# Patient Record
Sex: FEMALE | Race: WHITE | NOT HISPANIC OR LATINO | Employment: OTHER | URBAN - METROPOLITAN AREA
[De-identification: names, ages, dates, MRNs, and addresses within clinical notes are randomized per-mention and may not be internally consistent; named-entity substitution may affect disease eponyms.]

---

## 2017-03-23 ENCOUNTER — OFFICE VISIT (OUTPATIENT)
Dept: ELECTROPHYSIOLOGY | Facility: CLINIC | Age: 67
End: 2017-03-23
Payer: MEDICARE

## 2017-03-23 ENCOUNTER — HOSPITAL ENCOUNTER (OUTPATIENT)
Dept: CARDIOLOGY | Facility: CLINIC | Age: 67
Discharge: HOME | End: 2017-03-23
Payer: MEDICARE

## 2017-03-23 ENCOUNTER — CLINICAL SUPPORT (OUTPATIENT)
Dept: ELECTROPHYSIOLOGY | Facility: CLINIC | Age: 67
End: 2017-03-23
Payer: MEDICARE

## 2017-03-23 VITALS
SYSTOLIC BLOOD PRESSURE: 122 MMHG | BODY MASS INDEX: 21.97 KG/M2 | HEIGHT: 67 IN | WEIGHT: 140 LBS | DIASTOLIC BLOOD PRESSURE: 68 MMHG | HEART RATE: 77 BPM

## 2017-03-23 DIAGNOSIS — I44.30 AV BLOCK: ICD-10-CM

## 2017-03-23 DIAGNOSIS — I44.30 AV BLOCK: Primary | ICD-10-CM

## 2017-03-23 DIAGNOSIS — F41.9 ANXIETY: ICD-10-CM

## 2017-03-23 DIAGNOSIS — Z95.0 CARDIAC PACEMAKER IN SITU: ICD-10-CM

## 2017-03-23 PROCEDURE — 1126F AMNT PAIN NOTED NONE PRSNT: CPT | Mod: S$GLB,,, | Performed by: INTERNAL MEDICINE

## 2017-03-23 PROCEDURE — 1160F RVW MEDS BY RX/DR IN RCRD: CPT | Mod: S$GLB,,, | Performed by: INTERNAL MEDICINE

## 2017-03-23 PROCEDURE — 93000 ELECTROCARDIOGRAM COMPLETE: CPT | Mod: S$GLB,,, | Performed by: INTERNAL MEDICINE

## 2017-03-23 PROCEDURE — 99999 PR PBB SHADOW E&M-EST. PATIENT-LVL III: CPT | Mod: PBBFAC,,, | Performed by: INTERNAL MEDICINE

## 2017-03-23 PROCEDURE — 1159F MED LIST DOCD IN RCRD: CPT | Mod: S$GLB,,, | Performed by: INTERNAL MEDICINE

## 2017-03-23 PROCEDURE — 99214 OFFICE O/P EST MOD 30 MIN: CPT | Mod: S$GLB,,, | Performed by: INTERNAL MEDICINE

## 2017-03-23 PROCEDURE — 1157F ADVNC CARE PLAN IN RCRD: CPT | Mod: S$GLB,,, | Performed by: INTERNAL MEDICINE

## 2017-03-23 PROCEDURE — 93280 PM DEVICE PROGR EVAL DUAL: CPT | Mod: S$GLB,,, | Performed by: INTERNAL MEDICINE

## 2017-03-23 NOTE — PROGRESS NOTES
Subjective:   Patient ID:  Jalyn Love is a 67 y.o. female     Chief complaint: Pacemaker Check.     HPI  From my last note (03/07/16):  PPM for 2nd degree AVB   Significant anxiety -- much improved since she has been on cymbalta  At her last office visit, Ivan Rahman reported that she remained active, though recently retired - walking her dog in the park, yoga, with occasional fatigue.     Update since then:  Since her last office visit, Ms. Love reports feeling well; she denies chest pain, SOB/YORK, dizziness, palpitations, or syncope. She continues to walk in the park and do yoga without difficulty.     Recent cardiac studies:  Echo (03/28/16) CONCLUSIONS:     1 - Normal left ventricular systolic function (EF 55-60%).     2 - Normal right ventricular systolic function .     3 - Normal left ventricular diastolic function.     4 - The estimated PA systolic pressure is greater than 17 mmHg.     5 - Atrial septal aneurysm . No color flow doppler across the IAS.     6-pacemaker catheter seen in the RA, RV.      Device Interrogation (03/23/17) reveals an intrinsic SR w/CHB with stable lead and device function. No arrhythmias or treated episodes noted. She paces 0% in the RA and 100% in the RV. Estimated battery longevity 6 years.     I reviewed today's ECG which demonstrated a V-paced rhythm at 77 bpm; , , and QTc 491.    Current Outpatient Prescriptions   Medication Sig    duloxetine (CYMBALTA) 60 MG capsule TAKE ONE CAPSULE BY MOUTH EVERY DAY     No current facility-administered medications for this visit.      Review of Systems   Constitution: Negative for decreased appetite, diaphoresis, weakness, malaise/fatigue, weight gain and weight loss.   HENT: Negative for headaches and nosebleeds.    Eyes: Negative for blurred vision, double vision and visual disturbance.   Cardiovascular: Negative for chest pain, claudication, cyanosis, dyspnea on exertion, irregular heartbeat, leg  swelling, near-syncope, orthopnea, palpitations, paroxysmal nocturnal dyspnea and syncope.   Respiratory: Negative for cough, shortness of breath and wheezing.    Endocrine: Negative for heat intolerance.   Skin: Negative.  Negative for rash.   Musculoskeletal: Negative.  Negative for muscle weakness and myalgias.   Gastrointestinal: Negative for abdominal pain, anorexia, hematemesis, hematochezia, melena, nausea and vomiting.   Genitourinary: Negative for hematuria and menorrhagia.   Neurological: Negative for excessive daytime sleepiness, dizziness, light-headedness, loss of balance, seizures and vertigo.   Psychiatric/Behavioral: Negative for altered mental status and depression. The patient is not nervous/anxious.        Objective:   Physical Exam   Constitutional: She is oriented to person, place, and time. She appears well-developed and well-nourished.   HENT:   Head: Normocephalic and atraumatic.   Right Ear: External ear normal.   Left Ear: External ear normal.   Eyes: Conjunctivae are normal. Pupils are equal, round, and reactive to light. Left eye exhibits no discharge. No scleral icterus.   Neck: Normal range of motion. Neck supple. No thyromegaly present.   Cardiovascular: Normal rate, regular rhythm, normal heart sounds and intact distal pulses.  Exam reveals no gallop, no S3, no S4, no friction rub, no midsystolic click and no opening snap.    No murmur heard.  Pulses:       Carotid pulses are 2+ on the right side, and 2+ on the left side.       Radial pulses are 2+ on the right side, and 2+ on the left side.        Dorsalis pedis pulses are 2+ on the right side, and 2+ on the left side.        Posterior tibial pulses are 2+ on the right side, and 2+ on the left side.   Pulmonary/Chest: Effort normal and breath sounds normal.   Device pocket is in excellent repair   Abdominal: Soft. Bowel sounds are normal. She exhibits no distension. There is no hepatomegaly. There is no tenderness. There is no  "guarding.   Musculoskeletal: Normal range of motion.        Right ankle: She exhibits no swelling.        Left ankle: She exhibits no swelling.        Right lower leg: She exhibits no swelling.        Left lower leg: She exhibits no swelling.   Neurological: She is alert and oriented to person, place, and time. She has normal strength. No cranial nerve deficit. Gait normal.   Skin: Skin is warm, dry and intact. No rash noted. She is not diaphoretic. No cyanosis. Nails show no clubbing.   Psychiatric: She has a normal mood and affect. Her speech is normal and behavior is normal. Thought content normal. Cognition and memory are normal.   Nursing note and vitals reviewed.    /68  Pulse 77  Ht 5' 7" (1.702 m)  Wt 63.5 kg (139 lb 15.9 oz)  BMI 21.93 kg/m2     Assessment:      1. AV block    2. Cardiac pacemaker in situ    3. Anxiety        Plan:      In summary, Ms. Love is a 67 y.o. female with AVB s/p DC PPM and anxiety. Ms. Love is doing well from a rhythm perspective with stable lead and device function without arrhythmia noted.     Continue current medication regimen and device settings.   Follow up in device clinic as scheduled.   Follow up in EP clinic in 1 year, sooner as needed.     Sophia Dow, MN, APRN, FNP-C    I have personally seen, interviewed and examined this patient and I agree with Ms chelsie's summary as stated above with the following provisos: She is doing well and her paced QRSd is quite narrow predicting a lower risk of pacing induced CMP.          "

## 2017-03-23 NOTE — MR AVS SNAPSHOT
[facility]  [address]  [facility] [address]  Phone: [phone]  Fax: [phone]                  Jalyn Love   3/23/2017 8:40 AM   Office Visit    Description:  Female : 1950   Provider:  Cristi Collins MD   Department:  [facility]           Reason for Visit     Follow-up           Diagnoses this Visit        Comments    AV block    -  Primary     Cardiac pacemaker in situ         Anxiety                To Do List           Future Appointments        Provider Department Dept Phone    2017 8:30 AM Annemarie Kilgore MD [place] [address] [phone]    2017 8:00 AM HOME MONITOR DEVICE CHECK, [facility] [phone]      Goals (5 Years of Data)     None      Follow-Up and Disposition     Return in about 1 year (around 3/23/2018).      [facility]     [facility] On Call Nurse [facility] -  Assistance  Registered nurses in the [facility] On Call Center provide clinical advisement, health education, appointment booking, and other advisory services.  Call for this free service at [phone].             Medications           Message regarding Medications     Verify the changes and/or additions to your medication regime listed below are the same as discussed with your clinician today.  If any of these changes or additions are incorrect, please notify your healthcare provider.        STOP taking these medications     calcium carbonate (OS-TALHA) 500 mg calcium (1,250 mg) tablet Take 1 tablet by mouth once daily.    sodium,potassium,mag sulfates (SUPREP BOWEL PREP KIT) 17.5-3.13-1.6 gram SolR Take 1 each by mouth as directed.           Verify that the below list of medications is an accurate representation of the medications you are currently taking.  If none reported, the list may be blank. If incorrect, please contact your healthcare provider. Carry this list with you in case of emergency.           Current  "Medications     duloxetine (CYMBALTA) 60 MG capsule TAKE ONE CAPSULE BY MOUTH EVERY DAY           Clinical Reference Information           Your Vitals Were     BP Pulse Height Weight BMI    122/68 77 5' 7" (1.702 m) 63.5 kg (139 lb 15.9 oz) 21.93 kg/m2      Blood Pressure          Most Recent Value    BP  122/68      Allergies as of 3/23/2017     Bactrim [Sulfamethoxazole-trimethoprim]      Immunizations Administered on Date of Encounter - 3/23/2017     None      Language Assistance Services     ATTENTION: Language assistance services are available, free of charge. Please call [phone].      ATENCIÓN: Si habla español, tiene a cortez disposición servicios gratuitos de asistencia lingüística. Llame al [phone].     MARIAMA Ý: N?u b?n nói Ti?ng Vi?t, có các d?ch v? h? tr? ngôn ng? mi?n phí dành cho b?n. G?i s? [phone].         [facility] complies with applicable Federal civil rights laws and does not discriminate on the basis of race, color, national origin, age, disability, or sex.        "

## 2017-03-24 DIAGNOSIS — I44.30 AV BLOCK: Primary | ICD-10-CM

## 2017-04-06 ENCOUNTER — OFFICE VISIT (OUTPATIENT)
Dept: INTERNAL MEDICINE | Facility: CLINIC | Age: 67
End: 2017-04-06
Payer: MEDICARE

## 2017-04-06 VITALS
HEART RATE: 75 BPM | BODY MASS INDEX: 21.97 KG/M2 | WEIGHT: 140 LBS | HEIGHT: 67 IN | SYSTOLIC BLOOD PRESSURE: 124 MMHG | DIASTOLIC BLOOD PRESSURE: 82 MMHG

## 2017-04-06 DIAGNOSIS — Z29.9 PREVENTIVE MEASURE: ICD-10-CM

## 2017-04-06 DIAGNOSIS — E55.9 MILD VITAMIN D DEFICIENCY: ICD-10-CM

## 2017-04-06 DIAGNOSIS — Z95.0 CARDIAC PACEMAKER IN SITU: Primary | ICD-10-CM

## 2017-04-06 DIAGNOSIS — F41.9 ANXIETY: ICD-10-CM

## 2017-04-06 DIAGNOSIS — R73.9 HYPERGLYCEMIA: ICD-10-CM

## 2017-04-06 DIAGNOSIS — E78.5 HYPERLIPIDEMIA, UNSPECIFIED HYPERLIPIDEMIA TYPE: ICD-10-CM

## 2017-04-06 LAB
BILIRUB UR QL STRIP: NEGATIVE
CLARITY UR REFRACT.AUTO: CLEAR
COLOR UR AUTO: NORMAL
GLUCOSE UR QL STRIP: NEGATIVE
HGB UR QL STRIP: NEGATIVE
KETONES UR QL STRIP: NEGATIVE
LEUKOCYTE ESTERASE UR QL STRIP: NEGATIVE
NITRITE UR QL STRIP: NEGATIVE
PH UR STRIP: 7 [PH] (ref 5–8)
PROT UR QL STRIP: NEGATIVE
SP GR UR STRIP: 1.01 (ref 1–1.03)
URN SPEC COLLECT METH UR: NORMAL
UROBILINOGEN UR STRIP-ACNC: NEGATIVE EU/DL

## 2017-04-06 PROCEDURE — 81003 URINALYSIS AUTO W/O SCOPE: CPT

## 2017-04-06 PROCEDURE — 90732 PPSV23 VACC 2 YRS+ SUBQ/IM: CPT | Mod: S$GLB,,, | Performed by: INTERNAL MEDICINE

## 2017-04-06 PROCEDURE — G0009 ADMIN PNEUMOCOCCAL VACCINE: HCPCS | Mod: S$GLB,,, | Performed by: INTERNAL MEDICINE

## 2017-04-06 PROCEDURE — 99999 PR PBB SHADOW E&M-EST. PATIENT-LVL III: CPT | Mod: PBBFAC,,, | Performed by: INTERNAL MEDICINE

## 2017-04-06 PROCEDURE — 1157F ADVNC CARE PLAN IN RCRD: CPT | Mod: S$GLB,,, | Performed by: INTERNAL MEDICINE

## 2017-04-06 PROCEDURE — 99214 OFFICE O/P EST MOD 30 MIN: CPT | Mod: 25,S$GLB,, | Performed by: INTERNAL MEDICINE

## 2017-04-06 PROCEDURE — 1126F AMNT PAIN NOTED NONE PRSNT: CPT | Mod: S$GLB,,, | Performed by: INTERNAL MEDICINE

## 2017-04-06 PROCEDURE — 1159F MED LIST DOCD IN RCRD: CPT | Mod: S$GLB,,, | Performed by: INTERNAL MEDICINE

## 2017-04-06 PROCEDURE — 99499 UNLISTED E&M SERVICE: CPT | Mod: S$PBB,,, | Performed by: INTERNAL MEDICINE

## 2017-04-06 PROCEDURE — 1160F RVW MEDS BY RX/DR IN RCRD: CPT | Mod: S$GLB,,, | Performed by: INTERNAL MEDICINE

## 2017-04-06 RX ORDER — DULOXETIN HYDROCHLORIDE 60 MG/1
60 CAPSULE, DELAYED RELEASE ORAL DAILY
Qty: 90 CAPSULE | Refills: 3 | Status: SHIPPED | OUTPATIENT
Start: 2017-04-06 | End: 2017-12-27

## 2017-04-06 NOTE — MR AVS SNAPSHOT
[facility] [address]  [address]  [address]  [facility] [address]  Phone: [phone]                  Jalyn Love   2017 8:30 AM   Office Visit    Description:  Female : 1950   Provider:  Annemarie Kilgore MD   Department:  [facility] [address]           Reason for Visit     Annual Exam           Diagnoses this Visit        Comments    Cardiac pacemaker in situ    -  Primary     Anxiety         Hyperlipidemia, unspecified hyperlipidemia type         Mild vitamin D deficiency         Hyperglycemia         Preventive measure                To Do List           Future Appointments        Provider Department Dept Phone    2017 8:00 AM HOME MONITOR DEVICE CHECK, [facility] [phone]      Goals (5 Years of Data)     None      Follow-Up and Disposition     Return in about 1 year (around 2018).       These Medications        Disp Refills Start End    duloxetine (CYMBALTA) 60 MG capsule 90 capsule 3 2017     Take 1 capsule (60 mg total) by mouth once daily. - Oral    Pharmacy: [facility] [address] [place] [address] [address] AT [facility] [address] Ph #: [phone]       Notes to Pharmacy: **Patient requests 90 days supply**      [facility] On Call     [facility] On Call Nurse Care Line - 24/ Assistance  Unless otherwise directed by your provider, please contact [facility], our nurse care line that is available for 24/7 assistance.     Registered nurses in the [facility] On Call Center provide: appointment scheduling, clinical advisement, health education, and other advisory services.  Call: [phone] (toll free)               Medications           Message regarding Medications     Verify the changes and/or additions to your medication regime listed below are the same as discussed with your clinician today.  If any of these changes or additions are incorrect, please notify your healthcare  "provider.        CHANGE how you are taking these medications     Start Taking Instead of    duloxetine (CYMBALTA) 60 MG capsule duloxetine (CYMBALTA) 60 MG capsule    Dosage:  Take 1 capsule (60 mg total) by mouth once daily. Dosage:  TAKE ONE CAPSULE BY MOUTH EVERY DAY    Reason for Change:  Reorder            Verify that the below list of medications is an accurate representation of the medications you are currently taking.  If none reported, the list may be blank. If incorrect, please contact your healthcare provider. Carry this list with you in case of emergency.           Current Medications     duloxetine (CYMBALTA) 60 MG capsule Take 1 capsule (60 mg total) by mouth once daily.           Clinical Reference Information           Your Vitals Were     BP Pulse Height Weight BMI    124/82 75 5' 7" (1.702 m) 63.5 kg (140 lb) 21.93 kg/m2      Blood Pressure          Most Recent Value    BP  124/82      Allergies as of 4/6/2017     Bactrim [Sulfamethoxazole-trimethoprim]      Immunizations Administered on Date of Encounter - 4/6/2017     Name Date Dose VIS Date Route    Pneumococcal Polysaccharide - 23 Valent 4/6/2017 0.5 mL 4/24/2015 Intramuscular      Orders Placed During Today's Visit      Normal Orders This Visit    Pneumococcal Polysaccharide Vaccine (23 Valent) (SQ/IM)     Urinalysis     Future Labs/Procedures Expected by Expires    CBC auto differential  4/6/2017 6/5/2018    Comprehensive metabolic panel  4/6/2017 4/6/2018    Hemoglobin A1c  4/6/2017 4/6/2018    Lipid panel  4/6/2017 4/6/2018    TSH  4/6/2017 4/6/2018    Vitamin D  4/6/2017 4/6/2018      Language Assistance Services     ATTENTION: Language assistance services are available, free of charge. Please call [phone].      ATENCIÓN: Si habla español, tiene a cortez disposición servicios gratuitos de asistencia lingüística. Llame al [phone].     CHÚ Ý: N?u b?n nói Ti?ng Vi?t, có các d?ch v? h? tr? ngôn ng? mi?n phí dành cho b?n. G?i s? " [phone].         [facility] [address] complies with applicable Federal civil rights laws and does not discriminate on the basis of race, color, national origin, age, disability, or sex.

## 2017-04-13 ENCOUNTER — LAB VISIT (OUTPATIENT)
Dept: LAB | Facility: HOSPITAL | Age: 67
End: 2017-04-13
Attending: INTERNAL MEDICINE
Payer: MEDICARE

## 2017-04-13 DIAGNOSIS — E78.5 HYPERLIPIDEMIA, UNSPECIFIED HYPERLIPIDEMIA TYPE: ICD-10-CM

## 2017-04-13 DIAGNOSIS — Z95.0 CARDIAC PACEMAKER IN SITU: ICD-10-CM

## 2017-04-13 DIAGNOSIS — F41.9 ANXIETY: ICD-10-CM

## 2017-04-13 DIAGNOSIS — E55.9 MILD VITAMIN D DEFICIENCY: ICD-10-CM

## 2017-04-13 DIAGNOSIS — R73.9 HYPERGLYCEMIA: ICD-10-CM

## 2017-04-13 LAB
25(OH)D3+25(OH)D2 SERPL-MCNC: 33 NG/ML
ALBUMIN SERPL BCP-MCNC: 3.7 G/DL
ALP SERPL-CCNC: 69 U/L
ALT SERPL W/O P-5'-P-CCNC: 16 U/L
ANION GAP SERPL CALC-SCNC: 9 MMOL/L
AST SERPL-CCNC: 18 U/L
BASOPHILS # BLD AUTO: 0.05 K/UL
BASOPHILS NFR BLD: 1.3 %
BILIRUB SERPL-MCNC: 0.9 MG/DL
BUN SERPL-MCNC: 15 MG/DL
CALCIUM SERPL-MCNC: 9.5 MG/DL
CHLORIDE SERPL-SCNC: 106 MMOL/L
CHOLEST/HDLC SERPL: 3.1 {RATIO}
CO2 SERPL-SCNC: 28 MMOL/L
CREAT SERPL-MCNC: 0.8 MG/DL
DIFFERENTIAL METHOD: ABNORMAL
EOSINOPHIL # BLD AUTO: 0.3 K/UL
EOSINOPHIL NFR BLD: 7.6 %
ERYTHROCYTE [DISTWIDTH] IN BLOOD BY AUTOMATED COUNT: 12.4 %
EST. GFR  (AFRICAN AMERICAN): >60 ML/MIN/1.73 M^2
EST. GFR  (NON AFRICAN AMERICAN): >60 ML/MIN/1.73 M^2
ESTIMATED AVG GLUCOSE: 114 MG/DL
GLUCOSE SERPL-MCNC: 81 MG/DL
HBA1C MFR BLD HPLC: 5.6 %
HCT VFR BLD AUTO: 39 %
HDL/CHOLESTEROL RATIO: 32.1 %
HDLC SERPL-MCNC: 246 MG/DL
HDLC SERPL-MCNC: 79 MG/DL
HGB BLD-MCNC: 13.3 G/DL
LDLC SERPL CALC-MCNC: 150.6 MG/DL
LYMPHOCYTES # BLD AUTO: 1.6 K/UL
LYMPHOCYTES NFR BLD: 40.9 %
MCH RBC QN AUTO: 31.1 PG
MCHC RBC AUTO-ENTMCNC: 34.1 %
MCV RBC AUTO: 91 FL
MONOCYTES # BLD AUTO: 0.3 K/UL
MONOCYTES NFR BLD: 7.3 %
NEUTROPHILS # BLD AUTO: 1.7 K/UL
NEUTROPHILS NFR BLD: 42.9 %
NONHDLC SERPL-MCNC: 167 MG/DL
PLATELET # BLD AUTO: 215 K/UL
PMV BLD AUTO: 10.1 FL
POTASSIUM SERPL-SCNC: 5 MMOL/L
PROT SERPL-MCNC: 6.9 G/DL
RBC # BLD AUTO: 4.27 M/UL
SODIUM SERPL-SCNC: 143 MMOL/L
TRIGL SERPL-MCNC: 82 MG/DL
TSH SERPL DL<=0.005 MIU/L-ACNC: 1.09 UIU/ML
WBC # BLD AUTO: 3.84 K/UL

## 2017-04-13 PROCEDURE — 82306 VITAMIN D 25 HYDROXY: CPT

## 2017-04-13 PROCEDURE — 80053 COMPREHEN METABOLIC PANEL: CPT

## 2017-04-13 PROCEDURE — 36415 COLL VENOUS BLD VENIPUNCTURE: CPT

## 2017-04-13 PROCEDURE — 85025 COMPLETE CBC W/AUTO DIFF WBC: CPT

## 2017-04-13 PROCEDURE — 83036 HEMOGLOBIN GLYCOSYLATED A1C: CPT

## 2017-04-13 PROCEDURE — 84443 ASSAY THYROID STIM HORMONE: CPT

## 2017-04-13 PROCEDURE — 80061 LIPID PANEL: CPT

## 2017-04-17 NOTE — PROGRESS NOTES
Subjective:       Patient ID: Jalyn Love is a 67 y.o. female.    Chief Complaint: Annual Exam    HPIPt is doing okay physically.  She had recent cardiology check up.  No CP or SOB. Very active. Still trying to help her son who has had issues with drugs.  Review of Systems   Respiratory: Negative for shortness of breath (PND or orthopnea).    Cardiovascular: Negative for chest pain (arm pain or jaw pain).   Gastrointestinal: Negative for abdominal pain, diarrhea, nausea and vomiting.   Genitourinary: Negative for dysuria.   Neurological: Negative for seizures, syncope and headaches.       Objective:      Physical Exam   Constitutional: She is oriented to person, place, and time. She appears well-developed and well-nourished. No distress.   HENT:   Head: Normocephalic.   Mouth/Throat: Oropharynx is clear and moist.   Neck: Neck supple. No JVD present. No thyromegaly present.   Cardiovascular: Normal rate, regular rhythm, normal heart sounds and intact distal pulses.  Exam reveals no gallop and no friction rub.    No murmur heard.  Pulmonary/Chest: Effort normal and breath sounds normal. She has no wheezes. She has no rales.   Abdominal: Soft. Bowel sounds are normal. She exhibits no distension and no mass. There is no tenderness. There is no rebound and no guarding.   Musculoskeletal: She exhibits no edema.   Lymphadenopathy:     She has no cervical adenopathy.   Neurological: She is alert and oriented to person, place, and time.   Skin: Skin is warm and dry.   Psychiatric: She has a normal mood and affect. Her behavior is normal. Judgment and thought content normal.       Assessment:       1. Cardiac pacemaker in situ    2. Anxiety    3. Hyperlipidemia, unspecified hyperlipidemia type    4. Mild vitamin D deficiency    5. Hyperglycemia    6. Preventive measure        Plan:   Cardiac pacemaker in situ  -     CBC auto differential; Future; Expected date: 4/6/17  -     Comprehensive metabolic panel;  Future; Expected date: 4/6/17    Anxiety  -     TSH; Future; Expected date: 4/6/17  Continue cymbalta  Hyperlipidemia, unspecified hyperlipidemia type  -     Lipid panel; Future; Expected date: 4/6/17    Mild vitamin D deficiency  -     Vitamin D; Future; Expected date: 4/6/17    Hyperglycemia  -     Hemoglobin A1c; Future; Expected date: 4/6/17    Preventive measure  -     Urinalysis    Other orders  -     Pneumococcal Polysaccharide Vaccine (23 Valent) (SQ/IM)  -     duloxetine (CYMBALTA) 60 MG capsule; Take 1 capsule (60 mg total) by mouth once daily.  Dispense: 90 capsule; Refill: 3

## 2017-06-26 ENCOUNTER — CLINICAL SUPPORT (OUTPATIENT)
Dept: ELECTROPHYSIOLOGY | Facility: CLINIC | Age: 67
End: 2017-06-26
Payer: MEDICARE

## 2017-06-26 DIAGNOSIS — Z95.0 CARDIAC PACEMAKER IN SITU: ICD-10-CM

## 2017-06-26 DIAGNOSIS — I44.30 AV BLOCK: ICD-10-CM

## 2017-06-26 PROCEDURE — 93296 REM INTERROG EVL PM/IDS: CPT | Mod: S$GLB,,, | Performed by: INTERNAL MEDICINE

## 2017-06-26 PROCEDURE — 93294 REM INTERROG EVL PM/LDLS PM: CPT | Mod: S$GLB,,, | Performed by: INTERNAL MEDICINE

## 2017-09-28 ENCOUNTER — CLINICAL SUPPORT (OUTPATIENT)
Dept: ELECTROPHYSIOLOGY | Facility: CLINIC | Age: 67
End: 2017-09-28
Payer: MEDICARE

## 2017-09-28 DIAGNOSIS — I44.30 AV BLOCK: ICD-10-CM

## 2017-09-28 DIAGNOSIS — Z95.0 CARDIAC PACEMAKER IN SITU: ICD-10-CM

## 2017-09-28 PROCEDURE — 93296 REM INTERROG EVL PM/IDS: CPT | Mod: S$GLB,,, | Performed by: INTERNAL MEDICINE

## 2017-09-28 PROCEDURE — 93294 REM INTERROG EVL PM/LDLS PM: CPT | Mod: S$GLB,,, | Performed by: INTERNAL MEDICINE

## 2017-12-20 ENCOUNTER — TELEPHONE (OUTPATIENT)
Dept: ELECTROPHYSIOLOGY | Facility: CLINIC | Age: 67
End: 2017-12-20

## 2017-12-20 NOTE — TELEPHONE ENCOUNTER
Called patient to inform her the remote transmission was received and all was WNL.  Patient verbalized understanding and stated she has placed a call to her PCP.

## 2017-12-20 NOTE — TELEPHONE ENCOUNTER
----- Message from Binta Machado sent at 12/20/2017 11:45 AM CST -----  Device transmission is WNL.

## 2017-12-20 NOTE — TELEPHONE ENCOUNTER
"Patient contacted the Device Clinic on this morning with c/o "my heart feels fatigued" and I have been feeling really tired and feeling the same way I felt shortly after having my pacemaker put in with all sorts of reprogramming having to be done.  Patient asked if she could send a transmission from her Remote Pacemaker home monitor.  Informed the patient she can go ahead and send a transmission.  Spoke with the Technician who monitors the Care link web site with the above information and she will review the report and a message would be sent to Dr. Collins's nurse if anything of alarm value is found on the transmission.      Patient called back and stated her IPad was next to her remote monitor, "should I send it again"?  Informed the patient the transmission was received and the "summary" reveals " no new events"  Informed the patient the Technician will review the entire report as well as should there be no abnormalities she should contact her PCP for further evaluation.  Patient verbalized understanding to all of the above.    "

## 2017-12-27 ENCOUNTER — OFFICE VISIT (OUTPATIENT)
Dept: INTERNAL MEDICINE | Facility: CLINIC | Age: 67
End: 2017-12-27
Payer: MEDICARE

## 2017-12-27 ENCOUNTER — HOSPITAL ENCOUNTER (OUTPATIENT)
Dept: RADIOLOGY | Facility: HOSPITAL | Age: 67
Discharge: HOME | End: 2017-12-27
Attending: INTERNAL MEDICINE
Payer: MEDICARE

## 2017-12-27 VITALS
DIASTOLIC BLOOD PRESSURE: 83 MMHG | WEIGHT: 143.31 LBS | HEIGHT: 67 IN | BODY MASS INDEX: 22.49 KG/M2 | OXYGEN SATURATION: 99 % | HEART RATE: 71 BPM | SYSTOLIC BLOOD PRESSURE: 114 MMHG | TEMPERATURE: 98 F

## 2017-12-27 DIAGNOSIS — R07.89 CHEST WALL DISCOMFORT: Primary | ICD-10-CM

## 2017-12-27 DIAGNOSIS — Z95.0 CARDIAC PACEMAKER IN SITU: ICD-10-CM

## 2017-12-27 DIAGNOSIS — R07.89 CHEST WALL DISCOMFORT: ICD-10-CM

## 2017-12-27 PROCEDURE — 93005 ELECTROCARDIOGRAM TRACING: CPT | Mod: S$GLB,,, | Performed by: INTERNAL MEDICINE

## 2017-12-27 PROCEDURE — 99213 OFFICE O/P EST LOW 20 MIN: CPT | Mod: S$GLB,,, | Performed by: INTERNAL MEDICINE

## 2017-12-27 PROCEDURE — 93010 ELECTROCARDIOGRAM REPORT: CPT | Mod: S$GLB,,, | Performed by: INTERNAL MEDICINE

## 2017-12-27 PROCEDURE — 71020 XR CHEST PA AND LATERAL: CPT | Mod: TC,PO

## 2017-12-27 PROCEDURE — 71020 XR CHEST PA AND LATERAL: CPT | Mod: 26,,, | Performed by: RADIOLOGY

## 2017-12-27 PROCEDURE — 99999 PR PBB SHADOW E&M-EST. PATIENT-LVL IV: CPT | Mod: PBBFAC,,, | Performed by: INTERNAL MEDICINE

## 2017-12-27 RX ORDER — CHOLECALCIFEROL (VITAMIN D3) 25 MCG
1000 TABLET ORAL DAILY
COMMUNITY

## 2017-12-27 NOTE — PROGRESS NOTES
Subjective:       Patient ID: Jalyn Love is a 67 y.o. female.    Chief Complaint: Chest Pain (more of a discomfort than pain)    Patient presents for evaluation of persistent left anterior chest wall discomfort.  She states it is not really a pain.  Hurts to turn neck and move the chest wall certain ways.  She is concerned that there may be something wrong with the pacer that she has.  She called Cardiology and the pacer was checked via phone.  It was said to be normally functioning.  No shortness of breathe.  Not aware of any palpitations.    She does yoga regularly and may have strained the chest with a certain pose called cobra which involves lying face down and raising the neck and upper torso.      Chest Pain    Pertinent negatives include no cough, fever, palpitations or shortness of breath.     Review of Systems   Constitutional: Negative for chills and fever.   Respiratory: Negative for cough, chest tightness and shortness of breath.    Cardiovascular: Positive for chest pain. Negative for palpitations and leg swelling.       Objective:      Physical Exam   Constitutional: She appears well-developed and well-nourished. No distress.   Cardiovascular: Normal rate, regular rhythm and normal heart sounds.  Exam reveals no gallop.    Pacer in place.  Mildly tender with direct palpation around the pacer area.  No redness.   Pulmonary/Chest: Effort normal and breath sounds normal. No respiratory distress.   Neurological: She is alert.   Vitals reviewed.      EKG with rhythm strip in the office shows pacer function.  CXR done and reviewed with patient in the office is normal.  Pacer wires seemed to be in place.  Assessment:       1. Chest wall discomfort    2. Cardiac pacemaker in situ        Plan:   1. Reassured that I think the chest wall discomfort is probably from a minimal chest wall strain.  Can use tyelnol, ibuprofen as needed.  Watch chest area in yoga poses.  2. Folllowup with regular PCP as  needed.

## 2017-12-27 NOTE — PATIENT INSTRUCTIONS
1. Reassured that I think this is a mild chest wall strain.  2. Be careful with poses in yoga.  May want to use a small towel under pacer when doing face down poses.  3. Tylenol, ibuprofen as needed.  4. Followup with regular PCP as needed.

## 2017-12-28 ENCOUNTER — TELEPHONE (OUTPATIENT)
Dept: ELECTROPHYSIOLOGY | Facility: CLINIC | Age: 67
End: 2017-12-28

## 2017-12-28 ENCOUNTER — CLINICAL SUPPORT (OUTPATIENT)
Dept: ELECTROPHYSIOLOGY | Facility: CLINIC | Age: 67
End: 2017-12-28
Payer: MEDICARE

## 2017-12-28 DIAGNOSIS — Z95.0 CARDIAC PACEMAKER IN SITU: ICD-10-CM

## 2017-12-28 DIAGNOSIS — I44.30 AV BLOCK: ICD-10-CM

## 2017-12-28 PROCEDURE — 93296 REM INTERROG EVL PM/IDS: CPT | Mod: S$GLB,,, | Performed by: INTERNAL MEDICINE

## 2017-12-28 PROCEDURE — 93294 REM INTERROG EVL PM/LDLS PM: CPT | Mod: S$GLB,,, | Performed by: INTERNAL MEDICINE

## 2017-12-28 NOTE — TELEPHONE ENCOUNTER
Patient is due for a remote pacemaker check today. I called patient to remind her to send her transmission. Patient will send her transmission today.

## 2017-12-29 ENCOUNTER — TELEPHONE (OUTPATIENT)
Dept: INTERNAL MEDICINE | Facility: CLINIC | Age: 67
End: 2017-12-29

## 2017-12-29 ENCOUNTER — NURSE TRIAGE (OUTPATIENT)
Dept: ADMINISTRATIVE | Facility: CLINIC | Age: 67
End: 2017-12-29

## 2017-12-29 ENCOUNTER — OFFICE VISIT (OUTPATIENT)
Dept: URGENT CARE | Facility: CLINIC | Age: 67
End: 2017-12-29
Payer: MEDICARE

## 2017-12-29 VITALS
BODY MASS INDEX: 21.97 KG/M2 | WEIGHT: 140 LBS | OXYGEN SATURATION: 99 % | HEIGHT: 67 IN | HEART RATE: 75 BPM | RESPIRATION RATE: 18 BRPM | DIASTOLIC BLOOD PRESSURE: 84 MMHG | TEMPERATURE: 97 F | SYSTOLIC BLOOD PRESSURE: 132 MMHG
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
## 2017-12-29 DIAGNOSIS — Z12.31 SCREENING MAMMOGRAM, ENCOUNTER FOR: Primary | ICD-10-CM

## 2017-12-29 PROCEDURE — 99214 OFFICE O/P EST MOD 30 MIN: CPT | Mod: S$GLB,,, | Performed by: PHYSICIAN ASSISTANT

## 2017-12-29 RX ORDER — AMOXICILLIN AND CLAVULANATE POTASSIUM 875; 125 MG/1; MG/1
1 TABLET, FILM COATED ORAL 2 TIMES DAILY
Qty: 20 TABLET | Refills: 0 | Status: SHIPPED | OUTPATIENT
Start: 2017-12-29 | End: 2018-04-06

## 2017-12-29 NOTE — PATIENT INSTRUCTIONS
- Rest.    - Drink plenty of fluids.    - Tylenol or Ibuprofen as directed as needed for fever/pain.    - Keep the wound clean and dry.    - Wash daily with soap and water.    - Change dressing daily.    - Ice for the next 24-48 hours.    - Elevate when possible.    - Follow up with your PCP or specialty clinic as directed in the next 1-2 weeks if not improved or as needed.  You can call [phone] to schedule an appointment with the appropriate provider.    - Go to the ED if your symptoms worsen.     Dog Bite  A dog bite can cause a wound deep enough to break the skin. In such cases, the wound is cleaned and sometimes closed. If the wound is closed, it is usually not completely closed. This is so that fluid can drain if the wound becomes infected. Often, wounds will be left open to heal. In addition to wound care, a tetanus shot may be given, if needed.    Home care  · Wash your hands well with soap and warm water before and after caring for the wound. This helps lower the risk of infection.  · Care for the wound as directed. If a dressing was applied to the wound, be sure to change it as directed.  · If the wound bleeds, place a clean, soft cloth on the wound. Then firmly apply pressure until the bleeding stops. This may take up to 5 minutes. Do not release the pressure and look at the wound during this time.  · Most wounds heal within 10 days. But an infection can occur even with proper treatment. So be sure to check the wound daily for signs of infection (see below).  · Antibiotics may be prescribed. These help prevent or treat infection. If youre given antibiotics, take them as directed. Also be sure to complete the medicines.  Rabies prevention  Rabies is a virus that can be carried in certain animals. These can include domestic animals such as dogs and cats. Pets fully vaccinated against rabies (2 shots) are at very low risk of infection. But because human rabies is almost always fatal, any biting pet  should be confined for 10 days as an extra precaution. In general, if there is a risk for rabies, the following steps may need to be taken:  · If someones pet dog has bitten you, it should be kept in a secure area for the next 10 days to watch for signs of illness. (If the pet owner wont allow this, contact your local animal control center.) If the dog becomes ill or dies during that time, contact your local animal control center at once so the animal may be tested for rabies. If the dog stays healthy for the next 10 days, there is no danger of rabies in the animal or you.  ¨ If a stray dog bit you, contact your local animal control center. They can give information on capture, quarantine, and animal rabies testing.  ¨ If you cant find the animal that bit you in the next 2 days, and if rabies exists in your area, you may need to receive the rabies vaccine series. Call your healthcare provider right away. Or, return to the emergency department promptly.  ¨ All animal bites should be reported to the local animal control center. If you were not given a form to fill out, you can report this yourself.  Follow-up care  Follow up with your healthcare provider, or as directed.  When to seek medical advice  Call your healthcare provider right away if any of these occur:  · Signs of infection:  ¨ Spreading redness or warmth from the wound  ¨ Increased pain or swelling  ¨ Fever of 100.4ºF (38ºC) or higher, or as directed by your healthcare provider  ¨ Colored fluid or pus draining from the wound  · Signs of rabies infection:  ¨ Headache  ¨ Confusion  ¨ Strange behavior  ¨ Increased salivating and drooling  ¨ Seizure  · Decreased ability to move any body part near the wound  · Bleeding that can't be stopped after 5 minutes of firm pressure  Date Last Reviewed: 3/1/2017  © 1633-8478 [employer]. [address] [place] [address], [place] [address]. All rights reserved. This information is not intended as a substitute for  professional medical care. Always follow your healthcare professional's instructions.

## 2017-12-29 NOTE — TELEPHONE ENCOUNTER
Reason for Disposition   Cut (length > 1/8 inch or 3 mm) or skin tear and any animal    Protocols used:  ANIMAL BITE-A-OH  Ms. Love was bit in the inner thigh by domestic dogs this morning. Patient advsied to go to ED. She states she will go to urgent care.

## 2017-12-29 NOTE — PROGRESS NOTES
"Subjective:       Patient ID: Jalyn Love is a 67 y.o. female.    Vitals:  height is 5' 7" (1.702 m) and weight is 63.5 kg (140 lb). Her tympanic temperature is 97.3 °F (36.3 °C). Her blood pressure is 132/84 and her pulse is 75. Her respiration is 18 and oxygen saturation is 99%.     Chief Complaint: Animal Bite    Animal Bite    The incident occurred just prior to arrival. The incident occurred at a playground. She came to the ER via personal transport. There is an injury to the right knee. The pain is mild. It is unlikely that a foreign body is present. Pertinent negatives include no chest pain, no abdominal pain, no nausea, no vomiting and no headaches. There have been no prior injuries to these areas. She is right-handed. Her tetanus status is UTD. She has been behaving normally. There were no sick contacts. She has received no recent medical care.     Review of Systems   Constitution: Negative for chills and fever.   HENT: Negative for sore throat.    Eyes: Negative for blurred vision.   Cardiovascular: Negative for chest pain.   Respiratory: Negative for shortness of breath.    Skin: Negative for rash.   Musculoskeletal: Negative for back pain and joint pain.   Gastrointestinal: Negative for abdominal pain, diarrhea, nausea and vomiting.   Neurological: Negative for headaches.   Psychiatric/Behavioral: The patient is not nervous/anxious.        Objective:      Physical Exam   Constitutional: She is oriented to person, place, and time. She appears well-developed and well-nourished.   HENT:   Head: Normocephalic and atraumatic.   Eyes: Conjunctivae are normal.   Neck: Normal range of motion. Neck supple. No thyromegaly present.   Cardiovascular: Normal rate and regular rhythm.  Exam reveals no gallop and no friction rub.    No murmur heard.  Pulmonary/Chest: Effort normal and breath sounds normal. She has no wheezes. She has no rales.   Musculoskeletal: Normal range of motion.        Right upper " leg: She exhibits tenderness and swelling.        Legs:  Lymphadenopathy:     She has no cervical adenopathy.   Neurological: She is alert and oriented to person, place, and time.   Skin: Skin is warm and dry. No rash noted. No erythema.   Psychiatric: She has a normal mood and affect. Her behavior is normal. Judgment and thought content normal.   Nursing note and vitals reviewed.      10:58 AM - Wound was irrigated with NS.  A dressing was applied.    Assessment:       1. Dog bite, initial encounter        Plan:         Dog bite, initial encounter  -     amoxicillin-clavulanate 875-125mg (AUGMENTIN) 875-125 mg per tablet; Take 1 tablet by mouth 2 (two) times daily.  Dispense: 20 tablet; Refill: 0      Jalyn Solares was seen today for animal bite.    Diagnoses and all orders for this visit:    Dog bite, initial encounter  -     amoxicillin-clavulanate 875-125mg (AUGMENTIN) 875-125 mg per tablet; Take 1 tablet by mouth 2 (two) times daily.      Patient Instructions   - Rest.    - Drink plenty of fluids.    - Tylenol or Ibuprofen as directed as needed for fever/pain.    - Keep the wound clean and dry.    - Wash daily with soap and water.    - Change dressing daily.    - Ice for the next 24-48 hours.    - Elevate when possible.    - Follow up with your PCP or specialty clinic as directed in the next 1-2 weeks if not improved or as needed.  You can call [phone] to schedule an appointment with the appropriate provider.    - Go to the ED if your symptoms worsen.     Dog Bite  A dog bite can cause a wound deep enough to break the skin. In such cases, the wound is cleaned and sometimes closed. If the wound is closed, it is usually not completely closed. This is so that fluid can drain if the wound becomes infected. Often, wounds will be left open to heal. In addition to wound care, a tetanus shot may be given, if needed.    Home care  · Wash your hands well with soap and warm water before and after caring for the  wound. This helps lower the risk of infection.  · Care for the wound as directed. If a dressing was applied to the wound, be sure to change it as directed.  · If the wound bleeds, place a clean, soft cloth on the wound. Then firmly apply pressure until the bleeding stops. This may take up to 5 minutes. Do not release the pressure and look at the wound during this time.  · Most wounds heal within 10 days. But an infection can occur even with proper treatment. So be sure to check the wound daily for signs of infection (see below).  · Antibiotics may be prescribed. These help prevent or treat infection. If youre given antibiotics, take them as directed. Also be sure to complete the medicines.  Rabies prevention  Rabies is a virus that can be carried in certain animals. These can include domestic animals such as dogs and cats. Pets fully vaccinated against rabies (2 shots) are at very low risk of infection. But because human rabies is almost always fatal, any biting pet should be confined for 10 days as an extra precaution. In general, if there is a risk for rabies, the following steps may need to be taken:  · If someones pet dog has bitten you, it should be kept in a secure area for the next 10 days to watch for signs of illness. (If the pet owner wont allow this, contact your local animal control center.) If the dog becomes ill or dies during that time, contact your local animal control center at once so the animal may be tested for rabies. If the dog stays healthy for the next 10 days, there is no danger of rabies in the animal or you.  ¨ If a stray dog bit you, contact your local animal control center. They can give information on capture, quarantine, and animal rabies testing.  ¨ If you cant find the animal that bit you in the next 2 days, and if rabies exists in your area, you may need to receive the rabies vaccine series. Call your healthcare provider right away. Or, return to the emergency department  promptly.  ¨ All animal bites should be reported to the local animal control center. If you were not given a form to fill out, you can report this yourself.  Follow-up care  Follow up with your healthcare provider, or as directed.  When to seek medical advice  Call your healthcare provider right away if any of these occur:  · Signs of infection:  ¨ Spreading redness or warmth from the wound  ¨ Increased pain or swelling  ¨ Fever of 100.4ºF (38ºC) or higher, or as directed by your healthcare provider  ¨ Colored fluid or pus draining from the wound  · Signs of rabies infection:  ¨ Headache  ¨ Confusion  ¨ Strange behavior  ¨ Increased salivating and drooling  ¨ Seizure  · Decreased ability to move any body part near the wound  · Bleeding that can't be stopped after 5 minutes of firm pressure  Date Last Reviewed: 3/1/2017  © 4980-0871 [employer]. [address] [place] [address]. All rights reserved. This information is not intended as a substitute for professional medical care. Always follow your healthcare professional's instructions.

## 2017-12-29 NOTE — TELEPHONE ENCOUNTER
----- Message from Patt Willis sent at 12/29/2017  4:18 PM CST -----  Contact: self [phone]  Type: Orders Request    What orders/ testing are being requested? Mammogram      Is there a future appointment scheduled for the patient with PCP? No      When?    Comments:  Please advise , Thanks

## 2018-01-04 ENCOUNTER — TELEPHONE (OUTPATIENT)
Dept: ELECTROPHYSIOLOGY | Facility: CLINIC | Age: 68
End: 2018-01-04

## 2018-01-04 NOTE — TELEPHONE ENCOUNTER
Returned patient's call on this afternoon.  Patient stated she was unable to view the remote Pacemaker reports in her patient portal.  Expressed to the patient this type of report is not set up for automatic release.  Informed the patient I have gone in and released the June and Sept remote checks.  Also informed the patient once the most recent remote check done on 12/28/17 is signed by the doctor it can also be released.  Informed the patient all of the transmissions reveal all is WNL.  Patient verbalized understanding to all of the above.

## 2018-01-08 ENCOUNTER — OFFICE VISIT (OUTPATIENT)
Dept: INTERNAL MEDICINE | Facility: CLINIC | Age: 68
End: 2018-01-08
Payer: MEDICARE

## 2018-01-08 VITALS
SYSTOLIC BLOOD PRESSURE: 121 MMHG | HEART RATE: 77 BPM | WEIGHT: 144.19 LBS | BODY MASS INDEX: 22.63 KG/M2 | DIASTOLIC BLOOD PRESSURE: 73 MMHG | HEIGHT: 67 IN

## 2018-01-08 DIAGNOSIS — S70.11XA HEMATOMA OF RIGHT THIGH, INITIAL ENCOUNTER: Primary | ICD-10-CM

## 2018-01-08 PROCEDURE — 99213 OFFICE O/P EST LOW 20 MIN: CPT | Mod: S$GLB,,, | Performed by: INTERNAL MEDICINE

## 2018-01-08 PROCEDURE — 99999 PR PBB SHADOW E&M-EST. PATIENT-LVL III: CPT | Mod: PBBFAC,,, | Performed by: INTERNAL MEDICINE

## 2018-01-08 RX ORDER — DULOXETIN HYDROCHLORIDE 60 MG/1
CAPSULE, DELAYED RELEASE ORAL
Refills: 3 | COMMUNITY
Start: 2017-10-09 | End: 2018-04-06 | Stop reason: SDUPTHER

## 2018-01-08 NOTE — PROGRESS NOTES
REASON FOR VISIT:  This is a 67-year-old female who was bitten by a dog on   12/29/2017 at a dog park.  She had two puncture wounds.  She went to Ur[facility].  Augmentin was given.  The puncture wounds have healed nicely as she puts   it, but she does still feel a hard, firm spot over the right thigh where she got   bit and there is bruising and some discomfort.    PAST MEDICAL HISTORY:  Pacemaker.  Anxiety.  Hyperlipidemia.    VACCINATION HISTORY:  Last tetanus was in 2014.    PHYSICAL EXAMINATION:  VITAL SIGNS:  Per EPIC.  EXTREMITIES:  2+ pedal pulses.  There is soft tissue firm, swollen area over the   lower medial thigh region, about 2 x 2 cm.  There is ecchymosis and there is   also ecchymosis over the medial aspect of the thigh and knee region and some in   the anterior upper shin or leg.  Two puncture marks are noted.  No signs of   infection.  This area swollen is not warm, minimally tender.  I was able to   review an iPhone picture when it first happened and the degree of swelling was   more and there was no ecchymosis over the medial thigh.    IMPRESSION:  Traumatic hematoma, clinically resolving.    PLAN:  She can take Advil or Aleve as needed for pain, but continue with putting   a warm pack over the area.  In a week or two if this does not completely   resolve, consider referral to Surgery.            /catalina [id number] review        DAGMAR/MADELINE  dd: 01/08/2018 16:40:39 (CST)  td: 01/09/2018 09:39:27 (CST)  Doc ID   #[id number]  Job ID #[id number]    CC:

## 2018-02-05 ENCOUNTER — HOSPITAL ENCOUNTER (OUTPATIENT)
Dept: RADIOLOGY | Facility: HOSPITAL | Age: 68
Discharge: HOME | End: 2018-02-05
Attending: INTERNAL MEDICINE
Payer: MEDICARE

## 2018-02-05 ENCOUNTER — OFFICE VISIT (OUTPATIENT)
Dept: OPTOMETRY | Facility: CLINIC | Age: 68
End: 2018-02-05
Payer: MEDICARE

## 2018-02-05 VITALS — HEIGHT: 67 IN | BODY MASS INDEX: 22.6 KG/M2 | WEIGHT: 144 LBS

## 2018-02-05 DIAGNOSIS — H16.229 KERATOCONJUNCTIVITIS SICCA, NOT SPECIFIED AS SJOGREN'S, UNSPECIFIED LATERALITY: Primary | ICD-10-CM

## 2018-02-05 DIAGNOSIS — Z12.31 SCREENING MAMMOGRAM, ENCOUNTER FOR: ICD-10-CM

## 2018-02-05 PROCEDURE — 77067 SCR MAMMO BI INCL CAD: CPT | Mod: TC

## 2018-02-05 PROCEDURE — 77063 BREAST TOMOSYNTHESIS BI: CPT | Mod: 26,,, | Performed by: RADIOLOGY

## 2018-02-05 PROCEDURE — 77067 SCR MAMMO BI INCL CAD: CPT | Mod: 26,,, | Performed by: RADIOLOGY

## 2018-02-05 PROCEDURE — 99999 PR PBB SHADOW E&M-EST. PATIENT-LVL II: CPT | Mod: PBBFAC,,, | Performed by: OPTOMETRIST

## 2018-02-05 NOTE — PROGRESS NOTES
HPI     Jalyn Love is a 68 y.o. Female who returns for continued   eye care. Jalyn was last seen by us on 01/14/2016 for bilateral   presbyopia.She wears progressive lenses for optical correction. She   reports that she has not noticed a significant changes.  She   underwentLasik Sx about 14 years ago, and still struggles with dry eyes.   She is interested in a new treatment for this. She uses artificial tears   but feels as if that just provides short term relief.    (--)blurred vision  (--)Headaches  (--)diplopia  (--)flashes  (--)floaters  (--)pain  (--)Itching  (--)tearing  (--)burning  (+)Dryness  (+) OTC Drops  (--)Photophobia    Last edited by Sonido Mcgraw, OD on 2/5/2018  9:32 AM. (History)        Review of Systems   Constitutional: Negative for chills, fever and malaise/fatigue.   HENT: Negative for congestion and hearing loss.    Eyes: Negative for blurred vision, double vision, photophobia, pain, discharge and redness.        S/p LASIK  Dry eyes   Respiratory: Negative.    Cardiovascular: Negative.    Gastrointestinal: Negative.    Genitourinary: Negative.    Musculoskeletal: Negative.    Skin: Negative.    Neurological: Negative for seizures.   Endo/Heme/Allergies: Negative for environmental allergies.        Allergies: bactrim   Psychiatric/Behavioral: Negative.        Assessment /Plan     For exam results, see Encounter Report.    Keratoconjunctivitis sicca, not specified as Sjogren's, unspecified laterality  - S/p LASIK  - Decreased TBUT in both eyes  - Lissamine green conjunctival staining in both eyes  - Symptomatic with no relief from OTC artificial tears  - Rx Xiidra eye drops for use twice daily in each eye    Nuclkear Sclerosis --> Asymptomatic; not visually significant  - no treatment needed at this time; Monitor annually    Refractive error with Presbyopia  - same specs ok    Glasses Prescription (2/5/2018)        Sphere Cylinder Add    Right +0.75 Sphere +2.75    Left +1.00  Sphere +2.75    Type:  PAL    Expiration Date:  2/6/2019          Patient education; RTC in 3 months for Dry eye check

## 2018-03-29 ENCOUNTER — CLINICAL SUPPORT (OUTPATIENT)
Dept: ELECTROPHYSIOLOGY | Facility: CLINIC | Age: 68
End: 2018-03-29
Payer: MEDICARE

## 2018-03-29 DIAGNOSIS — I44.30 AV BLOCK: ICD-10-CM

## 2018-03-29 DIAGNOSIS — Z95.0 CARDIAC PACEMAKER IN SITU: ICD-10-CM

## 2018-03-29 PROCEDURE — 93280 PM DEVICE PROGR EVAL DUAL: CPT | Mod: S$GLB,,, | Performed by: INTERNAL MEDICINE

## 2018-04-06 ENCOUNTER — OFFICE VISIT (OUTPATIENT)
Dept: INTERNAL MEDICINE | Facility: CLINIC | Age: 68
End: 2018-04-06
Payer: MEDICARE

## 2018-04-06 VITALS
WEIGHT: 137.38 LBS | HEART RATE: 78 BPM | TEMPERATURE: 98 F | SYSTOLIC BLOOD PRESSURE: 123 MMHG | HEIGHT: 67 IN | DIASTOLIC BLOOD PRESSURE: 61 MMHG | OXYGEN SATURATION: 98 % | BODY MASS INDEX: 21.56 KG/M2

## 2018-04-06 DIAGNOSIS — N60.89: ICD-10-CM

## 2018-04-06 DIAGNOSIS — I44.30 AV BLOCK: Primary | ICD-10-CM

## 2018-04-06 DIAGNOSIS — E78.5 HYPERLIPIDEMIA, UNSPECIFIED HYPERLIPIDEMIA TYPE: ICD-10-CM

## 2018-04-06 DIAGNOSIS — Z13.89 SCREENING FOR BLOOD OR PROTEIN IN URINE: ICD-10-CM

## 2018-04-06 DIAGNOSIS — R73.9 HYPERGLYCEMIA: ICD-10-CM

## 2018-04-06 DIAGNOSIS — M89.9 DISORDER OF BONE: ICD-10-CM

## 2018-04-06 DIAGNOSIS — E55.9 MILD VITAMIN D DEFICIENCY: ICD-10-CM

## 2018-04-06 LAB
BILIRUB UR QL STRIP: NEGATIVE
CLARITY UR REFRACT.AUTO: CLEAR
COLOR UR AUTO: YELLOW
GLUCOSE UR QL STRIP: NEGATIVE
HGB UR QL STRIP: NEGATIVE
KETONES UR QL STRIP: NEGATIVE
LEUKOCYTE ESTERASE UR QL STRIP: NEGATIVE
NITRITE UR QL STRIP: NEGATIVE
PH UR STRIP: 7 [PH] (ref 5–8)
PROT UR QL STRIP: NEGATIVE
SP GR UR STRIP: 1.01 (ref 1–1.03)
URN SPEC COLLECT METH UR: NORMAL
UROBILINOGEN UR STRIP-ACNC: NEGATIVE EU/DL

## 2018-04-06 PROCEDURE — 99214 OFFICE O/P EST MOD 30 MIN: CPT | Mod: S$GLB,,, | Performed by: INTERNAL MEDICINE

## 2018-04-06 PROCEDURE — 81003 URINALYSIS AUTO W/O SCOPE: CPT

## 2018-04-06 PROCEDURE — 99999 PR PBB SHADOW E&M-EST. PATIENT-LVL V: CPT | Mod: PBBFAC,,, | Performed by: INTERNAL MEDICINE

## 2018-04-06 RX ORDER — DULOXETIN HYDROCHLORIDE 60 MG/1
CAPSULE, DELAYED RELEASE ORAL
Qty: 90 CAPSULE | Refills: 3 | Status: SHIPPED | OUTPATIENT
Start: 2018-04-06 | End: 2019-04-07 | Stop reason: SDUPTHER

## 2018-04-09 ENCOUNTER — LAB VISIT (OUTPATIENT)
Dept: LAB | Facility: HOSPITAL | Age: 68
End: 2018-04-09
Attending: INTERNAL MEDICINE
Payer: MEDICARE

## 2018-04-09 DIAGNOSIS — E55.9 MILD VITAMIN D DEFICIENCY: ICD-10-CM

## 2018-04-09 DIAGNOSIS — E78.5 HYPERLIPIDEMIA, UNSPECIFIED HYPERLIPIDEMIA TYPE: ICD-10-CM

## 2018-04-09 DIAGNOSIS — R73.9 HYPERGLYCEMIA: ICD-10-CM

## 2018-04-09 LAB
25(OH)D3+25(OH)D2 SERPL-MCNC: 44 NG/ML
ALBUMIN SERPL BCP-MCNC: 3.8 G/DL
ALP SERPL-CCNC: 63 U/L
ALT SERPL W/O P-5'-P-CCNC: 17 U/L
ANION GAP SERPL CALC-SCNC: 8 MMOL/L
AST SERPL-CCNC: 19 U/L
BASOPHILS # BLD AUTO: 0.07 K/UL
BASOPHILS NFR BLD: 1.5 %
BILIRUB SERPL-MCNC: 0.8 MG/DL
BUN SERPL-MCNC: 13 MG/DL
CALCIUM SERPL-MCNC: 9.4 MG/DL
CHLORIDE SERPL-SCNC: 104 MMOL/L
CHOLEST SERPL-MCNC: 255 MG/DL
CHOLEST/HDLC SERPL: 3.1 {RATIO}
CO2 SERPL-SCNC: 29 MMOL/L
CREAT SERPL-MCNC: 0.7 MG/DL
DIFFERENTIAL METHOD: NORMAL
EOSINOPHIL # BLD AUTO: 0.3 K/UL
EOSINOPHIL NFR BLD: 6.9 %
ERYTHROCYTE [DISTWIDTH] IN BLOOD BY AUTOMATED COUNT: 12.7 %
EST. GFR  (AFRICAN AMERICAN): >60 ML/MIN/1.73 M^2
EST. GFR  (NON AFRICAN AMERICAN): >60 ML/MIN/1.73 M^2
ESTIMATED AVG GLUCOSE: 103 MG/DL
GLUCOSE SERPL-MCNC: 84 MG/DL
HBA1C MFR BLD HPLC: 5.2 %
HCT VFR BLD AUTO: 40.3 %
HDLC SERPL-MCNC: 82 MG/DL
HDLC SERPL: 32.2 %
HGB BLD-MCNC: 13 G/DL
IMM GRANULOCYTES # BLD AUTO: 0.02 K/UL
IMM GRANULOCYTES NFR BLD AUTO: 0.4 %
LDLC SERPL CALC-MCNC: 153 MG/DL
LYMPHOCYTES # BLD AUTO: 2.1 K/UL
LYMPHOCYTES NFR BLD: 44.2 %
MCH RBC QN AUTO: 31 PG
MCHC RBC AUTO-ENTMCNC: 32.3 G/DL
MCV RBC AUTO: 96 FL
MONOCYTES # BLD AUTO: 0.4 K/UL
MONOCYTES NFR BLD: 8.2 %
NEUTROPHILS # BLD AUTO: 1.8 K/UL
NEUTROPHILS NFR BLD: 38.8 %
NONHDLC SERPL-MCNC: 173 MG/DL
NRBC BLD-RTO: 0 /100 WBC
PLATELET # BLD AUTO: 228 K/UL
PMV BLD AUTO: 10.2 FL
POTASSIUM SERPL-SCNC: 4.1 MMOL/L
PROT SERPL-MCNC: 6.8 G/DL
RBC # BLD AUTO: 4.2 M/UL
SODIUM SERPL-SCNC: 141 MMOL/L
TRIGL SERPL-MCNC: 100 MG/DL
TSH SERPL DL<=0.005 MIU/L-ACNC: 1.21 UIU/ML
WBC # BLD AUTO: 4.66 K/UL

## 2018-04-09 PROCEDURE — 85025 COMPLETE CBC W/AUTO DIFF WBC: CPT

## 2018-04-09 PROCEDURE — 82306 VITAMIN D 25 HYDROXY: CPT

## 2018-04-09 PROCEDURE — 83036 HEMOGLOBIN GLYCOSYLATED A1C: CPT

## 2018-04-09 PROCEDURE — 36415 COLL VENOUS BLD VENIPUNCTURE: CPT | Mod: PO

## 2018-04-09 PROCEDURE — 80053 COMPREHEN METABOLIC PANEL: CPT

## 2018-04-09 PROCEDURE — 80061 LIPID PANEL: CPT

## 2018-04-09 PROCEDURE — 84443 ASSAY THYROID STIM HORMONE: CPT

## 2018-04-09 NOTE — PROGRESS NOTES
Subjective:       Patient ID: Jalyn Love is a 68 y.o. female.    Chief Complaint: Annual Exam    HPIPt is feeling well - no CP or SOB.  Very active in [employer] - retired now.  Review of Systems   Constitutional: Negative for activity change and unexpected weight change.   HENT: Negative for hearing loss, rhinorrhea and trouble swallowing.    Eyes: Negative for discharge and visual disturbance.   Respiratory: Negative for chest tightness, shortness of breath (PND or orthopnea) and wheezing.    Cardiovascular: Negative for chest pain and palpitations.   Gastrointestinal: Negative for abdominal pain, blood in stool, constipation, diarrhea, nausea and vomiting.   Endocrine: Negative for polydipsia and polyuria.   Genitourinary: Negative for difficulty urinating, dysuria, hematuria and menstrual problem.   Musculoskeletal: Positive for arthralgias. Negative for joint swelling and neck pain.   Neurological: Negative for seizures, syncope, weakness and headaches.   Psychiatric/Behavioral: Negative for confusion and dysphoric mood.       Objective:      Physical Exam   Constitutional: She is oriented to person, place, and time. She appears well-developed and well-nourished. No distress.   HENT:   Head: Normocephalic.   Mouth/Throat: Oropharynx is clear and moist.   Eyes: EOM are normal. Pupils are equal, round, and reactive to light.   Neck: Neck supple. No JVD present. No thyromegaly present.   Cardiovascular: Normal rate, regular rhythm, normal heart sounds and intact distal pulses.  Exam reveals no gallop and no friction rub.    No murmur heard.  Pulmonary/Chest: Effort normal and breath sounds normal. She has no wheezes. She has no rales.   R breast no mass d/c or adenopathy  L breast tender thickening at 2 o'clock - no adenopathy   Abdominal: Soft. Bowel sounds are normal. She exhibits no distension and no mass. There is no tenderness. There is no rebound and no guarding.   Genitourinary:   Genitourinary  Comments: Pt declines  exam now.   Musculoskeletal: She exhibits no edema.   Lymphadenopathy:     She has no cervical adenopathy.   Neurological: She is alert and oriented to person, place, and time. She has normal reflexes.   Skin: Skin is warm and dry.   Psychiatric: She has a normal mood and affect. Her behavior is normal. Judgment and thought content normal.       Assessment:       1. AV block    2. Hyperlipidemia, unspecified hyperlipidemia type    3. Mild vitamin D deficiency    4. Hyperglycemia    5. Screening for blood or protein in urine    6. Flat epithelial atypia of breast, unspecified laterality    7. Disorder of bone        Plan:   AV block  Has pacer and cardiology follow up  Hyperlipidemia, unspecified hyperlipidemia type  -     CBC auto differential; Future; Expected date: 04/06/2018  -     Comprehensive metabolic panel; Future; Expected date: 04/06/2018  -     Lipid panel; Future; Expected date: 04/06/2018    Mild vitamin D deficiency  -     Vitamin D; Future; Expected date: 04/06/2018    Hyperglycemia  -     TSH; Future; Expected date: 04/06/2018  -     Hemoglobin A1c; Future; Expected date: 04/06/2018    Screening for blood or protein in urine  -     Urinalysis    Flat epithelial atypia of breast, unspecified laterality  -     Ambulatory consult to Oncology - for opinion - due to high score - Dr. Saba wanted a consult for chemo prevention pt wants to do it now  -     Mammo Digital Diagnostic Left with Tomosynthesis_CAD; Future; Expected date: 04/06/2018  -     US Breast Left Limited; Future; Expected date: 04/06/2018    Disorder of bone  -     DXA Bone Density Spine And Hip; Future; Expected date: 04/06/2018    Other orders  -     DULoxetine (CYMBALTA) 60 MG capsule; One daily  Dispense: 90 capsule; Refill: 3  -     varicella-zoster gE-AS01B, PF, (SHINGRIX, PF,) 50 mcg/0.5 mL injection; Inject 0.5 mLs into the muscle once.  Dispense: 0.5 mL; Refill: 0

## 2018-04-12 ENCOUNTER — TELEPHONE (OUTPATIENT)
Dept: INTERNAL MEDICINE | Facility: CLINIC | Age: 68
End: 2018-04-12

## 2018-04-12 NOTE — TELEPHONE ENCOUNTER
----- Message from Patt Willis sent at 4/12/2018  4:07 PM CDT -----  Contact: self [phone]  Patient is returning a call from the office . Please call and advise, Thanks

## 2018-04-12 NOTE — TELEPHONE ENCOUNTER
Spoke with patient. informed her of upcoming appointments made for her. Wants to know what Dr. Posada should go to for oncology.    Please Advise  Thank You

## 2018-04-16 ENCOUNTER — TELEPHONE (OUTPATIENT)
Dept: HEMATOLOGY/ONCOLOGY | Facility: CLINIC | Age: 68
End: 2018-04-16

## 2018-04-16 NOTE — TELEPHONE ENCOUNTER
----- Message from Ivis Li RN sent at 4/13/2018  4:01 PM CDT -----  Contact: [phone]  [facility]    ----- Message -----  From: Sarah Foy MA  Sent: 4/12/2018   4:01 PM  To: Ivis Li RN    Flat epithelial atypia of breast, unspecified laterality     Coded Diagnosis: Flat epithelial atypia of breast, unspecified laterality [N60.89] Coded Procedure:    Sched Instruct:      AMB CONSULT TO ONCOLOGY        Pls call patient to schedule consult

## 2018-04-17 ENCOUNTER — OFFICE VISIT (OUTPATIENT)
Dept: DERMATOLOGY | Facility: CLINIC | Age: 68
End: 2018-04-17
Payer: MEDICARE

## 2018-04-17 DIAGNOSIS — D18.00 ANGIOMA: ICD-10-CM

## 2018-04-17 DIAGNOSIS — L72.11 PILAR CYST: ICD-10-CM

## 2018-04-17 DIAGNOSIS — Z85.828 HISTORY OF NONMELANOMA SKIN CANCER: ICD-10-CM

## 2018-04-17 DIAGNOSIS — L57.0 AK (ACTINIC KERATOSIS): Primary | ICD-10-CM

## 2018-04-17 DIAGNOSIS — L82.1 SEBORRHEIC KERATOSIS: ICD-10-CM

## 2018-04-17 DIAGNOSIS — L81.4 LENTIGO: ICD-10-CM

## 2018-04-17 PROCEDURE — 99999 PR PBB SHADOW E&M-EST. PATIENT-LVL II: CPT | Mod: PBBFAC,,, | Performed by: DERMATOLOGY

## 2018-04-17 PROCEDURE — 99213 OFFICE O/P EST LOW 20 MIN: CPT | Mod: 25,S$GLB,, | Performed by: DERMATOLOGY

## 2018-04-17 PROCEDURE — 17000 DESTRUCT PREMALG LESION: CPT | Mod: S$GLB,,, | Performed by: DERMATOLOGY

## 2018-04-17 NOTE — PROGRESS NOTES
Subjective:       Patient ID:  Jalyn Love is a 68 y.o. female who presents for   Chief Complaint   Patient presents with    Growth     scalp few months, itchy no prev tx     Skin Check     UBSE     History of Present Illness: The patient presents for follow up of skin check.    The patient was last seen on: 1/19/16 for skin check  This is a high risk patient here to check for the development of new lesions.      Other skin complaints: growth on scalp x 3 months + itchy. No prev treatment    Was bitten by dog right leg 3.5 months ago on leg - wants it checked          Review of Systems   Skin: Positive for daily sunscreen use and activity-related sunscreen use. Negative for itching, rash, dry skin and recent sunburn.   Hematologic/Lymphatic: Bruises/bleeds easily.        Objective:    Physical Exam   Constitutional: She appears well-developed and well-nourished. No distress.   Neurological: She is alert and oriented to person, place, and time. She is not disoriented.   Psychiatric: She has a normal mood and affect.   Skin:   Areas Examined (abnormalities noted in diagram):   Head / Face Inspection Performed  Neck Inspection Performed  Chest / Axilla Inspection Performed  Back Inspection Performed  RUE Inspected  LUE Inspection Performed  LLE Inspection Performed  Nails and Digits Inspection Performed                       Diagram Legend     Erythematous scaling macule/papule c/w actinic keratosis       Vascular papule c/w angioma      Pigmented verrucoid papule/plaque c/w seborrheic keratosis      Yellow umbilicated papule c/w sebaceous hyperplasia      Irregularly shaped tan macule c/w lentigo     1-2 mm smooth white papules consistent with Milia      Movable subcutaneous cyst with punctum c/w epidermal inclusion cyst      Subcutaneous movable cyst c/w pilar cyst      Firm pink to brown papule c/w dermatofibroma      Pedunculated fleshy papule(s) c/w skin tag(s)      Evenly pigmented macule c/w  junctional nevus     Mildly variegated pigmented, slightly irregular-bordered macule c/w mildly atypical nevus      Flesh colored to evenly pigmented papule c/w intradermal nevus       Pink pearly papule/plaque c/w basal cell carcinoma      Erythematous hyperkeratotic cursted plaque c/w SCC      Surgical scar with no sign of skin cancer recurrence      Open and closed comedones      Inflammatory papules and pustules      Verrucoid papule consistent consistent with wart     Erythematous eczematous patches and plaques     Dystrophic onycholytic nail with subungual debris c/w onychomycosis     Umbilicated papule    Erythematous-base heme-crusted tan verrucoid plaque consistent with inflamed seborrheic keratosis     Erythematous Silvery Scaling Plaque c/w Psoriasis     See annotation      Assessment / Plan:        AK (actinic keratosis)  Cryosurgery Procedure Note    Verbal consent from the patient is obtained and the patient is aware of the precancerous quality and need for treatment of these lesions. Liquid nitrogen cryosurgery is applied to the 1 actinic keratoses, as detailed in the physical exam, to produce a freeze injury. The patient is aware that blisters may form and is instructed on wound care with gentle cleansing and use of vaseline ointment to keep moist until healed. The patient is supplied a handout on cryosurgery and is instructed to call if lesions do not completely resolve.      Pilar cyst - post scalp  Reassurance.     Lentigo  This is a benign hyperpigmented sun induced lesion. Daily sun protection will reduce the number of new lesions. Treatment of these benign lesions are considered cosmetic.  Discussed therapeutic option of laser treatment.       Seborrheic keratosis  These are benign inherited growths without a malignant potential. Reassurance given to patient. No treatment is necessary.       Angioma  This is a benign vascular lesion. Reassurance given. No treatment required.       History of  nonmelanoma skin cancer  Area(s) of previous NMSC evaluated with no signs of recurrence.    Upper body skin examination performed today including at least 6 points as noted in physical examination. No lesions suspicious for malignancy noted.               Follow-up in about 1 year (around 4/17/2019).

## 2018-04-17 NOTE — PATIENT INSTRUCTIONS

## 2018-05-02 NOTE — PROGRESS NOTES
Ms. Love is a patient of Dr. Collins and was last seen in clinic 3/23/2017.    Subjective:   Patient ID:  Jalyn Love is a 68 y.o. female who presents for follow-up of AV block  .     HPI:    Ms. 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     Background:    PPM for 2nd degree AVB in 2001  Significant anxiety -- much improved since she has been on cymbalta    Update (5/3/2018):    Today she says she feels well and has no cardiac complaints. Ms. Love denies chest pain with exertion or at rest, palpitations, SOB, YORK, dizziness, or syncope. She is active, does yoga regularly, and experiences no limitations in activity tolerance.     Device Interrogation (3/29/2018) reveals an intrinsic SR with CHB with stable lead and device function. No arrhythmias or treated episodes noted.  She paces 0% in the RA and 100% in the RV. Estimated battery longevity 5 years. Last echo 3/2016 showed normal EF.    I have personally reviewed the patient's EKG today, which shows SR with ventricular pacing at 73bpm. MN interval is 154. QRS is 142. QTc is 441.    Recent Cardiac Tests:    Echo (03/28/16):  CONCLUSIONS:     1 - Normal left ventricular systolic function (EF 55-60%).     2 - Normal right ventricular systolic function .     3 - Normal left ventricular diastolic function.     4 - The estimated PA systolic pressure is greater than 17 mmHg.     5 - Atrial septal aneurysm . No color flow doppler across the IAS.     6-pacemaker catheter seen in the RA, RV.     Current Outpatient Prescriptions   Medication Sig    DULoxetine (CYMBALTA) 60 MG capsule One daily    lifitegrast (XIIDRA) 5 % Dpet Place 1 drop into both eyes 2 (two) times daily.    vitamin D 1000 units Tab Take 1,000 Units by mouth once daily.     No current facility-administered medications for this visit.      Review of Systems   Constitution: Negative for malaise/fatigue.  "  Cardiovascular: Negative for chest pain, dyspnea on exertion, irregular heartbeat, leg swelling and palpitations.   Respiratory: Negative for shortness of breath.    Hematologic/Lymphatic: Negative for bleeding problem.   Skin: Negative for rash.   Musculoskeletal: Negative for myalgias.   Gastrointestinal: Negative for hematemesis, hematochezia and nausea.   Genitourinary: Negative for hematuria.   Neurological: Negative for light-headedness.   Psychiatric/Behavioral: Negative for altered mental status.   Allergic/Immunologic: Negative for persistent infections.     Objective:          /81   Pulse 73   Ht 5' 7" (1.702 m)   Wt 60.3 kg (132 lb 14.4 oz)   SpO2 97%   BMI 20.82 kg/m²     Physical Exam   Constitutional: She is oriented to person, place, and time. She appears well-developed and well-nourished.   HENT:   Head: Normocephalic.   Nose: Nose normal.   Eyes: Pupils are equal, round, and reactive to light.   Cardiovascular: Normal rate, regular rhythm, S1 normal and S2 normal.    No murmur heard.  Pulses:       Radial pulses are 2+ on the right side, and 2+ on the left side.   Pulmonary/Chest: Breath sounds normal. No respiratory distress.   Device to LUCW in good repair.   Abdominal: Normal appearance.   Musculoskeletal: Normal range of motion. She exhibits no edema.   Neurological: She is alert and oriented to person, place, and time.   Skin: Skin is warm and dry. No erythema.   Psychiatric: She has a normal mood and affect. Her speech is normal and behavior is normal.   Nursing note and vitals reviewed.    Lab Results   Component Value Date     04/09/2018    K 4.1 04/09/2018    BUN 13 04/09/2018    CREATININE 0.7 04/09/2018    ALT 17 04/09/2018    AST 19 04/09/2018    HGB 13.0 04/09/2018    HCT 40.3 04/09/2018    TSH 1.207 04/09/2018    LDLCALC 153.0 04/09/2018           Assessment:       1. Cardiac pacemaker in situ    2. AV block      Plan:     In summary, Ms. Love is a 68 y.o. " female with AVB s/p DC PPM and anxiety. Ms. Love is doing well from a rhythm perspective with stable lead and device function without arrhythmia noted. Her energy and activity levels remain high.  Per PPM report she is pacing 100% in the RV, with a normal EF on her echo 3/2016. Examination of her CXR with Dr. Collins revealed that her RV lead is near the his bundle, reducing her likelihood of cardiomyopathy despite high ventricular pacing. She has no symptoms of CHF.    Continue current medication regimen and device settings.   Follow up in device clinic as scheduled.   Follow up in EP clinic in 1 year, sooner as needed.       Follow-up in about 1 year (around 5/3/2019).    ------------------------------------------------------------------    RAMÓN Plata, NP-C  [facility]    I have personally seen, interviewed and examined this patient and I agree with the nurse practitioner's summary as stated above.  Ms Love has been doing very well. Her paced QRSd is very narrow (RV lead in septal OT)

## 2018-05-03 ENCOUNTER — HOSPITAL ENCOUNTER (OUTPATIENT)
Dept: CARDIOLOGY | Facility: CLINIC | Age: 68
Discharge: HOME | End: 2018-05-03
Payer: MEDICARE

## 2018-05-03 ENCOUNTER — HOSPITAL ENCOUNTER (OUTPATIENT)
Dept: RADIOLOGY | Facility: HOSPITAL | Age: 68
Discharge: HOME | End: 2018-05-03
Attending: INTERNAL MEDICINE
Payer: MEDICARE

## 2018-05-03 ENCOUNTER — OFFICE VISIT (OUTPATIENT)
Dept: ELECTROPHYSIOLOGY | Facility: CLINIC | Age: 68
End: 2018-05-03
Payer: MEDICARE

## 2018-05-03 VITALS
DIASTOLIC BLOOD PRESSURE: 81 MMHG | BODY MASS INDEX: 20.85 KG/M2 | WEIGHT: 132.88 LBS | SYSTOLIC BLOOD PRESSURE: 114 MMHG | HEART RATE: 73 BPM | HEIGHT: 67 IN | OXYGEN SATURATION: 97 %

## 2018-05-03 DIAGNOSIS — I44.30 AV BLOCK: Primary | ICD-10-CM

## 2018-05-03 DIAGNOSIS — Z95.0 CARDIAC PACEMAKER IN SITU: ICD-10-CM

## 2018-05-03 DIAGNOSIS — I44.30 AV BLOCK: ICD-10-CM

## 2018-05-03 DIAGNOSIS — R07.89 CHEST WALL DISCOMFORT: ICD-10-CM

## 2018-05-03 DIAGNOSIS — N60.89: ICD-10-CM

## 2018-05-03 DIAGNOSIS — Z95.0 CARDIAC PACEMAKER IN SITU: Primary | ICD-10-CM

## 2018-05-03 PROCEDURE — 77061 BREAST TOMOSYNTHESIS UNI: CPT | Mod: 26,LT,, | Performed by: RADIOLOGY

## 2018-05-03 PROCEDURE — 77061 BREAST TOMOSYNTHESIS UNI: CPT | Mod: TC,PO,LT

## 2018-05-03 PROCEDURE — 99999 PR PBB SHADOW E&M-EST. PATIENT-LVL III: CPT | Mod: PBBFAC,,, | Performed by: INTERNAL MEDICINE

## 2018-05-03 PROCEDURE — 93000 ELECTROCARDIOGRAM COMPLETE: CPT | Mod: S$GLB,,, | Performed by: INTERNAL MEDICINE

## 2018-05-03 PROCEDURE — 76642 ULTRASOUND BREAST LIMITED: CPT | Mod: 26,LT,, | Performed by: RADIOLOGY

## 2018-05-03 PROCEDURE — 77065 DX MAMMO INCL CAD UNI: CPT | Mod: TC,PO,LT

## 2018-05-03 PROCEDURE — 76642 ULTRASOUND BREAST LIMITED: CPT | Mod: TC,PO,LT

## 2018-05-03 PROCEDURE — 99214 OFFICE O/P EST MOD 30 MIN: CPT | Mod: S$GLB,,, | Performed by: INTERNAL MEDICINE

## 2018-05-03 PROCEDURE — 77065 DX MAMMO INCL CAD UNI: CPT | Mod: 26,LT,, | Performed by: RADIOLOGY

## 2018-05-16 DIAGNOSIS — I44.30 AV BLOCK: ICD-10-CM

## 2018-05-16 DIAGNOSIS — Z95.0 PACEMAKER: Primary | ICD-10-CM

## 2018-05-18 ENCOUNTER — TELEPHONE (OUTPATIENT)
Dept: INTERNAL MEDICINE | Facility: CLINIC | Age: 68
End: 2018-05-18

## 2018-05-18 NOTE — TELEPHONE ENCOUNTER
----- Message from Parmjitronakjohn Rohan sent at 5/18/2018  1:34 PM CDT -----  Contact: Patient [phone]  Her dog scratched her and broke the skin. The dog has had all of her shots. She is requesting an antibiotic if you thinks it is necessary.    Pharmacy: [facility] [address] [place] [address] [address] AT [facility] [address] [phone] (Phone) [phone] (Fax)    Thank you

## 2018-05-19 NOTE — TELEPHONE ENCOUNTER
"Pt with superficial scratch 1" x 1/4" - she cleaned it well tetanus 2014 - watch over the weekend call if red around etc - would be more worried if bite .  "

## 2018-06-06 ENCOUNTER — INITIAL CONSULT (OUTPATIENT)
Dept: HEMATOLOGY/ONCOLOGY | Facility: CLINIC | Age: 68
End: 2018-06-06
Payer: MEDICARE

## 2018-06-06 VITALS
RESPIRATION RATE: 20 BRPM | DIASTOLIC BLOOD PRESSURE: 72 MMHG | HEIGHT: 67 IN | BODY MASS INDEX: 21.48 KG/M2 | TEMPERATURE: 98 F | WEIGHT: 136.88 LBS | HEART RATE: 87 BPM | SYSTOLIC BLOOD PRESSURE: 110 MMHG

## 2018-06-06 DIAGNOSIS — D24.9 INTRADUCTAL PAPILLOMA OF BREAST, UNSPECIFIED LATERALITY: ICD-10-CM

## 2018-06-06 PROCEDURE — 99999 PR PBB SHADOW E&M-EST. PATIENT-LVL III: CPT | Mod: PBBFAC,,, | Performed by: INTERNAL MEDICINE

## 2018-06-06 PROCEDURE — 99204 OFFICE O/P NEW MOD 45 MIN: CPT | Mod: S$GLB,,, | Performed by: INTERNAL MEDICINE

## 2018-06-06 NOTE — PROGRESS NOTES
Subjective:       Patient ID: Jalyn Love is a 68 y.o. female.    Chief Complaint: No chief complaint on file.    HPI     Mrs. Love is a 67 yo female with a history of intraductal papilloma with atypia.  - abnormal screening mammogram 13 followed by confirmatory diagnostic mammogram same day  - underwent a left breast wire localization excisional biopsy in the lateral aspect of the left breast on 2014 for a papilloma.    Final pathology revealed intraductal papilloma with atypia.    At that time she discussed chemoprevention with Dr. Saba and declined.    Most recent breast imagin/3/18 Mammogram:  Impression:  There is no mammographic or sonographic evidence of malignancy.  BI-RADS Category 1: Negative  Recommendation:  Return to annual screening mammogram schedule is recommended due in .     Reports overall doing well.  Active with yoga, walking her dog    PMH:  Wrist arthritis    Active Ambulatory Problems     Diagnosis Date Noted    AV block     Cardiac pacemaker in situ 2013    Anxiety 2014     Resolved Ambulatory Problems     Diagnosis Date Noted    Neck pain 10/02/2013    Papilloma of breast 2013    Breast mass 2014    Hallux valgus 2015    Screening 2016     Past Medical History:   Diagnosis Date    Arthritis     Atypical ductal hyperplasia, breast 2013    AV block     Fever blister     Heart block     History of acne     Joint pain     Pacemaker      SH:  Retired  from here at [employer]  , single  2 sons- 1   Artist for [employer]    FH:  Dad-  of Multiple sclerosis-  at age 43 yo (he was a surgeon)  Mom- retired RN,  at age 85- subdural hematomas from fall with chronic issues post  Paget's disease of nipple  Sister- 71 yo- diagnosed with MS at age 73 (also primary progressive)  Maternal grandfather-  of metastatic prostate cancer in his 70s  CAD    Review of  Systems   Constitutional: Negative for activity change, appetite change, chills, fatigue, fever and unexpected weight change.   Respiratory: Negative for cough, shortness of breath and wheezing.    Cardiovascular: Negative for chest pain, palpitations and leg swelling.   Gastrointestinal: Negative for abdominal distention, blood in stool, constipation, diarrhea, nausea and vomiting.   Musculoskeletal: Positive for arthralgias (wrist) and neck pain (better). Negative for back pain, gait problem, joint swelling and myalgias.   Skin: Negative for color change, pallor, rash and wound.   Neurological: Negative for dizziness and headaches.   Psychiatric/Behavioral: The patient is not nervous/anxious.        Objective:      Physical Exam   Constitutional: She is oriented to person, place, and time. She appears well-developed and well-nourished. No distress.   Presents alone   HENT:   Head: Normocephalic.   Eyes: Conjunctivae are normal. No scleral icterus.   Neck: Normal range of motion. Neck supple. No thyromegaly present.   Cardiovascular: Normal rate, regular rhythm, normal heart sounds and intact distal pulses.  Exam reveals no gallop and no friction rub.    No murmur heard.  Pacemaker in place   Pulmonary/Chest: Effort normal and breath sounds normal. No respiratory distress. She has no wheezes. She has no rales. She exhibits no tenderness.   Breasts- no concerning masses, no nipple irregularities, no LAD   Abdominal: Soft. Bowel sounds are normal. She exhibits no distension and no mass. There is no tenderness. There is no guarding.   No organomegaly   Musculoskeletal: Normal range of motion. She exhibits no edema, tenderness or deformity.   Neurological: She is alert and oriented to person, place, and time.   Skin: Skin is warm and dry. No rash noted. She is not diaphoretic. No erythema.   Nursing note and vitals reviewed.    Labs- reviewed  Assessment:       1. Intraductal papilloma of breast, unspecified laterality         Plan:     We reviewed chemoprevention data  She is now > 4 years out from diagnosis  We reviewed increased risk of second breast event and data showing 50% reduction in another breast event but no impact on survival- these medications still can be offered with impact though we did discuss she would have likely near completed course of therapy by now  We reviewed medications available and mechanism of action and potential side effects  After discussion we will proceed with surveillance.          Distress Screening Results: Psychosocial Distress screening score of Distress Score: 0 noted and reviewed. No intervention indicated.

## 2018-06-06 NOTE — LETTER
June 6, 2018      Annemarie Kilgore MD  [address]  [facility] [address]           [facility]  [address]  [facility] [address]  Phone: [phone]          Patient: Jalyn Love   MR Number: [id number]   YOB: 1950   Date of Visit: 6/6/2018       Dear Dr. Annemarie Kilgore:    Thank you for referring Jalyn Love to me for evaluation. Attached you will find relevant portions of my assessment and plan of care.    If you have questions, please do not hesitate to call me. I look forward to following Jalyn Love along with you.    Sincerely,    Brittany Rodriguez MD    Enclosure  CC:  No Recipients    If you would like to receive this communication electronically, please contact [email or web address] or [phone] to request more information on [employer] access.    For providers and/or their staff who would like to refer a patient to [facility], please contact us through our one-stop-shop provider referral line, [facility], at [phone].    If you feel you have received this communication in error or would no longer like to receive these types of communications, please e-mail [email or web address]

## 2018-07-11 ENCOUNTER — OFFICE VISIT (OUTPATIENT)
Dept: INTERNAL MEDICINE | Facility: CLINIC | Age: 68
End: 2018-07-11
Payer: MEDICARE

## 2018-07-11 ENCOUNTER — HOSPITAL ENCOUNTER (OUTPATIENT)
Dept: RADIOLOGY | Facility: HOSPITAL | Age: 68
Discharge: HOME | End: 2018-07-11
Attending: INTERNAL MEDICINE
Payer: MEDICARE

## 2018-07-11 VITALS
HEART RATE: 84 BPM | HEIGHT: 67 IN | BODY MASS INDEX: 21.58 KG/M2 | OXYGEN SATURATION: 97 % | DIASTOLIC BLOOD PRESSURE: 78 MMHG | SYSTOLIC BLOOD PRESSURE: 108 MMHG | WEIGHT: 137.5 LBS

## 2018-07-11 DIAGNOSIS — M79.89 FINGER SWELLING: ICD-10-CM

## 2018-07-11 DIAGNOSIS — M19.041 PRIMARY OSTEOARTHRITIS OF BOTH HANDS: Primary | ICD-10-CM

## 2018-07-11 DIAGNOSIS — M19.041 PRIMARY OSTEOARTHRITIS OF BOTH HANDS: ICD-10-CM

## 2018-07-11 DIAGNOSIS — M19.042 PRIMARY OSTEOARTHRITIS OF BOTH HANDS: Primary | ICD-10-CM

## 2018-07-11 DIAGNOSIS — M19.042 PRIMARY OSTEOARTHRITIS OF BOTH HANDS: ICD-10-CM

## 2018-07-11 PROCEDURE — 99213 OFFICE O/P EST LOW 20 MIN: CPT | Mod: S$GLB,,, | Performed by: INTERNAL MEDICINE

## 2018-07-11 PROCEDURE — 73140 X-RAY EXAM OF FINGER(S): CPT | Mod: 26,RT,, | Performed by: RADIOLOGY

## 2018-07-11 PROCEDURE — 99999 PR PBB SHADOW E&M-EST. PATIENT-LVL III: CPT | Mod: PBBFAC,,, | Performed by: INTERNAL MEDICINE

## 2018-07-11 PROCEDURE — 73140 X-RAY EXAM OF FINGER(S): CPT | Mod: TC,RT

## 2018-07-11 NOTE — PROGRESS NOTES
Subjective:       Patient ID: Jalyn Love is a 68 y.o. female.    Chief Complaint: Edema (right hand, middle finger)    HPI   Has oa of hands.  Awoke couple of days ago with blue R middle finger.   No h/o injury.  Leaving town and wanted it checked out.  appplied ice, then heat.  Blueness has resolved.   Tender.  Never that painful.  No fever.    Last hand xray -     Review of Systems   Constitutional: Negative for fever and unexpected weight change.   HENT: Negative for congestion and postnasal drip.    Eyes: Negative for pain, discharge and visual disturbance.   Respiratory: Negative for cough, chest tightness, shortness of breath and wheezing.    Cardiovascular: Negative for chest pain and leg swelling.   Gastrointestinal: Negative for abdominal pain, constipation, diarrhea and nausea.   Genitourinary: Negative for difficulty urinating, dysuria and hematuria.   Skin: Negative for rash.   Neurological: Negative for headaches.   Psychiatric/Behavioral: Negative for dysphoric mood and sleep disturbance. The patient is not nervous/anxious.        Objective:      Physical Exam    Ulnar deviaion R middle finger with prominent , mildly tender joints.  No discoloration.  Wrist, other joints without edema, tenderness  Assessment:       1. Primary osteoarthritis of both hands    2. Finger swelling        Plan:       Jalyn Solares was seen today for edema.    Diagnoses and all orders for this visit:    Primary osteoarthritis of both hands  -     X-Ray Finger 2 or More Views Right; Future    Finger swelling  -     X-Ray Finger 2 or More Views Right; Future  -     Sedimentation rate; Future  -     C-reactive protein; Future  -     Uric acid; Future  -     Rheumatoid factor; Future       Aleve 1-2 twice a day for 10 days.    Call if no better

## 2018-08-01 ENCOUNTER — TELEPHONE (OUTPATIENT)
Dept: ELECTROPHYSIOLOGY | Facility: CLINIC | Age: 68
End: 2018-08-01

## 2018-08-01 NOTE — TELEPHONE ENCOUNTER
Patient is over due for her remote pacemaker home transmission. I called her to remind her to send. No answer. I left a voice message for her to return my call.

## 2018-08-14 ENCOUNTER — CLINICAL SUPPORT (OUTPATIENT)
Dept: ELECTROPHYSIOLOGY | Facility: CLINIC | Age: 68
End: 2018-08-14
Attending: INTERNAL MEDICINE
Payer: MEDICARE

## 2018-08-14 DIAGNOSIS — I44.30 AV BLOCK: ICD-10-CM

## 2018-08-14 DIAGNOSIS — Z95.0 PACEMAKER: ICD-10-CM

## 2018-08-14 PROCEDURE — 93294 REM INTERROG EVL PM/LDLS PM: CPT | Mod: ,,, | Performed by: INTERNAL MEDICINE

## 2018-08-14 PROCEDURE — 93296 REM INTERROG EVL PM/IDS: CPT

## 2018-11-16 ENCOUNTER — TELEPHONE (OUTPATIENT)
Dept: CARDIOLOGY | Facility: HOSPITAL | Age: 68
End: 2018-11-16

## 2018-11-16 NOTE — TELEPHONE ENCOUNTER
11/16/18 Left patient a vm as a reminder to send an upcoming scheduled manual transmission on 11/19/18.

## 2018-11-19 ENCOUNTER — CLINICAL SUPPORT (OUTPATIENT)
Dept: CARDIOLOGY | Facility: HOSPITAL | Age: 68
End: 2018-11-19
Attending: INTERNAL MEDICINE
Payer: MEDICARE

## 2018-11-19 DIAGNOSIS — Z95.0 PACEMAKER: ICD-10-CM

## 2018-11-19 DIAGNOSIS — I44.30 AV BLOCK: ICD-10-CM

## 2018-11-19 PROCEDURE — 93296 REM INTERROG EVL PM/IDS: CPT

## 2018-11-28 ENCOUNTER — TELEPHONE (OUTPATIENT)
Dept: INTERNAL MEDICINE | Facility: CLINIC | Age: 68
End: 2018-11-28

## 2018-11-28 NOTE — TELEPHONE ENCOUNTER
Spoke with patient, she will drop papers to  and will get Dr. Kilgore to sign as soon as she can.----- Message from Nya Silverman sent at 11/28/2018  9:28 AM CST -----  Contact: [phone]  PT would like a call back in regards to a medical release form. Pt states she is going to [place] and will need these forms signed to go. Please call and advise.

## 2018-12-04 ENCOUNTER — HOSPITAL ENCOUNTER (OUTPATIENT)
Dept: RADIOLOGY | Facility: CLINIC | Age: 68
Discharge: HOME | End: 2018-12-04
Attending: INTERNAL MEDICINE
Payer: MEDICARE

## 2018-12-04 ENCOUNTER — TELEPHONE (OUTPATIENT)
Dept: INTERNAL MEDICINE | Facility: CLINIC | Age: 68
End: 2018-12-04

## 2018-12-04 DIAGNOSIS — M89.9 DISORDER OF BONE: ICD-10-CM

## 2018-12-04 PROCEDURE — 77080 DXA BONE DENSITY AXIAL: CPT | Mod: TC

## 2018-12-04 PROCEDURE — 77080 DXA BONE DENSITY AXIAL: CPT | Mod: 26,,, | Performed by: INTERNAL MEDICINE

## 2018-12-04 NOTE — TELEPHONE ENCOUNTER
----- Message from Cristina Vela sent at 12/4/2018 10:27 AM CST -----  Contact: Jalyn Love [phone]  Jalyn states she spoke with Inez Jama in regards to her medical release form. Jalyn would like a call back to know if the forms are ready for .

## 2018-12-06 ENCOUNTER — TELEPHONE (OUTPATIENT)
Dept: INTERNAL MEDICINE | Facility: CLINIC | Age: 68
End: 2018-12-06

## 2018-12-06 NOTE — TELEPHONE ENCOUNTER
----- Message from Andrew Collier sent at 12/6/2018  3:02 PM CST -----  Contact: Patient [phone] Cell  Patient is returning a phone call.  Who left a message for the patient: Dr hilario  Does patient know what this is regarding:  Not sure of call  Comments:     Please call an advise  Thank you

## 2018-12-10 ENCOUNTER — TELEPHONE (OUTPATIENT)
Dept: INTERNAL MEDICINE | Facility: CLINIC | Age: 68
End: 2018-12-10

## 2018-12-10 NOTE — TELEPHONE ENCOUNTER
----- Message from Patria Jama sent at 12/8/2018 11:09 AM CST -----  Contact: self   Patient is returning a phone call.  Who left a message for the patient: Dr. hilario  Does patient know what this is regarding:  About her medical release form.  Comments:

## 2019-01-08 DIAGNOSIS — D24.9 INTRADUCTAL PAPILLOMA OF BREAST, UNSPECIFIED LATERALITY: Primary | ICD-10-CM

## 2019-01-08 DIAGNOSIS — R92.8 OTHER ABNORMAL AND INCONCLUSIVE FINDINGS ON DIAGNOSTIC IMAGING OF BREAST: ICD-10-CM

## 2019-01-18 PROCEDURE — 93294 REM INTERROG EVL PM/LDLS PM: CPT | Mod: ,,, | Performed by: INTERNAL MEDICINE

## 2019-01-30 ENCOUNTER — TELEPHONE (OUTPATIENT)
Dept: INTERNAL MEDICINE | Facility: CLINIC | Age: 69
End: 2019-01-30

## 2019-01-30 RX ORDER — DULOXETIN HYDROCHLORIDE 60 MG/1
CAPSULE, DELAYED RELEASE ORAL
Qty: 90 CAPSULE | Refills: 3 | Status: CANCELLED | OUTPATIENT
Start: 2019-01-30

## 2019-01-30 NOTE — TELEPHONE ENCOUNTER
----- Message from Tania Solis sent at 1/30/2019  9:36 AM CST -----  Contact: [phone]  Pt called in regards to getting a paper Rx for DULoxetine (CYMBALTA) 60 MG capsule due to she is going out of the [place] on march first.     Please advise

## 2019-02-15 ENCOUNTER — TELEPHONE (OUTPATIENT)
Dept: CARDIOLOGY | Facility: HOSPITAL | Age: 69
End: 2019-02-15

## 2019-02-18 ENCOUNTER — CLINICAL SUPPORT (OUTPATIENT)
Dept: CARDIOLOGY | Facility: HOSPITAL | Age: 69
End: 2019-02-18
Attending: INTERNAL MEDICINE
Payer: MEDICARE

## 2019-02-18 DIAGNOSIS — Z95.0 PACEMAKER: ICD-10-CM

## 2019-02-18 DIAGNOSIS — I44.30 AV BLOCK: ICD-10-CM

## 2019-02-18 PROCEDURE — 93296 REM INTERROG EVL PM/IDS: CPT

## 2019-02-27 ENCOUNTER — TELEPHONE (OUTPATIENT)
Dept: INTERNAL MEDICINE | Facility: CLINIC | Age: 69
End: 2019-02-27

## 2019-02-27 DIAGNOSIS — I49.9 CARDIAC ARRHYTHMIA, UNSPECIFIED CARDIAC ARRHYTHMIA TYPE: Primary | ICD-10-CM

## 2019-02-27 DIAGNOSIS — R73.9 HYPERGLYCEMIA: ICD-10-CM

## 2019-02-27 DIAGNOSIS — E78.2 HYPERLIPIDEMIA, MIXED: ICD-10-CM

## 2019-02-27 DIAGNOSIS — E55.9 VITAMIN D DEFICIENCY: ICD-10-CM

## 2019-02-27 NOTE — TELEPHONE ENCOUNTER
----- Message from Andrew Collier sent at 2/27/2019 12:50 PM CST -----  Contact: Patient  05/07/19 Annual Physical need lab orders placed and linked    Thank you

## 2019-03-01 ENCOUNTER — HOSPITAL ENCOUNTER (OUTPATIENT)
Dept: RADIOLOGY | Facility: HOSPITAL | Age: 69
Discharge: HOME | End: 2019-03-01
Attending: INTERNAL MEDICINE
Payer: MEDICARE

## 2019-03-01 ENCOUNTER — OFFICE VISIT (OUTPATIENT)
Dept: HEMATOLOGY/ONCOLOGY | Facility: CLINIC | Age: 69
End: 2019-03-01
Payer: MEDICARE

## 2019-03-01 VITALS
OXYGEN SATURATION: 100 % | RESPIRATION RATE: 16 BRPM | WEIGHT: 137.81 LBS | DIASTOLIC BLOOD PRESSURE: 73 MMHG | BODY MASS INDEX: 21.63 KG/M2 | SYSTOLIC BLOOD PRESSURE: 136 MMHG | HEART RATE: 68 BPM | HEIGHT: 67 IN | TEMPERATURE: 98 F

## 2019-03-01 DIAGNOSIS — R92.8 OTHER ABNORMAL AND INCONCLUSIVE FINDINGS ON DIAGNOSTIC IMAGING OF BREAST: ICD-10-CM

## 2019-03-01 DIAGNOSIS — D24.9 INTRADUCTAL PAPILLOMA OF BREAST, UNSPECIFIED LATERALITY: Primary | ICD-10-CM

## 2019-03-01 DIAGNOSIS — D24.9 INTRADUCTAL PAPILLOMA OF BREAST, UNSPECIFIED LATERALITY: ICD-10-CM

## 2019-03-01 PROCEDURE — 99999 PR PBB SHADOW E&M-EST. PATIENT-LVL III: CPT | Mod: PBBFAC,,, | Performed by: INTERNAL MEDICINE

## 2019-03-01 PROCEDURE — 99212 PR OFFICE/OUTPT VISIT, EST, LEVL II, 10-19 MIN: ICD-10-PCS | Mod: S$GLB,,, | Performed by: INTERNAL MEDICINE

## 2019-03-01 PROCEDURE — 99999 PR PBB SHADOW E&M-EST. PATIENT-LVL III: ICD-10-PCS | Mod: PBBFAC,,, | Performed by: INTERNAL MEDICINE

## 2019-03-01 PROCEDURE — 99212 OFFICE O/P EST SF 10 MIN: CPT | Mod: S$GLB,,, | Performed by: INTERNAL MEDICINE

## 2019-03-01 PROCEDURE — 77062 BREAST TOMOSYNTHESIS BI: CPT | Mod: 26,,, | Performed by: RADIOLOGY

## 2019-03-01 PROCEDURE — 77066 MAMMO DIGITAL DIAGNOSTIC BILAT WITH TOMOSYNTHESIS_CAD: ICD-10-PCS | Mod: 26,,, | Performed by: RADIOLOGY

## 2019-03-01 PROCEDURE — 77066 DX MAMMO INCL CAD BI: CPT | Mod: TC,PO

## 2019-03-01 PROCEDURE — 1101F PT FALLS ASSESS-DOCD LE1/YR: CPT | Mod: CPTII,S$GLB,, | Performed by: INTERNAL MEDICINE

## 2019-03-01 PROCEDURE — 1101F PR PT FALLS ASSESS DOC 0-1 FALLS W/OUT INJ PAST YR: ICD-10-PCS | Mod: CPTII,S$GLB,, | Performed by: INTERNAL MEDICINE

## 2019-03-01 PROCEDURE — 77066 DX MAMMO INCL CAD BI: CPT | Mod: 26,,, | Performed by: RADIOLOGY

## 2019-03-01 PROCEDURE — 77062 MAMMO DIGITAL DIAGNOSTIC BILAT WITH TOMOSYNTHESIS_CAD: ICD-10-PCS | Mod: 26,,, | Performed by: RADIOLOGY

## 2019-03-01 RX ORDER — CALCIUM CARBONATE 600 MG
600 TABLET ORAL ONCE
COMMUNITY
End: 2021-11-10

## 2019-03-04 NOTE — PROGRESS NOTES
Subjective:       Patient ID: Jalyn Love is a 69 y.o. female.    Chief Complaint: Follow-up    HPI     Mrs. Love is a 70 yo female with a history of intraductal papilloma with atypia.  - abnormal screening mammogram 11/26/13 followed by confirmatory diagnostic mammogram same day  - underwent a left breast wire localization excisional biopsy in the lateral aspect of the left breast on 01/03/2014 for a papilloma.    Final pathology revealed intraductal papilloma with atypia.    At that time she discussed chemoprevention with Dr. Saba and declined.  Recently presented to re-discuss and with > 5 years out it was not recommended, continued surveillance discussed     Presents after breast imaging:  Films Compared:  Compared to: 05/03/2018 US Breast Left Limited, 05/03/2018 Mammo Digital Diagnostic Left with Tomosynthesis_CAD, 02/05/2018 Mammo Digital Screening Bilat with Tomosynthesis CAD, and 10/03/2016 Mammo Digital Screening Bilat with Tomos  Findings:  Computer-aided detection was utilized in the interpretation of this examination. This procedure was performed using tomosynthesis.    The breasts have scattered areas of fibroglandular density. There is no evidence of suspicious masses, microcalcifications or architectural distortion.  Impression:  No mammographic evidence of malignancy.  BI-RADS Category 1: Negative  Recommendation:  Routine screening mammogram in 1 year is recommended.  The patient's estimated lifetime risk of breast cancer (to age 85) based on Tyrer-Cuzick - 7 risk assessment model is: Tyrer-Cuzick: 26.27 %. According to the American Cancer Society,  patients with a lifetime breast cancer risk of 20% or higher might benefit from supplemental screening tests.    Review of Systems  see recent note  Objective:      Physical Exam  see recent note  Assessment:       No diagnosis found.    Plan:     Continue annual mammogram  She cannot have MRIs due to pacemaker status  Can get  ultrasounds for additional breast imaging if needed  Does not need oncology follow up

## 2019-04-09 RX ORDER — DULOXETIN HYDROCHLORIDE 60 MG/1
CAPSULE, DELAYED RELEASE ORAL
Qty: 90 CAPSULE | Refills: 0 | Status: SHIPPED | OUTPATIENT
Start: 2019-04-09 | End: 2019-05-07 | Stop reason: SDUPTHER

## 2019-04-29 ENCOUNTER — LAB VISIT (OUTPATIENT)
Dept: LAB | Facility: HOSPITAL | Age: 69
End: 2019-04-29
Attending: INTERNAL MEDICINE
Payer: MEDICARE

## 2019-04-29 DIAGNOSIS — E78.2 HYPERLIPIDEMIA, MIXED: ICD-10-CM

## 2019-04-29 DIAGNOSIS — E55.9 VITAMIN D DEFICIENCY: ICD-10-CM

## 2019-04-29 DIAGNOSIS — I49.9 CARDIAC ARRHYTHMIA, UNSPECIFIED CARDIAC ARRHYTHMIA TYPE: ICD-10-CM

## 2019-04-29 DIAGNOSIS — R73.9 HYPERGLYCEMIA: ICD-10-CM

## 2019-04-29 LAB
25(OH)D3+25(OH)D2 SERPL-MCNC: 46 NG/ML (ref 30–96)
ALBUMIN SERPL BCP-MCNC: 3.6 G/DL (ref 3.5–5.2)
ALP SERPL-CCNC: 65 U/L (ref 55–135)
ALT SERPL W/O P-5'-P-CCNC: 15 U/L (ref 10–44)
ANION GAP SERPL CALC-SCNC: 7 MMOL/L (ref 8–16)
AST SERPL-CCNC: 17 U/L (ref 10–40)
BASOPHILS # BLD AUTO: 0.06 K/UL (ref 0–0.2)
BASOPHILS NFR BLD: 1.4 % (ref 0–1.9)
BILIRUB SERPL-MCNC: 0.8 MG/DL (ref 0.1–1)
BUN SERPL-MCNC: 14 MG/DL (ref 8–23)
CALCIUM SERPL-MCNC: 9.4 MG/DL (ref 8.7–10.5)
CHLORIDE SERPL-SCNC: 106 MMOL/L (ref 95–110)
CHOLEST SERPL-MCNC: 227 MG/DL (ref 120–199)
CHOLEST/HDLC SERPL: 3 {RATIO} (ref 2–5)
CO2 SERPL-SCNC: 30 MMOL/L (ref 23–29)
CREAT SERPL-MCNC: 0.7 MG/DL (ref 0.5–1.4)
DIFFERENTIAL METHOD: ABNORMAL
EOSINOPHIL # BLD AUTO: 0.2 K/UL (ref 0–0.5)
EOSINOPHIL NFR BLD: 5.3 % (ref 0–8)
ERYTHROCYTE [DISTWIDTH] IN BLOOD BY AUTOMATED COUNT: 12.7 % (ref 11.5–14.5)
EST. GFR  (AFRICAN AMERICAN): >60 ML/MIN/1.73 M^2
EST. GFR  (NON AFRICAN AMERICAN): >60 ML/MIN/1.73 M^2
ESTIMATED AVG GLUCOSE: 105 MG/DL (ref 68–131)
GLUCOSE SERPL-MCNC: 85 MG/DL (ref 70–110)
HBA1C MFR BLD HPLC: 5.3 % (ref 4–5.6)
HCT VFR BLD AUTO: 38.9 % (ref 37–48.5)
HDLC SERPL-MCNC: 76 MG/DL (ref 40–75)
HDLC SERPL: 33.5 % (ref 20–50)
HGB BLD-MCNC: 12.6 G/DL (ref 12–16)
IMM GRANULOCYTES # BLD AUTO: 0.01 K/UL (ref 0–0.04)
IMM GRANULOCYTES NFR BLD AUTO: 0.2 % (ref 0–0.5)
LDLC SERPL CALC-MCNC: 134 MG/DL (ref 63–159)
LYMPHOCYTES # BLD AUTO: 1.7 K/UL (ref 1–4.8)
LYMPHOCYTES NFR BLD: 38.8 % (ref 18–48)
MCH RBC QN AUTO: 31.6 PG (ref 27–31)
MCHC RBC AUTO-ENTMCNC: 32.4 G/DL (ref 32–36)
MCV RBC AUTO: 98 FL (ref 82–98)
MONOCYTES # BLD AUTO: 0.3 K/UL (ref 0.3–1)
MONOCYTES NFR BLD: 7.6 % (ref 4–15)
NEUTROPHILS # BLD AUTO: 2 K/UL (ref 1.8–7.7)
NEUTROPHILS NFR BLD: 46.7 % (ref 38–73)
NONHDLC SERPL-MCNC: 151 MG/DL
NRBC BLD-RTO: 0 /100 WBC
PLATELET # BLD AUTO: 206 K/UL (ref 150–350)
PMV BLD AUTO: 10.8 FL (ref 9.2–12.9)
POTASSIUM SERPL-SCNC: 4.7 MMOL/L (ref 3.5–5.1)
PROT SERPL-MCNC: 6.5 G/DL (ref 6–8.4)
RBC # BLD AUTO: 3.99 M/UL (ref 4–5.4)
SODIUM SERPL-SCNC: 143 MMOL/L (ref 136–145)
TRIGL SERPL-MCNC: 85 MG/DL (ref 30–150)
TSH SERPL DL<=0.005 MIU/L-ACNC: 0.89 UIU/ML (ref 0.4–4)
WBC # BLD AUTO: 4.33 K/UL (ref 3.9–12.7)

## 2019-04-29 PROCEDURE — 36415 COLL VENOUS BLD VENIPUNCTURE: CPT | Mod: PO

## 2019-04-29 PROCEDURE — 85025 COMPLETE CBC W/AUTO DIFF WBC: CPT

## 2019-04-29 PROCEDURE — 84443 ASSAY THYROID STIM HORMONE: CPT

## 2019-04-29 PROCEDURE — 82306 VITAMIN D 25 HYDROXY: CPT

## 2019-04-29 PROCEDURE — 83036 HEMOGLOBIN GLYCOSYLATED A1C: CPT

## 2019-04-29 PROCEDURE — 80061 LIPID PANEL: CPT

## 2019-04-29 PROCEDURE — 80053 COMPREHEN METABOLIC PANEL: CPT

## 2019-05-03 ENCOUNTER — TELEPHONE (OUTPATIENT)
Dept: ELECTROPHYSIOLOGY | Facility: CLINIC | Age: 69
End: 2019-05-03

## 2019-05-03 DIAGNOSIS — Z95.0 PACEMAKER: Primary | ICD-10-CM

## 2019-05-03 NOTE — TELEPHONE ENCOUNTER
Spoke with patient regarding her appointment request, former [facility] patient wanting to follow care with Dr Colby. scheduled her appointment to see Lasha in June and an ekg prior. Mailed appointment slip. Patient stated she would call to change date and time if needed.

## 2019-05-07 ENCOUNTER — OFFICE VISIT (OUTPATIENT)
Dept: INTERNAL MEDICINE | Facility: CLINIC | Age: 69
End: 2019-05-07
Payer: MEDICARE

## 2019-05-07 VITALS
HEART RATE: 76 BPM | DIASTOLIC BLOOD PRESSURE: 70 MMHG | BODY MASS INDEX: 22.77 KG/M2 | WEIGHT: 136.69 LBS | HEIGHT: 65 IN | SYSTOLIC BLOOD PRESSURE: 120 MMHG | TEMPERATURE: 98 F | OXYGEN SATURATION: 99 %

## 2019-05-07 DIAGNOSIS — D24.9 INTRADUCTAL PAPILLOMA OF BREAST, UNSPECIFIED LATERALITY: Primary | ICD-10-CM

## 2019-05-07 DIAGNOSIS — Z13.89 SCREENING FOR BLOOD OR PROTEIN IN URINE: ICD-10-CM

## 2019-05-07 LAB
BILIRUB UR QL STRIP: NEGATIVE
CLARITY UR REFRACT.AUTO: CLEAR
COLOR UR AUTO: YELLOW
GLUCOSE UR QL STRIP: NEGATIVE
HGB UR QL STRIP: NEGATIVE
KETONES UR QL STRIP: ABNORMAL
LEUKOCYTE ESTERASE UR QL STRIP: NEGATIVE
MICROSCOPIC COMMENT: NORMAL
NITRITE UR QL STRIP: NEGATIVE
PH UR STRIP: 6 [PH] (ref 5–8)
PROT UR QL STRIP: NEGATIVE
SP GR UR STRIP: 1.01 (ref 1–1.03)
URN SPEC COLLECT METH UR: ABNORMAL
WBC #/AREA URNS AUTO: 1 /HPF (ref 0–5)

## 2019-05-07 PROCEDURE — 1101F PR PT FALLS ASSESS DOC 0-1 FALLS W/OUT INJ PAST YR: ICD-10-PCS | Mod: CPTII,S$GLB,, | Performed by: INTERNAL MEDICINE

## 2019-05-07 PROCEDURE — 1101F PT FALLS ASSESS-DOCD LE1/YR: CPT | Mod: CPTII,S$GLB,, | Performed by: INTERNAL MEDICINE

## 2019-05-07 PROCEDURE — 81001 URINALYSIS AUTO W/SCOPE: CPT

## 2019-05-07 PROCEDURE — 99999 PR PBB SHADOW E&M-EST. PATIENT-LVL III: ICD-10-PCS | Mod: PBBFAC,,, | Performed by: INTERNAL MEDICINE

## 2019-05-07 PROCEDURE — 99214 OFFICE O/P EST MOD 30 MIN: CPT | Mod: S$GLB,,, | Performed by: INTERNAL MEDICINE

## 2019-05-07 PROCEDURE — 99214 PR OFFICE/OUTPT VISIT, EST, LEVL IV, 30-39 MIN: ICD-10-PCS | Mod: S$GLB,,, | Performed by: INTERNAL MEDICINE

## 2019-05-07 PROCEDURE — 99999 PR PBB SHADOW E&M-EST. PATIENT-LVL III: CPT | Mod: PBBFAC,,, | Performed by: INTERNAL MEDICINE

## 2019-05-07 RX ORDER — DULOXETIN HYDROCHLORIDE 60 MG/1
CAPSULE, DELAYED RELEASE ORAL
Qty: 90 CAPSULE | Refills: 3 | Status: SHIPPED | OUTPATIENT
Start: 2019-05-07 | End: 2020-05-26 | Stop reason: SDUPTHER

## 2019-05-09 ENCOUNTER — ANCILLARY PROCEDURE (OUTPATIENT)
Dept: CARDIOLOGY | Facility: HOSPITAL | Age: 69
End: 2019-05-09
Attending: INTERNAL MEDICINE
Payer: MEDICARE

## 2019-05-09 DIAGNOSIS — I44.30 AV BLOCK: ICD-10-CM

## 2019-05-09 DIAGNOSIS — Z95.0 PACEMAKER: ICD-10-CM

## 2019-05-09 PROCEDURE — 93280 PM DEVICE PROGR EVAL DUAL: CPT

## 2019-05-27 ENCOUNTER — OFFICE VISIT (OUTPATIENT)
Dept: DERMATOLOGY | Facility: CLINIC | Age: 69
End: 2019-05-27
Payer: MEDICARE

## 2019-05-27 DIAGNOSIS — L57.0 AK (ACTINIC KERATOSIS): Primary | ICD-10-CM

## 2019-05-27 DIAGNOSIS — L72.0 MILIA: ICD-10-CM

## 2019-05-27 DIAGNOSIS — L81.4 LENTIGO: ICD-10-CM

## 2019-05-27 DIAGNOSIS — Z85.828 HISTORY OF NONMELANOMA SKIN CANCER: ICD-10-CM

## 2019-05-27 DIAGNOSIS — L72.11 PILAR CYST: ICD-10-CM

## 2019-05-27 PROCEDURE — 17003 DESTRUCT PREMALG LES 2-14: CPT | Mod: S$GLB,,, | Performed by: DERMATOLOGY

## 2019-05-27 PROCEDURE — 99999 PR PBB SHADOW E&M-EST. PATIENT-LVL II: ICD-10-PCS | Mod: PBBFAC,,, | Performed by: DERMATOLOGY

## 2019-05-27 PROCEDURE — 17000 PR DESTRUCTION(LASER SURGERY,CRYOSURGERY,CHEMOSURGERY),PREMALIGNANT LESIONS,FIRST LESION: ICD-10-PCS | Mod: S$GLB,,, | Performed by: DERMATOLOGY

## 2019-05-27 PROCEDURE — 1101F PR PT FALLS ASSESS DOC 0-1 FALLS W/OUT INJ PAST YR: ICD-10-PCS | Mod: CPTII,S$GLB,, | Performed by: DERMATOLOGY

## 2019-05-27 PROCEDURE — 99999 PR PBB SHADOW E&M-EST. PATIENT-LVL II: CPT | Mod: PBBFAC,,, | Performed by: DERMATOLOGY

## 2019-05-27 PROCEDURE — 17003 DESTRUCTION, PREMALIGNANT LESIONS; SECOND THROUGH 14 LESIONS: ICD-10-PCS | Mod: S$GLB,,, | Performed by: DERMATOLOGY

## 2019-05-27 PROCEDURE — 1101F PT FALLS ASSESS-DOCD LE1/YR: CPT | Mod: CPTII,S$GLB,, | Performed by: DERMATOLOGY

## 2019-05-27 PROCEDURE — 99213 PR OFFICE/OUTPT VISIT, EST, LEVL III, 20-29 MIN: ICD-10-PCS | Mod: 25,S$GLB,, | Performed by: DERMATOLOGY

## 2019-05-27 PROCEDURE — 17000 DESTRUCT PREMALG LESION: CPT | Mod: S$GLB,,, | Performed by: DERMATOLOGY

## 2019-05-27 PROCEDURE — 99213 OFFICE O/P EST LOW 20 MIN: CPT | Mod: 25,S$GLB,, | Performed by: DERMATOLOGY

## 2019-05-27 NOTE — PATIENT INSTRUCTIONS

## 2019-05-27 NOTE — PROGRESS NOTES
Subjective:       Patient ID:  Jalyn Love is a 69 y.o. female who presents for   Chief Complaint   Patient presents with    Skin Check     UBSE     History of Present Illness: The patient presents for follow up of skin check.    The patient was last seen on: 4/17/18 for cryosurgery to actinic keratoses which have resolved.   This is a high risk patient here to check for the development of new lesions.    Other skin complaints:   Patient complains of lesion(s)  Location: scalp  Duration: 3 months  Symptoms: scabs  Relieving factors/Previous treatments: none          Review of Systems   Skin: Positive for daily sunscreen use and activity-related sunscreen use. Negative for itching, rash, dry skin and recent sunburn.   Hematologic/Lymphatic: Bruises/bleeds easily.        Objective:    Physical Exam   Constitutional: She appears well-developed and well-nourished. No distress.   Neurological: She is alert and oriented to person, place, and time. She is not disoriented.   Psychiatric: She has a normal mood and affect.   Skin:   Areas Examined (abnormalities noted in diagram):   Head / Face Inspection Performed  Neck Inspection Performed  Chest / Axilla Inspection Performed  Back Inspection Performed  RUE Inspected  LUE Inspection Performed  Nails and Digits Inspection Performed                       Diagram Legend     Erythematous scaling macule/papule c/w actinic keratosis       Vascular papule c/w angioma      Pigmented verrucoid papule/plaque c/w seborrheic keratosis      Yellow umbilicated papule c/w sebaceous hyperplasia      Irregularly shaped tan macule c/w lentigo     1-2 mm smooth white papules consistent with Milia      Movable subcutaneous cyst with punctum c/w epidermal inclusion cyst      Subcutaneous movable cyst c/w pilar cyst      Firm pink to brown papule c/w dermatofibroma      Pedunculated fleshy papule(s) c/w skin tag(s)      Evenly pigmented macule c/w junctional nevus     Mildly  variegated pigmented, slightly irregular-bordered macule c/w mildly atypical nevus      Flesh colored to evenly pigmented papule c/w intradermal nevus       Pink pearly papule/plaque c/w basal cell carcinoma      Erythematous hyperkeratotic cursted plaque c/w SCC      Surgical scar with no sign of skin cancer recurrence      Open and closed comedones      Inflammatory papules and pustules      Verrucoid papule consistent consistent with wart     Erythematous eczematous patches and plaques     Dystrophic onycholytic nail with subungual debris c/w onychomycosis     Umbilicated papule    Erythematous-base heme-crusted tan verrucoid plaque consistent with inflamed seborrheic keratosis     Erythematous Silvery Scaling Plaque c/w Psoriasis     See annotation      Assessment / Plan:        AK (actinic keratosis)  Cryosurgery Procedure Note    Verbal consent from the patient is obtained including, but not limited to, risk of hypopigmentation/hyperpigmentation, scar, recurrence of lesion. The patient is aware of the precancerous quality and need for treatment of these lesions. Liquid nitrogen cryosurgery is applied to the 4 actinic keratoses, as detailed in the physical exam, to produce a freeze injury. The patient is aware that blisters may form and is instructed on wound care with gentle cleansing and use of vaseline ointment to keep moist until healed. The patient is supplied a handout on cryosurgery and is instructed to call if lesions do not completely resolve.      Lentigo  This is a benign hyperpigmented sun induced lesion. Daily sun protection will reduce the number of new lesions. Treatment of these benign lesions are considered cosmetic.      Pilar cyst   - stable and chronic    Milia  Reassurance given to patient. No treatment is necessary.   Treatment of benign, asymptomatic lesions may be considered cosmetic.      History of nonmelanoma skin cancer  Area(s) of previous NMSC evaluated with no signs of  recurrence.    Upper body skin examination performed today including at least 6 points as noted in physical examination. No lesions suspicious for malignancy noted.               Follow up in about 1 year (around 5/27/2020).

## 2019-05-28 NOTE — PROGRESS NOTES
Subjective:       Patient ID: Jalyn Love is a 69 y.o. female.    Chief Complaint: Annual Exam    HPIPt is doing well - no CP or SOB - UTD with pacer checks. She is active.   Review of Systems   Respiratory: Negative for shortness of breath (PND or orthopnea).    Cardiovascular: Negative for chest pain (arm pain or jaw pain).   Gastrointestinal: Negative for abdominal pain, diarrhea, nausea and vomiting.   Genitourinary: Negative for dysuria.   Neurological: Negative for seizures, syncope and headaches.       Objective:      Physical Exam   Constitutional: She is oriented to person, place, and time. She appears well-developed and well-nourished. No distress.   HENT:   Head: Normocephalic.   Mouth/Throat: Oropharynx is clear and moist.   Neck: Neck supple. No JVD present. No thyromegaly present.   Cardiovascular: Normal rate, regular rhythm, normal heart sounds and intact distal pulses. Exam reveals no gallop and no friction rub.   No murmur heard.  Pulmonary/Chest: Effort normal and breath sounds normal. She has no wheezes. She has no rales.   Abdominal: Soft. Bowel sounds are normal. She exhibits no distension and no mass. There is no tenderness. There is no rebound and no guarding.   Musculoskeletal: She exhibits no edema.   Lymphadenopathy:     She has no cervical adenopathy.   Neurological: She is alert and oriented to person, place, and time. She has normal reflexes.   Skin: Skin is warm and dry.   Psychiatric: She has a normal mood and affect. Her behavior is normal. Judgment and thought content normal.       Assessment:       1. Intraductal papilloma of breast, unspecified laterality    2. Screening for blood or protein in urine        Plan:   Intraductal papilloma of breast, unspecified laterality  UTD with MMG and oncology exam  Screening for blood or protein in urine  -     Urinalysis    Other orders  -     DULoxetine (CYMBALTA) 60 MG capsule; TAKE 1 CAPSULE BY MOUTH DAILY  Dispense: 90  capsule; Refill: 3  -     Urinalysis Microscopic

## 2019-06-25 ENCOUNTER — TELEPHONE (OUTPATIENT)
Dept: CARDIOLOGY | Facility: HOSPITAL | Age: 69
End: 2019-06-25

## 2019-06-25 NOTE — TELEPHONE ENCOUNTER
Left patient message need to reschedule appointments on June 26th.  Rescheduled to July 17th starting at 2:00 pm.  Let us know if this date will work.

## 2019-07-09 ENCOUNTER — TELEPHONE (OUTPATIENT)
Dept: ELECTROPHYSIOLOGY | Facility: CLINIC | Age: 69
End: 2019-07-09

## 2019-07-09 NOTE — TELEPHONE ENCOUNTER
Left detailed message with appointment dates and times asked patient to return our call if they didn't work. Appointment slips mailed.   ----- Message from Eva Morgan sent at 7/9/2019  9:39 AM CDT -----  Alee,  Please call patient and confirm her device check on August 5th and Dr. Colby on August 7th.

## 2019-07-15 RX ORDER — DULOXETIN HYDROCHLORIDE 60 MG/1
CAPSULE, DELAYED RELEASE ORAL
Qty: 90 CAPSULE | Refills: 3 | Status: SHIPPED | OUTPATIENT
Start: 2019-07-15 | End: 2019-08-07 | Stop reason: SDUPTHER

## 2019-08-01 ENCOUNTER — TELEPHONE (OUTPATIENT)
Dept: ELECTROPHYSIOLOGY | Facility: CLINIC | Age: 69
End: 2019-08-01

## 2019-08-02 ENCOUNTER — TELEPHONE (OUTPATIENT)
Dept: DERMATOLOGY | Facility: CLINIC | Age: 69
End: 2019-08-02

## 2019-08-02 NOTE — TELEPHONE ENCOUNTER
----- Message from Petty Blancas sent at 8/2/2019  8:12 AM CDT -----  Contact: MICHAEL VICTOR [[id number]]  Name of Who is Calling: MICHAEL VICTOR [[id number]]       What is the request in detail: Patient is requesting a call from staff she would like to get laser treatment but has questions she needs to ask so she can discuss with her primary doctor before she schedules  .....Please contact to further discuss and advise.     Can the clinic reply by [facility]: no     What [facility]: [phone]

## 2019-08-02 NOTE — TELEPHONE ENCOUNTER
Called PT and answered questions in regards to laser. PT states that she will speak to her cardiologist and call back to schedule.

## 2019-08-05 ENCOUNTER — CLINICAL SUPPORT (OUTPATIENT)
Dept: CARDIOLOGY | Facility: HOSPITAL | Age: 69
End: 2019-08-05
Attending: INTERNAL MEDICINE
Payer: MEDICARE

## 2019-08-05 DIAGNOSIS — Z95.0 PACEMAKER: ICD-10-CM

## 2019-08-05 PROCEDURE — 93280 PM DEVICE PROGR EVAL DUAL: CPT

## 2019-08-06 NOTE — PROGRESS NOTES
Subjective:    Patient ID:  Jalyn Love is a 69 y.o. female who presents for follow-up of AV block      HPI 68 yo female with PPM, AV block, anxiety.      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  Significant anxiety -- much improved since she has been on cymbalta  Echo 3/28/16 normal biventricular structure and function.    Update:  Goes to Yoga three times a week.  Walks the park.  Remains very active.  Happy with her energy level.    Review of Systems   Constitution: Negative. Negative for malaise/fatigue.   Cardiovascular: Negative for chest pain, dyspnea on exertion, irregular heartbeat, leg swelling, near-syncope, orthopnea, palpitations, paroxysmal nocturnal dyspnea and syncope.   Respiratory: Negative for cough and shortness of breath.    Neurological: Negative for dizziness, light-headedness and weakness.   All other systems reviewed and are negative.       Objective:    Physical Exam   Constitutional: She is oriented to person, place, and time. She appears well-developed and well-nourished.   Eyes: Conjunctivae are normal. No scleral icterus.   Neck: No JVD present. No tracheal deviation present.   Cardiovascular: Normal rate and regular rhythm. PMI is not displaced.   Pulmonary/Chest: Effort normal and breath sounds normal. No respiratory distress.   Abdominal: Soft. There is no hepatosplenomegaly. There is no tenderness.   Musculoskeletal: She exhibits no edema or tenderness.   Neurological: She is alert and oriented to person, place, and time.   Skin: Skin is warm and dry. No rash noted.   Psychiatric: She has a normal mood and affect. Her behavior is normal.         Assessment:       1. AV block    2. Cardiac pacemaker in situ    3. Anxiety    4. Intraductal papilloma of breast, unspecified laterality         Plan:           Doing great.  Echo with color flow given chronic RV pacing.  F/u with monitoring as scheduled,  with me in one year.

## 2019-08-07 ENCOUNTER — HOSPITAL ENCOUNTER (OUTPATIENT)
Dept: CARDIOLOGY | Facility: CLINIC | Age: 69
Discharge: HOME | End: 2019-08-07
Attending: INTERNAL MEDICINE
Payer: MEDICARE

## 2019-08-07 ENCOUNTER — TELEPHONE (OUTPATIENT)
Dept: ELECTROPHYSIOLOGY | Facility: CLINIC | Age: 69
End: 2019-08-07

## 2019-08-07 ENCOUNTER — OFFICE VISIT (OUTPATIENT)
Dept: ELECTROPHYSIOLOGY | Facility: CLINIC | Age: 69
End: 2019-08-07
Attending: INTERNAL MEDICINE
Payer: MEDICARE

## 2019-08-07 VITALS
SYSTOLIC BLOOD PRESSURE: 110 MMHG | HEIGHT: 67 IN | HEART RATE: 91 BPM | DIASTOLIC BLOOD PRESSURE: 68 MMHG | WEIGHT: 138 LBS | BODY MASS INDEX: 21.66 KG/M2

## 2019-08-07 VITALS
WEIGHT: 138.69 LBS | DIASTOLIC BLOOD PRESSURE: 70 MMHG | BODY MASS INDEX: 21.77 KG/M2 | HEIGHT: 67 IN | HEART RATE: 94 BPM | SYSTOLIC BLOOD PRESSURE: 120 MMHG

## 2019-08-07 DIAGNOSIS — I44.30 AV BLOCK: ICD-10-CM

## 2019-08-07 DIAGNOSIS — Z95.0 PACEMAKER: ICD-10-CM

## 2019-08-07 DIAGNOSIS — F41.9 ANXIETY: ICD-10-CM

## 2019-08-07 DIAGNOSIS — I44.30 AV BLOCK: Primary | ICD-10-CM

## 2019-08-07 DIAGNOSIS — Z95.0 CARDIAC PACEMAKER IN SITU: ICD-10-CM

## 2019-08-07 DIAGNOSIS — D24.9 INTRADUCTAL PAPILLOMA OF BREAST, UNSPECIFIED LATERALITY: ICD-10-CM

## 2019-08-07 LAB
ASCENDING AORTA: 2.64 CM
AV INDEX (PROSTH): 0.66
AV MEAN GRADIENT: 3 MMHG
AV PEAK GRADIENT: 5 MMHG
AV VALVE AREA: 1.96 CM2
AV VELOCITY RATIO: 0.76
BSA FOR ECHO PROCEDURE: 1.72 M2
CV ECHO LV RWT: 0.35 CM
DOP CALC AO PEAK VEL: 1.1 M/S
DOP CALC AO VTI: 19.76 CM
DOP CALC LVOT AREA: 3 CM2
DOP CALC LVOT DIAMETER: 1.95 CM
DOP CALC LVOT PEAK VEL: 0.84 M/S
DOP CALC LVOT STROKE VOLUME: 38.74 CM3
DOP CALCLVOT PEAK VEL VTI: 12.98 CM
E WAVE DECELERATION TIME: 172.64 MSEC
E/A RATIO: 0.71
E/E' RATIO: 12.22 M/S
ECHO LV POSTERIOR WALL: 0.76 CM (ref 0.6–1.1)
FRACTIONAL SHORTENING: 32 % (ref 28–44)
INTERVENTRICULAR SEPTUM: 0.72 CM (ref 0.6–1.1)
IVRT: 0.14 MSEC
LA MAJOR: 4.5 CM
LA MINOR: 4.53 CM
LA WIDTH: 3.73 CM
LEFT ATRIUM SIZE: 2.72 CM
LEFT ATRIUM VOLUME INDEX: 22.5 ML/M2
LEFT ATRIUM VOLUME: 38.94 CM3
LEFT INTERNAL DIMENSION IN SYSTOLE: 2.96 CM (ref 2.1–4)
LEFT VENTRICLE DIASTOLIC VOLUME INDEX: 49.37 ML/M2
LEFT VENTRICLE DIASTOLIC VOLUME: 85.26 ML
LEFT VENTRICLE MASS INDEX: 56 G/M2
LEFT VENTRICLE SYSTOLIC VOLUME INDEX: 19.6 ML/M2
LEFT VENTRICLE SYSTOLIC VOLUME: 33.9 ML
LEFT VENTRICULAR INTERNAL DIMENSION IN DIASTOLE: 4.35 CM (ref 3.5–6)
LEFT VENTRICULAR MASS: 96.98 G
LV LATERAL E/E' RATIO: 11 M/S
LV SEPTAL E/E' RATIO: 13.75 M/S
MV PEAK A VEL: 0.77 M/S
MV PEAK E VEL: 0.55 M/S
PISA TR MAX VEL: 2.34 M/S
PULM VEIN S/D RATIO: 1.72
PV PEAK D VEL: 0.29 M/S
PV PEAK S VEL: 0.5 M/S
RA MAJOR: 4.45 CM
RA PRESSURE: 3 MMHG
RA WIDTH: 3.52 CM
RIGHT VENTRICULAR END-DIASTOLIC DIMENSION: 3.07 CM
RV TISSUE DOPPLER FREE WALL SYSTOLIC VELOCITY 1 (APICAL 4 CHAMBER VIEW): 14.03 CM/S
SINUS: 3.11 CM
STJ: 2.41 CM
TDI LATERAL: 0.05 M/S
TDI SEPTAL: 0.04 M/S
TDI: 0.05 M/S
TR MAX PG: 22 MMHG
TRICUSPID ANNULAR PLANE SYSTOLIC EXCURSION: 1.86 CM
TV REST PULMONARY ARTERY PRESSURE: 25 MMHG

## 2019-08-07 PROCEDURE — 99999 PR PBB SHADOW E&M-EST. PATIENT-LVL III: CPT | Mod: PBBFAC,,, | Performed by: INTERNAL MEDICINE

## 2019-08-07 PROCEDURE — 93005 ELECTROCARDIOGRAM TRACING: CPT | Mod: S$GLB,,, | Performed by: INTERNAL MEDICINE

## 2019-08-07 PROCEDURE — 1101F PT FALLS ASSESS-DOCD LE1/YR: CPT | Mod: CPTII,S$GLB,, | Performed by: INTERNAL MEDICINE

## 2019-08-07 PROCEDURE — 93306 TTE W/DOPPLER COMPLETE: CPT | Mod: S$GLB,,, | Performed by: INTERNAL MEDICINE

## 2019-08-07 PROCEDURE — 99499 RISK ADDL DX/OHS AUDIT: ICD-10-PCS | Mod: S$GLB,,, | Performed by: INTERNAL MEDICINE

## 2019-08-07 PROCEDURE — 93306 TRANSTHORACIC ECHO (TTE) COMPLETE (CUPID ONLY): ICD-10-PCS | Mod: S$GLB,,, | Performed by: INTERNAL MEDICINE

## 2019-08-07 PROCEDURE — 99499 UNLISTED E&M SERVICE: CPT | Mod: S$GLB,,, | Performed by: INTERNAL MEDICINE

## 2019-08-07 PROCEDURE — 99999 PR PBB SHADOW E&M-EST. PATIENT-LVL III: ICD-10-PCS | Mod: PBBFAC,,, | Performed by: INTERNAL MEDICINE

## 2019-08-07 PROCEDURE — 1101F PR PT FALLS ASSESS DOC 0-1 FALLS W/OUT INJ PAST YR: ICD-10-PCS | Mod: CPTII,S$GLB,, | Performed by: INTERNAL MEDICINE

## 2019-08-07 PROCEDURE — 99214 PR OFFICE/OUTPT VISIT, EST, LEVL IV, 30-39 MIN: ICD-10-PCS | Mod: S$GLB,,, | Performed by: INTERNAL MEDICINE

## 2019-08-07 PROCEDURE — 93010 ELECTROCARDIOGRAM REPORT: CPT | Mod: S$GLB,,, | Performed by: INTERNAL MEDICINE

## 2019-08-07 PROCEDURE — 93010 RHYTHM STRIP: ICD-10-PCS | Mod: S$GLB,,, | Performed by: INTERNAL MEDICINE

## 2019-08-07 PROCEDURE — 93005 RHYTHM STRIP: ICD-10-PCS | Mod: S$GLB,,, | Performed by: INTERNAL MEDICINE

## 2019-08-07 PROCEDURE — 99214 OFFICE O/P EST MOD 30 MIN: CPT | Mod: S$GLB,,, | Performed by: INTERNAL MEDICINE

## 2019-08-07 NOTE — TELEPHONE ENCOUNTER
Called pt to give results of echo. Pt voiced understanding and thanked me for calling.        ----- Message from Chacho Colby MD sent at 8/7/2019  4:38 PM CDT -----  Echo reveals normal structure and function.  Please relay to patient

## 2019-08-07 NOTE — LETTER
August 7, 2019      Cristi Collins MD  [address]  [facility] [address]           [facility] - Arrhythmia  [address]  [facility] [address]  Phone: [phone]  Fax: [phone]          Patient: Jalyn Love   MR Number: [id number]   YOB: 1950   Date of Visit: 8/7/2019       Dear Dr. Cristi Collins:    Thank you for referring Jalyn Love to me for evaluation. Attached you will find relevant portions of my assessment and plan of care.    If you have questions, please do not hesitate to call me. I look forward to following Jalyn Love along with you.    Sincerely,    Chacho Colby MD    Enclosure  CC:  No Recipients    If you would like to receive this communication electronically, please contact [email or web address] or [phone] to request more information on [employer] Link access.    For providers and/or their staff who would like to refer a patient to [facility], please contact us through our one-stop-shop provider referral line, [facility], at [phone].    If you feel you have received this communication in error or would no longer like to receive these types of communications, please e-mail [email or web address]

## 2019-08-09 ENCOUNTER — PATIENT MESSAGE (OUTPATIENT)
Dept: ELECTROPHYSIOLOGY | Facility: CLINIC | Age: 69
End: 2019-08-09

## 2019-08-09 ENCOUNTER — TELEPHONE (OUTPATIENT)
Dept: ELECTROPHYSIOLOGY | Facility: CLINIC | Age: 69
End: 2019-08-09

## 2019-08-16 ENCOUNTER — TELEPHONE (OUTPATIENT)
Dept: DERMATOLOGY | Facility: CLINIC | Age: 69
End: 2019-08-16

## 2019-08-16 NOTE — TELEPHONE ENCOUNTER
Called PT in regards to scheduling laser tx after being clearing by cardiologist. Asked PT to bring in note to scan into her chart when she comes in for her appointment.

## 2019-08-16 NOTE — TELEPHONE ENCOUNTER
----- Message from Shamar Vargas sent at 8/16/2019 10:55 AM CDT -----  Contact: Pt   Name of Who is Calling: MICHAEL VICTOR [[id number]]    What is the request in detail: Pt is requesting a call back to schedule her laser Pt spoke with cardiologist told pt its ok to do the laser ........ Please contact to further discuss and advise      Can the clinic reply by [facility]: Yes     What Number to [facility] Back [facility][phone]

## 2019-09-16 ENCOUNTER — PATIENT OUTREACH (OUTPATIENT)
Dept: ADMINISTRATIVE | Facility: OTHER | Age: 69
End: 2019-09-16

## 2019-09-17 ENCOUNTER — PROCEDURE VISIT (OUTPATIENT)
Dept: DERMATOLOGY | Facility: CLINIC | Age: 69
End: 2019-09-17
Payer: MEDICARE

## 2019-09-17 DIAGNOSIS — L81.4 LENTIGINES: ICD-10-CM

## 2019-09-17 DIAGNOSIS — Z41.1 ELECTIVE PROCEDURE FOR UNACCEPTABLE COSMETIC APPEARANCE: Primary | ICD-10-CM

## 2019-09-17 PROCEDURE — 17999 UNLISTD PX SKN MUC MEMB SUBQ: CPT | Mod: CSM,S$GLB,, | Performed by: DERMATOLOGY

## 2019-09-17 PROCEDURE — 17999 PR V BEAM, 51-150 PULSES: ICD-10-PCS | Mod: CSM,S$GLB,, | Performed by: DERMATOLOGY

## 2019-09-17 PROCEDURE — 99499 UNLISTED E&M SERVICE: CPT | Mod: CSM,S$GLB,, | Performed by: DERMATOLOGY

## 2019-09-17 PROCEDURE — 99499 NO LOS: ICD-10-PCS | Mod: CSM,S$GLB,, | Performed by: DERMATOLOGY

## 2019-10-19 ENCOUNTER — PATIENT OUTREACH (OUTPATIENT)
Dept: ADMINISTRATIVE | Facility: OTHER | Age: 69
End: 2019-10-19

## 2019-10-22 ENCOUNTER — PROCEDURE VISIT (OUTPATIENT)
Dept: DERMATOLOGY | Facility: CLINIC | Age: 69
End: 2019-10-22
Payer: MEDICARE

## 2019-10-22 DIAGNOSIS — Z41.1 ELECTIVE PROCEDURE FOR UNACCEPTABLE COSMETIC APPEARANCE: Primary | ICD-10-CM

## 2019-10-22 DIAGNOSIS — L81.4 LENTIGINES: ICD-10-CM

## 2019-10-22 PROCEDURE — 17999 UNLISTD PX SKN MUC MEMB SUBQ: CPT | Mod: CSM,S$GLB,, | Performed by: DERMATOLOGY

## 2019-10-22 PROCEDURE — 17999 PR V BEAM, 51-150 PULSES: ICD-10-PCS | Mod: CSM,S$GLB,, | Performed by: DERMATOLOGY

## 2019-10-22 PROCEDURE — 99499 NO LOS: ICD-10-PCS | Mod: CSM,S$GLB,, | Performed by: DERMATOLOGY

## 2019-10-22 PROCEDURE — 99499 UNLISTED E&M SERVICE: CPT | Mod: CSM,S$GLB,, | Performed by: DERMATOLOGY

## 2020-02-18 ENCOUNTER — TELEPHONE (OUTPATIENT)
Dept: INTERNAL MEDICINE | Facility: CLINIC | Age: 70
End: 2020-02-18

## 2020-02-18 DIAGNOSIS — R73.9 HYPERGLYCEMIA: ICD-10-CM

## 2020-02-18 DIAGNOSIS — E78.5 HYPERLIPIDEMIA, UNSPECIFIED HYPERLIPIDEMIA TYPE: ICD-10-CM

## 2020-02-18 DIAGNOSIS — E55.9 MILD VITAMIN D DEFICIENCY: ICD-10-CM

## 2020-02-18 DIAGNOSIS — R94.6 ABNORMAL RESULTS OF THYROID FUNCTION STUDIES: ICD-10-CM

## 2020-02-18 DIAGNOSIS — I44.30 AV BLOCK: Primary | ICD-10-CM

## 2020-02-18 NOTE — TELEPHONE ENCOUNTER
----- Message from Andrew Collier sent at 2/18/2020  9:06 AM CST -----  Contact: Patient  Doctor appointment and lab have been scheduled.  Please link lab orders to the lab appointment.  Date of doctor appointment:    Physical or EP:  6/15/20  Date of lab appointment:    Comments:     Thank you

## 2020-04-14 ENCOUNTER — TELEPHONE (OUTPATIENT)
Dept: ELECTROPHYSIOLOGY | Facility: CLINIC | Age: 70
End: 2020-04-14

## 2020-04-14 NOTE — TELEPHONE ENCOUNTER
----- Message from Alethea Jocelynn sent at 4/14/2020  3:18 PM CDT -----  Contact: Patient  The Pt is calling to schedule her recall . Please  call her back @ [phone]. Thanks, Alethea - she wants to know she is having pain from Yoga and wants to know if she can get a xray to see the leads.

## 2020-04-14 NOTE — TELEPHONE ENCOUNTER
Spoke with patient. She is very anxious and had recently increased the amount of Yoga she was doing. She had some soreness on her left side and read that maybe there was a lead issue with PPM so she stopped doing the Yoga. Advised that her transmission from 4/9/2020 showed no change in any lead impedence. CXR not indicated and she can safely resume her Yoga. She appreciated the reassurance and will call me if she needs anything prior to her normal August recall.

## 2020-04-14 NOTE — TELEPHONE ENCOUNTER
Patient is not due to be seen until 8/20. She is asking for X-Ray to check leads. Can you please look at her transmission from 4/9/20 to see if there is any indication for us to order CXR?   Please advise  thanks

## 2020-05-05 ENCOUNTER — CLINICAL SUPPORT (OUTPATIENT)
Dept: CARDIOLOGY | Facility: HOSPITAL | Age: 70
End: 2020-05-05
Attending: INTERNAL MEDICINE
Payer: MEDICARE

## 2020-05-05 DIAGNOSIS — Z95.0 PACEMAKER: ICD-10-CM

## 2020-05-05 PROCEDURE — 93294 CARDIAC DEVICE CHECK - REMOTE: ICD-10-PCS | Mod: ,,, | Performed by: INTERNAL MEDICINE

## 2020-05-05 PROCEDURE — 93296 REM INTERROG EVL PM/IDS: CPT | Performed by: INTERNAL MEDICINE

## 2020-05-05 PROCEDURE — 93294 REM INTERROG EVL PM/LDLS PM: CPT | Mod: ,,, | Performed by: INTERNAL MEDICINE

## 2020-05-12 ENCOUNTER — TELEPHONE (OUTPATIENT)
Dept: ELECTROPHYSIOLOGY | Facility: CLINIC | Age: 70
End: 2020-05-12

## 2020-05-26 ENCOUNTER — HOSPITAL ENCOUNTER (OUTPATIENT)
Dept: RADIOLOGY | Facility: HOSPITAL | Age: 70
Discharge: HOME | End: 2020-05-26
Attending: INTERNAL MEDICINE
Payer: MEDICARE

## 2020-05-26 ENCOUNTER — NURSE TRIAGE (OUTPATIENT)
Dept: ADMINISTRATIVE | Facility: CLINIC | Age: 70
End: 2020-05-26

## 2020-05-26 ENCOUNTER — OFFICE VISIT (OUTPATIENT)
Dept: INTERNAL MEDICINE | Facility: CLINIC | Age: 70
End: 2020-05-26
Payer: MEDICARE

## 2020-05-26 ENCOUNTER — TELEPHONE (OUTPATIENT)
Dept: ELECTROPHYSIOLOGY | Facility: CLINIC | Age: 70
End: 2020-05-26

## 2020-05-26 VITALS
SYSTOLIC BLOOD PRESSURE: 120 MMHG | HEIGHT: 67 IN | DIASTOLIC BLOOD PRESSURE: 80 MMHG | WEIGHT: 129.88 LBS | HEART RATE: 85 BPM | OXYGEN SATURATION: 98 % | BODY MASS INDEX: 20.38 KG/M2

## 2020-05-26 DIAGNOSIS — R06.00 DYSPNEA, UNSPECIFIED TYPE: ICD-10-CM

## 2020-05-26 DIAGNOSIS — R07.9 CHEST PAIN, UNSPECIFIED TYPE: Primary | ICD-10-CM

## 2020-05-26 DIAGNOSIS — N64.9 DISORDER OF BREAST, UNSPECIFIED: ICD-10-CM

## 2020-05-26 DIAGNOSIS — N63.0 BREAST MASS: ICD-10-CM

## 2020-05-26 PROCEDURE — 77062 BREAST TOMOSYNTHESIS BI: CPT | Mod: TC,PO

## 2020-05-26 PROCEDURE — 76642 ULTRASOUND BREAST LIMITED: CPT | Mod: 26,LT,, | Performed by: RADIOLOGY

## 2020-05-26 PROCEDURE — 76642 US BREAST LEFT LIMITED: ICD-10-PCS | Mod: 26,LT,, | Performed by: RADIOLOGY

## 2020-05-26 PROCEDURE — 1159F MED LIST DOCD IN RCRD: CPT | Mod: S$GLB,,, | Performed by: INTERNAL MEDICINE

## 2020-05-26 PROCEDURE — 77062 BREAST TOMOSYNTHESIS BI: CPT | Mod: 26,,, | Performed by: RADIOLOGY

## 2020-05-26 PROCEDURE — 99999 PR PBB SHADOW E&M-EST. PATIENT-LVL III: CPT | Mod: PBBFAC,,, | Performed by: INTERNAL MEDICINE

## 2020-05-26 PROCEDURE — 99214 PR OFFICE/OUTPT VISIT, EST, LEVL IV, 30-39 MIN: ICD-10-PCS | Mod: S$GLB,,, | Performed by: INTERNAL MEDICINE

## 2020-05-26 PROCEDURE — 71046 X-RAY EXAM CHEST 2 VIEWS: CPT | Mod: TC

## 2020-05-26 PROCEDURE — 1101F PR PT FALLS ASSESS DOC 0-1 FALLS W/OUT INJ PAST YR: ICD-10-PCS | Mod: CPTII,S$GLB,, | Performed by: INTERNAL MEDICINE

## 2020-05-26 PROCEDURE — 1101F PT FALLS ASSESS-DOCD LE1/YR: CPT | Mod: CPTII,S$GLB,, | Performed by: INTERNAL MEDICINE

## 2020-05-26 PROCEDURE — 93010 ELECTROCARDIOGRAM REPORT: CPT | Mod: S$GLB,,, | Performed by: INTERNAL MEDICINE

## 2020-05-26 PROCEDURE — 1159F PR MEDICATION LIST DOCUMENTED IN MEDICAL RECORD: ICD-10-PCS | Mod: S$GLB,,, | Performed by: INTERNAL MEDICINE

## 2020-05-26 PROCEDURE — 93005 ELECTROCARDIOGRAM TRACING: CPT | Mod: S$GLB,,, | Performed by: INTERNAL MEDICINE

## 2020-05-26 PROCEDURE — 93005 EKG 12-LEAD: ICD-10-PCS | Mod: S$GLB,,, | Performed by: INTERNAL MEDICINE

## 2020-05-26 PROCEDURE — 93010 EKG 12-LEAD: ICD-10-PCS | Mod: S$GLB,,, | Performed by: INTERNAL MEDICINE

## 2020-05-26 PROCEDURE — 99214 OFFICE O/P EST MOD 30 MIN: CPT | Mod: S$GLB,,, | Performed by: INTERNAL MEDICINE

## 2020-05-26 PROCEDURE — 99999 PR PBB SHADOW E&M-EST. PATIENT-LVL III: ICD-10-PCS | Mod: PBBFAC,,, | Performed by: INTERNAL MEDICINE

## 2020-05-26 PROCEDURE — 71046 X-RAY EXAM CHEST 2 VIEWS: CPT | Mod: 26,,, | Performed by: RADIOLOGY

## 2020-05-26 PROCEDURE — 77066 DX MAMMO INCL CAD BI: CPT | Mod: 26,,, | Performed by: RADIOLOGY

## 2020-05-26 PROCEDURE — 71046 XR CHEST PA AND LATERAL: ICD-10-PCS | Mod: 26,,, | Performed by: RADIOLOGY

## 2020-05-26 PROCEDURE — 77062 MAMMO DIGITAL DIAGNOSTIC BILAT WITH TOMOSYNTHESIS_CAD: ICD-10-PCS | Mod: 26,,, | Performed by: RADIOLOGY

## 2020-05-26 PROCEDURE — 77066 MAMMO DIGITAL DIAGNOSTIC BILAT WITH TOMOSYNTHESIS_CAD: ICD-10-PCS | Mod: 26,,, | Performed by: RADIOLOGY

## 2020-05-26 PROCEDURE — 76642 ULTRASOUND BREAST LIMITED: CPT | Mod: TC,PO,LT

## 2020-05-26 RX ORDER — DULOXETIN HYDROCHLORIDE 60 MG/1
CAPSULE, DELAYED RELEASE ORAL
Qty: 90 CAPSULE | Refills: 3 | Status: SHIPPED | OUTPATIENT
Start: 2020-05-26 | End: 2020-09-08

## 2020-05-26 NOTE — TELEPHONE ENCOUNTER
Spoke with Aide from Dr. Kilgore's office, made 1130 am appt. Informed pt, pt verbalized understanding.

## 2020-05-26 NOTE — TELEPHONE ENCOUNTER
----- Message from Stacey Callejas MA sent at 5/26/2020  9:06 AM CDT -----  Contact: Pt called       ----- Message -----  From: Aisha López  Sent: 5/26/2020   8:07 AM CDT  To: Stacey Callejas MA    Pt has SOB and chest discomfort. Please call pt @ [phone]. Thank you.

## 2020-05-26 NOTE — TELEPHONE ENCOUNTER
"Pt reports "sharp pain in chest" a few weeks ago, called pacemaker clinic but they were not concerned regarding chest pain.     Pt reports chest discomfort and SOB x 2-3 days. Pt called pacemaker clinic today and they advised to call PCP (Dr. Kilgore). Denies exacerbating factors.     Pt would prefer to not go to ER for symptoms. Pt does admit doing yoga past few weeks and thought maybe she was sore from that. Pt does report some tenderness to chest underneath arm, next to breast with tenderness to palpation. Denies racing heart, pain radiation, lightheadedness/dizziness, cough, numbness/tingling to extremities.     Attempting to contact Dr Kilgore's office to make appt.     Reason for Disposition   Intermittent chest pains persist > 3 days    Additional Information   Negative: Severe difficulty breathing (e.g., struggling for each breath, speaks in single words)   Negative: Passed out (i.e., fainted, collapsed and was not responding)   Negative: Chest pain lasting longer than 5 minutes and ANY of the following:* Over 50 years old* Over 30 years old and at least one cardiac risk factor (i.e., high blood pressure, diabetes, high cholesterol, obesity, smoker or strong family history of heart disease)* Pain is crushing, pressure-like, or heavy * Took nitroglycerin and chest pain was not relieved* History of heart disease (i.e., angina, heart attack, bypass surgery, angioplasty, CHF)   Negative: Visible sweat on face or sweat dripping down face   Negative: Sounds like a life-threatening emergency to the triager   Negative: Followed an injury to chest   Negative: SEVERE chest pain   Negative: Pain also present in shoulder(s) or arm(s) or jaw   Negative: Difficulty breathing   Negative: Cocaine use within last 3 days   Negative: History of prior 'blood clot' in leg or lungs (i.e., deep vein thrombosis, pulmonary embolism)   Negative: Recent illness requiring prolonged bed rest (i.e., immobilization)   " Negative: Hip or leg fracture in past 2 months (e.g, or had cast on leg or ankle)   Negative: Major surgery in the past month   Negative: Recent long-distance travel with prolonged time in car, bus, plane, or train (i.e., within past 2 weeks; 6 or more hours duration)   Negative: Heart beating irregularly or very rapidly   Negative: Chest pain lasting longer than 5 minutes   Negative: Intermittent chest pain and pain has been increasing in severity or frequency   Negative: Dizziness or lightheadedness   Negative: Coughing up blood   Negative: Patient sounds very sick or weak to the triager   Negative: Fever > 100.5 F (38.1 C)    Protocols used: CHEST PAIN-A-OH

## 2020-05-26 NOTE — TELEPHONE ENCOUNTER
"Spoke with patient. She was very tearful and very anxious. I spoke with her last month when she was doing online Yoga and had three sudden sharp pains. She was scared that the lead dislodged. Transmission showed normal lead function and reassurance was provided. Today she states that she has had non-stop chest discomfort for about 3 days that is a "6" on a scale of 1-10. Pain is persistent and does not go away. Also states she has shortness of breath. She wants a CXR to make sure PPM lead is not dislodged or infected. Advised that remote interrogation on 5/4/20 once again showed normal lead function. She does need to be evaluated for the chest discomfort but does not want to go to ER. Advised to call her primary care MD to start the evaluation process. Advised that Dr Colby is EP and her symptoms are not arrhythmia related; she may need a general cardiologist. She verbalized understanding and will call Dr Kilgore now.   "

## 2020-06-01 ENCOUNTER — HOSPITAL ENCOUNTER (OUTPATIENT)
Dept: CARDIOLOGY | Facility: CLINIC | Age: 70
Discharge: HOME | End: 2020-06-01
Attending: INTERNAL MEDICINE
Payer: MEDICARE

## 2020-06-01 VITALS
DIASTOLIC BLOOD PRESSURE: 80 MMHG | BODY MASS INDEX: 20.4 KG/M2 | WEIGHT: 130 LBS | HEART RATE: 71 BPM | HEIGHT: 67 IN | SYSTOLIC BLOOD PRESSURE: 130 MMHG

## 2020-06-01 LAB
ASCENDING AORTA: 3.12 CM
AV INDEX (PROSTH): 0.67
AV MEAN GRADIENT: 3 MMHG
AV PEAK GRADIENT: 5 MMHG
AV VALVE AREA: 2.12 CM2
AV VELOCITY RATIO: 0.66
BSA FOR ECHO PROCEDURE: 1.67 M2
CV ECHO LV RWT: 0.29 CM
DOP CALC AO PEAK VEL: 1.12 M/S
DOP CALC AO VTI: 25.35 CM
DOP CALC LVOT AREA: 3.1 CM2
DOP CALC LVOT DIAMETER: 2 CM
DOP CALC LVOT PEAK VEL: 0.74 M/S
DOP CALC LVOT STROKE VOLUME: 53.69 CM3
DOP CALCLVOT PEAK VEL VTI: 17.1 CM
E WAVE DECELERATION TIME: 164.84 MSEC
E/A RATIO: 0.64
E/E' RATIO: 8.36 M/S
ECHO LV POSTERIOR WALL: 0.66 CM (ref 0.6–1.1)
FRACTIONAL SHORTENING: 18 % (ref 28–44)
INTERVENTRICULAR SEPTUM: 0.55 CM (ref 0.6–1.1)
IVRT: 105.61 MSEC
LA MAJOR: 5.3 CM
LA MINOR: 5.18 CM
LA WIDTH: 3.73 CM
LEFT ATRIUM SIZE: 2.77 CM
LEFT ATRIUM VOLUME INDEX: 27.3 ML/M2
LEFT ATRIUM VOLUME: 46.01 CM3
LEFT INTERNAL DIMENSION IN SYSTOLE: 3.72 CM (ref 2.1–4)
LEFT VENTRICLE DIASTOLIC VOLUME INDEX: 56.75 ML/M2
LEFT VENTRICLE DIASTOLIC VOLUME: 95.55 ML
LEFT VENTRICLE MASS INDEX: 48 G/M2
LEFT VENTRICLE SYSTOLIC VOLUME INDEX: 34.9 ML/M2
LEFT VENTRICLE SYSTOLIC VOLUME: 58.73 ML
LEFT VENTRICULAR INTERNAL DIMENSION IN DIASTOLE: 4.56 CM (ref 3.5–6)
LEFT VENTRICULAR MASS: 81.54 G
LV LATERAL E/E' RATIO: 7.67 M/S
LV SEPTAL E/E' RATIO: 9.2 M/S
MV PEAK A VEL: 0.72 M/S
MV PEAK E VEL: 0.46 M/S
PISA TR MAX VEL: 2.28 M/S
PULM VEIN S/D RATIO: 1.59
PV PEAK D VEL: 0.32 M/S
PV PEAK S VEL: 0.51 M/S
RA MAJOR: 5.13 CM
RA PRESSURE: 3 MMHG
RA WIDTH: 3.5 CM
RETIRED EF AND QEF - SEE NOTES: 43 %
RIGHT VENTRICULAR END-DIASTOLIC DIMENSION: 2.89 CM
RV TISSUE DOPPLER FREE WALL SYSTOLIC VELOCITY 1 (APICAL 4 CHAMBER VIEW): 14.08 CM/S
SINUS: 3 CM
STJ: 2.5 CM
TDI LATERAL: 0.06 M/S
TDI SEPTAL: 0.05 M/S
TDI: 0.06 M/S
TR MAX PG: 21 MMHG
TRICUSPID ANNULAR PLANE SYSTOLIC EXCURSION: 2.5 CM
TV REST PULMONARY ARTERY PRESSURE: 24 MMHG

## 2020-06-01 PROCEDURE — 93306 ECHO (CUPID ONLY): ICD-10-PCS | Mod: S$GLB,,, | Performed by: INTERNAL MEDICINE

## 2020-06-01 PROCEDURE — 93306 TTE W/DOPPLER COMPLETE: CPT | Mod: S$GLB,,, | Performed by: INTERNAL MEDICINE

## 2020-06-03 ENCOUNTER — TELEPHONE (OUTPATIENT)
Dept: ELECTROPHYSIOLOGY | Facility: CLINIC | Age: 70
End: 2020-06-03

## 2020-06-03 NOTE — TELEPHONE ENCOUNTER
Please see message below. Can you please schedule her to see Dr Colby or Joyce? Not urgent.  thanks

## 2020-06-03 NOTE — TELEPHONE ENCOUNTER
----- Message from Chacho Colby MD sent at 6/3/2020  1:42 PM CDT -----  Contact: MD Bogdan León,  We will start with us, and probably set her up with General cardiology,  Thanks for the heads up, and for the congrats  Chacho  ----- Message -----  From: Anenmarie Kilgore MD  Sent: 6/3/2020  10:58 AM CDT  To: Chacho Colby MD    Hi,    Ms. Jalyn Love (now [place], newly ) a mutual patient of ours with a pacer came in with some sharp chest discomfort and some dyspnea. Similar to symptoms when she required the pacemaker several years ago.  New echo shows a diminished EF - 43% should she see you in EP or regular cardiology?  Thanks for your advice - congratulations on your new position as  of Cardiology :)  Please stay safe and well!    Annemarie

## 2020-06-05 ENCOUNTER — TELEPHONE (OUTPATIENT)
Dept: ELECTROPHYSIOLOGY | Facility: CLINIC | Age: 70
End: 2020-06-05

## 2020-06-05 NOTE — TELEPHONE ENCOUNTER
----- Message from Binta Machado sent at 6/5/2020  3:57 PM CDT -----  Patient is requesting that her appointment be made after August 11th. She wants to see Dr. Colby not the NP. Please give her a call.

## 2020-06-07 NOTE — PROGRESS NOTES
Subjective:       Patient ID: Jalyn Aaron is a 70 y.o. female.    Chief Complaint: Chest Pain (for atleast 2days) and Shortness of Breath    HPIPt has been walking and swimming. Concerned she pulled a lead from her pacer out - had some sharp CP and some mild YORK.  No PND or orthopnea.  No GI symptoms  Relates symptoms are similar to when she got her pacer   Review of Systems   Respiratory: Positive for shortness of breath (PND or orthopnea).    Cardiovascular: Positive for chest pain (sharp).   Gastrointestinal: Negative for abdominal pain, diarrhea, nausea and vomiting.   Genitourinary: Negative for dysuria.   Neurological: Negative for seizures, syncope and headaches.       Objective:      Physical Exam   Constitutional: She is oriented to person, place, and time. She appears well-developed and well-nourished. No distress.   HENT:   Head: Normocephalic.   Mouth/Throat: Oropharynx is clear and moist.   Eyes: Pupils are equal, round, and reactive to light. EOM are normal.   Neck: Neck supple. No JVD present. No thyromegaly present.   Cardiovascular: Normal rate, regular rhythm, normal heart sounds and intact distal pulses. Exam reveals no gallop and no friction rub.   No murmur heard.  Pulmonary/Chest: Effort normal and breath sounds normal. She has no wheezes. She has no rales.   R breast : No masses, discharge or adenopathy     L breast : 1 cm movable cystic nodule at 1 o'clock - no adenopathy     Abdominal: Soft. Bowel sounds are normal. She exhibits no distension and no mass. There is no tenderness. There is no rebound and no guarding.   Musculoskeletal: She exhibits no edema.   Lymphadenopathy:     She has no cervical adenopathy.   Neurological: She is alert and oriented to person, place, and time. She has normal reflexes.   Skin: Skin is warm and dry.   Psychiatric: She has a normal mood and affect. Her behavior is normal. Judgment and thought content normal.       Assessment:       1. Chest pain,  unspecified type    2. Dyspnea, unspecified type    3. Breast mass    4. Disorder of breast, unspecified         Plan:   Chest pain, unspecified type  -     Troponin I; Future; Expected date: 05/26/2020  -     CK-MB; Future; Expected date: 05/26/2020    Dyspnea, unspecified type  -     Brain Natriuretic Peptide; Future; Expected date: 05/26/2020  -     X-Ray Chest PA And Lateral; Future; Expected date: 05/26/2020  -     EKG 12-lead  -     Echo Color Flow Doppler? Yes    Breast mass  -     Cancel: Mammo Digital Diagnostic Bilateral With CAD; Future; Expected date: 05/26/2020  -     US Breast Left Limited; Future; Expected date: 05/26/2020    Disorder of breast, unspecified   -     Mammo Digital Diagnostic Bilateral With CAD; Future; Expected date: 05/26/2020    Other orders  -     DULoxetine (CYMBALTA) 60 MG capsule; TAKE 1 CAPSULE BY MOUTH DAILY  Dispense: 90 capsule; Refill: 3 for anxiety  -     GI cocktail (mylanta 30 mL, lidocaine 2 % viscous 10 mL, dicyclomine 10 mL) 50 mL - check for GERD

## 2020-06-08 ENCOUNTER — DOCUMENTATION ONLY (OUTPATIENT)
Dept: CARDIOLOGY | Facility: HOSPITAL | Age: 70
End: 2020-06-08

## 2020-06-08 NOTE — PROGRESS NOTES
Pt contacted the Device Clinic on this am in relation to her already scheduled appointments on 8/11/20.  Pt stated she would like to see Dr. Colby vs the NP.  Pt was informed a message would have to be sent to Dr. Colby's MA in relation to the clinic appts.  Pt then asked about the results of the ECHO she had done on 6/1/20 as to she does not see it in her patient portal.  Pt was informed a message would have to be sent to Dr. Colby's RN in relation to this. Understanding was verbalized.     Message was sent to Dr. Karla LOYA in relation to clinic appts and to his RN in relation to the ECHO results.     Addendum: the ECHO was ordered by pt's PCP.  Contacted the pt and instructed her to contact their office in relation to the ECHO results and the releasing of the results.  Understanding was verbalized.  Pt appreciated the return call.

## 2020-06-12 ENCOUNTER — TELEPHONE (OUTPATIENT)
Dept: INTERNAL MEDICINE | Facility: CLINIC | Age: 70
End: 2020-06-12

## 2020-06-12 NOTE — TELEPHONE ENCOUNTER
----- Message from Melodie León sent at 6/12/2020 11:10 AM CDT -----  Contact: Patient [phone]  Good Morning,    Calling to get test results.  Name of test (lab, xray, etc.):  TRANSTHORACIC ECHO COMPLETE  Date of test:  6/1/2020  Ordering provider: Dr Kilgore  Where was the test performed:  [facility] ECHO[facility]  Would the patient rather a call back or a response via [facility]?:  Call  Comments:  Stated that Rx DULoxetine (CYMBALTA) 60 MG capsule is making her nauseated.     Please call and advise.     Thank You

## 2020-06-16 ENCOUNTER — TELEPHONE (OUTPATIENT)
Dept: ELECTROPHYSIOLOGY | Facility: CLINIC | Age: 70
End: 2020-06-16

## 2020-06-16 NOTE — TELEPHONE ENCOUNTER
Spoke with pt to schedule f/u with SK per her request.  ----- Message from Gutierrez Webster sent at 6/8/2020 10:06 AM CDT -----  Bogdan Ibarra,    It looks like you may have spoken to this pt on Friday (6/5/20) in relation to her annual ck up on 8/11/20.  Pt called the Device Clinic on this am and requested to have her appts scheduled with Dr. Colby.  Pt does not want to see the NP.  Please contact the pt @ [phone].    Thanks,  Gutierrez

## 2020-06-17 ENCOUNTER — TELEPHONE (OUTPATIENT)
Dept: INTERNAL MEDICINE | Facility: CLINIC | Age: 70
End: 2020-06-17

## 2020-06-17 DIAGNOSIS — F41.9 ANXIETY: Primary | ICD-10-CM

## 2020-06-17 RX ORDER — DULOXETIN HYDROCHLORIDE 30 MG/1
30 CAPSULE, DELAYED RELEASE ORAL DAILY
Qty: 30 CAPSULE | Refills: 3 | Status: SHIPPED | OUTPATIENT
Start: 2020-06-17 | End: 2020-09-08

## 2020-06-17 NOTE — TELEPHONE ENCOUNTER
Decrease cymbalta to 30mg daily - she will call to schedule with Dr. Colby to discuss EF 45%, please schedule in psych Dr. Vazquez.

## 2020-06-17 NOTE — TELEPHONE ENCOUNTER
----- Message from Tania Ayala sent at 6/17/2020  8:09 AM CDT -----  Contact: patient [phone]  Patient states that she  has been trying to contact you and has not had a response for 2 weeks and she thinks you must not be getting the messages.    Patient had a ct scan on 6/01 and has not been able to get the results because you haven't released them.    Also, you prescribed Cymbalta 60mg  which is causing nausea and loss of appetite. Please call pt asap about whether this medication comes in a weaker dose. She had to quit taking it .

## 2020-06-18 ENCOUNTER — TELEPHONE (OUTPATIENT)
Dept: INTERNAL MEDICINE | Facility: CLINIC | Age: 70
End: 2020-06-18

## 2020-06-18 NOTE — TELEPHONE ENCOUNTER
----- Message from Patria Jama sent at 6/18/2020  3:59 PM CDT -----  Contact: SELF/ [phone]  Patient says no one has called her back from Dr. Dr. Colby office and would like to know if the doctor could call and schedule an appointment for her? Please call and advise.

## 2020-06-19 ENCOUNTER — TELEPHONE (OUTPATIENT)
Dept: INTERNAL MEDICINE | Facility: CLINIC | Age: 70
End: 2020-06-19

## 2020-06-19 NOTE — TELEPHONE ENCOUNTER
----- Message from Tania Ayala sent at 6/19/2020  8:42 AM CDT -----  Contact: patient [phone]  Patient spoke with the nurse yesterday about calling a doctor to be seen but that office has not returned her call. Patient would like to speak with the nurse again about this. It was .

## 2020-06-22 ENCOUNTER — TELEPHONE (OUTPATIENT)
Dept: ELECTROPHYSIOLOGY | Facility: CLINIC | Age: 70
End: 2020-06-22

## 2020-06-22 NOTE — TELEPHONE ENCOUNTER
Called pt to schedule device and ekg appts b4 appt with JF on 7/8/2020.  Canceled appts with SK at the end of August

## 2020-06-29 DIAGNOSIS — I49.8 OTHER SPECIFIED CARDIAC ARRHYTHMIAS: Primary | ICD-10-CM

## 2020-07-06 NOTE — PROGRESS NOTES
Ms. Aaron is a patient of Dr. Colby and was last seen in clinic 8/7/2019.      Subjective:   Patient ID:  Jalyn Aaron is a 70 y.o. female who presents for follow-up of Pacemaker Check  .     HPI:    Ms. Aaron is a 70 y.o. female with PPM, CHB, anxiety here for follow up.     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  Significant anxiety -- much improved since she has been on cymbalta    Update:  Goes to Yoga three times a week.  Walks the park.  Remains very active.  Happy with her energy level.  Doing great.  Echo with color flow given chronic RV pacing.  F/u with monitoring as scheduled, with me in one year.    Update (07/08/2020):    Today she reports that several weeks ago she felt chest pain while stretching doing yoga and was worried that her leads were damaged. CP is now resolved. Labwork WNL. She feels episodic SOB. Denies worsening YORK. Denies palpitations, LH, syncope.    She is very concerned because she knows her EF is 45%.    Device Interrogation (7/7/2020) reveals an intrinsic CHB with stable lead and device function. No arrhythmias or treated episodes were noted.  She paces 1.3% in the RA and 100% in the RV. Estimated battery longevity 31 months.    I have personally reviewed the patient's EKG today, which shows ASVP at 81bpm. DE interval is 154. QRS is 148. QTc is 487.    Recent Cardiac Tests:    2D Echo (6/1/2020):  · Mildly decreased left ventricular systolic function. The estimated ejection fraction is 45%. QEF 43%. Global hypokinetic wall motion.  · Grade I (mild) left ventricular diastolic dysfunction consistent with impaired relaxation.  · Septal wall has abnormal motion.  · Normal right ventricular systolic function.  · Mild tricuspid regurgitation.  · The estimated PA systolic pressure is 24 mmHg.  · Normal central venous pressure (3 mmHg).    Current Outpatient Medications   Medication Sig    calcium  "carbonate (OS-TALHA) 600 mg calcium (1,500 mg) Tab Take 600 mg by mouth once.    DULoxetine (CYMBALTA) 30 MG capsule Take 1 capsule (30 mg total) by mouth once daily.    DULoxetine (CYMBALTA) 60 MG capsule TAKE 1 CAPSULE BY MOUTH DAILY    vitamin D 1000 units Tab Take 1,000 Units by mouth once daily.     No current facility-administered medications for this visit.      Review of Systems   Constitution: Negative for malaise/fatigue.   Cardiovascular: Positive for chest pain. Negative for irregular heartbeat, leg swelling and palpitations.   Respiratory: Positive for shortness of breath.    Hematologic/Lymphatic: Negative for bleeding problem.   Skin: Negative for rash.   Musculoskeletal: Negative for myalgias.   Gastrointestinal: Negative for hematemesis, hematochezia and nausea.   Genitourinary: Negative for hematuria.   Neurological: Negative for light-headedness.   Psychiatric/Behavioral: Negative for altered mental status.   Allergic/Immunologic: Negative for persistent infections.     Objective:        BP (!) 144/78   Pulse 81   Ht 5' 7" (1.702 m)   Wt 60.1 kg (132 lb 7.9 oz)   BMI 20.75 kg/m²     Physical Exam   Constitutional: She is oriented to person, place, and time. She appears well-developed and well-nourished.   HENT:   Head: Normocephalic.   Nose: Nose normal.   Eyes: Pupils are equal, round, and reactive to light.   Cardiovascular: Normal rate, regular rhythm, S1 normal and S2 normal.   No murmur heard.  Pulses:       Radial pulses are 2+ on the right side and 2+ on the left side.   Pulmonary/Chest: Breath sounds normal. No respiratory distress.   Device to LUCW. Pocket and incision in good repair.   Abdominal: Normal appearance.   Musculoskeletal: Normal range of motion.         General: No edema.   Neurological: She is alert and oriented to person, place, and time.   Skin: Skin is warm and dry. No erythema.   Psychiatric: She has a normal mood and affect. Her speech is normal and behavior is " normal.   Nursing note and vitals reviewed.    Lab Results   Component Value Date     04/29/2019    K 4.7 04/29/2019    BUN 14 04/29/2019    CREATININE 0.7 04/29/2019    ALT 15 04/29/2019    AST 17 04/29/2019    HGB 12.6 04/29/2019    HCT 38.9 04/29/2019    TSH 0.888 04/29/2019    LDLCALC 134.0 04/29/2019           Assessment:     1. Cardiac pacemaker in situ    2. AV block    3. Other cardiomyopathy      Plan:     In summary, Ms. Aaron is a 70 y.o. female with PPM, CHB, anxiety here for follow up.   Ms. Aaron is doing well from a device perspective with stable lead and device function. No arrhythmia noted. CHB with 100% RV pacing. EF per echo is down to 45% (from 55%). We discussed that her cardiomyopathy may be a result of chronic RV pacing. Discussed CRT upgrade, including risks and benefits. Pt agrees to proceed if confirmed by Dr. Colby (UPDATE: Will re-evaluate after 3 mo on GDMT). Patient not on GDMT. Will start on BB today, and add ACE if pt tolerates in a few weeks. Chest pain likely MSK and has resolved, but will confirm with Dr. Colby re: ischemic eval. (UPDATE: can defer)    Start metoprolol succinate 50mg.  Possible CRT upgrade. Pending repeat echo after 3 mo on GDMT.  Continue current medication regimen and device settings.   Follow up in device clinic as scheduled.   Follow up in EP clinic as scheduled.    *A copy of this note has been sent to Dr. Colby*    Follow up as scheduled.    ------------------------------------------------------------------    RAMÓN Plata, NP-C  Cardiac Electrophysiology

## 2020-07-07 ENCOUNTER — CLINICAL SUPPORT (OUTPATIENT)
Dept: CARDIOLOGY | Facility: HOSPITAL | Age: 70
End: 2020-07-07
Attending: INTERNAL MEDICINE
Payer: MEDICARE

## 2020-07-07 DIAGNOSIS — Z95.0 PACEMAKER: ICD-10-CM

## 2020-07-07 PROCEDURE — 93280 PM DEVICE PROGR EVAL DUAL: CPT

## 2020-07-08 ENCOUNTER — OFFICE VISIT (OUTPATIENT)
Dept: ELECTROPHYSIOLOGY | Facility: CLINIC | Age: 70
End: 2020-07-08
Payer: MEDICARE

## 2020-07-08 ENCOUNTER — HOSPITAL ENCOUNTER (OUTPATIENT)
Dept: CARDIOLOGY | Facility: CLINIC | Age: 70
Discharge: HOME | End: 2020-07-08
Payer: MEDICARE

## 2020-07-08 ENCOUNTER — NURSE TRIAGE (OUTPATIENT)
Dept: ADMINISTRATIVE | Facility: CLINIC | Age: 70
End: 2020-07-08

## 2020-07-08 VITALS
BODY MASS INDEX: 20.8 KG/M2 | HEIGHT: 67 IN | HEART RATE: 81 BPM | WEIGHT: 132.5 LBS | SYSTOLIC BLOOD PRESSURE: 144 MMHG | DIASTOLIC BLOOD PRESSURE: 78 MMHG

## 2020-07-08 DIAGNOSIS — I42.8 OTHER CARDIOMYOPATHY: ICD-10-CM

## 2020-07-08 DIAGNOSIS — I44.30 AV BLOCK: ICD-10-CM

## 2020-07-08 DIAGNOSIS — I49.8 OTHER SPECIFIED CARDIAC ARRHYTHMIAS: ICD-10-CM

## 2020-07-08 DIAGNOSIS — Z95.0 CARDIAC PACEMAKER IN SITU: Primary | ICD-10-CM

## 2020-07-08 PROCEDURE — 1101F PT FALLS ASSESS-DOCD LE1/YR: CPT | Mod: CPTII,S$GLB,, | Performed by: NURSE PRACTITIONER

## 2020-07-08 PROCEDURE — 1159F PR MEDICATION LIST DOCUMENTED IN MEDICAL RECORD: ICD-10-PCS | Mod: S$GLB,,, | Performed by: NURSE PRACTITIONER

## 2020-07-08 PROCEDURE — 99999 PR PBB SHADOW E&M-EST. PATIENT-LVL III: ICD-10-PCS | Mod: PBBFAC,,, | Performed by: NURSE PRACTITIONER

## 2020-07-08 PROCEDURE — 1159F MED LIST DOCD IN RCRD: CPT | Mod: S$GLB,,, | Performed by: NURSE PRACTITIONER

## 2020-07-08 PROCEDURE — 3008F BODY MASS INDEX DOCD: CPT | Mod: CPTII,S$GLB,, | Performed by: NURSE PRACTITIONER

## 2020-07-08 PROCEDURE — 93005 RHYTHM STRIP: ICD-10-PCS | Mod: S$GLB,,, | Performed by: NURSE PRACTITIONER

## 2020-07-08 PROCEDURE — 99999 PR PBB SHADOW E&M-EST. PATIENT-LVL III: CPT | Mod: PBBFAC,,, | Performed by: NURSE PRACTITIONER

## 2020-07-08 PROCEDURE — 99214 PR OFFICE/OUTPT VISIT, EST, LEVL IV, 30-39 MIN: ICD-10-PCS | Mod: S$GLB,,, | Performed by: NURSE PRACTITIONER

## 2020-07-08 PROCEDURE — 1126F PR PAIN SEVERITY QUANTIFIED, NO PAIN PRESENT: ICD-10-PCS | Mod: S$GLB,,, | Performed by: NURSE PRACTITIONER

## 2020-07-08 PROCEDURE — 93005 ELECTROCARDIOGRAM TRACING: CPT | Mod: S$GLB,,, | Performed by: NURSE PRACTITIONER

## 2020-07-08 PROCEDURE — 1126F AMNT PAIN NOTED NONE PRSNT: CPT | Mod: S$GLB,,, | Performed by: NURSE PRACTITIONER

## 2020-07-08 PROCEDURE — 3008F PR BODY MASS INDEX (BMI) DOCUMENTED: ICD-10-PCS | Mod: CPTII,S$GLB,, | Performed by: NURSE PRACTITIONER

## 2020-07-08 PROCEDURE — 99214 OFFICE O/P EST MOD 30 MIN: CPT | Mod: S$GLB,,, | Performed by: NURSE PRACTITIONER

## 2020-07-08 PROCEDURE — 93010 RHYTHM STRIP: ICD-10-PCS | Mod: S$GLB,,, | Performed by: INTERNAL MEDICINE

## 2020-07-08 PROCEDURE — 93010 ELECTROCARDIOGRAM REPORT: CPT | Mod: S$GLB,,, | Performed by: INTERNAL MEDICINE

## 2020-07-08 PROCEDURE — 1101F PR PT FALLS ASSESS DOC 0-1 FALLS W/OUT INJ PAST YR: ICD-10-PCS | Mod: CPTII,S$GLB,, | Performed by: NURSE PRACTITIONER

## 2020-07-08 RX ORDER — NAPROXEN 500 MG/1
500 TABLET ORAL 2 TIMES DAILY WITH MEALS
COMMUNITY
End: 2020-09-08 | Stop reason: SDUPTHER

## 2020-07-08 RX ORDER — METOPROLOL SUCCINATE 50 MG/1
50 TABLET, EXTENDED RELEASE ORAL DAILY
Qty: 30 TABLET | Refills: 11 | Status: SHIPPED | OUTPATIENT
Start: 2020-07-08 | End: 2020-07-15 | Stop reason: SDUPTHER

## 2020-07-08 NOTE — PATIENT INSTRUCTIONS
Start metoprolol: Take 1/2 tab (25mg) nightly for one week, then take full tab (50mg) nightly.      Understanding Cardiac Resynchronization Therapy (CRT)    Cardiac resynchronization therapy (CRT) is a treatment that may help you when your heart isnt pumping as well as it should. This problem can be caused by heart failure, a condition in which the heart muscle has become weak. It can also be caused by an electrical problem that keeps the bottom chambers of the heart (ventricles) from beating in sync. This can make heart failure worse.  When you have heart failure, fluid can build up in your lungs and your legs (edema). You may have less energy and be short of breath. These symptoms interfere with daily life.  CRT uses a small device to help improve the timing of the hearts contractions. CRT can ease symptoms, improve your quality of life, and help you live longer.  How CRT Works  With CRT, a small electrical device (pacemaker or defibrillator) is put under the skin in the upper chest. This is a minor surgical procedure done at a hospital. It is not heart surgery. Wires from the device lead to the ventricles. The device sends electrical pulses to each ventricle at the same time. This keeps the ventricles beating in sync . This procedure is also called biventricular pacing or resynchronization pacing.  CRT can be done with 1 of 2 devices. The type of device used depends on the persons needs. The devices are:  · A biventricular pacemaker. This device helps the heart beat at a normal rate.  · A biventricular ICD (implantable cardioverter defibrillator). This device is a pacemaker but also can treat fast, life-threatening heart rhythms.  Reasons for CRT  Your healthcare provider may advise CRT if:  · You have heart failure symptoms and your heart doesnt pump well  · The ventricles are not working together  · Tests, like an echocardiogram, show that your heart is weak and enlarged  · Medicine and lifestyle changes  are not working well enough to control your heart failure  Benefits of CRT  CRT wont replace your other treatments. Its part of a complete heart failure treatment plan. CRT helps a weakened heart do a better job of pumping blood out of the heart with each beat. So, more blood and oxygen go to the rest of your body. This can decrease heart failure symptoms and improve survival. The device is put into your body during a low-risk procedure.  Not everyone with heart failure benefits from CRT. CRT improves symptoms in about 2 out of 3 people who get it. For those who have mild symptoms, it can also prevent worsening heart failure. With CRT, you may be more able to:  · Return to daily activities such as walking, carrying grocery bags, and climbing stairs  · Have more energy to be active and do the things you enjoy  · Breathe more easily when you lie flat, so you sleep better at night  · Have less swelling in your ankles, feet, and abdomen  · Make fewer visits to the hospital because of heart failure symptoms  · Have fewer side effects from your heart failure medicines  Risks and complications  Like all medical procedures, having a CRT device implanted has some risks. These include:  · Anesthesia reactions  · Swelling or bruising in the upper chest area where the CRT device is placed  · Bleeding  · Infection  · Heart rhythm problems  · Collapsed lung  · Nerve or blood vessel damage  · Movement of the device or the device wires, which may require a second procedure  · Mechanical problems with the CRT device  · Kidney damage  · Sudden worsening of heart failure  · Other risks related to your specific medical condition  Life with CRT  If you have a CRT device, you may need to stay away from certain electronic devices and devices that have strong magnetic fields. These devices can interfere with how the CRT device works. You will need to avoid anti-theft systems, security metal detectors, Snap[employer] radios, and very strong magnets,  such as those used in MRI. However, depending on the type of device you have implanted, you may be able to undergo an MRI with certain precautions in place. Talk to your cardiologist and the radiologist to make sure it is safe.  Your provider may also give you specific instructions for using cell phones and headphones with mp3 players. Your provider may give you a list of other devices and procedures to avoid. Most people with CRT devices can still enjoy physical activity, including sports and exercise.  You should not drive until your doctor says it's OK. The driving restriction is done to be sure that your health condition does not cause a safety issue for yourself or others on the road. Ask your provider when it may be safe for you to continue to drive.  You will have regular appointments to see how the device is working and to check the battery life of your device. Some of the device monitoring can be done using a home device that transmits the information about your device to your provider's office over a telephone or internet connection. The battery, or generator, will have to be changed at some point and your provider will let you know as that time approaches.  Date Last Reviewed: 6/1/2016  © 3950-3807 [email or web address] [address] [place] [address], [place] [address]. All rights reserved. This information is not intended as a substitute for professional medical care. Always follow your healthcare professional's instructions.

## 2020-07-08 NOTE — Clinical Note
Can we schedule an echo for 3 months from now? Recall will depend on results.Thanks!
Pt with CHB and 100% RV pacing with EF now down to 45% (from 55). I started her on BB today. Do you want to wait for 3 mo on GDMT prior to CRT or schedule now? Also, she had non-exertional CP which has resolved and no CAD hx. Are you ok deferring ischemic eval? Thx.
Private car

## 2020-07-08 NOTE — TELEPHONE ENCOUNTER
Third party exposure. No symptoms. Spoke about if her friend (direct exposure) was to come back positive. Verbalized understanding.     Reason for Disposition   COVID-19 Testing, questions about    Protocols used: CORONAVIRUS (COVID-19) EXPOSURE-A-[place]

## 2020-07-09 ENCOUNTER — TELEPHONE (OUTPATIENT)
Dept: ELECTROPHYSIOLOGY | Facility: CLINIC | Age: 70
End: 2020-07-09

## 2020-07-09 NOTE — TELEPHONE ENCOUNTER
Spoke with pt to schedule echo in October.  Will mail appt reminder  ----- Message from Joyce Campa NP sent at 7/8/2020  3:32 PM CDT -----  Can we schedule an echo for 3 months from now? Recall will depend on results.Thanks!

## 2020-07-15 RX ORDER — METOPROLOL SUCCINATE 25 MG/1
12.5 TABLET, EXTENDED RELEASE ORAL DAILY
Qty: 30 TABLET | Refills: 11 | Status: SHIPPED | OUTPATIENT
Start: 2020-07-15 | End: 2020-07-21 | Stop reason: SINTOL

## 2020-07-21 DIAGNOSIS — I42.8 OTHER CARDIOMYOPATHY: Primary | ICD-10-CM

## 2020-07-21 RX ORDER — LOSARTAN POTASSIUM 25 MG/1
25 TABLET ORAL DAILY
Qty: 30 TABLET | Refills: 3 | Status: SHIPPED | OUTPATIENT
Start: 2020-07-21 | End: 2020-09-08 | Stop reason: SDUPTHER

## 2020-07-31 ENCOUNTER — OFFICE VISIT (OUTPATIENT)
Dept: DERMATOLOGY | Facility: CLINIC | Age: 70
End: 2020-07-31
Payer: MEDICARE

## 2020-07-31 DIAGNOSIS — Z85.828 HISTORY OF NONMELANOMA SKIN CANCER: ICD-10-CM

## 2020-07-31 DIAGNOSIS — L72.0 MILIA: ICD-10-CM

## 2020-07-31 DIAGNOSIS — L82.1 SK (SEBORRHEIC KERATOSIS): ICD-10-CM

## 2020-07-31 DIAGNOSIS — L82.0 BENIGN LICHENOID KERATOSIS: Primary | ICD-10-CM

## 2020-07-31 DIAGNOSIS — L81.4 LENTIGO: ICD-10-CM

## 2020-07-31 PROCEDURE — 1126F PR PAIN SEVERITY QUANTIFIED, NO PAIN PRESENT: ICD-10-PCS | Mod: S$GLB,,, | Performed by: DERMATOLOGY

## 2020-07-31 PROCEDURE — 17110 PR DESTRUCTION BENIGN LESIONS UP TO 14: ICD-10-PCS | Mod: S$GLB,,, | Performed by: DERMATOLOGY

## 2020-07-31 PROCEDURE — 1126F AMNT PAIN NOTED NONE PRSNT: CPT | Mod: S$GLB,,, | Performed by: DERMATOLOGY

## 2020-07-31 PROCEDURE — 99213 OFFICE O/P EST LOW 20 MIN: CPT | Mod: 25,S$GLB,, | Performed by: DERMATOLOGY

## 2020-07-31 PROCEDURE — 17110 DESTRUCTION B9 LES UP TO 14: CPT | Mod: S$GLB,,, | Performed by: DERMATOLOGY

## 2020-07-31 PROCEDURE — 1159F PR MEDICATION LIST DOCUMENTED IN MEDICAL RECORD: ICD-10-PCS | Mod: S$GLB,,, | Performed by: DERMATOLOGY

## 2020-07-31 PROCEDURE — 1101F PR PT FALLS ASSESS DOC 0-1 FALLS W/OUT INJ PAST YR: ICD-10-PCS | Mod: CPTII,S$GLB,, | Performed by: DERMATOLOGY

## 2020-07-31 PROCEDURE — 99213 PR OFFICE/OUTPT VISIT, EST, LEVL III, 20-29 MIN: ICD-10-PCS | Mod: 25,S$GLB,, | Performed by: DERMATOLOGY

## 2020-07-31 PROCEDURE — 99999 PR PBB SHADOW E&M-EST. PATIENT-LVL III: ICD-10-PCS | Mod: PBBFAC,,, | Performed by: DERMATOLOGY

## 2020-07-31 PROCEDURE — 99999 PR PBB SHADOW E&M-EST. PATIENT-LVL III: CPT | Mod: PBBFAC,,, | Performed by: DERMATOLOGY

## 2020-07-31 PROCEDURE — 1159F MED LIST DOCD IN RCRD: CPT | Mod: S$GLB,,, | Performed by: DERMATOLOGY

## 2020-07-31 PROCEDURE — 1101F PT FALLS ASSESS-DOCD LE1/YR: CPT | Mod: CPTII,S$GLB,, | Performed by: DERMATOLOGY

## 2020-07-31 NOTE — PATIENT INSTRUCTIONS

## 2020-08-04 ENCOUNTER — PATIENT MESSAGE (OUTPATIENT)
Dept: ELECTROPHYSIOLOGY | Facility: CLINIC | Age: 70
End: 2020-08-04

## 2020-08-04 NOTE — TELEPHONE ENCOUNTER
Joyce,   Please advise if you want to add any additional meds or just repeat echo in 3 months as originally planned.   Thanks

## 2020-08-17 ENCOUNTER — LAB VISIT (OUTPATIENT)
Dept: LAB | Facility: HOSPITAL | Age: 70
End: 2020-08-17
Attending: INTERNAL MEDICINE
Payer: MEDICARE

## 2020-08-17 DIAGNOSIS — E55.9 MILD VITAMIN D DEFICIENCY: ICD-10-CM

## 2020-08-17 DIAGNOSIS — R94.6 ABNORMAL RESULTS OF THYROID FUNCTION STUDIES: ICD-10-CM

## 2020-08-17 DIAGNOSIS — E78.5 HYPERLIPIDEMIA, UNSPECIFIED HYPERLIPIDEMIA TYPE: ICD-10-CM

## 2020-08-17 DIAGNOSIS — R73.9 HYPERGLYCEMIA: ICD-10-CM

## 2020-08-17 LAB
25(OH)D3+25(OH)D2 SERPL-MCNC: 45 NG/ML (ref 30–96)
ALBUMIN SERPL BCP-MCNC: 3.9 G/DL (ref 3.5–5.2)
ALP SERPL-CCNC: 62 U/L (ref 55–135)
ALT SERPL W/O P-5'-P-CCNC: 12 U/L (ref 10–44)
ANION GAP SERPL CALC-SCNC: 7 MMOL/L (ref 8–16)
AST SERPL-CCNC: 15 U/L (ref 10–40)
BASOPHILS # BLD AUTO: 0.06 K/UL (ref 0–0.2)
BASOPHILS NFR BLD: 1.3 % (ref 0–1.9)
BILIRUB SERPL-MCNC: 0.9 MG/DL (ref 0.1–1)
BUN SERPL-MCNC: 25 MG/DL (ref 8–23)
CALCIUM SERPL-MCNC: 9.4 MG/DL (ref 8.7–10.5)
CHLORIDE SERPL-SCNC: 108 MMOL/L (ref 95–110)
CHOLEST SERPL-MCNC: 213 MG/DL (ref 120–199)
CHOLEST/HDLC SERPL: 2.8 {RATIO} (ref 2–5)
CO2 SERPL-SCNC: 28 MMOL/L (ref 23–29)
CREAT SERPL-MCNC: 0.7 MG/DL (ref 0.5–1.4)
DIFFERENTIAL METHOD: ABNORMAL
EOSINOPHIL # BLD AUTO: 0.2 K/UL (ref 0–0.5)
EOSINOPHIL NFR BLD: 4.5 % (ref 0–8)
ERYTHROCYTE [DISTWIDTH] IN BLOOD BY AUTOMATED COUNT: 12.5 % (ref 11.5–14.5)
EST. GFR  (AFRICAN AMERICAN): >60 ML/MIN/1.73 M^2
EST. GFR  (NON AFRICAN AMERICAN): >60 ML/MIN/1.73 M^2
GLUCOSE SERPL-MCNC: 86 MG/DL (ref 70–110)
HCT VFR BLD AUTO: 38.7 % (ref 37–48.5)
HDLC SERPL-MCNC: 77 MG/DL (ref 40–75)
HDLC SERPL: 36.2 % (ref 20–50)
HGB BLD-MCNC: 12.3 G/DL (ref 12–16)
IMM GRANULOCYTES # BLD AUTO: 0.01 K/UL (ref 0–0.04)
IMM GRANULOCYTES NFR BLD AUTO: 0.2 % (ref 0–0.5)
LDLC SERPL CALC-MCNC: 119 MG/DL (ref 63–159)
LYMPHOCYTES # BLD AUTO: 1.8 K/UL (ref 1–4.8)
LYMPHOCYTES NFR BLD: 40 % (ref 18–48)
MCH RBC QN AUTO: 31.3 PG (ref 27–31)
MCHC RBC AUTO-ENTMCNC: 31.8 G/DL (ref 32–36)
MCV RBC AUTO: 99 FL (ref 82–98)
MONOCYTES # BLD AUTO: 0.4 K/UL (ref 0.3–1)
MONOCYTES NFR BLD: 8 % (ref 4–15)
NEUTROPHILS # BLD AUTO: 2.1 K/UL (ref 1.8–7.7)
NEUTROPHILS NFR BLD: 46 % (ref 38–73)
NONHDLC SERPL-MCNC: 136 MG/DL
NRBC BLD-RTO: 0 /100 WBC
PLATELET # BLD AUTO: 210 K/UL (ref 150–350)
PMV BLD AUTO: 11.8 FL (ref 9.2–12.9)
POTASSIUM SERPL-SCNC: 4.8 MMOL/L (ref 3.5–5.1)
PROT SERPL-MCNC: 6.6 G/DL (ref 6–8.4)
RBC # BLD AUTO: 3.93 M/UL (ref 4–5.4)
SODIUM SERPL-SCNC: 143 MMOL/L (ref 136–145)
TRIGL SERPL-MCNC: 85 MG/DL (ref 30–150)
TSH SERPL DL<=0.005 MIU/L-ACNC: 0.89 UIU/ML (ref 0.4–4)
WBC # BLD AUTO: 4.48 K/UL (ref 3.9–12.7)

## 2020-08-17 PROCEDURE — 85025 COMPLETE CBC W/AUTO DIFF WBC: CPT

## 2020-08-17 PROCEDURE — 80061 LIPID PANEL: CPT

## 2020-08-17 PROCEDURE — 80053 COMPREHEN METABOLIC PANEL: CPT

## 2020-08-17 PROCEDURE — 82306 VITAMIN D 25 HYDROXY: CPT

## 2020-08-17 PROCEDURE — 36415 COLL VENOUS BLD VENIPUNCTURE: CPT

## 2020-08-17 PROCEDURE — 83036 HEMOGLOBIN GLYCOSYLATED A1C: CPT

## 2020-08-17 PROCEDURE — 84443 ASSAY THYROID STIM HORMONE: CPT

## 2020-08-18 ENCOUNTER — TELEPHONE (OUTPATIENT)
Dept: INTERNAL MEDICINE | Facility: CLINIC | Age: 70
End: 2020-08-18

## 2020-08-18 LAB
ESTIMATED AVG GLUCOSE: 105 MG/DL (ref 68–131)
HBA1C MFR BLD HPLC: 5.3 % (ref 4–5.6)

## 2020-08-19 ENCOUNTER — TELEPHONE (OUTPATIENT)
Dept: INTERNAL MEDICINE | Facility: CLINIC | Age: 70
End: 2020-08-19

## 2020-08-19 NOTE — TELEPHONE ENCOUNTER
----- Message from Andrew Collier sent at 8/19/2020 12:35 PM CDT -----  Regarding: Advice  Contact: Patient [phone]  Patient would like to get medical advice.  Symptoms (please be specific):  Read Comment below    Comments: Patient calling stating cut finger with agus knife and wants to know if Tetanus shot is up today, also would like a call back with next steps. Stating didn't go to Urgent Care until office call to inform next steps, feels may need to be seen by PCP.    Please call an advise  Thank you

## 2020-08-19 NOTE — TELEPHONE ENCOUNTER
Please schedule her a UC appt today may need sutures please place with NP that can possibly suture - needs TD if dirty cut and >5 years since last one (Tdap 2014).

## 2020-08-19 NOTE — TELEPHONE ENCOUNTER
----- Message from Andrew Collier sent at 8/19/2020 12:35 PM CDT -----  Regarding: Advice  Contact: Patient [phone]  Patient would like to get medical advice.  Symptoms (please be specific):  Read Comment below    Comments: Patient calling stating cut finger with agus knife and wants to know if Tetanus shot is up today, also would like a call back with next steps. Stating didn't go to Urgent Care until office call to inform next steps, feels may need to be seen by PCP.    Please call an advise  Thank you

## 2020-08-21 ENCOUNTER — TELEPHONE (OUTPATIENT)
Dept: INTERNAL MEDICINE | Facility: CLINIC | Age: 70
End: 2020-08-21

## 2020-09-08 ENCOUNTER — OFFICE VISIT (OUTPATIENT)
Dept: INTERNAL MEDICINE | Facility: CLINIC | Age: 70
End: 2020-09-08
Payer: MEDICARE

## 2020-09-08 VITALS
OXYGEN SATURATION: 96 % | DIASTOLIC BLOOD PRESSURE: 88 MMHG | TEMPERATURE: 98 F | BODY MASS INDEX: 20.3 KG/M2 | WEIGHT: 129.63 LBS | SYSTOLIC BLOOD PRESSURE: 122 MMHG | HEART RATE: 81 BPM

## 2020-09-08 DIAGNOSIS — Z13.89 SCREENING FOR BLOOD OR PROTEIN IN URINE: ICD-10-CM

## 2020-09-08 DIAGNOSIS — I42.8 OTHER CARDIOMYOPATHY: ICD-10-CM

## 2020-09-08 DIAGNOSIS — N95.8 OTHER SPECIFIED MENOPAUSAL AND PERIMENOPAUSAL DISORDERS: ICD-10-CM

## 2020-09-08 DIAGNOSIS — M85.80 OSTEOPENIA, UNSPECIFIED LOCATION: Primary | ICD-10-CM

## 2020-09-08 DIAGNOSIS — Z01.00 ROUTINE EYE EXAM: ICD-10-CM

## 2020-09-08 LAB
BILIRUB UR QL STRIP: NEGATIVE
CLARITY UR REFRACT.AUTO: CLEAR
COLOR UR AUTO: NORMAL
GLUCOSE UR QL STRIP: NEGATIVE
HGB UR QL STRIP: NEGATIVE
KETONES UR QL STRIP: NEGATIVE
LEUKOCYTE ESTERASE UR QL STRIP: NEGATIVE
NITRITE UR QL STRIP: NEGATIVE
PH UR STRIP: 8 [PH] (ref 5–8)
PROT UR QL STRIP: NEGATIVE
SP GR UR STRIP: 1 (ref 1–1.03)
URN SPEC COLLECT METH UR: NORMAL

## 2020-09-08 PROCEDURE — 1159F MED LIST DOCD IN RCRD: CPT | Mod: S$GLB,,, | Performed by: INTERNAL MEDICINE

## 2020-09-08 PROCEDURE — 87086 URINE CULTURE/COLONY COUNT: CPT

## 2020-09-08 PROCEDURE — 81003 URINALYSIS AUTO W/O SCOPE: CPT

## 2020-09-08 PROCEDURE — 3008F BODY MASS INDEX DOCD: CPT | Mod: CPTII,S$GLB,, | Performed by: INTERNAL MEDICINE

## 2020-09-08 PROCEDURE — 1126F AMNT PAIN NOTED NONE PRSNT: CPT | Mod: S$GLB,,, | Performed by: INTERNAL MEDICINE

## 2020-09-08 PROCEDURE — 99999 PR PBB SHADOW E&M-EST. PATIENT-LVL IV: CPT | Mod: PBBFAC,,, | Performed by: INTERNAL MEDICINE

## 2020-09-08 PROCEDURE — 1126F PR PAIN SEVERITY QUANTIFIED, NO PAIN PRESENT: ICD-10-PCS | Mod: S$GLB,,, | Performed by: INTERNAL MEDICINE

## 2020-09-08 PROCEDURE — 99999 PR PBB SHADOW E&M-EST. PATIENT-LVL IV: ICD-10-PCS | Mod: PBBFAC,,, | Performed by: INTERNAL MEDICINE

## 2020-09-08 PROCEDURE — 99214 OFFICE O/P EST MOD 30 MIN: CPT | Mod: S$GLB,,, | Performed by: INTERNAL MEDICINE

## 2020-09-08 PROCEDURE — 99214 PR OFFICE/OUTPT VISIT, EST, LEVL IV, 30-39 MIN: ICD-10-PCS | Mod: S$GLB,,, | Performed by: INTERNAL MEDICINE

## 2020-09-08 PROCEDURE — 1101F PT FALLS ASSESS-DOCD LE1/YR: CPT | Mod: CPTII,S$GLB,, | Performed by: INTERNAL MEDICINE

## 2020-09-08 PROCEDURE — 1101F PR PT FALLS ASSESS DOC 0-1 FALLS W/OUT INJ PAST YR: ICD-10-PCS | Mod: CPTII,S$GLB,, | Performed by: INTERNAL MEDICINE

## 2020-09-08 PROCEDURE — 1159F PR MEDICATION LIST DOCUMENTED IN MEDICAL RECORD: ICD-10-PCS | Mod: S$GLB,,, | Performed by: INTERNAL MEDICINE

## 2020-09-08 PROCEDURE — 3008F PR BODY MASS INDEX (BMI) DOCUMENTED: ICD-10-PCS | Mod: CPTII,S$GLB,, | Performed by: INTERNAL MEDICINE

## 2020-09-08 PROCEDURE — 82043 UR ALBUMIN QUANTITATIVE: CPT

## 2020-09-08 RX ORDER — LOSARTAN POTASSIUM 25 MG/1
TABLET ORAL
Qty: 60 TABLET | Refills: 6 | Status: SHIPPED | OUTPATIENT
Start: 2020-09-08 | End: 2020-10-23 | Stop reason: SDUPTHER

## 2020-09-08 RX ORDER — NAPROXEN 500 MG/1
TABLET ORAL
Qty: 30 TABLET | Refills: 3
Start: 2020-09-08 | End: 2021-09-27 | Stop reason: SDUPTHER

## 2020-09-08 RX ORDER — ALPRAZOLAM 0.25 MG/1
TABLET ORAL
Qty: 10 TABLET | Refills: 0 | Status: SHIPPED | OUTPATIENT
Start: 2020-09-08 | End: 2020-10-09 | Stop reason: SDUPTHER

## 2020-09-08 NOTE — PROGRESS NOTES
Subjective:       Patient ID: Jalyn Aaron is a 70 y.o. female.    Chief Complaint: Annual Exam    HPIPt with anxiety - wants a second opinion about her pacer.  Some slight chest pressure in AM.  No SOB.  No issues exercising.  Could not tolerate metoprolol ro duloxetine.   Review of Systems   Respiratory: Negative for shortness of breath (PND or orthopnea).    Cardiovascular: Negative for chest pain (arm pain or jaw pain).   Gastrointestinal: Negative for abdominal pain, diarrhea, nausea and vomiting.   Genitourinary: Negative for dysuria.   Neurological: Negative for seizures, syncope and headaches.       Objective:      Physical Exam  Constitutional:       General: She is not in acute distress.     Appearance: She is well-developed.   HENT:      Head: Normocephalic.   Neck:      Musculoskeletal: Neck supple.      Thyroid: No thyromegaly.      Vascular: No JVD.   Cardiovascular:      Rate and Rhythm: Normal rate and regular rhythm.      Heart sounds: Normal heart sounds. No murmur. No friction rub. No gallop.    Pulmonary:      Effort: Pulmonary effort is normal.      Breath sounds: Normal breath sounds. No wheezing or rales.   Abdominal:      General: Bowel sounds are normal. There is no distension.      Palpations: Abdomen is soft. There is no mass.      Tenderness: There is no abdominal tenderness. There is no guarding or rebound.   Lymphadenopathy:      Cervical: No cervical adenopathy.   Skin:     General: Skin is warm and dry.   Neurological:      Mental Status: She is alert and oriented to person, place, and time.      Deep Tendon Reflexes: Reflexes are normal and symmetric.   Psychiatric:         Behavior: Behavior normal.         Thought Content: Thought content normal.         Judgment: Judgment normal.         Assessment:       1. Osteopenia, unspecified location    2. Other cardiomyopathy    3. Routine eye exam    4. Screening for blood or protein in urine    5. Other specified menopausal  and perimenopausal disorders         Plan:   Osteopenia, unspecified location  -     DXA Bone Density Spine And Hip; Future; Expected date: 09/08/2020    Other cardiomyopathy  -     Ambulatory referral/consult to Electrophysiology; Future; Expected date: 09/15/2020  -     losartan (COZAAR) 25 MG tablet; One twice daily  Dispense: 60 tablet; Refill: 6    Routine eye exam  -     Ambulatory referral/consult to Optometry; Future; Expected date: 09/15/2020    Screening for blood or protein in urine  -     Urinalysis  -     Microalbumin/creatinine urine ratio  -     Urine culture    Other specified menopausal and perimenopausal disorders   -     DXA Bone Density Spine And Hip; Future; Expected date: 09/08/2020    Other orders  -     ALPRAZolam (XANAX) 0.25 MG tablet; Half - one tablet daily as needed for anxiety  Dispense: 10 tablet; Refill: 0  -     naproxen (NAPROSYN) 500 MG tablet; Half tablet once daily  Dispense: 30 tablet; Refill: 3    Flu vac    Shingrix

## 2020-09-09 ENCOUNTER — TELEPHONE (OUTPATIENT)
Dept: OPHTHALMOLOGY | Facility: CLINIC | Age: 70
End: 2020-09-09

## 2020-09-09 LAB
ALBUMIN/CREAT UR: NORMAL UG/MG (ref 0–30)
BACTERIA UR CULT: NO GROWTH
CREAT UR-MCNC: 18 MG/DL (ref 15–325)
MICROALBUMIN UR DL<=1MG/L-MCNC: <2.5 UG/ML

## 2020-09-10 ENCOUNTER — TELEPHONE (OUTPATIENT)
Dept: OPTOMETRY | Facility: CLINIC | Age: 70
End: 2020-09-10

## 2020-09-10 NOTE — TELEPHONE ENCOUNTER
----- Message from Sonido Mcgraw, OD sent at 9/9/2020 12:31 PM CDT -----  Regarding: RE: Eye Exam  Contact: Jalyn  Yes. Thanks!  ----- Message -----  From: Kerry Gabriel MA  Sent: 9/8/2020  11:50 AM CDT  To: Sonido Mcgraw, OD  Subject: FW: Eye Exam                                     Is it ok to schedule patient?  ----- Message -----  From: Emily Reeves  Sent: 9/8/2020  10:20 AM CDT  To: [facility]ID[facility] Staff  Subject: Eye Exam                                         Pt calling to schedule for her two year eye exam with Dr. Mcgraw. She can be r eached at:  [phone]

## 2020-09-21 ENCOUNTER — HOSPITAL ENCOUNTER (OUTPATIENT)
Dept: CARDIOLOGY | Facility: HOSPITAL | Age: 70
Discharge: HOME | End: 2020-09-21
Attending: INTERNAL MEDICINE
Payer: MEDICARE

## 2020-09-21 ENCOUNTER — OFFICE VISIT (OUTPATIENT)
Dept: CARDIOLOGY | Facility: CLINIC | Age: 70
End: 2020-09-21
Payer: MEDICARE

## 2020-09-21 ENCOUNTER — PATIENT OUTREACH (OUTPATIENT)
Dept: ADMINISTRATIVE | Facility: OTHER | Age: 70
End: 2020-09-21

## 2020-09-21 ENCOUNTER — CLINICAL SUPPORT (OUTPATIENT)
Dept: CARDIOLOGY | Facility: HOSPITAL | Age: 70
End: 2020-09-21
Attending: INTERNAL MEDICINE
Payer: MEDICARE

## 2020-09-21 VITALS
SYSTOLIC BLOOD PRESSURE: 134 MMHG | HEART RATE: 99 BPM | OXYGEN SATURATION: 100 % | BODY MASS INDEX: 20.34 KG/M2 | DIASTOLIC BLOOD PRESSURE: 80 MMHG | WEIGHT: 129.88 LBS

## 2020-09-21 DIAGNOSIS — I44.30 AV BLOCK: ICD-10-CM

## 2020-09-21 DIAGNOSIS — I44.30 AV BLOCK: Primary | ICD-10-CM

## 2020-09-21 DIAGNOSIS — Z95.0 CARDIAC PACEMAKER IN SITU: ICD-10-CM

## 2020-09-21 DIAGNOSIS — Z95.0 CARDIAC PACEMAKER IN SITU: Primary | ICD-10-CM

## 2020-09-21 DIAGNOSIS — R94.30 EJECTION FRACTION < 50%: ICD-10-CM

## 2020-09-21 DIAGNOSIS — F41.9 ANXIETY: ICD-10-CM

## 2020-09-21 PROCEDURE — 93005 ELECTROCARDIOGRAM TRACING: CPT

## 2020-09-21 PROCEDURE — 3008F PR BODY MASS INDEX (BMI) DOCUMENTED: ICD-10-PCS | Mod: CPTII,S$GLB,, | Performed by: INTERNAL MEDICINE

## 2020-09-21 PROCEDURE — 99215 OFFICE O/P EST HI 40 MIN: CPT | Mod: S$GLB,,, | Performed by: INTERNAL MEDICINE

## 2020-09-21 PROCEDURE — 1101F PT FALLS ASSESS-DOCD LE1/YR: CPT | Mod: CPTII,S$GLB,, | Performed by: INTERNAL MEDICINE

## 2020-09-21 PROCEDURE — 99999 PR PBB SHADOW E&M-EST. PATIENT-LVL I: ICD-10-PCS | Mod: PBBFAC,,,

## 2020-09-21 PROCEDURE — 3008F BODY MASS INDEX DOCD: CPT | Mod: CPTII,S$GLB,, | Performed by: INTERNAL MEDICINE

## 2020-09-21 PROCEDURE — 99999 PR PBB SHADOW E&M-EST. PATIENT-LVL III: ICD-10-PCS | Mod: PBBFAC,,, | Performed by: INTERNAL MEDICINE

## 2020-09-21 PROCEDURE — 93010 EKG 12-LEAD: ICD-10-PCS | Mod: ,,, | Performed by: INTERNAL MEDICINE

## 2020-09-21 PROCEDURE — 99999 PR PBB SHADOW E&M-EST. PATIENT-LVL I: CPT | Mod: PBBFAC,,,

## 2020-09-21 PROCEDURE — 93280 PM DEVICE PROGR EVAL DUAL: CPT

## 2020-09-21 PROCEDURE — 93280 PM DEVICE PROGR EVAL DUAL: CPT | Mod: 26,,, | Performed by: INTERNAL MEDICINE

## 2020-09-21 PROCEDURE — 1159F PR MEDICATION LIST DOCUMENTED IN MEDICAL RECORD: ICD-10-PCS | Mod: S$GLB,,, | Performed by: INTERNAL MEDICINE

## 2020-09-21 PROCEDURE — 93010 ELECTROCARDIOGRAM REPORT: CPT | Mod: ,,, | Performed by: INTERNAL MEDICINE

## 2020-09-21 PROCEDURE — 93280 CARDIAC DEVICE CHECK - IN CLINIC & HOSPITAL: ICD-10-PCS | Mod: 26,,, | Performed by: INTERNAL MEDICINE

## 2020-09-21 PROCEDURE — 99215 PR OFFICE/OUTPT VISIT, EST, LEVL V, 40-54 MIN: ICD-10-PCS | Mod: S$GLB,,, | Performed by: INTERNAL MEDICINE

## 2020-09-21 PROCEDURE — 1159F MED LIST DOCD IN RCRD: CPT | Mod: S$GLB,,, | Performed by: INTERNAL MEDICINE

## 2020-09-21 PROCEDURE — 99999 PR PBB SHADOW E&M-EST. PATIENT-LVL III: CPT | Mod: PBBFAC,,, | Performed by: INTERNAL MEDICINE

## 2020-09-21 PROCEDURE — 1101F PR PT FALLS ASSESS DOC 0-1 FALLS W/OUT INJ PAST YR: ICD-10-PCS | Mod: CPTII,S$GLB,, | Performed by: INTERNAL MEDICINE

## 2020-09-21 RX ORDER — INFLUENZA A VIRUS A/MICHIGAN/45/2015 X-275 (H1N1) ANTIGEN (FORMALDEHYDE INACTIVATED), INFLUENZA A VIRUS A/SINGAPORE/INFIMH-16-0019/2016 IVR-186 (H3N2) ANTIGEN (FORMALDEHYDE INACTIVATED), INFLUENZA B VIRUS B/PHUKET/3073/2013 ANTIGEN (FORMALDEHYDE INACTIVATED), AND INFLUENZA B VIRUS B/MARYLAND/15/2016 BX-69A ANTIGEN (FORMALDEHYDE INACTIVATED) 60; 60; 60; 60 UG/.7ML; UG/.7ML; UG/.7ML; UG/.7ML
INJECTION, SUSPENSION INTRAMUSCULAR
COMMUNITY
Start: 2020-09-09 | End: 2020-11-10 | Stop reason: ALTCHOICE

## 2020-09-21 NOTE — PROGRESS NOTES
Subjective:   Patient ID:  Jalyn Aaron is a 70 y.o. female     Chief complaint:Shortness of Breath      HPI  Background:  From my last note (03/07/16):  PPM for 2nd degree AVB   Significant anxiety -- much improved since she has been on cymbalta  At her last office visit, Ivan Rahman reported that she remained active, though recently retired - walking her dog in the park, yoga, with occasional fatigue.      Update 03/23/2017:  Since her last office visit, Ms. Love reports feeling well; she denies chest pain, SOB/YORK, dizziness, palpitations, or syncope. She continues to walk in the park and do yoga without difficulty.      Recent cardiac studies:  Echo (03/28/16) CONCLUSIONS:     1 - Normal left ventricular systolic function (EF 55-60%).     2 - Normal right ventricular systolic function .     3 - Normal left ventricular diastolic function.     4 - The estimated PA systolic pressure is greater than 17 mmHg.     5 - Atrial septal aneurysm . No color flow doppler across the IAS.     6-pacemaker catheter seen in the RA, RV.      Device Interrogation (03/23/17) reveals an intrinsic SR w/CHB with stable lead and device function. No arrhythmias or treated episodes noted. She paces 0% in the RA and 100% in the RV. Estimated battery longevity 6 years.      I reviewed today's ECG which demonstrated a V-paced rhythm at 77 bpm; , , and QTc 491.     Update since then:  She is now here in consultation regarding what to do now that she was newly noted to have a a decreased LVEF on routine echocardiography.  She says that her functionality has not decreased but would like to know how to proceed from here.  The decrease in LVEF is thought to be related to persistent RV pacing.  Current Outpatient Medications   Medication Sig    ALPRAZolam (XANAX) 0.25 MG tablet Half - one tablet daily as needed for anxiety    calcium carbonate (OS-TALHA) 600 mg calcium (1,500 mg) Tab Take 600 mg by mouth once.     FLUZONE HIGHDOSE QUAD 20-21  mcg/0.7 mL Syrg ADM 0.7ML IM UTD    losartan (COZAAR) 25 MG tablet One twice daily    multivitamin capsule Take 1 capsule by mouth once daily.    naproxen (NAPROSYN) 500 MG tablet Half tablet once daily    vitamin D 1000 units Tab Take 1,000 Units by mouth once daily.     No current facility-administered medications for this visit.      Review of Systems   Constitution: Negative for decreased appetite, weight gain and weight loss.   HENT: Negative for nosebleeds.    Eyes: Negative for blurred vision and visual disturbance.   Cardiovascular: Negative for chest pain, claudication, cyanosis, dyspnea on exertion, irregular heartbeat, leg swelling, near-syncope, orthopnea, palpitations, paroxysmal nocturnal dyspnea and syncope.   Respiratory: Negative for cough, shortness of breath and wheezing.    Endocrine: Negative for heat intolerance.   Skin: Negative for rash.   Musculoskeletal: Positive for arthritis. Negative for muscle weakness and myalgias.   Gastrointestinal: Negative for abdominal pain, anorexia, melena, nausea and vomiting.   Genitourinary: Negative for menorrhagia.   Neurological: Negative for excessive daytime sleepiness, dizziness, headaches, loss of balance, seizures, vertigo and weakness.   Psychiatric/Behavioral: Negative for altered mental status and depression. The patient is not nervous/anxious.        Objective:   Physical Exam   Constitutional: She is oriented to person, place, and time. She appears well-developed and well-nourished.   HENT:   Head: Normocephalic and atraumatic.   Right Ear: External ear normal.   Left Ear: External ear normal.   Eyes: Pupils are equal, round, and reactive to light. Conjunctivae are normal. Left eye exhibits no discharge. No scleral icterus.   Neck: Normal range of motion. Neck supple. No thyromegaly present.   Cardiovascular: Normal rate, regular rhythm, normal heart sounds and intact distal pulses. Exam reveals no  gallop, no S3, no S4, no friction rub, no midsystolic click and no opening snap.   No murmur heard.  Pulses:       Carotid pulses are 2+ on the right side and 2+ on the left side.       Radial pulses are 2+ on the right side and 2+ on the left side.        Dorsalis pedis pulses are 2+ on the right side and 2+ on the left side.        Posterior tibial pulses are 2+ on the right side and 2+ on the left side.   Pulmonary/Chest: Effort normal and breath sounds normal.   Device pocket is in excellent repair   Abdominal: Soft. She exhibits no distension. There is no hepatomegaly. There is no abdominal tenderness. There is no guarding.   Musculoskeletal:      Right ankle: She exhibits no swelling.      Left ankle: She exhibits no swelling.      Right lower leg: She exhibits no swelling.      Left lower leg: She exhibits no swelling.   Neurological: She is alert and oriented to person, place, and time. She has normal strength. No cranial nerve deficit. Gait normal.   Skin: Skin is warm, dry and intact. No rash noted. No cyanosis. Nails show no clubbing.   Psychiatric: Her speech is normal and behavior is normal. Thought content normal. Her mood appears anxious. Cognition and memory are normal.   Nursing note and vitals reviewed.    /80 (BP Location: Right arm, Patient Position: Sitting, BP Method: Medium (Manual))   Pulse 99   Wt 58.9 kg (129 lb 13.6 oz)   SpO2 100%   BMI 20.34 kg/m²      Assessment:    Asymptomatic decrease in ejection fraction likely related to persistent RV pacing.  Patient's underlying rhythm is sinus with complete heart block and a junctional escape rhythm at 43 beats per minute.  1. AV block    2. Cardiac pacemaker in situ    3. Ejection fraction < 50%    4. Anxiety        Plan:    Long discussion:    I reassured her that this is reversible situation and that it is quite likely that once the configuration of ventricular pacing is modified, her LV is likely to remodel and LVEF may well  normalize.    I also mentioned that there are 2 methods to do so:          CRT pacing by adding an LV lead.         His bundle pacing with backup RV lead in place.   I personally favor His pacing over CRT for this situation.  She will think about her options and decide whether she wants to pursue.  If she does, it would be helpful to have a preoperative venogram (clinically, there is no evidence of left subclavian occlusion but it would be best to rule this out before full preop counseling).  No orders of the defined types were placed in this encounter.    No follow-ups on file.  There are no discontinued medications.     Medication List with Changes/Refills   Current Medications    ALPRAZOLAM (XANAX) 0.25 MG TABLET    Half - one tablet daily as needed for anxiety    CALCIUM CARBONATE (OS-TALHA) 600 MG CALCIUM (1,500 MG) TAB    Take 600 mg by mouth once.    FLUZONE HIGHDOSE QUAD 20-21  MCG/0.7 ML SYRG    ADM 0.7ML IM UTD    LOSARTAN (COZAAR) 25 MG TABLET    One twice daily    MULTIVITAMIN CAPSULE    Take 1 capsule by mouth once daily.    NAPROXEN (NAPROSYN) 500 MG TABLET    Half tablet once daily    VITAMIN D 1000 UNITS TAB    Take 1,000 Units by mouth once daily.

## 2020-09-23 PROBLEM — R94.30 EJECTION FRACTION < 50%: Status: ACTIVE | Noted: 2020-09-23

## 2020-09-26 LAB
AV DELAY - LONGEST: 180 MSEC
AV DELAY - SHORTEST: 140 MSEC
BATTERY VOLTAGE (V): 2.76 V
IMPEDANCE RA LEAD (NATIVE): 754 OHMS
IMPEDANCE RA LEAD: 485 OHMS
OHS CV DC PP MS1: 0.4 MS
OHS CV DC PP MS2: 0.4 MS
OHS CV DC PP V1: NORMAL V
OHS CV DC PP V2: NORMAL V
P/R-WAVE RA LEAD (NATIVE): NORMAL MV
P/R-WAVE RA LEAD: 5.6 MV
THRESHOLD MS RA LEAD (NATIVE): 0.4 MS
THRESHOLD MS RA LEAD: 0.4 MS
THRESHOLD V RA LEAD (NATIVE): 0.5 V
THRESHOLD V RA LEAD: 0.5 V

## 2020-09-29 ENCOUNTER — PATIENT MESSAGE (OUTPATIENT)
Dept: ELECTROPHYSIOLOGY | Facility: CLINIC | Age: 70
End: 2020-09-29

## 2020-09-29 ENCOUNTER — TELEPHONE (OUTPATIENT)
Dept: ELECTROPHYSIOLOGY | Facility: CLINIC | Age: 70
End: 2020-09-29

## 2020-09-29 NOTE — TELEPHONE ENCOUNTER
Spoke with pt to schedule virtual visit with SK. Pt has Prezacor downloaded as is familiar with how to begin visit.  Will call a few days prior to appt to answer any questions, reconcile meds, and ensure that she has completed the precheck questions.

## 2020-10-01 ENCOUNTER — OFFICE VISIT (OUTPATIENT)
Dept: INTERNAL MEDICINE | Facility: CLINIC | Age: 70
End: 2020-10-01
Payer: MEDICARE

## 2020-10-01 ENCOUNTER — LAB VISIT (OUTPATIENT)
Dept: LAB | Facility: HOSPITAL | Age: 70
End: 2020-10-01
Attending: INTERNAL MEDICINE
Payer: MEDICARE

## 2020-10-01 VITALS
DIASTOLIC BLOOD PRESSURE: 70 MMHG | HEART RATE: 86 BPM | BODY MASS INDEX: 19.93 KG/M2 | HEIGHT: 67 IN | OXYGEN SATURATION: 98 % | WEIGHT: 127 LBS | SYSTOLIC BLOOD PRESSURE: 118 MMHG

## 2020-10-01 DIAGNOSIS — R20.2 PARESTHESIA OF LEFT LOWER EXTREMITY: ICD-10-CM

## 2020-10-01 DIAGNOSIS — R20.2 PARESTHESIA OF LEFT LOWER EXTREMITY: Primary | ICD-10-CM

## 2020-10-01 LAB
FOLATE SERPL-MCNC: 14 NG/ML (ref 4–24)
VIT B12 SERPL-MCNC: 544 PG/ML (ref 210–950)

## 2020-10-01 PROCEDURE — 1101F PR PT FALLS ASSESS DOC 0-1 FALLS W/OUT INJ PAST YR: ICD-10-PCS | Mod: CPTII,S$GLB,, | Performed by: INTERNAL MEDICINE

## 2020-10-01 PROCEDURE — 82607 VITAMIN B-12: CPT

## 2020-10-01 PROCEDURE — 1101F PT FALLS ASSESS-DOCD LE1/YR: CPT | Mod: CPTII,S$GLB,, | Performed by: INTERNAL MEDICINE

## 2020-10-01 PROCEDURE — 99214 PR OFFICE/OUTPT VISIT, EST, LEVL IV, 30-39 MIN: ICD-10-PCS | Mod: S$GLB,,, | Performed by: INTERNAL MEDICINE

## 2020-10-01 PROCEDURE — 3008F BODY MASS INDEX DOCD: CPT | Mod: CPTII,S$GLB,, | Performed by: INTERNAL MEDICINE

## 2020-10-01 PROCEDURE — 1159F MED LIST DOCD IN RCRD: CPT | Mod: S$GLB,,, | Performed by: INTERNAL MEDICINE

## 2020-10-01 PROCEDURE — 99999 PR PBB SHADOW E&M-EST. PATIENT-LVL IV: ICD-10-PCS | Mod: PBBFAC,,, | Performed by: INTERNAL MEDICINE

## 2020-10-01 PROCEDURE — 36415 COLL VENOUS BLD VENIPUNCTURE: CPT

## 2020-10-01 PROCEDURE — 82746 ASSAY OF FOLIC ACID SERUM: CPT

## 2020-10-01 PROCEDURE — 3008F PR BODY MASS INDEX (BMI) DOCUMENTED: ICD-10-PCS | Mod: CPTII,S$GLB,, | Performed by: INTERNAL MEDICINE

## 2020-10-01 PROCEDURE — 99214 OFFICE O/P EST MOD 30 MIN: CPT | Mod: S$GLB,,, | Performed by: INTERNAL MEDICINE

## 2020-10-01 PROCEDURE — 99999 PR PBB SHADOW E&M-EST. PATIENT-LVL IV: CPT | Mod: PBBFAC,,, | Performed by: INTERNAL MEDICINE

## 2020-10-01 PROCEDURE — 1159F PR MEDICATION LIST DOCUMENTED IN MEDICAL RECORD: ICD-10-PCS | Mod: S$GLB,,, | Performed by: INTERNAL MEDICINE

## 2020-10-01 RX ORDER — PREDNISONE 20 MG/1
TABLET ORAL
Qty: 12 TABLET | Refills: 0 | Status: SHIPPED | OUTPATIENT
Start: 2020-10-01 | End: 2020-10-15

## 2020-10-01 NOTE — PROGRESS NOTES
A 70-year-old female    For 2 weeks she has been feeling a numbing discomfort involving her left lower extremity in which she feels along the lateral aspect of her leg and into a foot .  At 1st it was when she was lying in bed but now seems to be all day.  There is no sense of weakness involving leg.  There is no back or buttocks pain.  No change in bowel and urine function.  She does not have numbness anywhere else.    It is noted that she has a pacemaker of for heart block and then recently she had a 2D echo to evaluate chest pain shortness of breath in which the ejection fraction lowered to 45%, which will be leading to an upgrade to CRT    Examination  Weight 127  Pulse 92  Blood pressure 136/86  2+ knee 2+ ankle jerk reflexes  Motor strength is normal  Sensation to monofilament intact  Negative straight leg raise    Impression  Left lower extremity numbness/paresthesia    Plan  B12 folate  Consider trial of Medrol Dosepak of steroids over the next 6 -8 days  If no improvement EMG study

## 2020-10-05 ENCOUNTER — PATIENT MESSAGE (OUTPATIENT)
Dept: CARDIOLOGY | Facility: CLINIC | Age: 70
End: 2020-10-05

## 2020-10-06 ENCOUNTER — PATIENT MESSAGE (OUTPATIENT)
Dept: INTERNAL MEDICINE | Facility: CLINIC | Age: 70
End: 2020-10-06

## 2020-10-06 DIAGNOSIS — R20.0 LEFT LEG NUMBNESS: Primary | ICD-10-CM

## 2020-10-07 ENCOUNTER — PATIENT MESSAGE (OUTPATIENT)
Dept: INTERNAL MEDICINE | Facility: CLINIC | Age: 70
End: 2020-10-07

## 2020-10-08 ENCOUNTER — HOSPITAL ENCOUNTER (OUTPATIENT)
Dept: CARDIOLOGY | Facility: HOSPITAL | Age: 70
Discharge: HOME | End: 2020-10-08
Attending: NURSE PRACTITIONER
Payer: MEDICARE

## 2020-10-08 VITALS
HEIGHT: 67 IN | DIASTOLIC BLOOD PRESSURE: 80 MMHG | BODY MASS INDEX: 19.93 KG/M2 | HEART RATE: 80 BPM | SYSTOLIC BLOOD PRESSURE: 130 MMHG | WEIGHT: 127 LBS

## 2020-10-08 DIAGNOSIS — Z95.0 CARDIAC PACEMAKER IN SITU: ICD-10-CM

## 2020-10-08 DIAGNOSIS — I42.8 OTHER CARDIOMYOPATHY: ICD-10-CM

## 2020-10-08 LAB
ASCENDING AORTA: 2.62 CM
AV INDEX (PROSTH): 0.75
AV MEAN GRADIENT: 3 MMHG
AV PEAK GRADIENT: 5 MMHG
AV VALVE AREA: 2.56 CM2
AV VELOCITY RATIO: 0.73
BSA FOR ECHO PROCEDURE: 1.65 M2
CV ECHO LV RWT: 0.37 CM
DOP CALC AO PEAK VEL: 1.12 M/S
DOP CALC AO VTI: 17.3 CM
DOP CALC LVOT AREA: 3.4 CM2
DOP CALC LVOT DIAMETER: 2.08 CM
DOP CALC LVOT PEAK VEL: 0.82 M/S
DOP CALC LVOT STROKE VOLUME: 44.32 CM3
DOP CALCLVOT PEAK VEL VTI: 13.05 CM
E WAVE DECELERATION TIME: 190.23 MSEC
E/A RATIO: 0.79
E/E' RATIO: 9.23 M/S
ECHO LV POSTERIOR WALL: 0.74 CM (ref 0.6–1.1)
FRACTIONAL SHORTENING: 22 % (ref 28–44)
INTERVENTRICULAR SEPTUM: 0.75 CM (ref 0.6–1.1)
IVRT: 134.16 MSEC
LA MAJOR: 4.36 CM
LA MINOR: 4.43 CM
LA WIDTH: 3.46 CM
LEFT ATRIUM SIZE: 2.32 CM
LEFT ATRIUM VOLUME INDEX MOD: 25.8 ML/M2
LEFT ATRIUM VOLUME INDEX: 18 ML/M2
LEFT ATRIUM VOLUME MOD: 43 CM3
LEFT ATRIUM VOLUME: 29.99 CM3
LEFT INTERNAL DIMENSION IN SYSTOLE: 3.08 CM (ref 2.1–4)
LEFT VENTRICLE DIASTOLIC VOLUME INDEX: 40.7 ML/M2
LEFT VENTRICLE DIASTOLIC VOLUME: 67.86 ML
LEFT VENTRICLE MASS INDEX: 50 G/M2
LEFT VENTRICLE SYSTOLIC VOLUME INDEX: 22.3 ML/M2
LEFT VENTRICLE SYSTOLIC VOLUME: 37.26 ML
LEFT VENTRICULAR INTERNAL DIMENSION IN DIASTOLE: 3.95 CM (ref 3.5–6)
LEFT VENTRICULAR MASS: 83.27 G
LV LATERAL E/E' RATIO: 10 M/S
LV SEPTAL E/E' RATIO: 8.57 M/S
MV PEAK A VEL: 0.76 M/S
MV PEAK E VEL: 0.6 M/S
MV STENOSIS PRESSURE HALF TIME: 55.17 MS
MV VALVE AREA P 1/2 METHOD: 3.99 CM2
PISA TR MAX VEL: 2.44 M/S
PULM VEIN S/D RATIO: 1.39
PV PEAK D VEL: 0.51 M/S
PV PEAK S VEL: 0.71 M/S
RA MAJOR: 4.03 CM
RA PRESSURE: 3 MMHG
RA WIDTH: 3.02 CM
RIGHT VENTRICULAR END-DIASTOLIC DIMENSION: 2.74 CM
RV TISSUE DOPPLER FREE WALL SYSTOLIC VELOCITY 1 (APICAL 4 CHAMBER VIEW): 12.96 CM/S
SINUS: 3.02 CM
STJ: 2.53 CM
TDI LATERAL: 0.06 M/S
TDI SEPTAL: 0.07 M/S
TDI: 0.07 M/S
TR MAX PG: 24 MMHG
TRICUSPID ANNULAR PLANE SYSTOLIC EXCURSION: 1.95 CM
TV REST PULMONARY ARTERY PRESSURE: 27 MMHG

## 2020-10-08 PROCEDURE — 93306 ECHO (CUPID ONLY): ICD-10-PCS | Mod: 26,,, | Performed by: INTERNAL MEDICINE

## 2020-10-08 PROCEDURE — 93306 TTE W/DOPPLER COMPLETE: CPT | Mod: 26,,, | Performed by: INTERNAL MEDICINE

## 2020-10-08 PROCEDURE — 93306 TTE W/DOPPLER COMPLETE: CPT

## 2020-10-09 ENCOUNTER — TELEPHONE (OUTPATIENT)
Dept: INTERNAL MEDICINE | Facility: CLINIC | Age: 70
End: 2020-10-09

## 2020-10-09 ENCOUNTER — PATIENT MESSAGE (OUTPATIENT)
Dept: INTERNAL MEDICINE | Facility: CLINIC | Age: 70
End: 2020-10-09

## 2020-10-09 RX ORDER — ALPRAZOLAM 0.25 MG/1
TABLET ORAL
Qty: 20 TABLET | Refills: 0 | Status: SHIPPED | OUTPATIENT
Start: 2020-10-09 | End: 2021-05-03

## 2020-10-12 PROBLEM — I42.8 CARDIOMYOPATHY, NONISCHEMIC: Status: ACTIVE | Noted: 2020-10-12

## 2020-10-12 NOTE — PROGRESS NOTES
Subjective:    Patient ID:  Jalyn Aaron is a 70 y.o. female who presents for follow-up of No chief complaint on file.      HPI 71 yo female with PPM, AV block, anxiety, NICM.    The patient location is: Home  The chief complaint leading to consultation is: pacemaker    Visit type: audiovisual    Face to Face time with patient: 15 minutes  22 minutes of total time spent on the encounter, which includes face to face time and non-face to face time preparing to see the patient (eg, review of tests), Obtaining and/or reviewing separately obtained history, Documenting clinical information in the electronic or other health record, Independently interpreting results (not separately reported) and communicating results to the patient/family/caregiver, or Care coordination (not separately reported).         Each patient to whom he or she provides medical services by telemedicine is:  (1) informed of the relationship between the physician and patient and the respective role of any other health care provider with respect to management of the patient; and (2) notified that he or she may decline to receive medical services by telemedicine and may withdraw from such care at any time.    Notes:        Background:     Dual chamber PPM implanted in 2011 for 2nd degree AVB.  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  Significant anxiety -- much improved since she has been on cymbalta  Echo 3/28/16 normal biventricular structure and function.    Update:  Echo 6/1/20 EF 45%.  She denied symptoms.  We added Toprol. Already on Losartan.  Felt poorly on the Toprol. Noted heaviness, possibly shortness of breath, thinks anxiety may have been related. This was discontinued.    Echo 10/8/20 EF 45%    Notes anxiety. Notes fatigue. Exercising (yoga and walking her dogs) without difficulty.    Review of Systems   Constitution: Positive for malaise/fatigue. Negative for fever.   HENT: Negative for congestion and sore throat.     Cardiovascular: Negative for chest pain, dyspnea on exertion, irregular heartbeat, leg swelling, near-syncope, orthopnea, palpitations, paroxysmal nocturnal dyspnea and syncope.   Respiratory: Negative for cough and shortness of breath.    Gastrointestinal: Negative for abdominal pain, constipation and diarrhea.   Neurological: Negative for dizziness, light-headedness and weakness.   Psychiatric/Behavioral: Negative for depression. The patient is not nervous/anxious.             Assessment:       1. AV block    2. Cardiac pacemaker in situ    3. Anxiety    4. Cardiomyopathy, nonischemic         Plan:       Slight decrease in EF, which remains stable, in the setting of chronic RV pacing.  My suspicion is that she is asymptomatic (hard to say definitively given her anxiety, but the lack of symptoms with exercise points to this).  Did not tolerate Toprol.  Discussed consideration of upgrade to CRT vs monitoring.  Given lack of symptoms and stable EF, I indicated that I believed monitoring was reasonable. She is in agreement.    Echo in 1 year.  Trial of Coreg 3.125 mg BID.  F/u with monitoring as scheduled, with me in one year.

## 2020-10-13 ENCOUNTER — TELEPHONE (OUTPATIENT)
Dept: ELECTROPHYSIOLOGY | Facility: CLINIC | Age: 70
End: 2020-10-13

## 2020-10-13 NOTE — TELEPHONE ENCOUNTER
Spoke with pt to review pt's meds.  Pt understands how to begin visit, and we will be in contact with her if we notice that she is having any difficulty.  ----- Message from Alethea Cabezas sent at 10/13/2020  1:08 PM CDT -----  Regarding: returning a call  Contact: patient  The pt is returning a call. Please call her back @ [phone]. Thanks, Alethea

## 2020-10-14 ENCOUNTER — OFFICE VISIT (OUTPATIENT)
Dept: ELECTROPHYSIOLOGY | Facility: CLINIC | Age: 70
End: 2020-10-14
Payer: MEDICARE

## 2020-10-14 DIAGNOSIS — F41.9 ANXIETY: ICD-10-CM

## 2020-10-14 DIAGNOSIS — I44.30 AV BLOCK: Primary | ICD-10-CM

## 2020-10-14 DIAGNOSIS — I42.8 CARDIOMYOPATHY, NONISCHEMIC: ICD-10-CM

## 2020-10-14 DIAGNOSIS — Z95.0 CARDIAC PACEMAKER IN SITU: ICD-10-CM

## 2020-10-14 PROCEDURE — 1159F MED LIST DOCD IN RCRD: CPT | Mod: 95,,, | Performed by: INTERNAL MEDICINE

## 2020-10-14 PROCEDURE — 99214 OFFICE O/P EST MOD 30 MIN: CPT | Mod: 95,,, | Performed by: INTERNAL MEDICINE

## 2020-10-14 PROCEDURE — 99214 PR OFFICE/OUTPT VISIT, EST, LEVL IV, 30-39 MIN: ICD-10-PCS | Mod: 95,,, | Performed by: INTERNAL MEDICINE

## 2020-10-14 PROCEDURE — 1101F PR PT FALLS ASSESS DOC 0-1 FALLS W/OUT INJ PAST YR: ICD-10-PCS | Mod: CPTII,95,, | Performed by: INTERNAL MEDICINE

## 2020-10-14 PROCEDURE — 1101F PT FALLS ASSESS-DOCD LE1/YR: CPT | Mod: CPTII,95,, | Performed by: INTERNAL MEDICINE

## 2020-10-14 PROCEDURE — 99499 UNLISTED E&M SERVICE: CPT | Mod: 95,,, | Performed by: INTERNAL MEDICINE

## 2020-10-14 PROCEDURE — 1159F PR MEDICATION LIST DOCUMENTED IN MEDICAL RECORD: ICD-10-PCS | Mod: 95,,, | Performed by: INTERNAL MEDICINE

## 2020-10-14 PROCEDURE — 99499 RISK ADDL DX/OHS AUDIT: ICD-10-PCS | Mod: 95,,, | Performed by: INTERNAL MEDICINE

## 2020-10-14 RX ORDER — CARVEDILOL 3.12 MG/1
3.12 TABLET ORAL 2 TIMES DAILY WITH MEALS
Qty: 60 TABLET | Refills: 11 | Status: SHIPPED | OUTPATIENT
Start: 2020-10-14 | End: 2021-10-06

## 2020-10-15 ENCOUNTER — PATIENT MESSAGE (OUTPATIENT)
Dept: INTERNAL MEDICINE | Facility: CLINIC | Age: 70
End: 2020-10-15

## 2020-10-15 ENCOUNTER — DOCUMENTATION ONLY (OUTPATIENT)
Dept: INTERNAL MEDICINE | Facility: CLINIC | Age: 70
End: 2020-10-15

## 2020-10-15 ENCOUNTER — PROCEDURE VISIT (OUTPATIENT)
Dept: PHYSICAL MEDICINE AND REHAB | Facility: CLINIC | Age: 70
End: 2020-10-15
Payer: MEDICARE

## 2020-10-15 DIAGNOSIS — R20.0 LEFT LEG NUMBNESS: ICD-10-CM

## 2020-10-15 PROCEDURE — 95886 PR EMG COMPLETE, W/ NERVE CONDUCTION STUDIES, 5+ MUSCLES: ICD-10-PCS | Mod: S$GLB,,, | Performed by: PHYSICAL MEDICINE & REHABILITATION

## 2020-10-15 PROCEDURE — 95911 NRV CNDJ TEST 9-10 STUDIES: CPT | Mod: S$GLB,,, | Performed by: PHYSICAL MEDICINE & REHABILITATION

## 2020-10-15 PROCEDURE — 95886 MUSC TEST DONE W/N TEST COMP: CPT | Mod: S$GLB,,, | Performed by: PHYSICAL MEDICINE & REHABILITATION

## 2020-10-15 PROCEDURE — 95885 PR MUSC TST DONE W/NERV TST LIM: ICD-10-PCS | Mod: 59,S$GLB,, | Performed by: PHYSICAL MEDICINE & REHABILITATION

## 2020-10-15 PROCEDURE — 95885 MUSC TST DONE W/NERV TST LIM: CPT | Mod: 59,S$GLB,, | Performed by: PHYSICAL MEDICINE & REHABILITATION

## 2020-10-15 PROCEDURE — 95911 PR NERVE CONDUCTION STUDY; 9-10 STUDIES: ICD-10-PCS | Mod: S$GLB,,, | Performed by: PHYSICAL MEDICINE & REHABILITATION

## 2020-10-15 RX ORDER — PREDNISONE 20 MG/1
TABLET ORAL
Qty: 18 TABLET | Refills: 0 | Status: SHIPPED | OUTPATIENT
Start: 2020-10-15 | End: 2020-11-10

## 2020-10-15 NOTE — PROGRESS NOTES
Patient still has persistent numbness on the top of the left foot that may extend up a little bit over the lateral distal leg    Initially when she was given prednisone for 8 days the pain as which she was having from the leg to the foot resolved and improved but still has the numbness    An EMG study was unrevealing      There is no other neurologic symptoms and will give another course of prednisone over 9 days but if this is still persistent consider referral to neurology

## 2020-10-15 NOTE — PROCEDURES
Test Date:  10/15/2020    Patient: Jalyn Aaron : 1950 Physician: Vaughn Strickland D.O.   ID#:  SEX: Female Ref. Phys: Cedrick Andrade MD     HPI: Jalyn Aaron is a 70 y.o.female who presents for NCS/EMG to evaluate left foot numbness on the dorsum and sole of the foot.      NCV & EMG Findings:   Evaluation of the left Medial Plantar (Mixed) sensory and the right Medial Plantar (Mixed) sensory nerves showed reduced amplitude, but these are notoriously difficult nerves to obtain responses for, especially with advancing age.  Further, this was symmetrical, therefore likely normal for age.     The right Superficial Fibular sensory nerve showed prolonged distal peak latency and decreased conduction velocity.   All remaining nerves (as indicated in the following tables) were within normal limits.   All examined muscles (as indicated in the following table) showed no evidence of electrical instability.    Impression:  1. Normal study of the left lower extremity  2. The right superficial peroneal nerve was mildly prolonged.  While this could indicate a mild superficial peroneal neuropathy on the right, she has no clinical symptoms of such.  Further, needle EMG of the right peroneal muscles were normal.      ___________________________  Vaughn Strickland D.O.        NCS+  Motor Nerve Results      Latency Amplitude F-Lat Segment Distance CV Comment   Site (ms) Norm (mV) Norm (ms)  (cm) (m/s) Norm    Left Fibular (EDB)   Ankle 4.1  < 6.5 2.4  > 1.10         Bel Fib Head 11.4 - 1.93 -  Bel Fib Head-Ankle 37 51 -    Right Fibular (EDB)   Ankle 5.6  < 6.5 1.51  > 1.10         Bel Fib Head 14.1 - 1.39 -  Bel Fib Head-Ankle 34 40 -    Left Tibial (AHB)   Ankle 3.9  < 6.1 7.3  > 1.10 51.5        Right Tibial (AHB)   Ankle 3.8  < 6.1 10.3  > 1.10 52.4          Sensory Nerve Results      Latency (Peak) Amplitude (P-P) Segment Distance CV Comment   Site (ms) Norm (µV) Norm  (cm) (m/s) Norm    Left  Sural   Calf-Lat Mall 3.5  < 4.5 16  > 4 Calf-Lat Mall 14 40  > 35    Right Sural   Calf-Lat Mall 3.8  < 4.5 6  > 4 Calf-Lat Mall 14 37  > 35    Left Superficial Fibular   14 cm-Ankle 2.2  < 4.2 10  > 5 14 cm-Ankle 14 64  > 32    Right Superficial Fibular   14 cm-Ankle *4.4  < 4.2 9  > 5 14 cm-Ankle 14 *32  > 32    Left Medial Plantar   Med Sole-Med Mall 3.0  < 3.7 7  - Med Sole-Med Mall - - -    Right Medial Plantar   Med Sole-Med Mall 3.0  < 3.7 7 - Med Sole-Med Mall - - -      EMG+     Side Muscle Nerve Root Ins Act Fibs Psw Amp Dur Poly Recrt Int Pat Comment   Left Vastus Med Femoral L2-L4 Nml Nml Nml Nml Nml 0 Nml Nml    Left Tib Anterior Fibular,  Deep Fibula... L4-L5 Nml Nml Nml Nml Nml 0 Nml Nml    Left Fib Longus Fibular,  Superficial... L5-S1 Nml Nml Nml Nml Nml 0 Nml Nml    Left Gastroc Tibial S1-S2 Nml Nml Nml Nml Nml 0 Nml Nml    Left Dorsal Interossei ped I Lateral Plantar S2-S3 Nml Nml Nml Nml Nml 0 Nml Nml    Right Fib Longus Fibular,  Superficial... L5-S1 Nml Nml Nml Nml Nml 0 Nml Nml    Right Tib Anterior Fibular,  Deep Fibula... L4-L5 Nml Nml Nml Nml Nml 0 Nml Nml    Left EDB Fibular,  Deep Fibula... L5-S1 Nml Nml Nml Nml Nml 0 Nml Nml    Right EDB Fibular,  Deep Fibula... L5-S1 Nml Nml Nml Nml Nml 0 Nml Nml    Left AHB Tibial,  Medial Plant... S1-S2 Nml Nml Nml Nml Nml 0 Nml Nml            Waveforms:    Motor              F-Wave       Sensory

## 2020-10-23 ENCOUNTER — PATIENT MESSAGE (OUTPATIENT)
Dept: INTERNAL MEDICINE | Facility: CLINIC | Age: 70
End: 2020-10-23

## 2020-10-26 ENCOUNTER — TELEPHONE (OUTPATIENT)
Dept: INTERNAL MEDICINE | Facility: CLINIC | Age: 70
End: 2020-10-26

## 2020-10-26 DIAGNOSIS — R20.2 PARESTHESIA: Primary | ICD-10-CM

## 2020-10-27 ENCOUNTER — TELEPHONE (OUTPATIENT)
Dept: NEUROLOGY | Facility: CLINIC | Age: 70
End: 2020-10-27

## 2020-10-27 NOTE — TELEPHONE ENCOUNTER
----- Message from Savannah Rubio sent at 10/27/2020 10:55 AM CDT -----  Regarding: appt  Contact: pt @ [phone]  Pt calling to speak with someone regarding scheduling an appt with a Neurologist for dx: R20.2 (ICD-10-CM) - Paresthesia, please call

## 2020-10-30 ENCOUNTER — HOSPITAL ENCOUNTER (OUTPATIENT)
Dept: RADIOLOGY | Facility: HOSPITAL | Age: 70
Discharge: HOME | End: 2020-10-30
Attending: PODIATRIST
Payer: MEDICARE

## 2020-10-30 ENCOUNTER — OFFICE VISIT (OUTPATIENT)
Dept: PODIATRY | Facility: CLINIC | Age: 70
End: 2020-10-30
Payer: MEDICARE

## 2020-10-30 VITALS
DIASTOLIC BLOOD PRESSURE: 83 MMHG | HEART RATE: 78 BPM | BODY MASS INDEX: 20.73 KG/M2 | SYSTOLIC BLOOD PRESSURE: 132 MMHG | WEIGHT: 132.06 LBS | HEIGHT: 67 IN

## 2020-10-30 DIAGNOSIS — M79.671 FOOT PAIN, BILATERAL: ICD-10-CM

## 2020-10-30 DIAGNOSIS — M79.671 FOOT PAIN, BILATERAL: Primary | ICD-10-CM

## 2020-10-30 DIAGNOSIS — M20.41 HAMMER TOES OF BOTH FEET: ICD-10-CM

## 2020-10-30 DIAGNOSIS — M79.672 FOOT PAIN, BILATERAL: Primary | ICD-10-CM

## 2020-10-30 DIAGNOSIS — L60.0 INGROWN NAIL: ICD-10-CM

## 2020-10-30 DIAGNOSIS — M79.672 FOOT PAIN, BILATERAL: ICD-10-CM

## 2020-10-30 DIAGNOSIS — Z98.890 HISTORY OF BUNIONECTOMY OF BOTH GREAT TOES: ICD-10-CM

## 2020-10-30 DIAGNOSIS — G60.9 IDIOPATHIC PERIPHERAL NEUROPATHY: ICD-10-CM

## 2020-10-30 DIAGNOSIS — M20.42 HAMMER TOES OF BOTH FEET: ICD-10-CM

## 2020-10-30 PROCEDURE — 3008F PR BODY MASS INDEX (BMI) DOCUMENTED: ICD-10-PCS | Mod: CPTII,S$GLB,, | Performed by: PODIATRIST

## 2020-10-30 PROCEDURE — 1125F AMNT PAIN NOTED PAIN PRSNT: CPT | Mod: S$GLB,,, | Performed by: PODIATRIST

## 2020-10-30 PROCEDURE — 99203 PR OFFICE/OUTPT VISIT, NEW, LEVL III, 30-44 MIN: ICD-10-PCS | Mod: S$GLB,,, | Performed by: PODIATRIST

## 2020-10-30 PROCEDURE — 1125F PR PAIN SEVERITY QUANTIFIED, PAIN PRESENT: ICD-10-PCS | Mod: S$GLB,,, | Performed by: PODIATRIST

## 2020-10-30 PROCEDURE — 73630 X-RAY EXAM OF FOOT: CPT | Mod: TC,50

## 2020-10-30 PROCEDURE — 73630 XR FOOT COMPLETE 3 VIEW BILATERAL: ICD-10-PCS | Mod: 26,50,, | Performed by: RADIOLOGY

## 2020-10-30 PROCEDURE — 1159F PR MEDICATION LIST DOCUMENTED IN MEDICAL RECORD: ICD-10-PCS | Mod: S$GLB,,, | Performed by: PODIATRIST

## 2020-10-30 PROCEDURE — 99999 PR PBB SHADOW E&M-EST. PATIENT-LVL III: CPT | Mod: PBBFAC,,, | Performed by: PODIATRIST

## 2020-10-30 PROCEDURE — 99999 PR PBB SHADOW E&M-EST. PATIENT-LVL III: ICD-10-PCS | Mod: PBBFAC,,, | Performed by: PODIATRIST

## 2020-10-30 PROCEDURE — 1159F MED LIST DOCD IN RCRD: CPT | Mod: S$GLB,,, | Performed by: PODIATRIST

## 2020-10-30 PROCEDURE — 1101F PT FALLS ASSESS-DOCD LE1/YR: CPT | Mod: CPTII,S$GLB,, | Performed by: PODIATRIST

## 2020-10-30 PROCEDURE — 73630 X-RAY EXAM OF FOOT: CPT | Mod: 26,50,, | Performed by: RADIOLOGY

## 2020-10-30 PROCEDURE — 3008F BODY MASS INDEX DOCD: CPT | Mod: CPTII,S$GLB,, | Performed by: PODIATRIST

## 2020-10-30 PROCEDURE — 99203 OFFICE O/P NEW LOW 30 MIN: CPT | Mod: S$GLB,,, | Performed by: PODIATRIST

## 2020-10-30 PROCEDURE — 1101F PR PT FALLS ASSESS DOC 0-1 FALLS W/OUT INJ PAST YR: ICD-10-PCS | Mod: CPTII,S$GLB,, | Performed by: PODIATRIST

## 2020-10-30 NOTE — PROGRESS NOTES
Subjective:      Patient ID: Jalyn Aaron is a 70 y.o. female.    Chief Complaint:   Ingrown Toenail (right ft. 2nd toe), Toe Pain (right ft. 2nd toe, hammertoe), and Foot Problem (left ft. numbness, EMT was normal, has appt w. Neurology in January)    Jalyn Sloares is a 70 y.o. female who presents to the podiatry clinic  with complaint of  bilateral foot pain. Onset of the symptoms was several weeks ago. Precipitating event: none known. Pt relates she feels like there is an ingrown nail on the right 2nd toe and that the toe is becoming hammertoe. Pt relates that she has had bunion surgery on each big toe twice... total of 4. Pt relates a new problem of left foot and leg numbness... her pcp ordered a EMG/NCV which was normal. She is scheduled with the first available neuro appt in [facility].    Per chart review last pod visit was in 2015 for same right 2nd toenail pain and last xray was 2015.        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    HYSTERECTOMY      INSERT / REPLACE / REMOVE PACEMAKER      LASIK       Current Outpatient Medications on File Prior to Visit   Medication Sig Dispense Refill    ALPRAZolam (XANAX) 0.25 MG tablet Half - one tablet daily as needed for anxiety 20 tablet 0    calcium carbonate (OS-TALHA) 600 mg calcium (1,500 mg) Tab Take 600 mg by mouth once.      carvediloL (COREG) 3.125 MG tablet Take 1 tablet  (3.125 mg total) by mouth 2 (two) times daily with meals. 60 tablet 11    FLUZONE HIGHDOSE QUAD 20-21  mcg/0.7 mL Syrg ADM 0.7ML IM UTD      losartan (COZAAR) 25 MG tablet One twice daily 60 tablet 6    multivitamin capsule Take 1 capsule by mouth once daily.      naproxen (NAPROSYN) 500 MG tablet Half tablet once daily 30 tablet 3    vitamin D 1000 units Tab Take 1,000 Units by mouth once daily.      predniSONE (DELTASONE) 20 MG tablet 3 tablets po daily for 3 days, then 2 tablets po daily for 3 days, then 1 tablets po daily for 3 days (Patient not taking: Reported on 10/30/2020) 18 tablet 0     No current facility-administered medications on file prior to visit.      Review of patient's allergies indicates:   Allergen Reactions    Bactrim [sulfamethoxazole-trimethoprim] Nausea Only       Review of Systems   Constitution: Negative for chills, decreased appetite, fever, malaise/fatigue, night sweats, weight gain and weight loss.   Cardiovascular: Negative for chest pain, claudication, dyspnea on exertion, leg swelling, palpitations and syncope.   Respiratory: Negative for cough and shortness of breath.    Endocrine: Negative for cold intolerance and heat intolerance.   Hematologic/Lymphatic: Negative for bleeding problem. Does not bruise/bleed easily.   Skin: Negative for color change, dry skin, flushing, itching, nail changes, poor wound healing, rash, skin cancer, suspicious lesions and unusual hair distribution.   Musculoskeletal: Positive for arthritis. Negative for back pain, falls, gout, joint pain, joint swelling, muscle cramps, muscle weakness, myalgias, neck pain and stiffness.   Gastrointestinal: Negative for diarrhea, nausea and vomiting.   Neurological: Positive for numbness and paresthesias. Negative for dizziness, focal weakness, light-headedness, tremors, vertigo and weakness.   Psychiatric/Behavioral: Negative for altered mental status and depression. The patient does not have insomnia.   "  Allergic/Immunologic: Negative.            Objective:       Vitals:    10/30/20 1307   BP: 132/83   Pulse: 78   Weight: 59.9 kg (132 lb 0.9 oz)   Height: 5' 7" (1.702 m)   PainSc:   4   PainLoc: Foot   59.9 kg (132 lb 0.9 oz)     Physical Exam  Vitals signs reviewed.   Constitutional:       General: She is not in acute distress.     Appearance: She is well-developed. She is not ill-appearing, toxic-appearing or diaphoretic.      Comments: Proper supportive shoegear   Cardiovascular:      Pulses:           Dorsalis pedis pulses are 2+ on the right side and 2+ on the left side.        Posterior tibial pulses are 1+ on the right side and 1+ on the left side.   Musculoskeletal:         General: No tenderness.      Right foot: Decreased range of motion. Deformity, bunion and prominent metatarsal heads present.      Left foot: Decreased range of motion. Deformity, bunion and prominent metatarsal heads present.      Comments: Flexible pes planus foot type w/ medial arch collapse and mild gastroc equinus     Reducible extensor and flexor contractures at the MTPJ and PIPJ of toes 2-5, bilat. With right dorsal contraction at the 2nd MTPJ and lateral deviation of right 1st MTPJ/bunion    Left + prominent area 1st MT   Feet:      Right foot:      Protective Sensation: 10 sites tested. 10 sites sensed.      Skin integrity: No ulcer, blister, skin breakdown, erythema, warmth, callus or dry skin.      Toenail Condition: Right toenails are normal.      Left foot:      Protective Sensation: 10 sites tested. 10 sites sensed.      Skin integrity: No ulcer, blister, skin breakdown, erythema, warmth, callus or dry skin.      Toenail Condition: Left toenails are ingrown.      Comments: SWM some increased sensitivity to left digits    Right medial border 2nd toe incurvated . Mild edema, no erythema. No acute soi  Skin:     General: Skin is warm.      Capillary Refill: Capillary refill takes 2 to 3 seconds.      Coloration: Skin is not " pale.      Findings: No erythema or rash.   Neurological:      Mental Status: She is alert and oriented to person, place, and time.      Gait: Gait is intact.   Psychiatric:         Attention and Perception: Attention normal.         Mood and Affect: Mood normal.         Speech: Speech normal.         Behavior: Behavior normal.         Thought Content: Thought content normal.         Judgment: Judgment normal.               Assessment:       Encounter Diagnoses   Name Primary?    Foot pain, bilateral Yes    Ingrown nail     Hammer toes of both feet     History of bunionectomy of both great toes     Idiopathic peripheral neuropathy          Plan:       Jalyn Solares was seen today for ingrown toenail, toe pain and foot problem.    Diagnoses and all orders for this visit:    Foot pain, bilateral  -     X-Ray Foot Complete Bilateral; Future    Ingrown nail    Hammer toes of both feet    History of bunionectomy of both great toes    Idiopathic peripheral neuropathy      I counseled the patient on her conditions, their implications and medical management.        Utilizing sterile toenail clippers I aggressively debrided the offending nail border approximately 3 mm from its edge and carried the nail plate incision down at an angle in order to wedge out the offending cryptotic portion of the nail plate. The offending border was then removed in toto. The area was cleansed with alcohol. Patient tolerated the procedure well and related significant relief.    - xrays to r/o broken hardware    - left foot subjective neuropathy symptoms could be localized from hardware impingement. Pt has consult with neurology. Consider also hip/back eitiolgy    - continue proper shoes. May need P&A     - f/u 2 months or sooner if needed. If hardware is broken will plan to remove. Do not advise more revisional surgery as pt can walk comfortably in shoes.

## 2020-11-03 ENCOUNTER — DOCUMENTATION ONLY (OUTPATIENT)
Dept: CARDIOLOGY | Facility: HOSPITAL | Age: 70
End: 2020-11-03

## 2020-11-03 NOTE — PROGRESS NOTES
Pt contacted the [facility] on this afternoon.  Pt stated she was calling to find out when her next remote Pacemaker check is scheduled on.  As it turns out the pt was registered @ [facility] under Dr. Collins.  Pt stated she went to see Dr. Collins for a second opinion and that she wants to continue to see Dr. Colby.  Contacted the Device RN [facility] and pt is now under this office for her remote follow up.  Informed the pt the next remote tx is scheduled on 11/5/20.  Understanding was verbalized.  Pt appreciated the call.

## 2020-11-06 ENCOUNTER — PATIENT MESSAGE (OUTPATIENT)
Dept: INTERNAL MEDICINE | Facility: CLINIC | Age: 70
End: 2020-11-06

## 2020-11-07 ENCOUNTER — CLINICAL SUPPORT (OUTPATIENT)
Dept: CARDIOLOGY | Facility: HOSPITAL | Age: 70
End: 2020-11-07
Attending: INTERNAL MEDICINE
Payer: MEDICARE

## 2020-11-07 DIAGNOSIS — Z95.0 PRESENCE OF CARDIAC PACEMAKER: ICD-10-CM

## 2020-11-07 PROCEDURE — 93294 REM INTERROG EVL PM/LDLS PM: CPT | Mod: ,,, | Performed by: INTERNAL MEDICINE

## 2020-11-07 PROCEDURE — 93294 CARDIAC DEVICE CHECK - REMOTE: ICD-10-PCS | Mod: ,,, | Performed by: INTERNAL MEDICINE

## 2020-11-07 PROCEDURE — 93296 REM INTERROG EVL PM/IDS: CPT | Performed by: INTERNAL MEDICINE

## 2020-11-10 ENCOUNTER — OFFICE VISIT (OUTPATIENT)
Dept: OPTOMETRY | Facility: CLINIC | Age: 70
End: 2020-11-10
Payer: MEDICARE

## 2020-11-10 DIAGNOSIS — H25.13 NUCLEAR SCLEROSIS, BILATERAL: Primary | ICD-10-CM

## 2020-11-10 PROCEDURE — 99999 PR PBB SHADOW E&M-EST. PATIENT-LVL III: ICD-10-PCS | Mod: PBBFAC,,, | Performed by: OPTOMETRIST

## 2020-11-10 PROCEDURE — 92014 COMPRE OPH EXAM EST PT 1/>: CPT | Mod: S$GLB,,, | Performed by: OPTOMETRIST

## 2020-11-10 PROCEDURE — 92015 DETERMINE REFRACTIVE STATE: CPT | Mod: S$GLB,,, | Performed by: OPTOMETRIST

## 2020-11-10 PROCEDURE — 92015 PR REFRACTION: ICD-10-PCS | Mod: S$GLB,,, | Performed by: OPTOMETRIST

## 2020-11-10 PROCEDURE — 99999 PR PBB SHADOW E&M-EST. PATIENT-LVL III: CPT | Mod: PBBFAC,,, | Performed by: OPTOMETRIST

## 2020-11-10 PROCEDURE — 92014 PR EYE EXAM, EST PATIENT,COMPREHESV: ICD-10-PCS | Mod: S$GLB,,, | Performed by: OPTOMETRIST

## 2020-11-10 NOTE — PATIENT INSTRUCTIONS
Adult Vision:   Over 60 Years of Age    It's a fact of life that vision changes occur as you get older. But these changes don't have to compromise your lifestyle. Knowing what to expect and when to seek professional care are important steps to safeguarding your vision.  As you reach your 60s and beyond, you need to be attentive to warning signs of age-related eye health problems that could cause vision loss. Many eye diseases have no early symptoms. They may develop painlessly and you may not be aware of changes to your vision until the condition is quite advanced. But wise lifestyle choices and regular eye exams can significantly improve your chances of maintaining good eye health even as you age.      Safeguarding your vision as you age can have a tremendous impact on your quality of life.   You may not realize that health problems affecting other parts of your body can affect your vision as well. Individuals with diabetes or hypertension (high blood pressure), or taking medications that have eye-related side effects, are at greatest risk for developing vision problems.  Therefore, regular eye exams are even more important as you reach your senior years. The [employer] Association recommends annual eye examinations for everyone over age 60. See your doctor of optometry immediately if you notice any changes in your vision.    Age-related Eye and Vision Problems  In the years after you turn 60, a number of eye diseases may develop that can change your vision permanently. The earlier these problems are detected and treated, the more likely you can retain good vision.  The following are some vision disorders of which you should be aware:  Age-related macular degeneration (AMD) is an eye disease affecting the macula, the center of the light sensitive retina at the back of the eye, causing loss of central vision. Although small, the macula is the part of the retina that allows us to see fine detail and colors.  Activities like reading, driving, watching TV and recognizing faces all require good central vision provided by the macula. While macular degeneration causes changes in central vision, peripheral or side vision remains unaffected.      An anuual eye exam can help catch evastating eye diseases, like glaucoma and macular degeneration, early. Early detection increases the chances of maintaining healthy vision in senior years.   Diabetic retinopathy is a condition occurring in people with diabetes. It is the result of progressive damage to the tiny blood vessels that nourish the retina. They leak blood and other fluids that cause swelling of retinal tissue and clouding of vision. The condition usually affects both eyes. The longer a person has diabetes, the more likely they will develop diabetic retinopathy, which can cause blindness.  Retinal detachment is a tearing or separation of the retina from the underlying tissue. It can be caused by trauma to the eye or head, health problems like advanced diabetes, and inflammatory disorders of the eye. But it most often occurs spontaneously as a result of changes to the gel-like vitreous fluid that fills the back of the eye. If not treated promptly, it can cause permanent vision loss.  Cataracts are cloudy or opaque areas in the normally clear lens of the eye. Depending upon their size and location, they can interfere with normal vision. Usually cataracts develop in both eyes, but one may be worse than the other. Cataracts can cause a decrease in contrast sensitivity, a dulling of colors and increased sensitivity to glare.  Glaucoma is a group of eye diseases characterized by damage to the optic nerve resulting in vision loss. People with a family history of glaucoma,  Americans and older adults are at higher risk for developing the disease.   Dry eye is a condition in which there is an insufficient amount of tears or a poor quality of tears to lubricate and nourish the  eye. Tears are necessary for maintaining the health of the front surface of the eye and for providing clear vision. Dry eyes are a common and often chronic problem, particularly in older adults.       Driving Safely After 60  If you are 60 or older, driving a car may be increasingly difficult. Age-related vision changes and eye diseases can compromise driving ability, even before you are aware of symptoms. You may be noticing difficulty judging distances and speed. Bright sunglight or the headlights of oncoming traffic at night may impair your vision.      Night driving with cortical cataract.  .   Some age-related vision changes that commonly affect seniors' driving are:  Not being able to see road signs as clearly   Having difficulty seeing objects up close like the car instrument panel or road maps   Changes in color perception   Problems seeing in low light or nighttime conditions   Difficulty adapting to glare from headlights   Experiencing a loss of side vision   These tips can help you stay safe when driving, especially at night:  Use extra caution at intersections.  Many collisions involving older drivers occur at intersections due to a failure to yield, especially when taking a left turn. Look carefully in both directions before proceeding into an intersection and turn your head frequently when driving to compensate for any decreased peripheral vision.   Reduce your speed and limit yourself to daytime driving.  If you are having trouble seeing at night or your eyes have difficulty recovering from the glare of oncoming headlights, slow down and avoid driving at night or on unfamilair roads, whenever possible.   Avoid wearing eyeglasses and sunglasses with wide frames or temples.  Glasses with wide temples (side arms) may restrict your side vision.   Take a driving course for seniors.  Participate in a program for older drivers in your community, such as those offered by the [employer] Retired  Persons (AARP). This can help you learn more about physical changes that may affect your driving ability and how to compensate for them.   Have an annual vision examination.  Yearly eye exams can ensure your eyeglass or contact lenses prescription is up to date and provide for early detection of any developing eye health problem.      Dealing with Vision Loss      Low vision rehabilitative services can provide people with the help and resources needed to regain their independence.   Unfortunately, some people over 60 experience loss of sight beyond the normal, age-related vision changes. Macular degeneration, glaucoma, and diabetic retinopathy are among the eye health conditions that can lead to permanent vision loss. This loss of vision can take many forms and it may exist in varying degrees.  It is important to understand that visual acuity alone is not a good predictor of the degree of visual difficulty that a person may have. Someone with relatively good acuity (e.g., 20/40) can have difficulty functioning, while someone with worse acuity (e.g., 20/100) might not be experiencing any significant functional problems. Other visual factors such as poor depth perception, limited side vision, extreme sensitivity to lights and glare, and reduced color perception can also limit a person's ability to do everyday tasks.  However, low vision rehabilitative services can provide people with the help and resources needed to regain their independence. Individuals with low vision can be taught a variety of techniques to allow performance of daily activities with the remaining vision.  Your doctor of optometry can help plan a rehabilitation program so that you may resume an independent life within your condition's limitations. A wide variety of rehabilitation options are available to help people with low vision live and work more effectively, efficiently, and safely. Most people can be helped with one or more low vision  treatment options. The more commonly prescribed devices are:  Spectacle-mounted magnifiers -- A magnifying lens is mounted in spectacles (this type of system is called a microscope) or on a special headband. This allows use of both hands to complete a close-up task, such as writing a letter.   Hand-held or spectacle-mounted telescopes -- These miniature telescopes are useful for seeing longer distances, such as across the room to watch television, and can also be modified for near (reading) tasks.   Hand-held and stand magnifiers -- These are convenient for short-term reading of things such as price tags, labels, and instrument dials. Both types can be equipped with lights.   Video magnification -- Table-top (closed-circuit television) or head-mounted systems enlarge reading material on a video display. Some systems can be used for distance views tasks. These are portable systems, and those that can be used with a computer or monitor. Image brightness, image size, contrast, and foreground/background color and illumination can be customized.   In addition, there are numerous other products to assist those with a vision impairment, such as large-type books, magazines, and newspapers, books-on-tape, talking wristwatches, self-threading needles, and more.      Courtesy of The [employer]

## 2020-11-10 NOTE — PROGRESS NOTES
HPI     Jalyn Aaron is a 70 y.o. female who returns  for continued   eye care. Jalyn's last exam with me was on 2/5/18. She has a history of K.   Sicca for which xiidra was prescribed at one point.  She explains that   insurance did not cover this.  She adds that she is rarely symptomatic so   she uses Refresh tears as needed (rarely). Jalyn is s/p LASIK.  She had   high myopia prior to when I began examining her in 2005. She now has   bilateral hyperopia with presbyopia.  She has glasses. She explains that   the coating on her current glasses are peeling, so she is having a   difficult time seeing well with them.     (+)blurred vision  (--)Headaches  (--)diplopia  (--)flashes  (--)floaters  (--)pain  (--)Itching  (--)tearing  (--)burning  (--)Dryness  (+) OTC Drops  (--)Photophobia      Last edited by Sonido Mcgraw, OD on 11/10/2020  9:29 AM. (History)        Review of Systems   Constitutional: Negative for chills, fever and malaise/fatigue.   HENT: Negative for congestion and hearing loss.    Eyes: Positive for blurred vision. Negative for double vision, photophobia, pain, discharge and redness.   Respiratory: Negative.    Cardiovascular: Negative.    Gastrointestinal: Negative.    Genitourinary: Negative.    Musculoskeletal: Negative.    Skin: Negative.    Neurological: Negative for seizures.   Endo/Heme/Allergies: Negative for environmental allergies.   Psychiatric/Behavioral: Negative.        For exam results, see encounter report    Assessment /Plan     1. Nuclear sclerosis, bilateral --> not visually significant  - no treatment needed at this time    2. Bilateral hyperopia with presbyopia  - Spec Rx per final Rx below  Glasses Prescription (11/10/2020)        Sphere Cylinder Add    Right +1.25 Sphere +2.75    Left +1.25 Sphere +2.75    Type: PAL    Expiration Date: 11/11/2021          Patient education; RTC in 1-2 years with DFE; Ok to instill 1% Tropicamide after (normal) baseline workup,  sooner as needed

## 2020-11-18 ENCOUNTER — PATIENT MESSAGE (OUTPATIENT)
Dept: INTERNAL MEDICINE | Facility: CLINIC | Age: 70
End: 2020-11-18

## 2020-11-18 ENCOUNTER — TELEPHONE (OUTPATIENT)
Dept: INTERNAL MEDICINE | Facility: CLINIC | Age: 70
End: 2020-11-18

## 2020-11-18 DIAGNOSIS — M79.606 PAIN OF LOWER EXTREMITY, UNSPECIFIED LATERALITY: Primary | ICD-10-CM

## 2020-12-02 ENCOUNTER — CLINICAL SUPPORT (OUTPATIENT)
Dept: REHABILITATION | Facility: HOSPITAL | Age: 70
End: 2020-12-02
Attending: INTERNAL MEDICINE
Payer: MEDICARE

## 2020-12-02 DIAGNOSIS — R52 PAIN: ICD-10-CM

## 2020-12-02 DIAGNOSIS — M79.606 PAIN OF LOWER EXTREMITY, UNSPECIFIED LATERALITY: ICD-10-CM

## 2020-12-02 PROCEDURE — 97110 THERAPEUTIC EXERCISES: CPT | Performed by: PHYSICAL THERAPIST

## 2020-12-02 PROCEDURE — 97161 PT EVAL LOW COMPLEX 20 MIN: CPT | Performed by: PHYSICAL THERAPIST

## 2020-12-02 NOTE — PLAN OF CARE
[facility]  Physical Therapy Initial Evaluation    Name: Jalyn Aaron  Clinic Number: [id number]    Therapy Diagnosis:   Encounter Diagnoses   Name Primary?    Pain of lower extremity, unspecified laterality     Pain      Physician: Annemarie Kilgore MD    Physician Orders: PT Eval and Treat   Medical Diagnosis: M79.606 (ICD-10-CM) - Pain of lower extremity   Evaluation Date: 12/2/2020  Authorization Period Expiration: 12-16-20  Plan of Care Certification Period: 1-30-21  Visit # / Visits authorized: 1/ 6    Time In: 7:25 am  Time Out: 8:05 am  Total Billable Time: 35 minutes    Precautions: Standard    Subjective     Date of onset: 2.5 mo  History of current condition - Jalyn reports: having L foot numbness and lateral leg/buttock pain that started 2.5 mo ago insidiously.  States having hx of B great toe bunionectomies (2 each) and hip/back problems.  States being concerned she may have MS which her sister had dx at age 70.  Pt to she neurologist next mo.  States NCV and EMG were neg.  States having a pacemaker implant.       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    Jalyn Solares has a current medication list which includes the following prescription(s): alprazolam, calcium carbonate, carvedilol, losartan, multivitamin, naproxen, and vitamin d.    Review of patient's allergies indicates:   Allergen Reactions    Bactrim  [sulfamethoxazole-trimethoprim] Nausea Only        Imaging: X-ray B feet:  Right: There is postoperative change of the 1st metatarsal.  There is partial correction of a hallux valgus deformity.  No fracture dislocation bone destruction or complication seen.  No hardware failure seen.     Left: There is postoperative change of the 1st metatarsal status post partial correction of hallux valgus deformity.  There is a bone island in the 3rd metatarsal.  There is baseline mild DJD.  No acute fracture dislocation bone destruction seen.  No hardware failure seen      Prior Therapy: na  Social History:  lives with their spouse  Occupation: retired  Prior Level of Function: yoga; walking  Current Level of Function: ADL limited by pain    Pain:  Current 0/10, worst 4/10, best 0/10   Location: left buttock and foot   Description: Aching and Numb  Aggravating Factors: Walking  Easing Factors: rest    Pts goals: decreased pain with ADL    Objective     Postural examination:  Decreased lordosis with slumped shld     Functional assessment:   - walking:   independent             AROM:  Gross trunk/LE WNL     MMT:   Gross trunk/core 4/5 to 4+/5; L hip abductors 4-/5; 5/5 B ankles    Tone:  normal    Flexibility testing:   Tight hams and piriformis     Special tests:   Neg SLR and SI stresses    Palpation:   TTP L piriformis    Joint mobility: fair    Swelling:  na    Other:  Sensation intact to light touch; 2+ quad reflex    CMS Impairment/Limitation/Restriction for FOTO LE Survey    Therapist reviewed FOTO scores for Jalyn Aaron on 12/2/2020.   FOTO documents entered into [employer] - see Media section.           TREATMENT     Treatment Time In: 7:25 am  Treatment Time Out: 8:05 am  Total Treatment time separate from Evaluation time:  35 min    Treatment:  HEP (knee to chest, PPT, HSS, piriformis stretch, GTB hip abd, SL hip abd, prone hip ext, bridges and GS) and set up for pool therapy    Home Exercises and Patient  Education Provided    Education provided:   - correct posture    Written Home Exercises Provided: yes.  Exercises were reviewed and Jalyn was able to demonstrate them prior to the end of the session.  Jalyn demonstrated good  understanding of the education provided.     See EMR under Patient Instructions for exercises provided 12/2/2020.      Assessment     Jalyn Solares is a 70 y.o. female referred to outpatient Physical Therapy with a medical diagnosis of M79.606 (ICD-10-CM) - Pain of lower extremity   .  Pt presents with L LE pain/numbness and trunk/core weakness.  Feel pt will benefit from pool therapy for PWB exercises.    Pt prognosis is Fair.   Pt will benefit from skilled outpatient Physical Therapy to address the deficits stated above and in the chart below, provide pt/family education, and to maximize pt's level of independence.     Plan of care discussed with patient: Yes  Pt's spiritual, cultural and educational needs considered and patient is agreeable to the plan of care and goals as stated below:     Anticipated Barriers for therapy: none    Medical Necessity is demonstrated by the following:  History  Co-morbidities and personal factors that may impact the plan of care Co-morbidities:   anxiety; pacemaker    Personal Factors:   no deficits     low   Examination  Body Structures and Functions, activity limitations and participation restrictions that may impact the plan of care Body Regions:   lower extremities  trunk    Body Systems:    ROM  strength  balance  gait    Participation Restrictions:   none    Activity limitations:   Learning and applying knowledge  no deficits    General Tasks and Commands  no deficits    Communication  no deficits    Mobility  lifting and carrying objects  walking    Self care  no deficits    Domestic Life  doing house work (cleaning house, washing dishes, laundry)    Interactions/Relationships  no deficits    Life Areas  no deficits    Community and Social Life  no  deficits         low   Clinical Presentation stable and uncomplicated low   Decision Making/ Complexity Score: low     Goals:  Short Term Goals: 2 weeks         1.   Independent with HEP        2.  Pt will report decreased pain level of < 50% from above measure with ADL    Long Term Goals:   GOALS:    10_   weeks. Pt agrees with goals set.  1. Independent with HEP.  2. Report decreased    L buttock/leg    pain  <   / =  3/10 with ADL such as walking  3. Increased MMT  for  Trunk/core to 4+/5  with ADL such as light lifting and house work  4. Increased LE flexibility and exercise tolerance with functional activities such walking or self-care      Plan     Certification Period/Plan of care expiration: 12/2/2020 to 1-30-21.    Outpatient Physical Therapy 2 times weekly for 10 weeks to include the following interventions: Aquatic Therapy, Patient Education and Therapeutic Exercise.     Tien Ryder, PT

## 2020-12-03 ENCOUNTER — CLINICAL SUPPORT (OUTPATIENT)
Dept: REHABILITATION | Facility: HOSPITAL | Age: 70
End: 2020-12-03
Attending: INTERNAL MEDICINE
Payer: MEDICARE

## 2020-12-03 DIAGNOSIS — R52 PAIN: Primary | ICD-10-CM

## 2020-12-03 PROCEDURE — 97113 AQUATIC THERAPY/EXERCISES: CPT | Performed by: PHYSICAL THERAPIST

## 2020-12-03 NOTE — PROGRESS NOTES
Physical Therapy Daily Treatment Note     Name: Jalyn Aaron  Clinic Number: [id number]    Therapy Diagnosis:   Encounter Diagnosis   Name Primary?    Pain Yes     Physician: Annemarie Kilgore MD    Visit Date: 12/3/2020   Physician Orders: PT Eval and Treat   Medical Diagnosis: M79.606 (ICD-10-CM) - Pain of lower extremity   Evaluation Date: 12/2/2020  Authorization Period Expiration: 12-16-20  Plan of Care Certification Period: 1-30-21  Visit # / Visits authorized: 2/ 6     PTA visits:  0/6    Time In: 915  Time Out: 1000  Total Billable Time: 45minutes    Precautions: Standard, Covid    PROCEDURES/IMAGING:Imaging: X-ray B feet:  Right: There is postoperative change of the 1st metatarsal.  There is partial correction of a hallux valgus deformity.  No fracture dislocation bone destruction or complication seen.  No hardware failure seen.     Left: There is postoperative change of the 1st metatarsal status post partial correction of hallux valgus deformity.  There is a bone island in the 3rd metatarsal.  There is baseline mild DJD.  No acute fracture dislocation bone destruction seen.  No hardware failure seen  Subjective     Pt reports: she is weak and overall fatigued, seeing neuro in [place] to R/O MS.  Discussed over heating, pt has pacemaker as well  She was compliant with home exercise program.  Response to previous treatment: 1st pool treatment  Functional change: none    Pain: 3/10  Location: bilateral back , buttocks  and feet      Objective     Jalyn received aquatic therapeutic exercises to develop strength, endurance, ROM, flexibility and core stabilization for 45 minutes including:    WARMUP x 2 laps each  Walk fwd/back/side    STRETCHES 2 x 20sec      LE EX x 20  Mini squat  Heel raise with GS  Hip abd/add  Hip flex/ext      UE EX/CORE  x 20  Shoulder flex/ext TA activation paddles open  Shoulder horizontal abd/add TA activation paddles open  Shoulder abd/add TA activation paddles  open      ENDURANCE  Stationary biking 5 min in parallel bars    COOL DOWN x 1 lap each  Walk fwd/back/side      Home Exercises Provided and Patient Education Provided     Education provided:   Role of aquatic therapy  Hydration post therapy      Written Home Exercises Provided: Patient instructed to cont prior HEP.  Exercises were reviewed and Jalyn was able to demonstrate them prior to the end of the session.  Jalyn demonstrated good  understanding of the education provided.     See Patient [id number] for exercises provided prior visit.    Assessment     Patient required mod verbal cues  for proper technique & core stabilization. Patient did well post instruction. Excellent posture and positioning in the pool and good understanding of the exercises. Reviewed hydration post therapy due to questionable MS. Given handouts    Jalyn is progressing well towards her goals.   Pt prognosis is Good.     Pt will continue to benefit from skilled aquatic outpatient physical therapy to address the deficits listed in the problem list box on initial evaluation, provide pt/family education and to maximize pt's level of independence in the home and community environment.     Pt's spiritual, cultural and educational needs considered and pt agreeable to plan of care and goals.    Anticipated barriers to physical therapy:none      Goals:  Short Term Goals: 2 weeks         1.   Independent with HEP        2.  Pt will report decreased pain level of < 50% from above measure with ADL     Long Term Goals:   GOALS:    10_   weeks. Pt agrees with goals set.  1. Independent with HEP.  2. Report decreased    L buttock/leg    pain  <   / =  3/10 with ADL such as walking  3. Increased MMT  for  Trunk/core to 4+/5  with ADL such as light lifting and house work  4. Increased LE flexibility and exercise tolerance with functional activities such walking or self-care        Plan      Certification Period/Plan of care expiration: 12/2/2020 to  1-30-21.     Outpatient Physical Therapy 2 times weekly for 10 weeks to include the following interventions: Aquatic Therapy, Patient Education and Therapeutic Exercise.      Ginny Fuentes, PT

## 2020-12-07 ENCOUNTER — PATIENT MESSAGE (OUTPATIENT)
Dept: INTERNAL MEDICINE | Facility: CLINIC | Age: 70
End: 2020-12-07

## 2020-12-07 ENCOUNTER — CLINICAL SUPPORT (OUTPATIENT)
Dept: REHABILITATION | Facility: HOSPITAL | Age: 70
End: 2020-12-07
Attending: INTERNAL MEDICINE
Payer: MEDICARE

## 2020-12-07 DIAGNOSIS — R52 PAIN: Primary | ICD-10-CM

## 2020-12-07 PROCEDURE — 97113 AQUATIC THERAPY/EXERCISES: CPT | Performed by: PHYSICAL THERAPIST

## 2020-12-07 NOTE — PROGRESS NOTES
Physical Therapy Daily Treatment Note     Name: Jalyn Aaron  Clinic Number: [id number]    Therapy Diagnosis:   Encounter Diagnosis   Name Primary?    Pain Yes     Physician: Annemarie Kilgore MD    Visit Date: 12/7/2020   Physician Orders: PT Eval and Treat   Medical Diagnosis: M79.606 (ICD-10-CM) - Pain of lower extremity   Evaluation Date: 12/2/2020  Authorization Period Expiration: 12-16-20  Plan of Care Certification Period: 1-30-21  Visit # / Visits authorized: 3/ 6     PTA visits:  0/6    Time In: 745  Time Out: 835  Total Billable Time: 45minutes    Precautions: Standard, Covid    PROCEDURES/IMAGING:Imaging: X-ray B feet:  Right: There is postoperative change of the 1st metatarsal.  There is partial correction of a hallux valgus deformity.  No fracture dislocation bone destruction or complication seen.  No hardware failure seen.     Left: There is postoperative change of the 1st metatarsal status post partial correction of hallux valgus deformity.  There is a bone island in the 3rd metatarsal.  There is baseline mild DJD.  No acute fracture dislocation bone destruction seen.  No hardware failure seen  Subjective     Pt reports: she is weak and overall fatigued, seeing neuro in [place] to R/O MS.  Discussed over heating, pt has pacemaker as well  She was compliant with home exercise program.  Response to previous treatment: 1st pool treatment  Functional change: none    Pain: 3/10  Location: bilateral back , buttocks  and feet      Objective     Jalyn received aquatic therapeutic exercises to develop strength, endurance, ROM, flexibility and core stabilization for 45 minutes including:    WARMUP x 2 laps each  Walk fwd/back/side    STRETCHES 2 x 20sec  Hamstring and gastroc on stairs    LE EX x 20 with 2 1/2 # weights  Mini squat  Heel raise with GS  Hip abd/add  Hip flex/ext      UE EX/CORE  x 20  Shoulder flex/ext TA activation paddles open  Shoulder horizontal abd/add TA activation paddles  open  Shoulder abd/add TA activation paddles open      ENDURANCE  Stationary biking 5 min in parallel bars    COOL DOWN x 1 lap each  Walk fwd/back/side      Home Exercises Provided and Patient Education Provided     Education provided:   Role of aquatic therapy  Hydration post therapy      Written Home Exercises Provided: Patient instructed to cont prior HEP.  Exercises were reviewed and Jalyn was able to demonstrate them prior to the end of the session.  Jalyn demonstrated good  understanding of the education provided.     See Patient [id number] for exercises provided prior visit.    Assessment     Patient required mod verbal cues  for proper technique & core stabilization. Patient did well post instruction. Excellent posture and positioning in the pool and good understanding of the exercises. Reviewed hydration post therapy due to questionable MS. Given handouts    Jalyn is progressing well towards her goals.   Pt prognosis is Good.     Pt will continue to benefit from skilled aquatic outpatient physical therapy to address the deficits listed in the problem list box on initial evaluation, provide pt/family education and to maximize pt's level of independence in the home and community environment.     Pt's spiritual, cultural and educational needs considered and pt agreeable to plan of care and goals.    Anticipated barriers to physical therapy:none      Goals:  Short Term Goals: 2 weeks         1.   Independent with HEP        2.  Pt will report decreased pain level of < 50% from above measure with ADL     Long Term Goals:   GOALS:    10_   weeks. Pt agrees with goals set.  1. Independent with HEP.  2. Report decreased    L buttock/leg    pain  <   / =  3/10 with ADL such as walking  3. Increased MMT  for  Trunk/core to 4+/5  with ADL such as light lifting and house work  4. Increased LE flexibility and exercise tolerance with functional activities such walking or self-care        Plan      Certification  Period/Plan of care expiration: 12/2/2020 to 1-30-21.     Outpatient Physical Therapy 2 times weekly for 10 weeks to include the following interventions: Aquatic Therapy, Patient Education and Therapeutic Exercise.      Ginny Fuentes, PT

## 2020-12-08 ENCOUNTER — HOSPITAL ENCOUNTER (OUTPATIENT)
Dept: RADIOLOGY | Facility: CLINIC | Age: 70
Discharge: HOME | End: 2020-12-08
Attending: INTERNAL MEDICINE
Payer: MEDICARE

## 2020-12-08 DIAGNOSIS — M85.80 OSTEOPENIA, UNSPECIFIED LOCATION: ICD-10-CM

## 2020-12-08 DIAGNOSIS — N95.8 OTHER SPECIFIED MENOPAUSAL AND PERIMENOPAUSAL DISORDERS: ICD-10-CM

## 2020-12-08 PROCEDURE — 77080 DXA BONE DENSITY AXIAL: CPT | Mod: 26,,, | Performed by: INTERNAL MEDICINE

## 2020-12-08 PROCEDURE — 77080 DEXA BONE DENSITY SPINE HIP: ICD-10-PCS | Mod: 26,,, | Performed by: INTERNAL MEDICINE

## 2020-12-08 PROCEDURE — 77080 DXA BONE DENSITY AXIAL: CPT | Mod: TC

## 2020-12-09 ENCOUNTER — PATIENT MESSAGE (OUTPATIENT)
Dept: INTERNAL MEDICINE | Facility: CLINIC | Age: 70
End: 2020-12-09

## 2020-12-10 ENCOUNTER — PATIENT MESSAGE (OUTPATIENT)
Dept: INTERNAL MEDICINE | Facility: CLINIC | Age: 70
End: 2020-12-10

## 2020-12-11 ENCOUNTER — TELEPHONE (OUTPATIENT)
Dept: INTERNAL MEDICINE | Facility: CLINIC | Age: 70
End: 2020-12-11

## 2020-12-14 ENCOUNTER — PATIENT MESSAGE (OUTPATIENT)
Dept: INTERNAL MEDICINE | Facility: CLINIC | Age: 70
End: 2020-12-14

## 2020-12-14 ENCOUNTER — CLINICAL SUPPORT (OUTPATIENT)
Dept: REHABILITATION | Facility: HOSPITAL | Age: 70
End: 2020-12-14
Attending: INTERNAL MEDICINE
Payer: MEDICARE

## 2020-12-14 DIAGNOSIS — R52 PAIN: Primary | ICD-10-CM

## 2020-12-14 PROCEDURE — 97113 AQUATIC THERAPY/EXERCISES: CPT | Performed by: PHYSICAL THERAPIST

## 2020-12-14 NOTE — PROGRESS NOTES
Physical Therapy Daily Treatment Note     Name: Jalyn Aaron  Clinic Number: [id number]    Therapy Diagnosis:   Encounter Diagnosis   Name Primary?    Pain Yes     Physician: Annemarie Kilgore MD    Visit Date: 12/14/2020   Physician Orders: PT Eval and Treat   Medical Diagnosis: M79.606 (ICD-10-CM) - Pain of lower extremity   Evaluation Date: 12/2/2020  Authorization Period Expiration: 12-16-20  Plan of Care Certification Period: 1-30-21  Visit # / Visits authorized: 4/ 6     PTA visits:  0/6    Time In:845  Time Out: 930  Total Billable Time: 45minutes    Precautions: Standard, Covid    PROCEDURES/IMAGING:Imaging: X-ray B feet:  Right: There is postoperative change of the 1st metatarsal.  There is partial correction of a hallux valgus deformity.  No fracture dislocation bone destruction or complication seen.  No hardware failure seen.     Left: There is postoperative change of the 1st metatarsal status post partial correction of hallux valgus deformity.  There is a bone island in the 3rd metatarsal.  There is baseline mild DJD.  No acute fracture dislocation bone destruction seen.  No hardware failure seen  Subjective     Pt reports: she is weak and overall fatigued, seeing neuro in [place] to R/O MS.  Discussed over heating, pt has pacemaker as well  She was compliant with home exercise program.  Response to previous treatment: fatigue and slight muscle soreness.   Functional change: none    Pain: 3/10  Location: bilateral back , buttocks  and feet      Objective     Jalyn received aquatic therapeutic exercises to develop strength, endurance, ROM, flexibility and core stabilization for 45 minutes including:    WARMUP x 2 laps each  Walk fwd/back/side    STRETCHES 2 x 20sec  Hamstring and gastroc on stairs    LE EX x 20 with 2 1/2 # weights  Mini squat  Heel raise with GS  Hip abd/add  Hip flex/ext      UE EX/CORE  x 20  Shoulder flex/ext TA activation paddlesclosed  Shoulder horizontal abd/add TA  activation paddles closed  Shoulder abd/add TA activation paddles closed  Mini squat with red board, W's 10 reps      ENDURANCE  Stationary biking 5 min in parallel bars    COOL DOWN x 1 lap each  Walk fwd/back/side      Home Exercises Provided and Patient Education Provided     Education provided:   Role of aquatic therapy  Hydration post therapy      Written Home Exercises Provided: Patient instructed to cont prior HEP.  Exercises were reviewed and Jalyn was able to demonstrate them prior to the end of the session.  Jalyn demonstrated good  understanding of the education provided.     See Patient [id number] for exercises provided prior visit.    Assessment     Patient required mod verbal cues  for proper technique & core stabilization. Patient did well post instruction. Excellent posture and positioning in the pool and good understanding of the exercises. Reviewed hydration post therapy due to questionable MS. Given handouts    Jalyn is progressing well towards her goals.   Pt prognosis is Good.     Pt will continue to benefit from skilled aquatic outpatient physical therapy to address the deficits listed in the problem list box on initial evaluation, provide pt/family education and to maximize pt's level of independence in the home and community environment.     Pt's spiritual, cultural and educational needs considered and pt agreeable to plan of care and goals.    Anticipated barriers to physical therapy:none      Goals:  Short Term Goals: 2 weeks         1.   Independent with HEP IMP        2.  Pt will report decreased pain level of < 50% from above measure with ADL IMP     Long Term Goals:   GOALS:    10_   weeks. Pt agrees with goals set.  1. Independent with HEP. IMP  2. Report decreased    L buttock/leg    pain  <   / =  3/10 with ADL such as walking IMP  3. Increased MMT  for  Trunk/core to 4+/5  with ADL such as light lifting and house work NOT MET  4. Increased LE flexibility and exercise  tolerance with functional activities such walking or self-care NOT MET        Plan      Certification Period/Plan of care expiration: 12/2/2020 to 1-30-21.     Outpatient Physical Therapy 2 times weekly for 10 weeks to include the following interventions: Aquatic Therapy, Patient Education and Therapeutic Exercise.      Ginny Fuentes, PT

## 2020-12-15 ENCOUNTER — TELEPHONE (OUTPATIENT)
Dept: INTERNAL MEDICINE | Facility: CLINIC | Age: 70
End: 2020-12-15

## 2020-12-17 ENCOUNTER — CLINICAL SUPPORT (OUTPATIENT)
Dept: REHABILITATION | Facility: HOSPITAL | Age: 70
End: 2020-12-17
Attending: INTERNAL MEDICINE
Payer: MEDICARE

## 2020-12-17 DIAGNOSIS — R52 PAIN: Primary | ICD-10-CM

## 2020-12-17 PROCEDURE — 97113 AQUATIC THERAPY/EXERCISES: CPT | Performed by: PHYSICAL THERAPIST

## 2020-12-17 NOTE — PROGRESS NOTES
Physical Therapy Daily Treatment Note     Name: Jalyn Aaron  Clinic Number: [id number]    Therapy Diagnosis:   Encounter Diagnosis   Name Primary?    Pain Yes     Physician: Annemarie Kilgore MD    Visit Date: 12/17/2020   Physician Orders: PT Eval and Treat   Medical Diagnosis: M79.606 (ICD-10-CM) - Pain of lower extremity   Evaluation Date: 12/2/2020  Authorization Period Expiration: 12-16-20  Plan of Care Certification Period: 1-30-21  Visit # / Visits authorized: 4/ 6     PTA visits:  0/6    Time In:1100  Time Out: 1145  Total Billable Time: 45minutes    Precautions: Standard, Covid    PROCEDURES/IMAGING:Imaging: X-ray B feet:  Right: There is postoperative change of the 1st metatarsal.  There is partial correction of a hallux valgus deformity.  No fracture dislocation bone destruction or complication seen.  No hardware failure seen.     Left: There is postoperative change of the 1st metatarsal status post partial correction of hallux valgus deformity.  There is a bone island in the 3rd metatarsal.  There is baseline mild DJD.  No acute fracture dislocation bone destruction seen.  No hardware failure seen  Subjective     Pt reports: she is weak and overall fatigued, seeing neuro in [place] to R/O MS.  Better after last visit after drinking lots of water to stay hydrated.   She was compliant with home exercise program.  Response to previous treatment: fatigue and slight muscle soreness.   Functional change: none    Pain: 3/10  Location: bilateral back , buttocks  and feet      Objective     Jalyn received aquatic therapeutic exercises to develop strength, endurance, ROM, flexibility and core stabilization for 45 minutes including:    WARMUP x 2 laps each  Walk fwd/back/side    STRETCHES 2 x 20sec  Hamstring and gastroc on stairs    LE EX x 20 with 3.75 # weights  Mini squat  Heel raise with GS  Hip abd/add  Hip flex/ext  LAQ      UE EX/CORE  x 20  Shoulder flex/ext TA activation paddlesclosed  Shoulder  horizontal abd/add TA activation paddles closed  Shoulder abd/add TA activation paddles closed  Mini squat with red board, W's 10 reps      ENDURANCE  Stationary biking 5 min in parallel bars    COOL DOWN x 1 lap each  Walk fwd/back/side    Home Exercises Provided and Patient Education Provided     Education provided:   Role of aquatic therapy  Hydration post therapy      Written Home Exercises Provided: Patient instructed to cont prior HEP.  Exercises were reviewed and Jalyn was able to demonstrate them prior to the end of the session.  Jalyn demonstrated good  understanding of the education provided.     See Patient [id number] for exercises provided prior visit.    Assessment     Patient required mod verbal cues  for proper technique & core stabilization. Patient did well post instruction. Excellent posture and positioning in the pool and good understanding of the exercises. Reviewed hydration post therapy due to questionable MS. Given handouts    Jalyn is progressing well towards her goals.   Pt prognosis is Good.     Pt will continue to benefit from skilled aquatic outpatient physical therapy to address the deficits listed in the problem list box on initial evaluation, provide pt/family education and to maximize pt's level of independence in the home and community environment.     Pt's spiritual, cultural and educational needs considered and pt agreeable to plan of care and goals.    Anticipated barriers to physical therapy:none      Goals:  Short Term Goals: 2 weeks         1.   Independent with HEP IMP        2.  Pt will report decreased pain level of < 50% from above measure with ADL IMP     Long Term Goals:   GOALS:    10_   weeks. Pt agrees with goals set.  1. Independent with HEP. IMP  2. Report decreased    L buttock/leg    pain  <   / =  3/10 with ADL such as walking IMP  3. Increased MMT  for  Trunk/core to 4+/5  with ADL such as light lifting and house work NOT MET  4. Increased LE flexibility  and exercise tolerance with functional activities such walking or self-care NOT MET        Plan      Certification Period/Plan of care expiration: 12/2/2020 to 1-30-21.     Outpatient Physical Therapy 2 times weekly for 10 weeks to include the following interventions: Aquatic Therapy, Patient Education and Therapeutic Exercise.      Ginny Fuentes, PT

## 2020-12-21 ENCOUNTER — OFFICE VISIT (OUTPATIENT)
Dept: INTERNAL MEDICINE | Facility: CLINIC | Age: 70
End: 2020-12-21
Payer: MEDICARE

## 2020-12-21 VITALS
WEIGHT: 127.63 LBS | HEART RATE: 94 BPM | DIASTOLIC BLOOD PRESSURE: 74 MMHG | HEIGHT: 67 IN | BODY MASS INDEX: 20.03 KG/M2 | OXYGEN SATURATION: 98 % | SYSTOLIC BLOOD PRESSURE: 120 MMHG

## 2020-12-21 DIAGNOSIS — R20.2 PARESTHESIA AND PAIN OF LEFT EXTREMITY: Primary | ICD-10-CM

## 2020-12-21 DIAGNOSIS — M79.609 PARESTHESIA AND PAIN OF LEFT EXTREMITY: Primary | ICD-10-CM

## 2020-12-21 DIAGNOSIS — M54.9 DORSALGIA, UNSPECIFIED: ICD-10-CM

## 2020-12-21 PROCEDURE — 1125F PR PAIN SEVERITY QUANTIFIED, PAIN PRESENT: ICD-10-PCS | Mod: S$GLB,,, | Performed by: INTERNAL MEDICINE

## 2020-12-21 PROCEDURE — 3288F PR FALLS RISK ASSESSMENT DOCUMENTED: ICD-10-PCS | Mod: CPTII,S$GLB,, | Performed by: INTERNAL MEDICINE

## 2020-12-21 PROCEDURE — 1101F PR PT FALLS ASSESS DOC 0-1 FALLS W/OUT INJ PAST YR: ICD-10-PCS | Mod: CPTII,S$GLB,, | Performed by: INTERNAL MEDICINE

## 2020-12-21 PROCEDURE — 3288F FALL RISK ASSESSMENT DOCD: CPT | Mod: CPTII,S$GLB,, | Performed by: INTERNAL MEDICINE

## 2020-12-21 PROCEDURE — 1101F PT FALLS ASSESS-DOCD LE1/YR: CPT | Mod: CPTII,S$GLB,, | Performed by: INTERNAL MEDICINE

## 2020-12-21 PROCEDURE — 99999 PR PBB SHADOW E&M-EST. PATIENT-LVL IV: CPT | Mod: PBBFAC,,, | Performed by: INTERNAL MEDICINE

## 2020-12-21 PROCEDURE — 3008F BODY MASS INDEX DOCD: CPT | Mod: CPTII,S$GLB,, | Performed by: INTERNAL MEDICINE

## 2020-12-21 PROCEDURE — 99214 OFFICE O/P EST MOD 30 MIN: CPT | Mod: S$GLB,,, | Performed by: INTERNAL MEDICINE

## 2020-12-21 PROCEDURE — 1159F PR MEDICATION LIST DOCUMENTED IN MEDICAL RECORD: ICD-10-PCS | Mod: S$GLB,,, | Performed by: INTERNAL MEDICINE

## 2020-12-21 PROCEDURE — 99214 PR OFFICE/OUTPT VISIT, EST, LEVL IV, 30-39 MIN: ICD-10-PCS | Mod: S$GLB,,, | Performed by: INTERNAL MEDICINE

## 2020-12-21 PROCEDURE — 99999 PR PBB SHADOW E&M-EST. PATIENT-LVL IV: ICD-10-PCS | Mod: PBBFAC,,, | Performed by: INTERNAL MEDICINE

## 2020-12-21 PROCEDURE — 1157F ADVNC CARE PLAN IN RCRD: CPT | Mod: S$GLB,,, | Performed by: INTERNAL MEDICINE

## 2020-12-21 PROCEDURE — 1159F MED LIST DOCD IN RCRD: CPT | Mod: S$GLB,,, | Performed by: INTERNAL MEDICINE

## 2020-12-21 PROCEDURE — 1157F PR ADVANCE CARE PLAN OR EQUIV PRESENT IN MEDICAL RECORD: ICD-10-PCS | Mod: S$GLB,,, | Performed by: INTERNAL MEDICINE

## 2020-12-21 PROCEDURE — 1125F AMNT PAIN NOTED PAIN PRSNT: CPT | Mod: S$GLB,,, | Performed by: INTERNAL MEDICINE

## 2020-12-21 PROCEDURE — 3008F PR BODY MASS INDEX (BMI) DOCUMENTED: ICD-10-PCS | Mod: CPTII,S$GLB,, | Performed by: INTERNAL MEDICINE

## 2020-12-21 RX ORDER — GABAPENTIN 100 MG/1
CAPSULE ORAL
Qty: 90 CAPSULE | Refills: 3 | Status: SHIPPED | OUTPATIENT
Start: 2020-12-21 | End: 2021-05-03

## 2020-12-23 ENCOUNTER — HOSPITAL ENCOUNTER (OUTPATIENT)
Dept: RADIOLOGY | Facility: HOSPITAL | Age: 70
Discharge: HOME | End: 2020-12-23
Attending: INTERNAL MEDICINE
Payer: MEDICARE

## 2020-12-23 DIAGNOSIS — M54.9 DORSALGIA, UNSPECIFIED: ICD-10-CM

## 2020-12-23 PROCEDURE — 72131 CT LUMBAR SPINE W/O DYE: CPT | Mod: 26,,, | Performed by: RADIOLOGY

## 2020-12-23 PROCEDURE — 72131 CT LUMBAR SPINE WITHOUT CONTRAST: ICD-10-PCS | Mod: 26,,, | Performed by: RADIOLOGY

## 2020-12-23 PROCEDURE — 72131 CT LUMBAR SPINE W/O DYE: CPT | Mod: TC

## 2020-12-28 ENCOUNTER — CLINICAL SUPPORT (OUTPATIENT)
Dept: REHABILITATION | Facility: HOSPITAL | Age: 70
End: 2020-12-28
Attending: INTERNAL MEDICINE
Payer: MEDICARE

## 2020-12-28 DIAGNOSIS — R52 PAIN: Primary | ICD-10-CM

## 2020-12-28 PROCEDURE — 97113 AQUATIC THERAPY/EXERCISES: CPT | Performed by: PHYSICAL THERAPIST

## 2020-12-28 NOTE — PROGRESS NOTES
Physical Therapy Daily Treatment Note     Name: Jalyn Aaron  Clinic Number: [id number]    Therapy Diagnosis:   Encounter Diagnosis   Name Primary?    Pain Yes     Physician: Annemarie Kilgore MD    Visit Date: 12/28/2020   Physician Orders: PT Eval and Treat   Medical Diagnosis: M79.606 (ICD-10-CM) - Pain of lower extremity   Evaluation Date: 12/2/2020  Authorization Period Expiration: 12-16-20  Plan of Care Certification Period: 1-30-21  Visit # / Visits authorized: 5/ 6     PTA visits:  0/6    Time In:1040  Time Out: 1130  Total Billable Time: 50 minutes    Precautions: Standard, Covid    PROCEDURES/IMAGING:Imaging: X-ray B feet:  Right: There is postoperative change of the 1st metatarsal.  There is partial correction of a hallux valgus deformity.  No fracture dislocation bone destruction or complication seen.  No hardware failure seen.     Left: There is postoperative change of the 1st metatarsal status post partial correction of hallux valgus deformity.  There is a bone island in the 3rd metatarsal.  There is baseline mild DJD.  No acute fracture dislocation bone destruction seen.  No hardware failure seen  Subjective     Pt reports: she is weak and overall fatigued, seeing neuro in [place] to R/O MS. Continues to feel good after her pool visit after drinking lots of water to stay hydrated.   She was compliant with home exercise program.  Response to previous treatment: fatigue and slight muscle soreness.   Functional change: none    Pain: 3/10  Location: bilateral back , buttocks  and feet      Objective     Jalyn received aquatic therapeutic exercises to develop strength, endurance, ROM, flexibility and core stabilization for 45 minutes including:    WARMUP x 2 laps each  Walk fwd/back/side    STRETCHES 2 x 20sec  Hamstring and gastroc on stairs    LE EX x 20 with 3.75 # weights  Mini squat  Heel raise with GS  Hip abd/add  Hip flex/ext  LAQ  Orange theraband 2 laps side stepping      UE EX/CORE  x  20  Shoulder flex/ext TA activation paddlesclosed  Shoulder horizontal abd/add TA activation paddles closed  Shoulder abd/add TA activation paddles closed  Mini squat with red board, W's 10 reps      ENDURANCE  Stationary biking 5 min in parallel bars    COOL DOWN x 1 lap each  Walk fwd/back/side    Home Exercises Provided and Patient Education Provided     Education provided:   Role of aquatic therapy  Hydration post therapy      Written Home Exercises Provided: Patient instructed to cont prior HEP.  Exercises were reviewed and Jalyn was able to demonstrate them prior to the end of the session.  Jalyn demonstrated good  understanding of the education provided.     See Patient [id number] for exercises provided prior visit.    Assessment     Patient required mod verbal cues  for proper technique & core stabilization. Patient did well post instruction. Excellent posture and positioning in the pool and good understanding of the exercises. Good tolerance to orange theraband exercises for side stepping and V walks.     Jalyn is progressing well towards her goals.   Pt prognosis is Good.     Pt will continue to benefit from skilled aquatic outpatient physical therapy to address the deficits listed in the problem list box on initial evaluation, provide pt/family education and to maximize pt's level of independence in the home and community environment.     Pt's spiritual, cultural and educational needs considered and pt agreeable to plan of care and goals.    Anticipated barriers to physical therapy:none      Goals:  Short Term Goals: 2 weeks         1.   Independent with HEP IMP        2.  Pt will report decreased pain level of < 50% from above measure with ADL IMP     Long Term Goals:   GOALS:    10_   weeks. Pt agrees with goals set.  1. Independent with HEP. IMP  2. Report decreased    L buttock/leg    pain  <   / =  3/10 with ADL such as walking IMP  3. Increased MMT  for  Trunk/core to 4+/5  with ADL such as  light lifting and house work NOT MET  4. Increased LE flexibility and exercise tolerance with functional activities such walking or self-care NOT MET        Plan      Certification Period/Plan of care expiration: 12/2/2020 to 1-30-21.     Outpatient Physical Therapy 2 times weekly for 10 weeks to include the following interventions: Aquatic Therapy, Patient Education and Therapeutic Exercise.      Ginny Fuentes, PT

## 2020-12-30 ENCOUNTER — CLINICAL SUPPORT (OUTPATIENT)
Dept: REHABILITATION | Facility: HOSPITAL | Age: 70
End: 2020-12-30
Attending: INTERNAL MEDICINE
Payer: MEDICARE

## 2020-12-30 DIAGNOSIS — R52 PAIN: Primary | ICD-10-CM

## 2020-12-30 PROCEDURE — 97113 AQUATIC THERAPY/EXERCISES: CPT | Performed by: PHYSICAL THERAPIST

## 2020-12-30 NOTE — PROGRESS NOTES
Physical Therapy Daily Treatment Note     Name: Jalyn Aaron  Clinic Number: [id number]    Therapy Diagnosis:   Encounter Diagnosis   Name Primary?    Pain Yes     Physician: Annemarie Kilgore MD    Visit Date: 12/30/2020   Physician Orders: PT Eval and Treat   Medical Diagnosis: M79.606 (ICD-10-CM) - Pain of lower extremity   Evaluation Date: 12/2/2020  Authorization Period Expiration: 12-16-20  Plan of Care Certification Period: 1-30-21  Visit # / Visits authorized: 7/18 per referral     PTA visits:  0/6    Time In:750  Time Out: 840  Total Billable Time: 50 minutes    Precautions: Standard, Covid    PROCEDURES/IMAGING:Imaging: X-ray B feet:  Right: There is postoperative change of the 1st metatarsal.  There is partial correction of a hallux valgus deformity.  No fracture dislocation bone destruction or complication seen.  No hardware failure seen.     Left: There is postoperative change of the 1st metatarsal status post partial correction of hallux valgus deformity.  There is a bone island in the 3rd metatarsal.  There is baseline mild DJD.  No acute fracture dislocation bone destruction seen.  No hardware failure seen  Subjective     Pt reports: she is feeling a little less weak, seeing neuro in [place] to R/O MS. Continues to feel good after her pool visit after drinking lots of water to stay hydrated.   She was compliant with home exercise program.  Response to previous treatment: fatigue and slight muscle soreness.   Functional change: none    Pain: 3/10  Location: bilateral back , buttocks  and feet      Objective     Jalyn received aquatic therapeutic exercises to develop strength, endurance, ROM, flexibility and core stabilization for 45 minutes including:    WARMUP x 2 laps each  Walk fwd/back/side    STRETCHES 2 x 20sec  Hamstring and gastroc on stairs    LE EX x 20 with 3.75 # weights  Mini squat  Heel raise with GS  Hip abd/add  Hip flex/ext  LAQ  Green theraband 2 laps side stepping      UE  EX/CORE  x 20  Shoulder flex/ext TA activation paddlesclosed  Shoulder horizontal abd/add TA activation paddles closed  Shoulder abd/add TA activation paddles closed  Mini squat with red board, W's 10 reps      ENDURANCE  Stationary biking 6 min in parallel bars    COOL DOWN x 1 lap each  Walk fwd/back/side    Home Exercises Provided and Patient Education Provided     Education provided:   Role of aquatic therapy  Hydration post therapy      Written Home Exercises Provided: Patient instructed to cont prior HEP.  Exercises were reviewed and Jalyn was able to demonstrate them prior to the end of the session.  Jalyn demonstrated good  understanding of the education provided.     See Patient [id number] for exercises provided prior visit.    Assessment     Patient required mod verbal cues  for proper technique & core stabilization. Patient did well post instruction. Excellent posture and positioning in the pool and good understanding of the exercises. Good tolerance to orange theraband exercises for side stepping and V walks.     Jalyn is progressing well towards her goals.   Pt prognosis is Good.     Pt will continue to benefit from skilled aquatic outpatient physical therapy to address the deficits listed in the problem list box on initial evaluation, provide pt/family education and to maximize pt's level of independence in the home and community environment.     Pt's spiritual, cultural and educational needs considered and pt agreeable to plan of care and goals.    Anticipated barriers to physical therapy:none      Goals:  Short Term Goals: 2 weeks         1.   Independent with HEP IMP        2.  Pt will report decreased pain level of < 50% from above measure with ADL IMP     Long Term Goals:   GOALS:    10_   weeks. Pt agrees with goals set.  1. Independent with HEP. IMP  2. Report decreased    L buttock/leg    pain  <   / =  3/10 with ADL such as walking IMP  3. Increased MMT  for  Trunk/core to 4+/5  with  ADL such as light lifting and house work NOT MET  4. Increased LE flexibility and exercise tolerance with functional activities such walking or self-care NOT MET        Plan      Certification Period/Plan of care expiration: 12/2/2020 to 1-30-21.     Outpatient Physical Therapy 2 times weekly for 10 weeks to include the following interventions: Aquatic Therapy, Patient Education and Therapeutic Exercise.      Ginny Fuentes, PT

## 2021-01-01 ENCOUNTER — TELEPHONE (OUTPATIENT)
Dept: INTERNAL MEDICINE | Facility: CLINIC | Age: 71
End: 2021-01-01

## 2021-01-04 ENCOUNTER — OFFICE VISIT (OUTPATIENT)
Dept: NEUROLOGY | Facility: CLINIC | Age: 71
End: 2021-01-04
Payer: MEDICARE

## 2021-01-04 VITALS
DIASTOLIC BLOOD PRESSURE: 89 MMHG | HEART RATE: 79 BPM | BODY MASS INDEX: 20.43 KG/M2 | HEIGHT: 67 IN | SYSTOLIC BLOOD PRESSURE: 150 MMHG | WEIGHT: 130.19 LBS

## 2021-01-04 DIAGNOSIS — R20.2 PARESTHESIA: ICD-10-CM

## 2021-01-04 DIAGNOSIS — M54.16 LUMBAR RADICULOPATHY: Chronic | ICD-10-CM

## 2021-01-04 PROCEDURE — 99999 PR PBB SHADOW E&M-EST. PATIENT-LVL IV: ICD-10-PCS | Mod: PBBFAC,GC,, | Performed by: STUDENT IN AN ORGANIZED HEALTH CARE EDUCATION/TRAINING PROGRAM

## 2021-01-04 PROCEDURE — 99204 PR OFFICE/OUTPT VISIT, NEW, LEVL IV, 45-59 MIN: ICD-10-PCS | Mod: GC,S$GLB,, | Performed by: PSYCHIATRY & NEUROLOGY

## 2021-01-04 PROCEDURE — 99999 PR PBB SHADOW E&M-EST. PATIENT-LVL IV: CPT | Mod: PBBFAC,GC,, | Performed by: STUDENT IN AN ORGANIZED HEALTH CARE EDUCATION/TRAINING PROGRAM

## 2021-01-04 PROCEDURE — 99204 OFFICE O/P NEW MOD 45 MIN: CPT | Mod: GC,S$GLB,, | Performed by: PSYCHIATRY & NEUROLOGY

## 2021-01-05 ENCOUNTER — TELEPHONE (OUTPATIENT)
Dept: NEUROLOGY | Facility: CLINIC | Age: 71
End: 2021-01-05

## 2021-01-06 ENCOUNTER — CLINICAL SUPPORT (OUTPATIENT)
Dept: REHABILITATION | Facility: HOSPITAL | Age: 71
End: 2021-01-06
Attending: INTERNAL MEDICINE
Payer: MEDICARE

## 2021-01-06 DIAGNOSIS — R52 PAIN: Primary | ICD-10-CM

## 2021-01-06 DIAGNOSIS — M54.16 LUMBAR RADICULOPATHY: Primary | ICD-10-CM

## 2021-01-06 PROCEDURE — 97113 AQUATIC THERAPY/EXERCISES: CPT | Performed by: PHYSICAL THERAPIST

## 2021-01-11 ENCOUNTER — CLINICAL SUPPORT (OUTPATIENT)
Dept: REHABILITATION | Facility: HOSPITAL | Age: 71
End: 2021-01-11
Attending: INTERNAL MEDICINE
Payer: MEDICARE

## 2021-01-11 DIAGNOSIS — R20.2 PARESTHESIA: ICD-10-CM

## 2021-01-11 DIAGNOSIS — R52 PAIN: Primary | ICD-10-CM

## 2021-01-11 PROCEDURE — 97113 AQUATIC THERAPY/EXERCISES: CPT | Performed by: PHYSICAL THERAPIST

## 2021-01-13 ENCOUNTER — PATIENT MESSAGE (OUTPATIENT)
Dept: INTERNAL MEDICINE | Facility: CLINIC | Age: 71
End: 2021-01-13

## 2021-01-14 ENCOUNTER — CLINICAL SUPPORT (OUTPATIENT)
Dept: REHABILITATION | Facility: HOSPITAL | Age: 71
End: 2021-01-14
Attending: INTERNAL MEDICINE
Payer: MEDICARE

## 2021-01-14 DIAGNOSIS — R52 PAIN: Primary | ICD-10-CM

## 2021-01-14 PROCEDURE — 97113 AQUATIC THERAPY/EXERCISES: CPT | Performed by: PHYSICAL THERAPIST

## 2021-01-17 ENCOUNTER — IMMUNIZATION (OUTPATIENT)
Dept: INTERNAL MEDICINE | Facility: CLINIC | Age: 71
End: 2021-01-17
Payer: MEDICARE

## 2021-01-17 DIAGNOSIS — Z23 NEED FOR VACCINATION: Primary | ICD-10-CM

## 2021-01-17 PROCEDURE — 91300 COVID-19, MRNA, LNP-S, PF, 30 MCG/0.3 ML DOSE VACCINE: CPT | Mod: PBBFAC | Performed by: FAMILY MEDICINE

## 2021-01-20 ENCOUNTER — PROCEDURE VISIT (OUTPATIENT)
Dept: PHYSICAL MEDICINE AND REHAB | Facility: CLINIC | Age: 71
End: 2021-01-20
Payer: MEDICARE

## 2021-01-20 ENCOUNTER — CLINICAL SUPPORT (OUTPATIENT)
Dept: REHABILITATION | Facility: HOSPITAL | Age: 71
End: 2021-01-20
Attending: INTERNAL MEDICINE
Payer: MEDICARE

## 2021-01-20 DIAGNOSIS — R52 PAIN: Primary | ICD-10-CM

## 2021-01-20 DIAGNOSIS — R20.2 PARESTHESIA AND PAIN OF LEFT EXTREMITY: ICD-10-CM

## 2021-01-20 DIAGNOSIS — M79.609 PARESTHESIA AND PAIN OF LEFT EXTREMITY: ICD-10-CM

## 2021-01-20 PROCEDURE — 97113 AQUATIC THERAPY/EXERCISES: CPT | Performed by: PHYSICAL THERAPIST

## 2021-01-20 PROCEDURE — 95911 NRV CNDJ TEST 9-10 STUDIES: CPT | Mod: S$GLB,,, | Performed by: PHYSICAL MEDICINE & REHABILITATION

## 2021-01-20 PROCEDURE — 95886 MUSC TEST DONE W/N TEST COMP: CPT | Mod: S$GLB,,, | Performed by: PHYSICAL MEDICINE & REHABILITATION

## 2021-01-20 PROCEDURE — 95911 PR NERVE CONDUCTION STUDY; 9-10 STUDIES: ICD-10-PCS | Mod: S$GLB,,, | Performed by: PHYSICAL MEDICINE & REHABILITATION

## 2021-01-20 PROCEDURE — 95886 PR EMG COMPLETE, W/ NERVE CONDUCTION STUDIES, 5+ MUSCLES: ICD-10-PCS | Mod: S$GLB,,, | Performed by: PHYSICAL MEDICINE & REHABILITATION

## 2021-01-25 ENCOUNTER — CLINICAL SUPPORT (OUTPATIENT)
Dept: REHABILITATION | Facility: HOSPITAL | Age: 71
End: 2021-01-25
Attending: INTERNAL MEDICINE
Payer: MEDICARE

## 2021-01-25 DIAGNOSIS — R52 PAIN: Primary | ICD-10-CM

## 2021-01-25 PROCEDURE — 97113 AQUATIC THERAPY/EXERCISES: CPT | Performed by: PHYSICAL THERAPIST

## 2021-01-28 ENCOUNTER — CLINICAL SUPPORT (OUTPATIENT)
Dept: REHABILITATION | Facility: HOSPITAL | Age: 71
End: 2021-01-28
Attending: INTERNAL MEDICINE
Payer: MEDICARE

## 2021-01-28 DIAGNOSIS — R52 PAIN: Primary | ICD-10-CM

## 2021-01-28 PROCEDURE — 97113 AQUATIC THERAPY/EXERCISES: CPT | Performed by: PHYSICAL THERAPIST

## 2021-02-02 ENCOUNTER — CLINICAL SUPPORT (OUTPATIENT)
Dept: REHABILITATION | Facility: OTHER | Age: 71
End: 2021-02-02
Attending: INTERNAL MEDICINE
Payer: MEDICARE

## 2021-02-02 DIAGNOSIS — M54.16 LUMBAR RADICULOPATHY: ICD-10-CM

## 2021-02-02 DIAGNOSIS — R52 PAIN: ICD-10-CM

## 2021-02-02 PROCEDURE — 97110 THERAPEUTIC EXERCISES: CPT | Mod: PN

## 2021-02-04 ENCOUNTER — CLINICAL SUPPORT (OUTPATIENT)
Dept: REHABILITATION | Facility: HOSPITAL | Age: 71
End: 2021-02-04
Attending: INTERNAL MEDICINE
Payer: MEDICARE

## 2021-02-04 DIAGNOSIS — R52 PAIN: Primary | ICD-10-CM

## 2021-02-04 PROCEDURE — 97113 AQUATIC THERAPY/EXERCISES: CPT | Performed by: PHYSICAL THERAPIST

## 2021-02-05 ENCOUNTER — PATIENT MESSAGE (OUTPATIENT)
Dept: INTERNAL MEDICINE | Facility: CLINIC | Age: 71
End: 2021-02-05

## 2021-02-07 ENCOUNTER — CLINICAL SUPPORT (OUTPATIENT)
Dept: CARDIOLOGY | Facility: HOSPITAL | Age: 71
End: 2021-02-07
Payer: MEDICARE

## 2021-02-07 ENCOUNTER — TELEPHONE (OUTPATIENT)
Dept: INTERNAL MEDICINE | Facility: CLINIC | Age: 71
End: 2021-02-07

## 2021-02-07 DIAGNOSIS — Z12.11 SCREENING FOR COLON CANCER: Primary | ICD-10-CM

## 2021-02-07 DIAGNOSIS — Z95.0 PRESENCE OF CARDIAC PACEMAKER: ICD-10-CM

## 2021-02-07 DIAGNOSIS — I44.2 ATRIOVENTRICULAR BLOCK, COMPLETE: ICD-10-CM

## 2021-02-07 PROCEDURE — 93294 CARDIAC DEVICE CHECK - REMOTE: ICD-10-PCS | Mod: ,,, | Performed by: INTERNAL MEDICINE

## 2021-02-07 PROCEDURE — 93296 REM INTERROG EVL PM/IDS: CPT | Performed by: INTERNAL MEDICINE

## 2021-02-07 PROCEDURE — 93294 REM INTERROG EVL PM/LDLS PM: CPT | Mod: ,,, | Performed by: INTERNAL MEDICINE

## 2021-02-09 ENCOUNTER — IMMUNIZATION (OUTPATIENT)
Dept: INTERNAL MEDICINE | Facility: CLINIC | Age: 71
End: 2021-02-09
Payer: MEDICARE

## 2021-02-09 DIAGNOSIS — Z23 NEED FOR VACCINATION: Primary | ICD-10-CM

## 2021-02-09 PROCEDURE — 0002A COVID-19, MRNA, LNP-S, PF, 30 MCG/0.3 ML DOSE VACCINE: CPT | Mod: CV19,S$GLB,, | Performed by: FAMILY MEDICINE

## 2021-02-09 PROCEDURE — 91300 COVID-19, MRNA, LNP-S, PF, 30 MCG/0.3 ML DOSE VACCINE: CPT | Mod: S$GLB,,, | Performed by: FAMILY MEDICINE

## 2021-02-09 PROCEDURE — 0002A COVID-19, MRNA, LNP-S, PF, 30 MCG/0.3 ML DOSE VACCINE: ICD-10-PCS | Mod: CV19,S$GLB,, | Performed by: FAMILY MEDICINE

## 2021-02-09 PROCEDURE — 91300 COVID-19, MRNA, LNP-S, PF, 30 MCG/0.3 ML DOSE VACCINE: ICD-10-PCS | Mod: S$GLB,,, | Performed by: FAMILY MEDICINE

## 2021-02-10 ENCOUNTER — CLINICAL SUPPORT (OUTPATIENT)
Dept: REHABILITATION | Facility: OTHER | Age: 71
End: 2021-02-10
Attending: INTERNAL MEDICINE
Payer: MEDICARE

## 2021-02-10 DIAGNOSIS — R52 PAIN: ICD-10-CM

## 2021-02-10 PROCEDURE — 97110 THERAPEUTIC EXERCISES: CPT | Mod: PN

## 2021-02-11 ENCOUNTER — CLINICAL SUPPORT (OUTPATIENT)
Dept: REHABILITATION | Facility: HOSPITAL | Age: 71
End: 2021-02-11
Attending: INTERNAL MEDICINE
Payer: MEDICARE

## 2021-02-11 DIAGNOSIS — R52 PAIN: Primary | ICD-10-CM

## 2021-02-11 PROCEDURE — 97113 AQUATIC THERAPY/EXERCISES: CPT | Performed by: PHYSICAL THERAPIST

## 2021-02-18 ENCOUNTER — CLINICAL SUPPORT (OUTPATIENT)
Dept: REHABILITATION | Facility: HOSPITAL | Age: 71
End: 2021-02-18
Attending: INTERNAL MEDICINE
Payer: MEDICARE

## 2021-02-18 DIAGNOSIS — R52 PAIN: Primary | ICD-10-CM

## 2021-02-18 PROCEDURE — 97113 AQUATIC THERAPY/EXERCISES: CPT | Performed by: PHYSICAL THERAPIST

## 2021-02-19 ENCOUNTER — CLINICAL SUPPORT (OUTPATIENT)
Dept: REHABILITATION | Facility: OTHER | Age: 71
End: 2021-02-19
Attending: INTERNAL MEDICINE
Payer: MEDICARE

## 2021-02-19 DIAGNOSIS — R52 PAIN: ICD-10-CM

## 2021-02-19 PROCEDURE — 97110 THERAPEUTIC EXERCISES: CPT | Mod: PN,CQ

## 2021-02-22 ENCOUNTER — PATIENT MESSAGE (OUTPATIENT)
Dept: INTERNAL MEDICINE | Facility: CLINIC | Age: 71
End: 2021-02-22

## 2021-02-22 ENCOUNTER — CLINICAL SUPPORT (OUTPATIENT)
Dept: REHABILITATION | Facility: OTHER | Age: 71
End: 2021-02-22
Attending: INTERNAL MEDICINE
Payer: MEDICARE

## 2021-02-22 DIAGNOSIS — R52 PAIN: ICD-10-CM

## 2021-02-22 PROCEDURE — 97110 THERAPEUTIC EXERCISES: CPT | Mod: PN,CQ

## 2021-02-24 ENCOUNTER — PATIENT MESSAGE (OUTPATIENT)
Dept: INTERNAL MEDICINE | Facility: CLINIC | Age: 71
End: 2021-02-24

## 2021-02-26 ENCOUNTER — CLINICAL SUPPORT (OUTPATIENT)
Dept: REHABILITATION | Facility: OTHER | Age: 71
End: 2021-02-26
Attending: INTERNAL MEDICINE
Payer: MEDICARE

## 2021-02-26 DIAGNOSIS — R52 PAIN: ICD-10-CM

## 2021-02-26 PROCEDURE — 97110 THERAPEUTIC EXERCISES: CPT | Mod: PN

## 2021-03-01 ENCOUNTER — TELEPHONE (OUTPATIENT)
Dept: INTERNAL MEDICINE | Facility: CLINIC | Age: 71
End: 2021-03-01

## 2021-03-01 ENCOUNTER — PATIENT MESSAGE (OUTPATIENT)
Dept: PHYSICAL MEDICINE AND REHAB | Facility: CLINIC | Age: 71
End: 2021-03-01

## 2021-03-01 ENCOUNTER — CLINICAL SUPPORT (OUTPATIENT)
Dept: REHABILITATION | Facility: OTHER | Age: 71
End: 2021-03-01
Attending: INTERNAL MEDICINE
Payer: MEDICARE

## 2021-03-01 DIAGNOSIS — R52 PAIN: ICD-10-CM

## 2021-03-01 PROCEDURE — 97110 THERAPEUTIC EXERCISES: CPT | Mod: PN

## 2021-03-03 ENCOUNTER — PATIENT OUTREACH (OUTPATIENT)
Dept: ADMINISTRATIVE | Facility: HOSPITAL | Age: 71
End: 2021-03-03

## 2021-03-03 ENCOUNTER — CLINICAL SUPPORT (OUTPATIENT)
Dept: REHABILITATION | Facility: OTHER | Age: 71
End: 2021-03-03
Attending: INTERNAL MEDICINE
Payer: MEDICARE

## 2021-03-03 DIAGNOSIS — Z11.59 NEED FOR HEPATITIS C SCREENING TEST: Primary | ICD-10-CM

## 2021-03-03 DIAGNOSIS — R52 PAIN: ICD-10-CM

## 2021-03-03 PROCEDURE — 97110 THERAPEUTIC EXERCISES: CPT | Mod: PN

## 2021-03-05 ENCOUNTER — PATIENT MESSAGE (OUTPATIENT)
Dept: NEUROLOGY | Facility: CLINIC | Age: 71
End: 2021-03-05

## 2021-03-05 ENCOUNTER — TELEPHONE (OUTPATIENT)
Dept: NEUROLOGY | Facility: CLINIC | Age: 71
End: 2021-03-05

## 2021-03-08 ENCOUNTER — OFFICE VISIT (OUTPATIENT)
Dept: INTERNAL MEDICINE | Facility: CLINIC | Age: 71
End: 2021-03-08
Payer: MEDICARE

## 2021-03-08 VITALS
BODY MASS INDEX: 20.25 KG/M2 | SYSTOLIC BLOOD PRESSURE: 124 MMHG | HEIGHT: 67 IN | OXYGEN SATURATION: 99 % | DIASTOLIC BLOOD PRESSURE: 70 MMHG | HEART RATE: 99 BPM | WEIGHT: 129 LBS

## 2021-03-08 DIAGNOSIS — K59.00 CONSTIPATION, UNSPECIFIED CONSTIPATION TYPE: ICD-10-CM

## 2021-03-08 DIAGNOSIS — M54.10 RADICULOPATHY, UNSPECIFIED SPINAL REGION: Primary | ICD-10-CM

## 2021-03-08 DIAGNOSIS — Z12.31 SCREENING MAMMOGRAM, ENCOUNTER FOR: ICD-10-CM

## 2021-03-08 PROCEDURE — 1101F PT FALLS ASSESS-DOCD LE1/YR: CPT | Mod: CPTII,S$GLB,, | Performed by: INTERNAL MEDICINE

## 2021-03-08 PROCEDURE — 3008F BODY MASS INDEX DOCD: CPT | Mod: CPTII,S$GLB,, | Performed by: INTERNAL MEDICINE

## 2021-03-08 PROCEDURE — 1101F PR PT FALLS ASSESS DOC 0-1 FALLS W/OUT INJ PAST YR: ICD-10-PCS | Mod: CPTII,S$GLB,, | Performed by: INTERNAL MEDICINE

## 2021-03-08 PROCEDURE — 1159F PR MEDICATION LIST DOCUMENTED IN MEDICAL RECORD: ICD-10-PCS | Mod: S$GLB,,, | Performed by: INTERNAL MEDICINE

## 2021-03-08 PROCEDURE — 99214 PR OFFICE/OUTPT VISIT, EST, LEVL IV, 30-39 MIN: ICD-10-PCS | Mod: S$GLB,,, | Performed by: INTERNAL MEDICINE

## 2021-03-08 PROCEDURE — 99999 PR PBB SHADOW E&M-EST. PATIENT-LVL V: ICD-10-PCS | Mod: PBBFAC,,, | Performed by: INTERNAL MEDICINE

## 2021-03-08 PROCEDURE — 3288F FALL RISK ASSESSMENT DOCD: CPT | Mod: CPTII,S$GLB,, | Performed by: INTERNAL MEDICINE

## 2021-03-08 PROCEDURE — 1159F MED LIST DOCD IN RCRD: CPT | Mod: S$GLB,,, | Performed by: INTERNAL MEDICINE

## 2021-03-08 PROCEDURE — 99214 OFFICE O/P EST MOD 30 MIN: CPT | Mod: S$GLB,,, | Performed by: INTERNAL MEDICINE

## 2021-03-08 PROCEDURE — 3288F PR FALLS RISK ASSESSMENT DOCUMENTED: ICD-10-PCS | Mod: CPTII,S$GLB,, | Performed by: INTERNAL MEDICINE

## 2021-03-08 PROCEDURE — 99999 PR PBB SHADOW E&M-EST. PATIENT-LVL V: CPT | Mod: PBBFAC,,, | Performed by: INTERNAL MEDICINE

## 2021-03-08 PROCEDURE — 1157F ADVNC CARE PLAN IN RCRD: CPT | Mod: S$GLB,,, | Performed by: INTERNAL MEDICINE

## 2021-03-08 PROCEDURE — 3008F PR BODY MASS INDEX (BMI) DOCUMENTED: ICD-10-PCS | Mod: CPTII,S$GLB,, | Performed by: INTERNAL MEDICINE

## 2021-03-08 PROCEDURE — 1157F PR ADVANCE CARE PLAN OR EQUIV PRESENT IN MEDICAL RECORD: ICD-10-PCS | Mod: S$GLB,,, | Performed by: INTERNAL MEDICINE

## 2021-03-08 RX ORDER — GABAPENTIN 600 MG/1
600 TABLET ORAL 3 TIMES DAILY
Qty: 90 TABLET | Refills: 1 | Status: SHIPPED | OUTPATIENT
Start: 2021-03-08 | End: 2021-08-03

## 2021-03-09 ENCOUNTER — CLINICAL SUPPORT (OUTPATIENT)
Dept: REHABILITATION | Facility: OTHER | Age: 71
End: 2021-03-09
Attending: INTERNAL MEDICINE
Payer: MEDICARE

## 2021-03-09 DIAGNOSIS — M54.10 RADICULOPATHY, UNSPECIFIED SPINAL REGION: ICD-10-CM

## 2021-03-09 DIAGNOSIS — R52 PAIN: ICD-10-CM

## 2021-03-09 PROCEDURE — 97110 THERAPEUTIC EXERCISES: CPT | Mod: PN

## 2021-03-15 ENCOUNTER — CLINICAL SUPPORT (OUTPATIENT)
Dept: REHABILITATION | Facility: OTHER | Age: 71
End: 2021-03-15
Attending: INTERNAL MEDICINE
Payer: MEDICARE

## 2021-03-15 DIAGNOSIS — R52 PAIN: ICD-10-CM

## 2021-03-15 PROCEDURE — 97110 THERAPEUTIC EXERCISES: CPT | Mod: PN

## 2021-03-17 ENCOUNTER — CLINICAL SUPPORT (OUTPATIENT)
Dept: REHABILITATION | Facility: OTHER | Age: 71
End: 2021-03-17
Attending: INTERNAL MEDICINE
Payer: MEDICARE

## 2021-03-17 DIAGNOSIS — R52 PAIN: ICD-10-CM

## 2021-03-17 PROCEDURE — 97110 THERAPEUTIC EXERCISES: CPT | Mod: PN

## 2021-03-18 ENCOUNTER — PATIENT MESSAGE (OUTPATIENT)
Dept: RESEARCH | Facility: HOSPITAL | Age: 71
End: 2021-03-18

## 2021-03-18 DIAGNOSIS — Z01.818 PRE-OP TESTING: ICD-10-CM

## 2021-03-18 DIAGNOSIS — Z12.11 SPECIAL SCREENING FOR MALIGNANT NEOPLASMS, COLON: Primary | ICD-10-CM

## 2021-03-18 RX ORDER — SODIUM, POTASSIUM,MAG SULFATES 17.5-3.13G
1 SOLUTION, RECONSTITUTED, ORAL ORAL DAILY
Qty: 1 KIT | Refills: 0 | Status: SHIPPED | OUTPATIENT
Start: 2021-03-18 | End: 2021-03-20

## 2021-03-22 ENCOUNTER — CLINICAL SUPPORT (OUTPATIENT)
Dept: REHABILITATION | Facility: OTHER | Age: 71
End: 2021-03-22
Attending: INTERNAL MEDICINE
Payer: MEDICARE

## 2021-03-22 DIAGNOSIS — R52 PAIN: ICD-10-CM

## 2021-03-22 PROCEDURE — 97110 THERAPEUTIC EXERCISES: CPT | Mod: PN

## 2021-03-24 ENCOUNTER — CLINICAL SUPPORT (OUTPATIENT)
Dept: REHABILITATION | Facility: OTHER | Age: 71
End: 2021-03-24
Attending: INTERNAL MEDICINE
Payer: MEDICARE

## 2021-03-24 DIAGNOSIS — R52 PAIN: ICD-10-CM

## 2021-03-24 PROCEDURE — 97110 THERAPEUTIC EXERCISES: CPT | Mod: PN

## 2021-03-26 ENCOUNTER — PATIENT MESSAGE (OUTPATIENT)
Dept: RESEARCH | Facility: HOSPITAL | Age: 71
End: 2021-03-26

## 2021-03-26 ENCOUNTER — TELEPHONE (OUTPATIENT)
Dept: NEUROLOGY | Facility: CLINIC | Age: 71
End: 2021-03-26

## 2021-03-27 ENCOUNTER — LAB VISIT (OUTPATIENT)
Dept: INTERNAL MEDICINE | Facility: CLINIC | Age: 71
End: 2021-03-27
Payer: MEDICARE

## 2021-03-27 DIAGNOSIS — Z01.818 PRE-OP TESTING: ICD-10-CM

## 2021-03-27 PROCEDURE — U0003 INFECTIOUS AGENT DETECTION BY NUCLEIC ACID (DNA OR RNA); SEVERE ACUTE RESPIRATORY SYNDROME CORONAVIRUS 2 (SARS-COV-2) (CORONAVIRUS DISEASE [COVID-19]), AMPLIFIED PROBE TECHNIQUE, MAKING USE OF HIGH THROUGHPUT TECHNOLOGIES AS DESCRIBED BY CMS-2020-01-R: HCPCS | Performed by: FAMILY MEDICINE

## 2021-03-27 PROCEDURE — U0005 INFEC AGEN DETEC AMPLI PROBE: HCPCS | Performed by: FAMILY MEDICINE

## 2021-03-28 LAB — SARS-COV-2 RNA RESP QL NAA+PROBE: NOT DETECTED

## 2021-03-30 ENCOUNTER — HOSPITAL ENCOUNTER (OUTPATIENT)
Facility: HOSPITAL | Age: 71
Discharge: HOME | End: 2021-03-30
Attending: COLON & RECTAL SURGERY | Admitting: COLON & RECTAL SURGERY
Payer: MEDICARE

## 2021-03-30 ENCOUNTER — ANESTHESIA EVENT (OUTPATIENT)
Dept: ENDOSCOPY | Facility: HOSPITAL | Age: 71
End: 2021-03-30
Payer: MEDICARE

## 2021-03-30 ENCOUNTER — NURSE TRIAGE (OUTPATIENT)
Dept: ADMINISTRATIVE | Facility: CLINIC | Age: 71
End: 2021-03-30

## 2021-03-30 ENCOUNTER — ANESTHESIA (OUTPATIENT)
Dept: ENDOSCOPY | Facility: HOSPITAL | Age: 71
End: 2021-03-30
Payer: MEDICARE

## 2021-03-30 ENCOUNTER — DOCUMENTATION ONLY (OUTPATIENT)
Dept: CARDIOLOGY | Facility: HOSPITAL | Age: 71
End: 2021-03-30

## 2021-03-30 VITALS
SYSTOLIC BLOOD PRESSURE: 107 MMHG | OXYGEN SATURATION: 100 % | RESPIRATION RATE: 14 BRPM | TEMPERATURE: 98 F | WEIGHT: 128 LBS | BODY MASS INDEX: 20.09 KG/M2 | HEIGHT: 67 IN | HEART RATE: 60 BPM | DIASTOLIC BLOOD PRESSURE: 63 MMHG

## 2021-03-30 DIAGNOSIS — R93.3 ABNORMAL COLONOSCOPY: Primary | ICD-10-CM

## 2021-03-30 DIAGNOSIS — Z12.11 SCREENING FOR COLON CANCER: ICD-10-CM

## 2021-03-30 PROCEDURE — 37000009 HC ANESTHESIA EA ADD 15 MINS: Performed by: COLON & RECTAL SURGERY

## 2021-03-30 PROCEDURE — 37000008 HC ANESTHESIA 1ST 15 MINUTES: Performed by: COLON & RECTAL SURGERY

## 2021-03-30 PROCEDURE — 45378 PR COLONOSCOPY,DIAGNOSTIC: ICD-10-PCS | Mod: 53,,, | Performed by: COLON & RECTAL SURGERY

## 2021-03-30 PROCEDURE — 63600175 PHARM REV CODE 636 W HCPCS: Performed by: NURSE ANESTHETIST, CERTIFIED REGISTERED

## 2021-03-30 PROCEDURE — 45378 DIAGNOSTIC COLONOSCOPY: CPT | Mod: 53,,, | Performed by: COLON & RECTAL SURGERY

## 2021-03-30 PROCEDURE — E9220 PRA ENDO ANESTHESIA: HCPCS | Mod: ,,, | Performed by: NURSE ANESTHETIST, CERTIFIED REGISTERED

## 2021-03-30 PROCEDURE — E9220 PRA ENDO ANESTHESIA: ICD-10-PCS | Mod: ,,, | Performed by: NURSE ANESTHETIST, CERTIFIED REGISTERED

## 2021-03-30 PROCEDURE — 45378 DIAGNOSTIC COLONOSCOPY: CPT | Performed by: COLON & RECTAL SURGERY

## 2021-03-30 PROCEDURE — 25000003 PHARM REV CODE 250: Performed by: NURSE ANESTHETIST, CERTIFIED REGISTERED

## 2021-03-30 RX ORDER — LIDOCAINE HYDROCHLORIDE 20 MG/ML
INJECTION, SOLUTION EPIDURAL; INFILTRATION; INTRACAUDAL; PERINEURAL
Status: DISCONTINUED | OUTPATIENT
Start: 2021-03-30 | End: 2021-03-30

## 2021-03-30 RX ORDER — PROPOFOL 10 MG/ML
VIAL (ML) INTRAVENOUS
Status: DISCONTINUED | OUTPATIENT
Start: 2021-03-30 | End: 2021-03-30

## 2021-03-30 RX ORDER — SODIUM CHLORIDE 9 MG/ML
INJECTION, SOLUTION INTRAVENOUS CONTINUOUS
Status: DISCONTINUED | OUTPATIENT
Start: 2021-03-30 | End: 2021-03-30 | Stop reason: HOSPADM

## 2021-03-30 RX ORDER — PHENYLEPHRINE HCL IN 0.9% NACL 1 MG/10 ML
SYRINGE (ML) INTRAVENOUS
Status: DISCONTINUED | OUTPATIENT
Start: 2021-03-30 | End: 2021-03-30

## 2021-03-30 RX ADMIN — SODIUM CHLORIDE: 0.9 INJECTION, SOLUTION INTRAVENOUS at 08:03

## 2021-03-30 RX ADMIN — Medication 100 MCG: at 09:03

## 2021-03-30 RX ADMIN — LIDOCAINE HYDROCHLORIDE 40 MG: 20 INJECTION, SOLUTION EPIDURAL; INFILTRATION; INTRACAUDAL at 08:03

## 2021-03-30 RX ADMIN — PROPOFOL 150 MCG/KG/MIN: 10 INJECTION, EMULSION INTRAVENOUS at 08:03

## 2021-03-30 RX ADMIN — PROPOFOL 50 MG: 10 INJECTION, EMULSION INTRAVENOUS at 08:03

## 2021-03-31 DIAGNOSIS — K59.00 CONSTIPATION, UNSPECIFIED CONSTIPATION TYPE: Primary | ICD-10-CM

## 2021-04-01 ENCOUNTER — CLINICAL SUPPORT (OUTPATIENT)
Dept: REHABILITATION | Facility: OTHER | Age: 71
End: 2021-04-01
Attending: INTERNAL MEDICINE
Payer: MEDICARE

## 2021-04-01 DIAGNOSIS — R52 PAIN: ICD-10-CM

## 2021-04-01 DIAGNOSIS — R93.3 ABNORMAL COLONOSCOPY: ICD-10-CM

## 2021-04-01 DIAGNOSIS — K59.00 CONSTIPATION, UNSPECIFIED CONSTIPATION TYPE: Primary | ICD-10-CM

## 2021-04-01 PROCEDURE — 97110 THERAPEUTIC EXERCISES: CPT | Mod: PN

## 2021-04-02 ENCOUNTER — PATIENT MESSAGE (OUTPATIENT)
Dept: SURGERY | Facility: CLINIC | Age: 71
End: 2021-04-02

## 2021-04-05 ENCOUNTER — CLINICAL SUPPORT (OUTPATIENT)
Dept: REHABILITATION | Facility: OTHER | Age: 71
End: 2021-04-05
Attending: INTERNAL MEDICINE
Payer: MEDICARE

## 2021-04-05 DIAGNOSIS — R52 PAIN: ICD-10-CM

## 2021-04-05 PROCEDURE — 97110 THERAPEUTIC EXERCISES: CPT | Mod: PN

## 2021-04-07 ENCOUNTER — PATIENT MESSAGE (OUTPATIENT)
Dept: INTERNAL MEDICINE | Facility: CLINIC | Age: 71
End: 2021-04-07

## 2021-04-08 ENCOUNTER — TELEPHONE (OUTPATIENT)
Dept: SURGERY | Facility: CLINIC | Age: 71
End: 2021-04-08

## 2021-04-15 ENCOUNTER — CLINICAL SUPPORT (OUTPATIENT)
Dept: REHABILITATION | Facility: OTHER | Age: 71
End: 2021-04-15
Attending: INTERNAL MEDICINE
Payer: MEDICARE

## 2021-04-15 DIAGNOSIS — R52 PAIN: ICD-10-CM

## 2021-04-15 PROCEDURE — 97140 MANUAL THERAPY 1/> REGIONS: CPT | Mod: PN

## 2021-04-15 PROCEDURE — 97110 THERAPEUTIC EXERCISES: CPT | Mod: PN

## 2021-04-19 ENCOUNTER — PATIENT MESSAGE (OUTPATIENT)
Dept: SURGERY | Facility: CLINIC | Age: 71
End: 2021-04-19

## 2021-04-19 ENCOUNTER — HOSPITAL ENCOUNTER (OUTPATIENT)
Dept: RADIOLOGY | Facility: HOSPITAL | Age: 71
Discharge: HOME | End: 2021-04-19
Attending: COLON & RECTAL SURGERY
Payer: MEDICARE

## 2021-04-19 DIAGNOSIS — R93.3 ABNORMAL COLONOSCOPY: ICD-10-CM

## 2021-04-19 DIAGNOSIS — K59.00 CONSTIPATION, UNSPECIFIED CONSTIPATION TYPE: ICD-10-CM

## 2021-04-19 PROCEDURE — 25500020 PHARM REV CODE 255: Performed by: COLON & RECTAL SURGERY

## 2021-04-19 PROCEDURE — 74270 X-RAY XM COLON 1CNTRST STD: CPT | Mod: 26,,, | Performed by: RADIOLOGY

## 2021-04-19 PROCEDURE — 74270 FL GASTROGRAFIN ENEMA WATER SOLUBLE: ICD-10-PCS | Mod: 26,,, | Performed by: RADIOLOGY

## 2021-04-19 PROCEDURE — 74270 X-RAY XM COLON 1CNTRST STD: CPT | Mod: TC

## 2021-04-19 RX ADMIN — DIATRIZOATE MEGLUMINE AND DIATRIZOATE SODIUM 240 ML: 660; 100 LIQUID ORAL; RECTAL at 11:04

## 2021-04-22 ENCOUNTER — CLINICAL SUPPORT (OUTPATIENT)
Dept: REHABILITATION | Facility: OTHER | Age: 71
End: 2021-04-22
Attending: INTERNAL MEDICINE
Payer: MEDICARE

## 2021-04-22 DIAGNOSIS — R52 PAIN: ICD-10-CM

## 2021-04-22 PROCEDURE — 97110 THERAPEUTIC EXERCISES: CPT | Mod: PN

## 2021-04-23 DIAGNOSIS — I42.8 OTHER CARDIOMYOPATHY: ICD-10-CM

## 2021-04-23 RX ORDER — LOSARTAN POTASSIUM 25 MG/1
TABLET ORAL
Qty: 60 TABLET | Refills: 6 | Status: SHIPPED | OUTPATIENT
Start: 2021-04-23 | End: 2022-05-21

## 2021-04-26 ENCOUNTER — PATIENT MESSAGE (OUTPATIENT)
Dept: SURGERY | Facility: CLINIC | Age: 71
End: 2021-04-26

## 2021-04-27 ENCOUNTER — OFFICE VISIT (OUTPATIENT)
Dept: NEUROLOGY | Facility: CLINIC | Age: 71
End: 2021-04-27
Payer: MEDICARE

## 2021-04-27 VITALS
BODY MASS INDEX: 20.29 KG/M2 | WEIGHT: 129.31 LBS | TEMPERATURE: 98 F | HEART RATE: 79 BPM | DIASTOLIC BLOOD PRESSURE: 80 MMHG | HEIGHT: 67 IN | SYSTOLIC BLOOD PRESSURE: 132 MMHG

## 2021-04-27 DIAGNOSIS — F41.9 ANXIETY: ICD-10-CM

## 2021-04-27 DIAGNOSIS — Z71.89 COUNSELING REGARDING GOALS OF CARE: Primary | ICD-10-CM

## 2021-04-27 DIAGNOSIS — Z95.0 CARDIAC PACEMAKER IN SITU: ICD-10-CM

## 2021-04-27 DIAGNOSIS — M48.061 SPINAL STENOSIS OF LUMBAR REGION, UNSPECIFIED WHETHER NEUROGENIC CLAUDICATION PRESENT: ICD-10-CM

## 2021-04-27 PROCEDURE — 1159F MED LIST DOCD IN RCRD: CPT | Mod: S$GLB,,, | Performed by: PSYCHIATRY & NEUROLOGY

## 2021-04-27 PROCEDURE — 99205 OFFICE O/P NEW HI 60 MIN: CPT | Mod: S$GLB,,, | Performed by: PSYCHIATRY & NEUROLOGY

## 2021-04-27 PROCEDURE — 99205 PR OFFICE/OUTPT VISIT, NEW, LEVL V, 60-74 MIN: ICD-10-PCS | Mod: S$GLB,,, | Performed by: PSYCHIATRY & NEUROLOGY

## 2021-04-27 PROCEDURE — 1157F ADVNC CARE PLAN IN RCRD: CPT | Mod: S$GLB,,, | Performed by: PSYCHIATRY & NEUROLOGY

## 2021-04-27 PROCEDURE — 1125F PR PAIN SEVERITY QUANTIFIED, PAIN PRESENT: ICD-10-PCS | Mod: S$GLB,,, | Performed by: PSYCHIATRY & NEUROLOGY

## 2021-04-27 PROCEDURE — 99499 UNLISTED E&M SERVICE: CPT | Mod: S$GLB,,, | Performed by: PSYCHIATRY & NEUROLOGY

## 2021-04-27 PROCEDURE — 99499 RISK ADDL DX/OHS AUDIT: ICD-10-PCS | Mod: S$GLB,,, | Performed by: PSYCHIATRY & NEUROLOGY

## 2021-04-27 PROCEDURE — 1159F PR MEDICATION LIST DOCUMENTED IN MEDICAL RECORD: ICD-10-PCS | Mod: S$GLB,,, | Performed by: PSYCHIATRY & NEUROLOGY

## 2021-04-27 PROCEDURE — 3008F BODY MASS INDEX DOCD: CPT | Mod: CPTII,S$GLB,, | Performed by: PSYCHIATRY & NEUROLOGY

## 2021-04-27 PROCEDURE — 3008F PR BODY MASS INDEX (BMI) DOCUMENTED: ICD-10-PCS | Mod: CPTII,S$GLB,, | Performed by: PSYCHIATRY & NEUROLOGY

## 2021-04-27 PROCEDURE — 1101F PT FALLS ASSESS-DOCD LE1/YR: CPT | Mod: CPTII,S$GLB,, | Performed by: PSYCHIATRY & NEUROLOGY

## 2021-04-27 PROCEDURE — 1125F AMNT PAIN NOTED PAIN PRSNT: CPT | Mod: S$GLB,,, | Performed by: PSYCHIATRY & NEUROLOGY

## 2021-04-27 PROCEDURE — 99999 PR PBB SHADOW E&M-EST. PATIENT-LVL III: ICD-10-PCS | Mod: PBBFAC,,, | Performed by: PSYCHIATRY & NEUROLOGY

## 2021-04-27 PROCEDURE — 1101F PR PT FALLS ASSESS DOC 0-1 FALLS W/OUT INJ PAST YR: ICD-10-PCS | Mod: CPTII,S$GLB,, | Performed by: PSYCHIATRY & NEUROLOGY

## 2021-04-27 PROCEDURE — 3288F FALL RISK ASSESSMENT DOCD: CPT | Mod: CPTII,S$GLB,, | Performed by: PSYCHIATRY & NEUROLOGY

## 2021-04-27 PROCEDURE — 1157F PR ADVANCE CARE PLAN OR EQUIV PRESENT IN MEDICAL RECORD: ICD-10-PCS | Mod: S$GLB,,, | Performed by: PSYCHIATRY & NEUROLOGY

## 2021-04-27 PROCEDURE — 99999 PR PBB SHADOW E&M-EST. PATIENT-LVL III: CPT | Mod: PBBFAC,,, | Performed by: PSYCHIATRY & NEUROLOGY

## 2021-04-27 PROCEDURE — 3288F PR FALLS RISK ASSESSMENT DOCUMENTED: ICD-10-PCS | Mod: CPTII,S$GLB,, | Performed by: PSYCHIATRY & NEUROLOGY

## 2021-04-29 ENCOUNTER — CLINICAL SUPPORT (OUTPATIENT)
Dept: REHABILITATION | Facility: OTHER | Age: 71
End: 2021-04-29
Attending: INTERNAL MEDICINE
Payer: MEDICARE

## 2021-04-29 DIAGNOSIS — R52 PAIN: ICD-10-CM

## 2021-04-29 PROCEDURE — 97110 THERAPEUTIC EXERCISES: CPT | Mod: PN

## 2021-04-30 ENCOUNTER — PATIENT MESSAGE (OUTPATIENT)
Dept: NEUROLOGY | Facility: CLINIC | Age: 71
End: 2021-04-30

## 2021-05-04 ENCOUNTER — OFFICE VISIT (OUTPATIENT)
Dept: GASTROENTEROLOGY | Facility: CLINIC | Age: 71
End: 2021-05-04
Payer: MEDICARE

## 2021-05-04 VITALS — WEIGHT: 129.88 LBS | BODY MASS INDEX: 20.38 KG/M2 | HEIGHT: 67 IN

## 2021-05-04 DIAGNOSIS — M62.89 PELVIC FLOOR DYSFUNCTION IN FEMALE: ICD-10-CM

## 2021-05-04 DIAGNOSIS — K59.04 CHRONIC IDIOPATHIC CONSTIPATION: Primary | ICD-10-CM

## 2021-05-04 PROCEDURE — 1126F AMNT PAIN NOTED NONE PRSNT: CPT | Mod: S$GLB,,, | Performed by: INTERNAL MEDICINE

## 2021-05-04 PROCEDURE — 99204 PR OFFICE/OUTPT VISIT, NEW, LEVL IV, 45-59 MIN: ICD-10-PCS | Mod: S$GLB,,, | Performed by: INTERNAL MEDICINE

## 2021-05-04 PROCEDURE — 1101F PR PT FALLS ASSESS DOC 0-1 FALLS W/OUT INJ PAST YR: ICD-10-PCS | Mod: CPTII,S$GLB,, | Performed by: INTERNAL MEDICINE

## 2021-05-04 PROCEDURE — 1126F PR PAIN SEVERITY QUANTIFIED, NO PAIN PRESENT: ICD-10-PCS | Mod: S$GLB,,, | Performed by: INTERNAL MEDICINE

## 2021-05-04 PROCEDURE — 99999 PR PBB SHADOW E&M-EST. PATIENT-LVL IV: CPT | Mod: PBBFAC,,, | Performed by: INTERNAL MEDICINE

## 2021-05-04 PROCEDURE — 3008F BODY MASS INDEX DOCD: CPT | Mod: CPTII,S$GLB,, | Performed by: INTERNAL MEDICINE

## 2021-05-04 PROCEDURE — 3288F PR FALLS RISK ASSESSMENT DOCUMENTED: ICD-10-PCS | Mod: CPTII,S$GLB,, | Performed by: INTERNAL MEDICINE

## 2021-05-04 PROCEDURE — 3288F FALL RISK ASSESSMENT DOCD: CPT | Mod: CPTII,S$GLB,, | Performed by: INTERNAL MEDICINE

## 2021-05-04 PROCEDURE — 3008F PR BODY MASS INDEX (BMI) DOCUMENTED: ICD-10-PCS | Mod: CPTII,S$GLB,, | Performed by: INTERNAL MEDICINE

## 2021-05-04 PROCEDURE — 1157F PR ADVANCE CARE PLAN OR EQUIV PRESENT IN MEDICAL RECORD: ICD-10-PCS | Mod: S$GLB,,, | Performed by: INTERNAL MEDICINE

## 2021-05-04 PROCEDURE — 1101F PT FALLS ASSESS-DOCD LE1/YR: CPT | Mod: CPTII,S$GLB,, | Performed by: INTERNAL MEDICINE

## 2021-05-04 PROCEDURE — 99204 OFFICE O/P NEW MOD 45 MIN: CPT | Mod: S$GLB,,, | Performed by: INTERNAL MEDICINE

## 2021-05-04 PROCEDURE — 99999 PR PBB SHADOW E&M-EST. PATIENT-LVL IV: ICD-10-PCS | Mod: PBBFAC,,, | Performed by: INTERNAL MEDICINE

## 2021-05-04 PROCEDURE — 1157F ADVNC CARE PLAN IN RCRD: CPT | Mod: S$GLB,,, | Performed by: INTERNAL MEDICINE

## 2021-05-04 RX ORDER — SENNOSIDES 8.6 MG/1
1 TABLET ORAL DAILY
COMMUNITY

## 2021-05-04 RX ORDER — MAGNESIUM 30 MG
TABLET ORAL ONCE
COMMUNITY
End: 2021-06-11

## 2021-05-04 RX ORDER — POLYETHYLENE GLYCOL 3350 17 G/17G
POWDER, FOR SOLUTION ORAL
COMMUNITY
End: 2021-06-29

## 2021-05-04 RX ORDER — DOCUSATE SODIUM 100 MG/1
100 CAPSULE, LIQUID FILLED ORAL 2 TIMES DAILY
COMMUNITY
End: 2021-12-10

## 2021-05-05 ENCOUNTER — CLINICAL SUPPORT (OUTPATIENT)
Dept: REHABILITATION | Facility: HOSPITAL | Age: 71
End: 2021-05-05
Attending: INTERNAL MEDICINE
Payer: MEDICARE

## 2021-05-05 DIAGNOSIS — M62.9 DISORDER OF MUSCLE: ICD-10-CM

## 2021-05-05 DIAGNOSIS — M62.89 PELVIC FLOOR DYSFUNCTION IN FEMALE: ICD-10-CM

## 2021-05-05 PROCEDURE — 97112 NEUROMUSCULAR REEDUCATION: CPT | Mod: PO

## 2021-05-05 PROCEDURE — 97162 PT EVAL MOD COMPLEX 30 MIN: CPT | Mod: PO

## 2021-05-26 ENCOUNTER — CLINICAL SUPPORT (OUTPATIENT)
Dept: REHABILITATION | Facility: HOSPITAL | Age: 71
End: 2021-05-26
Attending: INTERNAL MEDICINE
Payer: MEDICARE

## 2021-05-26 DIAGNOSIS — M62.9 DISORDER OF MUSCLE: Primary | ICD-10-CM

## 2021-05-26 PROCEDURE — 97112 NEUROMUSCULAR REEDUCATION: CPT | Mod: PO

## 2021-05-27 ENCOUNTER — TELEPHONE (OUTPATIENT)
Dept: NEUROLOGY | Facility: CLINIC | Age: 71
End: 2021-05-27

## 2021-05-27 ENCOUNTER — PATIENT MESSAGE (OUTPATIENT)
Dept: NEUROLOGY | Facility: CLINIC | Age: 71
End: 2021-05-27

## 2021-05-31 ENCOUNTER — PATIENT MESSAGE (OUTPATIENT)
Dept: PSYCHIATRY | Facility: CLINIC | Age: 71
End: 2021-05-31

## 2021-05-31 ENCOUNTER — PATIENT MESSAGE (OUTPATIENT)
Dept: NEUROLOGY | Facility: CLINIC | Age: 71
End: 2021-05-31

## 2021-06-01 ENCOUNTER — PATIENT MESSAGE (OUTPATIENT)
Dept: OPTOMETRY | Facility: CLINIC | Age: 71
End: 2021-06-01

## 2021-06-09 ENCOUNTER — CLINICAL SUPPORT (OUTPATIENT)
Dept: REHABILITATION | Facility: HOSPITAL | Age: 71
End: 2021-06-09
Attending: INTERNAL MEDICINE
Payer: MEDICARE

## 2021-06-09 DIAGNOSIS — M62.9 DISORDER OF MUSCLE: Primary | ICD-10-CM

## 2021-06-09 PROCEDURE — 97112 NEUROMUSCULAR REEDUCATION: CPT | Mod: PO

## 2021-06-11 ENCOUNTER — OFFICE VISIT (OUTPATIENT)
Dept: INTERNAL MEDICINE | Facility: CLINIC | Age: 71
End: 2021-06-11
Payer: MEDICARE

## 2021-06-11 VITALS
BODY MASS INDEX: 20.38 KG/M2 | SYSTOLIC BLOOD PRESSURE: 110 MMHG | TEMPERATURE: 98 F | HEART RATE: 63 BPM | OXYGEN SATURATION: 98 % | DIASTOLIC BLOOD PRESSURE: 70 MMHG | HEIGHT: 67 IN | WEIGHT: 129.88 LBS

## 2021-06-11 DIAGNOSIS — E78.5 HYPERLIPIDEMIA, UNSPECIFIED HYPERLIPIDEMIA TYPE: ICD-10-CM

## 2021-06-11 DIAGNOSIS — I10 HYPERTENSION, UNSPECIFIED TYPE: ICD-10-CM

## 2021-06-11 DIAGNOSIS — E55.9 MILD VITAMIN D DEFICIENCY: ICD-10-CM

## 2021-06-11 DIAGNOSIS — Z11.59 ENCOUNTER FOR HEPATITIS C SCREENING TEST FOR LOW RISK PATIENT: ICD-10-CM

## 2021-06-11 DIAGNOSIS — R73.9 HYPERGLYCEMIA: ICD-10-CM

## 2021-06-11 DIAGNOSIS — I42.8 CARDIOMYOPATHY, NONISCHEMIC: Primary | ICD-10-CM

## 2021-06-11 DIAGNOSIS — R94.6 ABNORMAL RESULTS OF THYROID FUNCTION STUDIES: ICD-10-CM

## 2021-06-11 PROCEDURE — 99999 PR PBB SHADOW E&M-EST. PATIENT-LVL IV: ICD-10-PCS | Mod: PBBFAC,,, | Performed by: INTERNAL MEDICINE

## 2021-06-11 PROCEDURE — 99214 PR OFFICE/OUTPT VISIT, EST, LEVL IV, 30-39 MIN: ICD-10-PCS | Mod: S$GLB,,, | Performed by: INTERNAL MEDICINE

## 2021-06-11 PROCEDURE — 3008F BODY MASS INDEX DOCD: CPT | Mod: CPTII,S$GLB,, | Performed by: INTERNAL MEDICINE

## 2021-06-11 PROCEDURE — 3288F FALL RISK ASSESSMENT DOCD: CPT | Mod: CPTII,S$GLB,, | Performed by: INTERNAL MEDICINE

## 2021-06-11 PROCEDURE — 1101F PR PT FALLS ASSESS DOC 0-1 FALLS W/OUT INJ PAST YR: ICD-10-PCS | Mod: CPTII,S$GLB,, | Performed by: INTERNAL MEDICINE

## 2021-06-11 PROCEDURE — 1159F PR MEDICATION LIST DOCUMENTED IN MEDICAL RECORD: ICD-10-PCS | Mod: S$GLB,,, | Performed by: INTERNAL MEDICINE

## 2021-06-11 PROCEDURE — 99499 RISK ADDL DX/OHS AUDIT: ICD-10-PCS | Mod: S$GLB,,, | Performed by: INTERNAL MEDICINE

## 2021-06-11 PROCEDURE — 99214 OFFICE O/P EST MOD 30 MIN: CPT | Mod: S$GLB,,, | Performed by: INTERNAL MEDICINE

## 2021-06-11 PROCEDURE — 3288F PR FALLS RISK ASSESSMENT DOCUMENTED: ICD-10-PCS | Mod: CPTII,S$GLB,, | Performed by: INTERNAL MEDICINE

## 2021-06-11 PROCEDURE — 1157F ADVNC CARE PLAN IN RCRD: CPT | Mod: S$GLB,,, | Performed by: INTERNAL MEDICINE

## 2021-06-11 PROCEDURE — 99499 UNLISTED E&M SERVICE: CPT | Mod: S$GLB,,, | Performed by: INTERNAL MEDICINE

## 2021-06-11 PROCEDURE — 1157F PR ADVANCE CARE PLAN OR EQUIV PRESENT IN MEDICAL RECORD: ICD-10-PCS | Mod: S$GLB,,, | Performed by: INTERNAL MEDICINE

## 2021-06-11 PROCEDURE — 1101F PT FALLS ASSESS-DOCD LE1/YR: CPT | Mod: CPTII,S$GLB,, | Performed by: INTERNAL MEDICINE

## 2021-06-11 PROCEDURE — 1126F PR PAIN SEVERITY QUANTIFIED, NO PAIN PRESENT: ICD-10-PCS | Mod: S$GLB,,, | Performed by: INTERNAL MEDICINE

## 2021-06-11 PROCEDURE — 99999 PR PBB SHADOW E&M-EST. PATIENT-LVL IV: CPT | Mod: PBBFAC,,, | Performed by: INTERNAL MEDICINE

## 2021-06-11 PROCEDURE — 1159F MED LIST DOCD IN RCRD: CPT | Mod: S$GLB,,, | Performed by: INTERNAL MEDICINE

## 2021-06-11 PROCEDURE — 1126F AMNT PAIN NOTED NONE PRSNT: CPT | Mod: S$GLB,,, | Performed by: INTERNAL MEDICINE

## 2021-06-11 PROCEDURE — 3008F PR BODY MASS INDEX (BMI) DOCUMENTED: ICD-10-PCS | Mod: CPTII,S$GLB,, | Performed by: INTERNAL MEDICINE

## 2021-06-16 ENCOUNTER — PATIENT OUTREACH (OUTPATIENT)
Dept: ADMINISTRATIVE | Facility: OTHER | Age: 71
End: 2021-06-16

## 2021-06-17 ENCOUNTER — OFFICE VISIT (OUTPATIENT)
Dept: GASTROENTEROLOGY | Facility: CLINIC | Age: 71
End: 2021-06-17
Payer: MEDICARE

## 2021-06-17 VITALS — HEIGHT: 67 IN | WEIGHT: 129 LBS | BODY MASS INDEX: 20.25 KG/M2

## 2021-06-17 DIAGNOSIS — K59.04 CHRONIC IDIOPATHIC CONSTIPATION: Primary | ICD-10-CM

## 2021-06-17 DIAGNOSIS — M62.89 PELVIC FLOOR DYSFUNCTION IN FEMALE: ICD-10-CM

## 2021-06-17 PROCEDURE — 99999 PR PBB SHADOW E&M-EST. PATIENT-LVL III: ICD-10-PCS | Mod: PBBFAC,,, | Performed by: INTERNAL MEDICINE

## 2021-06-17 PROCEDURE — 1101F PR PT FALLS ASSESS DOC 0-1 FALLS W/OUT INJ PAST YR: ICD-10-PCS | Mod: CPTII,S$GLB,, | Performed by: INTERNAL MEDICINE

## 2021-06-17 PROCEDURE — 1126F AMNT PAIN NOTED NONE PRSNT: CPT | Mod: S$GLB,,, | Performed by: INTERNAL MEDICINE

## 2021-06-17 PROCEDURE — 3288F PR FALLS RISK ASSESSMENT DOCUMENTED: ICD-10-PCS | Mod: CPTII,S$GLB,, | Performed by: INTERNAL MEDICINE

## 2021-06-17 PROCEDURE — 1101F PT FALLS ASSESS-DOCD LE1/YR: CPT | Mod: CPTII,S$GLB,, | Performed by: INTERNAL MEDICINE

## 2021-06-17 PROCEDURE — 3008F PR BODY MASS INDEX (BMI) DOCUMENTED: ICD-10-PCS | Mod: CPTII,S$GLB,, | Performed by: INTERNAL MEDICINE

## 2021-06-17 PROCEDURE — 99214 OFFICE O/P EST MOD 30 MIN: CPT | Mod: S$GLB,,, | Performed by: INTERNAL MEDICINE

## 2021-06-17 PROCEDURE — 99999 PR PBB SHADOW E&M-EST. PATIENT-LVL III: CPT | Mod: PBBFAC,,, | Performed by: INTERNAL MEDICINE

## 2021-06-17 PROCEDURE — 1126F PR PAIN SEVERITY QUANTIFIED, NO PAIN PRESENT: ICD-10-PCS | Mod: S$GLB,,, | Performed by: INTERNAL MEDICINE

## 2021-06-17 PROCEDURE — 99214 PR OFFICE/OUTPT VISIT, EST, LEVL IV, 30-39 MIN: ICD-10-PCS | Mod: S$GLB,,, | Performed by: INTERNAL MEDICINE

## 2021-06-17 PROCEDURE — 3288F FALL RISK ASSESSMENT DOCD: CPT | Mod: CPTII,S$GLB,, | Performed by: INTERNAL MEDICINE

## 2021-06-17 PROCEDURE — 3008F BODY MASS INDEX DOCD: CPT | Mod: CPTII,S$GLB,, | Performed by: INTERNAL MEDICINE

## 2021-06-17 PROCEDURE — 1157F PR ADVANCE CARE PLAN OR EQUIV PRESENT IN MEDICAL RECORD: ICD-10-PCS | Mod: S$GLB,,, | Performed by: INTERNAL MEDICINE

## 2021-06-17 PROCEDURE — 1157F ADVNC CARE PLAN IN RCRD: CPT | Mod: S$GLB,,, | Performed by: INTERNAL MEDICINE

## 2021-06-29 ENCOUNTER — OFFICE VISIT (OUTPATIENT)
Dept: OPTOMETRY | Facility: CLINIC | Age: 71
End: 2021-06-29
Payer: MEDICARE

## 2021-06-29 DIAGNOSIS — H50.30 INTERMITTENT ESOTROPIA: Primary | ICD-10-CM

## 2021-06-29 DIAGNOSIS — H53.2 DIPLOPIA: ICD-10-CM

## 2021-06-29 PROCEDURE — 1157F PR ADVANCE CARE PLAN OR EQUIV PRESENT IN MEDICAL RECORD: ICD-10-PCS | Mod: S$GLB,,, | Performed by: OPTOMETRIST

## 2021-06-29 PROCEDURE — 1157F ADVNC CARE PLAN IN RCRD: CPT | Mod: S$GLB,,, | Performed by: OPTOMETRIST

## 2021-06-29 PROCEDURE — 99999 PR PBB SHADOW E&M-EST. PATIENT-LVL III: ICD-10-PCS | Mod: PBBFAC,,, | Performed by: OPTOMETRIST

## 2021-06-29 PROCEDURE — 99999 PR PBB SHADOW E&M-EST. PATIENT-LVL III: CPT | Mod: PBBFAC,,, | Performed by: OPTOMETRIST

## 2021-06-29 PROCEDURE — 92014 PR EYE EXAM, EST PATIENT,COMPREHESV: ICD-10-PCS | Mod: S$GLB,,, | Performed by: OPTOMETRIST

## 2021-06-29 PROCEDURE — 92014 COMPRE OPH EXAM EST PT 1/>: CPT | Mod: S$GLB,,, | Performed by: OPTOMETRIST

## 2021-06-29 RX ORDER — TRAMADOL HYDROCHLORIDE 50 MG/1
50 TABLET ORAL EVERY 8 HOURS PRN
COMMUNITY
Start: 2021-06-09 | End: 2021-08-13

## 2021-07-09 ENCOUNTER — TELEPHONE (OUTPATIENT)
Dept: ELECTROPHYSIOLOGY | Facility: CLINIC | Age: 71
End: 2021-07-09

## 2021-07-09 DIAGNOSIS — I44.2 ATRIOVENTRICULAR BLOCK, COMPLETE: Primary | ICD-10-CM

## 2021-07-14 ENCOUNTER — HOSPITAL ENCOUNTER (OUTPATIENT)
Dept: RADIOLOGY | Facility: HOSPITAL | Age: 71
Discharge: HOME | End: 2021-07-14
Attending: INTERNAL MEDICINE
Payer: MEDICARE

## 2021-07-14 DIAGNOSIS — Z12.31 SCREENING MAMMOGRAM, ENCOUNTER FOR: ICD-10-CM

## 2021-07-14 PROCEDURE — 77067 MAMMO DIGITAL SCREENING BILAT WITH TOMO: ICD-10-PCS | Mod: 26,,, | Performed by: RADIOLOGY

## 2021-07-14 PROCEDURE — 77063 MAMMO DIGITAL SCREENING BILAT WITH TOMO: ICD-10-PCS | Mod: 26,,, | Performed by: RADIOLOGY

## 2021-07-14 PROCEDURE — 77063 BREAST TOMOSYNTHESIS BI: CPT | Mod: 26,,, | Performed by: RADIOLOGY

## 2021-07-14 PROCEDURE — 77067 SCR MAMMO BI INCL CAD: CPT | Mod: 26,,, | Performed by: RADIOLOGY

## 2021-07-14 PROCEDURE — 77067 SCR MAMMO BI INCL CAD: CPT | Mod: TC

## 2021-07-30 ENCOUNTER — PATIENT OUTREACH (OUTPATIENT)
Dept: ADMINISTRATIVE | Facility: OTHER | Age: 71
End: 2021-07-30

## 2021-08-03 ENCOUNTER — OFFICE VISIT (OUTPATIENT)
Dept: NEUROLOGY | Facility: CLINIC | Age: 71
End: 2021-08-03
Payer: MEDICARE

## 2021-08-03 VITALS
BODY MASS INDEX: 20.48 KG/M2 | WEIGHT: 130.5 LBS | DIASTOLIC BLOOD PRESSURE: 65 MMHG | SYSTOLIC BLOOD PRESSURE: 113 MMHG | HEART RATE: 69 BPM | HEIGHT: 67 IN

## 2021-08-03 DIAGNOSIS — M54.16 LUMBAR RADICULOPATHY: ICD-10-CM

## 2021-08-03 DIAGNOSIS — M48.061 SPINAL STENOSIS OF LUMBAR REGION WITHOUT NEUROGENIC CLAUDICATION: ICD-10-CM

## 2021-08-03 DIAGNOSIS — R20.2 PARESTHESIA: Primary | ICD-10-CM

## 2021-08-03 PROCEDURE — 99999 PR PBB SHADOW E&M-EST. PATIENT-LVL III: CPT | Mod: PBBFAC,GC,, | Performed by: STUDENT IN AN ORGANIZED HEALTH CARE EDUCATION/TRAINING PROGRAM

## 2021-08-03 PROCEDURE — 99213 OFFICE O/P EST LOW 20 MIN: CPT | Mod: GC,S$GLB,, | Performed by: STUDENT IN AN ORGANIZED HEALTH CARE EDUCATION/TRAINING PROGRAM

## 2021-08-03 PROCEDURE — 99213 PR OFFICE/OUTPT VISIT, EST, LEVL III, 20-29 MIN: ICD-10-PCS | Mod: GC,S$GLB,, | Performed by: STUDENT IN AN ORGANIZED HEALTH CARE EDUCATION/TRAINING PROGRAM

## 2021-08-03 PROCEDURE — 99999 PR PBB SHADOW E&M-EST. PATIENT-LVL III: ICD-10-PCS | Mod: PBBFAC,GC,, | Performed by: STUDENT IN AN ORGANIZED HEALTH CARE EDUCATION/TRAINING PROGRAM

## 2021-08-11 ENCOUNTER — CLINICAL SUPPORT (OUTPATIENT)
Dept: CARDIOLOGY | Facility: HOSPITAL | Age: 71
End: 2021-08-11
Attending: INTERNAL MEDICINE
Payer: MEDICARE

## 2021-08-11 DIAGNOSIS — Z95.0 PRESENCE OF CARDIAC PACEMAKER: ICD-10-CM

## 2021-08-11 PROCEDURE — 93294 REM INTERROG EVL PM/LDLS PM: CPT | Mod: ,,, | Performed by: INTERNAL MEDICINE

## 2021-08-11 PROCEDURE — 93296 REM INTERROG EVL PM/IDS: CPT | Performed by: INTERNAL MEDICINE

## 2021-08-11 PROCEDURE — 93294 CARDIAC DEVICE CHECK - REMOTE: ICD-10-PCS | Mod: ,,, | Performed by: INTERNAL MEDICINE

## 2021-08-13 ENCOUNTER — OFFICE VISIT (OUTPATIENT)
Dept: DERMATOLOGY | Facility: CLINIC | Age: 71
End: 2021-08-13
Payer: MEDICARE

## 2021-08-13 DIAGNOSIS — Z85.828 HISTORY OF NONMELANOMA SKIN CANCER: ICD-10-CM

## 2021-08-13 DIAGNOSIS — L81.4 LENTIGINES: ICD-10-CM

## 2021-08-13 DIAGNOSIS — L81.4 LENTIGO: ICD-10-CM

## 2021-08-13 DIAGNOSIS — L82.1 SK (SEBORRHEIC KERATOSIS): Primary | ICD-10-CM

## 2021-08-13 DIAGNOSIS — L72.0 EIC (EPIDERMAL INCLUSION CYST): ICD-10-CM

## 2021-08-13 PROCEDURE — 99213 OFFICE O/P EST LOW 20 MIN: CPT | Mod: S$GLB,,, | Performed by: DERMATOLOGY

## 2021-08-13 PROCEDURE — 3288F PR FALLS RISK ASSESSMENT DOCUMENTED: ICD-10-PCS | Mod: CPTII,S$GLB,, | Performed by: DERMATOLOGY

## 2021-08-13 PROCEDURE — 1160F RVW MEDS BY RX/DR IN RCRD: CPT | Mod: CPTII,S$GLB,, | Performed by: DERMATOLOGY

## 2021-08-13 PROCEDURE — 99999 PR PBB SHADOW E&M-EST. PATIENT-LVL II: CPT | Mod: PBBFAC,,, | Performed by: DERMATOLOGY

## 2021-08-13 PROCEDURE — 1159F PR MEDICATION LIST DOCUMENTED IN MEDICAL RECORD: ICD-10-PCS | Mod: CPTII,S$GLB,, | Performed by: DERMATOLOGY

## 2021-08-13 PROCEDURE — 1157F ADVNC CARE PLAN IN RCRD: CPT | Mod: CPTII,S$GLB,, | Performed by: DERMATOLOGY

## 2021-08-13 PROCEDURE — 99213 PR OFFICE/OUTPT VISIT, EST, LEVL III, 20-29 MIN: ICD-10-PCS | Mod: S$GLB,,, | Performed by: DERMATOLOGY

## 2021-08-13 PROCEDURE — 1159F MED LIST DOCD IN RCRD: CPT | Mod: CPTII,S$GLB,, | Performed by: DERMATOLOGY

## 2021-08-13 PROCEDURE — 1157F PR ADVANCE CARE PLAN OR EQUIV PRESENT IN MEDICAL RECORD: ICD-10-PCS | Mod: CPTII,S$GLB,, | Performed by: DERMATOLOGY

## 2021-08-13 PROCEDURE — 99999 PR PBB SHADOW E&M-EST. PATIENT-LVL II: ICD-10-PCS | Mod: PBBFAC,,, | Performed by: DERMATOLOGY

## 2021-08-13 PROCEDURE — 1101F PR PT FALLS ASSESS DOC 0-1 FALLS W/OUT INJ PAST YR: ICD-10-PCS | Mod: CPTII,S$GLB,, | Performed by: DERMATOLOGY

## 2021-08-13 PROCEDURE — 3288F FALL RISK ASSESSMENT DOCD: CPT | Mod: CPTII,S$GLB,, | Performed by: DERMATOLOGY

## 2021-08-13 PROCEDURE — 1160F PR REVIEW ALL MEDS BY PRESCRIBER/CLIN PHARMACIST DOCUMENTED: ICD-10-PCS | Mod: CPTII,S$GLB,, | Performed by: DERMATOLOGY

## 2021-08-13 PROCEDURE — 1101F PT FALLS ASSESS-DOCD LE1/YR: CPT | Mod: CPTII,S$GLB,, | Performed by: DERMATOLOGY

## 2021-08-20 ENCOUNTER — PROCEDURE VISIT (OUTPATIENT)
Dept: NEUROLOGY | Facility: CLINIC | Age: 71
End: 2021-08-20
Payer: MEDICARE

## 2021-08-20 DIAGNOSIS — M48.061 SPINAL STENOSIS OF LUMBAR REGION WITHOUT NEUROGENIC CLAUDICATION: ICD-10-CM

## 2021-08-20 DIAGNOSIS — R20.2 PARESTHESIA: ICD-10-CM

## 2021-08-20 PROCEDURE — 95910 PR NERVE CONDUCTION STUDY; 7-8 STUDIES: ICD-10-PCS | Mod: S$GLB,,, | Performed by: PSYCHIATRY & NEUROLOGY

## 2021-08-20 PROCEDURE — 95910 NRV CNDJ TEST 7-8 STUDIES: CPT | Mod: S$GLB,,, | Performed by: PSYCHIATRY & NEUROLOGY

## 2021-08-20 PROCEDURE — 95886 MUSC TEST DONE W/N TEST COMP: CPT | Mod: S$GLB,,, | Performed by: PSYCHIATRY & NEUROLOGY

## 2021-08-20 PROCEDURE — 95886 PR EMG COMPLETE, W/ NERVE CONDUCTION STUDIES, 5+ MUSCLES: ICD-10-PCS | Mod: S$GLB,,, | Performed by: PSYCHIATRY & NEUROLOGY

## 2021-08-23 ENCOUNTER — PATIENT MESSAGE (OUTPATIENT)
Dept: NEUROLOGY | Facility: CLINIC | Age: 71
End: 2021-08-23

## 2021-09-01 ENCOUNTER — PATIENT MESSAGE (OUTPATIENT)
Dept: PSYCHIATRY | Facility: CLINIC | Age: 71
End: 2021-09-01

## 2021-09-02 ENCOUNTER — PATIENT MESSAGE (OUTPATIENT)
Dept: PSYCHIATRY | Facility: CLINIC | Age: 71
End: 2021-09-02

## 2021-09-04 ENCOUNTER — PATIENT MESSAGE (OUTPATIENT)
Dept: NEUROLOGY | Facility: CLINIC | Age: 71
End: 2021-09-04

## 2021-09-16 ENCOUNTER — OFFICE VISIT (OUTPATIENT)
Dept: GASTROENTEROLOGY | Facility: CLINIC | Age: 71
End: 2021-09-16
Payer: MEDICARE

## 2021-09-16 VITALS — BODY MASS INDEX: 20.17 KG/M2 | WEIGHT: 128.5 LBS | HEIGHT: 67 IN

## 2021-09-16 DIAGNOSIS — K59.04 CHRONIC IDIOPATHIC CONSTIPATION: Primary | ICD-10-CM

## 2021-09-16 DIAGNOSIS — M48.061 SPINAL STENOSIS OF LUMBAR REGION, UNSPECIFIED WHETHER NEUROGENIC CLAUDICATION PRESENT: ICD-10-CM

## 2021-09-16 DIAGNOSIS — M62.89 PELVIC FLOOR DYSFUNCTION IN FEMALE: ICD-10-CM

## 2021-09-16 PROCEDURE — 4010F PR ACE/ARB THEARPY RXD/TAKEN: ICD-10-PCS | Mod: CPTII,S$GLB,, | Performed by: INTERNAL MEDICINE

## 2021-09-16 PROCEDURE — 1157F PR ADVANCE CARE PLAN OR EQUIV PRESENT IN MEDICAL RECORD: ICD-10-PCS | Mod: CPTII,S$GLB,, | Performed by: INTERNAL MEDICINE

## 2021-09-16 PROCEDURE — 1126F AMNT PAIN NOTED NONE PRSNT: CPT | Mod: CPTII,S$GLB,, | Performed by: INTERNAL MEDICINE

## 2021-09-16 PROCEDURE — 1159F MED LIST DOCD IN RCRD: CPT | Mod: CPTII,S$GLB,, | Performed by: INTERNAL MEDICINE

## 2021-09-16 PROCEDURE — 3008F PR BODY MASS INDEX (BMI) DOCUMENTED: ICD-10-PCS | Mod: CPTII,S$GLB,, | Performed by: INTERNAL MEDICINE

## 2021-09-16 PROCEDURE — 99999 PR PBB SHADOW E&M-EST. PATIENT-LVL III: CPT | Mod: PBBFAC,,, | Performed by: INTERNAL MEDICINE

## 2021-09-16 PROCEDURE — 99214 PR OFFICE/OUTPT VISIT, EST, LEVL IV, 30-39 MIN: ICD-10-PCS | Mod: S$GLB,,, | Performed by: INTERNAL MEDICINE

## 2021-09-16 PROCEDURE — 3288F FALL RISK ASSESSMENT DOCD: CPT | Mod: CPTII,S$GLB,, | Performed by: INTERNAL MEDICINE

## 2021-09-16 PROCEDURE — 1101F PT FALLS ASSESS-DOCD LE1/YR: CPT | Mod: CPTII,S$GLB,, | Performed by: INTERNAL MEDICINE

## 2021-09-16 PROCEDURE — 1157F ADVNC CARE PLAN IN RCRD: CPT | Mod: CPTII,S$GLB,, | Performed by: INTERNAL MEDICINE

## 2021-09-16 PROCEDURE — 3288F PR FALLS RISK ASSESSMENT DOCUMENTED: ICD-10-PCS | Mod: CPTII,S$GLB,, | Performed by: INTERNAL MEDICINE

## 2021-09-16 PROCEDURE — 3008F BODY MASS INDEX DOCD: CPT | Mod: CPTII,S$GLB,, | Performed by: INTERNAL MEDICINE

## 2021-09-16 PROCEDURE — 1101F PR PT FALLS ASSESS DOC 0-1 FALLS W/OUT INJ PAST YR: ICD-10-PCS | Mod: CPTII,S$GLB,, | Performed by: INTERNAL MEDICINE

## 2021-09-16 PROCEDURE — 4010F ACE/ARB THERAPY RXD/TAKEN: CPT | Mod: CPTII,S$GLB,, | Performed by: INTERNAL MEDICINE

## 2021-09-16 PROCEDURE — 1159F PR MEDICATION LIST DOCUMENTED IN MEDICAL RECORD: ICD-10-PCS | Mod: CPTII,S$GLB,, | Performed by: INTERNAL MEDICINE

## 2021-09-16 PROCEDURE — 1126F PR PAIN SEVERITY QUANTIFIED, NO PAIN PRESENT: ICD-10-PCS | Mod: CPTII,S$GLB,, | Performed by: INTERNAL MEDICINE

## 2021-09-16 PROCEDURE — 99214 OFFICE O/P EST MOD 30 MIN: CPT | Mod: S$GLB,,, | Performed by: INTERNAL MEDICINE

## 2021-09-16 PROCEDURE — 99999 PR PBB SHADOW E&M-EST. PATIENT-LVL III: ICD-10-PCS | Mod: PBBFAC,,, | Performed by: INTERNAL MEDICINE

## 2021-09-27 ENCOUNTER — PATIENT MESSAGE (OUTPATIENT)
Dept: INTERNAL MEDICINE | Facility: CLINIC | Age: 71
End: 2021-09-27

## 2021-09-27 RX ORDER — NAPROXEN 500 MG/1
TABLET ORAL
Qty: 30 TABLET | Refills: 3 | Status: SHIPPED | OUTPATIENT
Start: 2021-09-27 | End: 2021-12-27 | Stop reason: SDUPTHER

## 2021-10-12 ENCOUNTER — PES CALL (OUTPATIENT)
Dept: ADMINISTRATIVE | Facility: CLINIC | Age: 71
End: 2021-10-12

## 2021-10-12 ENCOUNTER — IMMUNIZATION (OUTPATIENT)
Dept: INTERNAL MEDICINE | Facility: CLINIC | Age: 71
End: 2021-10-12
Payer: MEDICARE

## 2021-10-12 DIAGNOSIS — Z23 NEED FOR VACCINATION: Primary | ICD-10-CM

## 2021-10-12 PROCEDURE — 0003A COVID-19, MRNA, LNP-S, PF, 30 MCG/0.3 ML DOSE VACCINE: CPT | Mod: CV19,PBBFAC | Performed by: INTERNAL MEDICINE

## 2021-10-12 PROCEDURE — 91300 COVID-19, MRNA, LNP-S, PF, 30 MCG/0.3 ML DOSE VACCINE: CPT | Mod: PBBFAC | Performed by: INTERNAL MEDICINE

## 2021-10-24 ENCOUNTER — PATIENT MESSAGE (OUTPATIENT)
Dept: INTERNAL MEDICINE | Facility: CLINIC | Age: 71
End: 2021-10-24
Payer: MEDICARE

## 2021-10-25 ENCOUNTER — TELEPHONE (OUTPATIENT)
Dept: INTERNAL MEDICINE | Facility: CLINIC | Age: 71
End: 2021-10-25
Payer: MEDICARE

## 2021-10-25 DIAGNOSIS — R68.2 DRY MOUTH: Primary | ICD-10-CM

## 2021-11-03 ENCOUNTER — HOSPITAL ENCOUNTER (OUTPATIENT)
Dept: CARDIOLOGY | Facility: HOSPITAL | Age: 71
Discharge: HOME | End: 2021-11-03
Attending: INTERNAL MEDICINE
Payer: MEDICARE

## 2021-11-03 ENCOUNTER — CLINICAL SUPPORT (OUTPATIENT)
Dept: CARDIOLOGY | Facility: HOSPITAL | Age: 71
End: 2021-11-03
Attending: INTERNAL MEDICINE
Payer: MEDICARE

## 2021-11-03 ENCOUNTER — OFFICE VISIT (OUTPATIENT)
Dept: ELECTROPHYSIOLOGY | Facility: CLINIC | Age: 71
End: 2021-11-03
Payer: MEDICARE

## 2021-11-03 VITALS
WEIGHT: 128 LBS | HEART RATE: 64 BPM | HEIGHT: 67 IN | BODY MASS INDEX: 20.09 KG/M2 | WEIGHT: 127.88 LBS | DIASTOLIC BLOOD PRESSURE: 77 MMHG | SYSTOLIC BLOOD PRESSURE: 143 MMHG | SYSTOLIC BLOOD PRESSURE: 118 MMHG | HEIGHT: 67 IN | HEART RATE: 70 BPM | BODY MASS INDEX: 20.07 KG/M2 | DIASTOLIC BLOOD PRESSURE: 80 MMHG

## 2021-11-03 DIAGNOSIS — I44.2 ATRIOVENTRICULAR BLOCK, COMPLETE: ICD-10-CM

## 2021-11-03 DIAGNOSIS — I44.30 AV BLOCK: Primary | ICD-10-CM

## 2021-11-03 DIAGNOSIS — F41.9 ANXIETY: ICD-10-CM

## 2021-11-03 DIAGNOSIS — I42.8 CARDIOMYOPATHY, NONISCHEMIC: ICD-10-CM

## 2021-11-03 DIAGNOSIS — Z95.0 CARDIAC PACEMAKER IN SITU: ICD-10-CM

## 2021-11-03 LAB
ASCENDING AORTA: 3.2 CM
AV INDEX (PROSTH): 0.7
AV MEAN GRADIENT: 4 MMHG
AV PEAK GRADIENT: 7 MMHG
AV VALVE AREA: 2.22 CM2
AV VELOCITY RATIO: 0.66
BSA FOR ECHO PROCEDURE: 1.66 M2
CV ECHO LV RWT: 0.34 CM
DOP CALC AO PEAK VEL: 1.29 M/S
DOP CALC AO VTI: 24.39 CM
DOP CALC LVOT AREA: 3.2 CM2
DOP CALC LVOT DIAMETER: 2.01 CM
DOP CALC LVOT PEAK VEL: 0.85 M/S
DOP CALC LVOT STROKE VOLUME: 54.2 CM3
DOP CALCLVOT PEAK VEL VTI: 17.09 CM
E WAVE DECELERATION TIME: 255.49 MSEC
E/A RATIO: 0.61
E/E' RATIO: 8 M/S
ECHO LV POSTERIOR WALL: 0.73 CM (ref 0.6–1.1)
EJECTION FRACTION: 45 %
FRACTIONAL SHORTENING: 18 % (ref 28–44)
INTERVENTRICULAR SEPTUM: 0.73 CM (ref 0.6–1.1)
IVRT: 151.28 MSEC
LA MAJOR: 4.43 CM
LA MINOR: 4.41 CM
LA WIDTH: 3.56 CM
LEFT ATRIUM SIZE: 2.56 CM
LEFT ATRIUM VOLUME INDEX MOD: 14.7 ML/M2
LEFT ATRIUM VOLUME INDEX: 20.5 ML/M2
LEFT ATRIUM VOLUME MOD: 24.58 CM3
LEFT ATRIUM VOLUME: 34.24 CM3
LEFT INTERNAL DIMENSION IN SYSTOLE: 3.49 CM (ref 2.1–4)
LEFT VENTRICLE DIASTOLIC VOLUME INDEX: 48.14 ML/M2
LEFT VENTRICLE DIASTOLIC VOLUME: 80.4 ML
LEFT VENTRICLE MASS INDEX: 55 G/M2
LEFT VENTRICLE SYSTOLIC VOLUME INDEX: 30.3 ML/M2
LEFT VENTRICLE SYSTOLIC VOLUME: 50.6 ML
LEFT VENTRICULAR INTERNAL DIMENSION IN DIASTOLE: 4.24 CM (ref 3.5–6)
LEFT VENTRICULAR MASS: 91.26 G
LV LATERAL E/E' RATIO: 8 M/S
LV SEPTAL E/E' RATIO: 8 M/S
MV A" WAVE DURATION": 14.84 MSEC
MV PEAK A VEL: 0.92 M/S
MV PEAK E VEL: 0.56 M/S
MV STENOSIS PRESSURE HALF TIME: 74.09 MS
MV VALVE AREA P 1/2 METHOD: 2.97 CM2
PISA TR MAX VEL: 2.43 M/S
PULM VEIN S/D RATIO: 1.68
PV PEAK D VEL: 0.34 M/S
PV PEAK S VEL: 0.57 M/S
RA MAJOR: 4.75 CM
RA PRESSURE: 3 MMHG
RA WIDTH: 3.18 CM
RIGHT VENTRICULAR END-DIASTOLIC DIMENSION: 2.42 CM
RV TISSUE DOPPLER FREE WALL SYSTOLIC VELOCITY 1 (APICAL 4 CHAMBER VIEW): 14.58 CM/S
SINUS: 2.78 CM
STJ: 3.17 CM
TDI LATERAL: 0.07 M/S
TDI SEPTAL: 0.07 M/S
TDI: 0.07 M/S
TR MAX PG: 24 MMHG
TRICUSPID ANNULAR PLANE SYSTOLIC EXCURSION: 2.37 CM
TV REST PULMONARY ARTERY PRESSURE: 27 MMHG

## 2021-11-03 PROCEDURE — 1101F PR PT FALLS ASSESS DOC 0-1 FALLS W/OUT INJ PAST YR: ICD-10-PCS | Mod: CPTII,S$GLB,, | Performed by: INTERNAL MEDICINE

## 2021-11-03 PROCEDURE — 3008F BODY MASS INDEX DOCD: CPT | Mod: CPTII,S$GLB,, | Performed by: INTERNAL MEDICINE

## 2021-11-03 PROCEDURE — 93280 PM DEVICE PROGR EVAL DUAL: CPT | Mod: 26,,, | Performed by: INTERNAL MEDICINE

## 2021-11-03 PROCEDURE — 3288F PR FALLS RISK ASSESSMENT DOCUMENTED: ICD-10-PCS | Mod: CPTII,S$GLB,, | Performed by: INTERNAL MEDICINE

## 2021-11-03 PROCEDURE — 99499 UNLISTED E&M SERVICE: CPT | Mod: S$GLB,,, | Performed by: INTERNAL MEDICINE

## 2021-11-03 PROCEDURE — 99214 OFFICE O/P EST MOD 30 MIN: CPT | Mod: S$GLB,,, | Performed by: INTERNAL MEDICINE

## 2021-11-03 PROCEDURE — 4010F PR ACE/ARB THEARPY RXD/TAKEN: ICD-10-PCS | Mod: CPTII,S$GLB,, | Performed by: INTERNAL MEDICINE

## 2021-11-03 PROCEDURE — 93306 ECHO (CUPID ONLY): ICD-10-PCS | Mod: 26,,, | Performed by: INTERNAL MEDICINE

## 2021-11-03 PROCEDURE — 1157F PR ADVANCE CARE PLAN OR EQUIV PRESENT IN MEDICAL RECORD: ICD-10-PCS | Mod: CPTII,S$GLB,, | Performed by: INTERNAL MEDICINE

## 2021-11-03 PROCEDURE — 1160F RVW MEDS BY RX/DR IN RCRD: CPT | Mod: CPTII,S$GLB,, | Performed by: INTERNAL MEDICINE

## 2021-11-03 PROCEDURE — 3288F FALL RISK ASSESSMENT DOCD: CPT | Mod: CPTII,S$GLB,, | Performed by: INTERNAL MEDICINE

## 2021-11-03 PROCEDURE — 93306 TTE W/DOPPLER COMPLETE: CPT | Mod: 26,,, | Performed by: INTERNAL MEDICINE

## 2021-11-03 PROCEDURE — 1157F ADVNC CARE PLAN IN RCRD: CPT | Mod: CPTII,S$GLB,, | Performed by: INTERNAL MEDICINE

## 2021-11-03 PROCEDURE — 93280 PM DEVICE PROGR EVAL DUAL: CPT

## 2021-11-03 PROCEDURE — 93280 CARDIAC DEVICE CHECK - IN CLINIC & HOSPITAL: ICD-10-PCS | Mod: 26,,, | Performed by: INTERNAL MEDICINE

## 2021-11-03 PROCEDURE — 99999 PR PBB SHADOW E&M-EST. PATIENT-LVL III: CPT | Mod: PBBFAC,,, | Performed by: INTERNAL MEDICINE

## 2021-11-03 PROCEDURE — 99214 PR OFFICE/OUTPT VISIT, EST, LEVL IV, 30-39 MIN: ICD-10-PCS | Mod: S$GLB,,, | Performed by: INTERNAL MEDICINE

## 2021-11-03 PROCEDURE — 1160F PR REVIEW ALL MEDS BY PRESCRIBER/CLIN PHARMACIST DOCUMENTED: ICD-10-PCS | Mod: CPTII,S$GLB,, | Performed by: INTERNAL MEDICINE

## 2021-11-03 PROCEDURE — 93306 TTE W/DOPPLER COMPLETE: CPT

## 2021-11-03 PROCEDURE — 4010F ACE/ARB THERAPY RXD/TAKEN: CPT | Mod: CPTII,S$GLB,, | Performed by: INTERNAL MEDICINE

## 2021-11-03 PROCEDURE — 1159F PR MEDICATION LIST DOCUMENTED IN MEDICAL RECORD: ICD-10-PCS | Mod: CPTII,S$GLB,, | Performed by: INTERNAL MEDICINE

## 2021-11-03 PROCEDURE — 1101F PT FALLS ASSESS-DOCD LE1/YR: CPT | Mod: CPTII,S$GLB,, | Performed by: INTERNAL MEDICINE

## 2021-11-03 PROCEDURE — 99499 RISK ADDL DX/OHS AUDIT: ICD-10-PCS | Mod: S$GLB,,, | Performed by: INTERNAL MEDICINE

## 2021-11-03 PROCEDURE — 1126F PR PAIN SEVERITY QUANTIFIED, NO PAIN PRESENT: ICD-10-PCS | Mod: CPTII,S$GLB,, | Performed by: INTERNAL MEDICINE

## 2021-11-03 PROCEDURE — 99999 PR PBB SHADOW E&M-EST. PATIENT-LVL III: ICD-10-PCS | Mod: PBBFAC,,, | Performed by: INTERNAL MEDICINE

## 2021-11-03 PROCEDURE — 3008F PR BODY MASS INDEX (BMI) DOCUMENTED: ICD-10-PCS | Mod: CPTII,S$GLB,, | Performed by: INTERNAL MEDICINE

## 2021-11-03 PROCEDURE — 3079F DIAST BP 80-89 MM HG: CPT | Mod: CPTII,S$GLB,, | Performed by: INTERNAL MEDICINE

## 2021-11-03 PROCEDURE — 3074F SYST BP LT 130 MM HG: CPT | Mod: CPTII,S$GLB,, | Performed by: INTERNAL MEDICINE

## 2021-11-03 PROCEDURE — 1159F MED LIST DOCD IN RCRD: CPT | Mod: CPTII,S$GLB,, | Performed by: INTERNAL MEDICINE

## 2021-11-03 PROCEDURE — 3079F PR MOST RECENT DIASTOLIC BLOOD PRESSURE 80-89 MM HG: ICD-10-PCS | Mod: CPTII,S$GLB,, | Performed by: INTERNAL MEDICINE

## 2021-11-03 PROCEDURE — 1126F AMNT PAIN NOTED NONE PRSNT: CPT | Mod: CPTII,S$GLB,, | Performed by: INTERNAL MEDICINE

## 2021-11-03 PROCEDURE — 3074F PR MOST RECENT SYSTOLIC BLOOD PRESSURE < 130 MM HG: ICD-10-PCS | Mod: CPTII,S$GLB,, | Performed by: INTERNAL MEDICINE

## 2021-11-10 ENCOUNTER — OFFICE VISIT (OUTPATIENT)
Dept: OTOLARYNGOLOGY | Facility: CLINIC | Age: 71
End: 2021-11-10
Payer: MEDICARE

## 2021-11-10 VITALS — HEART RATE: 75 BPM | SYSTOLIC BLOOD PRESSURE: 135 MMHG | DIASTOLIC BLOOD PRESSURE: 82 MMHG

## 2021-11-10 DIAGNOSIS — K21.9 LARYNGOPHARYNGEAL REFLUX (LPR): ICD-10-CM

## 2021-11-10 DIAGNOSIS — J34.2 NASAL SEPTAL DEVIATION: ICD-10-CM

## 2021-11-10 DIAGNOSIS — R09.81 NASAL CONGESTION: ICD-10-CM

## 2021-11-10 DIAGNOSIS — R09.A2 GLOBUS SENSATION: ICD-10-CM

## 2021-11-10 DIAGNOSIS — R68.2 DRY MOUTH: Primary | ICD-10-CM

## 2021-11-10 PROCEDURE — 99999 PR PBB SHADOW E&M-EST. PATIENT-LVL III: ICD-10-PCS | Mod: PBBFAC,,, | Performed by: OTOLARYNGOLOGY

## 2021-11-10 PROCEDURE — 1157F PR ADVANCE CARE PLAN OR EQUIV PRESENT IN MEDICAL RECORD: ICD-10-PCS | Mod: CPTII,S$GLB,, | Performed by: OTOLARYNGOLOGY

## 2021-11-10 PROCEDURE — 3288F PR FALLS RISK ASSESSMENT DOCUMENTED: ICD-10-PCS | Mod: CPTII,S$GLB,, | Performed by: OTOLARYNGOLOGY

## 2021-11-10 PROCEDURE — 1101F PR PT FALLS ASSESS DOC 0-1 FALLS W/OUT INJ PAST YR: ICD-10-PCS | Mod: CPTII,S$GLB,, | Performed by: OTOLARYNGOLOGY

## 2021-11-10 PROCEDURE — 1126F AMNT PAIN NOTED NONE PRSNT: CPT | Mod: CPTII,S$GLB,, | Performed by: OTOLARYNGOLOGY

## 2021-11-10 PROCEDURE — 1159F MED LIST DOCD IN RCRD: CPT | Mod: CPTII,S$GLB,, | Performed by: OTOLARYNGOLOGY

## 2021-11-10 PROCEDURE — 4010F PR ACE/ARB THEARPY RXD/TAKEN: ICD-10-PCS | Mod: CPTII,S$GLB,, | Performed by: OTOLARYNGOLOGY

## 2021-11-10 PROCEDURE — 3288F FALL RISK ASSESSMENT DOCD: CPT | Mod: CPTII,S$GLB,, | Performed by: OTOLARYNGOLOGY

## 2021-11-10 PROCEDURE — 3079F DIAST BP 80-89 MM HG: CPT | Mod: CPTII,S$GLB,, | Performed by: OTOLARYNGOLOGY

## 2021-11-10 PROCEDURE — 1160F RVW MEDS BY RX/DR IN RCRD: CPT | Mod: CPTII,S$GLB,, | Performed by: OTOLARYNGOLOGY

## 2021-11-10 PROCEDURE — 3075F SYST BP GE 130 - 139MM HG: CPT | Mod: CPTII,S$GLB,, | Performed by: OTOLARYNGOLOGY

## 2021-11-10 PROCEDURE — 1159F PR MEDICATION LIST DOCUMENTED IN MEDICAL RECORD: ICD-10-PCS | Mod: CPTII,S$GLB,, | Performed by: OTOLARYNGOLOGY

## 2021-11-10 PROCEDURE — 1126F PR PAIN SEVERITY QUANTIFIED, NO PAIN PRESENT: ICD-10-PCS | Mod: CPTII,S$GLB,, | Performed by: OTOLARYNGOLOGY

## 2021-11-10 PROCEDURE — 3075F PR MOST RECENT SYSTOLIC BLOOD PRESS GE 130-139MM HG: ICD-10-PCS | Mod: CPTII,S$GLB,, | Performed by: OTOLARYNGOLOGY

## 2021-11-10 PROCEDURE — 3079F PR MOST RECENT DIASTOLIC BLOOD PRESSURE 80-89 MM HG: ICD-10-PCS | Mod: CPTII,S$GLB,, | Performed by: OTOLARYNGOLOGY

## 2021-11-10 PROCEDURE — 1101F PT FALLS ASSESS-DOCD LE1/YR: CPT | Mod: CPTII,S$GLB,, | Performed by: OTOLARYNGOLOGY

## 2021-11-10 PROCEDURE — 1160F PR REVIEW ALL MEDS BY PRESCRIBER/CLIN PHARMACIST DOCUMENTED: ICD-10-PCS | Mod: CPTII,S$GLB,, | Performed by: OTOLARYNGOLOGY

## 2021-11-10 PROCEDURE — 99204 OFFICE O/P NEW MOD 45 MIN: CPT | Mod: S$GLB,,, | Performed by: OTOLARYNGOLOGY

## 2021-11-10 PROCEDURE — 4010F ACE/ARB THERAPY RXD/TAKEN: CPT | Mod: CPTII,S$GLB,, | Performed by: OTOLARYNGOLOGY

## 2021-11-10 PROCEDURE — 99204 PR OFFICE/OUTPT VISIT, NEW, LEVL IV, 45-59 MIN: ICD-10-PCS | Mod: S$GLB,,, | Performed by: OTOLARYNGOLOGY

## 2021-11-10 PROCEDURE — 99999 PR PBB SHADOW E&M-EST. PATIENT-LVL III: CPT | Mod: PBBFAC,,, | Performed by: OTOLARYNGOLOGY

## 2021-11-10 PROCEDURE — 1157F ADVNC CARE PLAN IN RCRD: CPT | Mod: CPTII,S$GLB,, | Performed by: OTOLARYNGOLOGY

## 2021-11-10 RX ORDER — FLUTICASONE PROPIONATE 50 MCG
2 SPRAY, SUSPENSION (ML) NASAL DAILY
Qty: 16 G | Refills: 0 | Status: SHIPPED | OUTPATIENT
Start: 2021-11-10 | End: 2021-11-10

## 2021-11-11 ENCOUNTER — PATIENT OUTREACH (OUTPATIENT)
Dept: ADMINISTRATIVE | Facility: OTHER | Age: 71
End: 2021-11-11
Payer: MEDICARE

## 2021-11-12 ENCOUNTER — OFFICE VISIT (OUTPATIENT)
Dept: CARDIOLOGY | Facility: CLINIC | Age: 71
End: 2021-11-12
Payer: MEDICARE

## 2021-11-12 VITALS
SYSTOLIC BLOOD PRESSURE: 145 MMHG | HEART RATE: 71 BPM | WEIGHT: 129.63 LBS | HEIGHT: 67 IN | BODY MASS INDEX: 20.35 KG/M2 | DIASTOLIC BLOOD PRESSURE: 78 MMHG

## 2021-11-12 DIAGNOSIS — I44.30 AV BLOCK: ICD-10-CM

## 2021-11-12 DIAGNOSIS — Z95.0 CARDIAC PACEMAKER IN SITU: ICD-10-CM

## 2021-11-12 DIAGNOSIS — I42.8 CARDIOMYOPATHY, NONISCHEMIC: Primary | ICD-10-CM

## 2021-11-12 PROCEDURE — 1157F ADVNC CARE PLAN IN RCRD: CPT | Mod: CPTII,S$GLB,, | Performed by: INTERNAL MEDICINE

## 2021-11-12 PROCEDURE — 99999 PR PBB SHADOW E&M-EST. PATIENT-LVL IV: ICD-10-PCS | Mod: PBBFAC,,, | Performed by: INTERNAL MEDICINE

## 2021-11-12 PROCEDURE — 1126F AMNT PAIN NOTED NONE PRSNT: CPT | Mod: CPTII,S$GLB,, | Performed by: INTERNAL MEDICINE

## 2021-11-12 PROCEDURE — 4010F PR ACE/ARB THEARPY RXD/TAKEN: ICD-10-PCS | Mod: CPTII,S$GLB,, | Performed by: INTERNAL MEDICINE

## 2021-11-12 PROCEDURE — 1159F PR MEDICATION LIST DOCUMENTED IN MEDICAL RECORD: ICD-10-PCS | Mod: CPTII,S$GLB,, | Performed by: INTERNAL MEDICINE

## 2021-11-12 PROCEDURE — 3078F DIAST BP <80 MM HG: CPT | Mod: CPTII,S$GLB,, | Performed by: INTERNAL MEDICINE

## 2021-11-12 PROCEDURE — 1157F PR ADVANCE CARE PLAN OR EQUIV PRESENT IN MEDICAL RECORD: ICD-10-PCS | Mod: CPTII,S$GLB,, | Performed by: INTERNAL MEDICINE

## 2021-11-12 PROCEDURE — 99214 OFFICE O/P EST MOD 30 MIN: CPT | Mod: S$GLB,,, | Performed by: INTERNAL MEDICINE

## 2021-11-12 PROCEDURE — 3008F PR BODY MASS INDEX (BMI) DOCUMENTED: ICD-10-PCS | Mod: CPTII,S$GLB,, | Performed by: INTERNAL MEDICINE

## 2021-11-12 PROCEDURE — 3078F PR MOST RECENT DIASTOLIC BLOOD PRESSURE < 80 MM HG: ICD-10-PCS | Mod: CPTII,S$GLB,, | Performed by: INTERNAL MEDICINE

## 2021-11-12 PROCEDURE — 4010F ACE/ARB THERAPY RXD/TAKEN: CPT | Mod: CPTII,S$GLB,, | Performed by: INTERNAL MEDICINE

## 2021-11-12 PROCEDURE — 3008F BODY MASS INDEX DOCD: CPT | Mod: CPTII,S$GLB,, | Performed by: INTERNAL MEDICINE

## 2021-11-12 PROCEDURE — 3077F SYST BP >= 140 MM HG: CPT | Mod: CPTII,S$GLB,, | Performed by: INTERNAL MEDICINE

## 2021-11-12 PROCEDURE — 3077F PR MOST RECENT SYSTOLIC BLOOD PRESSURE >= 140 MM HG: ICD-10-PCS | Mod: CPTII,S$GLB,, | Performed by: INTERNAL MEDICINE

## 2021-11-12 PROCEDURE — 1126F PR PAIN SEVERITY QUANTIFIED, NO PAIN PRESENT: ICD-10-PCS | Mod: CPTII,S$GLB,, | Performed by: INTERNAL MEDICINE

## 2021-11-12 PROCEDURE — 99214 PR OFFICE/OUTPT VISIT, EST, LEVL IV, 30-39 MIN: ICD-10-PCS | Mod: S$GLB,,, | Performed by: INTERNAL MEDICINE

## 2021-11-12 PROCEDURE — 99999 PR PBB SHADOW E&M-EST. PATIENT-LVL IV: CPT | Mod: PBBFAC,,, | Performed by: INTERNAL MEDICINE

## 2021-11-12 PROCEDURE — 1159F MED LIST DOCD IN RCRD: CPT | Mod: CPTII,S$GLB,, | Performed by: INTERNAL MEDICINE

## 2021-11-28 ENCOUNTER — PATIENT MESSAGE (OUTPATIENT)
Dept: GASTROENTEROLOGY | Facility: CLINIC | Age: 71
End: 2021-11-28
Payer: MEDICARE

## 2021-11-28 DIAGNOSIS — K59.04 CHRONIC IDIOPATHIC CONSTIPATION: Primary | ICD-10-CM

## 2021-11-29 ENCOUNTER — PATIENT MESSAGE (OUTPATIENT)
Dept: GASTROENTEROLOGY | Facility: CLINIC | Age: 71
End: 2021-11-29
Payer: MEDICARE

## 2021-12-09 ENCOUNTER — TELEPHONE (OUTPATIENT)
Dept: ELECTROPHYSIOLOGY | Facility: CLINIC | Age: 71
End: 2021-12-09
Payer: MEDICARE

## 2021-12-09 ENCOUNTER — TELEPHONE (OUTPATIENT)
Dept: CARDIOLOGY | Facility: HOSPITAL | Age: 71
End: 2021-12-09
Payer: MEDICARE

## 2021-12-10 ENCOUNTER — OFFICE VISIT (OUTPATIENT)
Dept: NEUROLOGY | Facility: CLINIC | Age: 71
End: 2021-12-10
Payer: MEDICARE

## 2021-12-10 VITALS
SYSTOLIC BLOOD PRESSURE: 103 MMHG | HEART RATE: 78 BPM | WEIGHT: 122.38 LBS | BODY MASS INDEX: 19.21 KG/M2 | HEIGHT: 67 IN | DIASTOLIC BLOOD PRESSURE: 68 MMHG

## 2021-12-10 DIAGNOSIS — M48.061 SPINAL STENOSIS OF LUMBAR REGION WITHOUT NEUROGENIC CLAUDICATION: Primary | ICD-10-CM

## 2021-12-10 PROCEDURE — 99999 PR PBB SHADOW E&M-EST. PATIENT-LVL III: ICD-10-PCS | Mod: PBBFAC,GC,, | Performed by: STUDENT IN AN ORGANIZED HEALTH CARE EDUCATION/TRAINING PROGRAM

## 2021-12-10 PROCEDURE — 99213 OFFICE O/P EST LOW 20 MIN: CPT | Mod: GC,S$GLB,, | Performed by: STUDENT IN AN ORGANIZED HEALTH CARE EDUCATION/TRAINING PROGRAM

## 2021-12-10 PROCEDURE — 99213 PR OFFICE/OUTPT VISIT, EST, LEVL III, 20-29 MIN: ICD-10-PCS | Mod: GC,S$GLB,, | Performed by: STUDENT IN AN ORGANIZED HEALTH CARE EDUCATION/TRAINING PROGRAM

## 2021-12-10 PROCEDURE — 99999 PR PBB SHADOW E&M-EST. PATIENT-LVL III: CPT | Mod: PBBFAC,GC,, | Performed by: STUDENT IN AN ORGANIZED HEALTH CARE EDUCATION/TRAINING PROGRAM

## 2021-12-10 RX ORDER — GABAPENTIN 600 MG/1
600 TABLET ORAL 3 TIMES DAILY
Qty: 90 TABLET | Refills: 1 | Status: SHIPPED | OUTPATIENT
Start: 2021-12-10 | End: 2022-02-21 | Stop reason: SDUPTHER

## 2021-12-11 ENCOUNTER — CLINICAL SUPPORT (OUTPATIENT)
Dept: CARDIOLOGY | Facility: HOSPITAL | Age: 71
End: 2021-12-11
Payer: MEDICARE

## 2021-12-11 DIAGNOSIS — Z95.0 PRESENCE OF CARDIAC PACEMAKER: ICD-10-CM

## 2021-12-11 DIAGNOSIS — I44.2 ATRIOVENTRICULAR BLOCK, COMPLETE: ICD-10-CM

## 2021-12-11 PROCEDURE — 93296 REM INTERROG EVL PM/IDS: CPT | Performed by: INTERNAL MEDICINE

## 2021-12-11 PROCEDURE — 93294 CARDIAC DEVICE CHECK - REMOTE: ICD-10-PCS | Mod: ,,, | Performed by: INTERNAL MEDICINE

## 2021-12-11 PROCEDURE — 93294 REM INTERROG EVL PM/LDLS PM: CPT | Mod: ,,, | Performed by: INTERNAL MEDICINE

## 2021-12-23 ENCOUNTER — LAB VISIT (OUTPATIENT)
Dept: LAB | Facility: HOSPITAL | Age: 71
End: 2021-12-23
Attending: INTERNAL MEDICINE
Payer: MEDICARE

## 2021-12-23 DIAGNOSIS — R94.6 ABNORMAL RESULTS OF THYROID FUNCTION STUDIES: ICD-10-CM

## 2021-12-23 DIAGNOSIS — E78.5 HYPERLIPIDEMIA, UNSPECIFIED HYPERLIPIDEMIA TYPE: ICD-10-CM

## 2021-12-23 DIAGNOSIS — Z11.59 ENCOUNTER FOR HEPATITIS C SCREENING TEST FOR LOW RISK PATIENT: ICD-10-CM

## 2021-12-23 DIAGNOSIS — E55.9 MILD VITAMIN D DEFICIENCY: ICD-10-CM

## 2021-12-23 DIAGNOSIS — R73.9 HYPERGLYCEMIA: ICD-10-CM

## 2021-12-23 LAB
25(OH)D3+25(OH)D2 SERPL-MCNC: 59 NG/ML (ref 30–96)
ALBUMIN SERPL BCP-MCNC: 4 G/DL (ref 3.5–5.2)
ALP SERPL-CCNC: 67 U/L (ref 55–135)
ALT SERPL W/O P-5'-P-CCNC: 23 U/L (ref 10–44)
ANION GAP SERPL CALC-SCNC: 6 MMOL/L (ref 8–16)
AST SERPL-CCNC: 22 U/L (ref 10–40)
BASOPHILS # BLD AUTO: 0.07 K/UL (ref 0–0.2)
BASOPHILS NFR BLD: 1.8 % (ref 0–1.9)
BILIRUB SERPL-MCNC: 0.9 MG/DL (ref 0.1–1)
BUN SERPL-MCNC: 21 MG/DL (ref 8–23)
CALCIUM SERPL-MCNC: 10.1 MG/DL (ref 8.7–10.5)
CHLORIDE SERPL-SCNC: 104 MMOL/L (ref 95–110)
CHOLEST SERPL-MCNC: 222 MG/DL (ref 120–199)
CHOLEST/HDLC SERPL: 2.8 {RATIO} (ref 2–5)
CO2 SERPL-SCNC: 31 MMOL/L (ref 23–29)
CREAT SERPL-MCNC: 0.7 MG/DL (ref 0.5–1.4)
DIFFERENTIAL METHOD: ABNORMAL
EOSINOPHIL # BLD AUTO: 0.2 K/UL (ref 0–0.5)
EOSINOPHIL NFR BLD: 4.8 % (ref 0–8)
ERYTHROCYTE [DISTWIDTH] IN BLOOD BY AUTOMATED COUNT: 12.4 % (ref 11.5–14.5)
EST. GFR  (AFRICAN AMERICAN): >60 ML/MIN/1.73 M^2
EST. GFR  (NON AFRICAN AMERICAN): >60 ML/MIN/1.73 M^2
ESTIMATED AVG GLUCOSE: 237 MG/DL (ref 68–131)
GLUCOSE SERPL-MCNC: 90 MG/DL (ref 70–110)
HBA1C MFR BLD: 9.9 % (ref 4–5.6)
HCT VFR BLD AUTO: 39.5 % (ref 37–48.5)
HDLC SERPL-MCNC: 80 MG/DL (ref 40–75)
HDLC SERPL: 36 % (ref 20–50)
HGB BLD-MCNC: 12.6 G/DL (ref 12–16)
IMM GRANULOCYTES # BLD AUTO: 0.01 K/UL (ref 0–0.04)
IMM GRANULOCYTES NFR BLD AUTO: 0.3 % (ref 0–0.5)
LDLC SERPL CALC-MCNC: 126.2 MG/DL (ref 63–159)
LYMPHOCYTES # BLD AUTO: 1.9 K/UL (ref 1–4.8)
LYMPHOCYTES NFR BLD: 47 % (ref 18–48)
MCH RBC QN AUTO: 31.4 PG (ref 27–31)
MCHC RBC AUTO-ENTMCNC: 31.9 G/DL (ref 32–36)
MCV RBC AUTO: 99 FL (ref 82–98)
MONOCYTES # BLD AUTO: 0.3 K/UL (ref 0.3–1)
MONOCYTES NFR BLD: 8.5 % (ref 4–15)
NEUTROPHILS # BLD AUTO: 1.5 K/UL (ref 1.8–7.7)
NEUTROPHILS NFR BLD: 37.6 % (ref 38–73)
NONHDLC SERPL-MCNC: 142 MG/DL
NRBC BLD-RTO: 0 /100 WBC
PLATELET # BLD AUTO: 195 K/UL (ref 150–450)
PMV BLD AUTO: 11.3 FL (ref 9.2–12.9)
POTASSIUM SERPL-SCNC: 4.2 MMOL/L (ref 3.5–5.1)
PROT SERPL-MCNC: 6.9 G/DL (ref 6–8.4)
RBC # BLD AUTO: 4.01 M/UL (ref 4–5.4)
SODIUM SERPL-SCNC: 141 MMOL/L (ref 136–145)
TRIGL SERPL-MCNC: 79 MG/DL (ref 30–150)
TSH SERPL DL<=0.005 MIU/L-ACNC: 1.24 UIU/ML (ref 0.4–4)
WBC # BLD AUTO: 3.98 K/UL (ref 3.9–12.7)

## 2021-12-23 PROCEDURE — 80061 LIPID PANEL: CPT | Performed by: INTERNAL MEDICINE

## 2021-12-23 PROCEDURE — 36415 COLL VENOUS BLD VENIPUNCTURE: CPT | Performed by: INTERNAL MEDICINE

## 2021-12-23 PROCEDURE — 85025 COMPLETE CBC W/AUTO DIFF WBC: CPT | Performed by: INTERNAL MEDICINE

## 2021-12-23 PROCEDURE — 80053 COMPREHEN METABOLIC PANEL: CPT | Performed by: INTERNAL MEDICINE

## 2021-12-23 PROCEDURE — 82306 VITAMIN D 25 HYDROXY: CPT | Performed by: INTERNAL MEDICINE

## 2021-12-23 PROCEDURE — 83036 HEMOGLOBIN GLYCOSYLATED A1C: CPT | Performed by: INTERNAL MEDICINE

## 2021-12-23 PROCEDURE — 86803 HEPATITIS C AB TEST: CPT | Performed by: INTERNAL MEDICINE

## 2021-12-23 PROCEDURE — 84443 ASSAY THYROID STIM HORMONE: CPT | Performed by: INTERNAL MEDICINE

## 2021-12-24 LAB — HCV AB SERPL QL IA: NEGATIVE

## 2021-12-27 ENCOUNTER — OFFICE VISIT (OUTPATIENT)
Dept: INTERNAL MEDICINE | Facility: CLINIC | Age: 71
End: 2021-12-27
Payer: MEDICARE

## 2021-12-27 ENCOUNTER — LAB VISIT (OUTPATIENT)
Dept: LAB | Facility: HOSPITAL | Age: 71
End: 2021-12-27
Attending: INTERNAL MEDICINE
Payer: MEDICARE

## 2021-12-27 VITALS
DIASTOLIC BLOOD PRESSURE: 68 MMHG | WEIGHT: 130.94 LBS | OXYGEN SATURATION: 98 % | BODY MASS INDEX: 20.55 KG/M2 | HEART RATE: 75 BPM | HEIGHT: 67 IN | SYSTOLIC BLOOD PRESSURE: 112 MMHG

## 2021-12-27 DIAGNOSIS — R21 RASH: ICD-10-CM

## 2021-12-27 DIAGNOSIS — R73.9 HYPERGLYCEMIA: ICD-10-CM

## 2021-12-27 DIAGNOSIS — R73.9 HYPERGLYCEMIA: Primary | ICD-10-CM

## 2021-12-27 LAB
BILIRUB UR QL STRIP: NEGATIVE
CLARITY UR REFRACT.AUTO: CLEAR
COLOR UR AUTO: YELLOW
ESTIMATED AVG GLUCOSE: 105 MG/DL (ref 68–131)
GLUCOSE UR QL STRIP: NEGATIVE
HBA1C MFR BLD: 5.3 % (ref 4–5.6)
HGB UR QL STRIP: NEGATIVE
KETONES UR QL STRIP: NEGATIVE
LEUKOCYTE ESTERASE UR QL STRIP: NEGATIVE
MICROSCOPIC COMMENT: NORMAL
NITRITE UR QL STRIP: NEGATIVE
PH UR STRIP: 8 [PH] (ref 5–8)
PROT UR QL STRIP: NEGATIVE
RBC #/AREA URNS AUTO: 1 /HPF (ref 0–4)
SP GR UR STRIP: 1.01 (ref 1–1.03)
URN SPEC COLLECT METH UR: NORMAL
WBC #/AREA URNS AUTO: 0 /HPF (ref 0–5)

## 2021-12-27 PROCEDURE — 3074F PR MOST RECENT SYSTOLIC BLOOD PRESSURE < 130 MM HG: ICD-10-PCS | Mod: CPTII,S$GLB,, | Performed by: INTERNAL MEDICINE

## 2021-12-27 PROCEDURE — 1157F ADVNC CARE PLAN IN RCRD: CPT | Mod: CPTII,S$GLB,, | Performed by: INTERNAL MEDICINE

## 2021-12-27 PROCEDURE — 1101F PT FALLS ASSESS-DOCD LE1/YR: CPT | Mod: CPTII,S$GLB,, | Performed by: INTERNAL MEDICINE

## 2021-12-27 PROCEDURE — 3008F PR BODY MASS INDEX (BMI) DOCUMENTED: ICD-10-PCS | Mod: CPTII,S$GLB,, | Performed by: INTERNAL MEDICINE

## 2021-12-27 PROCEDURE — 36415 COLL VENOUS BLD VENIPUNCTURE: CPT | Performed by: INTERNAL MEDICINE

## 2021-12-27 PROCEDURE — 1160F PR REVIEW ALL MEDS BY PRESCRIBER/CLIN PHARMACIST DOCUMENTED: ICD-10-PCS | Mod: CPTII,S$GLB,, | Performed by: INTERNAL MEDICINE

## 2021-12-27 PROCEDURE — 83036 HEMOGLOBIN GLYCOSYLATED A1C: CPT | Performed by: INTERNAL MEDICINE

## 2021-12-27 PROCEDURE — 1101F PR PT FALLS ASSESS DOC 0-1 FALLS W/OUT INJ PAST YR: ICD-10-PCS | Mod: CPTII,S$GLB,, | Performed by: INTERNAL MEDICINE

## 2021-12-27 PROCEDURE — 3078F DIAST BP <80 MM HG: CPT | Mod: CPTII,S$GLB,, | Performed by: INTERNAL MEDICINE

## 2021-12-27 PROCEDURE — 99999 PR PBB SHADOW E&M-EST. PATIENT-LVL IV: CPT | Mod: PBBFAC,,, | Performed by: INTERNAL MEDICINE

## 2021-12-27 PROCEDURE — 1159F PR MEDICATION LIST DOCUMENTED IN MEDICAL RECORD: ICD-10-PCS | Mod: CPTII,S$GLB,, | Performed by: INTERNAL MEDICINE

## 2021-12-27 PROCEDURE — 1157F PR ADVANCE CARE PLAN OR EQUIV PRESENT IN MEDICAL RECORD: ICD-10-PCS | Mod: CPTII,S$GLB,, | Performed by: INTERNAL MEDICINE

## 2021-12-27 PROCEDURE — 3046F PR MOST RECENT HEMOGLOBIN A1C LEVEL > 9.0%: ICD-10-PCS | Mod: CPTII,S$GLB,, | Performed by: INTERNAL MEDICINE

## 2021-12-27 PROCEDURE — 1126F PR PAIN SEVERITY QUANTIFIED, NO PAIN PRESENT: ICD-10-PCS | Mod: CPTII,S$GLB,, | Performed by: INTERNAL MEDICINE

## 2021-12-27 PROCEDURE — 99214 OFFICE O/P EST MOD 30 MIN: CPT | Mod: S$GLB,,, | Performed by: INTERNAL MEDICINE

## 2021-12-27 PROCEDURE — 99999 PR PBB SHADOW E&M-EST. PATIENT-LVL IV: ICD-10-PCS | Mod: PBBFAC,,, | Performed by: INTERNAL MEDICINE

## 2021-12-27 PROCEDURE — 81001 URINALYSIS AUTO W/SCOPE: CPT | Performed by: INTERNAL MEDICINE

## 2021-12-27 PROCEDURE — 1160F RVW MEDS BY RX/DR IN RCRD: CPT | Mod: CPTII,S$GLB,, | Performed by: INTERNAL MEDICINE

## 2021-12-27 PROCEDURE — 4010F PR ACE/ARB THEARPY RXD/TAKEN: ICD-10-PCS | Mod: CPTII,S$GLB,, | Performed by: INTERNAL MEDICINE

## 2021-12-27 PROCEDURE — 3008F BODY MASS INDEX DOCD: CPT | Mod: CPTII,S$GLB,, | Performed by: INTERNAL MEDICINE

## 2021-12-27 PROCEDURE — 3046F HEMOGLOBIN A1C LEVEL >9.0%: CPT | Mod: CPTII,S$GLB,, | Performed by: INTERNAL MEDICINE

## 2021-12-27 PROCEDURE — 3074F SYST BP LT 130 MM HG: CPT | Mod: CPTII,S$GLB,, | Performed by: INTERNAL MEDICINE

## 2021-12-27 PROCEDURE — 3288F PR FALLS RISK ASSESSMENT DOCUMENTED: ICD-10-PCS | Mod: CPTII,S$GLB,, | Performed by: INTERNAL MEDICINE

## 2021-12-27 PROCEDURE — 3288F FALL RISK ASSESSMENT DOCD: CPT | Mod: CPTII,S$GLB,, | Performed by: INTERNAL MEDICINE

## 2021-12-27 PROCEDURE — 4010F ACE/ARB THERAPY RXD/TAKEN: CPT | Mod: CPTII,S$GLB,, | Performed by: INTERNAL MEDICINE

## 2021-12-27 PROCEDURE — 3078F PR MOST RECENT DIASTOLIC BLOOD PRESSURE < 80 MM HG: ICD-10-PCS | Mod: CPTII,S$GLB,, | Performed by: INTERNAL MEDICINE

## 2021-12-27 PROCEDURE — 1159F MED LIST DOCD IN RCRD: CPT | Mod: CPTII,S$GLB,, | Performed by: INTERNAL MEDICINE

## 2021-12-27 PROCEDURE — 1126F AMNT PAIN NOTED NONE PRSNT: CPT | Mod: CPTII,S$GLB,, | Performed by: INTERNAL MEDICINE

## 2021-12-27 PROCEDURE — 99214 PR OFFICE/OUTPT VISIT, EST, LEVL IV, 30-39 MIN: ICD-10-PCS | Mod: S$GLB,,, | Performed by: INTERNAL MEDICINE

## 2021-12-27 RX ORDER — NAPROXEN 500 MG/1
TABLET ORAL
Qty: 90 TABLET | Refills: 1 | Status: SHIPPED | OUTPATIENT
Start: 2021-12-27 | End: 2023-01-03

## 2022-01-21 ENCOUNTER — CLINICAL SUPPORT (OUTPATIENT)
Dept: REHABILITATION | Facility: OTHER | Age: 72
End: 2022-01-21
Attending: INTERNAL MEDICINE
Payer: MEDICARE

## 2022-01-21 DIAGNOSIS — R29.898 DECREASED STRENGTH OF TRUNK AND BACK: ICD-10-CM

## 2022-01-21 DIAGNOSIS — R29.898 WEAKNESS OF LEFT LOWER EXTREMITY: ICD-10-CM

## 2022-01-21 DIAGNOSIS — M48.061 SPINAL STENOSIS OF LUMBAR REGION WITHOUT NEUROGENIC CLAUDICATION: ICD-10-CM

## 2022-01-21 DIAGNOSIS — M25.69 DECREASED RANGE OF MOTION OF TRUNK AND BACK: ICD-10-CM

## 2022-01-21 PROCEDURE — 97110 THERAPEUTIC EXERCISES: CPT

## 2022-01-21 PROCEDURE — 97161 PT EVAL LOW COMPLEX 20 MIN: CPT

## 2022-01-24 ENCOUNTER — TELEPHONE (OUTPATIENT)
Dept: REHABILITATION | Facility: OTHER | Age: 72
End: 2022-01-24
Payer: MEDICARE

## 2022-01-24 NOTE — TELEPHONE ENCOUNTER
HC called Pt to see how her experience was during the evaluation last week and to talk to her about our health coaching services.  I left a message.

## 2022-01-25 PROBLEM — R29.898 DECREASED STRENGTH OF TRUNK AND BACK: Status: ACTIVE | Noted: 2022-01-25

## 2022-01-25 PROBLEM — R29.898 WEAKNESS OF LEFT LOWER EXTREMITY: Status: ACTIVE | Noted: 2022-01-25

## 2022-01-25 PROBLEM — M25.69 DECREASED RANGE OF MOTION OF TRUNK AND BACK: Status: ACTIVE | Noted: 2022-01-25

## 2022-01-26 NOTE — PLAN OF CARE
[facility]     Name: Jalyn Aaron  Clinic Number: [id number]    Therapy Diagnosis:   Encounter Diagnoses   Name Primary?    Spinal stenosis of lumbar region without neurogenic claudication     Decreased range of motion of trunk and back     Decreased strength of trunk and back     Weakness of left lower extremity      Physician: Bere Myers MD    Physician Orders: PT Eval and Treat   Medical Diagnosis from Referral: M48.061 (ICD-10-CM) - Spinal stenosis of lumbar region without neurogenic claudication  Evaluation Date: 1/21/2022  Authorization Period Expiration: 12/10/2022  Plan of Care Expiration: 4/21/2022  Reassessment Due: 2/21/2022  Visit # / Visits authorized: 1/ 1 (pending)    Time In: 8:00 am  Time Out: 9:30 am  Total Billable Time: 90 minutes    Precautions: Standard and pacemaker     Pattern of pain determined: 2      Subjective   Date of onset: 8 months ago  History of current condition - Jalyn reports: a couple montha ago she started to have constant bilateral buttock pain and chronic constipation. She also reports that her L leg has started to get weak again. She previously completed aquatic PT and traditional ortho PT about 8 months ago that helped with the leg weakness. She had some lasting relief. She reports she has a grade 2 anterolisthesis of L4 on L5 with moderate canal stenosis. Her Dr advised her to complete pilates, but she thinks this made it worse. She wakes up every morning in a lot of pain, and it takes a long time for it to get better. The pain is in the bilateral buttocks which started about 2 months ago and in the L calf pain for over a year. The pain in the buttock has started to migrate a little lower to the posterior thigh in the last week. Aggravating factors include lifting, sitting, bending forward, standing, cooking, lifting overhead, and bending backwards. Alleviated factors include heating pad, ice pack, distractions.       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    Medications:   Jalyn Solares has a current medication list which includes the following prescription(s): carvedilol, gabapentin, linaclotide, losartan, multivitamin, naproxen, senna, and vitamin d.    Allergies:   Review of patient's allergies indicates:   Allergen Reactions    Bactrim [sulfamethoxazole-trimethoprim] Nausea Only        Imaging:   CT LUMBAR SPINE WITHOUT CONTRAST     CLINICAL HISTORY:  Back pain or radiculopathy, > 6 wks;Dorsalgia, unspecified     TECHNIQUE:  Low dose axial images, sagittal and coronal reformations were performed though the lumbar spine.  Contrast was not administered.  Motion artifact degrades study quality.     COMPARISON:  None     FINDINGS:  Motion artifact limits evaluation.     There is a grade 2 anterolisthesis of L4 on L5. The vertebral body heights appear well-maintained.  There is no evidence of fracture or osseous lytic or blastic process.  There are multilevel degenerative changes specifically of intervertebral disc height loss and endplate changes L4-5 and L5-S1.  Focal degenerative changes are described below.     T12-L1: There is no posterior disc bulge.  There is no neural foraminal narrowing. There is no central canal narrowing     L1-L2: There is no posterior disc bulge.  There is no neural foraminal narrowing. There is no  central canal narrowing     L2-L3: There is no posterior disc bulge.  There is no neural foraminal narrowing. There is no central canal narrowing     L3-L4: There is no posterior disc bulge.  There is no neural foraminal narrowing. There is no central canal narrowing     L4-L5: There is no posterior disc bulge.  There is no neural foraminal narrowing.  There is grade 2 anterolisthesis of L4 on L5 with resultant moderate to severe central canal narrowing.     L5-S1: There is no posterior disc bulge.  There is no neural foraminal narrowing. There is no central canal narrowing     The spinal canal otherwise appears unremarkable.  No intradural abnormalities are identified.  Evaluation of the surrounding soft tissues reveals a 1.5 cm left-sided radiopaque dense focus likely representing ingested stomach contents or calcification.  There are pacemaker leads identified.  Mild calcific atherosclerosis of the abdominal aorta.     Impression:     Motion artifact limits evaluation.     Multilevel degenerative changes predominately at L4-S1.  Grade 2 anterolisthesis of L4 on L5 with moderate to severe central canal narrowing.    Prior Therapy: Ortho and aquatic PT 8 months ago  Prior Treatment: NO  Social History: single story home 4 [address] lives with their spouse  Occupation: Retired   Leisure: walking my dog, cooking, go to yoga, reading, [employer]/bible study      Prior Level of Function: independent  Current Level of Function: increased pain and decreased tolerance to lifting, sitting, bending forward, standing, cooking, lifting overhead, and bending backwards  DME owned/used: none      Pain:  Current 3/10, worst 7/10, best 0/10   Location: bilateral back  and buttocks , L calf  Description: buttocks- Burning and Sharp calf - dull/achy  Aggravating Factors: lifting, sitting, bending forward, standing, cooking, lifting overhead, and bending backwards  Easing Factors: heating pad, ice pack, distractions  Disturbed  "Sleep: problems getting to sleep, and sometimes it wakes me up         Pattern of pain questions:  1.  Where is your pain the worst? Bilateral buttocks   2.  Is your pain constant or intermittent? intermittent  3.  Does bending forward make your typical pain worse? no  4.  Since the start of your back pain, has there been a change in your bowel or bladder? Yes chronic constipation  5.  What can't you do now that you use to be able to do? I don't have the energy that I used to       Pts goals: "Increased L leg strength, prevent my pain from getting worse and hopefully decrease my pain"      Red Flag Screening:   Cough  Sneeze  Strain: (+)  Bladder/ bowel: (+)  Falls: (--)  Night pain: (--)  Unexplained weight loss: (--)  General health: "Been great until this happened"    OBJECTIVE     Postural examination/scapula alignment: Rounded shoulder, Head forward, Slouched posture, Increased kyphosis and Decreased lordosis  Joint integrity: Hard end feel  Skin integrity: intact  Edema: none  Sitting: poor  Standing: poor  Correction of posture: better with lumbar roll    MOVEMENT LOSS    ROM Loss   Flexion minimal loss   Extension moderate loss   Side glide Right moderate loss   Side glide Left moderate loss   Rotation Right moderate loss   Rotation Left moderate loss     Lower Extremity Strength  Right LE  Left LE    Hip flexion: 4+/5 Hip flexion: 4/5   Hip extension:  4-/5 Hip extension: 3+/5   Hip abduction: 4-/5 Hip abduction: 3+/5   Hip adduction:  3+/5 Hip adduction:  3+/5   Hip Internal rotation   4/5 Hip Internal rotation 4/5   Knee Flexion 4+/5 Knee Flexion 4+/5   Knee Extension 5/5 Knee Extension 4+/5   Ankle dorsiflexion: 5/5 Ankle dorsiflexion: 4/5   Ankle plantarflexion: 5/5 Ankle plantarflexion: 4/5       GAIT:  Assistive Device used: none  Level of Assistance: independent  Patient displays the following gait deviations:  no gait deviations observed.     Special Tests:   Test Name  Test Result   Prone " "Instability Test (+)   SI Joint Provocation Test (+)   Straight Leg Raise (--)   Neural Tension Test (--)   Crossed Straight Leg Raise (--)   Walking on toes (--)   Walking on heels  (--)       NEUROLOGICAL SCREENING     Sensory deficit: impaired light touch sensation in L lateral knee and calf    Reflexes:    Left Right   Patella Tendon 1+ 1+   Achilles Tendon 1+ 1+   Babinski  (--) (--)   Clonus (--) (--)     REPEATED TEST MOVEMENTS:    Baseline symptoms:  Repeated Flexion in Standing no effect   Repeated Extension in Standing worse   Repeated Flexion in lying better   Repeated Extension in lying  worse       STATIC TESTS and other movements:   Baseline symptoms:  Prone lie worse   Prone lie on elbows worse   Sustained prone with  using mat worse   Sustained flexion better   Sitting slouched  worse   Sitting erect better   Standing slouched no effect   Standing erect  no effect   Lying prone in extension  worse   Long sitting   worse       Baseline Isometric Testing on Med X equipment: Testing administered by PT  Date of testin2022  ROM 3-48 deg   Max Peak Torque 100    Min Peak Torque 55    Flex/Ext Ratio 1.8:1   % below normative data 26         Limitation/Restriction for FOTO lumbar Survey    Therapist reviewed FOTO scores for Jalyn Aaron on 2022.   FOTO documents entered into [employer] - see Media section.     Limitation Score: 35%    Goal: 30%             Treatment   Treatment Time In: 9:00  Treatment Time Out: 9:30  Total Treatment time separate from Evaluation: 30 minutes      Jalyn Solares received therapeutic exercises to develop/improve posture, lumbar/cervical ROM, strength and muscular endurance for 30 minutes including the following exercises:     Med x dynamic exercise and baseline IM test    LTR's x10  PPT 15x5"  DKTC 10x5"  SKTC 10x5"  Seated trunk flexion 10x5"  Seated trunk flexion ball rollouts 10x5"  SOC 10x5"    [facility] - Short 2022   Visit Number 1 "   VAS Pain Rating 3   Lumbar Stretches - Slouch 10   Flexion in Lying 10   Flexion in Sitting 10   Lumbar Extension - Seat Pad 1   Femur Restraint 6   Top Dead Center 24   Counterweight 94   Lumbar Flexion 48   Lumbar Extension 3   Lumbar Peak Torque 100   Lumbar Weight 45   Repetitions 5         Written Home Exercises Provided: yes.  Exercises were reviewed and Jalyn was able to demonstrate them prior to the end of the session.  Jalyn demonstrated good  understanding of the education provided.     See EMR under Patient Instructions for exercises provided 1/21/2022.      Education provided:   - Patient received education regarding proper posture and body mechanics.  Patient was given top [facility] Visit 1 handouts which discuss what to expect in therapy, the purpose and opportunity for health coaching, the program,  wellness when discharged from therapy, back education and care specifically for posture seated, standing, lifting correctly, components of exercise, importance of nutrition and hydration, and importance of sleep.   Information on lumbar rolls provided.  - Fabiana roll tried, recommended, and purchase information was provided.    - Patient received a handout regarding anticipated muscular soreness following the isometric test and strategies for management were reviewed with patient including stretching, using ice and scheduled rest.   - Patient received education on the [facility] program, purpose of the isometric test, progression of back strengthening as well as wellness approach and systemic strengthening.  Details of the program were discussed.  Reviewed that patient should feel support/pressure from med ex restraints but no pain or discomfort and patient expressed understanding.    Jalyn received cold pack for 10 minutes to low back.    Assessment   Jalyn Solares is a 72 y.o. female referred to [facility] with a medical diagnosis of M48.061 (ICD-10-CM) - Spinal stenosis of  lumbar region without neurogenic claudication. Pt presents with signs and symptoms consistent with diagnosis including decreased range of motion of lumbar spine, weakness of trunk and back, decreased strength of L LE, poor posture, poor balance, hypomobility of lumbar spine, decreased soft tissue flexibility and mobility, and decreased functional mobility tolerance. These deficits are limiting patient in full participation in ADL's and leisure activities such as lifting, sitting, bending forward, standing, cooking, lifting overhead, and bending backwards. Pt is 26% below normative values with Medx lumbar isometric strength testing.    Pain Pattern: 2       Pt prognosis is Good.   Pt will benefit from skilled outpatient Physical Therapy to address the deficits stated above and in the chart below, provide pt/family education, and to maximize pt's level of independence. Based on the above history and physical examination an active physical therapy program is recommended.  Pt will continue to benefit from skilled outpatient physical therapy to address the deficits listed below in the chart, provide pt/family education and to maximize pt's level of independence in the home and community environment. .       Plan of care discussed with patient: Yes  Pt's spiritual, cultural and educational needs considered and patient is agreeable to the plan of care and goals as stated below:     Anticipated Barriers for therapy: none    PT Evaluation Completed? Yes    Medical necessity is demonstrated by the following problem list.    Pt presents with the following impairments:     History  Co-morbidities and personal factors that may impact the plan of care Co-morbidities:   advanced age, anxiety, difficulty sleeping and cardiomyopathy, AV block, pacemaker    Personal Factors:   no deficits     low   Examination  Body Structures and Functions, activity limitations and participation restrictions that may impact the plan of care Body  Regions:   back  lower extremities  trunk    Body Systems:    gross symmetry  ROM  strength  gross coordinated movement  balance  gait  transfers  transitions  motor control    Participation Restrictions:   See above    Activity limitations:   Learning and applying knowledge  no deficits    General Tasks and Commands  no deficits    Communication  no deficits    Mobility  lifting and carrying objects    Self care  no deficits    Domestic Life  cooking  doing house work (cleaning house, washing dishes, laundry)    Interactions/Relationships  no deficits    Life Areas  no deficits    Community and Social Life  no deficits         low   Clinical Presentation stable and uncomplicated low   Decision Making/ Complexity Score: low       GOALS: Pt is in agreement with the following goals.    Short term goals:  6 weeks or 10 visits   1.  Pt will demonstrate increased lumbar ROM by at least 6 degrees from the initial ROM value with improvements noted in functional ROM and ability to perform ADLs.  (approp and ongoing)  2.  Pt will demonstrate increased MedX average isometric strength value  by 15% from initial test resulting in improved ability to perform bending, lifting, and carrying activities safely, confidently.  (approp and ongoing)  3.  Patient report a reduction in worst pain score by 1-2 points for improved tolerance for sitting.  (approp and ongoing)  4.  Pt able to perform HEP correctly with minimal cueing or supervision from therapist to encourage independent management of symptoms. (approp and ongoing)      Long term goals: 10 weeks or 20 visits   1. Pt will demonstrate increased lumbar ROM by at least 9 degrees from initial ROM value, resulting in improved ability to perform functional fwd bending while standing and sitting. (approp and ongoing)  2. Pt will demonstrate increased MedX average isometric strength value  by 25% from initial test resulting in improved ability to perform bending, lifting, and carrying  "activities safely, confidently.  (approp and ongoing)  3. Pt to demonstrate ability to independently control and reduce their pain through posture positioning and mechanical movements throughout a typical day.  (approp and ongoing)  4.  Pt will demonstrate reduced pain and improved functional outcomes as reported on the FOTO by reaching a limitation score of < or = 30% or less in order to demonstrate subjective improvement in pt's condition.    (approp and ongoing)  5. Pt will demonstrate independence with the HEP at discharge  (approp and ongoing)  6. Pt will be able to lift 10# from the floor to a table and back to the floor without increased pain so that she may have improved ability to perform bending, lifting, and carrying activities safely, confidently (approp and ongoing)      Plan   Outpatient physical therapy 2x week for 10 weeks or 20 visits to include the following:   - Patient education  - Therapeutic exercise  - Manual therapy  - Performance testing   - Neuromuscular Re-education  - Therapeutic activity   - Modalities    Pt may be seen by PTA as part of the rehabilitation team.     Therapist: Jesus Calabrese, PT  1/25/2022    "I certify the need for these services furnished under this plan of treatment and while under my care."    ____________________________________  Physician/Referring Practitioner    _______________  Date of Signature            "

## 2022-02-01 ENCOUNTER — CLINICAL SUPPORT (OUTPATIENT)
Dept: REHABILITATION | Facility: OTHER | Age: 72
End: 2022-02-01
Attending: INTERNAL MEDICINE
Payer: MEDICARE

## 2022-02-01 DIAGNOSIS — R29.898 WEAKNESS OF LEFT LOWER EXTREMITY: ICD-10-CM

## 2022-02-01 DIAGNOSIS — R29.898 DECREASED STRENGTH OF TRUNK AND BACK: ICD-10-CM

## 2022-02-01 DIAGNOSIS — M25.69 DECREASED RANGE OF MOTION OF TRUNK AND BACK: Primary | ICD-10-CM

## 2022-02-01 PROCEDURE — 97110 THERAPEUTIC EXERCISES: CPT

## 2022-02-01 NOTE — PROGRESS NOTES
"[facility] Physical Therapy Treatment      Name: Jalyn Aaron  Clinic Number: [id number]    Therapy Diagnosis:   Encounter Diagnoses   Name Primary?    Decreased range of motion of trunk and back Yes    Decreased strength of trunk and back     Weakness of left lower extremity      Physician: Bere Myers MD    Visit Date: 2022    Physician Orders: PT Eval and Treat   Medical Diagnosis from Referral: M48.061 (ICD-10-CM) - Spinal stenosis of lumbar region without neurogenic claudication  Evaluation Date: 2022  Authorization Period Expiration: 12/10/2022  Plan of Care Expiration: 2022  Reassessment Due: 2022  Visit # / Visits authorized: 2     Time In: 0815  Time Out: 0900  Total Billable Time: 45 minutes     Precautions: Standard and pacemaker      Pattern of pain determined: 2    Subjective   Jalyn Solares returns to PT for visit 2 f/u; she reports the pain is in her buttocks (lower to post thighs). Pain is worse in the morning 6/10; currently a 3/10. She feels overall her pain has been worsening.     Patient reports tolerating previous visit  Fair.      Pain:  Current 3/10, worst 7/10, best 0/10   Location: bilateral back  and buttocks , L calf  Description: buttocks- Burning and Sharp calf - dull/achy    Occupation: Retired   Leisure: walking my dog, cooking, go to yoga, reading, [employer]/bible study        Pts goals: "Increased L leg strength, prevent my pain from getting worse and hopefully decrease my pain"     Objective     Baseline Isometric Testing on Med X equipment: Testing administered by PT  Date of testin2022  ROM 3-48 deg   Max Peak Torque 100    Min Peak Torque 55    Flex/Ext Ratio 1.8:1   % below normative data 26         Limitation/Restriction for FOTO lumbar Survey     Therapist reviewed FOTO scores for Jalyn Aaron on 2022.   FOTO documents entered into [employer] - see Media section.      Limitation Score: 35%     Goal: " "30%             Treatment    Pt was instructed in and performed the following:     Jalyn Solares received therapeutic exercises to develop/improved posture, cardiovascular endurance, muscular endurance, lumbar/cervical ROM, strength and muscular endurance for 45 minutes including the following exercises:      Aerobic exercise: Treadmill for 5 minutes at 2-3 mph.     LTR's x10  +Piriformis stretch 3x20"  +HS stretch 3x20"  DKTC 10x5"  PPT 15x5"  SOC 20x5"    [facility] 2/1/2022   Visit Number 2   VAS Pain Rating 3   Treadmill Time (in min.) 5   Speed 3   Lumbar Stretches - Slouch Overcorrection 20   Flexion in Lying 10   Lumbar Weight 50   Repetitions 16   Rating of Perceived Exertion 4   Ice - Z Lie (in min.) 5       NP=Not Performed:  SKTC 10x5"-NP  Seated trunk flexion 10x5"-NP  Seated trunk flexion ball rollouts 10x5"-NP      Peripheral muscle strengthening which included 1 set of 15-20 repetitions at a slow, controlled 10-13 second per rep pace focused on strengthening supporting musculature for improved body mechanics and functional mobility.  Pt and therapist focused on proper form during treatment to ensure optimal strengthening of each targeted muscle group.  Machines were utilized including torso rotation, leg extension, leg curl, chest press, upright row. Tricep extension, bicep curl, leg press, and hip abduction added visit 3    Jalyn Solares received the following manual therapy techniques: 00 Minutes    Home Exercises Provided and Patient Education Provided   Home exercises include: See pt instructions form eval; updated 2/1/22 see pt instructions.   Cardio program: Bike/Walking  Lifting education date: visit 11  Lumbar roll: Standard/Slim    Education provided:   Written Home Exercises Provided: Patient instructed to cont prior HEP.  Exercises were reviewed and Jalyn was able to demonstrate them prior to the end of the session.  Jalyn demonstrated good  understanding of the education " provided.     See EMR under Patient Instructions for exercises provided prior visit.    Assessment     Jalyn returned to PT today with minimal pain of 3/10 in B buttocks. She completed guided flexibility and mobility exercises prior to medx. Patient's lumbar weight was initiated at 50% peak torque, at 50#. She proceeded to complete 16 reps today at RPE of 4/10. Proceed per tolerance.     Patient is making progress towards established goals.  Pt will continue to benefit from skilled outpatient physical therapy to address the deficits stated in the impairment chart, provide pt/family education and to maximize pt's level of independence in the home and community environment.     Anticipated Barriers for therapy: None  Pt's spiritual, cultural and educational needs considered and pt agreeable to plan of care and goals as stated below:        GOALS: Pt is in agreement with the following goals.     Short term goals:  6 weeks or 10 visits   1.  Pt will demonstrate increased lumbar ROM by at least 6 degrees from the initial ROM value with improvements noted in functional ROM and ability to perform ADLs.  (approp and ongoing)  2.  Pt will demonstrate increased MedX average isometric strength value  by 15% from initial test resulting in improved ability to perform bending, lifting, and carrying activities safely, confidently.  (approp and ongoing)  3.  Patient report a reduction in worst pain score by 1-2 points for improved tolerance for sitting.  (approp and ongoing)  4.  Pt able to perform HEP correctly with minimal cueing or supervision from therapist to encourage independent management of symptoms. (approp and ongoing)        Long term goals: 10 weeks or 20 visits   1. Pt will demonstrate increased lumbar ROM by at least 9 degrees from initial ROM value, resulting in improved ability to perform functional fwd bending while standing and sitting. (approp and ongoing)  2. Pt will demonstrate increased MedX average isometric  strength value  by 25% from initial test resulting in improved ability to perform bending, lifting, and carrying activities safely, confidently.  (approp and ongoing)  3. Pt to demonstrate ability to independently control and reduce their pain through posture positioning and mechanical movements throughout a typical day.  (approp and ongoing)  4.  Pt will demonstrate reduced pain and improved functional outcomes as reported on the FOTO by reaching a limitation score of < or = 30% or less in order to demonstrate subjective improvement in pt's condition.    (approp and ongoing)  5. Pt will demonstrate independence with the HEP at discharge  (approp and ongoing)  6. Pt will be able to lift 10# from the floor to a table and back to the floor without increased pain so that she may have improved ability to perform bending, lifting, and carrying activities safely, confidently (approp and ongoing)     Plan   Continue with established Plan of Care towards established PT goals.

## 2022-02-03 ENCOUNTER — CLINICAL SUPPORT (OUTPATIENT)
Dept: REHABILITATION | Facility: OTHER | Age: 72
End: 2022-02-03
Attending: INTERNAL MEDICINE
Payer: MEDICARE

## 2022-02-03 DIAGNOSIS — R29.898 DECREASED STRENGTH OF TRUNK AND BACK: ICD-10-CM

## 2022-02-03 DIAGNOSIS — M25.69 DECREASED RANGE OF MOTION OF TRUNK AND BACK: Primary | ICD-10-CM

## 2022-02-03 DIAGNOSIS — R29.898 WEAKNESS OF LEFT LOWER EXTREMITY: ICD-10-CM

## 2022-02-03 PROCEDURE — 97110 THERAPEUTIC EXERCISES: CPT | Mod: CQ

## 2022-02-03 NOTE — PROGRESS NOTES
"[facility]      Name: Jalyn Aaron  Clinic Number: [id number]    Therapy Diagnosis:   Encounter Diagnoses   Name Primary?    Decreased range of motion of trunk and back Yes    Decreased strength of trunk and back     Weakness of left lower extremity      Physician: Bere Myers MD    Visit Date: 2/3/2022    Physician Orders: PT Eval and Treat   Medical Diagnosis from Referral: M48.061 (ICD-10-CM) - Spinal stenosis of lumbar region without neurogenic claudication  Evaluation Date: 2022  Authorization Period Expiration: 12/10/2022  Plan of Care Expiration: 2022  Reassessment Due: 2022  Visit # / Visits authorized: 3     Time In: 1:30 PM  Time Out: 2:30 PM  Total Billable Time: 50 minutes     Precautions: Standard and pacemaker      Pattern of pain determined: 2    Subjective   Jalyn Solares returns with continued c/o (L) lower back/(L)LE pain. States that she has been participating in yoga which has been been helpful.  Patient reports tolerating previous visit fairly well with only mild residual soreness     Pain:  Current 3/10, worst 7/10, best 0/10   Location: bilateral back  and buttocks , L calf  Description: buttocks- Burning and Sharp calf - dull/achy    Occupation: Retired   Leisure: walking my dog, cooking, go to yoga, reading, [employer]/bible study        Pts goals: "Increased L leg strength, prevent my pain from getting worse and hopefully decrease my pain"     Objective     Baseline Isometric Testing on Med X equipment: Testing administered by PT  Date of testin2022  ROM 3-48 deg   Max Peak Torque 100    Min Peak Torque 55    Flex/Ext Ratio 1.8:1   % below normative data 26         Limitation/Restriction for FOTO lumbar Survey     Therapist reviewed FOTO scores for Jalyn Aaron on 2022.   FOTO documents entered into [employer] - see Media section.      Limitation Score: 35%     Goal: 30%           Treatment  " "  Pt was instructed in and performed the following:     Jalyn Solares received therapeutic exercises to develop/improved posture, cardiovascular endurance, muscular endurance, lumbar/cervical ROM, strength and muscular endurance for 50 minutes including the following exercises:      Aerobic exercise: Treadmill for 5 minutes at 2-3 mph.     LTR's x10  Piriformis stretch 3x20"  DKTC 10x5"  HS stretch with strap 3x20"  +Sciatic Nerve glide (L) x 20  PPT 15x5"  SOC 20x5"    Patient receives manual therapy x 5 minutes to include Long Axis distraction (L)LE    Not performed  SKTC 10x5"-NP  Seated trunk flexion 10x5"-NP  Seated trunk flexion ball rollouts 10x5"-NP    [facility] - Short 2/3/2022   Visit Number 3   VAS Pain Rating 3   Treadmill Time (in min.) 5   Speed -   Lumbar Stretches - Slouch 20   Flexion in Lying 10   Flexion in Sitting -   Manual Therapy 5   Lumbar Extension - Seat Pad -   Femur Restraint -   Top Dead Center -   Counterweight -   Lumbar Flexion -   Lumbar Extension -   Lumbar Peak Torque -   Lumbar Weight 50   Repetitions 18   Rating of Perceived Exertion 5     Peripheral muscle strengthening which included 1 set of 15-20 repetitions at a slow, controlled 10-13 second per rep pace focused on strengthening supporting musculature for improved body mechanics and functional mobility.  Pt and therapist focused on proper form during treatment to ensure optimal strengthening of each targeted muscle group.  Machines were utilized including torso rotation, leg extension, leg curl, chest press, upright row. Tricep extension, bicep curl, leg press, and hip abduction added visit 3    Jalyn Solares received the following manual therapy techniques: 00 Minutes    Home Exercises Provided and Patient Education Provided   Home exercises include: See pt instructions form aury; updated 2/1/22 see pt instructions.   Cardio program: Bike/Walking  Lifting education date: visit 11  Lumbar roll: " Standard/Slim    Education provided:   Written Home Exercises Provided: Patient instructed to cont prior HEP. Added Sciatic nerve glide 2/3/22  Exercises were reviewed and Jalyn was able to demonstrate them prior to the end of the session.  Jalyn demonstrated good  understanding of the education provided.     See EMR under Patient Instructions for exercises provided prior visit and today's visit.    Assessment   Jalyn returned for her 3rd [facility] visit today reporting compliance with her HEP which has been helpful thus far but some pain is still lingering. She completed guided flexibility and mobility exercises in prep for the Lumbar Medx without c/o. Added Sciatic nerve glide on (L) along with (L)LE long axis distraction which provided some relief per subjective report. Lumbar MedX resistance was maintained at 50#'s and she was able to progress to complete 18 reps with a RPE = 5 and was also able to progress to perform the full circuit of peripheral strengthening ex's without c/o.  Some cues were required to avoid pushing with her LE's on the MedX.  Will look to increase reps next session.     Patient is making Good progress towards established goals.  Pt will continue to benefit from skilled outpatient physical therapy to address the deficits stated in the impairment chart, provide pt/family education and to maximize pt's level of independence in the home and community environment.     Anticipated Barriers for therapy: None  Pt's spiritual, cultural and educational needs considered and pt agreeable to plan of care and goals as stated below:      GOALS: Pt is in agreement with the following goals.     Short term goals:  6 weeks or 10 visits   1.  Pt will demonstrate increased lumbar ROM by at least 6 degrees from the initial ROM value with improvements noted in functional ROM and ability to perform ADLs.  (approp and ongoing)  2.  Pt will demonstrate increased MedX average isometric strength value  by 15% from  initial test resulting in improved ability to perform bending, lifting, and carrying activities safely, confidently.  (approp and ongoing)  3.  Patient report a reduction in worst pain score by 1-2 points for improved tolerance for sitting.  (approp and ongoing)  4.  Pt able to perform HEP correctly with minimal cueing or supervision from therapist to encourage independent management of symptoms. (approp and ongoing)        Long term goals: 10 weeks or 20 visits   1. Pt will demonstrate increased lumbar ROM by at least 9 degrees from initial ROM value, resulting in improved ability to perform functional fwd bending while standing and sitting. (approp and ongoing)  2. Pt will demonstrate increased MedX average isometric strength value  by 25% from initial test resulting in improved ability to perform bending, lifting, and carrying activities safely, confidently.  (approp and ongoing)  3. Pt to demonstrate ability to independently control and reduce their pain through posture positioning and mechanical movements throughout a typical day.  (approp and ongoing)  4.  Pt will demonstrate reduced pain and improved functional outcomes as reported on the FOTO by reaching a limitation score of < or = 30% or less in order to demonstrate subjective improvement in pt's condition.    (approp and ongoing)  5. Pt will demonstrate independence with the HEP at discharge  (approp and ongoing)  6. Pt will be able to lift 10# from the floor to a table and back to the floor without increased pain so that she may have improved ability to perform bending, lifting, and carrying activities safely, confidently (approp and ongoing)     Plan   Continue with established Plan of Care towards established PT goals.     Prashanth Jones, PTA  2/3/2022

## 2022-02-08 ENCOUNTER — CLINICAL SUPPORT (OUTPATIENT)
Dept: REHABILITATION | Facility: OTHER | Age: 72
End: 2022-02-08
Attending: INTERNAL MEDICINE
Payer: MEDICARE

## 2022-02-08 DIAGNOSIS — R29.898 DECREASED STRENGTH OF TRUNK AND BACK: ICD-10-CM

## 2022-02-08 DIAGNOSIS — R29.898 WEAKNESS OF LEFT LOWER EXTREMITY: ICD-10-CM

## 2022-02-08 DIAGNOSIS — M25.69 DECREASED RANGE OF MOTION OF TRUNK AND BACK: Primary | ICD-10-CM

## 2022-02-08 PROCEDURE — 97110 THERAPEUTIC EXERCISES: CPT

## 2022-02-08 NOTE — PROGRESS NOTES
"  [facility]      Name: Jalyn Aaron  Clinic Number: [id number]    Therapy Diagnosis:   Encounter Diagnoses   Name Primary?    Decreased range of motion of trunk and back Yes    Decreased strength of trunk and back     Weakness of left lower extremity      Physician: Bere Myers MD    Visit Date: 2022    Physician Orders: PT Eval and Treat   Medical Diagnosis from Referral: M48.061 (ICD-10-CM) - Spinal stenosis of lumbar region without neurogenic claudication  Evaluation Date: 2022  Authorization Period Expiration: 12/10/2022  Plan of Care Expiration: 2022  Reassessment Due: 2022  Visit # / Visits authorized:      Time In:9:30 am  Time Out: 10:15 am  Total Billable Time: 45 minutes     Precautions: Standard and pacemaker      Pattern of pain determined: 2    Subjective   Jalyn Solares reports cont visits to [place] for yoga and notes it is performed in the AM where she starts out /c discomfort but at finish of yoga feels better.    Pt report HEP compliance and inquires about technique on some stretches.      Patient reports tolerating previous visit well /c no c/o of excess discomfort.    Pain:  Current 3/10   Location: bilateral back, L calf  Description:  dull/achy    Occupation: Retired   Leisure: walking my dog, cooking, go to yoga, reading, [employer]/b[employer] study        Pts goals: "Increased L leg strength, prevent my pain from getting worse and hopefully decrease my pain"     Objective     Baseline Isometric Testing on Med X equipment: Testing administered by PT  Date of testin2022  ROM 3-48 deg   Max Peak Torque 100    Min Peak Torque 55    Flex/Ext Ratio 1.8:1   % below normative data 26         Limitation/Restriction for FOTO lumbar Survey     Therapist reviewed FOTO scores for Jalyn Aaron on 2022.   FOTO documents entered into [employer] - see Media section.      Limitation Score: 35%     Goal: 30%    " "       Treatment    Pt was instructed in and performed the following:     Jalyn Solares received therapeutic exercises to develop/improved posture, cardiovascular endurance, muscular endurance, lumbar/cervical ROM, strength and muscular endurance for 45 minutes including the following exercises:      Aerobic exercise: Recumbent bike 5 minutes at 4 resistance.     LTR's x10  Piriformis stretch 3x20"  DKTC 10x5"    Prone laying -> prone on elbow 3 min     PPT x 10 5 sec hold  Bridges x 10 cue for TA activation sustained  Sciatic Nerve glide (L) x 10    Return next session:   HS stretch with strap 3x20"  Sciatic Nerve glide (L) x 20  SOC 20x5"    Not performed  SKTC 10x5"-NP  Seated trunk flexion 10x5"-NP  Seated trunk flexion ball rollouts 10x5"-NP    [facility] 2/8/2022   Visit Number 4   VAS Pain Rating 3   Treadmill Time (in min.) -   Speed -   Time 5   Lumbar Stretches - Slouch Overcorrection -   Extension in Lying 1   Flexion in Lying 10   Flexion in Sitting -   Manual Therapy -   Lumbar Extension Seat Pad -   Femur Restraint -   Top Dead Center -   Counterweight -   Lumbar Flexion 48   Lumbar Extension 0   Lumbar Peak Torque -   Min Torque -   Test Percent Below Normative Data -   Lumbar Weight 50   Repetitions 20   Rating of Perceived Exertion 4   Ice - Z Lie (in min.) 5         Peripheral muscle strengthening which included 1 set of 15-20 repetitions at a slow, controlled 10-13 second per rep pace focused on strengthening supporting musculature for improved body mechanics and functional mobility.  Pt and therapist focused on proper form during treatment to ensure optimal strengthening of each targeted muscle group.  Machines were utilized including torso rotation, leg extension, leg curl, chest press, upright row. Tricep extension, bicep curl, leg press, and hip abduction added visit 3    Jalyn Solares received the following manual therapy techniques: 00 Minutes  Patient receives manual therapy x " 00 minutes to include Long Axis distraction (L)LE    Home Exercises Provided and Patient Education Provided   Home exercises include:   LTR  DKTC/SKTC  PPT  Seated lumbar flexion - ball rollout  SOC  Piriformis   HS stretch  Sciatic nerve glide     Cardio program: Bike/Walking  Lifting education date: visit 11  Lumbar roll: Standard/Slim uses in car and at home    Education provided:   -importance of maintaining functional mobility to promote improved posture     Written Home Exercises Provided: Patient instructed to cont prior HEP.  Exercises were reviewed and Jalyn was able to demonstrate them prior to the end of the session.  Jalyn demonstrated good  understanding of the education provided.     See EMR under Patient Instructions for exercises provided prior visit and today's visit.    Assessment     Subjective report confirm the importance of spinal mobility for sx self management.  Pt report she was instructed to never go into extension and pt report fearfulness achieve lumbar ext.  PT ed pt on importance of functional mobility to include some amount of lumbar ext but that excess ext or extended time in ext may be sx provocating.   Therefore, to illustrate this point pt in prone laying to inc confidence in functional mobility tolerance.  Pt report no sx inc /c LEONEL indicating safe execution of mobility exercise.  Pt need cue for TA activation during PPT indicating cont core motor deficit.    Lumbar MedX weight maintained per pt tolerance last visit, however, to capitalize on inc tolerance to ext in functional range ext progressed to 0 deg (upright).   Today pt perform 20 reps at 4 RPE indicating improved mobility and strength.  Progress per HB programming next visit.         Patient is making Good progress towards established goals.  Pt will continue to benefit from skilled outpatient physical therapy to address the deficits stated in the impairment chart, provide pt/family education and to maximize pt's level of  independence in the home and community environment.     Anticipated Barriers for therapy: Fear avoidance  Pt's spiritual, cultural and educational needs considered and pt agreeable to plan of care and goals as stated below:      GOALS: Pt is in agreement with the following goals.     Short term goals:  6 weeks or 10 visits   1.  Pt will demonstrate increased lumbar ROM by at least 6 degrees from the initial ROM value with improvements noted in functional ROM and ability to perform ADLs.  (approp and ongoing)  2.  Pt will demonstrate increased MedX average isometric strength value  by 15% from initial test resulting in improved ability to perform bending, lifting, and carrying activities safely, confidently.  (approp and ongoing)  3.  Patient report a reduction in worst pain score by 1-2 points for improved tolerance for sitting.  (approp and ongoing)  4.  Pt able to perform HEP correctly with minimal cueing or supervision from therapist to encourage independent management of symptoms. (approp and ongoing)        Long term goals: 10 weeks or 20 visits   1. Pt will demonstrate increased lumbar ROM by at least 9 degrees from initial ROM value, resulting in improved ability to perform functional fwd bending while standing and sitting. (approp and ongoing)  2. Pt will demonstrate increased MedX average isometric strength value  by 25% from initial test resulting in improved ability to perform bending, lifting, and carrying activities safely, confidently.  (approp and ongoing)  3. Pt to demonstrate ability to independently control and reduce their pain through posture positioning and mechanical movements throughout a typical day.  (approp and ongoing)  4.  Pt will demonstrate reduced pain and improved functional outcomes as reported on the FOTO by reaching a limitation score of < or = 30% or less in order to demonstrate subjective improvement in pt's condition.    (approp and ongoing)  5. Pt will demonstrate independence  with the HEP at discharge  (approp and ongoing)  6. Pt will be able to lift 10# from the floor to a table and back to the floor without increased pain so that she may have improved ability to perform bending, lifting, and carrying activities safely, confidently (approp and ongoing)     Plan   Continue with established Plan of Care towards established PT goals.     Hamlet Hager, PT  2/8/2022

## 2022-02-10 ENCOUNTER — OFFICE VISIT (OUTPATIENT)
Dept: OPTOMETRY | Facility: CLINIC | Age: 72
End: 2022-02-10
Payer: MEDICARE

## 2022-02-10 DIAGNOSIS — H50.30 INTERMITTENT ESOTROPIA: Primary | ICD-10-CM

## 2022-02-10 DIAGNOSIS — H25.13 NUCLEAR SCLEROSIS, BILATERAL: ICD-10-CM

## 2022-02-10 DIAGNOSIS — H35.89 RPE MOTTLING OF MACULA: ICD-10-CM

## 2022-02-10 DIAGNOSIS — H16.223 KERATOCONJUNCTIVITIS SICCA OF BOTH EYES NOT SPECIFIED AS SJOGREN'S: ICD-10-CM

## 2022-02-10 PROCEDURE — 1126F PR PAIN SEVERITY QUANTIFIED, NO PAIN PRESENT: ICD-10-PCS | Mod: CPTII,S$GLB,, | Performed by: OPTOMETRIST

## 2022-02-10 PROCEDURE — 99999 PR PBB SHADOW E&M-EST. PATIENT-LVL III: CPT | Mod: PBBFAC,,, | Performed by: OPTOMETRIST

## 2022-02-10 PROCEDURE — 1159F PR MEDICATION LIST DOCUMENTED IN MEDICAL RECORD: ICD-10-PCS | Mod: CPTII,S$GLB,, | Performed by: OPTOMETRIST

## 2022-02-10 PROCEDURE — 92014 PR EYE EXAM, EST PATIENT,COMPREHESV: ICD-10-PCS | Mod: S$GLB,,, | Performed by: OPTOMETRIST

## 2022-02-10 PROCEDURE — 92015 PR REFRACTION: ICD-10-PCS | Mod: S$GLB,,, | Performed by: OPTOMETRIST

## 2022-02-10 PROCEDURE — 99999 PR PBB SHADOW E&M-EST. PATIENT-LVL III: ICD-10-PCS | Mod: PBBFAC,,, | Performed by: OPTOMETRIST

## 2022-02-10 PROCEDURE — 1157F PR ADVANCE CARE PLAN OR EQUIV PRESENT IN MEDICAL RECORD: ICD-10-PCS | Mod: CPTII,S$GLB,, | Performed by: OPTOMETRIST

## 2022-02-10 PROCEDURE — 92014 COMPRE OPH EXAM EST PT 1/>: CPT | Mod: S$GLB,,, | Performed by: OPTOMETRIST

## 2022-02-10 PROCEDURE — 1126F AMNT PAIN NOTED NONE PRSNT: CPT | Mod: CPTII,S$GLB,, | Performed by: OPTOMETRIST

## 2022-02-10 PROCEDURE — 1159F MED LIST DOCD IN RCRD: CPT | Mod: CPTII,S$GLB,, | Performed by: OPTOMETRIST

## 2022-02-10 PROCEDURE — 1157F ADVNC CARE PLAN IN RCRD: CPT | Mod: CPTII,S$GLB,, | Performed by: OPTOMETRIST

## 2022-02-10 PROCEDURE — 92015 DETERMINE REFRACTIVE STATE: CPT | Mod: S$GLB,,, | Performed by: OPTOMETRIST

## 2022-02-10 NOTE — PROGRESS NOTES
HPI     Jalyn Aaron is a 72 y.o. female who returns for continued   eye care. Jalyn's last exam with me on 06/29/2021.  She has bilateral   hyperopia, and presbyopia, nuclear sclerosis, K. Sicca. During her last   exam, she was having intermittent diplopia. 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  Glasses (no prism) are   prescribed and worn full time. Today, she explains that the diplopia   (intermittently at near) is stable.  She adds that driving at night   (glare) is now difficult.  She also feels that she has to strain to see   better.     (+)blurred vision  (--)Headaches  (+)diplopia  (--)flashes  (--)floaters  (--)pain  (--)Itching  (--)tearing  (--)burning  (--)Dryness  (--) OTC Drops  (--)Photophobia          Last edited by Sonido Mcgraw, OD on 2/10/2022 10:45 AM. (History)        Review of Systems   Constitutional: Negative for chills, fever and malaise/fatigue.   HENT: Negative for congestion, hearing loss and sore throat.    Eyes: Positive for blurred vision. Negative for double vision, photophobia, pain, discharge and redness.   Respiratory: Negative.  Negative for cough, shortness of breath and wheezing.    Cardiovascular: Negative.    Gastrointestinal: Negative.  Negative for nausea and vomiting.   Genitourinary: Negative.    Musculoskeletal: Negative.    Skin: Negative.    Neurological: Negative for seizures.   Psychiatric/Behavioral: Negative.        For exam results, see encounter report    Assessment /Plan     1. Intermittent esotropia --> stable  - Experiencing intermittent diplopia at near only  - Not subjectively significant; will defer prism    2. Nuclear sclerosis, bilateral --> becoming visually significant with glare problems when night driving  - Defer surgery for now  - Advised on antireflective lens for new glasses    3. Keratoconjunctivitis sicca of both eyes not specified as Sjogren's --> minimally symptomatic  - Controlled with OTC  artificial tears as needed  - Can consider tyrvaya as needed    4. Age related Risk of  RPE mottling of macula  - Will get baseline OCT of macula/RNFL/ONH    5. Bilateral hyperopia and presbyopia  - Spec Rx per final Rx below  Glasses Prescription (2/10/2022)        Sphere Cylinder Add    Right +0.75 Sphere +3.25    Left +0.75 Sphere +3.25    Type: PAL    Expiration Date: 2/11/2023            Patient education; RTC in 1 year with DFE; Ok to instill 1% Tropicamide & 2.5% Phenylephrine after (normal) baseline workup, sooner as needed at [facility]

## 2022-02-10 NOTE — PATIENT INSTRUCTIONS
Open Your Eyes to Healthy Eating Habits  NUTRITION TIPS FOR YOUR EYE SIGHT  Doctors of optometry see millions of patients a year and are the primary providers of eye and vision care in [place]. This month, in celebration of national Save Your Vision Month, the American Optometric Association (AOA), [employer] [employer] â€“ [employer] are educating Americans on the many preventative actions they can take to protect their sight, including eating  right.    More than 43 million Americans suffer from cataracts or age-related macular degeneration (AMD), the two leading causes of vision loss and blindness.  Research indicates that there is a strong correlation between good nutrition and the prevention of these age-related eye diseases. Eating foods rich in key nutrients - antioxidants lutein and zeaxanthin, essential fatty acids, vitamins C and E and the mineral zinc - can help protect eyesight and vision.    Fast Facts   In a recent survey conducted by the AOA, nearly three-fourths (72%) of respondents  age 55 and older began noticing changes in their vision between the ages of 40  and 45.   To cope with vision loss or various eye problems, less than one-third (29%) of  respondents are increasing their nutrient intake for healthy eyes.   Many Americans (48%) still believe that carrots are the best food for eye health,  when, in fact, spinach and other dark leafy greens are the healthiest foods for  the eyes because they naturally contain large amounts of lutein and zeaxanthin.   In order to maintain healthy eyes, studies show that 10 mg of lutein should be  consumed each day or one cup of cooked spinach four times a week.   More than 50% of Americans do not take in the recommended dosage of vitamin C  per day.   One cup (8 fl oz) of orange juice per day contains 81.6 mg/serving of vitamin C,  more than enough to help offset some eye diseases.  Visit www.AOA.org for additional information, or for vision-friendly  recipes      Nutrition for Healthy Eyes  By Thomas Cano OD  Research suggests that antioxidants and other important nutrients may reduce your risk of cataracts and macular degeneration. Specific antioxidants can have additional benefits as well; for example, vitamin A protects against blindness, and vitamin C may play a role in preventing or alleviating glaucoma.  Omega-3 essential fatty acids appear to help the eye in a variety of ways, from alleviating symptoms of dry eye syndrome to guarding against macular damage.  Eye Benefits of Vitamins and Micronutrients    A healthy diet for your eyes should include plenty of colorful fruits and vegetables.   The following vitamins, minerals and other nutrients have been shown to be essential for good vision and may protect your eyes from sight-robbing conditions and diseases.  Incorporating the following foods in your diet will help you get the Recommended Dietary Allowance (RDA) of these important eye nutrients. Established by the [place] of Medicine (National Academy of Sciences), the RDA is the average daily dietary intake level of a nutrient sufficient to meet the requirements of nearly all healthy individuals in a specific life stage and gender group.  While the RDA is a useful reference, some eye care practitioners recommend higher daily intakes of certain nutrients for people at risk for eye problems.  (In the following list, mg = milligram; mcg = microgram (1/1000 of a mg) and IU = International Unit.)  Beta-carotene  Eye benefits of beta-carotene: When taken in combination with zinc and vitamins C and E, beta-carotene may reduce the progression of macular degeneration.   Food sources: Carrots, sweet potatoes, spinach, kale, butternut squash.   RDA: None (most supplements contain 5,000 to 25,000 IU).  Bioflavonoids (Flavonoids)  Eye benefits of bioflavonoids: May protect against cataracts and macular degeneration.   Food sources: Tea, red wine, citrus fruits,  bilberries, blueberries, cherries, legumes, soy products.   RDA: None.  More Info   Learn more about omega-3s and save $2 on TheraTears Nutrition gels   Moderate to severe dry eyes? Find out if Retaine MGD is right for you   Learn how Optometry Giving Sight helps 670 million people to see again  Lutein and Zeaxanthin  Eye benefits of lutein and zeaxanthin: May prevent cataracts and macular degeneration.   Food sources: Spinach, kale, turnip greens, mohini greens, squash.   RDA: None.      This infographic shows which nutrients you need for good eye health as you age. Please click here for the full image. (Image: [employer])   Omega-3 Fatty Acids  Eye benefits of omega-3 fatty acids: May help prevent macular degeneration (AMD) and dry eyes.   Food sources: Cold-water fish such as salmon, mackerel and herring; fish oil supplements, freshly ground flaxseeds, walnuts.   RDA: None; but for cardiovascular benefits, the American Heart Association recommends approximately 1,000 mg daily.  Selenium  Eye benefits of selenium: When combined with carotenoids and vitamins C and E, may reduce risk of advanced AMD.   Food sources: Seafood (shrimp, crab, salmon, halibut), [place] nuts, enriched noodles, brown rice.   RDA: 55 mcg for teens and adults (60 mcg for women during pregnancy and 70 mcg when breast-feeding).  Vitamin A  Eye benefits of vitamin A: May protect against night blindness and dry eyes.   Food sources: Beef or chicken liver; eggs, butter, milk.   RDA: 3,000 IU for men; 2,333 IU for women (2,567 IU during pregnancy and 4,333 IU when breast-feeding).  Vitamin C  Eye benefits of vitamin C: May reduce the risk of cataracts and macular degeneration.   Food sources: Sweet peppers (red or green), kale, strawberries, broccoli, oranges, cantaloupe.   RDA: 90 mg for men; 70 mg for women (85 mg during pregnancy and 120 mg when breast-feeding).  Vitamin D  Eye benefits of vitamin D: May reduce the risk of macular  degeneration.   Food sources: [place], sardines, mackerel, milk; orange juice fortified with vitamin D.   RDA: None, but the American Academy of Pediatrics recommends 400 IU per day for infants, children and adolescents, and many experts recommend higher daily intakes for adults.   The best source of vitamin D is exposure to sunlight. Ultraviolet radiation from the sun stimulates production of vitamin D in human skin, and just a few minutes of exposure to sunlight each day (without sunscreen) will insure your body is producing adequate amounts of vitamin D.  Vitamin E  Eye benefits of vitamin E: When combined with carotenoids and vitamin C, may reduce the risk of advanced AMD.   Food sources: Almonds, sunflower seeds, hazelnuts.   RDA: 15 mg for teens and adults (15 mg for women during pregnancy and 19 mg when breast-feeding).  Zinc  Eye benefits of zinc: Helps vitamin A reduce the risk of night blindness; may play a role in reducing risk of advanced AMD.   Food sources: Oysters, beef, [place] crab, turkey (dark meat).   RDA: 11 mg for men; 8 mg for women (11 mg during pregnancy and 12 mg when breast-feeding).  In general, it's best to obtain most nutrients through a healthy diet, including at least two servings of fish per week and plenty of colorful fruits and vegetables.  If you plan to begin a regimen of eye vitamins, be sure to discuss this with your optometrist or ophthalmologist. Taking too much of certain vision supplements can cause problems, especially if you are taking prescription medications for health problems.  Bon appétit!   Home » Nutrition » Overview     About the Author: Thomas Cano OD, is  of [employer]. Dr. aCno has more than 25 years of experience as an eye care provider, health educator and consultant to the eyewear industry. His special interests include contact lenses, nutrition and preventive vision care.      Eye Benefits of Omega-3 Fatty Acids  By Thomas Cano  "OD  You may find it hard to believe that fat is essential to your health, but it's true. Without fat, our bodies can't function properly. And without the proper kinds of fats in our diet, our eye health also may suffer.  Fatty acids are the "building blocks" of fat. These important nutrients are critical for the normal production and functioning of cells, muscles, nerves and organs. Fatty acids also are required for the production of hormone-like compounds that help regulate blood pressure, heart rate and blood clotting.  Some fatty acids -- called essential fatty acids (EFAs) -- are necessary to our diet, because our body can't produce them. To stay healthy, we must obtain these fatty acids from our food.  Two types of EFAs are omega-3 fatty acids and omega-6 fatty acids. Studies have found that omega-3 fatty acids, in particular, may benefit eye health.  Omega-3 fatty acids include docosahexaenoic acid (DHA), eicoapentaenoic acid (EPA) and alpha-linolenic acid (ALA).  Omega-3 Fatty Acids and Infant Vision Development  A number of clinical studies have shown omega-3 fatty acids are essential for normal infant vision development.    Grilled salmon is an excellent natural source of omega-3 fatty acids.   DHA and other omega-3 fatty acids are found in maternal breast milk and also are added to some supplemented infant formulas. Omega-3 supplemental formulas appear to stimulate vision development in infants.  According to an analysis of several studies conducted by researchers at [employer] and published in the journal Pediatrics, the authors found that healthy pre-term infants who were fed DHA-supplemented formula showed significantly better visual acuity at 2 and 4 months of age, compared with similar pre-term infants who were fed formula that did not contain the omega-3 supplement.  Adequate amounts of DHA and other omega-3 fatty acids in the diet of pregnant women also appear to be important in " "normal infant vision development.  More Info   Learn more about omega-3s and save $2 on TheraTears Nutrition gels   Moderate to severe dry eyes? Find out if Retaine MGD is right for you   Learn how Optometry Giving Sight helps 670 million people to see again  In a study published in the American Journal of Clinical Nutrition, [place] researchers found that infant girls whose mothers received DHA supplements from their fourth month of pregnancy until delivery were less likely to have below-average visual acuity at 2 months of age than infant girls whose mothers did not receive the omega-3 supplements.  Adult Eye Benefits of Omega-3 Fatty Acids  Several studies suggest omega-3 fatty acids may help protect adult eyes from macular degeneration and dry eye syndrome. Essential fatty acids also may help proper drainage of intraocular fluid from the eye, decreasing the risk of high eye pressure and glaucoma.  In a large  study published in 2008, participants who ate oily fish (an excellent source of DHA and EPA omega-3 fatty acids) at least once per week had half the risk of developing neovascular ("wet") macular degeneration, compared with those who ate fish less than once per week.    Eye Nutrition News   Omega-3 Supplements Relieve Dry Eye Symptoms Among Computer Users, Study Finds  February 2015 -- Taking daily omega-3 fatty acid supplements could help relieve your dry eyes associated with computer use, according to a study.    The study participants were 456 computer users in [facility] who complained of dry eyes and who used a computer for more than three hours a day for at least one year.  Subjects in one group (220) were given two capsules of omega-3 fatty acids, each containing 180mg EPA and 120mg DHA, to supplement their daily diet; subjects in the other group (236) were given two capsules of a placebo containing olive oil for daily use. Each group took the daily supplements for three months.  At the end of " the three-month trial, a survey of the participants revealed dry eye symptoms diminished after dietary intervention with omega-3 fatty acids, and use of the omega-3 supplements also reduced abnormal tear evaporation. The omega-3 supplements also increased the density of conjunctival goblet cells on the surface of the eye. These cells secrete substances that lubricate the eye during blinks, stabilize the tear film and reduce dryness.  The study authors concluded that orally administered omega-3 fatty acid supplements can alleviate dry eye symptoms, slow tear evaporation, and improve signs of a healthy eye surface in patients suffering from dry eyes related to computer vision syndrome.  A report of this study was published online this month by the journal Contact Lens & Anterior Eye. -- G.[facility].  Also, a 2009 National Eye [place] (NEI) study that used data obtained from the Age-Related Eye Disease Study (AREDS) found participants who reported the highest level of omega-3 fatty acids in their diet were 30 percent less likely than their peers to develop macular degeneration during a 12-year period.  In May 2013, the NEI published results of a large follow-up to the original AREDS study called AREDS2. Among other things, AREDS2 investigated whether daily supplementation of omega-3 fatty acids, along with the original AREDS nutritional supplement or modifications of that formula -- which contained beta-carotene, vitamin C, vitamin E, zinc and copper -- would further reduce the risk of AMD progression among study participants with early signs of macular degeneration. (The original AREDS supplement reduced the risk of AMD progression by 25 percent among a similar population.)  A somewhat surprising result of AREDS2 was that participants who supplemented their diet with 1,000 mg of omega-3s daily (350 mg DHA and 650 mg EPA) did not show any reduction of their risk for progressive AMD over the 5-year duration of the study,  "compared with participants who did not receive omega-3 supplements.  Possible explanations for these different findings from AREDS and AREDS2 data may be that omega-3 fatty acids are more effective at reducing the risk of age-related eye diseases when obtained via food sources rather than from nutritional supplements, and that a healthy diet containing plenty of omega-3s along with other important nutrients consumed over a person's lifetime is more protective than taking nutritional supplements for a 5-year period.  Omega-3 fatty acids also have been found to reduce the risk of dry eyes. In a study of more than 32,000 women between the ages of 45 and 84, those with the highest ratio of (potentially harmful) omega-6 fatty acids to beneficial omega-3 fatty acids in their diet (15-to-1) had a significantly greater risk of dry eye syndrome, compared with the women with the lowest ratio (less than 4-to-1). The study also found that the women who ate at least two servings of tuna per week had significantly less risk of dry eye than women who ate one or fewer servings per week.  Omega-3 fatty acids also may help treat dry eyes. In a recent study of dry eyes induced in mice, topical application of the omega-3 fatty acid ALA led to a significant decrease in dry eye signs and inflammation associated with dry eye.  [place]-3 Foods  While both omega-3 and omega-6 fatty acids are important to health, the balance of these two types of EFAs in our diet is extremely important. Most experts believe the ratio of omega-6 to omega-3 fatty acids in a healthy diet should be 4-to-1 or lower.  Many eye doctors recommend a diet high in omega-3 fatty acids to reduce the risk of eye problems.   Unfortunately, the typical American diet, characterized by significant amounts of meat and processed foods, tends to contain 10 to 30 times more omega-6 than omega-3 fatty acids. This imbalance of omega-6 ("bad") fatty acids to omega-3 ("good") fatty " "acids appears to be a contributing cause of a number of serious health problems, including heart disease, cancer, asthma, arthritis and depression.  One of the best steps you can take to improve your diet is to eat more foods that are rich in omega-3 fatty acids and fewer that are high in omega-6 fatty acids.  The best food sources of beneficial omega-3 fatty acids are cold-water fish, which are high in both DHA and EPA. Examples include sardines, herring, salmon and tuna. Wild-caught varieties usually are better than "farmed" fish, which typically are subject to higher levels of pollutants and chemicals.  The American Heart Association recommends a minimum of two servings of cold-water fish weekly to reduce the risk of cardiovascular disease, and many eye doctors likewise recommend a diet high in omega-3 fatty acids to reduce the risk of eye problems.  If you aren't a fish lover, another way to make sure your diet contains enough omega-3s it to take fish oil supplements. These are available in capsule and liquid form, and many varieties feature a "non-fishy" taste.  Other good sources of omega-3 fatty acids include flaxseeds, flaxseed oil, walnuts and dark green leafy vegetables. However, your body cannot process the ALA omega-3 fatty acids from these vegetarian sources as easily as the DHA and EPA omega-3 fatty acids found in fish.  To reduce your intake of omega-6s, avoid fried and highly processed foods. Many cooking oils, including sunflower oil and corn oil, are very high in omega-6 fatty acids. High cooking temperatures also create harmful trans-fatty acids, or "trans-fats."  Trans fats interfere with the body's absorption of beneficial omega-3 fatty acids and may contribute to a number of serious diseases, including cancer, heart disease, atherosclerosis (hardening of the arteries), high blood pressure, diabetes, obesity, arthritis and immune system disorders.  Currently, there is no Recommended Dietary " Allowance (RDA) for omega-3 fatty acids. But, according to the American Heart Association, research suggests daily intakes of DHA and EPA (combined) ranging from 500 milligrams (0.5 gram) to 1.8 grams (either from fish or fish oil supplements) significantly reduces cardiac risks. For ALA, daily intakes of 1.5 to 3 grams (g) seem to be beneficial.  Foods Containing Omega-3 Essential Fatty Acids   Food DHA and EPA Omega-3s (total), grams   [place], Atlantic (half fillet, grilled) 3.89   Mackerel, Pacific (1 fillet, grilled) 3.25   Sardine oil (1 tablespoon) 2.83   Amado, [place] (half fillet, grilled) 2.68   Cod liver oil (1 tablespoon) 2.43   [place]ado, pink (half fillet, grilled)  1.60   Herring oil (1 tablespoon) 1.43   Sardines, canned in oil (approx. 3 ounces) 0.90   White tuna, canned in water (approx. 3 ounces) 0.73   Source: [employer] Library, U.S. Dept. of Agriculture   For a more nutritious diet and potentially better eye health, try these simple changes:  1. Replace cooking oils that are high in omega-6 fatty acids with olive oil, which has significantly lower levels of omega-6 fatty acids.  2. Eat plenty of fish, fruits and vegetables.  3. Avoid hydrogenated oils (found in many snack foods) and margarine.  4. Avoid fried foods and foods containing trans fats.  5. Limit your consumption of red meat.  Choosing a healthy diet that includes a variety of foods with plenty of omega-3 fatty acids and limiting your intake of potentially harmful omega-6 fatty acids will significantly increase your odds of a lifetime of good vision and vibrant health.   Home » Nutrition » Omega-3 Fatty Acids     About the Author: Thomas Cano OD, is  of [email or web address]. Dr. Cano has more than 25 years of experience as an eye care provider, health educator and consultant to the eyewear industry. His special interests include contact lenses, nutrition and preventive vision care

## 2022-02-11 ENCOUNTER — CLINICAL SUPPORT (OUTPATIENT)
Dept: REHABILITATION | Facility: OTHER | Age: 72
End: 2022-02-11
Attending: INTERNAL MEDICINE
Payer: MEDICARE

## 2022-02-11 DIAGNOSIS — M25.69 DECREASED RANGE OF MOTION OF TRUNK AND BACK: Primary | ICD-10-CM

## 2022-02-11 DIAGNOSIS — R29.898 WEAKNESS OF LEFT LOWER EXTREMITY: ICD-10-CM

## 2022-02-11 DIAGNOSIS — R29.898 DECREASED STRENGTH OF TRUNK AND BACK: ICD-10-CM

## 2022-02-11 PROCEDURE — 97110 THERAPEUTIC EXERCISES: CPT

## 2022-02-11 NOTE — PROGRESS NOTES
" [facility] Treatment      Name: Jalyn Aaron  Clinic Number: [id number]    Therapy Diagnosis:   Encounter Diagnoses   Name Primary?    Decreased range of motion of trunk and back Yes    Decreased strength of trunk and back     Weakness of left lower extremity      Physician: Bere Myers MD    Visit Date: 2022    Physician Orders: PT Eval and Treat   Medical Diagnosis from Referral: M48.061 (ICD-10-CM) - Spinal stenosis of lumbar region without neurogenic claudication  Evaluation Date: 2022  Authorization Period Expiration: 12/10/2022  Plan of Care Expiration: 2022  Reassessment Due: 2022  Visit # / Visits authorized:      Time In:9:30 am  Time Out: 10:30 am  Total Billable Time: 55 minutes     Precautions: Standard and pacemaker      Pattern of pain determined: 2    Subjective   Jalyn Solares reports increased soreness in the glute     Patient reports tolerating previous visit well /c no c/o of excess discomfort.      Pain:  Current 3/10   Location: bilateral back, L calf  Description:  dull/achy    Occupation: Retired   Leisure: walking my dog, cooking, go to yoga, reading, [employer]/bible study        Pts goals: "Increased L leg strength, prevent my pain from getting worse and hopefully decrease my pain"     Objective     Baseline Isometric Testing on Med X equipment: Testing administered by PT  Date of testin2022  ROM 3-48 deg   Max Peak Torque 100    Min Peak Torque 55    Flex/Ext Ratio 1.8:1   % below normative data 26         Limitation/Restriction for FOTO lumbar Survey     Therapist reviewed FOTO scores for Jalyn Aaron on 2022.   FOTO documents entered into [employer] - see Media section.      Limitation Score: 35%     Goal: 30%           Treatment    Pt was instructed in and performed the following:     Jalyn Solares received therapeutic exercises to develop/improved posture, cardiovascular endurance, " "muscular endurance, lumbar/cervical ROM, strength and muscular endurance for 50 minutes including the following exercises:      Aerobic exercise: Recumbent bike 5 minutes at 4 resistance.     LTR's x10  Piriformis stretch 3x20"  DKTC 10x5"  PPT x 15 5 sec hold  Bridges RTB x 15 cue for TA activation sustained  BKFO RTB x10  Sciatic Nerve glide (L) x 20    Not performed  SOC 20x5"  HS stretch with strap 3x20"  Prone laying -> prone on elbow 3 min   SKTC 10x5"-NP  Seated trunk flexion 10x5"-NP  Seated trunk flexion ball rollouts 10x5"-NP    [facility] - Short 2/11/2022   Visit Number 5   VAS Pain Rating 3   Treadmill Time (in min.) -   Speed -   Time 5   Lumbar Stretches - Slouch -   Extension in Lying -   Flexion in Lying 10   Flexion in Sitting -   Manual Therapy 5   Lumbar Extension - Seat Pad -   Femur Restraint -   Top Dead Center -   Counterweight -   Lumbar Flexion -   Lumbar Extension -   Lumbar Peak Torque -   Lumbar Weight 55   Repetitions 15   Rating of Perceived Exertion 4         Peripheral muscle strengthening which included 1 set of 15-20 repetitions at a slow, controlled 10-13 second per rep pace focused on strengthening supporting musculature for improved body mechanics and functional mobility.  Pt and therapist focused on proper form during treatment to ensure optimal strengthening of each targeted muscle group.  Machines were utilized including torso rotation, leg extension, leg curl, chest press, upright row. Tricep extension, bicep curl, leg press, and hip abduction added visit 3    Jalyn Solares received the following manual therapy techniques: 5 Minutes    Piriformis release     Home Exercises Provided and Patient Education Provided   Home exercises include:   LTR  DKTC/SKTC  PPT  Seated lumbar flexion - ball rollout  SOC  Piriformis   HS stretch  Sciatic nerve glide     Cardio program: Bike/Walking  Lifting education date: visit 11  Lumbar roll: Standard/Slim uses in car and at " home    Education provided:   -importance of maintaining functional mobility to promote improved posture     Written Home Exercises Provided: Patient instructed to cont prior HEP.  Exercises were reviewed and Jalyn was able to demonstrate them prior to the end of the session.  Jalyn demonstrated good  understanding of the education provided.     See EMR under Patient Instructions for exercises provided prior visit and today's visit.    Assessment     Pt presents today with continued complaints of buttock and calf pain. Reviewed sciatic nerve glide form due to incorrect form demo by patient. After cueing, pt able to complete correctly. Medex resistance progressed to 55 ft/lbs and pt was able to complete 15 reps with 4/10 RPE. Continue to progress as tolerated.       Patient is making Good progress towards established goals.  Pt will continue to benefit from skilled outpatient physical therapy to address the deficits stated in the impairment chart, provide pt/family education and to maximize pt's level of independence in the home and community environment.     Anticipated Barriers for therapy: Fear avoidance  Pt's spiritual, cultural and educational needs considered and pt agreeable to plan of care and goals as stated below:      GOALS: Pt is in agreement with the following goals.     Short term goals:  6 weeks or 10 visits   1.  Pt will demonstrate increased lumbar ROM by at least 6 degrees from the initial ROM value with improvements noted in functional ROM and ability to perform ADLs.  (approp and ongoing)  2.  Pt will demonstrate increased MedX average isometric strength value  by 15% from initial test resulting in improved ability to perform bending, lifting, and carrying activities safely, confidently.  (approp and ongoing)  3.  Patient report a reduction in worst pain score by 1-2 points for improved tolerance for sitting.  (approp and ongoing)  4.  Pt able to perform HEP correctly with minimal cueing or  supervision from therapist to encourage independent management of symptoms. (approp and ongoing)        Long term goals: 10 weeks or 20 visits   1. Pt will demonstrate increased lumbar ROM by at least 9 degrees from initial ROM value, resulting in improved ability to perform functional fwd bending while standing and sitting. (approp and ongoing)  2. Pt will demonstrate increased MedX average isometric strength value  by 25% from initial test resulting in improved ability to perform bending, lifting, and carrying activities safely, confidently.  (approp and ongoing)  3. Pt to demonstrate ability to independently control and reduce their pain through posture positioning and mechanical movements throughout a typical day.  (approp and ongoing)  4.  Pt will demonstrate reduced pain and improved functional outcomes as reported on the FOTO by reaching a limitation score of < or = 30% or less in order to demonstrate subjective improvement in pt's condition.    (approp and ongoing)  5. Pt will demonstrate independence with the HEP at discharge  (approp and ongoing)  6. Pt will be able to lift 10# from the floor to a table and back to the floor without increased pain so that she may have improved ability to perform bending, lifting, and carrying activities safely, confidently (approp and ongoing)     Plan   Continue with established Plan of Care towards established PT goals.     Jesus Calabrese, PT  2/11/2022

## 2022-02-15 ENCOUNTER — CLINICAL SUPPORT (OUTPATIENT)
Dept: REHABILITATION | Facility: OTHER | Age: 72
End: 2022-02-15
Attending: INTERNAL MEDICINE
Payer: MEDICARE

## 2022-02-15 DIAGNOSIS — R29.898 WEAKNESS OF LEFT LOWER EXTREMITY: ICD-10-CM

## 2022-02-15 DIAGNOSIS — R29.898 DECREASED STRENGTH OF TRUNK AND BACK: ICD-10-CM

## 2022-02-15 DIAGNOSIS — M25.69 DECREASED RANGE OF MOTION OF TRUNK AND BACK: Primary | ICD-10-CM

## 2022-02-15 PROCEDURE — 97110 THERAPEUTIC EXERCISES: CPT

## 2022-02-15 NOTE — PROGRESS NOTES
"  [facility] Update     Name: Jalyn Rileyue  Clinic Number: [id number]    Therapy Diagnosis:   Encounter Diagnoses   Name Primary?    Decreased range of motion of trunk and back Yes    Decreased strength of trunk and back     Weakness of left lower extremity      Physician: Bere Myers MD    Visit Date: 2/15/2022    Physician Orders: PT Eval and Treat   Medical Diagnosis from Referral: M48.061 (ICD-10-CM) - Spinal stenosis of lumbar region without neurogenic claudication  Evaluation Date: 1/21/2022  Authorization Period Expiration: 12/10/2022  Plan of Care Expiration: 4/21/2022  Reassessment Completed: 02/15/22  Visit # / Visits authorized:  6/ 6     Time In: 0815  Time Out: 0915  Total Billable Time: 45 minutes     Precautions: Standard and pacemaker      Pattern of pain determined: 2    Subjective   Jalyn Solares arrives to PT and reports 3/10 L buttock pain, with some pain at lower leg region.    Patient reports tolerating previous visit well /c no c/o of excess discomfort.      Pain:  Current: 3/10;  at worst: 5/10   ; at best: 3/10     Location: bilateral back/(L buttock), L calf  Description:  dull/achy    Occupation: Retired   Leisure: walking my dog, cooking, go to yoga, reading, Y[employer]anism/bible study        Pts goals: "Increased L leg strength, prevent my pain from getting worse and hopefully decrease my pain"     Objective     UPDATE: 2/15/22  MOVEMENT LOSS     2/15/22 ROM Loss   Flexion WFL minimal loss   Extension Mod loss moderate loss   Side glide Right Mod loss moderate loss   Side glide Left Mod Loss moderate loss   Rotation Right WFL moderate loss   Rotation Left WFL moderate loss      Lower Extremity Strength  Right LE   Left LE     Hip flexion: 4+/5 Hip flexion: 4+/5   Hip extension:  4-/5 Hip extension: 4-/5   Hip abduction: 4-/5 Hip abduction: 4-/5   Hip adduction:  4/5 Hip adduction:  4/5   Hip IR    4+/5 Hip IR 4+/5   Hip ER 4-/5 Hip ER 4-/5 " "  Knee Flexion 4+/5 Knee Flexion 4+/5   Knee Extension 5/5 Knee Extension 5/5   Ankle dorsiflexion: 5/5 Ankle dorsiflexion: 4/5   Ankle plantarflexion: 5/5 Ankle plantarflexion: 4/5        Baseline Isometric Testing on Med X equipment: Testing administered by PT  Date of testin2022  ROM 3-48 deg==> 0-48 deg    Max Peak Torque 100    Min Peak Torque 55    Flex/Ext Ratio 1.8:1   Total ROM gain from eval  3 deg Ext on 22   % below normative data 26         Limitation/Restriction for FOTO lumbar Survey     Therapist reviewed FOTO scores for Jalyn Aaron on 2022.   FOTO documents entered into [employer] - see Media section.      Limitation Score: 35%              Treatment    Pt was instructed in and performed the following:     Jalyn Solares received therapeutic exercises to develop/improved posture, cardiovascular endurance, muscular endurance, lumbar/cervical ROM, strength and muscular endurance for 60 minutes including the following exercises:      Aerobic exercise: Treadmill at 2 mph for 5 minutes  Recumbent bike 5 minutes at 4 resistance-NP    LTR's x10  Piriformis stretch 3x20"  DKTC 10x5"  PPT x20 with 5 sec hold  Bridges RTB x20 cue for TA activation sustained ==> Progress to GTB  Braced BKFO RTB x20==> Progress to GTB  Sciatic Nerve glide (L) x 20  [facility] 2/15/2022   Visit Number 6   VAS Pain Rating 3   Treadmill Time (in min.) 5   Speed 2   Flexion in Lying 10   Lumbar Weight 55   Repetitions 20   Rating of Perceived Exertion 5   Ice - Z Lie (in min.) 5       Not performed  SOC 20x5"  HS stretch with strap 3x20"  Prone laying -> prone on elbow 3 min   SKTC 10x5"-NP  Seated trunk flexion 10x5"-NP  Seated trunk flexion ball rollouts 10x5"-NP    Peripheral muscle strengthening which included 1 set of 15-20 repetitions at a slow, controlled 10-13 second per rep pace focused on strengthening supporting musculature for improved body mechanics and functional mobility.  Pt and " therapist focused on proper form during treatment to ensure optimal strengthening of each targeted muscle group.  Machines were utilized including torso rotation, leg extension, leg curl, chest press, upright row. Tricep extension, bicep curl, leg press, and hip abduction added visit 3    Jalyn Solares received the following manual therapy techniques:  Minutes    Piriformis release     Home Exercises Provided and Patient Education Provided   Home exercises include:   LTR  DKTC/SKTC  PPT  Seated lumbar flexion - ball rollout  SOC  Piriformis   HS stretch  Sciatic nerve glide     Cardio program: Bike/Walking  Lifting education date: visit 11  Lumbar roll: Standard/Slim uses in car and at home    Education provided:   -importance of maintaining functional mobility to promote improved posture     Written Home Exercises Provided: Patient instructed to cont prior HEP.  Exercises were reviewed and Jalyn was able to demonstrate them prior to the end of the session.  Jalyn demonstrated good  understanding of the education provided.     See EMR under Patient Instructions for exercises provided prior visit and today's visit.    Assessment     Jalyn  has attended 6 visits at [facility] which included MD evaluation, PT evaluation with isometric testing, and physical therapy treatment including HEP instruction, education, aerobic work, dynamic strengthening on med ex equipment for the spine, and whole body strengthening on med ex equipment with increasing weight loads.  Patient is demonstrating fairly improving ability to reduce symptoms, adjusting posture, and improving lumbar mobility and LE's strength. She displayed a 3 deg extension ROM increase from eval, on lumbar extension medx.   Jalyn resumed previous exercises for mobility, stabilization and strengthening without any concerns. She progressed with lumbar extension weight on medx, at 55# currently. She completed 20 reps on lumbar extension, at RPE of 5/10.  Progress with a 5% weight increase at next visit.     Patient is making  progress towards lumbar ROM, LE Strength,  FOTO outcome score, and other established goals.  Pt will continue to benefit from skilled outpatient physical therapy to address the deficits stated in the impairment chart, provide pt/family education and to maximize pt's level of independence in the home and community environment.     Anticipated Barriers for therapy: Fear avoidance  Pt's spiritual, cultural and educational needs considered and pt agreeable to plan of care and goals as stated below:      GOALS: Pt is in agreement with the following goals.     Short term goals:  6 weeks or 10 visits   1.  Pt will demonstrate increased lumbar ROM by at least 6 degrees from the initial ROM value with improvements noted in functional ROM and ability to perform ADLs.  (approp and ongoing)  2.  Pt will demonstrate increased MedX average isometric strength value  by 15% from initial test resulting in improved ability to perform bending, lifting, and carrying activities safely, confidently.  (approp and ongoing)  3.  Patient report a reduction in worst pain score by 1-2 points for improved tolerance for sitting.  (approp and ongoing)  4.  Pt able to perform HEP correctly with minimal cueing or supervision from therapist to encourage independent management of symptoms. (approp and ongoing)        Long term goals: 10 weeks or 20 visits   1. Pt will demonstrate increased lumbar ROM by at least 9 degrees from initial ROM value, resulting in improved ability to perform functional fwd bending while standing and sitting. (approp and ongoing)  2. Pt will demonstrate increased MedX average isometric strength value  by 25% from initial test resulting in improved ability to perform bending, lifting, and carrying activities safely, confidently.  (approp and ongoing)  3. Pt to demonstrate ability to independently control and reduce their pain through posture positioning  and mechanical movements throughout a typical day.  (approp and ongoing)  4.  Pt will demonstrate reduced pain and improved functional outcomes as reported on the FOTO by reaching a limitation score of < or = 30% or less in order to demonstrate subjective improvement in pt's condition.    (approp and ongoing)  5. Pt will demonstrate independence with the HEP at discharge  (approp and ongoing)  6. Pt will be able to lift 10# from the floor to a table and back to the floor without increased pain so that she may have improved ability to perform bending, lifting, and carrying activities safely, confidently (approp and ongoing)     Plan   Continue with established Plan of Care towards established PT goals.     Solomon Wesley, PT  2/15/2022

## 2022-02-17 ENCOUNTER — CLINICAL SUPPORT (OUTPATIENT)
Dept: REHABILITATION | Facility: OTHER | Age: 72
End: 2022-02-17
Attending: INTERNAL MEDICINE
Payer: MEDICARE

## 2022-02-17 ENCOUNTER — PES CALL (OUTPATIENT)
Dept: ADMINISTRATIVE | Facility: CLINIC | Age: 72
End: 2022-02-17
Payer: MEDICARE

## 2022-02-17 DIAGNOSIS — M25.69 DECREASED RANGE OF MOTION OF TRUNK AND BACK: Primary | ICD-10-CM

## 2022-02-17 DIAGNOSIS — R29.898 WEAKNESS OF LEFT LOWER EXTREMITY: ICD-10-CM

## 2022-02-17 DIAGNOSIS — R29.898 DECREASED STRENGTH OF TRUNK AND BACK: ICD-10-CM

## 2022-02-17 PROCEDURE — 97110 THERAPEUTIC EXERCISES: CPT | Mod: CQ

## 2022-02-17 NOTE — PROGRESS NOTES
"  [facility] Update     Name: Jalyn Solares [facility]  Clinic Number: [id number]    Therapy Diagnosis:   Encounter Diagnoses   Name Primary?    Decreased range of motion of trunk and back Yes    Decreased strength of trunk and back     Weakness of left lower extremity      Physician: Bere Myers MD    Visit Date: 2/17/2022    Physician Orders: PT Eval and Treat   Medical Diagnosis from Referral: M48.061 (ICD-10-CM) - Spinal stenosis of lumbar region without neurogenic claudication  Evaluation Date: 1/21/2022  Authorization Period Expiration: 12/10/2022  Plan of Care Expiration: 4/21/2022  Reassessment Completed: 02/15/22  Visit # / Visits authorized:  7/ 6 (pending)     Time In: 8:15  Time Out: 9:10  Total Billable Time: 45 minutes     Precautions: Standard and pacemaker      Pattern of pain determined: 2    Subjective   Jalyn Solares reports increased pain in L buttock and L calf today.    Patient reports tolerating previous visit well /c no c/o of excess discomfort.      Pain:  Current: 5/10;  at worst: 5/10   ; at best: 3/10     Location: bilateral back/(L buttock), L calf  Description:  dull/achy    Occupation: Retired   Leisure: walking my dog, cooking, go to yoga, reading, Quaker/bible study        Pts goals: "Increased L leg strength, prevent my pain from getting worse and hopefully decrease my pain"     Objective     UPDATE: 2/15/22  MOVEMENT LOSS     2/15/22 ROM Loss   Flexion WFL minimal loss   Extension Mod loss moderate loss   Side glide Right Mod loss moderate loss   Side glide Left Mod Loss moderate loss   Rotation Right WFL moderate loss   Rotation Left WFL moderate loss      Lower Extremity Strength  Right LE   Left LE     Hip flexion: 4+/5 Hip flexion: 4+/5   Hip extension:  4-/5 Hip extension: 4-/5   Hip abduction: 4-/5 Hip abduction: 4-/5   Hip adduction:  4/5 Hip adduction:  4/5   Hip IR    4+/5 Hip IR 4+/5   Hip ER 4-/5 Hip ER 4-/5   Knee Flexion " "4+/5 Knee Flexion 4+/5   Knee Extension 5/5 Knee Extension 5/5   Ankle dorsiflexion: 5/5 Ankle dorsiflexion: 4/5   Ankle plantarflexion: 5/5 Ankle plantarflexion: 4/5        Baseline Isometric Testing on Med X equipment: Testing administered by PT  Date of testin2022  ROM 3-48 deg==> 0-48 deg    Max Peak Torque 100    Min Peak Torque 55    Flex/Ext Ratio 1.8:1   Total ROM gain from eval  3 deg Ext on 22   % below normative data 26         Limitation/Restriction for FOTO lumbar Survey     Therapist reviewed FOTO scores for Jalyn Aaron on 2022.   FOTO documents entered into "[employer] - see Media section.      Limitation Score: 35%              Treatment    Pt was instructed in and performed the following:     Jalyn Solares received therapeutic exercises to develop/improved posture, cardiovascular endurance, muscular endurance, lumbar/cervical ROM, strength and muscular endurance for 45 minutes including the following exercises:      Aerobic exercise: Treadmill at 2 mph for 5 minutes  Recumbent bike 5 minutes at 4 resistance-NP    LTR's x10  Piriformis stretch 3x20"  DKTC 10x5"  PPT x20 with 5 sec hold  Bridges +GTB x20 cue for TA activation sustained  Braced BKFO +GTB x20   Sciatic Nerve glide (L) x 20    [facility] 2022   Visit Number 7   VAS Pain Rating 5   Treadmill Time (in min.) 5   Speed 2   Time -   Lumbar Stretches - Slouch -   Extension in Lying -   Flexion in Lying 10   Flexion in Sitting -   Manual Therapy 5   Lumbar Extension - Seat Pad -   Femur Restraint -   Top Dead Center -   Counterweight -   Lumbar Flexion -   Lumbar Extension -   Lumbar Peak Torque -   Lumbar Weight 59   Repetitions 15   Rating of Perceived Exertion 6       Not performed  SOC 20x5"  HS stretch with strap 3x20"  Prone laying -> prone on elbow 3 min   SKTC 10x5"-NP  Seated trunk flexion 10x5"-NP  Seated trunk flexion ball rollouts 10x5"-NP    Peripheral muscle strengthening which " included 1 set of 15-20 repetitions at a slow, controlled 10-13 second per rep pace focused on strengthening supporting musculature for improved body mechanics and functional mobility.  Pt and therapist focused on proper form during treatment to ensure optimal strengthening of each targeted muscle group.  Machines were utilized including torso rotation, leg extension, leg curl, chest press, upright row. Tricep extension, bicep curl, leg press, and hip abduction added visit 3    Jalyn Solares received the following manual therapy techniques: 5 Minutes    LAD to L leg  Piriformis release NP    Home Exercises Provided and Patient Education Provided   Home exercises include:   LTR  DKTC/SKTC  PPT  Seated lumbar flexion - ball rollout  SOC  Piriformis   HS stretch  Sciatic nerve glide     Cardio program: Bike/Walking  Lifting education date: visit 11  Lumbar roll: Standard/Slim uses in car and at home    Education provided:   -importance of maintaining functional mobility to promote improved posture     Written Home Exercises Provided: Patient instructed to cont prior HEP.  Exercises were reviewed and Jalyn was able to demonstrate them prior to the end of the session.  Jalyn demonstrated good  understanding of the education provided.     See EMR under Patient Instructions for exercises provided prior visit and today's visit.    Assessment   Jalyn returned reporting increased L buttocks pain.  Treatment began by performing LAD to L leg to reduce radicular pain down L leg. She reported decreased pain following technique.  Resistance on bridge and BKFO exercises increased to GTB to further challenge gluteus medius musculature.  Medx resistance increased to 59#.  She completed 15 reps at a RPE of 6/10.  Continue to progress per HB protocol.      Patient is making  progress towards lumbar ROM, LE Strength,  FOTO outcome score, and other established goals.  Pt will continue to benefit from skilled outpatient physical therapy  to address the deficits stated in the impairment chart, provide pt/family education and to maximize pt's level of independence in the home and community environment.     Anticipated Barriers for therapy: Fear avoidance  Pt's spiritual, cultural and educational needs considered and pt agreeable to plan of care and goals as stated below:      GOALS: Pt is in agreement with the following goals.     Short term goals:  6 weeks or 10 visits   1.  Pt will demonstrate increased lumbar ROM by at least 6 degrees from the initial ROM value with improvements noted in functional ROM and ability to perform ADLs.  (approp and ongoing)  2.  Pt will demonstrate increased MedX average isometric strength value  by 15% from initial test resulting in improved ability to perform bending, lifting, and carrying activities safely, confidently.  (approp and ongoing)  3.  Patient report a reduction in worst pain score by 1-2 points for improved tolerance for sitting.  (approp and ongoing)  4.  Pt able to perform HEP correctly with minimal cueing or supervision from therapist to encourage independent management of symptoms. (approp and ongoing)        Long term goals: 10 weeks or 20 visits   1. Pt will demonstrate increased lumbar ROM by at least 9 degrees from initial ROM value, resulting in improved ability to perform functional fwd bending while standing and sitting. (approp and ongoing)  2. Pt will demonstrate increased MedX average isometric strength value  by 25% from initial test resulting in improved ability to perform bending, lifting, and carrying activities safely, confidently.  (approp and ongoing)  3. Pt to demonstrate ability to independently control and reduce their pain through posture positioning and mechanical movements throughout a typical day.  (approp and ongoing)  4.  Pt will demonstrate reduced pain and improved functional outcomes as reported on the FOTO by reaching a limitation score of < or = 30% or less in order to  demonstrate subjective improvement in pt's condition.    (approp and ongoing)  5. Pt will demonstrate independence with the HEP at discharge  (approp and ongoing)  6. Pt will be able to lift 10# from the floor to a table and back to the floor without increased pain so that she may have improved ability to perform bending, lifting, and carrying activities safely, confidently (approp and ongoing)     Plan   Continue with established Plan of Care towards established PT goals.     Alfonso Reeder, PTA  2/17/2022

## 2022-02-21 ENCOUNTER — TELEPHONE (OUTPATIENT)
Dept: NEUROLOGY | Facility: CLINIC | Age: 72
End: 2022-02-21

## 2022-02-21 ENCOUNTER — OFFICE VISIT (OUTPATIENT)
Dept: PODIATRY | Facility: CLINIC | Age: 72
End: 2022-02-21
Payer: MEDICARE

## 2022-02-21 ENCOUNTER — TELEPHONE (OUTPATIENT)
Dept: NEUROLOGY | Facility: HOSPITAL | Age: 72
End: 2022-02-21
Payer: MEDICARE

## 2022-02-21 ENCOUNTER — PATIENT MESSAGE (OUTPATIENT)
Dept: NEUROLOGY | Facility: CLINIC | Age: 72
End: 2022-02-21
Payer: MEDICARE

## 2022-02-21 VITALS
BODY MASS INDEX: 20.4 KG/M2 | SYSTOLIC BLOOD PRESSURE: 115 MMHG | HEIGHT: 67 IN | HEART RATE: 74 BPM | DIASTOLIC BLOOD PRESSURE: 71 MMHG | WEIGHT: 130 LBS

## 2022-02-21 DIAGNOSIS — L84 CORN OR CALLUS: ICD-10-CM

## 2022-02-21 DIAGNOSIS — M21.619 BUNION: ICD-10-CM

## 2022-02-21 DIAGNOSIS — M79.671 FOOT PAIN, BILATERAL: Primary | ICD-10-CM

## 2022-02-21 DIAGNOSIS — M79.672 FOOT PAIN, BILATERAL: Primary | ICD-10-CM

## 2022-02-21 DIAGNOSIS — Z98.890 HISTORY OF BUNIONECTOMY OF BOTH GREAT TOES: ICD-10-CM

## 2022-02-21 DIAGNOSIS — L60.0 INGROWN NAIL: ICD-10-CM

## 2022-02-21 DIAGNOSIS — L85.3 DRY SKIN: ICD-10-CM

## 2022-02-21 PROCEDURE — 4010F ACE/ARB THERAPY RXD/TAKEN: CPT | Mod: CPTII,S$GLB,, | Performed by: PODIATRIST

## 2022-02-21 PROCEDURE — 3008F PR BODY MASS INDEX (BMI) DOCUMENTED: ICD-10-PCS | Mod: CPTII,S$GLB,, | Performed by: PODIATRIST

## 2022-02-21 PROCEDURE — 99999 PR PBB SHADOW E&M-EST. PATIENT-LVL III: ICD-10-PCS | Mod: PBBFAC,,, | Performed by: PODIATRIST

## 2022-02-21 PROCEDURE — 1125F AMNT PAIN NOTED PAIN PRSNT: CPT | Mod: CPTII,S$GLB,, | Performed by: PODIATRIST

## 2022-02-21 PROCEDURE — 1160F RVW MEDS BY RX/DR IN RCRD: CPT | Mod: CPTII,S$GLB,, | Performed by: PODIATRIST

## 2022-02-21 PROCEDURE — 3074F PR MOST RECENT SYSTOLIC BLOOD PRESSURE < 130 MM HG: ICD-10-PCS | Mod: CPTII,S$GLB,, | Performed by: PODIATRIST

## 2022-02-21 PROCEDURE — 1160F PR REVIEW ALL MEDS BY PRESCRIBER/CLIN PHARMACIST DOCUMENTED: ICD-10-PCS | Mod: CPTII,S$GLB,, | Performed by: PODIATRIST

## 2022-02-21 PROCEDURE — 1159F PR MEDICATION LIST DOCUMENTED IN MEDICAL RECORD: ICD-10-PCS | Mod: CPTII,S$GLB,, | Performed by: PODIATRIST

## 2022-02-21 PROCEDURE — 3008F BODY MASS INDEX DOCD: CPT | Mod: CPTII,S$GLB,, | Performed by: PODIATRIST

## 2022-02-21 PROCEDURE — 1159F MED LIST DOCD IN RCRD: CPT | Mod: CPTII,S$GLB,, | Performed by: PODIATRIST

## 2022-02-21 PROCEDURE — 1157F PR ADVANCE CARE PLAN OR EQUIV PRESENT IN MEDICAL RECORD: ICD-10-PCS | Mod: CPTII,S$GLB,, | Performed by: PODIATRIST

## 2022-02-21 PROCEDURE — 3078F PR MOST RECENT DIASTOLIC BLOOD PRESSURE < 80 MM HG: ICD-10-PCS | Mod: CPTII,S$GLB,, | Performed by: PODIATRIST

## 2022-02-21 PROCEDURE — 3288F FALL RISK ASSESSMENT DOCD: CPT | Mod: CPTII,S$GLB,, | Performed by: PODIATRIST

## 2022-02-21 PROCEDURE — 3288F PR FALLS RISK ASSESSMENT DOCUMENTED: ICD-10-PCS | Mod: CPTII,S$GLB,, | Performed by: PODIATRIST

## 2022-02-21 PROCEDURE — 3078F DIAST BP <80 MM HG: CPT | Mod: CPTII,S$GLB,, | Performed by: PODIATRIST

## 2022-02-21 PROCEDURE — 99213 PR OFFICE/OUTPT VISIT, EST, LEVL III, 20-29 MIN: ICD-10-PCS | Mod: S$GLB,,, | Performed by: PODIATRIST

## 2022-02-21 PROCEDURE — 1101F PT FALLS ASSESS-DOCD LE1/YR: CPT | Mod: CPTII,S$GLB,, | Performed by: PODIATRIST

## 2022-02-21 PROCEDURE — 99213 OFFICE O/P EST LOW 20 MIN: CPT | Mod: S$GLB,,, | Performed by: PODIATRIST

## 2022-02-21 PROCEDURE — 1101F PR PT FALLS ASSESS DOC 0-1 FALLS W/OUT INJ PAST YR: ICD-10-PCS | Mod: CPTII,S$GLB,, | Performed by: PODIATRIST

## 2022-02-21 PROCEDURE — 3074F SYST BP LT 130 MM HG: CPT | Mod: CPTII,S$GLB,, | Performed by: PODIATRIST

## 2022-02-21 PROCEDURE — 1157F ADVNC CARE PLAN IN RCRD: CPT | Mod: CPTII,S$GLB,, | Performed by: PODIATRIST

## 2022-02-21 PROCEDURE — 99999 PR PBB SHADOW E&M-EST. PATIENT-LVL III: CPT | Mod: PBBFAC,,, | Performed by: PODIATRIST

## 2022-02-21 PROCEDURE — 1125F PR PAIN SEVERITY QUANTIFIED, PAIN PRESENT: ICD-10-PCS | Mod: CPTII,S$GLB,, | Performed by: PODIATRIST

## 2022-02-21 PROCEDURE — 4010F PR ACE/ARB THEARPY RXD/TAKEN: ICD-10-PCS | Mod: CPTII,S$GLB,, | Performed by: PODIATRIST

## 2022-02-21 RX ORDER — GABAPENTIN 600 MG/1
600 TABLET ORAL 3 TIMES DAILY
Qty: 90 TABLET | Refills: 1 | Status: SHIPPED | OUTPATIENT
Start: 2022-02-21 | End: 2022-03-11 | Stop reason: ALTCHOICE

## 2022-02-21 RX ORDER — UREA 200 MG/G
1 CREAM TOPICAL DAILY
Qty: 75 G | Refills: 10 | Status: SHIPPED | OUTPATIENT
Start: 2022-02-21 | End: 2022-12-02

## 2022-02-21 NOTE — TELEPHONE ENCOUNTER
----- Message from Piero Strauss sent at 2/21/2022 11:35 AM CST -----  Contact: Pt  Pt calling in reference to RX below     gabapentin (NEURONTIN) 600 MG tablet        [id number] - [place] - [address]  [address]  [place] [address]  Phone: [phone] Fax: [phone]

## 2022-02-21 NOTE — PROGRESS NOTES
Subjective:      Patient ID: Jalyn Aaron is a 72 y.o. female.    Chief Complaint:   Ingrown Toenail (L great toe, 2nd digit on R foot)    Jalyn Solares is a 72 y.o. female who presents to the podiatry clinic  with concerns of ingrown nail pain.   history of bunionectomy.   Bunion recurred.  She is wearing toe spacers.   Going to [place] in about a month.  Brought in boot that will be worn on her trip to be examined.          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 90 tablet 1    losartan (COZAAR) 25 MG tablet One twice daily 60 tablet 6    multivitamin capsule Take 1 capsule by mouth once daily.      naproxen (NAPROSYN) 500 MG tablet Half tablet once daily 90 tablet 1    senna (SENOKOT) 8.6 mg tablet Take 1 tablet by mouth once daily. Twice a day      vitamin D 1000 units Tab Take 1,000 Units by mouth once daily.       No current facility-administered medications on file prior to visit.     Review of patient's allergies indicates:   Allergen Reactions    Bactrim [sulfamethoxazole-trimethoprim] Nausea Only       Review of Systems   Constitutional: Negative for chills, decreased appetite, fever, malaise/fatigue, night sweats, weight gain and weight loss.   Cardiovascular: Negative for chest pain, claudication, dyspnea on exertion, leg swelling, palpitations and syncope.   Respiratory: Negative for cough and  "shortness of breath.    Endocrine: Negative for cold intolerance and heat intolerance.   Hematologic/Lymphatic: Negative for bleeding problem. Does not bruise/bleed easily.   Skin: Negative for color change, dry skin, flushing, itching, nail changes, poor wound healing, rash, skin cancer, suspicious lesions and unusual hair distribution.   Musculoskeletal: Positive for arthritis. Negative for back pain, falls, gout, joint pain, joint swelling, muscle cramps, muscle weakness, myalgias, neck pain and stiffness.   Gastrointestinal: Negative for diarrhea, nausea and vomiting.   Neurological: Positive for numbness and paresthesias. Negative for dizziness, focal weakness, light-headedness, tremors, vertigo and weakness.   Psychiatric/Behavioral: Negative for altered mental status and depression. The patient does not have insomnia.    Allergic/Immunologic: Negative.            Objective:       Vitals:    02/21/22 1043   BP: 115/71   Pulse: 74   Weight: 59 kg (130 lb)   Height: 5' 7" (1.702 m)   PainSc:   5   PainLoc: Toe   59 kg (130 lb)     Physical Exam  Vitals reviewed.   Constitutional:       General: She is not in acute distress.     Appearance: She is well-developed. She is not ill-appearing, toxic-appearing or diaphoretic.      Comments: Proper supportive shoegear   Cardiovascular:      Pulses:           Dorsalis pedis pulses are 2+ on the right side and 2+ on the left side.        Posterior tibial pulses are 1+ on the right side and 1+ on the left side.   Musculoskeletal:         General: No tenderness.      Right foot: Decreased range of motion. Deformity, bunion and prominent metatarsal heads present.      Left foot: Decreased range of motion. Deformity, bunion and prominent metatarsal heads present.      Comments: Flexible pes planus foot type w/ medial arch collapse and mild gastroc equinus     Reducible extensor and flexor contractures at the MTPJ and PIPJ of toes 2-5, bilat. With right dorsal contraction at " the 2nd MTPJ and lateral deviation of right 1st MTPJ/bunion    Left + prominent area 1st MT   Feet:      Right foot:      Protective Sensation: 10 sites tested. 10 sites sensed.      Skin integrity: No ulcer, blister, skin breakdown, erythema, warmth, callus or dry skin.      Toenail Condition: Right toenails are normal.      Left foot:      Protective Sensation: 10 sites tested. 10 sites sensed.      Skin integrity: No ulcer, blister, skin breakdown, erythema, warmth, callus or dry skin.      Toenail Condition: Left toenails are ingrown.      Comments: SWM some increased sensitivity to left digits    Right medial border 2nd toe incurvated . Mild edema, no erythema. No acute soi  Skin:     General: Skin is warm.      Capillary Refill: Capillary refill takes 2 to 3 seconds.      Coloration: Skin is not pale.      Findings: No erythema or rash.   Neurological:      Mental Status: She is alert and oriented to person, place, and time.      Gait: Gait is intact.   Psychiatric:         Attention and Perception: Attention normal.         Mood and Affect: Mood normal.         Speech: Speech normal.         Behavior: Behavior normal.         Thought Content: Thought content normal.         Judgment: Judgment normal.               Assessment:       Encounter Diagnoses   Name Primary?    Foot pain, bilateral Yes    History of bunionectomy of both great toes     Ingrown nail     Bunion     Dry skin     Corn or callus          Plan:         · I counseled the patient on her conditions, their implications and medical management.  Shoe and activity modification as needed for relief.    Extra depth shoes to accommodate bunion, and toe spacers.  Consider shoe repair shop that can stretch out shoes/boots.   I trimmed ingrown nail as a courtesy.  Return as needed for P&A (phenol and alcohol chemical matrixectomy)    General nail care measures for abnormal nails include:   Keeping nails trimmed short   Avoiding trauma     Avoiding contact irritants    Keeping nails dry (avoiding wet work)   Avoiding all nail cosmetics

## 2022-02-22 ENCOUNTER — CLINICAL SUPPORT (OUTPATIENT)
Dept: REHABILITATION | Facility: OTHER | Age: 72
End: 2022-02-22
Attending: INTERNAL MEDICINE
Payer: MEDICARE

## 2022-02-22 DIAGNOSIS — R29.898 DECREASED STRENGTH OF TRUNK AND BACK: ICD-10-CM

## 2022-02-22 DIAGNOSIS — R29.898 WEAKNESS OF LEFT LOWER EXTREMITY: ICD-10-CM

## 2022-02-22 DIAGNOSIS — M25.69 DECREASED RANGE OF MOTION OF TRUNK AND BACK: Primary | ICD-10-CM

## 2022-02-22 PROCEDURE — 97110 THERAPEUTIC EXERCISES: CPT

## 2022-02-22 NOTE — PROGRESS NOTES
"  [facility] Update     Name: Jalyn Aaron  Clinic Number: [id number]    Therapy Diagnosis:   Encounter Diagnoses   Name Primary?    Decreased range of motion of trunk and back Yes    Decreased strength of trunk and back     Weakness of left lower extremity      Physician: Bere Myers MD    Visit Date: 2/22/2022    Physician Orders: PT Eval and Treat   Medical Diagnosis from Referral: M48.061 (ICD-10-CM) - Spinal stenosis of lumbar region without neurogenic claudication  Evaluation Date: 1/21/2022  Authorization Period Expiration: 12/10/2022  Plan of Care Expiration: 4/21/2022  Reassessment Completed: 02/15/22  Visit # / Visits authorized:  8/ 6 (pending)     Time In: 0815  Time Out: 0902  Total Billable Time: 47 minutes     Precautions: Standard and pacemaker      Pattern of pain determined: 2    Subjective   Jalyn Solares arrives to PT and reports mod pain in the back, with  L sciatic symptoms/pain to L LE (3/10).     Patient reports tolerating previous visit well /c no c/o of excess discomfort.      Pain:  Current: /10;  at worst: 5/10   ; at best: 3/10     Location: bilateral back/(L buttock), L calf  Description:  dull/achy    Occupation: Retired   Leisure: walking my dog, cooking, go to yoga, reading, Oriental orthodox/bible study        Pts goals: "Increased L leg strength, prevent my pain from getting worse and hopefully decrease my pain"     Objective     UPDATE: 2/15/22  MOVEMENT LOSS     2/15/22 ROM Loss   Flexion WFL minimal loss   Extension Mod loss moderate loss   Side glide Right Mod loss moderate loss   Side glide Left Mod Loss moderate loss   Rotation Right WFL moderate loss   Rotation Left WFL moderate loss      Lower Extremity Strength  Right LE   Left LE     Hip flexion: 4+/5 Hip flexion: 4+/5   Hip extension:  4-/5 Hip extension: 4-/5   Hip abduction: 4-/5 Hip abduction: 4-/5   Hip adduction:  4/5 Hip adduction:  4/5   Hip IR    4+/5 Hip IR 4+/5 " "  Hip ER 4-/5 Hip ER 4-/5   Knee Flexion 4+/5 Knee Flexion 4+/5   Knee Extension 5/5 Knee Extension 5/5   Ankle dorsiflexion: 5/5 Ankle dorsiflexion: 4/5   Ankle plantarflexion: 5/5 Ankle plantarflexion: 4/5        Baseline Isometric Testing on Med X equipment: Testing administered by PT  Date of testin2022  ROM 3-48 deg==> 0-48 deg    Max Peak Torque 100    Min Peak Torque 55    Flex/Ext Ratio 1.8:1   Total ROM gain from eval  3 deg Ext on 22   % below normative data 26         Limitation/Restriction for FOTO lumbar Survey     Therapist reviewed FOTO scores for Jalyn Aaron on 2022.   FOTO documents entered into [employer] - see Media section.      Limitation Score: 35%              Treatment    Pt was instructed in and performed the following:     Jalyn Soalres received therapeutic exercises to develop/improved posture, cardiovascular endurance, muscular endurance, lumbar/cervical ROM, strength and muscular endurance for 47 minutes including the following exercises:      Aerobic exercise: Treadmill at 2 mph for 5 minutes  Recumbent bike 5 minutes at 4 resistance-NP    LTR's x10  Piriformis stretch 3x20"  DKTC 10x5"  Sciatic Nerve glide (L/R) x 20  PPT x20 with 5 sec hold  Bridges +GTB x20 cue for TA activation sustained  Braced BKFO +GTB x20     [facility] 2022   Visit Number 8   VAS Pain Rating 0   Treadmill Time (in min.) 5   Speed 2   Flexion in Lying 10   Lumbar Weight 59   Repetitions 18   Rating of Perceived Exertion 5.5   Ice - Z Lie (in min.) 5       Not performed  SOC 20x5"  HS stretch with strap 3x20"  Prone laying -> prone on elbow 3 min   SKTC 10x5"-NP  Seated trunk flexion 10x5"-NP  Seated trunk flexion ball rollouts 10x5"-NP    Peripheral muscle strengthening which included 1 set of 15-20 repetitions at a slow, controlled 10-13 second per rep pace focused on strengthening supporting musculature for improved body mechanics and functional mobility.  Pt and " therapist focused on proper form during treatment to ensure optimal strengthening of each targeted muscle group.  Machines were utilized including torso rotation, leg extension, leg curl, chest press, upright row. Tricep extension, bicep curl, leg press, and hip abduction added visit 3    Jalyn Solares received the following manual therapy techniques: 00 Minutes    LAD to L leg  Piriformis release NP    Home Exercises Provided and Patient Education Provided   Home exercises include:   LTR  DKTC/SKTC  PPT  Seated lumbar flexion - ball rollout  SOC  Piriformis   HS stretch  Sciatic nerve glide     Cardio program: Bike/Walking  Lifting education date: visit 11  Lumbar roll: Standard/Slim uses in car and at home    Education provided:   -importance of maintaining functional mobility to promote improved posture     Written Home Exercises Provided: Patient instructed to cont prior HEP.  Exercises were reviewed and Jalyn was able to demonstrate them prior to the end of the session.  Jalyn demonstrated good  understanding of the education provided.     See EMR under Patient Instructions for exercises provided prior visit and today's visit.    Assessment     Jalyn arrived to PT with 4/10 L LBP and 3/10 L LE pain. Following guided stretches, and upon set up in lumbar medx, she reported 0/10 pain. She resumed previous lumbar weight of 59#, completing 18 reps at RPE of 5.5/10. Continue to progress per tolerance.     Patient is making  progress towards lumbar ROM, LE Strength,  FOTO outcome score, and other established goals.  Pt will continue to benefit from skilled outpatient physical therapy to address the deficits stated in the impairment chart, provide pt/family education and to maximize pt's level of independence in the home and community environment.     Anticipated Barriers for therapy: Fear avoidance  Pt's spiritual, cultural and educational needs considered and pt agreeable to plan of care and goals as stated below:       GOALS: Pt is in agreement with the following goals.     Short term goals:  6 weeks or 10 visits   1.  Pt will demonstrate increased lumbar ROM by at least 6 degrees from the initial ROM value with improvements noted in functional ROM and ability to perform ADLs.  (approp and ongoing)  2.  Pt will demonstrate increased MedX average isometric strength value  by 15% from initial test resulting in improved ability to perform bending, lifting, and carrying activities safely, confidently.  (approp and ongoing)  3.  Patient report a reduction in worst pain score by 1-2 points for improved tolerance for sitting.  (approp and ongoing)  4.  Pt able to perform HEP correctly with minimal cueing or supervision from therapist to encourage independent management of symptoms. (approp and ongoing)        Long term goals: 10 weeks or 20 visits   1. Pt will demonstrate increased lumbar ROM by at least 9 degrees from initial ROM value, resulting in improved ability to perform functional fwd bending while standing and sitting. (approp and ongoing)  2. Pt will demonstrate increased MedX average isometric strength value  by 25% from initial test resulting in improved ability to perform bending, lifting, and carrying activities safely, confidently.  (approp and ongoing)  3. Pt to demonstrate ability to independently control and reduce their pain through posture positioning and mechanical movements throughout a typical day.  (approp and ongoing)  4.  Pt will demonstrate reduced pain and improved functional outcomes as reported on the FOTO by reaching a limitation score of < or = 30% or less in order to demonstrate subjective improvement in pt's condition.    (approp and ongoing)  5. Pt will demonstrate independence with the HEP at discharge  (approp and ongoing)  6. Pt will be able to lift 10# from the floor to a table and back to the floor without increased pain so that she may have improved ability to perform bending, lifting, and  carrying activities safely, confidently (approp and ongoing)     Plan   Continue with established Plan of Care towards established PT goals.     Solomon Wesley, PT  2/22/2022

## 2022-02-24 ENCOUNTER — CLINICAL SUPPORT (OUTPATIENT)
Dept: REHABILITATION | Facility: OTHER | Age: 72
End: 2022-02-24
Attending: INTERNAL MEDICINE
Payer: MEDICARE

## 2022-02-24 DIAGNOSIS — R29.898 DECREASED STRENGTH OF TRUNK AND BACK: ICD-10-CM

## 2022-02-24 DIAGNOSIS — M25.69 DECREASED RANGE OF MOTION OF TRUNK AND BACK: Primary | ICD-10-CM

## 2022-02-24 DIAGNOSIS — R29.898 WEAKNESS OF LEFT LOWER EXTREMITY: ICD-10-CM

## 2022-02-24 PROCEDURE — 97110 THERAPEUTIC EXERCISES: CPT | Mod: CQ

## 2022-02-24 NOTE — PROGRESS NOTES
"  [facility] Update     Name: Jalyn Solares [facility]  Clinic Number: [id number]    Therapy Diagnosis:   Encounter Diagnoses   Name Primary?    Decreased range of motion of trunk and back Yes    Decreased strength of trunk and back     Weakness of left lower extremity      Physician: Bere Myers MD    Visit Date: 2/24/2022    Physician Orders: PT Eval and Treat   Medical Diagnosis from Referral: M48.061 (ICD-10-CM) - Spinal stenosis of lumbar region without neurogenic claudication  Evaluation Date: 1/21/2022  Authorization Period Expiration: 12/10/2022  Plan of Care Expiration: 4/21/2022  Reassessment Completed: 02/15/22  Visit # / Visits authorized: 9/20     Time In: 8:15 AM  Time Out:9:10 AM  Total Billable Time: 50 minutes     Precautions: Standard and pacemaker      Pattern of pain determined: 2    Subjective   Jalyn Solares returned reporting some continued (L) lower back/leg symptoms. She states that she remains active with yoga but has not been able to do it this week because of her ill dog. She reports improved postural and core awareness.    Patient reports tolerating previous visit well /c no c/o of excess discomfort.   Pain:  Current: 3-4/10;  at worst: 5/10   ; at best: 3/10     Location: bilateral back/(L buttock), L calf  Description:  dull/achy    Occupation: Retired   Leisure: walking my dog, cooking, go to yoga, reading, [employer]/bible study        Pts goals: "Increased L leg strength, prevent my pain from getting worse and hopefully decrease my pain"     Objective     UPDATE: 2/15/22  MOVEMENT LOSS     2/15/22 ROM Loss   Flexion WFL minimal loss   Extension Mod loss moderate loss   Side glide Right Mod loss moderate loss   Side glide Left Mod Loss moderate loss   Rotation Right WFL moderate loss   Rotation Left WFL moderate loss      Lower Extremity Strength  Right LE   Left LE     Hip flexion: 4+/5 Hip flexion: 4+/5   Hip extension:  4-/5 Hip extension: " "4-/5   Hip abduction: 4-/5 Hip abduction: 4-/5   Hip adduction:  4/5 Hip adduction:  4/5   Hip IR    4+/5 Hip IR 4+/5   Hip ER 4-/5 Hip ER 4-/5   Knee Flexion 4+/5 Knee Flexion 4+/5   Knee Extension 5/5 Knee Extension 5/5   Ankle dorsiflexion: 5/5 Ankle dorsiflexion: 4/5   Ankle plantarflexion: 5/5 Ankle plantarflexion: 4/5        Baseline Isometric Testing on Med X equipment: Testing administered by PT  Date of testin2022  ROM 3-48 deg==> 0-48 deg    Max Peak Torque 100    Min Peak Torque 55    Flex/Ext Ratio 1.8:1   Total ROM gain from eval  3 deg Ext on 22   % below normative data 26         Limitation/Restriction for FOTO lumbar Survey     Therapist reviewed FOTO scores for Jalyn Aaron on 2022.   FOTO documents entered into [employer] - see Media section.      Limitation Score: 35%              Treatment    Pt was instructed in and performed the following:     Jalyn Solares received therapeutic exercises to develop/improved posture, cardiovascular endurance, muscular endurance, lumbar/cervical ROM, strength and muscular endurance for 50 minutes including the following exercises:      Aerobic exercise: Treadmill at 2 mph for 5 minutes  Recumbent bike 5 minutes at 4 resistance-NP    LTR's x10  Piriformis stretch 3x20"  DKTC 10x5"  Sciatic Nerve glide (L/R) x 20  PPT x20 with 5 sec hold  Bridges +GTB x20 cue for TA activation sustained  Braced BKFO +GTB x20     Not performed  SOC 20x5"  HS stretch with strap 3x20"  Prone laying -> prone on elbow 3 min   SKTC 10x5"-NP  Seated trunk flexion 10x5"-NP  Seated trunk flexion ball rollouts 10x5"-NP    [facility] - Short 2022   Visit Number 9   VAS Pain Rating 3   Treadmill Time (in min.) -   Speed -   Time 5   Lumbar Stretches - Slouch -   Extension in Lying -   Flexion in Lying 10   Flexion in Sitting -   Manual Therapy -   Lumbar Extension - Seat Pad -   Femur Restraint -   Top Dead Center -   Counterweight -   Lumbar Flexion - "   Lumbar Extension -   Lumbar Peak Torque -   Lumbar Weight 59   Repetitions 20   Rating of Perceived Exertion 6     Peripheral muscle strengthening which included 1 set of 15-20 repetitions at a slow, controlled 10-13 second per rep pace focused on strengthening supporting musculature for improved body mechanics and functional mobility.  Pt and therapist focused on proper form during treatment to ensure optimal strengthening of each targeted muscle group.  Machines were utilized including torso rotation, leg extension, leg curl, chest press, upright row. Tricep extension, bicep curl, leg press, and hip abduction added visit 3    Jalyn Solares received the following manual therapy techniques: 2 Minutes    LAD to L leg x 2 minutes  Piriformis release NP    Home Exercises Provided and Patient Education Provided   Home exercises include:   LTR  DKTC/SKTC  PPT  Seated lumbar flexion - ball rollout  SOC  Piriformis   HS stretch  Sciatic nerve glide     Cardio program: Bike/Walking  Lifting education date: visit 11  Lumbar roll: Standard/Slim uses in car and at home    Education provided:   -importance of maintaining functional mobility to promote improved posture     Written Home Exercises Provided: Patient instructed to cont prior HEP.  Exercises were reviewed and Jalyn was able to demonstrate them prior to the end of the session.  Jalyn demonstrated good  understanding of the education provided.     See EMR under Patient Instructions for exercises provided prior visit     Assessment   Jalyn returned with some continued lower back and intermittent (L)LE discomfort.  She reports Improved postural and core awareness since initiating the program. Continued with lumbopelvic/core flexibility and strengthening ex's without c/o. She reports that (L)LE long axis distraction provides some relief. Lumbar MedX resistance maintained at 59# and she was able to progress to complete 20 reps with a RPE = 6/10. The topic of Health  Coaching opportunities as part of the program was discussed to which she did express some interest and was able to discuss scheduling of this with one of our Health Coaches. Will monitor and attempt to progress as tolerated.      Pt will continue to benefit from skilled outpatient physical therapy to address the deficits stated in the impairment chart, provide pt/family education and to maximize pt's level of independence in the home and community environment.     Anticipated Barriers for therapy: Fear avoidance  Pt's spiritual, cultural and educational needs considered and pt agreeable to plan of care and goals as stated below:      GOALS: Pt is in agreement with the following goals.     Short term goals:  6 weeks or 10 visits   1.  Pt will demonstrate increased lumbar ROM by at least 6 degrees from the initial ROM value with improvements noted in functional ROM and ability to perform ADLs.  (approp and ongoing)  2.  Pt will demonstrate increased MedX average isometric strength value  by 15% from initial test resulting in improved ability to perform bending, lifting, and carrying activities safely, confidently.  (approp and ongoing)  3.  Patient report a reduction in worst pain score by 1-2 points for improved tolerance for sitting.  (approp and ongoing)  4.  Pt able to perform HEP correctly with minimal cueing or supervision from therapist to encourage independent management of symptoms. (approp and ongoing)        Long term goals: 10 weeks or 20 visits   1. Pt will demonstrate increased lumbar ROM by at least 9 degrees from initial ROM value, resulting in improved ability to perform functional fwd bending while standing and sitting. (approp and ongoing)  2. Pt will demonstrate increased MedX average isometric strength value  by 25% from initial test resulting in improved ability to perform bending, lifting, and carrying activities safely, confidently.  (approp and ongoing)  3. Pt to demonstrate ability to  independently control and reduce their pain through posture positioning and mechanical movements throughout a typical day.  (approp and ongoing)  4.  Pt will demonstrate reduced pain and improved functional outcomes as reported on the FOTO by reaching a limitation score of < or = 30% or less in order to demonstrate subjective improvement in pt's condition.    (approp and ongoing)  5. Pt will demonstrate independence with the HEP at discharge  (approp and ongoing)  6. Pt will be able to lift 10# from the floor to a table and back to the floor without increased pain so that she may have improved ability to perform bending, lifting, and carrying activities safely, confidently (approp and ongoing)     Plan   Continue with established Plan of Care towards established PT goals.     Prashanth Jones, PTA  2/24/2022

## 2022-03-02 ENCOUNTER — CLINICAL SUPPORT (OUTPATIENT)
Dept: REHABILITATION | Facility: OTHER | Age: 72
End: 2022-03-02
Attending: INTERNAL MEDICINE
Payer: MEDICARE

## 2022-03-02 DIAGNOSIS — M25.69 DECREASED RANGE OF MOTION OF TRUNK AND BACK: Primary | ICD-10-CM

## 2022-03-02 DIAGNOSIS — R29.898 DECREASED STRENGTH OF TRUNK AND BACK: ICD-10-CM

## 2022-03-02 DIAGNOSIS — R29.898 WEAKNESS OF LEFT LOWER EXTREMITY: ICD-10-CM

## 2022-03-02 PROCEDURE — 97110 THERAPEUTIC EXERCISES: CPT

## 2022-03-02 NOTE — PROGRESS NOTES
"  [facility] Treatment     Name: Jalyn Solares [facility]  Clinic Number: [id number]    Therapy Diagnosis:   Encounter Diagnoses   Name Primary?    Decreased range of motion of trunk and back Yes    Decreased strength of trunk and back     Weakness of left lower extremity      Physician: Bere Myers MD    Visit Date: 3/2/2022    Physician Orders: PT Eval and Treat   Medical Diagnosis from Referral: M48.061 (ICD-10-CM) - Spinal stenosis of lumbar region without neurogenic claudication  Evaluation Date: 1/21/2022  Authorization Period Expiration: 12/10/2022  Plan of Care Expiration: 4/21/2022  Visit # / Visits authorized: 10/20     Time In: 9:30 AM  Time Out: 10:25 AM  Total Billable Time: 50 minutes     Precautions: Standard and pacemaker      Pattern of pain determined: 2    Subjective   Jalyn Solares reports no low back or glute discomfort at this time, just some pain in the L calf    Patient reports tolerating previous visit well /c no c/o of excess discomfort.   Pain:  Current: 3-4/10;  at worst: 5/10   ; at best: 3/10     Location: bilateral back/(L buttock), L calf  Description:  dull/achy    Occupation: Retired   Leisure: walking my dog, cooking, go to yoga, reading, [employer]/bible study        Pts goals: "Increased L leg strength, prevent my pain from getting worse and hopefully decrease my pain"     Objective     UPDATE: 2/15/22  MOVEMENT LOSS     2/15/22 ROM Loss   Flexion WFL minimal loss   Extension Mod loss moderate loss   Side glide Right Mod loss moderate loss   Side glide Left Mod Loss moderate loss   Rotation Right WFL moderate loss   Rotation Left WFL moderate loss      Lower Extremity Strength  Right LE   Left LE     Hip flexion: 4+/5 Hip flexion: 4+/5   Hip extension:  4-/5 Hip extension: 4-/5   Hip abduction: 4-/5 Hip abduction: 4-/5   Hip adduction:  4/5 Hip adduction:  4/5   Hip IR    4+/5 Hip IR 4+/5   Hip ER 4-/5 Hip ER 4-/5   Knee Flexion 4+/5 Knee " "Flexion 4+/5   Knee Extension 5/5 Knee Extension 5/5   Ankle dorsiflexion: 5/5 Ankle dorsiflexion: 4/5   Ankle plantarflexion: 5/5 Ankle plantarflexion: 4/5        Baseline Isometric Testing on Med X equipment: Testing administered by PT  Date of testin2022  ROM 3-48 deg==> 0-48 deg    Max Peak Torque 100    Min Peak Torque 55    Flex/Ext Ratio 1.8:1   Total ROM gain from eval  3 deg Ext on 22   % below normative data 26      Baseline Isometric Testing on Med X equipment: Testing administered by PT  Date of testing: 3/2/2022  ROM 0-48 deg    Max Peak Torque 132   Min Peak Torque 74   Flex/Ext Ratio 1.8:1   Total ROM gain from eval  3 deg Ext on 22   % gain from initial  61%   % above normative data 14%        Limitation/Restriction for FOTO lumbar Survey     Therapist reviewed FOTO scores for Jalyn Aaron on 2022.   FOTO documents entered into [employer] - see Media section.                    Treatment    Pt was instructed in and performed the following:     Jalyn Solares received therapeutic exercises to develop/improved posture, cardiovascular endurance, muscular endurance, lumbar/cervical ROM, strength and muscular endurance for 45 minutes including the following exercises:      Aerobic exercise: Treadmill at 2 mph for 5 minutes  Recumbent bike 5 minutes at 4 resistance-NP    LTR's x10  Piriformis stretch 3x20"  DKTC 10x5"  Sciatic Nerve glide (L) x 20  PPT x20 with 5 sec hold  Bridges +GTB x20 cue for TA activation sustained  Braced BKFO +GTB x15     Not performed  SOC 20x5"  HS stretch with strap 3x20"  Prone laying -> prone on elbow 3 min   SKTC 10x5"  Seated trunk flexion 10x5"   Seated trunk flexion ball rollouts 10x5"    [facility] - Short 3/2/2022   Visit Number 10   VAS Pain Rating 3   Treadmill Time (in min.) -   Speed -   Time 5   Lumbar Stretches - Slouch -   Extension in Lying -   Flexion in Lying 10   Flexion in Sitting 10   Manual Therapy -   Lumbar Extension - " Seat Pad -   Femur Restraint -   Top Dead Center -   Counterweight -   Lumbar Flexion 48   Lumbar Extension 0   Lumbar Peak Torque 132   Lumbar Weight 45   Repetitions 5   Rating of Perceived Exertion -         Peripheral muscle strengthening which included 1 set of 15-20 repetitions at a slow, controlled 10-13 second per rep pace focused on strengthening supporting musculature for improved body mechanics and functional mobility.  Pt and therapist focused on proper form during treatment to ensure optimal strengthening of each targeted muscle group.  Machines were utilized including torso rotation, leg extension, leg curl, chest press, upright row. Tricep extension, bicep curl, leg press, and hip abduction added visit 3    Jalyn Solares received the following manual therapy techniques: 2 Minutes    LAD to L leg x 2 minutes  Piriformis release NP    Home Exercises Provided and Patient Education Provided   Home exercises include:   LTR  DKTC/SKTC  PPT  Seated lumbar flexion - ball rollout  SOC  Piriformis   HS stretch  Sciatic nerve glide     Cardio program: Bike/Walking  Lifting education date: visit 11  Lumbar roll: Standard/Slim uses in car and at home    Education provided:   -importance of maintaining functional mobility to promote improved posture     Written Home Exercises Provided: Patient instructed to cont prior HEP.  Exercises were reviewed and Jalyn was able to demonstrate them prior to the end of the session.  Jalyn demonstrated good  understanding of the education provided.     See EMR under Patient Instructions for exercises provided prior visit     Assessment       Patient retested at visit 10 and shows improvement in:  Improved posture, using lumbar roll  Improved lumbar ROM, 3-48 deg initially on med ex test and 0-48 currently  Improved strength at each test point on lumbar med ex IM test with 61% average improvement noted with Reduced pain  Noted by patient  Initial FOTO score 35% and current FOTO  score 28% indicating reduced pain and improved function        Pt will continue to benefit from skilled outpatient physical therapy to address the deficits stated in the impairment chart, provide pt/family education and to maximize pt's level of independence in the home and community environment.     Anticipated Barriers for therapy: Fear avoidance  Pt's spiritual, cultural and educational needs considered and pt agreeable to plan of care and goals as stated below:      GOALS: Pt is in agreement with the following goals.     Short term goals:  6 weeks or 10 visits   1.  Pt will demonstrate increased lumbar ROM by at least 6 degrees from the initial ROM value with improvements noted in functional ROM and ability to perform ADLs.  (approp and ongoing)  2.  Pt will demonstrate increased MedX average isometric strength value  by 15% from initial test resulting in improved ability to perform bending, lifting, and carrying activities safely, confidently.  MET  3.  Patient report a reduction in worst pain score by 1-2 points for improved tolerance for sitting.  MET  4.  Pt able to perform HEP correctly with minimal cueing or supervision from therapist to encourage independent management of symptoms. MET      Long term goals: 10 weeks or 20 visits   1. Pt will demonstrate increased lumbar ROM by at least 9 degrees from initial ROM value, resulting in improved ability to perform functional fwd bending while standing and sitting. (approp and ongoing)  2. Pt will demonstrate increased MedX average isometric strength value  by 25% from initial test resulting in improved ability to perform bending, lifting, and carrying activities safely, confidently.  MET  3. Pt to demonstrate ability to independently control and reduce their pain through posture positioning and mechanical movements throughout a typical day.  (approp and ongoing)  4.  Pt will demonstrate reduced pain and improved functional outcomes as reported on the FOTO by  reaching a limitation score of < or = 30% or less in order to demonstrate subjective improvement in pt's condition.    MET  5. Pt will demonstrate independence with the HEP at discharge  (approp and ongoing)  6. Pt will be able to lift 10# from the floor to a table and back to the floor without increased pain so that she may have improved ability to perform bending, lifting, and carrying activities safely, confidently (approp and ongoing)     Plan   Continue with established Plan of Care towards established PT goals.     Jesus Calabrese, PT  3/2/2022

## 2022-03-06 NOTE — PROGRESS NOTES
"  [facility]     Name: Jalyn Rileyue  Clinic Number: [id number]    Therapy Diagnosis:   Encounter Diagnoses   Name Primary?    Decreased range of motion of trunk and back Yes    Decreased strength of trunk and back     Weakness of left lower extremity      Physician: Bere Myers MD    Visit Date: 3/7/2022    Physician Orders: PT Eval and Treat   Medical Diagnosis from Referral: M48.061 (ICD-10-CM) - Spinal stenosis of lumbar region without neurogenic claudication  Evaluation Date: 1/21/2022  Authorization Period Expiration: 12/10/2022  Plan of Care Expiration: 4/21/2022  Visit # / Visits authorized: 11/ 20     Time In: 11:45 am  Time Out: 12:30 pm   Total Billable Time: 45 minutes     Precautions: Standard and pacemaker      Pattern of pain determined: 2    Subjective   Jalyn Solares reports cont inc AM sx presentation but that her AM stretches and cold pack contribute to lowering her discomfort.    Pt reports has not returned to yoga courses but feels she is physically capable.    Pt reports 17 day countdown until 10 day vacation to [facility].     Patient reports tolerating previous visit well with no residual issues or symptoms  Patient reports their pain to be 3/10 on a 0-10 scale with 0 being no pain and 10 being the worst pain imaginable    Location: bilateral back L calf  Description:  dull/achy    Occupation: Retired   Leisure: walking my dog, cooking, go to yoga, reading, [employer]/bible study        Pts goals: "Increased L leg strength, prevent my pain from getting worse and hopefully decrease my pain"     Objective     UPDATE: 2/15/22  MOVEMENT LOSS     2/15/22 ROM Loss   Flexion WFL minimal loss   Extension Mod loss moderate loss   Side glide Right Mod loss moderate loss   Side glide Left Mod Loss moderate loss   Rotation Right WFL moderate loss   Rotation Left WFL moderate loss      Lower Extremity Strength  Right LE   Left LE     Hip " "flexion: 4+/5 Hip flexion: 4+/5   Hip extension:  4-/5 Hip extension: 4-/5   Hip abduction: 4-/5 Hip abduction: 4-/5   Hip adduction:  4/5 Hip adduction:  4/5   Hip IR    4+/5 Hip IR 4+/5   Hip ER 4-/5 Hip ER 4-/5   Knee Flexion 4+/5 Knee Flexion 4+/5   Knee Extension 5/5 Knee Extension 5/5   Ankle dorsiflexion: 5/5 Ankle dorsiflexion: 4/5   Ankle plantarflexion: 5/5 Ankle plantarflexion: 4/5        Baseline Isometric Testing on Med X equipment: Testing administered by PT  Date of testin2022  ROM 3-48 deg==> 0-48 deg    Max Peak Torque 100    Min Peak Torque 55    Flex/Ext Ratio 1.8:1   Total ROM gain from eval  3 deg Ext on 22   % below normative data 26      Baseline Isometric Testing on Med X equipment: Testing administered by PT  Date of testing: 3/2/2022  ROM 0-48 deg    Max Peak Torque 132   Min Peak Torque 74   Flex/Ext Ratio 1.8:1   Total ROM gain from eval  3 deg Ext on 22   % gain from initial  61%   % above normative data 14%        Limitation/Restriction for FOTO lumbar Survey     Therapist reviewed FOTO scores for Jalyn Aaron on 2022.   FOTO documents entered into [employer] - see Media section.                    Treatment    Pt was instructed in and performed the following:     Jalyn Solares received therapeutic exercises to develop/improved posture, cardiovascular endurance, muscular endurance, lumbar/cervical ROM, strength and muscular endurance for 45 minutes including the following exercises:      Aerobic exercise: Treadmill at 2 mph for 5 minutes    LTR's x10  DKTC 10x5"  Prone laying -> prone on elbow 3 min    PPT x 10 /c 5 sec hold   Bridges x20 cue for TA activation sustained    Lifting protocol - performed various lifts /c technique deconstructed to hip hinge, counter balance, squat   Pt educated on lifting basics including issued handout   Hip hinge x 10     Squat x 10 cue for knees over toes, push thru heel   Squat /c step stool lift x 5    Golfers lift x 5 " "  SOC x 20    NP due to lifting ed (plan to return next visit)  Braced BKFO +GTB x15   Bridges +GTB x20 cue for TA activation sustained  Piriformis stretch 3x20"  Sciatic Nerve glide (L) x 20     Not performed  Braced BKFO +GTB x15  SOC 20x5"  HS stretch with strap 3x20"  SKTC 10x5"  Seated trunk flexion 10x5"   Seated trunk flexion ball rollouts 10x5"  [facility] 3/7/2022   Visit Number 11   VAS Pain Rating 3   Treadmill Time (in min.) 5   Speed -   Time -   Lumbar Stretches - Slouch Overcorrection 20   Extension in Lying -   Flexion in Lying 10   Flexion in Sitting -   Manual Therapy -   Lumbar Extension Seat Pad -   Femur Restraint -   Top Dead Center -   Counterweight -   Lumbar Flexion 51   Lumbar Extension 0   Lumbar Peak Torque -   Min Torque -   Test Percent Below Normative Data -   Test Percent Gain in Strength from Initial  -   Lumbar Weight 63   Repetitions 19   Rating of Perceived Exertion 6   Ice - Z Lie (in min.) 5       Peripheral muscle strengthening which included 1 set of 15-20 repetitions at a slow, controlled 10-13 second per rep pace focused on strengthening supporting musculature for improved body mechanics and functional mobility.  Pt and therapist focused on proper form during treatment to ensure optimal strengthening of each targeted muscle group.  Machines were utilized including torso rotation, leg extension, leg curl, chest press, upright row. Tricep extension, bicep curl, leg press, and hip abduction added visit 3    Jalyn Solares received the following manual therapy techniques:  00 Minutes    LAD to L leg x 2 minutes  Piriformis release NP    Home Exercises Provided and Patient Education Provided   Home exercises include:   LTR  DKTC/SKTC  PPT  Seated lumbar flexion - ball rollout  SOC  Piriformis   HS stretch  Sciatic nerve glide     Cardio program: Bike/Walking  Lifting education date: visit 11 pt issued handout  Lumbar roll: Standard/Slim uses in car and at " home    Education provided:   -importance of maintaining functional mobility to promote improved posture     Written Home Exercises Provided: Patient instructed to cont prior HEP.  Exercises were reviewed and Jalyn was able to demonstrate them prior to the end of the session.  Jalyn demonstrated good  understanding of the education provided.     See EMR under Patient Instructions for exercises provided prior visit     Assessment     Pt subjective report indicating good sx self management.  Pt demo good core control and may benefit from inc intensity core stability exercises, primarily to offer cont inc self efficacy for return to yoga.  Lumbar MedX weight increased per pt tolerance last visit. Also inc flex range for mobility challenge.  Today pt perform 19 reps at 6 RPE indicating improved mobility and appropriateness of present weight for strength challenge.   Tx /c focus on lifting basics including technique to dec stress on spinal structures and musculature. Pt issued handout for inc carry over.      Patient is making good progress towards established goals.  Pt will continue to benefit from skilled outpatient physical therapy to address the deficits stated in the impairment chart, provide pt/family education and to maximize pt's level of independence in the home and community environment.     Anticipated Barriers for therapy: Fear avoidance  Pt's spiritual, cultural and educational needs considered and pt agreeable to plan of care and goals as stated below:      GOALS: Pt is in agreement with the following goals.     Short term goals:  6 weeks or 10 visits   1.  Pt will demonstrate increased lumbar ROM by at least 6 degrees from the initial ROM value with improvements noted in functional ROM and ability to perform ADLs.  (approp and ongoing)  2.  Pt will demonstrate increased MedX average isometric strength value  by 15% from initial test resulting in improved ability to perform bending, lifting, and carrying  activities safely, confidently.  MET  3.  Patient report a reduction in worst pain score by 1-2 points for improved tolerance for sitting.  MET  4.  Pt able to perform HEP correctly with minimal cueing or supervision from therapist to encourage independent management of symptoms. MET      Long term goals: 10 weeks or 20 visits   1. Pt will demonstrate increased lumbar ROM by at least 9 degrees from initial ROM value, resulting in improved ability to perform functional fwd bending while standing and sitting. (approp and ongoing)  2. Pt will demonstrate increased MedX average isometric strength value  by 25% from initial test resulting in improved ability to perform bending, lifting, and carrying activities safely, confidently.  MET  3. Pt to demonstrate ability to independently control and reduce their pain through posture positioning and mechanical movements throughout a typical day.  (approp and ongoing)  4.  Pt will demonstrate reduced pain and improved functional outcomes as reported on the FOTO by reaching a limitation score of < or = 30% or less in order to demonstrate subjective improvement in pt's condition.    MET  5. Pt will demonstrate independence with the HEP at discharge  (approp and ongoing)  6. Pt will be able to lift 10# from the floor to a table and back to the floor without increased pain so that she may have improved ability to perform bending, lifting, and carrying activities safely, confidently (approp and ongoing)     Plan   Continue with established Plan of Care towards established PT goals.     Hamlet Hager, PT  3/7/2022

## 2022-03-07 ENCOUNTER — CLINICAL SUPPORT (OUTPATIENT)
Dept: REHABILITATION | Facility: OTHER | Age: 72
End: 2022-03-07
Attending: INTERNAL MEDICINE
Payer: MEDICARE

## 2022-03-07 DIAGNOSIS — R29.898 WEAKNESS OF LEFT LOWER EXTREMITY: ICD-10-CM

## 2022-03-07 DIAGNOSIS — M25.69 DECREASED RANGE OF MOTION OF TRUNK AND BACK: Primary | ICD-10-CM

## 2022-03-07 DIAGNOSIS — R29.898 DECREASED STRENGTH OF TRUNK AND BACK: ICD-10-CM

## 2022-03-07 PROCEDURE — 97110 THERAPEUTIC EXERCISES: CPT

## 2022-03-08 ENCOUNTER — OFFICE VISIT (OUTPATIENT)
Dept: DERMATOLOGY | Facility: CLINIC | Age: 72
End: 2022-03-08
Payer: MEDICARE

## 2022-03-08 DIAGNOSIS — L98.9 DISEASE OF SKIN AND SUBCUTANEOUS TISSUE: Primary | ICD-10-CM

## 2022-03-08 DIAGNOSIS — R21 RASH: ICD-10-CM

## 2022-03-08 PROCEDURE — 1101F PT FALLS ASSESS-DOCD LE1/YR: CPT | Mod: CPTII,S$GLB,, | Performed by: DERMATOLOGY

## 2022-03-08 PROCEDURE — 3288F PR FALLS RISK ASSESSMENT DOCUMENTED: ICD-10-PCS | Mod: CPTII,S$GLB,, | Performed by: DERMATOLOGY

## 2022-03-08 PROCEDURE — 88305 TISSUE EXAM BY PATHOLOGIST: CPT | Mod: 26,,, | Performed by: PATHOLOGY

## 2022-03-08 PROCEDURE — 1160F RVW MEDS BY RX/DR IN RCRD: CPT | Mod: CPTII,S$GLB,, | Performed by: DERMATOLOGY

## 2022-03-08 PROCEDURE — 1101F PR PT FALLS ASSESS DOC 0-1 FALLS W/OUT INJ PAST YR: ICD-10-PCS | Mod: CPTII,S$GLB,, | Performed by: DERMATOLOGY

## 2022-03-08 PROCEDURE — 1126F PR PAIN SEVERITY QUANTIFIED, NO PAIN PRESENT: ICD-10-PCS | Mod: CPTII,S$GLB,, | Performed by: DERMATOLOGY

## 2022-03-08 PROCEDURE — 1126F AMNT PAIN NOTED NONE PRSNT: CPT | Mod: CPTII,S$GLB,, | Performed by: DERMATOLOGY

## 2022-03-08 PROCEDURE — 99214 OFFICE O/P EST MOD 30 MIN: CPT | Mod: 25,S$GLB,, | Performed by: DERMATOLOGY

## 2022-03-08 PROCEDURE — 99999 PR PBB SHADOW E&M-EST. PATIENT-LVL III: CPT | Mod: PBBFAC,,, | Performed by: DERMATOLOGY

## 2022-03-08 PROCEDURE — 1157F ADVNC CARE PLAN IN RCRD: CPT | Mod: CPTII,S$GLB,, | Performed by: DERMATOLOGY

## 2022-03-08 PROCEDURE — 99999 PR PBB SHADOW E&M-EST. PATIENT-LVL III: ICD-10-PCS | Mod: PBBFAC,,, | Performed by: DERMATOLOGY

## 2022-03-08 PROCEDURE — 1160F PR REVIEW ALL MEDS BY PRESCRIBER/CLIN PHARMACIST DOCUMENTED: ICD-10-PCS | Mod: CPTII,S$GLB,, | Performed by: DERMATOLOGY

## 2022-03-08 PROCEDURE — 88313 SPECIAL STAINS GROUP 2: CPT | Mod: 26,,, | Performed by: PATHOLOGY

## 2022-03-08 PROCEDURE — 88305 TISSUE EXAM BY PATHOLOGIST: CPT | Performed by: PATHOLOGY

## 2022-03-08 PROCEDURE — 4010F ACE/ARB THERAPY RXD/TAKEN: CPT | Mod: CPTII,S$GLB,, | Performed by: DERMATOLOGY

## 2022-03-08 PROCEDURE — 1159F MED LIST DOCD IN RCRD: CPT | Mod: CPTII,S$GLB,, | Performed by: DERMATOLOGY

## 2022-03-08 PROCEDURE — 99214 PR OFFICE/OUTPT VISIT, EST, LEVL IV, 30-39 MIN: ICD-10-PCS | Mod: 25,S$GLB,, | Performed by: DERMATOLOGY

## 2022-03-08 PROCEDURE — 88313 SPECIAL STAINS GROUP 2: CPT | Performed by: PATHOLOGY

## 2022-03-08 PROCEDURE — 3288F FALL RISK ASSESSMENT DOCD: CPT | Mod: CPTII,S$GLB,, | Performed by: DERMATOLOGY

## 2022-03-08 PROCEDURE — 1157F PR ADVANCE CARE PLAN OR EQUIV PRESENT IN MEDICAL RECORD: ICD-10-PCS | Mod: CPTII,S$GLB,, | Performed by: DERMATOLOGY

## 2022-03-08 PROCEDURE — 11104 PR PUNCH BIOPSY, SKIN, SINGLE LESION: ICD-10-PCS | Mod: S$GLB,,, | Performed by: DERMATOLOGY

## 2022-03-08 PROCEDURE — 1159F PR MEDICATION LIST DOCUMENTED IN MEDICAL RECORD: ICD-10-PCS | Mod: CPTII,S$GLB,, | Performed by: DERMATOLOGY

## 2022-03-08 PROCEDURE — 88313 PR  SPECIAL STAINS,GROUP II: ICD-10-PCS | Mod: 26,,, | Performed by: PATHOLOGY

## 2022-03-08 PROCEDURE — 11104 PUNCH BX SKIN SINGLE LESION: CPT | Mod: S$GLB,,, | Performed by: DERMATOLOGY

## 2022-03-08 PROCEDURE — 88305 TISSUE EXAM BY PATHOLOGIST: ICD-10-PCS | Mod: 26,,, | Performed by: PATHOLOGY

## 2022-03-08 PROCEDURE — 4010F PR ACE/ARB THEARPY RXD/TAKEN: ICD-10-PCS | Mod: CPTII,S$GLB,, | Performed by: DERMATOLOGY

## 2022-03-08 RX ORDER — CLOBETASOL PROPIONATE 0.5 MG/G
CREAM TOPICAL
Qty: 60 G | Refills: 3 | Status: SHIPPED | OUTPATIENT
Start: 2022-03-08 | End: 2022-06-27

## 2022-03-08 NOTE — PROGRESS NOTES
Subjective:       Patient ID:  Jalyn Aaron is a 72 y.o. female who presents for   Chief Complaint   Patient presents with    Skin Discoloration    Spot     stomach     History of Present Illness: The patient presents for evaluation of skin discoloration on stomach.    The patient was last seen on: 8/13/2021 for skin check.     Other skin complaints:   Patient complains of lesion(s)  Location: stomach  Duration: 5 months  Symptoms: bruising, discoloration, no symptoms  Relieving factors/Previous treatments: neosporin          Review of Systems   Skin: Negative for itching and rash.   Hematologic/Lymphatic: Bruises/bleeds easily.        Objective:    Physical Exam   Constitutional: She appears well-developed and well-nourished. No distress.   Neurological: She is alert and oriented to person, place, and time. She is not disoriented.   Psychiatric: She has a normal mood and affect.   Skin:   Areas Examined (abnormalities noted in diagram):   Abdomen Inspection Performed              Diagram Legend     Erythematous scaling macule/papule c/w actinic keratosis       Vascular papule c/w angioma      Pigmented verrucoid papule/plaque c/w seborrheic keratosis      Yellow umbilicated papule c/w sebaceous hyperplasia      Irregularly shaped tan macule c/w lentigo     1-2 mm smooth white papules consistent with Milia      Movable subcutaneous cyst with punctum c/w epidermal inclusion cyst      Subcutaneous movable cyst c/w pilar cyst      Firm pink to brown papule c/w dermatofibroma      Pedunculated fleshy papule(s) c/w skin tag(s)      Evenly pigmented macule c/w junctional nevus     Mildly variegated pigmented, slightly irregular-bordered macule c/w mildly atypical nevus      Flesh colored to evenly pigmented papule c/w intradermal nevus       Pink pearly papule/plaque c/w basal cell carcinoma      Erythematous hyperkeratotic cursted plaque c/w SCC      Surgical scar with no sign of skin cancer recurrence       Open and closed comedones      Inflammatory papules and pustules      Verrucoid papule consistent consistent with wart     Erythematous eczematous patches and plaques     Dystrophic onycholytic nail with subungual debris c/w onychomycosis     Umbilicated papule    Erythematous-base heme-crusted tan verrucoid plaque consistent with inflamed seborrheic keratosis     Erythematous Silvery Scaling Plaque c/w Psoriasis     See annotation          Assessment / Plan:      Pathology Orders:     Normal Orders This Visit    Specimen to Pathology, Dermatology     Questions:    Procedure Type: Dermatology and skin neoplasms    Number of Specimens: 1    ------------------------: -------------------------    Spec 1 Procedure: Biopsy    Spec 1 Clinical Impression: r/o morphea vs ctcl vs other    Spec 1 Source: left abdomen    Release to patient: Immediate        Disease of skin and subcutaneous tissue  -     Specimen to Pathology, Dermatology  Punch biopsy procedure note:  Punch biopsy performed after verbal consent obtained. Area marked and prepped with alcohol. Approximately 1cc of 1% lidocaine with epinephrine injected. 3 mm disposable punch used to remove lesion. Hemostasis obtained and biopsy site closed with 1 - 2 Prolene sutures. Wound care instructions reviewed with patient and handout given.    -     clobetasoL (TEMOVATE) 0.05 % cream; AAA bid x 3 months  Dispense: 60 g; Refill: 3               Follow up in about 2 weeks (around 3/22/2022).

## 2022-03-08 NOTE — PATIENT INSTRUCTIONS

## 2022-03-09 ENCOUNTER — DOCUMENTATION ONLY (OUTPATIENT)
Dept: REHABILITATION | Facility: OTHER | Age: 72
End: 2022-03-09
Attending: INTERNAL MEDICINE
Payer: MEDICARE

## 2022-03-09 DIAGNOSIS — R29.898 DECREASED STRENGTH OF TRUNK AND BACK: ICD-10-CM

## 2022-03-09 DIAGNOSIS — R29.898 WEAKNESS OF LEFT LOWER EXTREMITY: ICD-10-CM

## 2022-03-09 DIAGNOSIS — M25.69 DECREASED RANGE OF MOTION OF TRUNK AND BACK: Primary | ICD-10-CM

## 2022-03-09 PROCEDURE — 97110 THERAPEUTIC EXERCISES: CPT

## 2022-03-09 NOTE — PROGRESS NOTES
"  [facility]     Name: Jalyn Solares [facility]  Clinic Number: [id number]    Therapy Diagnosis:   Encounter Diagnoses   Name Primary?    Decreased range of motion of trunk and back Yes    Decreased strength of trunk and back     Weakness of left lower extremity      Physician: Bere Myers MD    Visit Date: 3/9/2022    Physician Orders: PT Eval and Treat   Medical Diagnosis from Referral: M48.061 (ICD-10-CM) - Spinal stenosis of lumbar region without neurogenic claudication  Evaluation Date: 1/21/2022  Authorization Period Expiration: 12/10/2022  Plan of Care Expiration: 4/21/2022  Visit # / Visits authorized: 12/ 20     Time In: 9:30 am  Time Out: 10:20 am   Total Billable Time: 45 minutes     Precautions: Standard and pacemaker      Pattern of pain determined: 2    Subjective   Jalyn Solares reports she woke up today for the first time in a long time without any pain in her back, buttocks, or calf    Patient reports tolerating previous visit well with no residual issues or symptoms  Patient reports their pain to be 0/10 on a 0-10 scale with 0 being no pain and 10 being the worst pain imaginable    Location: bilateral back L calf  Description:  dull/achy    Occupation: Retired   Leisure: walking my dog, cooking, go to yoga, reading, [employer]/bible study        Pts goals: "Increased L leg strength, prevent my pain from getting worse and hopefully decrease my pain"     Objective     UPDATE: 2/15/22  MOVEMENT LOSS     2/15/22 ROM Loss   Flexion WFL minimal loss   Extension Mod loss moderate loss   Side glide Right Mod loss moderate loss   Side glide Left Mod Loss moderate loss   Rotation Right WFL moderate loss   Rotation Left WFL moderate loss      Lower Extremity Strength  Right LE   Left LE     Hip flexion: 4+/5 Hip flexion: 4+/5   Hip extension:  4-/5 Hip extension: 4-/5   Hip abduction: 4-/5 Hip abduction: 4-/5   Hip adduction:  4/5 Hip adduction:  4/5   Hip " "IR    4+/5 Hip IR 4+/5   Hip ER 4-/5 Hip ER 4-/5   Knee Flexion 4+/5 Knee Flexion 4+/5   Knee Extension 5/5 Knee Extension 5/5   Ankle dorsiflexion: 5/5 Ankle dorsiflexion: 4/5   Ankle plantarflexion: 5/5 Ankle plantarflexion: 4/5        Baseline Isometric Testing on Med X equipment: Testing administered by PT  Date of testin2022  ROM 3-48 deg==> 0-48 deg    Max Peak Torque 100    Min Peak Torque 55    Flex/Ext Ratio 1.8:1   Total ROM gain from eval  3 deg Ext on 22   % below normative data 26      Baseline Isometric Testing on Med X equipment: Testing administered by PT  Date of testing: 3/2/2022  ROM 0-48 deg    Max Peak Torque 132   Min Peak Torque 74   Flex/Ext Ratio 1.8:1   Total ROM gain from eval  3 deg Ext on 22   % gain from initial  61%   % above normative data 14%        Limitation/Restriction for FOTO lumbar Survey     Therapist reviewed FOTO scores for Jalyn Aaron on 2022.   FOTO documents entered into [employer] - see Media section.                    Treatment    Pt was instructed in and performed the following:     Jalyn Solares received therapeutic exercises to develop/improved posture, cardiovascular endurance, muscular endurance, lumbar/cervical ROM, strength and muscular endurance for 45 minutes including the following exercises:      [facility] 3/9/2022   Visit Number 12   VAS Pain Rating 0   Treadmill Time (in min.) -   Speed -   Time 5   Lumbar Stretches - Slouch -   Extension in Lying -   Flexion in Lying 10   Flexion in Sitting -   Manual Therapy -   Lumbar Extension - Seat Pad -   Femur Restraint -   Top Dead Center -   Counterweight -   Lumbar Flexion -   Lumbar Extension -   Lumbar Peak Torque -   Lumbar Weight 63   Repetitions 20   Rating of Perceived Exertion 5       LTR's x10  DKTC 10x5"  PPT x 10 /c 5 sec hold   Bridges x20 cue for TA activation sustained   SOC x 20  Braced BKFO +GTB x15   Bridges +GTB x20 cue for TA activation " "sustained  Piriformis stretch 3x20"  Sciatic Nerve glide (L) x 20     Not performed  Lifting protocol - performed various lifts /c technique deconstructed to hip hinge, counter balance, squat   Pt educated on lifting basics including issued handout   Hip hinge x 10     Squat x 10 cue for knees over toes, push thru heel   Squat /c step stool lift x 5    Golfers lift x 5  Prone laying -> prone on elbow 3 min  SOC 20x5"  HS stretch with strap 3x20"  SKTC 10x5"  Seated trunk flexion 10x5"   Seated trunk flexion ball rollouts 10x5"      Peripheral muscle strengthening which included 1 set of 15-20 repetitions at a slow, controlled 10-13 second per rep pace focused on strengthening supporting musculature for improved body mechanics and functional mobility.  Pt and therapist focused on proper form during treatment to ensure optimal strengthening of each targeted muscle group.  Machines were utilized including torso rotation, leg extension, leg curl, chest press, upright row. Tricep extension, bicep curl, leg press, and hip abduction added visit 3    Jalyn Solares received the following manual therapy techniques:  00 Minutes    LAD to L leg x 2 minutes  Piriformis release NP    Home Exercises Provided and Patient Education Provided   Home exercises include:   LTR  DKTC/SKTC  PPT  Seated lumbar flexion - ball rollout  SOC  Piriformis   HS stretch  Sciatic nerve glide     Cardio program: Bike/Walking  Lifting education date: visit 11 pt issued handout  Lumbar roll: Standard/Slim uses in car and at home    Education provided:   -importance of maintaining functional mobility to promote improved posture     Written Home Exercises Provided: Patient instructed to cont prior HEP.  Exercises were reviewed and Jalyn was able to demonstrate them prior to the end of the session.  Jalyn demonstrated good  understanding of the education provided.     See EMR under Patient Instructions for exercises provided prior visit     Assessment "     Jalyn presents today without complaints of pain. Modified piriformis stretch and pt reporting increased stretch and improved relief with stretch. Medex resistance maintained and pt was able to complete 20 reps with 5/10 RPE. Progress 5% next visit.     Patient is making good progress towards established goals.  Pt will continue to benefit from skilled outpatient physical therapy to address the deficits stated in the impairment chart, provide pt/family education and to maximize pt's level of independence in the home and community environment.     Anticipated Barriers for therapy: Fear avoidance  Pt's spiritual, cultural and educational needs considered and pt agreeable to plan of care and goals as stated below:      GOALS: Pt is in agreement with the following goals.     Short term goals:  6 weeks or 10 visits   1.  Pt will demonstrate increased lumbar ROM by at least 6 degrees from the initial ROM value with improvements noted in functional ROM and ability to perform ADLs.  (approp and ongoing)  2.  Pt will demonstrate increased MedX average isometric strength value  by 15% from initial test resulting in improved ability to perform bending, lifting, and carrying activities safely, confidently.  MET  3.  Patient report a reduction in worst pain score by 1-2 points for improved tolerance for sitting.  MET  4.  Pt able to perform HEP correctly with minimal cueing or supervision from therapist to encourage independent management of symptoms. MET      Long term goals: 10 weeks or 20 visits   1. Pt will demonstrate increased lumbar ROM by at least 9 degrees from initial ROM value, resulting in improved ability to perform functional fwd bending while standing and sitting. (approp and ongoing)  2. Pt will demonstrate increased MedX average isometric strength value  by 25% from initial test resulting in improved ability to perform bending, lifting, and carrying activities safely, confidently.  MET  3. Pt to demonstrate  ability to independently control and reduce their pain through posture positioning and mechanical movements throughout a typical day.  (approp and ongoing)  4.  Pt will demonstrate reduced pain and improved functional outcomes as reported on the FOTO by reaching a limitation score of < or = 30% or less in order to demonstrate subjective improvement in pt's condition.    MET  5. Pt will demonstrate independence with the HEP at discharge  (approp and ongoing)  6. Pt will be able to lift 10# from the floor to a table and back to the floor without increased pain so that she may have improved ability to perform bending, lifting, and carrying activities safely, confidently (approp and ongoing)     Plan   Continue with established Plan of Care towards established PT goals.     Jesus Calabrese, PT  3/9/2022

## 2022-03-11 ENCOUNTER — OFFICE VISIT (OUTPATIENT)
Dept: NEUROLOGY | Facility: CLINIC | Age: 72
End: 2022-03-11
Payer: MEDICARE

## 2022-03-11 ENCOUNTER — CLINICAL SUPPORT (OUTPATIENT)
Dept: CARDIOLOGY | Facility: HOSPITAL | Age: 72
End: 2022-03-11
Payer: MEDICARE

## 2022-03-11 VITALS
SYSTOLIC BLOOD PRESSURE: 126 MMHG | WEIGHT: 132.38 LBS | BODY MASS INDEX: 20.78 KG/M2 | HEIGHT: 67 IN | HEART RATE: 65 BPM | DIASTOLIC BLOOD PRESSURE: 77 MMHG

## 2022-03-11 DIAGNOSIS — Z95.0 PRESENCE OF CARDIAC PACEMAKER: ICD-10-CM

## 2022-03-11 DIAGNOSIS — M54.16 LUMBAR RADICULOPATHY: Primary | ICD-10-CM

## 2022-03-11 DIAGNOSIS — M48.061 SPINAL STENOSIS OF LUMBAR REGION, UNSPECIFIED WHETHER NEUROGENIC CLAUDICATION PRESENT: ICD-10-CM

## 2022-03-11 DIAGNOSIS — M54.16 LUMBAR RADICULOPATHY, CHRONIC: ICD-10-CM

## 2022-03-11 DIAGNOSIS — G62.9 NEUROPATHY: ICD-10-CM

## 2022-03-11 DIAGNOSIS — R29.898 WEAKNESS OF LEFT LOWER EXTREMITY: ICD-10-CM

## 2022-03-11 DIAGNOSIS — G60.9 HEREDITARY AND IDIOPATHIC NEUROPATHY, UNSPECIFIED: ICD-10-CM

## 2022-03-11 PROCEDURE — 93296 REM INTERROG EVL PM/IDS: CPT | Performed by: INTERNAL MEDICINE

## 2022-03-11 PROCEDURE — 99499 UNLISTED E&M SERVICE: CPT | Mod: S$GLB,,, | Performed by: STUDENT IN AN ORGANIZED HEALTH CARE EDUCATION/TRAINING PROGRAM

## 2022-03-11 PROCEDURE — 99999 PR PBB SHADOW E&M-EST. PATIENT-LVL III: CPT | Mod: PBBFAC,GC,, | Performed by: STUDENT IN AN ORGANIZED HEALTH CARE EDUCATION/TRAINING PROGRAM

## 2022-03-11 PROCEDURE — 99499 RISK ADDL DX/OHS AUDIT: ICD-10-PCS | Mod: S$GLB,,, | Performed by: STUDENT IN AN ORGANIZED HEALTH CARE EDUCATION/TRAINING PROGRAM

## 2022-03-11 PROCEDURE — 99999 PR PBB SHADOW E&M-EST. PATIENT-LVL III: ICD-10-PCS | Mod: PBBFAC,GC,, | Performed by: STUDENT IN AN ORGANIZED HEALTH CARE EDUCATION/TRAINING PROGRAM

## 2022-03-11 RX ORDER — GABAPENTIN 600 MG/1
900 TABLET ORAL 3 TIMES DAILY
Qty: 135 TABLET | Refills: 11 | Status: SHIPPED | OUTPATIENT
Start: 2022-03-11 | End: 2023-06-12 | Stop reason: SDUPTHER

## 2022-03-11 NOTE — PATIENT INSTRUCTIONS
New prescription of gabapentin today   -900 mg in the morning, 900 mg in the daytime and 900 mg in nighttime--> that is a total of one and half tablet three times a day as each tablet is 600 mg

## 2022-03-11 NOTE — PROGRESS NOTES
[facility] - NEUROLOGY [address]TH [address] [facility], [place]    Date: 3/11/22  Patient Name: Jalyn Aaron   MRN: [id number]   PCP: Annemarie Kilgore  Referring Provider: No ref. provider found    Assessment:   Jalyn Aaron is a 72 y.o. female presenting with paresthesias in the dorsal part of her bilateral feet.     Plan:   Patient presents as a follow up for subjective sensation changes in the dorsal part of her bilateral feet that started in September 2020 and progressively has gotten worse. 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 I saw her in MS clinic as she thought she had MS (given her strong family history) however, that was ruled out.  Patient has full strength in dorsiflexion, plantarflexion, inversion, and eversion making. Repeat EMG revealing radiculopathy.     -Increase gabapentin to 900 mg TID  -Repeat CT lumbar spine   -Obtain Vit B1, B6  -Referral to Neurosurgery per patient request for possible intervention for spinal stenosis     Problem List Items Addressed This Visit        Neuro    Lumbar stenosis    Current Assessment & Plan     -Increase gabapentin 900 mg TID  -Continue healthy back clinic  -Referral NSGY            Relevant Orders    Ambulatory referral/consult to Neurosurgery    VITAMIN B1    VITAMIN B6    Neuropathy    Current Assessment & Plan     Secondary to the stenosis of the lumbar spine  -Will obtain Vitamin B1 and B6               Orthopedic    Weakness of left lower extremity    Current Assessment & Plan     Improving with healthy back clinic exercises              Other Visit Diagnoses     Lumbar radiculopathy    -  Primary    Relevant Orders    Ambulatory referral/consult to Neurosurgery    VITAMIN B1    VITAMIN B6    Lumbar radiculopathy, chronic        Relevant Orders    VITAMIN B1    VITAMIN B6    CT  "Lumbar Spine Without Contrast    Hereditary and idiopathic neuropathy, unspecified         Relevant Orders    VITAMIN B6          Bere Myers MD    Patient note was created using MModal Dictation.  Any errors in syntax or even information may not have been identified and edited on initial review prior to signing this note.  Subjective:        HPI:   Ms. Jalyn Aaron is a 72 y.o. female presenting as a follow up for her paresthesias. Repeat EMG in August 2021 revealed acute on chronic denervation in the right L5 myotome and chronic denervation in the left L4 and L5 myotomes. This is suggestive of radiculopathy.   I last saw her with Dr Stone on 12/10/21.    She is currently going to healthy back and she feels that her muscles in her back are getting stronger. She is having buttocks pain. She is concerned that the pain will get worse. Although she can function ok, she would like to see neurosurgery for any intervention in the future.      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 Patient's main concern today is the possibility of having multiple sclerosis as she has a strong family history. Her father was diagnosed with primary progressive MS at age 35 and passed away at 48. Her sister was diagnosed with primary progressive MS at 69 at [facility] and she passed away at 73 (3 weeks ago).      Patient had numbness/pressure like sensation initially on the top of her left feet. It began in the middle of September of last year. It only occured at night but then progressed throughout the entire day. She went to Dr. Andrade (her PCP) who ordered her a short course of steroids thinking this was related to her sciatic pain. The steroids helped her back pain however, not the numbness. An EMG of the lower " "extremities done on 10/15/20, showed a normal study of the left lower extremity. The right superficial peroneal nerve was mildly prolonged which indicated mild superficial peroneal neuropathy. Neuropathy labs such as B12, HgA1 c, and folate was normal. She was started on gabapentin nightly by her PCP on 12/21/21 and the dose was increased to 600 mg nightly for the past 2 months which helped with her numbness. Her PCP also ordered a CT lumbar spine which showed multilevel degenerative changes at L4 and S1 and grade 2 anterolisthesis at L4 with moderate to severe central canal narrowing.      Her first visit with Dr. Sutton and Dr. Comer was on 1/4/21. 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 They ordered aquatic therapy which has helped the patient with her core however, not with the numbness.      She now presents with new numbness in her right foot that began in March. 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    Left    AV block     exercised induced 2:1    Cataract     Fever blister     Heart block     History of acne     Joint pain     hands    Keratoconjunctivitis sicca not due to Sjogren's syndrome     Pacemaker        PAST SURGICAL HISTORY:  Past Surgical History:   Procedure Laterality Date    BREAST BIOPSY Left 12/2013    papilloma with atypia on core biopsy    BREAST BIOPSY Left 01/2014    Ex.Bx., removal of ADH/Papilloma    COLONOSCOPY N/A 11/8/2016    Procedure: COLONOSCOPY;  Surgeon: Dl Caruso MD;  Location: [address]);  Service: Endoscopy;  Laterality: N/A;  Pacemaker in place.    COLONOSCOPY N/A 3/30/2021    Procedure: COLONOSCOPY;  Surgeon: Joaquin Johnson MD;  Location: [facility] ENDO ([address] [place] [address]);  Service: Endoscopy;  Laterality: N/A;  prep ins. emailed - " COVID screening on 3/27/21 PCW - ERW    HYSTERECTOMY      INSERT / REPLACE / REMOVE PACEMAKER      LASIK      LASIK Bilateral 2009       CURRENT MEDS:  Current Outpatient Medications   Medication Sig Dispense Refill    carvediloL (COREG) 3.125 MG tablet TAKE 1 TABLET(3.125 MG) BY MOUTH TWICE DAILY WITH MEALS 60 tablet 11    clobetasoL (TEMOVATE) 0.05 % cream AAA bid x 3 months 60 g 3    gabapentin (NEURONTIN) 600 MG tablet Take 1 tablet (600 mg total) by mouth 3 (three) times daily. 90 tablet 1    losartan (COZAAR) 25 MG tablet One twice daily 60 tablet 6    multivitamin capsule Take 1 capsule by mouth once daily.      naproxen (NAPROSYN) 500 MG tablet Half tablet once daily 90 tablet 1    senna (SENOKOT) 8.6 mg tablet Take 1 tablet by mouth once daily. Twice a day      urea 20 % Crea Apply 1 application topically once daily. To dry skin on the feet. 75 g 10    vitamin D 1000 units Tab Take 1,000 Units by mouth once daily.       No current facility-administered medications for this visit.       ALLERGIES:  Review of patient's allergies indicates:   Allergen Reactions    Bactrim [sulfamethoxazole-trimethoprim] Nausea Only       FAMILY HISTORY:  Family History   Problem Relation Age of Onset    Cancer Mother 80        pagets    Cataracts Mother     Hypertension Mother     Breast cancer Mother     Multiple sclerosis Father     Multiple sclerosis Sister     No Known Problems Brother     No Known Problems Maternal Aunt     No Known Problems Maternal Uncle     No Known Problems Paternal Aunt     No Known Problems Paternal Uncle     No Known Problems Maternal Grandmother     Prostate cancer Maternal Grandfather     No Known Problems Paternal Grandmother     No Known Problems Paternal Grandfather     No Known Problems Son     No Known Problems Son     Amblyopia Neg Hx     Blindness Neg Hx     Diabetes Neg Hx     Glaucoma Neg Hx     Macular degeneration Neg Hx     Retinal detachment Neg  "Hx     Strabismus Neg Hx     Stroke Neg Hx     Thyroid disease Neg Hx     Melanoma Neg Hx     Ovarian cancer Neg Hx        SOCIAL HISTORY:  Social History     Tobacco Use    Smoking status: Never Smoker    Smokeless tobacco: Never Used   Substance Use Topics    Alcohol use: Yes     Alcohol/week: 0.0 standard drinks     Types: 2 Glasses of wine per week     Comment: socially    Drug use: No       Review of Systems:  12 system review of systems is negative except for the symptoms mentioned in HPI.      Objective:     Vitals:    03/11/22 0811   BP: 126/77   BP Location: Left arm   Patient Position: Sitting   BP Method: Large (Automatic)   Pulse: 65   Weight: 60.1 kg (132 lb 6.2 oz)   Height: 5' 7" (1.702 m)     General: NAD, well nourished   Eyes: no tearing, discharge, no erythema   ENT: moist mucous membranes of the oral cavity, nares patent    Neck: Supple, full range of motion  Cardiovascular: Warm and well perfused, pulses equal and symmetrical  Lungs: Normal work of breathing, normal chest wall excursions  Skin: No rash, lesions, or breakdown on exposed skin  Psychiatry: Mood and affect are appropriate   Abdomen: soft, non tender, non distended  Extremeties: No cyanosis, clubbing or edema.    Neurological   MENTAL STATUS: Alert and oriented to person, place, and time. Attention and concentration within normal limits. Speech without dysarthria, able to name and repeat without difficulty. Recent and remote memory within normal limits   CRANIAL NERVES: Visual fields intact. PERRL. EOMI. Facial sensation intact. Face symmetrical. Hearing grossly intact. Full shoulder shrug bilaterally. Tongue protrudes midline   SENSORY: Sensation is decreased in bilateral dorsum of the feet (vibration and pinprick)  MOTOR: Normal bulk and tone. No pronator drift.  5/5 deltoid, biceps, triceps, interosseous, hand  bilaterally. 5/5 iliopsoas, knee 5/5 extension/flexion, 5/5 foot dorsi/plantarflexion bilaterally.  "   REFLEXES: Symmetric and 2+ in the patellar, triceps and biceps. 1+ in the achilles. Toes down going bilaterally.   CEREBELLAR/COORDINATION/GAIT: Gait steady with normal arm swing and stride length.  Heel to shin intact. Finger to nose intact. Normal rapid alternating movements.     Imaging        EXAMINATION:  CT LUMBAR SPINE WITHOUT CONTRAST     CLINICAL HISTORY:  Back pain or radiculopathy, > 6 wks;Dorsalgia, unspecified     TECHNIQUE:  Low dose axial images, sagittal and coronal reformations were performed   though the lumbar spine.  Contrast was not administered.  Motion artifact   degrades study quality.     COMPARISON:  None     FINDINGS:  Motion artifact limits evaluation.     There is a grade 2 anterolisthesis of L4 on L5. The vertebral body heights   appear well-maintained.  There is no evidence of fracture or osseous lytic   or blastic process.  There are multilevel degenerative changes   specifically of intervertebral disc height loss and endplate changes L4-5   and L5-S1.  Focal degenerative changes are described below.     T12-L1: There is no posterior disc bulge.  There is no neural foraminal   narrowing. There is no central canal narrowing     L1-L2: There is no posterior disc bulge.  There is no neural foraminal   narrowing. There is no central canal narrowing     L2-L3: There is no posterior disc bulge.  There is no neural foraminal   narrowing. There is no central canal narrowing     L3-L4: There is no posterior disc bulge.  There is no neural foraminal   narrowing. There is no central canal narrowing     L4-L5: There is no posterior disc bulge.  There is no neural foraminal   narrowing.  There is grade 2 anterolisthesis of L4 on L5 with resultant   moderate to severe central canal narrowing.     L5-S1: There is no posterior disc bulge.  There is no neural foraminal   narrowing. There is no central canal narrowing     The spinal canal otherwise appears unremarkable.  No intradural   abnormalities  are identified.  Evaluation of the surrounding soft tissues   reveals a 1.5 cm left-sided radiopaque dense focus likely representing   ingested stomach contents or calcification.  There are pacemaker leads   identified.  Mild calcific atherosclerosis of the abdominal aorta.     Impression:     Motion artifact limits evaluation.     Multilevel degenerative changes predominately at L4-S1.  Grade 2   anterolisthesis of L4 on L5 with moderate to severe central canal   narrowing.    EMG 8/20/21    Acute on chronic denervation in the right L5 myotome and chronic denervation in the left L4 and L5 myotomes suggestive of radiculopathies at those levels. The absent superficial sensory responses can sometimes be attributed to L5 radiculopathy.

## 2022-03-14 ENCOUNTER — TELEPHONE (OUTPATIENT)
Dept: NEUROSURGERY | Facility: CLINIC | Age: 72
End: 2022-03-14
Payer: MEDICARE

## 2022-03-14 NOTE — TELEPHONE ENCOUNTER
----- Message from Kwabena Richardson sent at 3/14/2022  9:31 AM CDT -----  Contact: MICHAEL TINOCO [[id number]]  Type: Patient Call Back    Who called:MICHAEL TINOCO [[id number]]    What is the request in detail: The patient would like to schedule appt.     Can the clinic reply by [facility]?    Would the patient rather a call back or a response via [facility]?     Best call back number: [phone] (mobile)    Additional Information:

## 2022-03-14 NOTE — TELEPHONE ENCOUNTER
----- Message from Gutierrez Valderrama sent at 3/14/2022  4:47 PM CDT -----  Contact: pt.  .Type:  Needs Medical Advice    Who Called: pt    Would the patient rather a call back or a response via [facility]? Call back  Best Call Back Number: [phone]   Additional Information: Pt. Is returning   a call to the office.

## 2022-03-15 ENCOUNTER — CLINICAL SUPPORT (OUTPATIENT)
Dept: REHABILITATION | Facility: OTHER | Age: 72
End: 2022-03-15
Attending: INTERNAL MEDICINE
Payer: MEDICARE

## 2022-03-15 DIAGNOSIS — M25.69 DECREASED RANGE OF MOTION OF TRUNK AND BACK: Primary | ICD-10-CM

## 2022-03-15 DIAGNOSIS — R29.898 WEAKNESS OF LEFT LOWER EXTREMITY: ICD-10-CM

## 2022-03-15 DIAGNOSIS — R29.898 DECREASED STRENGTH OF TRUNK AND BACK: ICD-10-CM

## 2022-03-15 LAB
FINAL PATHOLOGIC DIAGNOSIS: NORMAL
GROSS: NORMAL
Lab: NORMAL
MICROSCOPIC EXAM: NORMAL

## 2022-03-15 PROCEDURE — 97110 THERAPEUTIC EXERCISES: CPT | Mod: CQ

## 2022-03-15 NOTE — PROGRESS NOTES
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 She states that she's had a pretty positive week, with the highlight of her week being making a decision about her vacation. After talking to her  and family friends, she decided that it would be best to post-pone the river cruise to stay with her dog, Mildred. She feels immense relief since making the decision.  She also was very successful with her other weekly goals. She looked into 2 different programs for art classes and now has a good idea of when she would be able to start them. She also purchased a  and is excited to tryout different smoothie recipes.  Pt is currently feeling stressed about the health of her son's dog. We discussed different scenarios and strategies for moving forward.    Vision:      Values: family, friends, pets, Orthodoxy, relationships, nutrition    Strengths: self-aware, motivated, proactive, compassionate    Challenges:  Decisional a    Support:  , friends, Orthodoxy, HB    Hobbies:  Yoga, cooking, walking, reading, art        INITIAL date  One a scale of 1-10, with 10 being 100% happy, how would you rate your happiness in each of the wellness areas below?    Happiness:         1     2     3     4     5     6     7     8     9     10    Initial Date: DC Date: +/- Total Change   Exercise/Movement      Physical Health      Stress Level      Nutrition      Sleep       Play      Body Image      Relationships      Energy/Vitality        Assessment:   Patient has a good understanding of her wellness aspirations.    Plan:  Patient goals for next consult include   1) investigate Neeta's potential injuries today; also discuss healthier care for him with son   2) Read venus and mars book with  to promote better communication  3) buy smoothie ingredients today  4) try out one of the HB recipes on Saturday    3 month:  Consistent art classes, cooking interesting meals, going to [facility] 3x/wk, yoga classes    Health : Laura Lynch  3/15/2022

## 2022-03-15 NOTE — PROGRESS NOTES
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  Other things in her wellness that she'd like to work on include finding better recepies to cook with her , finding more opportunities for self-care, and capitalizing on the fitness opportunities that she has ([facility] and [employer]). She's interested in buying a  to make smoothies.    Vision:      Values: family, friends, pets, Holiness, relationships, nutrition    Strengths: self-aware, motivated, proactive, compassionate    Challenges:  Decisional a    Support:  , friends, Holiness, HB    Hobbies:  Yoga, cooking, walking, reading, art        INITIAL date  One a scale of 1-10, with 10 being 100% happy, how would you rate your happiness in each of the wellness areas below?    Happiness:         1     2     3     4     5     6     7     8     9     10    Initial Date: DC Date: +/- Total Change   Exercise/Movement      Physical Health      Stress Level      Nutrition      Sleep      Play      Body Image      Relationships      Energy/Vitality        Assessment:   Patient has a good understanding of her wellness aspirations.    Plan:  Patient goals for next consult include   1) Make a decision about the River Cruise (talking with , call  Emili, weighing pros/cons)  2) Check into art classes  3) Order a  on [employer]    3 month:  Consistent art classes, cooking interesting meals, going to [facility] 3x/wk, yoga classes    Health : Laura Lynch  3/15/2022

## 2022-03-15 NOTE — PROGRESS NOTES
"  [facility]     Name: Jalyn Solares [facility]  Clinic Number: [id number]    Therapy Diagnosis:   Encounter Diagnoses   Name Primary?    Decreased range of motion of trunk and back Yes    Decreased strength of trunk and back     Weakness of left lower extremity      Physician: Bere Myers MD    Visit Date: 3/15/2022    Physician Orders: PT Eval and Treat   Medical Diagnosis from Referral: M48.061 (ICD-10-CM) - Spinal stenosis of lumbar region without neurogenic claudication  Evaluation Date: 1/21/2022  Authorization Period Expiration: 12/10/2022  Plan of Care Expiration: 4/21/2022  Visit # / Visits authorized: 13/ 20     Time In: 12:10 pm  Time Out: 1:00 pm   Total Billable Time: 45 minutes     Precautions: Standard and pacemaker      Pattern of pain determined: 2    Subjective   Jalyn Solares reports no pain at the moment but states she woke up with some pain.  She was able to relieve pain with ice and performing her HEP stretches.    Patient reports tolerating previous visit well with no residual issues or symptoms  Patient reports their pain to be 0/10 on a 0-10 scale with 0 being no pain and 10 being the worst pain imaginable    Location: bilateral back L calf  Description:  dull/achy    Occupation: Retired   Leisure: walking my dog, cooking, go to yoga, reading, Taoist/bible study        Pts goals: "Increased L leg strength, prevent my pain from getting worse and hopefully decrease my pain"     Objective     UPDATE: 2/15/22  MOVEMENT LOSS     2/15/22 ROM Loss   Flexion WFL minimal loss   Extension Mod loss moderate loss   Side glide Right Mod loss moderate loss   Side glide Left Mod Loss moderate loss   Rotation Right WFL moderate loss   Rotation Left WFL moderate loss      Lower Extremity Strength  Right LE   Left LE     Hip flexion: 4+/5 Hip flexion: 4+/5   Hip extension:  4-/5 Hip extension: 4-/5   Hip abduction: 4-/5 Hip abduction: 4-/5   Hip " "adduction:  4/5 Hip adduction:  4/5   Hip IR    4+/5 Hip IR 4+/5   Hip ER 4-/5 Hip ER 4-/5   Knee Flexion 4+/5 Knee Flexion 4+/5   Knee Extension 5/5 Knee Extension 5/5   Ankle dorsiflexion: 5/5 Ankle dorsiflexion: 4/5   Ankle plantarflexion: 5/5 Ankle plantarflexion: 4/5        Baseline Isometric Testing on Med X equipment: Testing administered by PT  Date of testin2022  ROM 3-48 deg==> 0-48 deg    Max Peak Torque 100    Min Peak Torque 55    Flex/Ext Ratio 1.8:1   Total ROM gain from eval  3 deg Ext on 22   % below normative data 26      Baseline Isometric Testing on Med X equipment: Testing administered by PT  Date of testing: 3/2/2022  ROM 0-48 deg    Max Peak Torque 132   Min Peak Torque 74   Flex/Ext Ratio 1.8:1   Total ROM gain from eval  3 deg Ext on 22   % gain from initial  61%   % above normative data 14%        Limitation/Restriction for FOTO lumbar Survey     Therapist reviewed FOTO scores for Jalyn Aaron on 2022.   FOTO documents entered into [employer] - see Media section.                    Treatment    Pt was instructed in and performed the following:     Jalyn Solares received therapeutic exercises to develop/improved posture, cardiovascular endurance, muscular endurance, lumbar/cervical ROM, strength and muscular endurance for 45 minutes including the following exercises:      [facility] - Short 3/15/2022   Visit Number 13   VAS Pain Rating 0   Treadmill Time (in min.) 5   Speed -   Time -   Lumbar Stretches - Slouch 20   Extension in Lying -   Flexion in Lying 10   Flexion in Sitting -   Manual Therapy -   Lumbar Extension - Seat Pad -   Femur Restraint -   Top Dead Center -   Counterweight -   Lumbar Flexion -   Lumbar Extension -   Lumbar Peak Torque -   Lumbar Weight 67   Repetitions 16   Rating of Perceived Exertion 6       LTR's x10  DKTC 10x5"  +PPT + Marches x10 ea  SOC x 20  Braced BKFO +GTB x15   Bridges +GTB x20 cue for TA activation " "sustained  Piriformis stretch 3x20"    Not performed  Lifting protocol - performed various lifts /c technique deconstructed to hip hinge, counter balance, squat   Pt educated on lifting basics including issued handout   Hip hinge x 10     Squat x 10 cue for knees over toes, push thru heel   Squat /c step stool lift x 5    Golfers lift x 5  Prone laying -> prone on elbow 3 min  HS stretch with strap 3x20"  SKTC 10x5"  Seated trunk flexion 10x5"   Seated trunk flexion ball rollouts 10x5"  Sciatic Nerve glide (L) x 20       Peripheral muscle strengthening which included 1 set of 15-20 repetitions at a slow, controlled 10-13 second per rep pace focused on strengthening supporting musculature for improved body mechanics and functional mobility.  Pt and therapist focused on proper form during treatment to ensure optimal strengthening of each targeted muscle group.  Machines were utilized including torso rotation, leg extension, leg curl, chest press, upright row. Tricep extension, bicep curl, leg press, and hip abduction added visit 3    Jalyn Sujatha received the following manual therapy techniques:  00 Minutes    LAD to L leg x 2 minutes  Piriformis release NP    Home Exercises Provided and Patient Education Provided   Home exercises include:   LTR  DKTC/SKTC  PPT  Seated lumbar flexion - ball rollout  SOC  Piriformis   HS stretch  Sciatic nerve glide     Cardio program: Bike/Walking  Lifting education date: visit 11 pt issued handout  Lumbar roll: Standard/Slim uses in car and at home    Education provided:   -importance of maintaining functional mobility to promote improved posture     Written Home Exercises Provided: Patient instructed to cont prior HEP.  Exercises were reviewed and Jalyn was able to demonstrate them prior to the end of the session.  Jalyn demonstrated good  understanding of the education provided.     See EMR under Patient Instructions for exercises provided prior visit     Assessment   Jalyn " returns without complaints of pain and reporting improved management of symptoms.  PPT + marches were added today to further challenge core musculature and coordination. Medx resistance increased to 67#.  She completed 16 reps at a RPE of 6/10.  Will attempt to complete 18-20 reps next visit per  protocol.    Patient is making good progress towards established goals.  Pt will continue to benefit from skilled outpatient physical therapy to address the deficits stated in the impairment chart, provide pt/family education and to maximize pt's level of independence in the home and community environment.     Anticipated Barriers for therapy: Fear avoidance  Pt's spiritual, cultural and educational needs considered and pt agreeable to plan of care and goals as stated below:      GOALS: Pt is in agreement with the following goals.     Short term goals:  6 weeks or 10 visits   1.  Pt will demonstrate increased lumbar ROM by at least 6 degrees from the initial ROM value with improvements noted in functional ROM and ability to perform ADLs.  (approp and ongoing)  2.  Pt will demonstrate increased MedX average isometric strength value  by 15% from initial test resulting in improved ability to perform bending, lifting, and carrying activities safely, confidently.  MET  3.  Patient report a reduction in worst pain score by 1-2 points for improved tolerance for sitting.  MET  4.  Pt able to perform HEP correctly with minimal cueing or supervision from therapist to encourage independent management of symptoms. MET      Long term goals: 10 weeks or 20 visits   1. Pt will demonstrate increased lumbar ROM by at least 9 degrees from initial ROM value, resulting in improved ability to perform functional fwd bending while standing and sitting. (approp and ongoing)  2. Pt will demonstrate increased MedX average isometric strength value  by 25% from initial test resulting in improved ability to perform bending, lifting, and carrying  activities safely, confidently.  MET  3. Pt to demonstrate ability to independently control and reduce their pain through posture positioning and mechanical movements throughout a typical day.  (approp and ongoing)  4.  Pt will demonstrate reduced pain and improved functional outcomes as reported on the FOTO by reaching a limitation score of < or = 30% or less in order to demonstrate subjective improvement in pt's condition.    MET  5. Pt will demonstrate independence with the HEP at discharge  (approp and ongoing)  6. Pt will be able to lift 10# from the floor to a table and back to the floor without increased pain so that she may have improved ability to perform bending, lifting, and carrying activities safely, confidently (approp and ongoing)     Plan   Continue with established Plan of Care towards established PT goals.     Alfonso Reeder, PTA  3/15/2022

## 2022-03-17 ENCOUNTER — CLINICAL SUPPORT (OUTPATIENT)
Dept: REHABILITATION | Facility: OTHER | Age: 72
End: 2022-03-17
Attending: INTERNAL MEDICINE
Payer: MEDICARE

## 2022-03-17 DIAGNOSIS — R29.898 WEAKNESS OF LEFT LOWER EXTREMITY: ICD-10-CM

## 2022-03-17 DIAGNOSIS — M25.69 DECREASED RANGE OF MOTION OF TRUNK AND BACK: Primary | ICD-10-CM

## 2022-03-17 DIAGNOSIS — R29.898 DECREASED STRENGTH OF TRUNK AND BACK: ICD-10-CM

## 2022-03-17 PROCEDURE — 97110 THERAPEUTIC EXERCISES: CPT | Mod: CQ

## 2022-03-17 NOTE — PROGRESS NOTES
"  [facility]     Name: Jalyn Solares [facility]  Clinic Number: [id number]    Therapy Diagnosis:   Encounter Diagnoses   Name Primary?    Decreased range of motion of trunk and back Yes    Decreased strength of trunk and back     Weakness of left lower extremity      Physician: Bere Myers MD    Visit Date: 3/17/2022    Physician Orders: PT Eval and Treat   Medical Diagnosis from Referral: M48.061 (ICD-10-CM) - Spinal stenosis of lumbar region without neurogenic claudication  Evaluation Date: 1/21/2022  Authorization Period Expiration: 12/10/2022  Plan of Care Expiration: 4/21/2022  Visit # / Visits authorized: 14/ 20     Time In: 10:30 AM    Time Out: 11:20 AM    Total Billable Time: 45 minutes     Precautions: Standard and pacemaker      Pattern of pain determined: 2    Subjective   Jalyn Solares reports no pain currently but still has some tendency for (L) Lower back/glute and leg pain in the mornings and evenings ( 4-5/10). States that she is able to resolve/calm down some of the symptoms faster now with stretching ex's and also notes improved core strength since initiating the program. States that she has been pleased with HB coaching thus far and is looking forward to participation in the Wellness program upon PT d/c.    Patient reports tolerating previous visit well with no residual issues or symptoms  Patient reports their pain to be 0/10 on a 0-10 scale with 0 being no pain and 10 being the worst pain imaginable     Location: bilateral back L calf  Description:  dull/achy    Occupation: Retired   Leisure: walking my dog, cooking, go to yoga, reading, Jewish/bible study        Pts goals: "Increased L leg strength, prevent my pain from getting worse and hopefully decrease my pain"     Objective     UPDATE: 2/15/22  MOVEMENT LOSS     2/15/22 ROM Loss   Flexion WFL minimal loss   Extension Mod loss moderate loss   Side glide Right Mod loss moderate loss " "  Side glide Left Mod Loss moderate loss   Rotation Right WFL moderate loss   Rotation Left WFL moderate loss      Lower Extremity Strength  Right LE   Left LE     Hip flexion: 4+/5 Hip flexion: 4+/5   Hip extension:  4-/5 Hip extension: 4-/5   Hip abduction: 4-/5 Hip abduction: 4-/5   Hip adduction:  4/5 Hip adduction:  4/5   Hip IR    4+/5 Hip IR 4+/5   Hip ER 4-/5 Hip ER 4-/5   Knee Flexion 4+/5 Knee Flexion 4+/5   Knee Extension 5/5 Knee Extension 5/5   Ankle dorsiflexion: 5/5 Ankle dorsiflexion: 4/5   Ankle plantarflexion: 5/5 Ankle plantarflexion: 4/5        Baseline Isometric Testing on Med X equipment: Testing administered by PT  Date of testin2022  ROM 3-48 deg==> 0-48 deg    Max Peak Torque 100    Min Peak Torque 55    Flex/Ext Ratio 1.8:1   Total ROM gain from eval  3 deg Ext on 22   % below normative data 26      Baseline Isometric Testing on Med X equipment: Testing administered by PT  Date of testing: 3/2/2022  ROM 0-48 deg    Max Peak Torque 132   Min Peak Torque 74   Flex/Ext Ratio 1.8:1   Total ROM gain from eval  3 deg Ext on 22   % gain from initial  61%   % above normative data 14%        Limitation/Restriction for FOTO lumbar Survey     Therapist reviewed FOTO scores for Jalyn Aaron on 2022.   FOTO documents entered into [employer] - see Media section.                    Treatment    Pt was instructed in and performed the following:     Jalyn Solares received therapeutic exercises to develop/improved posture, cardiovascular endurance, muscular endurance, lumbar/cervical ROM, strength and muscular endurance for 45 minutes including the following exercises:     LTR's x10  DKTC 10x5"  Piriformis stretch 3x20"  Sciatic Nerve glide (L) x 20   PPT + Marches x15 ea  Braced BKFO +GTB x20   Bridges +GTB x20 cue for TA activation sustained  SOC x 20    Not performed  Lifting protocol - performed various lifts /c technique deconstructed to hip hinge, counter balance, " "squat   Pt educated on lifting basics including issued handout   Hip hinge x 10     Squat x 10 cue for knees over toes, push thru heel   Squat /c step stool lift x 5    Golfers lift x 5  Prone laying -> prone on elbow 3 min  HS stretch with strap 3x20"  SKTC 10x5"  Seated trunk flexion 10x5"   Seated trunk flexion ball rollouts 10x5"    [facility] - Short 3/17/2022   Visit Number 14   VAS Pain Rating 0   Treadmill Time (in min.) 5   Speed -   Time -   Lumbar Stretches - Slouch 20   Extension in Lying -   Flexion in Lying 10   Flexion in Sitting -   Manual Therapy -   Lumbar Extension - Seat Pad -   Femur Restraint -   Top Dead Center -   Counterweight -   Lumbar Flexion -   Lumbar Extension -   Lumbar Peak Torque -   Lumbar Weight 67   Repetitions 17   Rating of Perceived Exertion 6     Peripheral muscle strengthening which included 1 set of 15-20 repetitions at a slow, controlled 10-13 second per rep pace focused on strengthening supporting musculature for improved body mechanics and functional mobility.  Pt and therapist focused on proper form during treatment to ensure optimal strengthening of each targeted muscle group.  Machines were utilized including torso rotation, leg extension, leg curl, chest press, upright row. Tricep extension, bicep curl, leg press, and hip abduction added visit 3    Jalyn Solares received the following manual therapy techniques:  00 Minutes    LAD to L leg x 2 minutes  Piriformis release NP    Home Exercises Provided and Patient Education Provided   Home exercises include:   LTR  DKTC/SKTC  PPT  Seated lumbar flexion - ball rollout  SOC  Piriformis   HS stretch  Sciatic nerve glide     Cardio program: Bike/Walking  Lifting education date: visit 11 pt issued handout  Lumbar roll: Standard/Slim uses in car and at home    Education provided:   -importance of maintaining functional mobility to promote improved posture     Written Home Exercises Provided: Patient instructed to " cont prior HEP.  Exercises were reviewed and Jalyn was able to demonstrate them prior to the end of the session.  Jalyn demonstrated good  understanding of the education provided.     See EMR under Patient Instructions for exercises provided prior visit     Assessment   Jalyn returns without c/o pain however, some (L) lower back/glute and leg symptoms are still lingering with tendency for symptoms in the mornings and afternoons.  She does feel that she is able to manage the symptoms easier now since the initiation of the HB program and feels that her core is stronger now. Treatment continued with lumbopelvic/core flexibility and strengthening ex's which she was able to perform without increased pain.  Lumbar MedX resistance was maintained at 67# and she was able to complete 17 reps with a RPE = 6/10. Will attempt to progress per HB protocol and patient tolerance.     Patient is making good progress towards established goals.  Pt will continue to benefit from skilled outpatient physical therapy to address the deficits stated in the impairment chart, provide pt/family education and to maximize pt's level of independence in the home and community environment.     Anticipated Barriers for therapy: Fear avoidance  Pt's spiritual, cultural and educational needs considered and pt agreeable to plan of care and goals as stated below:      GOALS: Pt is in agreement with the following goals.     Short term goals:  6 weeks or 10 visits   1.  Pt will demonstrate increased lumbar ROM by at least 6 degrees from the initial ROM value with improvements noted in functional ROM and ability to perform ADLs.  (approp and ongoing)  2.  Pt will demonstrate increased MedX average isometric strength value  by 15% from initial test resulting in improved ability to perform bending, lifting, and carrying activities safely, confidently.  MET  3.  Patient report a reduction in worst pain score by 1-2 points for improved tolerance for sitting.   MET  4.  Pt able to perform HEP correctly with minimal cueing or supervision from therapist to encourage independent management of symptoms. MET      Long term goals: 10 weeks or 20 visits   1. Pt will demonstrate increased lumbar ROM by at least 9 degrees from initial ROM value, resulting in improved ability to perform functional fwd bending while standing and sitting. (approp and ongoing)  2. Pt will demonstrate increased MedX average isometric strength value  by 25% from initial test resulting in improved ability to perform bending, lifting, and carrying activities safely, confidently.  MET  3. Pt to demonstrate ability to independently control and reduce their pain through posture positioning and mechanical movements throughout a typical day.  (approp and ongoing)  4.  Pt will demonstrate reduced pain and improved functional outcomes as reported on the FOTO by reaching a limitation score of < or = 30% or less in order to demonstrate subjective improvement in pt's condition.    MET  5. Pt will demonstrate independence with the HEP at discharge  (approp and ongoing)  6. Pt will be able to lift 10# from the floor to a table and back to the floor without increased pain so that she may have improved ability to perform bending, lifting, and carrying activities safely, confidently (approp and ongoing)     Plan   Continue with established Plan of Care towards established PT goals.     Prashanth Jones, PTA  3/17/2022

## 2022-03-21 ENCOUNTER — CLINICAL SUPPORT (OUTPATIENT)
Dept: REHABILITATION | Facility: OTHER | Age: 72
End: 2022-03-21
Attending: INTERNAL MEDICINE
Payer: MEDICARE

## 2022-03-21 DIAGNOSIS — R29.898 WEAKNESS OF LEFT LOWER EXTREMITY: ICD-10-CM

## 2022-03-21 DIAGNOSIS — M25.69 DECREASED RANGE OF MOTION OF TRUNK AND BACK: Primary | ICD-10-CM

## 2022-03-21 DIAGNOSIS — R29.898 DECREASED STRENGTH OF TRUNK AND BACK: ICD-10-CM

## 2022-03-21 PROCEDURE — 97110 THERAPEUTIC EXERCISES: CPT

## 2022-03-21 NOTE — PROGRESS NOTES
"  [facility]     Name: Jalyn Solares [facility]  Clinic Number: [id number]    Therapy Diagnosis:   Encounter Diagnoses   Name Primary?    Decreased range of motion of trunk and back Yes    Decreased strength of trunk and back     Weakness of left lower extremity      Physician: Bere Myers MD    Visit Date: 3/21/2022    Physician Orders: PT Eval and Treat   Medical Diagnosis from Referral: M48.061 (ICD-10-CM) - Spinal stenosis of lumbar region without neurogenic claudication  Evaluation Date: 1/21/2022  Authorization Period Expiration: 12/10/2022  Plan of Care Expiration: 4/21/2022  Visit # / Visits authorized: 15 / 20     Time In: 10:15 am    Time Out: 11:00 am    Total Billable Time: 45 minutes     Precautions: Standard and pacemaker      Pattern of pain determined: 2    Subjective   Jalyn Solares reports her 2 week vacation has been cancelled due to family illness.  Pt report Health  visits have brought up some good motivation and recipes.    This weekend pt did some gardening and noted her activity tolerance was more than expected.  Pt plans to attend yoga session later this week.   Pt note difficulty performing HEP in her bed due to mattress but that today she attempted it on her couch and noticed dec AM sx presentation vs attempting on bed.       Patient reports tolerating previous visit well with no residual issues or symptoms  Patient reports their pain to be 0/10 on a 0-10 scale with 0 being no pain and 10 being the worst pain imaginable     Location: bilateral back L calf  Description:  dull/achy    Occupation: Retired   Leisure: walking my dog, cooking, go to yoga, reading, Shinto/bible study        Pts goals: "Increased L leg strength, prevent my pain from getting worse and hopefully decrease my pain"     Objective     UPDATE: 2/15/22  MOVEMENT LOSS     2/15/22 ROM Loss   Flexion WFL minimal loss   Extension Mod loss moderate loss   Side glide " "Right Mod loss moderate loss   Side glide Left Mod Loss moderate loss   Rotation Right WFL moderate loss   Rotation Left WFL moderate loss      Lower Extremity Strength  Right LE   Left LE     Hip flexion: 4+/5 Hip flexion: 4+/5   Hip extension:  4-/5 Hip extension: 4-/5   Hip abduction: 4-/5 Hip abduction: 4-/5   Hip adduction:  4/5 Hip adduction:  4/5   Hip IR    4+/5 Hip IR 4+/5   Hip ER 4-/5 Hip ER 4-/5   Knee Flexion 4+/5 Knee Flexion 4+/5   Knee Extension 5/5 Knee Extension 5/5   Ankle dorsiflexion: 5/5 Ankle dorsiflexion: 4/5   Ankle plantarflexion: 5/5 Ankle plantarflexion: 4/5        Baseline Isometric Testing on Med X equipment: Testing administered by PT  Date of testin2022  ROM 3-48 deg==> 0-48 deg    Max Peak Torque 100    Min Peak Torque 55    Flex/Ext Ratio 1.8:1   Total ROM gain from eval  3 deg Ext on 22   % below normative data 26      Baseline Isometric Testing on Med X equipment: Testing administered by PT  Date of testing: 3/2/2022  ROM 0-48 deg    Max Peak Torque 132   Min Peak Torque 74   Flex/Ext Ratio 1.8:1   Total ROM gain from eval  3 deg Ext on 22   % gain from initial  61%   % above normative data 14%        Limitation/Restriction for FOTO lumbar Survey     Therapist reviewed FOTO scores for Jalyn Aaron on 2022.   FOTO documents entered into EPIC - see Media section.                    Treatment    Pt was instructed in and performed the following:     Jalyn Solares received therapeutic exercises to develop/improved posture, cardiovascular endurance, muscular endurance, lumbar/cervical ROM, strength and muscular endurance for 45 minutes including the following exercises:     Aerobic exercise: Elliptical at resistance 4 for 5 minutes    LTR's x10  DKTC 10x5"  Piriformis stretch 3x20"  Sciatic Nerve glide (L) x 20   PPT   TA brace heel taps x 10 B   Bridges +GTB x20 cue for TA activation sustained  Quad:    Cat/Cow x 10    LE ext x 8 B  SOC x 20    Not " "performed  Braced BKFO +GTB x20   Lifting protocol - performed various lifts /c technique deconstructed to hip hinge, counter balance, squat   Pt educated on lifting basics including issued handout   Hip hinge x 10     Squat x 10 cue for knees over toes, push thru heel   Squat /c step stool lift x 5    Golfers lift x 5  Prone laying -> prone on elbow 3 min  HS stretch with strap 3x20"  SKTC 10x5"  Seated trunk flexion 10x5"   Seated trunk flexion ball rollouts 10x5"    [facility] 3/21/2022   Visit Number 15   VAS Pain Rating 0   Treadmill Time (in min.) 5   Speed -   Time -   Lumbar Stretches - Slouch Overcorrection 20   Extension in Lying -   Flexion in Lying 10   Flexion in Sitting -   Manual Therapy -   Lumbar Extension Seat Pad -   Femur Restraint -   Top Dead Center -   Counterweight -   Lumbar Flexion 57   Lumbar Extension 0   Lumbar Peak Torque -   Min Torque -   Test Percent Below Normative Data -   Test Percent Gain in Strength from Initial  -   Lumbar Weight 67   Repetitions 20   Rating of Perceived Exertion 5   Ice - Z Lie (in min.) 5       Peripheral muscle strengthening which included 1 set of 15-20 repetitions at a slow, controlled 10-13 second per rep pace focused on strengthening supporting musculature for improved body mechanics and functional mobility.  Pt and therapist focused on proper form during treatment to ensure optimal strengthening of each targeted muscle group.  Machines were utilized including torso rotation, leg extension, leg curl, chest press, upright row. Tricep extension, bicep curl, leg press, and hip abduction added visit 3    Jalyn Solares received the following manual therapy techniques:  00 Minutes    LAD to L leg x 2 minutes  Piriformis release NP    Home Exercises Provided and Patient Education Provided   Home exercises include:   LTR  DKTC/SKTC  PPT  Piriformis   HS stretch  SOC  Heel taps     Sciatic nerve glide - d/c   Seated lumbar flexion - ball " rollout    Cardio program: Bike/Walking  Lifting education date: visit 11 pt issued handout  Lumbar roll: Standard/Slim uses in car and at home    Education provided:   -importance of maintaining functional mobility to promote improved posture     Written Home Exercises Provided: Patient instructed to cont prior HEP.  Exercises were reviewed and Jalyn was able to demonstrate them prior to the end of the session.  Jalyn demonstrated good  understanding of the education provided.     See EMR under Patient Instructions for exercises provided prior visit     Assessment     Pt subjective report indicate dec fear avoidance /c planned return to yoga and yard work this weekend.  Pt encouraged to cont AM HEP completion to address AM sx presentation.    Tx cont progress core stability challenge introducing quadruped positioning.  Pt complete /s discomfort indicating appropriateness of cont performance.  Pt HEP primarily consist of mobility exercises, however, pt demo good mobility. Therefore, added core stability challenge /c heel taps to HEP.    Lumbar MedX weight maintained per pt tolerance last visit.  Pt demo comfort /c inc flexion during MedX set up, therefore, inc flex range for mobility challenge.  Today pt perform 20 reps at 5 RPE indicating improved strength and mobility.  Progress per HB protocol.       Patient is making good progress towards established goals.  Pt will continue to benefit from skilled outpatient physical therapy to address the deficits stated in the impairment chart, provide pt/family education and to maximize pt's level of independence in the home and community environment.     Anticipated Barriers for therapy: Fear avoidance  Pt's spiritual, cultural and educational needs considered and pt agreeable to plan of care and goals as stated below:      GOALS: Pt is in agreement with the following goals.     Short term goals:  6 weeks or 10 visits   1.  Pt will demonstrate increased lumbar ROM by at  least 6 degrees from the initial ROM value with improvements noted in functional ROM and ability to perform ADLs.  (approp and ongoing)  2.  Pt will demonstrate increased MedX average isometric strength value  by 15% from initial test resulting in improved ability to perform bending, lifting, and carrying activities safely, confidently.  MET  3.  Patient report a reduction in worst pain score by 1-2 points for improved tolerance for sitting.  MET  4.  Pt able to perform HEP correctly with minimal cueing or supervision from therapist to encourage independent management of symptoms. MET      Long term goals: 10 weeks or 20 visits   1. Pt will demonstrate increased lumbar ROM by at least 9 degrees from initial ROM value, resulting in improved ability to perform functional fwd bending while standing and sitting. (approp and ongoing)  2. Pt will demonstrate increased MedX average isometric strength value  by 25% from initial test resulting in improved ability to perform bending, lifting, and carrying activities safely, confidently.  MET  3. Pt to demonstrate ability to independently control and reduce their pain through posture positioning and mechanical movements throughout a typical day.  (approp and ongoing)  4.  Pt will demonstrate reduced pain and improved functional outcomes as reported on the FOTO by reaching a limitation score of < or = 30% or less in order to demonstrate subjective improvement in pt's condition.    MET  5. Pt will demonstrate independence with the HEP at discharge  (approp and ongoing)  6. Pt will be able to lift 10# from the floor to a table and back to the floor without increased pain so that she may have improved ability to perform bending, lifting, and carrying activities safely, confidently (approp and ongoing)     Plan   Continue with established Plan of Care towards established PT goals.     Hamlet Hager, PT  3/21/2022

## 2022-03-22 ENCOUNTER — OFFICE VISIT (OUTPATIENT)
Dept: DERMATOLOGY | Facility: CLINIC | Age: 72
End: 2022-03-22
Payer: MEDICARE

## 2022-03-22 DIAGNOSIS — L98.9 DISEASE OF SKIN AND SUBCUTANEOUS TISSUE: Primary | ICD-10-CM

## 2022-03-22 PROCEDURE — 3288F FALL RISK ASSESSMENT DOCD: CPT | Mod: CPTII,S$GLB,, | Performed by: DERMATOLOGY

## 2022-03-22 PROCEDURE — 1101F PR PT FALLS ASSESS DOC 0-1 FALLS W/OUT INJ PAST YR: ICD-10-PCS | Mod: CPTII,S$GLB,, | Performed by: DERMATOLOGY

## 2022-03-22 PROCEDURE — 4010F ACE/ARB THERAPY RXD/TAKEN: CPT | Mod: CPTII,S$GLB,, | Performed by: DERMATOLOGY

## 2022-03-22 PROCEDURE — 3288F PR FALLS RISK ASSESSMENT DOCUMENTED: ICD-10-PCS | Mod: CPTII,S$GLB,, | Performed by: DERMATOLOGY

## 2022-03-22 PROCEDURE — 1126F AMNT PAIN NOTED NONE PRSNT: CPT | Mod: CPTII,S$GLB,, | Performed by: DERMATOLOGY

## 2022-03-22 PROCEDURE — 1157F PR ADVANCE CARE PLAN OR EQUIV PRESENT IN MEDICAL RECORD: ICD-10-PCS | Mod: CPTII,S$GLB,, | Performed by: DERMATOLOGY

## 2022-03-22 PROCEDURE — 1159F MED LIST DOCD IN RCRD: CPT | Mod: CPTII,S$GLB,, | Performed by: DERMATOLOGY

## 2022-03-22 PROCEDURE — 99213 PR OFFICE/OUTPT VISIT, EST, LEVL III, 20-29 MIN: ICD-10-PCS | Mod: S$GLB,,, | Performed by: DERMATOLOGY

## 2022-03-22 PROCEDURE — 99999 PR PBB SHADOW E&M-EST. PATIENT-LVL II: CPT | Mod: PBBFAC,,, | Performed by: DERMATOLOGY

## 2022-03-22 PROCEDURE — 1159F PR MEDICATION LIST DOCUMENTED IN MEDICAL RECORD: ICD-10-PCS | Mod: CPTII,S$GLB,, | Performed by: DERMATOLOGY

## 2022-03-22 PROCEDURE — 1160F RVW MEDS BY RX/DR IN RCRD: CPT | Mod: CPTII,S$GLB,, | Performed by: DERMATOLOGY

## 2022-03-22 PROCEDURE — 1157F ADVNC CARE PLAN IN RCRD: CPT | Mod: CPTII,S$GLB,, | Performed by: DERMATOLOGY

## 2022-03-22 PROCEDURE — 99999 PR PBB SHADOW E&M-EST. PATIENT-LVL II: ICD-10-PCS | Mod: PBBFAC,,, | Performed by: DERMATOLOGY

## 2022-03-22 PROCEDURE — 1101F PT FALLS ASSESS-DOCD LE1/YR: CPT | Mod: CPTII,S$GLB,, | Performed by: DERMATOLOGY

## 2022-03-22 PROCEDURE — 1126F PR PAIN SEVERITY QUANTIFIED, NO PAIN PRESENT: ICD-10-PCS | Mod: CPTII,S$GLB,, | Performed by: DERMATOLOGY

## 2022-03-22 PROCEDURE — 1160F PR REVIEW ALL MEDS BY PRESCRIBER/CLIN PHARMACIST DOCUMENTED: ICD-10-PCS | Mod: CPTII,S$GLB,, | Performed by: DERMATOLOGY

## 2022-03-22 PROCEDURE — 99213 OFFICE O/P EST LOW 20 MIN: CPT | Mod: S$GLB,,, | Performed by: DERMATOLOGY

## 2022-03-22 PROCEDURE — 4010F PR ACE/ARB THEARPY RXD/TAKEN: ICD-10-PCS | Mod: CPTII,S$GLB,, | Performed by: DERMATOLOGY

## 2022-03-22 NOTE — PROGRESS NOTES
Subjective:       Patient ID:  Jalyn Aaron is a 72 y.o. female who presents for   Chief Complaint   Patient presents with    Spot     F/u      History of Present Illness: The patient returns to clinic for f/u of asymptomatic rash on stomach x 5 months. Here for bx results.    The patient was last seen on: 3/8/2022 for bx of left abdomen:    Final Pathologic Diagnosis      Date                     Value               Ref Range           Status               03/08/2022                                                       Final            1.  Skin, left abdomen, punch biopsy: - MILD SUPERFICIAL PERIVASCULAR DERMATITIS WITH ASSOCIATED DERMAL EDEMA (SEE COMMENT). COMMENT:  These histologic features are mild and nonspecific.  In the appropriate clinical context, they may be compatible with a viral exanthem or a morbilliform drug eruption.  Clinical correlation is essential.   Comment:  Interp By Kwabena Collier M.D., Signed on 03/15/2022 at 15:21  ----------      Pt says that she has been using the clobetasol cream bid and rash has resolved.    Patient with new complaint of lesion(s)  Location: left chest  Duration: 2 days  Symptoms: red/hurt -- ?bite  Relieving factors/Previous treatments: neosporin              Review of Systems   Skin: Negative for itching and rash.   Hematologic/Lymphatic: Bruises/bleeds easily.        Objective:    Physical Exam   Constitutional: She appears well-developed and well-nourished. No distress.   Neurological: She is alert and oriented to person, place, and time. She is not disoriented.   Psychiatric: She has a normal mood and affect.   Skin:   Areas Examined (abnormalities noted in diagram):   Chest / Axilla Inspection Performed  Abdomen Inspection Performed              Diagram Legend     Erythematous scaling macule/papule c/w actinic keratosis       Vascular papule c/w angioma      Pigmented verrucoid papule/plaque c/w seborrheic keratosis      Yellow umbilicated  papule c/w sebaceous hyperplasia      Irregularly shaped tan macule c/w lentigo     1-2 mm smooth white papules consistent with Milia      Movable subcutaneous cyst with punctum c/w epidermal inclusion cyst      Subcutaneous movable cyst c/w pilar cyst      Firm pink to brown papule c/w dermatofibroma      Pedunculated fleshy papule(s) c/w skin tag(s)      Evenly pigmented macule c/w junctional nevus     Mildly variegated pigmented, slightly irregular-bordered macule c/w mildly atypical nevus      Flesh colored to evenly pigmented papule c/w intradermal nevus       Pink pearly papule/plaque c/w basal cell carcinoma      Erythematous hyperkeratotic cursted plaque c/w SCC      Surgical scar with no sign of skin cancer recurrence      Open and closed comedones      Inflammatory papules and pustules      Verrucoid papule consistent consistent with wart     Erythematous eczematous patches and plaques     Dystrophic onycholytic nail with subungual debris c/w onychomycosis     Umbilicated papule    Erythematous-base heme-crusted tan verrucoid plaque consistent with inflamed seborrheic keratosis     Erythematous Silvery Scaling Plaque c/w Psoriasis     See annotation      Assessment / Plan:        Disease of skin and subcutaneous tissue  Path nonspecific -- may have been viral exanthem (although lasted 5 months) vs drug eruption (although not pruritic or classic in morphology)  Resolved  D/c clobetasol cream (ok to use on new lesion on chest that may represent an arthropod bite)             No follow-ups on file.

## 2022-03-23 ENCOUNTER — CLINICAL SUPPORT (OUTPATIENT)
Dept: REHABILITATION | Facility: OTHER | Age: 72
End: 2022-03-23
Attending: INTERNAL MEDICINE
Payer: MEDICARE

## 2022-03-23 DIAGNOSIS — R29.898 WEAKNESS OF LEFT LOWER EXTREMITY: ICD-10-CM

## 2022-03-23 DIAGNOSIS — R29.898 DECREASED STRENGTH OF TRUNK AND BACK: ICD-10-CM

## 2022-03-23 DIAGNOSIS — M25.69 DECREASED RANGE OF MOTION OF TRUNK AND BACK: Primary | ICD-10-CM

## 2022-03-23 PROCEDURE — 97110 THERAPEUTIC EXERCISES: CPT

## 2022-03-23 NOTE — PROGRESS NOTES
"  [facility]     Name: Jalyn Rileyue  Clinic Number: [id number]    Therapy Diagnosis:   Encounter Diagnoses   Name Primary?    Decreased range of motion of trunk and back Yes    Decreased strength of trunk and back     Weakness of left lower extremity      Physician: Bere Myers MD    Visit Date: 3/23/2022    Physician Orders: PT Eval and Treat   Medical Diagnosis from Referral: M48.061 (ICD-10-CM) - Spinal stenosis of lumbar region without neurogenic claudication  Evaluation Date: 1/21/2022  Authorization Period Expiration: 12/10/2022  Plan of Care Expiration: 4/21/2022  Visit # / Visits authorized: 16 / 20     Time In: 10:15 am    Time Out: 11:00 am    Total Billable Time: 45 minutes     Precautions: Standard and pacemaker      Pattern of pain determined: 2    Subjective   Jalyn Solares reports usually morning sorness when she woke up but continues to be less and less. Able to achieve no pain after completing morning HEP.     Patient reports tolerating previous visit well with no residual issues or symptoms  Patient reports their pain to be 0/10 on a 0-10 scale with 0 being no pain and 10 being the worst pain imaginable     Location: bilateral back L calf  Description:  dull/achy    Occupation: Retired   Leisure: walking my dog, cooking, go to yoga, reading, [employer]ab[employer]/bible study        Pts goals: "Increased L leg strength, prevent my pain from getting worse and hopefully decrease my pain"     Objective     UPDATE: 2/15/22  MOVEMENT LOSS     2/15/22 ROM Loss   Flexion WFL minimal loss   Extension Mod loss moderate loss   Side glide Right Mod loss moderate loss   Side glide Left Mod Loss moderate loss   Rotation Right WFL moderate loss   Rotation Left WFL moderate loss      Lower Extremity Strength  Right LE   Left LE     Hip flexion: 4+/5 Hip flexion: 4+/5   Hip extension:  4-/5 Hip extension: 4-/5   Hip abduction: 4-/5 Hip abduction: 4-/5   Hip " "adduction:  4/5 Hip adduction:  4/5   Hip IR    4+/5 Hip IR 4+/5   Hip ER 4-/5 Hip ER 4-/5   Knee Flexion 4+/5 Knee Flexion 4+/5   Knee Extension 5/5 Knee Extension 5/5   Ankle dorsiflexion: 5/5 Ankle dorsiflexion: 4/5   Ankle plantarflexion: 5/5 Ankle plantarflexion: 4/5        Baseline Isometric Testing on Med X equipment: Testing administered by PT  Date of testin2022  ROM 3-48 deg==> 0-48 deg    Max Peak Torque 100    Min Peak Torque 55    Flex/Ext Ratio 1.8:1   Total ROM gain from eval  3 deg Ext on 22   % below normative data 26      Baseline Isometric Testing on Med X equipment: Testing administered by PT  Date of testing: 3/2/2022  ROM 0-48 deg    Max Peak Torque 132   Min Peak Torque 74   Flex/Ext Ratio 1.8:1   Total ROM gain from eval  3 deg Ext on 22   % gain from initial  61%   % above normative data 14%        Limitation/Restriction for FOTO lumbar Survey     Therapist reviewed FOTO scores for Jalyn Aaron on 2022.   FOTO documents entered into EPIC - see Media section.                    Treatment    Pt was instructed in and performed the following:     Jalyn Solares received therapeutic exercises to develop/improved posture, cardiovascular endurance, muscular endurance, lumbar/cervical ROM, strength and muscular endurance for 50 minutes including the following exercises:     LTR's x10  DKTC 10x5"  Piriformis stretch 3x20"  Sciatic Nerve glide (L) x 20   PPT 15x5"  TA brace heel taps x 10 B   Braced BKFO +GTB x20   Bridges +GTB x15 cue for TA activation sustained  Quad:    Cat/Cow x 10    Multifidi lifts x10 ea    Not performed  Lifting protocol - performed various lifts /c technique deconstructed to hip hinge, counter balance, squat   Pt educated on lifting basics including issued handout   Hip hinge x 10     Squat x 10 cue for knees over toes, push thru heel   Squat /c step stool lift x 5    Golfers lift x 5  Prone laying -> prone on elbow 3 min  HS stretch with " "strap 3x20"  SKTC 10x5"  Seated trunk flexion 10x5"   Seated trunk flexion ball rollouts 10x5"  SOC x 20    [facility] 3/23/2022   Visit Number 16   VAS Pain Rating 0   Treadmill Time (in min.) 5   Speed -   Time -   Lumbar Stretches - Slouch -   Extension in Lying -   Flexion in Lying 10   Flexion in Sitting -   Manual Therapy -   Lumbar Extension - Seat Pad -   Femur Restraint -   Top Dead Center -   Counterweight -   Lumbar Flexion -   Lumbar Extension -   Lumbar Peak Torque -   Lumbar Weight 70   Repetitions 15   Rating of Perceived Exertion 6         Peripheral muscle strengthening which included 1 set of 15-20 repetitions at a slow, controlled 10-13 second per rep pace focused on strengthening supporting musculature for improved body mechanics and functional mobility.  Pt and therapist focused on proper form during treatment to ensure optimal strengthening of each targeted muscle group.  Machines were utilized including torso rotation, leg extension, leg curl, chest press, upright row. Tricep extension, bicep curl, leg press, and hip abduction added visit 3    Jalyn Sujatha received the following manual therapy techniques:  00 Minutes    LAD to L leg x 2 minutes  Piriformis release NP    Home Exercises Provided and Patient Education Provided   Home exercises include:   LTR  DKTC/SKTC  PPT  Piriformis   HS stretch  SOC  Heel taps     Sciatic nerve glide - d/c   Seated lumbar flexion - ball rollout    Cardio program: Bike/Walking  Lifting education date: visit 11 pt issued handout  Lumbar roll: Standard/Slim uses in car and at home    Education provided:   -importance of maintaining functional mobility to promote improved posture     Written Home Exercises Provided: Patient instructed to cont prior HEP.  Exercises were reviewed and Jalyn was able to demonstrate them prior to the end of the session.  Jalyn demonstrated good  understanding of the education provided.     See EMR under Patient " Instructions for exercises provided prior visit     Assessment     Pt presents today without complaints of pain. Continued with progression of core strengthening and stability exercises with good tolerance and no adverse effects. Medex resistance progressed to 70 ft/lbs and pt was able to complete 15 reps with 6/10 RPE. Continue to progress as tolerated.     Patient is making good progress towards established goals.  Pt will continue to benefit from skilled outpatient physical therapy to address the deficits stated in the impairment chart, provide pt/family education and to maximize pt's level of independence in the home and community environment.     Anticipated Barriers for therapy: Fear avoidance  Pt's spiritual, cultural and educational needs considered and pt agreeable to plan of care and goals as stated below:      GOALS: Pt is in agreement with the following goals.     Short term goals:  6 weeks or 10 visits   1.  Pt will demonstrate increased lumbar ROM by at least 6 degrees from the initial ROM value with improvements noted in functional ROM and ability to perform ADLs.  (approp and ongoing)  2.  Pt will demonstrate increased MedX average isometric strength value  by 15% from initial test resulting in improved ability to perform bending, lifting, and carrying activities safely, confidently.  MET  3.  Patient report a reduction in worst pain score by 1-2 points for improved tolerance for sitting.  MET  4.  Pt able to perform HEP correctly with minimal cueing or supervision from therapist to encourage independent management of symptoms. MET      Long term goals: 10 weeks or 20 visits   1. Pt will demonstrate increased lumbar ROM by at least 9 degrees from initial ROM value, resulting in improved ability to perform functional fwd bending while standing and sitting. (approp and ongoing)  2. Pt will demonstrate increased MedX average isometric strength value  by 25% from initial test resulting in improved  ability to perform bending, lifting, and carrying activities safely, confidently.  MET  3. Pt to demonstrate ability to independently control and reduce their pain through posture positioning and mechanical movements throughout a typical day.  (approp and ongoing)  4.  Pt will demonstrate reduced pain and improved functional outcomes as reported on the FOTO by reaching a limitation score of < or = 30% or less in order to demonstrate subjective improvement in pt's condition.    MET  5. Pt will demonstrate independence with the HEP at discharge  (approp and ongoing)  6. Pt will be able to lift 10# from the floor to a table and back to the floor without increased pain so that she may have improved ability to perform bending, lifting, and carrying activities safely, confidently (approp and ongoing)     Plan   Continue with established Plan of Care towards established PT goals.     Jesus Calabrese, PT  3/23/2022

## 2022-03-23 NOTE — PROGRESS NOTES
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 She is notably a little nervous about hosting friends tonight. She feels better about the situation surrounding her son's dog and has worked on accepting things she cannot control. She was successful with her goal of getting smoothie ingredients, and is excited to continue with more. She was also very successful with trying out one of the recipes provided to her last visit (shrimp parm spaghetti squash). Since she and her  were not able to go on vacation this week, she would like to initiate some activities for them to do here in the city.     Vision:      Values: family, friends, pets, Rastafarian, relationships, nutrition    Strengths: self-aware, motivated, proactive, compassionate    Challenges:  Decisional a    Support:  , friends, Rastafarian, HB    Hobbies:  Yoga, cooking, walking, reading, art        INITIAL date  One a scale of 1-10, with 10 being 100% happy, how would you rate your happiness in each of the wellness areas below?    Happiness:         1     2     3     4     5     6     7     8     9     10    Initial Date: DC Date: +/- Total Change   Exercise/Movement      Physical Health      Stress Level      Nutrition      Sleep      Play      Body Image      Relationships      Energy/Vitality        Assessment:   Patient has a good understanding of her wellness aspirations.    Plan:  Patient  goals for next consult include   1) Plan activities to do with  in place of vacation   2) Read venus and mars book with  to promote better communication      3 month:  Consistent art classes, cooking interesting meals, going to [employer] 3x/wk, yoga classes    Health : Laura Lynch  3/23/2022

## 2022-04-05 ENCOUNTER — CLINICAL SUPPORT (OUTPATIENT)
Dept: REHABILITATION | Facility: OTHER | Age: 72
End: 2022-04-05
Attending: INTERNAL MEDICINE
Payer: MEDICARE

## 2022-04-05 DIAGNOSIS — R29.898 WEAKNESS OF LEFT LOWER EXTREMITY: ICD-10-CM

## 2022-04-05 DIAGNOSIS — M25.69 DECREASED RANGE OF MOTION OF TRUNK AND BACK: Primary | ICD-10-CM

## 2022-04-05 DIAGNOSIS — R29.898 DECREASED STRENGTH OF TRUNK AND BACK: ICD-10-CM

## 2022-04-05 PROCEDURE — 97110 THERAPEUTIC EXERCISES: CPT

## 2022-04-05 NOTE — PROGRESS NOTES
" [facility]     Name: Jalyn Aaron  Clinic Number: [id number]    Therapy Diagnosis:   Encounter Diagnoses   Name Primary?    Decreased range of motion of trunk and back Yes    Decreased strength of trunk and back     Weakness of left lower extremity      Physician: Bere Myers MD    Visit Date: 2022    Physician Orders: PT Eval and Treat   Medical Diagnosis from Referral: M48.061 (ICD-10-CM) - Spinal stenosis of lumbar region without neurogenic claudication  Evaluation Date: 2022  Authorization Period Expiration: 12/10/2022  Plan of Care Expiration: 2022  Visit # / Visits authorized:      Time In: 1500    Time Out: 1547  Total Billable Time: 47 minutes     Precautions: Standard and pacemaker      Pattern of pain determined: 2    Subjective   Jalyn Solares returns to PT with c/o of 3/10 L calf pain today; no other new complaints.     Patient reports tolerating previous visit well with no residual issues or symptoms  Patient reports their pain to be 3/10 on a 0-10 scale with 0 being no pain and 10 being the worst pain imaginable     Location: bilateral back L calf  Description:  dull/achy    Occupation: Retired   Leisure: walking my dog, cooking, go to yoga, reading, [employer] study        Pts goals: "Increased L leg strength, prevent my pain from getting worse and hopefully decrease my pain"     Objective        Baseline Isometric Testing on Med X equipment: Testing administered by PT  Date of testin2022  ROM 3-48 deg==> 0-48 deg    Max Peak Torque 100    Min Peak Torque 55    Flex/Ext Ratio 1.8:1   Total ROM gain from eval  3 deg Ext on 22   % below normative data 26      Baseline Isometric Testing on Med X equipment: Testing administered by PT  Date of testing: 3/2/2022  ROM 0-48 deg    Max Peak Torque 132   Min Peak Torque 74   Flex/Ext Ratio 1.8:1   Total ROM gain from eval  3 deg Ext on 22   % gain from " "initial  61%   % above normative data 14%        Limitation/Restriction for FOTO lumbar Survey     Therapist reviewed FOTO scores for Jalyn Aaron on 1/21/2022.   FOTO documents entered into [employer] - see Media section.                    Treatment    Pt was instructed in and performed the following:     Jalyn Solares received therapeutic exercises to develop/improved posture, cardiovascular endurance, muscular endurance, lumbar/cervical ROM, strength and muscular endurance for 47 minutes including the following exercises:     Aerobic Exercise: Treadmill for 5 minutes at 2.5 mph    LTR's x10  DKTC 10x5"  Piriformis stretch 3x20"  Sciatic Nerve glide (L) x 20   PPT 15x5"  TA brace heel taps x 10 B   Braced BKFO with BTB x10 (individual LE activation)  Bridges +BTB x10 cue for TA activation sustained    [facility] 4/5/2022   Visit Number 17   VAS Pain Rating 0   Treadmill Time (in min.) 5   Speed 2.5   Flexion in Lying 10   Lumbar Weight 70   Repetitions 17   Rating of Perceived Exertion 6   Ice - Z Lie (in min.) 5       Not Performed:  Quad:    Cat/Cow x 10    Multifidi lifts x10 ea      Peripheral muscle strengthening which included 1 set of 15-20 repetitions at a slow, controlled 10-13 second per rep pace focused on strengthening supporting musculature for improved body mechanics and functional mobility.  Pt and therapist focused on proper form during treatment to ensure optimal strengthening of each targeted muscle group.  Machines were utilized including torso rotation, leg extension, leg curl, chest press, upright row. Tricep extension, bicep curl, leg press, and hip abduction added visit 3    Jalyn Solares received the following manual therapy techniques:  00 Minutes    LAD to L leg x 2 minutes  Piriformis release NP    Home Exercises Provided and Patient Education Provided   Home exercises include: BTB on 4/5/22  LTR  DKTC/SKTC  PPT  Piriformis   HS stretch  SOC  Heel taps     Sciatic " nerve glide - d/c   Seated lumbar flexion - ball rollout    Cardio program: Bike/Walking  Lifting education date: visit 11 pt issued handout  Lumbar roll: Standard/Slim uses in car and at home    Education provided:   -importance of maintaining functional mobility to promote improved posture     Written Home Exercises Provided: Patient instructed to cont prior HEP.  Exercises were reviewed and Jalyn was able to demonstrate them prior to the end of the session.  Jalyn demonstrated good  understanding of the education provided.     See EMR under Patient Instructions for exercises provided prior visit     Assessment     Jalyn returned to PT with c/o of 3/10 L calf pain; relieved following bolded stretches above. PT progressed therex with Blue resistance band without issues. Pt proceeded with medx exercises, with  Maintained lumbar dynamic weight of 70#. She completed 17 reps at RPE of 6/10. She completed all other peripheral strengthening without new complaints. Progress per HB program protocol, and per tolerance.     Patient is making good progress towards established goals.  Pt will continue to benefit from skilled outpatient physical therapy to address the deficits stated in the impairment chart, provide pt/family education and to maximize pt's level of independence in the home and community environment.     Anticipated Barriers for therapy: Fear avoidance  Pt's spiritual, cultural and educational needs considered and pt agreeable to plan of care and goals as stated below:      GOALS: Pt is in agreement with the following goals.     Short term goals:  6 weeks or 10 visits   1.  Pt will demonstrate increased lumbar ROM by at least 6 degrees from the initial ROM value with improvements noted in functional ROM and ability to perform ADLs.  (approp and ongoing)  2.  Pt will demonstrate increased MedX average isometric strength value  by 15% from initial test resulting in improved ability to perform bending, lifting, and  carrying activities safely, confidently.  MET  3.  Patient report a reduction in worst pain score by 1-2 points for improved tolerance for sitting.  MET  4.  Pt able to perform HEP correctly with minimal cueing or supervision from therapist to encourage independent management of symptoms. MET      Long term goals: 10 weeks or 20 visits   1. Pt will demonstrate increased lumbar ROM by at least 9 degrees from initial ROM value, resulting in improved ability to perform functional fwd bending while standing and sitting. (approp and ongoing)  2. Pt will demonstrate increased MedX average isometric strength value  by 25% from initial test resulting in improved ability to perform bending, lifting, and carrying activities safely, confidently.  MET  3. Pt to demonstrate ability to independently control and reduce their pain through posture positioning and mechanical movements throughout a typical day.  (approp and ongoing)  4.  Pt will demonstrate reduced pain and improved functional outcomes as reported on the FOTO by reaching a limitation score of < or = 30% or less in order to demonstrate subjective improvement in pt's condition.    MET  5. Pt will demonstrate independence with the HEP at discharge  (approp and ongoing)  6. Pt will be able to lift 10# from the floor to a table and back to the floor without increased pain so that she may have improved ability to perform bending, lifting, and carrying activities safely, confidently (approp and ongoing)     Plan   Continue with established Plan of Care towards established PT goals.     Solomon Wesley, PT  4/5/2022

## 2022-04-06 ENCOUNTER — HOSPITAL ENCOUNTER (OUTPATIENT)
Dept: RADIOLOGY | Facility: HOSPITAL | Age: 72
Discharge: HOME | End: 2022-04-06
Attending: STUDENT IN AN ORGANIZED HEALTH CARE EDUCATION/TRAINING PROGRAM
Payer: MEDICARE

## 2022-04-06 DIAGNOSIS — M54.16 LUMBAR RADICULOPATHY, CHRONIC: ICD-10-CM

## 2022-04-06 PROCEDURE — 72131 CT LUMBAR SPINE W/O DYE: CPT | Mod: TC

## 2022-04-06 PROCEDURE — 72131 CT LUMBAR SPINE W/O DYE: CPT | Mod: 26,,, | Performed by: RADIOLOGY

## 2022-04-06 PROCEDURE — 72131 CT LUMBAR SPINE WITHOUT CONTRAST: ICD-10-PCS | Mod: 26,,, | Performed by: RADIOLOGY

## 2022-04-07 ENCOUNTER — TELEPHONE (OUTPATIENT)
Dept: NEUROSURGERY | Facility: CLINIC | Age: 72
End: 2022-04-07
Payer: MEDICARE

## 2022-04-07 ENCOUNTER — PATIENT MESSAGE (OUTPATIENT)
Dept: NEUROSURGERY | Facility: CLINIC | Age: 72
End: 2022-04-07
Payer: MEDICARE

## 2022-04-07 DIAGNOSIS — M54.16 LUMBAR RADICULOPATHY: Primary | ICD-10-CM

## 2022-04-07 NOTE — TELEPHONE ENCOUNTER
----- Message from Olivia Ventura sent at 4/7/2022  8:41 AM CDT -----  Regarding: Surgery Scheduling/Had CT Scan Yesterday  Type:  Patient Returning Call    Who Called:pt  Who Left Message for Patient: Jagruti  Does the patient know what this is regarding?: scheduling procedure  Would the patient rather a call back or a response via [facility]? call  Best Call Back Number: [phone]

## 2022-04-08 ENCOUNTER — CLINICAL SUPPORT (OUTPATIENT)
Dept: REHABILITATION | Facility: OTHER | Age: 72
End: 2022-04-08
Attending: INTERNAL MEDICINE
Payer: MEDICARE

## 2022-04-08 DIAGNOSIS — R29.898 WEAKNESS OF LEFT LOWER EXTREMITY: ICD-10-CM

## 2022-04-08 DIAGNOSIS — R29.898 DECREASED STRENGTH OF TRUNK AND BACK: ICD-10-CM

## 2022-04-08 DIAGNOSIS — M25.69 DECREASED RANGE OF MOTION OF TRUNK AND BACK: Primary | ICD-10-CM

## 2022-04-08 PROCEDURE — 97110 THERAPEUTIC EXERCISES: CPT

## 2022-04-08 NOTE — PROGRESS NOTES
"  [facility]     Name: Jalyn Solares [facility]  Clinic Number: [id number]    Therapy Diagnosis:   Encounter Diagnoses   Name Primary?    Decreased range of motion of trunk and back Yes    Decreased strength of trunk and back     Weakness of left lower extremity      Physician: Bere Myers MD    Visit Date: 2022    Physician Orders: PT Eval and Treat   Medical Diagnosis from Referral: M48.061 (ICD-10-CM) - Spinal stenosis of lumbar region without neurogenic claudication  Evaluation Date: 2022  Authorization Period Expiration: 12/10/2022  Plan of Care Expiration: 2022  Visit # / Visits authorized:      Time In: 9:35 am  Time Out: 10:30 am  Total Billable Time: 50 minutes     Precautions: Standard and pacemaker      Pattern of pain determined: 2    Subjective   Jalyn Solares reports she continues to have calf pain, she thinks the seated trunk flexion ball rollouts make her calf pain worse.      Patient reports tolerating previous visit well with no residual issues or symptoms  Patient reports their pain to be 3/10 on a 0-10 scale with 0 being no pain and 10 being the worst pain imaginable     Location: bilateral back L calf  Description:  dull/achy    Occupation: Retired   Leisure: walking my dog, cooking, go to yoga, reading, Hinduism/bible study        Pts goals: "Increased L leg strength, prevent my pain from getting worse and hopefully decrease my pain"     Objective        Baseline Isometric Testing on Med X equipment: Testing administered by PT  Date of testin2022  ROM 3-48 deg==> 0-48 deg    Max Peak Torque 100    Min Peak Torque 55    Flex/Ext Ratio 1.8:1   Total ROM gain from eval  3 deg Ext on 22   % below normative data 26      Baseline Isometric Testing on Med X equipment: Testing administered by PT  Date of testing: 3/2/2022  ROM 0-48 deg    Max Peak Torque 132   Min Peak Torque 74   Flex/Ext Ratio 1.8:1   Total ROM gain " "from eval  3 deg Ext on 2/8/22   % gain from initial  61%   % above normative data 14%        Limitation/Restriction for FOTO lumbar Survey     Therapist reviewed FOTO scores for Jalyn Aaron on 1/21/2022.   FOTO documents entered into [employer] - see Media section.                    Treatment    Pt was instructed in and performed the following:     Jalyn Solares received therapeutic exercises to develop/improved posture, cardiovascular endurance, muscular endurance, lumbar/cervical ROM, strength and muscular endurance for 50 minutes including the following exercises:     Aerobic Exercise: Treadmill for 5 minutes at 2.5 mph    LTR's x10  DKTC 10x5"  Piriformis stretch 3x20"  Sciatic Nerve glide (L) x 20   PPT 15x5"  TA brace heel taps x 10 B   Braced BKFO with BTB x10 (individual LE activation)  Bridges +BTB x10 cue for TA activation sustained  [facility] 4/8/2022   Visit Number 18   VAS Pain Rating 0   Treadmill Time (in min.) 5   Speed -   Time -   Lumbar Stretches - Slouch -   Extension in Lying -   Flexion in Lying 10   Flexion in Sitting -   Manual Therapy -   Lumbar Extension - Seat Pad -   Femur Restraint -   Top Dead Center -   Counterweight -   Lumbar Flexion -   Lumbar Extension -   Lumbar Peak Torque -   Lumbar Weight 70   Repetitions 20   Rating of Perceived Exertion 6           Not Performed:  Quad:    Cat/Cow x 10    Multifidi lifts x10 ea      Peripheral muscle strengthening which included 1 set of 15-20 repetitions at a slow, controlled 10-13 second per rep pace focused on strengthening supporting musculature for improved body mechanics and functional mobility.  Pt and therapist focused on proper form during treatment to ensure optimal strengthening of each targeted muscle group.  Machines were utilized including torso rotation, leg extension, leg curl, chest press, upright row. Tricep extension, bicep curl, leg press, and hip abduction added visit 3    Jalyn Solares " received the following manual therapy techniques:  00 Minutes    LAD to L leg x 2 minutes  Piriformis release NP    Home Exercises Provided and Patient Education Provided   Home exercises include: BTB on 4/5/22  LTR  DKTC/SKTC  PPT  Piriformis   HS stretch  SOC  Heel taps     Sciatic nerve glide - d/c   Seated lumbar flexion - ball rollout    Cardio program: Bike/Walking  Lifting education date: visit 11 pt issued handout  Lumbar roll: Standard/Slim uses in car and at home    Education provided:   -importance of maintaining functional mobility to promote improved posture     Written Home Exercises Provided: Patient instructed to cont prior HEP.  Exercises were reviewed and Jayln was able to demonstrate them prior to the end of the session.  Jalyn demonstrated good  understanding of the education provided.     See EMR under Patient Instructions for exercises provided prior visit     Assessment     Jalyn presents today with continued complaints of pain. Pt instructed to stop seated trunk flexion at home due to reports of increase calf pain with that exercise. Continuation of flexion based dynamic core and lumbar strengthening and stability exercises with good tolerance and no adverse effects. Medx resistance maintained at 70 ft/lbs and pt was able to complete 20 reps with 6/10 RPE. Maintain resistance for one more visit.    Patient is making good progress towards established goals.  Pt will continue to benefit from skilled outpatient physical therapy to address the deficits stated in the impairment chart, provide pt/family education and to maximize pt's level of independence in the home and community environment.     Anticipated Barriers for therapy: Fear avoidance  Pt's spiritual, cultural and educational needs considered and pt agreeable to plan of care and goals as stated below:      GOALS: Pt is in agreement with the following goals.     Short term goals:  6 weeks or 10 visits   1.  Pt will demonstrate increased  lumbar ROM by at least 6 degrees from the initial ROM value with improvements noted in functional ROM and ability to perform ADLs.  (approp and ongoing)  2.  Pt will demonstrate increased MedX average isometric strength value  by 15% from initial test resulting in improved ability to perform bending, lifting, and carrying activities safely, confidently.  MET  3.  Patient report a reduction in worst pain score by 1-2 points for improved tolerance for sitting.  MET  4.  Pt able to perform HEP correctly with minimal cueing or supervision from therapist to encourage independent management of symptoms. MET      Long term goals: 10 weeks or 20 visits   1. Pt will demonstrate increased lumbar ROM by at least 9 degrees from initial ROM value, resulting in improved ability to perform functional fwd bending while standing and sitting. (approp and ongoing)  2. Pt will demonstrate increased MedX average isometric strength value  by 25% from initial test resulting in improved ability to perform bending, lifting, and carrying activities safely, confidently.  MET  3. Pt to demonstrate ability to independently control and reduce their pain through posture positioning and mechanical movements throughout a typical day.  (approp and ongoing)  4.  Pt will demonstrate reduced pain and improved functional outcomes as reported on the FOTO by reaching a limitation score of < or = 30% or less in order to demonstrate subjective improvement in pt's condition.    MET  5. Pt will demonstrate independence with the HEP at discharge  (approp and ongoing)  6. Pt will be able to lift 10# from the floor to a table and back to the floor without increased pain so that she may have improved ability to perform bending, lifting, and carrying activities safely, confidently (approp and ongoing)     Plan   Continue with established Plan of Care towards established PT goals.     Jesus Calabrese, PT  4/8/2022

## 2022-04-11 ENCOUNTER — CLINICAL SUPPORT (OUTPATIENT)
Dept: REHABILITATION | Facility: OTHER | Age: 72
End: 2022-04-11
Attending: INTERNAL MEDICINE
Payer: MEDICARE

## 2022-04-11 DIAGNOSIS — R29.898 DECREASED STRENGTH OF TRUNK AND BACK: ICD-10-CM

## 2022-04-11 DIAGNOSIS — R29.898 WEAKNESS OF LEFT LOWER EXTREMITY: ICD-10-CM

## 2022-04-11 DIAGNOSIS — M25.69 DECREASED RANGE OF MOTION OF TRUNK AND BACK: Primary | ICD-10-CM

## 2022-04-11 PROCEDURE — 97110 THERAPEUTIC EXERCISES: CPT

## 2022-04-11 NOTE — PROGRESS NOTES
"  [facility]     Name: Jalyn [facility]  Clinic Number: [id number]    Therapy Diagnosis:   Encounter Diagnoses   Name Primary?    Decreased range of motion of trunk and back Yes    Decreased strength of trunk and back     Weakness of left lower extremity      Physician: Bere Myers MD    Visit Date: 2022    Physician Orders: PT Eval and Treat   Medical Diagnosis from Referral: M48.061 (ICD-10-CM) - Spinal stenosis of lumbar region without neurogenic claudication  Evaluation Date: 2022  Authorization Period Expiration: 12/10/2022  Plan of Care Expiration: 2022  Visit # / Visits authorized:      Time In: 10:15 am  Time Out: 11:00 am  Total Billable Time: 45 minutes     Precautions: Standard and pacemaker      Pattern of pain determined: 2    Subjective   Jalyn Solares reports gardening this weekend leading to some L hip/groin discomfort particularly /c walking. Pt notes she has limited her dog walking distance as a result.  Pt googled regional stretches and feels they have helped the discomfort   However, pt notes she feels stronger and more cognizant of her posture.  Pt plans to attend Wellness and cont yoga visit /p HB.    Pt continues to have concern for her imaging and is considering future visits to MD for consult.     Patient reports tolerating previous visit well with no residual issues or symptoms  Patient reports their pain to be 3/10 on a 0-10 scale with 0 being no pain and 10 being the worst pain imaginable     Location: bilateral back L calf  Description:  dull/achy    Occupation: Retired   Leisure: walking my dog, cooking, go to yoga, reading, Congregational/bible study        Pts goals: "Increased L leg strength, prevent my pain from getting worse and hopefully decrease my pain"     Objective        Baseline Isometric Testing on Med X equipment: Testing administered by PT  Date of testin2022  ROM 3-48 deg==> 0-48 deg  " "  Max Peak Torque 100    Min Peak Torque 55    Flex/Ext Ratio 1.8:1   Total ROM gain from eval  3 deg Ext on 2/8/22   % below normative data 26      Baseline Isometric Testing on Med X equipment: Testing administered by PT  Date of testing: 3/2/2022  ROM 0-48 deg    Max Peak Torque 132   Min Peak Torque 74   Flex/Ext Ratio 1.8:1   Total ROM gain from eval  3 deg Ext on 2/8/22   % gain from initial  61%   % above normative data 14%        Limitation/Restriction for FOTO lumbar Survey     Therapist reviewed FOTO scores for Jalyn Aaron on 1/21/2022.   FOTO documents entered into [employer] - see Media section.                    Treatment    Pt was instructed in and performed the following:     Jalyn Solares received therapeutic exercises to develop/improved posture, cardiovascular endurance, muscular endurance, lumbar/cervical ROM, strength and muscular endurance for 45 minutes including the following exercises:     Aerobic Exercise: Recumbent bike Level 14 5 min     Standing hip AD stretch 3 x 20 sec hold  Supine hip flexor stretch 30 sec hold B   LTR's x10  Piriformis stretch 3x20"  Sciatic Nerve glide (L) x 20     PPT 10x5"  TA brace heel taps x 10 B   Bridges x 15 arms across chest     Squat lift 10# floor<>plinth x 5       Not Performed:  Braced BKFO with BTB x10 (individual LE activation)  DKTC 10x5"  Quad:    Cat/Cow x 10    Multifidi lifts x10 ea    [facility] 4/11/2022   Visit Number 19   VAS Pain Rating 3   Treadmill Time (in min.) -   Speed -   Time 5   Lumbar Stretches - Slouch Overcorrection -   Extension in Lying -   Flexion in Lying -   Flexion in Sitting -   Manual Therapy -   Lumbar Extension Seat Pad -   Femur Restraint -   Top Dead Center -   Counterweight -   Lumbar Flexion 60   Lumbar Extension 0   Lumbar Peak Torque -   Min Torque -   Test Percent Below Normative Data -   Test Percent Gain in Strength from Initial  -   Lumbar Weight 70   Repetitions 20   Rating of Perceived " Exertion 6   Ice - Z Lie (in min.) 5         Peripheral muscle strengthening which included 1 set of 15-20 repetitions at a slow, controlled 10-13 second per rep pace focused on strengthening supporting musculature for improved body mechanics and functional mobility.  Pt and therapist focused on proper form during treatment to ensure optimal strengthening of each targeted muscle group.  Machines were utilized including torso rotation, leg extension, leg curl, chest press, upright row. Tricep extension, bicep curl, leg press, and hip abduction added visit 3    Jalyn Solares received the following manual therapy techniques:  00 Minutes    LAD to L leg x 2 minutes  Piriformis release NP    Home Exercises Provided and Patient Education Provided   Home exercises include: BTB on 4/5/22  LTR  DKTC/SKTC  PPT  Piriformis   HS stretch  SOC  Heel taps     Sciatic nerve glide - d/c   Seated lumbar flexion - ball rollout    Cardio program: Bike/Walking  Lifting education date: visit 11 pt issued handout  Lumbar roll: Standard/Slim uses in car and at home    Education provided:   -importance of maintaining functional mobility to promote improved posture     Written Home Exercises Provided: Patient instructed to cont prior HEP.  Exercises were reviewed and Jalyn was able to demonstrate them prior to the end of the session.  Jalyn demonstrated good  understanding of the education provided.     See EMR under Patient Instructions for exercises provided prior visit     Assessment     Pt subjective report indicate sig improved sx self management /c application of stretching to address discomfort and, thereby meeting, LTG.  Stretches reviewed for safe performance and pt introduced to supine hip flexor stretch to address recent hip discomfort.  Goal for lifting also address in tx today.  Pt complete /s discomfort thereby meeting functional goal.  Pt encouraged to attended Wellness to prepare for Ind workouts on resistance exercise  machines at [place].  Lumbar MedX weight maintained per pt tolerance last visit.  Pt demo comfort /c inc flex range during MedX set up, therefore inc flex ROM for mobility challenge.  Today pt perform 20 reps at 6 RPE indicating improved mobility and strength.  Plan MedX IM testing next visit for likely d/c.     Patient is making good progress towards established goals.  Pt will continue to benefit from skilled outpatient physical therapy to address the deficits stated in the impairment chart, provide pt/family education and to maximize pt's level of independence in the home and community environment.     Anticipated Barriers for therapy: Fear avoidance  Pt's spiritual, cultural and educational needs considered and pt agreeable to plan of care and goals as stated below:      GOALS: Pt is in agreement with the following goals.     Short term goals:  6 weeks or 10 visits   1.  Pt will demonstrate increased lumbar ROM by at least 6 degrees from the initial ROM value with improvements noted in functional ROM and ability to perform ADLs.  (approp and ongoing)  2.  Pt will demonstrate increased MedX average isometric strength value  by 15% from initial test resulting in improved ability to perform bending, lifting, and carrying activities safely, confidently.  MET  3.  Patient report a reduction in worst pain score by 1-2 points for improved tolerance for sitting.  MET  4.  Pt able to perform HEP correctly with minimal cueing or supervision from therapist to encourage independent management of symptoms. MET      Long term goals: 10 weeks or 20 visits   1. Pt will demonstrate increased lumbar ROM by at least 9 degrees from initial ROM value, resulting in improved ability to perform functional fwd bending while standing and sitting. (approp and ongoing)  2. Pt will demonstrate increased MedX average isometric strength value  by 25% from initial test resulting in improved ability to perform bending, lifting, and carrying  activities safely, confidently.  MET  3. Pt to demonstrate ability to independently control and reduce their pain through posture positioning and mechanical movements throughout a typical day.  (MET 04/11/22)  4.  Pt will demonstrate reduced pain and improved functional outcomes as reported on the FOTO by reaching a limitation score of < or = 30% or less in order to demonstrate subjective improvement in pt's condition.    MET  5. Pt will demonstrate independence with the HEP at discharge  (approp and ongoing)  6. Pt will be able to lift 10# from the floor to a table and back to the floor without increased pain so that she may have improved ability to perform bending, lifting, and carrying activities safely, confidently (MET 04/11/22)     Plan   Continue with established Plan of Care towards established PT goals.     Hamlet Hager, PT  4/11/2022

## 2022-04-12 ENCOUNTER — TELEPHONE (OUTPATIENT)
Dept: OPTOMETRY | Facility: CLINIC | Age: 72
End: 2022-04-12
Payer: MEDICARE

## 2022-04-12 ENCOUNTER — DOCUMENTATION ONLY (OUTPATIENT)
Dept: REHABILITATION | Facility: OTHER | Age: 72
End: 2022-04-12
Attending: INTERNAL MEDICINE
Payer: MEDICARE

## 2022-04-12 NOTE — PROGRESS NOTES
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 Her last day of PT is tomorrow and she is feeling motivated, enthusiastic, and secure about moving forward with her physical health. Today is most likely final HC session, but will be checking in within the wellness program. She and her   recently went to the [employer] [place] and enjoyed themselves in spite of having to change vacation plans. They will be able reschedule the [employer] for later in the year and have been enjoying solid quality time together recently. Tomorrow, pt is looking forward to meeting a friend at an art class, especially since she is planning on doing classes this summer as a creative outlet for her own enjoyment.     Vision:      Values: family, friends, pets, Christianity, relationships, nutrition    Strengths: self-aware, motivated, proactive, compassionate    Challenges:  Decisional ambivalence     Support:  , friends, Christianity,     Hobbies:  Yoga, cooking, walking, reading, art        Assessment:   Patient has a good understanding of her wellness aspirations. She will thrive as her confidence increases during the wellness program.    Plan:  Pt plans to continue with the  Wellness program, continue to take part in social/ Christianity events, and enjoy her time with her dog.      3 month:  Consistent art classes,  cooking interesting meals, going to [facility] 3x/wk, yoga classes           Health : Laura Lynch  4/12/2022

## 2022-04-12 NOTE — TELEPHONE ENCOUNTER
----- Message from Sonido Mcgraw OD sent at 4/6/2022  7:38 PM CDT -----  Bring her back for an rx check with Joyce  ----- Message -----  From: MARGOT Owen  Sent: 4/6/2022   9:50 AM CDT  To: Sonido Mcgraw OD      ----- Message -----  From: Diane Ayoub  Sent: 4/6/2022   9:21 AM CDT  To: Lucien RIDER Staff    Patient states that her distant vision is blurry with the new eyeglasses and that she can see distances better with her old glasses.  Patient wanted to know if the doctor suggests she do so that she can see distance with her new glasses    Jalyn  @  [phone]

## 2022-04-13 ENCOUNTER — CLINICAL SUPPORT (OUTPATIENT)
Dept: REHABILITATION | Facility: OTHER | Age: 72
End: 2022-04-13
Attending: INTERNAL MEDICINE
Payer: MEDICARE

## 2022-04-13 DIAGNOSIS — M25.69 DECREASED RANGE OF MOTION OF TRUNK AND BACK: Primary | ICD-10-CM

## 2022-04-13 DIAGNOSIS — R29.898 WEAKNESS OF LEFT LOWER EXTREMITY: ICD-10-CM

## 2022-04-13 DIAGNOSIS — R29.898 DECREASED STRENGTH OF TRUNK AND BACK: ICD-10-CM

## 2022-04-13 PROCEDURE — 97110 THERAPEUTIC EXERCISES: CPT

## 2022-04-13 NOTE — PROGRESS NOTES
" [facility]     Name: Jalyn Solares [facility]  Clinic Number: [id number]    Therapy Diagnosis:   Encounter Diagnoses   Name Primary?    Decreased range of motion of trunk and back Yes    Decreased strength of trunk and back     Weakness of left lower extremity      Physician: Bere Myers MD    Visit Date: 2022    Physician Orders: PT Eval and Treat   Medical Diagnosis from Referral: M48.061 (ICD-10-CM) - Spinal stenosis of lumbar region without neurogenic claudication  Evaluation Date: 2022  Authorization Period Expiration: 12/10/2022  Plan of Care Expiration: 2022  Visit # / Visits authorized:      Time In: 8:05 am  Time Out: 9:05 am  Total Billable Time: 55 minutes     Precautions: Standard and pacemaker      Pattern of pain determined: 2    Subjective   Jalyn Solares reports she is happy with her progress with therapy, eager to transition to wellness    Patient reports tolerating previous visit well with no residual issues or symptoms  Patient reports their pain to be 3/10 on a 0-10 scale with 0 being no pain and 10 being the worst pain imaginable     Location: bilateral back L calf  Description:  dull/achy    Occupation: Retired   Leisure: walking my dog, cooking, go to yoga, reading, Synagogue/bible study        Pts goals: "Increased L leg strength, prevent my pain from getting worse and hopefully decrease my pain"     Objective        Baseline Isometric Testing on Med X equipment: Testing administered by PT  Date of testin2022  ROM 3-48 deg==> 0-48 deg    Max Peak Torque 100    Min Peak Torque 55    Flex/Ext Ratio 1.8:1   Total ROM gain from eval  3 deg Ext on 22   % below normative data 26      Baseline Isometric Testing on Med X equipment: Testing administered by PT  Date of testing: 3/2/2022  ROM 0-48 deg    Max Peak Torque 132   Min Peak Torque 74   Flex/Ext Ratio 1.8:1   Total ROM gain from eval  3 deg Ext on 22 " "gain from initial  61%   % above normative data 14%     Final Isometric Testing on Med X equipment: Testing administered by PT  Date of testin2022  ROM 0-60 deg    Max Peak Torque 158   Min Peak Torque 55   Flex/Ext Ratio 2.9:1   Total ROM gain from eval  15 deg   % gain from initial  61%   % above normative data 13%        Limitation/Restriction for FOTO lumbar Survey     Therapist reviewed FOTO scores for Jalyn Aaron on 2022.   FOTO documents entered into [email or web address] - see Media section.                    Treatment    Pt was instructed in and performed the following:     Jalyn Solares received therapeutic exercises to develop/improved posture, cardiovascular endurance, muscular endurance, lumbar/cervical ROM, strength and muscular endurance for 45 minutes including the following exercises:     Aerobic Exercise: Recumbent bike Level 4 5 min     Standing hip AD stretch 3 x 20 sec hold  Supine hip flexor stretch 30 sec hold B   LTR's x10  Piriformis stretch 3x20"  Sciatic Nerve glide (L) x 20   PPT 10x5"  TA brace heel taps x 10 B   Bridges x 15 arms across chest       Not Performed:  Squat lift 10# floor<>plinth x 5   Braced BKFO with BTB x10 (individual LE activation)  DKTC 10x5"  Quad:    Cat/Cow x 10    Multifidi lifts x10 ea    [facility] - Short 2022   Visit Number 20   VAS Pain Rating 3   Treadmill Time (in min.) 5   Speed -   Time -   Lumbar Stretches - Slouch -   Extension in Lying -   Flexion in Lying 10   Flexion in Sitting -   Manual Therapy -   Lumbar Extension - Seat Pad -   Femur Restraint -   Top Dead Center -   Counterweight -   Lumbar Flexion 60   Lumbar Extension 0   Lumbar Peak Torque 158   Lumbar Weight 45   Repetitions 5   Rating of Perceived Exertion -         Peripheral muscle strengthening which included 1 set of 15-20 repetitions at a slow, controlled 10-13 second per rep pace focused on strengthening supporting musculature for improved body mechanics and " functional mobility.  Pt and therapist focused on proper form during treatment to ensure optimal strengthening of each targeted muscle group.  Machines were utilized including torso rotation, leg extension, leg curl, chest press, upright row. Tricep extension, bicep curl, leg press, and hip abduction added visit 3    Jalyn Solares received the following manual therapy techniques:  00 Minutes    LAD to L leg x 2 minutes  Piriformis release NP    Home Exercises Provided and Patient Education Provided   Home exercises include: BTB on 4/5/22  LTR  DKTC/SKTC  PPT  Piriformis   HS stretch  SOC  Heel taps     Sciatic nerve glide - d/c   Seated lumbar flexion - ball rollout    Cardio program: Bike/Walking  Lifting education date: visit 11 pt issued handout  Lumbar roll: Standard/Slim uses in car and at home    Education provided:   -importance of maintaining functional mobility to promote improved posture     Written Home Exercises Provided: Patient instructed to cont prior HEP.  Exercises were reviewed and Jalyn was able to demonstrate them prior to the end of the session.  Jalyn demonstrated good  understanding of the education provided.     See EMR under Patient Instructions for exercises provided prior visit     Assessment     Patient has attended 20 visits of the [facility] program for aerobic work, med ex isometric testing with dynamic strengthening on med ex equipment for spine, whole body strengthening on med ex equipment, HEP, education.  Patient has completed [facility] Program and is ready to be transitioned into wellness program.  Importance of wellness program, and attending 1/week to maintain strength stressed.  Importance of continuing there ex and body mech and ergonomics stressed.   Patient demonstrates improvement in ability to reduce symptoms, improved posture, improved ROM and improved strength.   Reviewed HEP, and importance of maintaining a healthy spine through continued stretching and  performance of HEP, good posture, good ergonomics, good body mech with ADL, leisure, and work.  Discharge handout with HEP given, and discussed aspects of exercise program, cardio, strengthening, and stretching.    Patient expressed understanding information given.  Patient plans to attend wellness and is ready to transition to wellness.      -Improved posture,   when using lumbar roll  -Improved lumbar ROM,  initially on med ex test 3-48 and   currently 0-60 deg  -Improved strength at each test point on lumbar med ex IM test with   61% average improvement noted with Reduced pain  Noted by patient  -Initial outcome tool score 35% and current outcome tool score  28% indicating reduced pain and improved function        Patient is making good progress towards established goals.  Pt will continue to benefit from skilled outpatient physical therapy to address the deficits stated in the impairment chart, provide pt/family education and to maximize pt's level of independence in the home and community environment.     Anticipated Barriers for therapy: Fear avoidance  Pt's spiritual, cultural and educational needs considered and pt agreeable to plan of care and goals as stated below:      GOALS: Pt is in agreement with the following goals.     Short term goals:  6 weeks or 10 visits   1.  Pt will demonstrate increased lumbar ROM by at least 6 degrees from the initial ROM value with improvements noted in functional ROM and ability to perform ADLs.  MET  2.  Pt will demonstrate increased MedX average isometric strength value  by 15% from initial test resulting in improved ability to perform bending, lifting, and carrying activities safely, confidently.  MET  3.  Patient report a reduction in worst pain score by 1-2 points for improved tolerance for sitting.  MET  4.  Pt able to perform HEP correctly with minimal cueing or supervision from therapist to encourage independent management of symptoms. MET      Long term goals: 10  weeks or 20 visits   1. Pt will demonstrate increased lumbar ROM by at least 9 degrees from initial ROM value, resulting in improved ability to perform functional fwd bending while standing and sitting. MET  2. Pt will demonstrate increased MedX average isometric strength value  by 25% from initial test resulting in improved ability to perform bending, lifting, and carrying activities safely, confidently.  MET  3. Pt to demonstrate ability to independently control and reduce their pain through posture positioning and mechanical movements throughout a typical day.  (MET 04/11/22)  4.  Pt will demonstrate reduced pain and improved functional outcomes as reported on the FOTO by reaching a limitation score of < or = 30% or less in order to demonstrate subjective improvement in pt's condition.    MET  5. Pt will demonstrate independence with the HEP at discharge  MET  6. Pt will be able to lift 10# from the floor to a table and back to the floor without increased pain so that she may have improved ability to perform bending, lifting, and carrying activities safely, confidently (MET 04/11/22)     Plan   Continue with established Plan of Care towards established PT goals.     Jesus Calabrese, PT  4/13/2022

## 2022-04-15 ENCOUNTER — PATIENT MESSAGE (OUTPATIENT)
Dept: INTERNAL MEDICINE | Facility: CLINIC | Age: 72
End: 2022-04-15
Payer: MEDICARE

## 2022-04-18 ENCOUNTER — TELEPHONE (OUTPATIENT)
Dept: INTERNAL MEDICINE | Facility: CLINIC | Age: 72
End: 2022-04-18

## 2022-04-18 ENCOUNTER — OFFICE VISIT (OUTPATIENT)
Dept: OPTOMETRY | Facility: CLINIC | Age: 72
End: 2022-04-18
Payer: MEDICARE

## 2022-04-18 DIAGNOSIS — H53.8 BLURRED VISION: Primary | ICD-10-CM

## 2022-04-18 PROCEDURE — 99499 UNLISTED E&M SERVICE: CPT | Mod: S$GLB,,, | Performed by: OPTOMETRIST

## 2022-04-18 PROCEDURE — 4010F ACE/ARB THERAPY RXD/TAKEN: CPT | Mod: CPTII,S$GLB,, | Performed by: OPTOMETRIST

## 2022-04-18 PROCEDURE — 1157F ADVNC CARE PLAN IN RCRD: CPT | Mod: CPTII,S$GLB,, | Performed by: OPTOMETRIST

## 2022-04-18 PROCEDURE — 99499 NO LOS: ICD-10-PCS | Mod: S$GLB,,, | Performed by: OPTOMETRIST

## 2022-04-18 PROCEDURE — 4010F PR ACE/ARB THEARPY RXD/TAKEN: ICD-10-PCS | Mod: CPTII,S$GLB,, | Performed by: OPTOMETRIST

## 2022-04-18 PROCEDURE — 1157F PR ADVANCE CARE PLAN OR EQUIV PRESENT IN MEDICAL RECORD: ICD-10-PCS | Mod: CPTII,S$GLB,, | Performed by: OPTOMETRIST

## 2022-04-18 PROCEDURE — 1159F MED LIST DOCD IN RCRD: CPT | Mod: CPTII,S$GLB,, | Performed by: OPTOMETRIST

## 2022-04-18 PROCEDURE — 99999 PR PBB SHADOW E&M-EST. PATIENT-LVL II: ICD-10-PCS | Mod: PBBFAC,,, | Performed by: OPTOMETRIST

## 2022-04-18 PROCEDURE — 99999 PR PBB SHADOW E&M-EST. PATIENT-LVL II: CPT | Mod: PBBFAC,,, | Performed by: OPTOMETRIST

## 2022-04-18 PROCEDURE — 1159F PR MEDICATION LIST DOCUMENTED IN MEDICAL RECORD: ICD-10-PCS | Mod: CPTII,S$GLB,, | Performed by: OPTOMETRIST

## 2022-04-18 NOTE — PROGRESS NOTES
HPI     Jalyn Aaron is a 72 y.o. female who returns for continued   eye care. She has intermittent esotropia, bilateral hyperopia with   presbyopia, K. Sicca, and bilateral nuclear sclerosis. Her last exam with   me was on 02/10/2022.  At that time her glasses prescription was updated.   Today, she reports that her distance vision, with the new glasses is   blurry, specifically when driving. Her near and intermediate vision are   fine. She explains that her distance vision is better with her older   glasses.     (+)blurred vision  (--)Headaches  (--)diplopia  (--)flashes  (--)floaters  (--)pain  (--)Itching  (--)tearing  (--)burning  (--)Dryness  (--) OTC Drops  (--)Photophobia         Last edited by Sonido Mcgraw, OD on 4/18/2022  9:51 AM. (History)        Review of Systems   Constitutional: Negative for chills, fever and malaise/fatigue.   HENT: Negative for congestion, hearing loss and sore throat.    Eyes: Positive for blurred vision. Negative for double vision, photophobia, pain, discharge and redness.   Respiratory: Negative.  Negative for cough, shortness of breath and wheezing.    Cardiovascular: Negative.    Gastrointestinal: Negative.  Negative for nausea and vomiting.   Genitourinary: Negative.    Musculoskeletal: Negative.    Skin: Negative.    Neurological: Negative for seizures.   Psychiatric/Behavioral: Negative.        For exam results, see encounter report    Assessment /Plan     Blurred vision  - Remake Spec Rx per final Rx below  Glasses Prescription (4/18/2022)        Sphere Cylinder Add    Right +1.00 Sphere +3.00    Left +1.00 Sphere +3.00    Type: PAL    Expiration Date: 4/18/2023              Patient education; RTC as scheduled, sooner as needed

## 2022-04-19 ENCOUNTER — PATIENT OUTREACH (OUTPATIENT)
Dept: ADMINISTRATIVE | Facility: OTHER | Age: 72
End: 2022-04-19
Payer: MEDICARE

## 2022-04-19 NOTE — PROGRESS NOTES
Health Maintenance Due   Topic Date Due    Shingles Vaccine (2 of 3) 11/02/2016    Influenza Vaccine (1) 09/01/2021     Updates were requested from care everywhere.  Chart was reviewed for overdue Proactive [facility] Encounters ([facility]) topics (CRS, Breast Cancer Screening, Eye exam)  Health Maintenance has been updated.  LIN[place] immunization registry triggered.  Immunizations were reconciled.

## 2022-04-22 ENCOUNTER — PES CALL (OUTPATIENT)
Dept: ADMINISTRATIVE | Facility: CLINIC | Age: 72
End: 2022-04-22
Payer: MEDICARE

## 2022-04-28 ENCOUNTER — DOCUMENTATION ONLY (OUTPATIENT)
Dept: REHABILITATION | Facility: OTHER | Age: 72
End: 2022-04-28
Attending: INTERNAL MEDICINE
Payer: MEDICARE

## 2022-04-28 NOTE — PROGRESS NOTES
Health  Wellness Visit Note    Name: Jalyn Solares [facility]  Clinic Number: [id number]  Physician: Bere Myers MD  Diagnosis: No diagnosis found.  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    Left    AV block     exercised induced 2:1    Cataract     Fever blister     Heart block     History of acne     Joint pain     hands    Keratoconjunctivitis sicca not due to Sjogren's syndrome     Pacemaker      Visit Number: 21  Precautions: standard; pacemaker      1st PT visit:  1/21/22  Year of care end date: 1/21/23  6 Month test  Performed: July 2022  12 Month test performed: Jan 2023  Mind body plan: 1F  Patient level:     Time In: 8:59 AM  Time Out: 9:49 AM  Total Treatment Time: 50 minutes    Wellness Vision 2022  Handout on this week's wellness topic Breathing Exercises provided  along with a discussion on what it means, the benefits, and suggestions for practice.  Reviewed last week's topic of NA-1st wellness visit.    Subjective:   Patient reports for her first wellness visit interested in exercising at [facility] to get an additional strengthening day in during the week.  Pt wakes up with glute pain but sits on ice to ease the pain. No pain during her wellness session.  Signed liability waiver.    Objective:   Jalyn Solares completed therapeutic stretches (EIL, KATHY) and the following MedX exercise machines: core lumbar, torso rotation l/r, leg extension, leg curl, upright row, chest press, biceps curl, triceps extension, leg press    See exercise log in patient folder for rate of exertion and repetitions completed.       Fitness Machine Education Key:  E=education on equipment initiated and further follow up and education needed  I=independent with  and exercise.  The patient:   Adjusts machines to his/her settings   Uses equipment levers, pins, weights safely   Maintains safe and correct posture while exercising   Moves  through exercise with correct pace and control   Gets on and off equipment safely      Lumbar/Cervical Ext.  Torso Rotation  Leg Press    Leg Extension  Seated Leg Curl  Chest Press    Seated Row  Hip ADD  Hip ABD    Triceps Extension  Bicep Curl  Other:        Assessment:   Patient tolerated Patient tolerated MedX Core Lumbar Strength and all other peripheral exercises without an increase in symptoms. Patient warmed up on recumbent bike for 5 minutes, stretched, and iced low back for 5 minutes after the workout.    Plan:  Continue with established plan of care towards wellness goals.     Health  : Elba Rodriguez  4/28/2022

## 2022-05-03 ENCOUNTER — PES CALL (OUTPATIENT)
Dept: ADMINISTRATIVE | Facility: CLINIC | Age: 72
End: 2022-05-03
Payer: MEDICARE

## 2022-05-06 ENCOUNTER — DOCUMENTATION ONLY (OUTPATIENT)
Dept: REHABILITATION | Facility: OTHER | Age: 72
End: 2022-05-06
Attending: INTERNAL MEDICINE
Payer: MEDICARE

## 2022-05-06 NOTE — PROGRESS NOTES
Health  Wellness Visit Note    Name: Jalyn Solares [place]  Clinic Number: [id number]  Physician: Bere Myers MD  Diagnosis: No diagnosis found.  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    Left    AV block     exercised induced 2:1    Cataract     Fever blister     Heart block     History of acne     Joint pain     hands    Keratoconjunctivitis sicca not due to Sjogren's syndrome     Pacemaker      Visit Number: 22  Precautions: standard; pacemaker      1st PT visit:  1/21/22  Year of care end date: 1/21/23  6 Month test  Performed: July 2022  12 Month test performed: Jan 2023  Mind body plan: Plan A 8 months  Patient level: B    Time In: 10:06 AM  Time Out: 10:50 AM  Total Treatment Time:  44 minutes    Wellness Vision 2022  Handout on this week's wellness topic Creative brain provided  along with a discussion on what it means, the benefits, and suggestions for practice.  Reviewed last week's topic of Breathing exercises- she practices breathing when she does yoga and has been practicing deep breathing when she wakes up in the middle of the night.  The handout was a reminder for her to use deep breathing when she is not able to fall back at sleep.      Subjective:   Patient reports that lower back pain is at 0/10 but having left glute pain 1-2/10, left leg pain 2-3/10, and left hip pain 2-3/10 today.  She is stretches and ices daily.  She walks daily with her , about 1 mile per day.  They have a dog that sometimes prevents them from walking the mile.  She went to the gym yesterday and walked 1 mile on the track.  They also played around with some of the machines in order to start getting used to them.  I will meet Mrs. Gunderson at [facility] on Friday 5/13/22 @ 1PM.      Objective:   Jalyn Solares completed therapeutic stretches (EIL, KATHY) and the following MedX exercise machines: core lumbar, torso rotation l/r, leg extension, leg curl,  upright row, chest press, biceps curl, triceps extension, leg press    See exercise log in patient folder for rate of exertion and repetitions completed.       Fitness Machine Education Key:  E=education on equipment initiated and further follow up and education needed  I=independent with  and exercise.  The patient:   Adjusts machines to his/her settings   Uses equipment levers, pins, weights safely   Maintains safe and correct posture while exercising   Moves through exercise with correct pace and control   Gets on and off equipment safely      Lumbar/Cervical Ext.  Torso Rotation  Leg Press    Leg Extension  Seated Leg Curl  Chest Press    Seated Row  Hip ADD  Hip ABD    Triceps Extension  Bicep Curl  Other:        Assessment:   Patient tolerated Patient tolerated MedX Core Lumbar Strength and all other peripheral exercises without an increase in symptoms. Patient warmed up on recumbent bike for 5 minutes, stretched, and iced low back for 5 minutes after the workout.    Plan:  Continue with established plan of care towards wellness goals.     Health  : Pat Zamora  5/6/2022

## 2022-05-11 ENCOUNTER — DOCUMENTATION ONLY (OUTPATIENT)
Dept: REHABILITATION | Facility: OTHER | Age: 72
End: 2022-05-11
Attending: INTERNAL MEDICINE
Payer: MEDICARE

## 2022-05-11 NOTE — PROGRESS NOTES
Health  Wellness Visit Note    Name: Jalyn Solares [facility]  Clinic Number: [id number]  Physician: Bere Myers MD  Diagnosis: No diagnosis found.  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    Left    AV block     exercised induced 2:1    Cataract     Fever blister     Heart block     History of acne     Joint pain     hands    Keratoconjunctivitis sicca not due to Sjogren's syndrome     Pacemaker      Visit Number: 23  Precautions: standard; pacemaker      1st PT visit:  1/21/22  Year of care end date: 1/21/23  6 Month test  Performed: July 2022  12 Month test performed: Jan 2023  Mind body plan: Plan A 8 months  Patient level: B    Time In: 8:26 AM  Time Out: 9:10 AM  Total Treatment Time:  44 minutes    Wellness Vision 2022  Handout on this week's wellness topic Learning was provided along with a discussion on what it means, the benefits, and suggestions for practice.  Reviewed last week's topic of Creative Brain-she signed up for an art class.    Subjective:   Patient reports mild lower back and hip pain that runs down the back of her leg. She is stretches and ices regularly daily.  She walks daily with her , about 1 mile per day. HC-Pat at [facility] on Friday 5/13/22 @ 1PM to learn machines.    Started to learn  today and entered CC into [employer].    Objective:   Jalyn Solares completed therapeutic stretches (EIL, KATHY) and the following MedX exercise machines: core lumbar, torso rotation l/r, leg extension, leg curl, upright row, chest press, biceps curl, triceps extension, leg press    See exercise log in patient folder for rate of exertion and repetitions completed.       Fitness Machine Education Key:  E=education on equipment initiated and further follow up and education needed  I=independent with  and exercise.  The patient:   Adjusts machines to his/her settings   Uses equipment levers, pins,  weights safely   Maintains safe and correct posture while exercising   Moves through exercise with correct pace and control   Gets on and off equipment safely      Lumbar/Cervical Ext.  Torso Rotation  Leg Press    Leg Extension E Seated Leg Curl  Chest Press    Seated Row  Hip ADD X Hip ABD E   Triceps Extension  Bicep Curl  Other:        Assessment:   Patient tolerated Patient tolerated MedX Core Lumbar Strength and all other peripheral exercises without an increase in symptoms. Patient warmed up on recumbent bike for 5 minutes, stretched, and iced low back for 5 minutes after the workout.    Plan:  Continue with established plan of care towards wellness goals.     Health  : Elba Rodriguez  5/11/2022

## 2022-05-17 ENCOUNTER — DOCUMENTATION ONLY (OUTPATIENT)
Dept: REHABILITATION | Facility: OTHER | Age: 72
End: 2022-05-17
Attending: INTERNAL MEDICINE
Payer: MEDICARE

## 2022-05-17 NOTE — PROGRESS NOTES
Health  Wellness Visit Note    Name: Jalyn Solares [facility]  Clinic Number: [id number]  Physician: Bere Myers MD  Diagnosis: No diagnosis found.  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    Left    AV block     exercised induced 2:1    Cataract     Fever blister     Heart block     History of acne     Joint pain     hands    Keratoconjunctivitis sicca not due to Sjogren's syndrome     Pacemaker      Visit Number: 24  Precautions: standard; pacemaker      1st PT visit:  1/21/22  Year of care end date: 1/21/23  6 Month test  Performed: July 2022  12 Month test performed: Jan 2023  Mind body plan: Plan A 8 months  Patient level: B    Time In: 8:58 AM  Time Out: 9:38 AM  Total Treatment Time: 40  minutes    Wellness Vision 2022  Handout on this week's wellness topic Memory was provided along with a discussion on what it means, the benefits, and suggestions for practice.  Reviewed last week's topic of Learning -she is very open to learning new things and gaining new experiences.    Subjective:   Patient reports that her low back feels good today. Over the week, she completed her stretches on a daily basis and iced at least 3 times.  For exercise, she takes walks on most days. Pt also got set up at [facility], and has worked out twice since Friday.    Objective:   Jalyn Solares completed therapeutic stretches (EIL, KATHY) and the following MedX exercise machines: core lumbar, torso rotation l/r, leg extension, leg curl, upright row, chest press, biceps curl, triceps extension, leg press    See exercise log in patient folder for rate of exertion and repetitions completed.       Fitness Machine Education Key:  E=education on equipment initiated and further follow up and education needed  I=independent with  and exercise.  The patient:   Adjusts machines to his/her settings   Uses equipment levers, pins, weights safely   Maintains  safe and correct posture while exercising   Moves through exercise with correct pace and control   Gets on and off equipment safely      Lumbar/Cervical Ext.  Torso Rotation  Leg Press    Leg Extension E Seated Leg Curl E Chest Press    Seated Row E Hip ADD X Hip ABD E   Triceps Extension  Bicep Curl  Other:        Assessment:   Patient tolerated Patient tolerated MedX Core Lumbar Strength and all other peripheral exercises without an increase in symptoms. Patient warmed up on recumbent bike for 5 minutes, stretched, and iced low back for 5 minutes after the workout.    Plan:  Continue with established plan of care towards wellness goals.     Health  : Laura Lynch  5/17/2022

## 2022-05-24 ENCOUNTER — DOCUMENTATION ONLY (OUTPATIENT)
Dept: REHABILITATION | Facility: OTHER | Age: 72
End: 2022-05-24
Attending: INTERNAL MEDICINE
Payer: MEDICARE

## 2022-05-24 NOTE — PROGRESS NOTES
Health  Wellness Visit Note    Name: Jalyn Solares [facility]  Clinic Number: [id number]  Physician: Bere Myers MD  Diagnosis: No diagnosis found.  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    Left    AV block     exercised induced 2:1    Cataract     Fever blister     Heart block     History of acne     Joint pain     hands    Keratoconjunctivitis sicca not due to Sjogren's syndrome     Pacemaker      Visit Number: 25  Precautions: standard; pacemaker      1st PT visit:  1/21/22  Year of care end date: 1/21/23  6 Month test  Performed: July 2022  12 Month test performed: Jan 2023  Mind body plan: Plan A 8 months  Patient level: B    Time In: 3:24 PM  Time Out: 4:08 PM  Total Treatment Time:  44 minutes    Wellness Vision 2022  Handout on this week's wellness topic Learning was provided along with a discussion on what it means, the benefits, and suggestions for practice.  Reviewed last week's topic of Memory- she liked the handout and is happy to start art class next Thursday.  She has not been painting at all.      Subjective:   Patient reports that her low back pain is at 0 but having left buttock and leg pain, level 3/10 today.  The buttock pain was at 5/10 this morning but is usually better by this time.  She is stretching and icing daily. The Pt does weight training at least one time per week, walking every day- 25 minutes with the dog and 40 minutes with her .  She is also doing yoga one time per week.      Objective:   Jalyn Solares completed therapeutic stretches (EIL, KATHY) and the following MedX exercise machines: core lumbar, torso rotation l/r, leg extension, leg curl, upright row, chest press, biceps curl, triceps extension, leg press    See exercise log in patient folder for rate of exertion and repetitions completed.       Fitness Machine Education Key:  E=education on equipment initiated and further follow up and  education needed  I=independent with  and exercise.  The patient:   Adjusts machines to his/her settings   Uses equipment levers, pins, weights safely   Maintains safe and correct posture while exercising   Moves through exercise with correct pace and control   Gets on and off equipment safely      Core lumbar strength  Core Torso Rotation E Leg Press E   Leg Extension E Seated Leg Curl E Chest Press    Row E Abductor E Hip ADD X   Triceps   Biceps  Other:        Assessment:   Patient tolerated Patient tolerated MedX Core Lumbar Strength and all other peripheral exercises without an increase in symptoms. Patient warmed up on the treadmill for 5 minutes, stretched, and iced low back for 5 minutes after the workout.    Plan:  Continue with established plan of care towards wellness goals.     Health  : Pat Zamora  5/24/2022

## 2022-05-27 ENCOUNTER — TELEPHONE (OUTPATIENT)
Dept: NEUROSURGERY | Facility: CLINIC | Age: 72
End: 2022-05-27

## 2022-05-27 ENCOUNTER — HOSPITAL ENCOUNTER (OUTPATIENT)
Dept: RADIOLOGY | Facility: HOSPITAL | Age: 72
Discharge: HOME | End: 2022-05-27
Attending: NEUROLOGICAL SURGERY
Payer: MEDICARE

## 2022-05-27 ENCOUNTER — OFFICE VISIT (OUTPATIENT)
Dept: NEUROSURGERY | Facility: CLINIC | Age: 72
End: 2022-05-27
Payer: MEDICARE

## 2022-05-27 DIAGNOSIS — M54.16 LUMBAR RADICULOPATHY: ICD-10-CM

## 2022-05-27 DIAGNOSIS — M48.061 SPINAL STENOSIS OF LUMBAR REGION, UNSPECIFIED WHETHER NEUROGENIC CLAUDICATION PRESENT: ICD-10-CM

## 2022-05-27 DIAGNOSIS — M48.061 SPINAL STENOSIS OF LUMBAR REGION WITH RADICULOPATHY: ICD-10-CM

## 2022-05-27 DIAGNOSIS — M54.16 SPINAL STENOSIS OF LUMBAR REGION WITH RADICULOPATHY: ICD-10-CM

## 2022-05-27 DIAGNOSIS — M51.36 DEGENERATIVE DISC DISEASE, LUMBAR: ICD-10-CM

## 2022-05-27 DIAGNOSIS — M54.2 NECK PAIN: Primary | ICD-10-CM

## 2022-05-27 DIAGNOSIS — M43.16 SPONDYLOLISTHESIS AT L4-L5 LEVEL: ICD-10-CM

## 2022-05-27 DIAGNOSIS — M54.2 NECK PAIN: ICD-10-CM

## 2022-05-27 PROCEDURE — 72120 XR LUMBAR SPINE FLEXION AND EXTENSION ONLY: ICD-10-PCS | Mod: 26,,, | Performed by: RADIOLOGY

## 2022-05-27 PROCEDURE — 1159F MED LIST DOCD IN RCRD: CPT | Mod: CPTII,S$GLB,, | Performed by: NEUROLOGICAL SURGERY

## 2022-05-27 PROCEDURE — 72050 XR CERVICAL SPINE AP LAT WITH FLEX EXTEN: ICD-10-PCS | Mod: 26,,, | Performed by: RADIOLOGY

## 2022-05-27 PROCEDURE — 99204 OFFICE O/P NEW MOD 45 MIN: CPT | Mod: S$GLB,,, | Performed by: NEUROLOGICAL SURGERY

## 2022-05-27 PROCEDURE — 1125F AMNT PAIN NOTED PAIN PRSNT: CPT | Mod: CPTII,S$GLB,, | Performed by: NEUROLOGICAL SURGERY

## 2022-05-27 PROCEDURE — 1101F PR PT FALLS ASSESS DOC 0-1 FALLS W/OUT INJ PAST YR: ICD-10-PCS | Mod: CPTII,S$GLB,, | Performed by: NEUROLOGICAL SURGERY

## 2022-05-27 PROCEDURE — 99999 PR PBB SHADOW E&M-EST. PATIENT-LVL IV: ICD-10-PCS | Mod: PBBFAC,,, | Performed by: NEUROLOGICAL SURGERY

## 2022-05-27 PROCEDURE — 1159F PR MEDICATION LIST DOCUMENTED IN MEDICAL RECORD: ICD-10-PCS | Mod: CPTII,S$GLB,, | Performed by: NEUROLOGICAL SURGERY

## 2022-05-27 PROCEDURE — 72110 XR LUMBAR SPINE AP AND LAT WITH FLEX/EXT: ICD-10-PCS | Mod: 26,,, | Performed by: RADIOLOGY

## 2022-05-27 PROCEDURE — 3288F PR FALLS RISK ASSESSMENT DOCUMENTED: ICD-10-PCS | Mod: CPTII,S$GLB,, | Performed by: NEUROLOGICAL SURGERY

## 2022-05-27 PROCEDURE — 72050 X-RAY EXAM NECK SPINE 4/5VWS: CPT | Mod: 26,,, | Performed by: RADIOLOGY

## 2022-05-27 PROCEDURE — 99204 PR OFFICE/OUTPT VISIT, NEW, LEVL IV, 45-59 MIN: ICD-10-PCS | Mod: S$GLB,,, | Performed by: NEUROLOGICAL SURGERY

## 2022-05-27 PROCEDURE — 99999 PR PBB SHADOW E&M-EST. PATIENT-LVL IV: CPT | Mod: PBBFAC,,, | Performed by: NEUROLOGICAL SURGERY

## 2022-05-27 PROCEDURE — 1157F PR ADVANCE CARE PLAN OR EQUIV PRESENT IN MEDICAL RECORD: ICD-10-PCS | Mod: CPTII,S$GLB,, | Performed by: NEUROLOGICAL SURGERY

## 2022-05-27 PROCEDURE — 3288F FALL RISK ASSESSMENT DOCD: CPT | Mod: CPTII,S$GLB,, | Performed by: NEUROLOGICAL SURGERY

## 2022-05-27 PROCEDURE — 72110 X-RAY EXAM L-2 SPINE 4/>VWS: CPT | Mod: TC,PN

## 2022-05-27 PROCEDURE — 72120 X-RAY BEND ONLY L-S SPINE: CPT | Mod: 26,,, | Performed by: RADIOLOGY

## 2022-05-27 PROCEDURE — 72110 X-RAY EXAM L-2 SPINE 4/>VWS: CPT | Mod: 26,,, | Performed by: RADIOLOGY

## 2022-05-27 PROCEDURE — 1157F ADVNC CARE PLAN IN RCRD: CPT | Mod: CPTII,S$GLB,, | Performed by: NEUROLOGICAL SURGERY

## 2022-05-27 PROCEDURE — 4010F ACE/ARB THERAPY RXD/TAKEN: CPT | Mod: CPTII,S$GLB,, | Performed by: NEUROLOGICAL SURGERY

## 2022-05-27 PROCEDURE — 1101F PT FALLS ASSESS-DOCD LE1/YR: CPT | Mod: CPTII,S$GLB,, | Performed by: NEUROLOGICAL SURGERY

## 2022-05-27 PROCEDURE — 1125F PR PAIN SEVERITY QUANTIFIED, PAIN PRESENT: ICD-10-PCS | Mod: CPTII,S$GLB,, | Performed by: NEUROLOGICAL SURGERY

## 2022-05-27 PROCEDURE — 4010F PR ACE/ARB THEARPY RXD/TAKEN: ICD-10-PCS | Mod: CPTII,S$GLB,, | Performed by: NEUROLOGICAL SURGERY

## 2022-05-27 PROCEDURE — 72050 X-RAY EXAM NECK SPINE 4/5VWS: CPT | Mod: TC,PN

## 2022-05-27 PROCEDURE — 72120 X-RAY BEND ONLY L-S SPINE: CPT | Mod: TC,PN

## 2022-05-27 NOTE — PROGRESS NOTES
Oswestry Low Back Pain Disability Questionnaire    DATE OF SERVICE:  05/27/2022    ATTENDING PHYSICIAN:  Narciso Segura MD    Instructions    This questionnaire has been designed to give us information as to how your back or leg pain is affecting your ability to manage in everyday life. Please answer by checking ONE box in each section for the statement which best applies to you. We realize you may consider that two or more statements in any one section apply but please just shade out the spot that indicates the statement which most clearly describes your problem.    Section 1 - Pain intensity    * I have no pain at the moment.  * The pain is very mild at the moment.  * The pain is moderate at the moment.  * The pain is fairly severe at the moment.  * The pain is very severe at the moment.  * The pain is the worst imaginable at the moment.    Score : 2    Section 2 - Personal care (washing, dressing etc)    * I can look after myself normally without causing extra pain.  * I can look after myself normally but it causes extra pain.  * It is painful to look after myself and I am slow and careful.  * I need some help but manage most of my personal care.  * I need help every day in most aspects of self-care.  * I do not get dressed, I wash with difficulty and stay in bed.    Score : 1    Section 3 - Lifting    * I can lift heavy weights without extra pain.  * I can lift heavy weights but it gives extra pain.  * Pain prevents me from lifting heavy weights off the floor, but I can manage if they are conveniently placed eg. on a table.  * Pain prevents me from lifting heavy weights, but I can manage light to medium weights if they are conveniently positioned.  * I can lift very light weights.  * I cannot lift or carry anything at all.    Score : 1    Section 4 - Walking    * Pain does not prevent me walking any distance.  * Pain prevents me from walking more than 1 mile.         * Pain prevents me from walking more than  1/2 mile.         * Pain prevents me from walking more than 100 yards.            * I can only walk using a stick or crutches.  * I am in bed most of the time.    Score : 0    Section 5 - Sitting    * I can sit in any chair as long as I like.  * I can only sit in my favourite chair as long as I like.  * Pain prevents me sitting more than one hour.  * Pain prevents me from sitting more than 30 minutes.  * Pain prevents me from sitting more than 10 minutes.  * Pain prevents me from sitting at all.    Score : 2    Section 6 - Standing    * I can stand as long as I want without extra pain.  * I can stand as long as I want but it gives me extra pain.  * Pain prevents me from standing for more than 1 hour  * Pain prevents me from standing for more than 30 minutes.  * Pain prevents me from standing for more than 10 minutes.  * Pain prevents me from standing at all.     Score : 1    Section 7 - Sleeping    * My sleep is never disturbed by pain.  * My sleep is occasionally disturbed by pain.  * Because of pain I have less than 6 hours sleep.   * Because of pain I have less than 4 hours sleep.   * Because of pain I have less than 2 hours sleep.  * Pain prevents me from sleeping at all.    Score : 1    Section 8 - Sex life (if applicable)    * My sex life is normal and causes no extra pain.  * My sex life is normal but causes some extra pain.  * My sex life is nearly normal but is very painful.   * My sex life is severely restricted by pain.  * My sex life is nearly absent because of pain.   * Pain prevents any sex life at all.    Score : 1    Section 9 - Social life    * My social life is normal and gives me no extra pain.  * My social life is normal but increases the degree of pain.  * Pain has no significant effect on my social life apart from limiting my more energetic interests eg, sport.  * Pain has restricted my social life and I do not go out as often.  * Pain has restricted my social life to my home.   * I have no  social life because of pain.     Score : 2    Section 10 - Travelling    * I can travel anywhere without pain.  * I can travel anywhere but it gives me extra pain.  * Pain is bad but I manage journeys over two hours.  * Pain restricts me to journeys of less than one hour.  * Pain restricts me to short necessary journeys under 30 minutes.  * Pain prevents me from travelling except to receive treatment.    Score : 1      TOTAL SCORE :  12 / 50 x 2 = 24 %      References  1. Syeda DEMETRIUS, Kaci PB. The Oswestry Disability Index. Spine 2000 Nov 15;25(22):2941-52; discussion 52.  from standing for more than from standing for more than from standing for more than from standing at all      NEUROSURGICAL OUTPATIENT CONSULTATION NOTE    DATE OF SERVICE:  2022    ATTENDING PHYSICIAN:  Narciso Segura MD    CONSULT REQUESTED BY:  Dr Myers    REASON FOR CONSULT:  Low back pain, left leg pain    GASTON:24%    NRS low back:3-4/10    NRS right le/10    NRS left leg:3-4/10    Opioid use daily:None    Neuropathic pain meds daily: gabapentin 450 mg BID    NSAIDs daily: naproxen 250 mg daily      Smoking:none    SUBJECTIVE:    HISTORY:  This is a 72 y.o. female, pacemaker for AV block, cardiomyopathy, who started to have left foot numbness in the L5 distribution about 2 years ago.  She then developed worsening left-sided back pain and leg pain.  The back and leg pain is rated 3 to 4/10.  The pain is not interfering with walking at this time.  Pain is usually worse with sitting or following physical activities such as lifting or bending forward.  She has been treated with the healthy back program and physical therapy with good pain relief.  She used to do a lot of yoga.  She is considering doing Pilates.  She denies having urinary retention or incontinence.  She is mainly complaining of constipation which is new for her.  No new onset of motor weakness.  No recent spinal injections.  More recently she has been complaining of  right-sided neck pain.  She tried some stretches at home but the pain is constant and not improving.  She denies having upper extremity weakness numbness.  She has not dropping things.  No gait imbalance.      PAST MEDICAL HISTORY:  Active Ambulatory Problems     Diagnosis Date Noted    AV block     Cardiac pacemaker in situ 07/26/2013    Anxiety 07/18/2014    Intraductal papilloma of breast 06/06/2018    Ejection fraction < 50% 09/23/2020    Cardiomyopathy, nonischemic 10/12/2020    Lumbar stenosis 01/04/2021    Screening for colon cancer 03/30/2021    Disorder of muscle 05/05/2021    Decreased range of motion of trunk and back 01/25/2022    Decreased strength of trunk and back 01/25/2022    Weakness of left lower extremity 01/25/2022    Neuropathy 03/11/2022     Resolved Ambulatory Problems     Diagnosis Date Noted    Neck pain 10/02/2013    Papilloma of breast 12/16/2013    Breast mass 01/03/2014    Hallux valgus 04/01/2015    Screening 11/08/2016    Pain 12/02/2020     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    COLONOSCOPY N/A 3/30/2021    Procedure: COLONOSCOPY;  Surgeon: Joaquin Johnson MD;  Location: [address]);  Service: Endoscopy;  Laterality: N/A;  prep ins. emailed - COVID screening on 3/27/21 [facility] - [facility]    HYSTERECTOMY      INSERT / REPLACE / REMOVE PACEMAKER       LASIK      LASIK Bilateral 2009       SOCIAL HISTORY:   Social History     Socioeconomic History    Marital status:     Number of children: 1   Occupational History    Occupation:      Employer: [employer]   Tobacco Use    Smoking status: Never Smoker    Smokeless tobacco: Never Used   Substance and Sexual Activity    Alcohol use: Yes     Alcohol/week: 0.0 standard drinks     Types: 2 Glasses of wine per week     Comment: socially    Drug use: No    Sexual activity: Not Currently     Partners: Male     Birth control/protection: None   Other Topics Concern    Are you pregnant or think you may be? No    Breast-feeding No   Social History Narrative     (case management) at [employer]       FAMILY HISTORY:  Family History   Problem Relation Age of Onset    Cancer Mother 80        pagets    Cataracts Mother     Hypertension Mother     Breast cancer Mother     Multiple sclerosis Father     Multiple sclerosis Sister     No Known Problems Brother     No Known Problems Maternal Aunt     No Known Problems Maternal Uncle     No Known Problems Paternal Aunt     No Known Problems Paternal Uncle     No Known Problems Maternal Grandmother     Prostate cancer Maternal Grandfather     No Known Problems Paternal Grandmother     No Known Problems Paternal Grandfather     No Known Problems Son     No Known Problems Son     Amblyopia Neg Hx     Blindness Neg Hx     Diabetes Neg Hx     Glaucoma Neg Hx     Macular degeneration Neg Hx     Retinal detachment Neg Hx     Strabismus Neg Hx     Stroke Neg Hx     Thyroid disease Neg Hx     Melanoma Neg Hx     Ovarian cancer Neg Hx        CURRENTS MEDICATIONS:  Current Outpatient Medications on File Prior to Visit   Medication Sig Dispense Refill    carvediloL (COREG) 3.125 MG tablet TAKE 1 TABLET(3.125 MG) BY MOUTH TWICE DAILY WITH MEALS 60 tablet 11    gabapentin (NEURONTIN) 600 MG tablet Take 1.5 tablets (900  mg total) by mouth 3 (three) times daily. 135 tablet 11    losartan (COZAAR) 25 MG tablet TAKE 1 TABLET BY MOUTH TWICE DAILY 60 tablet 6    multivitamin capsule Take 1 capsule by mouth once daily.      naproxen (NAPROSYN) 500 MG tablet Half tablet once daily 90 tablet 1    senna (SENOKOT) 8.6 mg tablet Take 1 tablet by mouth once daily. Twice a day      urea 20 % Crea Apply 1 application topically once daily. To dry skin on the feet. 75 g 10    vitamin D 1000 units Tab Take 1,000 Units by mouth once daily.      clobetasoL (TEMOVATE) 0.05 % cream AAA bid x 3 months 60 g 3     No current facility-administered medications on file prior to visit.       ALLERGIES:  Review of patient's allergies indicates:   Allergen Reactions    Bactrim [sulfamethoxazole-trimethoprim] Nausea Only       REVIEW OF SYSTEMS:  Review of Systems   Constitutional: Negative for diaphoresis, fever and weight loss.   Respiratory: Negative for shortness of breath.    Cardiovascular: Negative for chest pain.   Gastrointestinal: Negative for blood in stool.   Genitourinary: Negative for hematuria.   Endo/Heme/Allergies: Does not bruise/bleed easily.   All other systems reviewed and are negative.      OBJECTIVE:    PHYSICAL EXAMINATION:   There were no vitals filed for this visit.    Physical Exam:  Vitals reviewed.    Constitutional: She appears well-developed and well-nourished.     Eyes: Pupils are equal, round, and reactive to light. Conjunctivae and EOM are normal.     Cardiovascular: Normal distal pulses and no edema.     Abdominal: Soft.     Skin: Skin displays no rash on trunk and no rash on extremities. Skin displays no lesions on trunk and no lesions on extremities.     Psych/Behavior: She is alert. She is oriented to person, place, and time. She has a normal mood and affect.     Musculoskeletal:        Neck: Range of motion is limited.     Neurological:        DTRs: Tricep reflexes are 2+ on the right side and 2+ on the left side.  Bicep reflexes are 2+ on the right side and 2+ on the left side. Brachioradialis reflexes are 2+ on the right side and 2+ on the left side. Patellar reflexes are 2+ on the right side and 2+ on the left side. Achilles reflexes are 0 on the right side and 0 on the left side.       Back Exam     Tenderness   The patient is experiencing tenderness in the cervical and lumbar.    Range of Motion   Extension: normal   Flexion: normal   Lateral bend right: normal   Lateral bend left: normal   Rotation right: normal   Rotation left: normal     Muscle Strength   Right Quadriceps:  5/5   Left Quadriceps:  5/5   Right Hamstrings:  5/5   Left Hamstrings:  5/5     Tests   Straight leg raise right: negative  Straight leg raise left: negative    Other   Toe walk: normal  Heel walk: normal              Neurologic Exam     Mental Status   Oriented to person, place, and time.   Speech: speech is normal   Level of consciousness: alert    Cranial Nerves   Cranial nerves II through XII intact.     CN III, IV, VI   Pupils are equal, round, and reactive to light.  Extraocular motions are normal.     Motor Exam   Muscle bulk: normal  Overall muscle tone: normal    Strength   Right deltoid: 5/5  Left deltoid: 5/5  Right biceps: 5/5  Left biceps: 5/5  Right triceps: 5/5  Left triceps: 5/5  Right wrist flexion: 5/5  Left wrist flexion: 5/5  Right wrist extension: 5/5  Left wrist extension: 5/5  Right interossei: 5/5  Left interossei: 5/5  Right iliopsoas: 5/5  Left iliopsoas: 5/5  Right quadriceps: 5/5  Left quadriceps: 5/5  Right hamstrin/5  Left hamstrin/5  Right anterior tibial: 5/5  Left anterior tibial: 5/5  Right posterior tibial: 5/5  Left posterior tibial: 5/5  Right peroneal: 5/5  Left peroneal: 5/5  Right gastroc: 5/5  Left gastroc: 5/5    Sensory Exam   Light touch normal.   Pinprick normal.     Gait, Coordination, and Reflexes     Gait  Gait: normal    Coordination   Finger to nose coordination: normal    Reflexes   Right  brachioradialis: 2+  Left brachioradialis: 2+  Right biceps: 2+  Left biceps: 2+  Right triceps: 2+  Left triceps: 2+  Right patellar: 2+  Left patellar: 2+  Right achilles: 0  Left achilles: 0  Right plantar: normal  Left plantar: normal  Right Alfredo: absent  Left Alfredo: absent  Right ankle clonus: absent  Left ankle clonus: absent        DIAGNOSTIC DATA:  I personally interpreted the following imaging:   CT scan lumbar spine April 2022 compared to December 2021:  Grade 2 L4-5 degenerative spondylolisthesis with severe spinal stenosis and foraminal stenosis, when compared to prior CT scan she seems to have more erosive endplate changes at L4-5.  Degenerative disc disease at L5-S1 with right more than left foraminal stenosis    EMG 08/21 is showing left L4 and L5 radiculopathy, right L5 radiculopathy    Lumbar flexion-extension x-rays  Pending  Cervical flexion-extension x-rays  Pending      ASSESMENT:  This is a 72 y.o. female with     Problem List Items Addressed This Visit        Neuro    Lumbar stenosis    Relevant Orders    X-Ray Lumbar Spine Flexion And Extension Only      Other Visit Diagnoses     Neck pain    -  Primary    Relevant Orders    X-Ray Cervical Spine AP Lat with Flexion  Extension    Ambulatory referral/consult to Physical/Occupational Therapy    Lumbar radiculopathy        Relevant Orders    X-Ray Lumbar Spine Flexion And Extension Only    Spondylolisthesis at L4-L5 level        Degenerative disc disease, lumbar        Spinal stenosis of lumbar region with radiculopathy              PLAN:  Continue conservative management with Pilates for lumbar problem  Will start cervical physical therapy with deep neck flexor muscle strengthening exercises  Surgical treatment explained including left L4-5 oblique interbody fusion with posterior instrumentation and minimally invasive laminectomy.  I explained to her that, at this time, her functional status is good and she does not have walking  limitations.  She does not have significant motor weakness.  The goals of the surgery would be to prevent worsening radiculopathy however I think a surgery should only be performed if her condition worsens over time.    FU in 3 months  Continue gabapentin  All questions answered        Narciso Segura MD  Cell:[phone]

## 2022-05-31 ENCOUNTER — CLINICAL SUPPORT (OUTPATIENT)
Dept: REHABILITATION | Facility: HOSPITAL | Age: 72
End: 2022-05-31
Attending: NEUROLOGICAL SURGERY
Payer: MEDICARE

## 2022-05-31 ENCOUNTER — DOCUMENTATION ONLY (OUTPATIENT)
Dept: REHABILITATION | Facility: OTHER | Age: 72
End: 2022-05-31
Attending: INTERNAL MEDICINE
Payer: MEDICARE

## 2022-05-31 DIAGNOSIS — M54.2 NECK PAIN: ICD-10-CM

## 2022-05-31 DIAGNOSIS — R29.898 DECREASED ROM OF NECK: ICD-10-CM

## 2022-05-31 DIAGNOSIS — R29.898 DECREASED STRENGTH OF UPPER EXTREMITY: ICD-10-CM

## 2022-05-31 PROCEDURE — 97162 PT EVAL MOD COMPLEX 30 MIN: CPT | Mod: PN

## 2022-05-31 PROCEDURE — 97110 THERAPEUTIC EXERCISES: CPT | Mod: PN

## 2022-05-31 NOTE — PROGRESS NOTES
Health  Wellness Visit Note    Name: Jalyn Solares [facility]  Clinic Number: [id number]  Physician: Bere Myers MD  Diagnosis: No diagnosis found.  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    Left    AV block     exercised induced 2:1    Cataract     Fever blister     Heart block     History of acne     Joint pain     hands    Keratoconjunctivitis sicca not due to Sjogren's syndrome     Pacemaker      Visit Number: 25  Precautions: standard; pacemaker      1st PT visit:  1/21/22  Year of care end date: 1/21/23  6 Month test  Performed: July 2022  12 Month test performed: Jan 2023  Mind body plan: Plan A 8 months  Patient level: B    Time In: 8:50 AM  Time Out: 9:30 AM  Total Treatment Time:  40 minutes    [employer] 2022  Handout on this week's wellness topic Mindfulness was provided along with a discussion on what it means, the benefits, and suggestions for practice.  Reviewed last week's topic of n/a.      Subjective:   Patient reports that her low back feels good today, but she's been experiencing next pain recently; she has an appt at [facility] later today to address this. Pt stretches and ices her back on a daily basis. Last week she weight-trained once, walked every day with her , and completed a yoga session.      Objective:   Jalyn Solares completed therapeutic stretches (EIL, KATHY) and the following MedX exercise machines: core lumbar, torso rotation l/r, leg extension, leg curl, upright row, chest press, biceps curl, triceps extension, leg press    See exercise log in patient folder for rate of exertion and repetitions completed.       Fitness Machine Education Key:  E=education on equipment initiated and further follow up and education needed  I=independent with  and exercise.  The patient:   Adjusts machines to his/her settings   Uses equipment levers, pins, weights safely   Maintains safe and  correct posture while exercising   Moves through exercise with correct pace and control   Gets on and off equipment safely      Core lumbar strength E Core Torso Rotation E Leg Press E   Leg Extension E Seated Leg Curl E Chest Press    Row E Abductor E Hip ADD X   Triceps   Biceps E Other:        Assessment:   Patient tolerated Patient tolerated MedX Core Lumbar Strength and all other peripheral exercises without an increase in symptoms. Patient warmed up on the treadmill for 5 minutes, stretched, and iced low back for 5 minutes after the workout.    Plan:  Continue with established plan of care towards wellness goals.     Health  : Laura Lynch  5/31/2022

## 2022-05-31 NOTE — PLAN OF CARE
[facility]  Physical Therapy Initial Evaluation    Name: Jalyn Aaron  Clinic Number: [id number]    Therapy Diagnosis:   Encounter Diagnoses   Name Primary?    Neck pain     Decreased ROM of neck     Decreased strength of upper extremity      Physician: Narciso Segura MD    Physician Orders: PT Eval and Treat   Medical Diagnosis from Referral: M54.2 (ICD-10-CM) - Neck pain  Evaluation Date: 5/31/2022  Authorization Period Expiration: 5/27/23  Plan of Care Expiration: 7/19/22  Visit # / Visits authorized: 1/ 1  FOTO: 1/10    Time In: 11:35  Time Out: 12:20  Total Billable Time: 45 minutes (mod Complexity Evaluation)    Precautions: Standard, pacemaker for AV block, cardiomyopathy    Subjective   Date of onset: 1 month  History of current condition - Jalyn reports: insidious onset of neck pain with rotation. Right>Left. pain is fairly constant, worse in mornings, relieved with cold pack. hx of lumbar spinal stenosis participating 1x/week in wellness program for healthy back. She was prescribed chin tucks by doctor and has been performing occasionally. Pt reports cracking with rotation, sometimes  painful. She has been very active and does yoga and pilates. Pt has n/t in left 4th/5th digits. EMG ruled out carpal tunnel. She has some difficulty sleeping, due to worry. Takes gabapentin for LEs. She has some arthritis in hands and has some difficulty opening things. Denies dropping things, no headaches. Pt allows  to do heavy household chores. Planning on participating in oil painting class for 8 weeks.      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    Medications:   Jalyn Solares has a current medication list which includes the following prescription(s): carvedilol, clobetasol, gabapentin, losartan, multivitamin, naproxen, senna, urea, and vitamin d.    Allergies:   Review of patient's allergies indicates:   Allergen Reactions    Bactrim [sulfamethoxazole-trimethoprim] Nausea Only        Imaging, MRI studies: "FINDINGS:  The cervical spine is visualized from the craniocervical junction to the inferior endplate of C7 on the lateral view.     There is no acute fracture or subluxation of the cervical spine.  The vertebral body heights are preserved.     At C3-C4 there is anterolisthesis measuring 3 mm on flexion, 0 mm on extension, and 1 mm on neutral.     At C4-C5 there is anterolisthesis measuring 4 mm on flexion, 2 mm on extension, and 2 mm on neutral.     At C7-T1 there is mild fixed anterolisthesis.     There is straightening of the usual cervical lordosis.     There is moderate disc height loss at C5-C6, C6-C7, and C7-T1.  There is multilevel bilateral facet arthropathy and uncovertebral joint spurring.     The remaining visualized osseous structures are intact.     Prevertebral soft tissues are within normal limits.     Partially imaged cardiac pacer and leads.     Impression:  No acute osseous abnormality of the cervical spine.  Anterolisthesis at C3-C4, C4-C5, and C7-T1 with measurements on neutral, flexion, and extension as detailed above."    Prior Therapy: back, currently in healthy back wellness program  Social History:  lives with their spouse, 4 entry steps  Occupation: retired   Falls: none  Prior Level of Function: participating in yoga, wellness, and gym " 1-2x/week   Current Level of Function: pain with above activities    Pain:   Current 3/10, worst 5/10, best 3/10   Location: right neck   Description: Dull  Aggravating Factors: neck rotation, reading   Easing Factors: pain medication and ice    Pts goals: improving strength and mobility     Objective     Posture: Forward head, rounded shoulders, decreased cervical lordosis   Palpation: tenderness of cervical paraspinals and right lower cervical facet joints (C4-7)  Sensation: intact     Range of Motion/Strength:   CERVICAL AROM Pain/Dysfunction with Movement   Flexion 40    Extension 45* right   Right side bending 25    Left side bending 20* abarant     Right rotation 55* Right    Left rotation 45* Right      Shoulder Right Left Pain/Dysfunction with Movement   AROM/PROM      flexion  WNL  WNL    extension  WNL WNL    abduction  WNL  WNL    Internal rotation  T10  T10    External rotation T5  T5      U/E MMT Right Left Pain/Dysfunction with Movement   Shoulder Flexion 4+/5 4+/5    Shoulder Extension 5/5 5/5    Shoulder Abduction 4+/5 4+/5    Shoulder IR 4+/5 4+/5    Shoulder ER   4+/5 4+/5    Elbow Flexion  5/5 5/5    Elbow Extension 5/5 5/5    Rhomboids 4/5 4/5    Mid Traps 4/5 4/5    Low Traps 4/5 4/5      Joint Mobility:   - Cervical:grade 2 hypomobility lower cervical spine   - Thoracic: decreased mobility throughout     Flexibility: UT assessment limited by joint mobility restrictions    Special Tests: negative compression, spurlings, neg ulnar nerve ULTT    Other: DNF endurance 5 sec prior to compensation     CMS Impairment/Limitation/Restriction for FOTO neck Survey    Therapist reviewed FOTO scores for Jalyn Aaron on 5/31/2022.   FOTO documents entered into [employer] - see Media section.    Limitation Score: 45%  Category: Carrying         TREATMENT   Treatment Time In: 12:10  Treatment Time Out: 12:20  Total Treatment time separate from Evaluation: 10 minutes    Jalyn received therapeutic  exercises to develop strength, endurance, ROM, flexibility, posture and core stabilization for 10 minutes including:  Cervical snags   Rows green theraband  Chin tucks     Home Exercises Provided and Patient Education Provided     Education provided:    Anatomy and Pathology.   Symptom management and plan of care progressions.   Home Exercise Program.        Written Home Exercises Provided: yes.  Exercises were reviewed and Jalyn was able to demonstrate them prior to the end of the session.  Jalyn demonstrated good  understanding of the education provided.     See EMR under Patient Instructions for exercises provided 5/31/2022.      Assessment   Jalyn Solares is a 72 y.o. female referred to outpatient Physical Therapy with a medical diagnosis of neck pain. Pt presents with limited and painful cervical range of motion, decreased upper extremity strength, impaired cervical and thoracic joint mobility, impaired posture, and decreased functional mobility. These impairments impact her ability to sleep, read, paint, and perform household chores without pain.     Pt prognosis is Good.   Pt will benefit from skilled outpatient Physical Therapy to address the deficits stated above and in the chart below, provide pt/family education, and to maximize pt's level of independence.     Plan of care discussed with patient: Yes  Pt's spiritual, cultural and educational needs considered and patient is agreeable to the plan of care and goals as stated below:     Anticipated Barriers for therapy: chronicity, co-morbidities     Medical Necessity is demonstrated by the following  History  Co-morbidities and personal factors that may impact the plan of care Co-morbidities:   anxiety and spinal stenosis, pacemaker    Personal Factors:   no deficits     moderate   Examination  Body Structures and Functions, activity limitations and participation restrictions that may impact the plan of care Body Regions:   neck  upper  extremities    Body Systems:    gross symmetry  ROM  strength  transitions  motor control    Participation Restrictions:   Painting, reading    Activity limitations:   Learning and applying knowledge  no deficits    General Tasks and Commands  no deficits    Communication  no deficits    Mobility  lifting and carrying objects  driving (bike, car, motorcycle)    Self care  dressing    Domestic Life  shopping  cooking  doing house work (cleaning house, washing dishes, laundry)    Interactions/Relationships  no deficits    Life Areas  no deficits    Community and Social Life  community life  recreation and leisure         high   Clinical Presentation evolving clinical presentation with changing clinical characteristics moderate   Decision Making/ Complexity Score: moderate       Goals:  Short Term Goals (4 Weeks):   1. Pt will be independent with HEP to supplement PT in improving pain free cervical mobility.  2. Pt will improve cervical SB AROM 5 deg to improve mobility for functional tasks.   3. Pt will improve UE MMTs by 1/2 grade in all planes to improve strength for lifting.    Long Term Goals (8 Weeks):   1. Pt will improve FOTO to </=28% limitation to improve perceived limitation with changing and maintaining mobility.  2. Pt will improve cervical rotation AROM to 70 deg bilateral  to improve mobility for driving.  3. Pt will improve UE MMTs 1 grade in all planes to improve strength for lifting and carrying tasks.  4. Pt will report no pain with oil painting for 1 hr.   5. Pt will report no pain with cervical AROM in all planes to promote unlimited functional mobility.     Plan   Plan of care Certification: 5/31/2022 to 7/19/22.    Outpatient Physical Therapy 2 times weekly for 8 weeks to include the following interventions: Cervical/Lumbar Traction, Manual Therapy, Moist Heat/ Ice, Neuromuscular Re-ed, Patient Education, Therapeutic Activities and Therapeutic Exercise, ASTYM, Kinesiotaping PRN, Functional Dry  Ulysses VILLASENOR, PT, DPT

## 2022-06-03 ENCOUNTER — OFFICE VISIT (OUTPATIENT)
Dept: NEUROLOGY | Facility: CLINIC | Age: 72
End: 2022-06-03
Payer: MEDICARE

## 2022-06-03 VITALS
WEIGHT: 132.25 LBS | HEART RATE: 70 BPM | HEIGHT: 67 IN | SYSTOLIC BLOOD PRESSURE: 98 MMHG | DIASTOLIC BLOOD PRESSURE: 67 MMHG | BODY MASS INDEX: 20.76 KG/M2

## 2022-06-03 DIAGNOSIS — G62.9 NEUROPATHY: ICD-10-CM

## 2022-06-03 DIAGNOSIS — M48.061 SPINAL STENOSIS OF LUMBAR REGION, UNSPECIFIED WHETHER NEUROGENIC CLAUDICATION PRESENT: Primary | ICD-10-CM

## 2022-06-03 PROCEDURE — 99213 OFFICE O/P EST LOW 20 MIN: CPT | Mod: GC,S$GLB,, | Performed by: STUDENT IN AN ORGANIZED HEALTH CARE EDUCATION/TRAINING PROGRAM

## 2022-06-03 PROCEDURE — 99999 PR PBB SHADOW E&M-EST. PATIENT-LVL III: ICD-10-PCS | Mod: PBBFAC,GC,, | Performed by: STUDENT IN AN ORGANIZED HEALTH CARE EDUCATION/TRAINING PROGRAM

## 2022-06-03 PROCEDURE — 99999 PR PBB SHADOW E&M-EST. PATIENT-LVL III: CPT | Mod: PBBFAC,GC,, | Performed by: STUDENT IN AN ORGANIZED HEALTH CARE EDUCATION/TRAINING PROGRAM

## 2022-06-03 PROCEDURE — 99213 PR OFFICE/OUTPT VISIT, EST, LEVL III, 20-29 MIN: ICD-10-PCS | Mod: GC,S$GLB,, | Performed by: STUDENT IN AN ORGANIZED HEALTH CARE EDUCATION/TRAINING PROGRAM

## 2022-06-03 NOTE — PROGRESS NOTES
[facility] - NEUROLOGY [address]TH [address] [facility], [place]    Date: 6/3/22  Patient Name: Jalyn Aaron   MRN: [id number]   PCP: Annemarie Kilgore  Referring Provider: No ref. provider found    Assessment:   Jalyn Aaron is a 72 y.o. female presenting with paresthesias in the dorsal part of her bilateral feet. Currently doing conservative therapy which is helping her.     Plan:   Patient presents as a follow up for subjective sensation changes in the dorsal part of her bilateral feet that started in September 2020 and progressively has gotten worse. 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 I saw her in MS clinic as she thought she had MS (given her strong family history) however, that was ruled out.  Patient has full strength in dorsiflexion, plantarflexion, inversion, and eversion making today. Repeat EMG revealing radiculopathy.     -Continue gabapentin 450 mg TID   -Continue PT/OT wellness program   -Continue follow up with NSGY   -Will send referral to Spine/Back Clinic   -Follow up with neurologist PRN     Problem List Items Addressed This Visit        Neuro    Lumbar stenosis - Primary    Relevant Orders    Ambulatory Consult to Back & Spine Clinic    Neuropathy    Current Assessment & Plan     Secondary to the stenosis of the lumbar spine                   Bere Myers MD    Patient note was created using MModal Dictation.  Any errors in syntax or even information may not have been identified and edited on initial review prior to signing this note.  Subjective:        HPI:   Ms. Jalyn Aaron is a 72 y.o. female presenting as a follow up for her paresthesias. Repeat EMG in August 2021 revealed acute on chronic denervation in the right L5 myotome and chronic denervation in the left L4 and L5 myotomes. This is suggestive  "of radiculopathy.   I last saw her on 3/11/2022.    She is currently going to therapy and she's hopeful. She finished the healthy back program and is in the wellness program.      She went to Dr. Narciso Segura regarding her back pain. She was found to have spondylolisthesis at L4-L5. He recommended conservative therapy with pilates and cervical physical therapy for her right neck pain. Recent  X ray of the C spine showed anterolisthesis at C3-C4, C4-C5, and C7-T1.        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 Patient's main concern today is the possibility of having multiple sclerosis as she has a strong family history. Her father was diagnosed with primary progressive MS at age 35 and passed away at 48. Her sister was diagnosed with primary progressive MS at 69 at [facility] and she passed away at 73 (3 weeks ago).      Patient had numbness/pressure like sensation initially on the top of her left feet. It began in the middle of September of last year. It only occured at night but then progressed throughout the entire day. She went to Dr. Andrade (her PCP) who ordered her a short course of steroids thinking this was related to her sciatic pain. The steroids helped her back pain however, not the numbness. An EMG of the lower extremities done on 10/15/20, showed a normal study of the left lower extremity. The right superficial peroneal nerve was mildly prolonged which indicated mild superficial peroneal neuropathy. Neuropathy labs such as B12, HgA1 c, and folate was normal. She was started on gabapentin nightly by her PCP on 12/21/21 and the dose was increased to 600 mg nightly for the past 2 months which helped with her numbness. Her PCP also ordered a CT lumbar spine which showed multilevel degenerative " "changes at L4 and S1 and grade 2 anterolisthesis at L4 with moderate to severe central canal narrowing.      Her first visit with Dr. Sutton and Dr. Comer was on 1/4/21. 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 They ordered aquatic therapy which has helped the patient with her core however, not with the numbness.      She now presents with new numbness in her right foot that began in March. 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  Service: Endoscopy;  Laterality: N/A;  Pacemaker in place.    COLONOSCOPY N/A 3/30/2021    Procedure: COLONOSCOPY;  Surgeon: Joaquin Johnson MD;  [facility] ([facility]);  Service: Endoscopy;  Laterality: N/A;  prep ins. emailed - COVID screening on 3/27/21 [facility] - ER[facility]    HYSTERECTOMY      INSERT / REPLACE / REMOVE PACEMAKER      LASIK      LASIK Bilateral 2009       CURRENT MEDS:  Current Outpatient Medications   Medication Sig Dispense Refill    carvediloL (COREG) 3.125 MG tablet TAKE 1 TABLET(3.125 MG) BY MOUTH TWICE DAILY WITH MEALS 60 tablet 11    gabapentin (NEURONTIN) 600 MG tablet Take 1.5 tablets (900 mg total) by mouth 3 (three) times daily. 135 tablet 11    losartan (COZAAR) 25 MG tablet TAKE 1 " TABLET BY MOUTH TWICE DAILY 60 tablet 6    multivitamin capsule Take 1 capsule by mouth once daily.      naproxen (NAPROSYN) 500 MG tablet Half tablet once daily 90 tablet 1    senna (SENOKOT) 8.6 mg tablet Take 1 tablet by mouth once daily. Twice a day      urea 20 % Crea Apply 1 application topically once daily. To dry skin on the feet. 75 g 10    vitamin D 1000 units Tab Take 1,000 Units by mouth once daily.      clobetasoL (TEMOVATE) 0.05 % cream AAA bid x 3 months 60 g 3     No current facility-administered medications for this visit.       ALLERGIES:  Review of patient's allergies indicates:   Allergen Reactions    Bactrim [sulfamethoxazole-trimethoprim] Nausea Only       FAMILY HISTORY:  Family History   Problem Relation Age of Onset    Cancer Mother 80        pagets    Cataracts Mother     Hypertension Mother     Breast cancer Mother     Multiple sclerosis Father     Multiple sclerosis Sister     No Known Problems Brother     No Known Problems Maternal Aunt     No Known Problems Maternal Uncle     No Known Problems Paternal Aunt     No Known Problems Paternal Uncle     No Known Problems Maternal Grandmother     Prostate cancer Maternal Grandfather     No Known Problems Paternal Grandmother     No Known Problems Paternal Grandfather     No Known Problems Son     No Known Problems Son     Amblyopia Neg Hx     Blindness Neg Hx     Diabetes Neg Hx     Glaucoma Neg Hx     Macular degeneration Neg Hx     Retinal detachment Neg Hx     Strabismus Neg Hx     Stroke Neg Hx     Thyroid disease Neg Hx     Melanoma Neg Hx     Ovarian cancer Neg Hx        SOCIAL HISTORY:  Social History     Tobacco Use    Smoking status: Never Smoker    Smokeless tobacco: Never Used   Substance Use Topics    Alcohol use: Yes     Alcohol/week: 0.0 standard drinks     Types: 2 Glasses of wine per week     Comment: socially    Drug use: No       Review of Systems:  12 system review of systems is  "negative except for the symptoms mentioned in HPI.      Objective:     Vitals:    06/03/22 0802   BP: 98/67   BP Location: Left arm   Patient Position: Sitting   BP Method: Large (Automatic)   Pulse: 70   Weight: 60 kg (132 lb 4.4 oz)   Height: 5' 7" (1.702 m)     General: NAD, well nourished   Eyes: no tearing, discharge, no erythema   ENT: moist mucous membranes of the oral cavity, nares patent    Neck: Supple, full range of motion  Cardiovascular: Warm and well perfused, pulses equal and symmetrical  Lungs: Normal work of breathing, normal chest wall excursions  Skin: No rash, lesions, or breakdown on exposed skin  Psychiatry: Mood and affect are appropriate   Abdomen: soft, non tender, non distended  Extremeties: No cyanosis, clubbing or edema.    Neurological   MENTAL STATUS: Alert and oriented to person, place, and time. Attention and concentration within normal limits. Speech without dysarthria, able to name and repeat without difficulty. Recent and remote memory within normal limits   CRANIAL NERVES: Visual fields intact. PERRL. EOMI. Facial sensation intact. Face symmetrical. Hearing grossly intact. Full shoulder shrug bilaterally. Tongue protrudes midline   SENSORY: Sensation is decreased in bilateral dorsum of the feet (vibration and pinprick)  MOTOR: Normal bulk and tone. No pronator drift.  5/5 deltoid, biceps, triceps, interosseous, hand  bilaterally. 5/5 iliopsoas, knee 5/5 extension/flexion, 5/5 foot dorsi/plantarflexion bilaterally.    REFLEXES: Symmetric and 2+ in the patellar, triceps and biceps. 1+ in the achilles. Toes down going bilaterally.   CEREBELLAR/COORDINATION/GAIT: Gait steady with normal arm swing and stride length.  Heel to shin intact. Finger to nose intact. Normal rapid alternating movements.     Imaging        EXAMINATION:  CT LUMBAR SPINE WITHOUT CONTRAST     CLINICAL HISTORY:  Back pain or radiculopathy, > 6 wks;Dorsalgia, unspecified     TECHNIQUE:  Low dose axial images, " sagittal and coronal reformations were performed   though the lumbar spine.  Contrast was not administered.  Motion artifact   degrades study quality.     COMPARISON:  None     FINDINGS:  Motion artifact limits evaluation.     There is a grade 2 anterolisthesis of L4 on L5. The vertebral body heights   appear well-maintained.  There is no evidence of fracture or osseous lytic   or blastic process.  There are multilevel degenerative changes   specifically of intervertebral disc height loss and endplate changes L4-5   and L5-S1.  Focal degenerative changes are described below.     T12-L1: There is no posterior disc bulge.  There is no neural foraminal   narrowing. There is no central canal narrowing     L1-L2: There is no posterior disc bulge.  There is no neural foraminal   narrowing. There is no central canal narrowing     L2-L3: There is no posterior disc bulge.  There is no neural foraminal   narrowing. There is no central canal narrowing     L3-L4: There is no posterior disc bulge.  There is no neural foraminal   narrowing. There is no central canal narrowing     L4-L5: There is no posterior disc bulge.  There is no neural foraminal   narrowing.  There is grade 2 anterolisthesis of L4 on L5 with resultant   moderate to severe central canal narrowing.     L5-S1: There is no posterior disc bulge.  There is no neural foraminal   narrowing. There is no central canal narrowing     The spinal canal otherwise appears unremarkable.  No intradural   abnormalities are identified.  Evaluation of the surrounding soft tissues   reveals a 1.5 cm left-sided radiopaque dense focus likely representing   ingested stomach contents or calcification.  There are pacemaker leads   identified.  Mild calcific atherosclerosis of the abdominal aorta.     Impression:     Motion artifact limits evaluation.     Multilevel degenerative changes predominately at L4-S1.  Grade 2   anterolisthesis of L4 on L5 with moderate to severe central canal    narrowing.    EMG 8/20/21    Acute on chronic denervation in the right L5 myotome and chronic denervation in the left L4 and L5 myotomes suggestive of radiculopathies at those levels. The absent superficial sensory responses can sometimes be attributed to L5 radiculopathy.

## 2022-06-04 ENCOUNTER — PATIENT MESSAGE (OUTPATIENT)
Dept: NEUROSURGERY | Facility: CLINIC | Age: 72
End: 2022-06-04
Payer: MEDICARE

## 2022-06-04 ENCOUNTER — PATIENT MESSAGE (OUTPATIENT)
Dept: GASTROENTEROLOGY | Facility: CLINIC | Age: 72
End: 2022-06-04
Payer: MEDICARE

## 2022-06-07 ENCOUNTER — CLINICAL SUPPORT (OUTPATIENT)
Dept: REHABILITATION | Facility: HOSPITAL | Age: 72
End: 2022-06-07
Payer: MEDICARE

## 2022-06-07 DIAGNOSIS — R29.898 DECREASED ROM OF NECK: Primary | ICD-10-CM

## 2022-06-07 DIAGNOSIS — R29.898 DECREASED STRENGTH OF UPPER EXTREMITY: ICD-10-CM

## 2022-06-07 PROCEDURE — 97140 MANUAL THERAPY 1/> REGIONS: CPT | Mod: KX,PN

## 2022-06-07 PROCEDURE — 97110 THERAPEUTIC EXERCISES: CPT | Mod: KX,PN

## 2022-06-07 NOTE — PROGRESS NOTES
"[facility]   Physical Therapy Treatment Note     Name: Jalyn Aaron  Clinic Number: [id number]    Therapy Diagnosis:   Encounter Diagnoses   Name Primary?    Decreased ROM of neck Yes    Decreased strength of upper extremity      Physician: Narciso Segura MD    Visit Date: 6/7/2022    Physician Orders: PT Eval and Treat   Medical Diagnosis from Referral: M54.2 (ICD-10-CM) - Neck pain  Evaluation Date: 5/31/2022  Authorization Period Expiration: 5/27/23  Plan of Care Expiration: 7/19/22  Visit # / Visits authorized: 2/ 1  FOTO: 2/10     PTA Visit #: 0/5     Precautions: Standard, pacemaker for AV block, cardiomyopathy    Time In: 3:04 pm   Time Out: 4:00 pm  Total Billable Time: 56 minutes (1 MT, 3 TE - KX modifier)    SUBJECTIVE     Pt reports: her neck was hurting more this morning, she put a cold pack on it and did her exercises and feel like this helped.  She was compliant with home exercise program.  Response to previous treatment: initial eval only  Functional change: none voiced     Pain: 3/10  Location: right neck      OBJECTIVE     Objective Measures updated at progress report unless specified.     Treatment     Jalyn received the treatments listed below:      therapeutic exercises to develop strength, endurance, ROM, flexibility and posture for 41 minutes including:  Cervical snags 15x bilateral   Rows green theraband 2x10  Chin tucks 15x5"  +Upper trap stretch 3x30"   +Levator scap stretch 3x30"  +No moneys red theraband 2x10  +Wall slide into thoracic extension 15x5"  +Standing open books 10x bilateral      manual therapy techniques: Joint mobilizations, soft tissue mobilization were applied to the: cervical spine for 15 minutes, including:  Cervical , transverse glides, up glides bilaterally        Patient Education and Home Exercises     Home Exercises Provided and Patient Education Provided     Education provided:   - Updated home exercise program "     Written Home Exercises Provided: yes. Exercises were reviewed and Jalyn was able to demonstrate them prior to the end of the session.  Jalyn demonstrated good  understanding of the education provided. See EMR under Patient Instructions for exercises provided during therapy sessions    ASSESSMENT     Jalyn Solares is a 72 y.o. female referred to outpatient Physical Therapy with a medical diagnosis of neck pain. Pt presented to PT with reports of relatively unchanged right neck pain/tightness since evaluation. Provided manual therapy to promote improved cervical mobility, no radicular symptoms provided with any mobilizations performed today. Introduced cervical/thoracic stretching and scapular strengthening exercises and pt tolerated all interventions well with no reports of neck pain/discomfort. Pt with notable tightness of right upper trap and levator scap, so provided pt with updated home exercise program including stretches for these muscles. Pt reported improvement in pain upon completion of treatment.     Jalyn Is progressing well towards her goals.   Pt prognosis is Good.     Pt will continue to benefit from skilled outpatient physical therapy to address the deficits listed in the problem list box on initial evaluation, provide pt/family education and to maximize pt's level of independence in the home and community environment.     Pt's spiritual, cultural and educational needs considered and pt agreeable to plan of care and goals.     Anticipated barriers to physical therapy: chronicity, co-morbidities     Goals:   Short Term Goals (4 Weeks):   1. Pt will be independent with HEP to supplement PT in improving pain free cervical mobility.  2. Pt will improve cervical SB AROM 5 deg to improve mobility for functional tasks.   3. Pt will improve UE MMTs by 1/2 grade in all planes to improve strength for lifting.     Long Term Goals (8 Weeks):   1. Pt will improve FOTO to </=28% limitation to improve perceived  limitation with changing and maintaining mobility.  2. Pt will improve cervical rotation AROM to 70 deg bilateral  to improve mobility for driving.  3. Pt will improve UE MMTs 1 grade in all planes to improve strength for lifting and carrying tasks.  4. Pt will report no pain with oil painting for 1 hr.   5. Pt will report no pain with cervical AROM in all planes to promote unlimited functional mobility.     PLAN     Continue with established PT plan of care     Maximiliano Reeves, PT, DPT

## 2022-06-08 ENCOUNTER — DOCUMENTATION ONLY (OUTPATIENT)
Dept: REHABILITATION | Facility: OTHER | Age: 72
End: 2022-06-08
Attending: INTERNAL MEDICINE
Payer: MEDICARE

## 2022-06-08 DIAGNOSIS — M48.02 SPINAL STENOSIS, CERVICAL REGION: ICD-10-CM

## 2022-06-08 NOTE — PROGRESS NOTES
Health  Wellness Visit Note    Name: Jalyn Solares [facility]  Clinic Number: [id number]  Physician: Bere Myers MD  Diagnosis: No diagnosis found.  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    Left    AV block     exercised induced 2:1    Cataract     Fever blister     Heart block     History of acne     Joint pain     hands    Keratoconjunctivitis sicca not due to Sjogren's syndrome     Pacemaker      Visit Number: 27  Precautions: standard; pacemaker      1st PT visit:  1/21/22  Year of care end date: 1/21/23  6 Month test  Performed: July 2022  12 Month test performed: Jan 2023  Mind body plan: Plan A 8 months  Patient level: B    Time In: 10:30 AM  Time Out: 11:06 AM  Total Treatment Time: 36 minutes    Wellness Vision 2022  Handout on this week's wellness topic Guilt vs Shame was provided along with a discussion on what it means, the benefits, and suggestions for practice.  Reviewed last week's topic of Mindfulness.      Subjective:   Patient reports that her neck has 3/10 pain, but her low back feels good today.  She is going to [facility] for neck therapy. She stretches and ices her back on a daily basis. Last week she weight-trained once, walked every day with her , and completed a yoga session.    She is learning .    Objective:   Jalyn Solares completed therapeutic stretches (EIL, KATHY) and the following MedX exercise machines: core lumbar, torso rotation l/r, leg extension, leg curl, upright row, chest press, biceps curl, triceps extension, leg press    See exercise log in patient folder for rate of exertion and repetitions completed.       Fitness Machine Education Key:  E=education on equipment initiated and further follow up and education needed  I=independent with  and exercise.  The patient:   Adjusts machines to his/her settings   Uses equipment levers, pins, weights safely   Maintains  safe and correct posture while exercising   Moves through exercise with correct pace and control   Gets on and off equipment safely      Core lumbar strength E Core Torso Rotation E Leg Press E   Leg Extension E Seated Leg Curl E Chest Press    Row E Abductor E Hip ADD X   Triceps   Biceps E Other:        Assessment:   Patient tolerated Patient tolerated MedX Core Lumbar Strength and all other peripheral exercises without an increase in symptoms. Patient warmed up on the treadmill for 5 minutes, stretched, and iced low back for 5 minutes after the workout.    Plan:  Continue with established plan of care towards wellness goals.     Health  : Elba Rodriguez  6/8/2022

## 2022-06-09 ENCOUNTER — CLINICAL SUPPORT (OUTPATIENT)
Dept: CARDIOLOGY | Facility: HOSPITAL | Age: 72
End: 2022-06-09
Payer: MEDICARE

## 2022-06-09 DIAGNOSIS — Z95.0 PRESENCE OF CARDIAC PACEMAKER: ICD-10-CM

## 2022-06-09 PROCEDURE — 93294 REM INTERROG EVL PM/LDLS PM: CPT | Mod: ,,, | Performed by: INTERNAL MEDICINE

## 2022-06-09 PROCEDURE — 93296 REM INTERROG EVL PM/IDS: CPT | Performed by: INTERNAL MEDICINE

## 2022-06-09 PROCEDURE — 93294 CARDIAC DEVICE CHECK - REMOTE: ICD-10-PCS | Mod: ,,, | Performed by: INTERNAL MEDICINE

## 2022-06-10 ENCOUNTER — TELEPHONE (OUTPATIENT)
Dept: CARDIOLOGY | Facility: HOSPITAL | Age: 72
End: 2022-06-10
Payer: MEDICARE

## 2022-06-10 NOTE — TELEPHONE ENCOUNTER
Spoke with patient and informed her that her device transmission has been received and she has not reached [facility] yet.  Patient stated understanding.   ----- Message from Marlee Lopes sent at 6/10/2022 10:02 AM CDT -----  Regarding: Transmission  Pt [phone] sent a transmission in on yesterday and she wants to know if it was received?    Thanks

## 2022-06-12 DIAGNOSIS — M48.02 CERVICAL STENOSIS OF SPINAL CANAL: Primary | ICD-10-CM

## 2022-06-12 DIAGNOSIS — M48.061 DEGENERATIVE LUMBAR SPINAL STENOSIS: ICD-10-CM

## 2022-06-14 ENCOUNTER — PATIENT MESSAGE (OUTPATIENT)
Dept: INTERNAL MEDICINE | Facility: CLINIC | Age: 72
End: 2022-06-14
Payer: MEDICARE

## 2022-06-15 ENCOUNTER — CLINICAL SUPPORT (OUTPATIENT)
Dept: REHABILITATION | Facility: HOSPITAL | Age: 72
End: 2022-06-15
Payer: MEDICARE

## 2022-06-15 ENCOUNTER — DOCUMENTATION ONLY (OUTPATIENT)
Dept: REHABILITATION | Facility: OTHER | Age: 72
End: 2022-06-15
Attending: INTERNAL MEDICINE
Payer: MEDICARE

## 2022-06-15 DIAGNOSIS — R29.898 DECREASED STRENGTH OF UPPER EXTREMITY: ICD-10-CM

## 2022-06-15 DIAGNOSIS — R29.898 DECREASED ROM OF NECK: Primary | ICD-10-CM

## 2022-06-15 PROCEDURE — 97140 MANUAL THERAPY 1/> REGIONS: CPT | Mod: PN,CQ

## 2022-06-15 PROCEDURE — 97110 THERAPEUTIC EXERCISES: CPT | Mod: PN,CQ

## 2022-06-15 NOTE — PROGRESS NOTES
"[facility]   Physical Therapy Treatment Note     Name: Jalyn Aaron  Clinic Number: [id number]    Therapy Diagnosis:   Encounter Diagnoses   Name Primary?    Decreased ROM of neck Yes    Decreased strength of upper extremity      Physician: Narciso Segura MD    Visit Date: 6/15/2022    Physician Orders: PT Eval and Treat   Medical Diagnosis from Referral: M54.2 (ICD-10-CM) - Neck pain  Evaluation Date: 5/31/2022  Authorization Period Expiration: 5/27/23  Plan of Care Expiration: 7/19/22  Visit # / Visits authorized: 3/1  FOTO:  3/5     PTA Visit #: 1/5     Precautions: Standard, pacemaker for AV block, cardiomyopathy    Time In: 1:48 pm   Time Out: 2:33 pm  Total Billable Time: 45 minutes (1 MT, 2 TE - KX modifier)    SUBJECTIVE     Pt reports: her neck is hurting a little more right now since going to [facility] this morning.  She was compliant with home exercise program.  Response to previous treatment:no adverse effects  Functional change: ongoing     Pain: 5/10  Location: right neck      OBJECTIVE     Objective Measures updated at progress report unless specified.     Treatment     Jalyn received the treatments listed below:      therapeutic exercises to develop strength, endurance, ROM, flexibility and posture for 30 minutes including:  Cervical snags 15x bilateral   Rows green theraband 2x10  Chin tucks 15x5"  Upper trap stretch 3x30"   Levator scap stretch 3x30"  No moneys red theraband 2x10  Wall slide into thoracic extension 15x5"  Standing open books 10x bilateral      manual therapy techniques: Joint mobilizations, soft tissue mobilization were applied to the: cervical spine for 10 minutes, including:  STM to cervical paraspinals with elongation  Gentle sub occipital release  STM/MFR to bilateral Upper traps and thoracic paraspinals    Patient Education and Home Exercises     Home Exercises Provided and Patient Education Provided     Education provided:   - " "Updated home exercise program   - Instructed on use of theraband in door frame rather than door knob  - Reviewed log roll technique for supine <> sit and practiced same.    Written Home Exercises Provided: yes. Exercises were reviewed and Jalyn was able to demonstrate them prior to the end of the session.  Jalyn demonstrated good  understanding of the education provided. See EMR under Patient Instructions for exercises provided during therapy sessions    ASSESSMENT     Jalyn Solares is a 72 y.o. female referred to outpatient Physical Therapy with a medical diagnosis of neck pain. Pt presented to PT with reports of increased pain after performing [facility] program this morning.  Required review as of all therex as performed to complete with best technique and in pain free range.  Pt tolerated all interventions well with no reports of increased neck pain/discomfort. Pt with notable tightness of right upper trap and levator scap with manual therapy.  Reports some relief of same after manual interventions and stretching.  Rates pain at "3/10" after treatment.. Pt reported improvement in pain upon completion of treatment.     Jalyn Is progressing well towards her goals.   Pt prognosis is Good.     Pt will continue to benefit from skilled outpatient physical therapy to address the deficits listed in the problem list box on initial evaluation, provide pt/family education and to maximize pt's level of independence in the home and community environment.     Pt's spiritual, cultural and educational needs considered and pt agreeable to plan of care and goals.     Anticipated barriers to physical therapy: chronicity, co-morbidities     Goals:   Short Term Goals (4 Weeks):   1. Pt will be independent with HEP to supplement PT in improving pain free cervical mobility.  2. Pt will improve cervical SB AROM 5 deg to improve mobility for functional tasks.   3. Pt will improve UE MMTs by 1/2 grade in all planes to improve " strength for lifting.     Long Term Goals (8 Weeks):   1. Pt will improve FOTO to </=28% limitation to improve perceived limitation with changing and maintaining mobility.  2. Pt will improve cervical rotation AROM to 70 deg bilateral  to improve mobility for driving.  3. Pt will improve UE MMTs 1 grade in all planes to improve strength for lifting and carrying tasks.  4. Pt will report no pain with oil painting for 1 hr.   5. Pt will report no pain with cervical AROM in all planes to promote unlimited functional mobility.     PLAN     Continue with established PT plan of care     Belinda Malcolm, LADY

## 2022-06-15 NOTE — PROGRESS NOTES
Health  Wellness Visit Note    Name: Jalyn Solares [facility]  Clinic Number: [id number]  Physician: Bere Myers MD  Diagnosis: No diagnosis found.  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    Left    AV block     exercised induced 2:1    Cataract     Fever blister     Heart block     History of acne     Joint pain     hands    Keratoconjunctivitis sicca not due to Sjogren's syndrome     Pacemaker      Visit Number: 28  Precautions: standard; pacemaker      1st PT visit:  1/21/22  Year of care end date: 1/21/23  6 Month test  Performed: July 2022  12 Month test performed: Jan 2023  Mind body plan: Plan A 8 months  Patient level: B    Time In: 8:29 AM  Time Out: 9:09 AM  Total Treatment Time: 40 minutes    Wellness Vision 2022  Handout on this week's wellness topic Anxiety was provided along with a discussion on what it means, the benefits, and suggestions for practice.  Reviewed last week's topic of Guilt vs Shame.      Subjective:   Patient reports that her neck has 4/10 pain, but she feels 0/10 in her back.  The rowing machine caused neck to become irritated. She is going to [facility] for neck therapy. She will discuss transferring to [facility]. She stretches and ices her back on a daily basis. Last week she weight-trained once, walked every day with her , and completed a yoga session.    She is learning .    Objective:   Jalyn Solares completed therapeutic stretches (EIL, KATHY) and the following MedX exercise machines: core lumbar, torso rotation l/r, leg extension, leg curl, upright row, chest press, biceps curl, triceps extension, leg press    See exercise log in patient folder for rate of exertion and repetitions completed.       Fitness Machine Education Key:  E=education on equipment initiated and further follow up and education needed  I=independent with  and exercise.  The  patient:   Adjusts machines to his/her settings   Uses equipment levers, pins, weights safely   Maintains safe and correct posture while exercising   Moves through exercise with correct pace and control   Gets on and off equipment safely      Core lumbar strength E Core Torso Rotation E Leg Press E   Leg Extension E Seated Leg Curl E Chest Press    Row E Abductor E Hip ADD X   Triceps   Biceps E Other:        Assessment:   Patient tolerated Patient tolerated MedX Core Lumbar Strength and all other peripheral exercises without an increase in symptoms. Patient warmed up on the treadmill for 5 minutes, stretched, and iced low back for 5 minutes after the workout.    Plan:  Continue with established plan of care towards wellness goals.     Health  : Elba Rodriguez  6/15/2022

## 2022-06-17 ENCOUNTER — CLINICAL SUPPORT (OUTPATIENT)
Dept: REHABILITATION | Facility: HOSPITAL | Age: 72
End: 2022-06-17
Payer: MEDICARE

## 2022-06-17 DIAGNOSIS — R29.898 DECREASED STRENGTH OF UPPER EXTREMITY: ICD-10-CM

## 2022-06-17 DIAGNOSIS — R29.898 DECREASED ROM OF NECK: Primary | ICD-10-CM

## 2022-06-17 PROCEDURE — 97140 MANUAL THERAPY 1/> REGIONS: CPT | Mod: PN

## 2022-06-17 PROCEDURE — 97110 THERAPEUTIC EXERCISES: CPT | Mod: PN

## 2022-06-17 NOTE — PROGRESS NOTES
"[facility]   Physical Therapy Treatment Note     Name: Jalyn Aaron  Clinic Number: [id number]    Therapy Diagnosis:   Encounter Diagnoses   Name Primary?   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 Neck pain is Up and down. Feels better with exercises. Ongoing n/t left 4th and 5th digits. Neck pain primarily right side. Pt reports myelogram scheduled, however she is nervous about procedure and would like to wait until she has change to MRI safe pacemaker, she assumes within a year.   She was compliant with home exercise program.  Response to previous treatment:no adverse effects  Functional change: ongoing     Pain: 3/10  Location: right neck      OBJECTIVE     Objective Measures updated at progress report unless specified.     Treatment     Jalyn received the treatments listed below:      therapeutic exercises to develop strength, endurance, ROM, flexibility and posture for 30 minutes including:  Cervical snags 15x bilateral   Rows green theraband 3x10 3"  Shoulder extensions green theraband 3x10 3"  Chin tucks  20 x5"  Supine chin tuck + head lift 10x 3"  Cervical isometrics 10x SB, rot, Flexion   No moneys red theraband 2x10 3"   Horizontal abduction red theraband 2x10  Wall slide into thoracic extension 15x5"  Standing open books 15x bilateral    Upper trap stretch 3x30" " "  Levator scap stretch 3x30" NT    manual therapy techniques: Joint mobilizations, soft tissue mobilization were applied to the: cervical spine for 10 minutes, including:  STM to cervical paraspinals with elongation  Gentle sub occipital release  STM/MFR to bilateral Upper traps     Patient Education and Home Exercises     Home Exercises Provided and Patient Education Provided     Education provided:   - Updated home exercise program   - Instructed on use of theraband in door frame rather than door knob  - Reviewed log roll technique for supine <> sit and practiced same.    Written Home Exercises Provided: yes. Exercises were reviewed and Jalyn was able to demonstrate them prior to the end of the session.  Jalyn demonstrated good  understanding of the education provided. See EMR under Patient Instructions for exercises provided during therapy sessions    ASSESSMENT     Jalyn Solares is a 72 y.o. female referred to outpatient Physical Therapy with a medical diagnosis of neck pain. Pt with gradual reduction in neck pain. Today's treatment focused on cervical stabilization. Pt with good tolerance for new exercises and periscapular strengthening. Pt with relief of neck tightness following manual techniques. Pt reported good response to today's treatment without increased pain.     Jalyn Is progressing well towards her goals.   Pt prognosis is Good.     Pt will continue to benefit from skilled outpatient physical therapy to address the deficits listed in the problem list box on initial evaluation, provide pt/family education and to maximize pt's level of independence in the home and community environment.     Pt's spiritual, cultural and educational needs considered and pt agreeable to plan of care and goals.     Anticipated barriers to physical therapy: chronicity, co-morbidities     Goals:   Short Term Goals (4 Weeks):   1. Pt will be independent with HEP to supplement PT in improving pain free cervical mobility.  2. " Pt will improve cervical SB AROM 5 deg to improve mobility for functional tasks.   3. Pt will improve UE MMTs by 1/2 grade in all planes to improve strength for lifting.     Long Term Goals (8 Weeks):   1. Pt will improve FOTO to </=28% limitation to improve perceived limitation with changing and maintaining mobility.  2. Pt will improve cervical rotation AROM to 70 deg bilateral  to improve mobility for driving.  3. Pt will improve UE MMTs 1 grade in all planes to improve strength for lifting and carrying tasks.  4. Pt will report no pain with oil painting for 1 hr.   5. Pt will report no pain with cervical AROM in all planes to promote unlimited functional mobility.     PLAN     Continue with established PT plan of care     ADRIAN VILLASENOR, PT

## 2022-06-20 ENCOUNTER — DOCUMENTATION ONLY (OUTPATIENT)
Dept: REHABILITATION | Facility: OTHER | Age: 72
End: 2022-06-20
Attending: INTERNAL MEDICINE
Payer: MEDICARE

## 2022-06-20 ENCOUNTER — PATIENT MESSAGE (OUTPATIENT)
Dept: INTERNAL MEDICINE | Facility: CLINIC | Age: 72
End: 2022-06-20
Payer: MEDICARE

## 2022-06-20 ENCOUNTER — PATIENT MESSAGE (OUTPATIENT)
Dept: NEUROSURGERY | Facility: CLINIC | Age: 72
End: 2022-06-20
Payer: MEDICARE

## 2022-06-20 NOTE — PROGRESS NOTES
Health  Wellness Visit Note    Name: Jalyn Solares [facility]  Clinic Number: [id number]  Physician: Bere Myers MD  Diagnosis: No diagnosis found.  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    Left    AV block     exercised induced 2:1    Cataract     Fever blister     Heart block     History of acne     Joint pain     hands    Keratoconjunctivitis sicca not due to Sjogren's syndrome     Pacemaker      Visit Number: 29  Precautions: standard; pacemaker      1st PT visit:  1/21/22  Year of care end date: 1/21/23  6 Month test  Performed: July 2022  12 Month test performed: Jan 2023  Mind body plan: Plan A 8 months  Patient level: B    Time In: 10:50 AM  Time Out: 11:32 AM  Total Treatment Time: 42 minutes    Wellness Vision 2022  Handout on this week's wellness topic Fear Avoidance was provided along with a discussion on what it means, the benefits, and suggestions for practice.  Reviewed last week's topic of Anxiety .      Subjective:   Patient reports that she is having no low back/glute pain, however her neck pain is 3-4/10 today  Pt stretches and ices on a daily basis, and noted that she is feeling motivated to continue to do so. For exercise, she weight-trained and walked twice/day.      Objective:   Jalyn Solares completed therapeutic stretches (EIL, KATHY) and the following MedX exercise machines: core lumbar, torso rotation l/r, leg extension, leg curl, upright row, chest press, biceps curl, triceps extension, leg press    See exercise log in patient folder for rate of exertion and repetitions completed.       Fitness Machine Education Key:  E=education on equipment initiated and further follow up and education needed  I=independent with  and exercise.  The patient:   Adjusts machines to his/her settings   Uses equipment levers, pins, weights safely   Maintains safe and correct posture while exercising   Moves through  exercise with correct pace and control   Gets on and off equipment safely      Core lumbar strength E Core Torso Rotation E Leg Press E   Leg Extension E Seated Leg Curl E Chest Press E   Row E Abductor E Hip ADD X   Triceps  E Biceps E Other: X       Assessment:   Patient tolerated Patient tolerated MedX Core Lumbar Strength and all other peripheral exercises without an increase in symptoms. Patient warmed up on the treadmill for 5 minutes, stretched, and iced low back for 5 minutes after the workout.    Plan:  Continue with established plan of care towards wellness goals.     Health  : Laura Lynch  6/20/2022

## 2022-06-21 ENCOUNTER — CLINICAL SUPPORT (OUTPATIENT)
Dept: REHABILITATION | Facility: HOSPITAL | Age: 72
End: 2022-06-21
Payer: MEDICARE

## 2022-06-21 DIAGNOSIS — R29.898 DECREASED ROM OF NECK: Primary | ICD-10-CM

## 2022-06-21 DIAGNOSIS — R29.898 DECREASED STRENGTH OF UPPER EXTREMITY: ICD-10-CM

## 2022-06-21 PROCEDURE — 97110 THERAPEUTIC EXERCISES: CPT | Mod: PN,CQ

## 2022-06-21 NOTE — PROGRESS NOTES
"[facility]   Physical Therapy Treatment Note     Name: Jalyn Aaron  Clinic Number: [id number]    Therapy Diagnosis:   Encounter Diagnoses   Name Primary?   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  Has been doing home exercise program including chin tucks and cervical rotation at red lights!  She was compliant with home exercise program.  Response to previous treatment:no adverse effects  Functional change: ongoing     Pain: 3/10  Location: right neck      OBJECTIVE     Objective Measures updated at progress report unless specified.     Treatment     Jalyn received the treatments listed below:      therapeutic exercises to develop strength, endurance, ROM, flexibility and posture for 30 minutes including:  Supine chin tuck + head lift 10x 3"  Seated Chin tucks:  5 x 20  Cervical snags 15x bilateral   Rows green theraband 3x10 3" out of time  Shoulder extensions green theraband 3x10 3" out of time  Cervical isometrics 10x SB, rot, Flexion   No moneys red theraband 2x10 3"   Horizontal abduction red theraband 2x10  Wall slide into thoracic extension 15x5"  Standing open books 15x bilateral  (performed supine 6/21 at patient request)  Upper trap stretch 3x30"   Levator scap stretch 3x30" NT    manual therapy techniques: Joint mobilizations, soft tissue mobilization were " "applied to the: cervical spine for 10 minutes, including:  STM to cervical paraspinals with elongation  Gentle sub occipital release  STM/MFR to bilateral Upper traps     Patient Education and Home Exercises     Home Exercises Provided and Patient Education Provided     Education provided:   - Updated home exercise program   - Instructed on use of theraband in door frame rather than door knob  - Reviewed log roll technique for supine <> sit and practiced same.    Written Home Exercises Provided: yes. Exercises were reviewed and Jalyn was able to demonstrate them prior to the end of the session.  Jalyn demonstrated good  understanding of the education provided. See EMR under Patient Instructions for exercises provided during evaland updated on 6/21/2022    ASSESSMENT     Jalyn Solares is a 72 y.o. female referred to outpatient Physical Therapy with a medical diagnosis of neck pain. Pt with gradual reduction in neck pain. Today's treatment focused on cervical stabilization and stretching. Pt required review of therex for correct form and updated home exercise program was issued to help with technique.  Jalyn withgood tolerance for  exercises and periscapular strengthening. Pt with reports of decreased pain and muscle tension after treatment. Pt reported good response to today's treatment without increased pain. Rates "2/10" at end of session.    Jalyn Is progressing well towards her goals.   Pt prognosis is Good.     Pt will continue to benefit from skilled outpatient physical therapy to address the deficits listed in the problem list box on initial evaluation, provide pt/family education and to maximize pt's level of independence in the home and community environment.     Pt's spiritual, cultural and educational needs considered and pt agreeable to plan of care and goals.     Anticipated barriers to physical therapy: chronicity, co-morbidities     Goals:   Short Term Goals (4 Weeks):   1. Pt will be independent " with HEP to supplement PT in improving pain free cervical mobility.  2. Pt will improve cervical SB AROM 5 deg to improve mobility for functional tasks.   3. Pt will improve UE MMTs by 1/2 grade in all planes to improve strength for lifting.     Long Term Goals (8 Weeks):   1. Pt will improve FOTO to </=28% limitation to improve perceived limitation with changing and maintaining mobility.  2. Pt will improve cervical rotation AROM to 70 deg bilateral  to improve mobility for driving.  3. Pt will improve UE MMTs 1 grade in all planes to improve strength for lifting and carrying tasks.  4. Pt will report no pain with oil painting for 1 hr.   5. Pt will report no pain with cervical AROM in all planes to promote unlimited functional mobility.     PLAN     Continue with established PT plan of care     Belinda Maclolm PTA

## 2022-06-24 ENCOUNTER — CLINICAL SUPPORT (OUTPATIENT)
Dept: REHABILITATION | Facility: HOSPITAL | Age: 72
End: 2022-06-24
Payer: MEDICARE

## 2022-06-24 ENCOUNTER — TELEPHONE (OUTPATIENT)
Dept: PAIN MEDICINE | Facility: CLINIC | Age: 72
End: 2022-06-24
Payer: MEDICARE

## 2022-06-24 DIAGNOSIS — R29.898 DECREASED ROM OF NECK: Primary | ICD-10-CM

## 2022-06-24 DIAGNOSIS — R29.898 DECREASED STRENGTH OF UPPER EXTREMITY: ICD-10-CM

## 2022-06-24 PROCEDURE — 97110 THERAPEUTIC EXERCISES: CPT | Mod: KX,PN

## 2022-06-24 NOTE — TELEPHONE ENCOUNTER
This message is for patient in regards to his/her appointment 06/27/22 at 1:00p   With Dr. Angelika Weeks MD.      [facility] Policy: For the safety of all patients and staff members.     Patient Visitor policy: During this visit we're informing all patients that face mask are now optional, upon visit you have the options of requesting clinical staff to wear a mask for your protection and thiers.      If you have any questions or concerns please contact [phone]      Staff left a voicemail reminding pt of their appt

## 2022-06-24 NOTE — PROGRESS NOTES
"[facility]   Physical Therapy Treatment Note     Name: Jalyn Aaron  Clinic Number: [id number]    Therapy Diagnosis:   Encounter Diagnoses   Name Primary?   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 Neurologist agreed but cautioned her to watch for symptoms of myelopathy  She was compliant with home exercise program.  Response to previous treatment:no adverse effects  Functional change: ongoing     Pain: 3-4/10  Location: right neck      OBJECTIVE     Objective Measures updated at progress report unless specified.     X-Ray: "At C3-C4 there is anterolisthesis measuring 3 mm on flexion, 0 mm on extension, and 1 mm on neutral.  At C4-C5 there is anterolisthesis measuring 4 mm on flexion, 2 mm on extension, and 2 mm on neutral.  At C7-T1 there is mild fixed anterolisthesis.  There is straightening of the usual cervical lordosis.  There is moderate disc height loss at C5-C6, C6-C7, and C7-T1.  There is multilevel bilateral facet arthropathy and uncovertebral joint spurring."    Treatment     Jalyn received the treatments listed below:      therapeutic exercises to develop strength, endurance, ROM, flexibility and posture for 30 minutes including:  · Seated " "Chin tucks   20x w/ 5" hold  · Supine chin tuck + head lift 3 x 10 w/ 5" hold  · Scap retractions at wall   20x w/ 5 "hold  · No moneys RTB    20x; 3" hold  · Wall posture   3 x 1 min   · Rows GTB    2x15 5" hold  · Shoulder extensions GTB  2x15 w/ 5" hold    Not Today:   Cervical snags 15x bilateral   Cervical isometrics 10x SB, rot, Flexion   Horizontal abduction red theraband 2x10  Wall slide into thoracic extension 15x5"  Standing open books 15x bilateral  (performed supine 6/21 at patient request)      manual therapy techniques: Joint mobilizations, soft tissue mobilization were applied to the: cervical spine for 10 minutes, including:  STM to cervical paraspinals with elongation  Gentle sub occipital release  STM/MFR to bilateral Upper traps     Patient Education and Home Exercises     Home Exercises Provided and Patient Education Provided     Education provided:   - Updated home exercise program   - Instructed on use of theraband in door frame rather than door knob  - Reviewed log roll technique for supine <> sit and practiced same.    Written Home Exercises Provided: yes. Exercises were reviewed and Jalyn was able to demonstrate them prior to the end of the session.  Jalyn demonstrated good  understanding of the education provided. See EMR under Patient Instructions for exercises provided during evaland updated on 6/21/2022    ASSESSMENT     Jalyn Solares is a 72 y.o. female referred to outpatient Physical Therapy with a medical diagnosis of neck pain. She was greatly fatigued by chin tuck and lift exercise as well as static postural exercises. She will benefit continued strengthening of deep neck flexors and parascapular musculature to off increased stability and motor control to her cervical spine.     Jalyn Is progressing well towards her goals.   Pt prognosis is Good.     Pt will continue to benefit from skilled outpatient physical therapy to address the deficits listed in the problem list box on " initial evaluation, provide pt/family education and to maximize pt's level of independence in the home and community environment.     Pt's spiritual, cultural and educational needs considered and pt agreeable to plan of care and goals.     Anticipated barriers to physical therapy: chronicity, co-morbidities     Goals:   Short Term Goals (4 Weeks):   1. Pt will be independent with HEP to supplement PT in improving pain free cervical mobility.  2. Pt will improve cervical SB AROM 5 deg to improve mobility for functional tasks.   3. Pt will improve UE MMTs by 1/2 grade in all planes to improve strength for lifting.     Long Term Goals (8 Weeks):   1. Pt will improve FOTO to </=28% limitation to improve perceived limitation with changing and maintaining mobility.  2. Pt will improve cervical rotation AROM to 70 deg bilateral  to improve mobility for driving.  3. Pt will improve UE MMTs 1 grade in all planes to improve strength for lifting and carrying tasks.  4. Pt will report no pain with oil painting for 1 hr.   5. Pt will report no pain with cervical AROM in all planes to promote unlimited functional mobility.     PLAN     Continue with established PT plan of care     Shanice Valente, PT, DPT

## 2022-06-27 ENCOUNTER — TELEPHONE (OUTPATIENT)
Dept: DERMATOLOGY | Facility: CLINIC | Age: 72
End: 2022-06-27
Payer: MEDICARE

## 2022-06-27 ENCOUNTER — OFFICE VISIT (OUTPATIENT)
Dept: PAIN MEDICINE | Facility: CLINIC | Age: 72
End: 2022-06-27
Payer: MEDICARE

## 2022-06-27 ENCOUNTER — DOCUMENTATION ONLY (OUTPATIENT)
Dept: REHABILITATION | Facility: OTHER | Age: 72
End: 2022-06-27
Attending: INTERNAL MEDICINE
Payer: MEDICARE

## 2022-06-27 VITALS
HEIGHT: 67 IN | HEART RATE: 73 BPM | WEIGHT: 133.19 LBS | DIASTOLIC BLOOD PRESSURE: 74 MMHG | SYSTOLIC BLOOD PRESSURE: 122 MMHG | RESPIRATION RATE: 18 BRPM | BODY MASS INDEX: 20.9 KG/M2

## 2022-06-27 DIAGNOSIS — M50.30 DDD (DEGENERATIVE DISC DISEASE), CERVICAL: Primary | ICD-10-CM

## 2022-06-27 PROCEDURE — 1125F AMNT PAIN NOTED PAIN PRSNT: CPT | Mod: CPTII,S$GLB,, | Performed by: ANESTHESIOLOGY

## 2022-06-27 PROCEDURE — 4010F ACE/ARB THERAPY RXD/TAKEN: CPT | Mod: CPTII,S$GLB,, | Performed by: ANESTHESIOLOGY

## 2022-06-27 PROCEDURE — 4010F PR ACE/ARB THEARPY RXD/TAKEN: ICD-10-PCS | Mod: CPTII,S$GLB,, | Performed by: ANESTHESIOLOGY

## 2022-06-27 PROCEDURE — 99999 PR PBB SHADOW E&M-EST. PATIENT-LVL III: ICD-10-PCS | Mod: PBBFAC,,, | Performed by: ANESTHESIOLOGY

## 2022-06-27 PROCEDURE — 99999 PR PBB SHADOW E&M-EST. PATIENT-LVL III: CPT | Mod: PBBFAC,,, | Performed by: ANESTHESIOLOGY

## 2022-06-27 PROCEDURE — 3288F PR FALLS RISK ASSESSMENT DOCUMENTED: ICD-10-PCS | Mod: CPTII,S$GLB,, | Performed by: ANESTHESIOLOGY

## 2022-06-27 PROCEDURE — 3288F FALL RISK ASSESSMENT DOCD: CPT | Mod: CPTII,S$GLB,, | Performed by: ANESTHESIOLOGY

## 2022-06-27 PROCEDURE — 3008F PR BODY MASS INDEX (BMI) DOCUMENTED: ICD-10-PCS | Mod: CPTII,S$GLB,, | Performed by: ANESTHESIOLOGY

## 2022-06-27 PROCEDURE — 3008F BODY MASS INDEX DOCD: CPT | Mod: CPTII,S$GLB,, | Performed by: ANESTHESIOLOGY

## 2022-06-27 PROCEDURE — 1159F MED LIST DOCD IN RCRD: CPT | Mod: CPTII,S$GLB,, | Performed by: ANESTHESIOLOGY

## 2022-06-27 PROCEDURE — 3074F PR MOST RECENT SYSTOLIC BLOOD PRESSURE < 130 MM HG: ICD-10-PCS | Mod: CPTII,S$GLB,, | Performed by: ANESTHESIOLOGY

## 2022-06-27 PROCEDURE — 1160F PR REVIEW ALL MEDS BY PRESCRIBER/CLIN PHARMACIST DOCUMENTED: ICD-10-PCS | Mod: CPTII,S$GLB,, | Performed by: ANESTHESIOLOGY

## 2022-06-27 PROCEDURE — 1101F PR PT FALLS ASSESS DOC 0-1 FALLS W/OUT INJ PAST YR: ICD-10-PCS | Mod: CPTII,S$GLB,, | Performed by: ANESTHESIOLOGY

## 2022-06-27 PROCEDURE — 99204 PR OFFICE/OUTPT VISIT, NEW, LEVL IV, 45-59 MIN: ICD-10-PCS | Mod: S$GLB,,, | Performed by: ANESTHESIOLOGY

## 2022-06-27 PROCEDURE — 3078F DIAST BP <80 MM HG: CPT | Mod: CPTII,S$GLB,, | Performed by: ANESTHESIOLOGY

## 2022-06-27 PROCEDURE — 99204 OFFICE O/P NEW MOD 45 MIN: CPT | Mod: S$GLB,,, | Performed by: ANESTHESIOLOGY

## 2022-06-27 PROCEDURE — 1125F PR PAIN SEVERITY QUANTIFIED, PAIN PRESENT: ICD-10-PCS | Mod: CPTII,S$GLB,, | Performed by: ANESTHESIOLOGY

## 2022-06-27 PROCEDURE — 1157F PR ADVANCE CARE PLAN OR EQUIV PRESENT IN MEDICAL RECORD: ICD-10-PCS | Mod: CPTII,S$GLB,, | Performed by: ANESTHESIOLOGY

## 2022-06-27 PROCEDURE — 3078F PR MOST RECENT DIASTOLIC BLOOD PRESSURE < 80 MM HG: ICD-10-PCS | Mod: CPTII,S$GLB,, | Performed by: ANESTHESIOLOGY

## 2022-06-27 PROCEDURE — 1157F ADVNC CARE PLAN IN RCRD: CPT | Mod: CPTII,S$GLB,, | Performed by: ANESTHESIOLOGY

## 2022-06-27 PROCEDURE — 3074F SYST BP LT 130 MM HG: CPT | Mod: CPTII,S$GLB,, | Performed by: ANESTHESIOLOGY

## 2022-06-27 PROCEDURE — 1160F RVW MEDS BY RX/DR IN RCRD: CPT | Mod: CPTII,S$GLB,, | Performed by: ANESTHESIOLOGY

## 2022-06-27 PROCEDURE — 1159F PR MEDICATION LIST DOCUMENTED IN MEDICAL RECORD: ICD-10-PCS | Mod: CPTII,S$GLB,, | Performed by: ANESTHESIOLOGY

## 2022-06-27 PROCEDURE — 1101F PT FALLS ASSESS-DOCD LE1/YR: CPT | Mod: CPTII,S$GLB,, | Performed by: ANESTHESIOLOGY

## 2022-06-27 NOTE — PROGRESS NOTES
Health  Wellness Visit Note    Name: Jalyn Solares [facility]  Clinic Number: [id number]  Physician: Bere Myers MD  Diagnosis: No diagnosis found.  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    Left    AV block     exercised induced 2:1    Cataract     Fever blister     Heart block     History of acne     Joint pain     hands    Keratoconjunctivitis sicca not due to Sjogren's syndrome     Pacemaker      Visit Number: 30  Precautions: standard; pacemaker      1st PT visit:  1/21/22  Year of care end date: 1/21/23  6 Month test  Performed: July 2022  12 Month test performed: Jan 2023  Mind body plan: Plan A 8 months  Patient level: B    Time In: 9:55 AM  Time Out: 10:35 AM  Total Treatment Time: 40 minutes    Wellness [employer] 2022  Handout on this week's wellness topic Emotions was provided along with a discussion on what it means, the benefits, and suggestions for practice.  Reviewed last week's topic of Fear Avoidance.      Subjective:   Patient reports that her back is pain-free and her neck pain has improved since last visit. Pt stretches and ices on a daily basis. For exercise, she typically goes to the gym at least once/week and walks twice/day. However, she did not workout as much this past week, due to caregiving for her sick dog.    Objective:   Jalyn Solares completed therapeutic stretches (EIL, KATHY) and the following MedX exercise machines: core lumbar, torso rotation l/r, leg extension, leg curl, upright row, chest press, biceps curl, triceps extension, leg press    See exercise log in patient folder for rate of exertion and repetitions completed.       Fitness Machine Education Key:  E=education on equipment initiated and further follow up and education needed  I=independent with  and exercise.  The patient:   Adjusts machines to his/her settings   Uses equipment levers, pins, weights safely   Maintains safe and  correct posture while exercising   Moves through exercise with correct pace and control   Gets on and off equipment safely      Core lumbar strength E Core Torso Rotation E Leg Press E   Leg Extension E Seated Leg Curl E Chest Press E   Row E Abductor E Hip ADD X   Triceps  E Biceps E Other: X       Assessment:   Patient tolerated Patient tolerated MedX Core Lumbar Strength and all other peripheral exercises without an increase in symptoms. Patient warmed up on the treadmill for 5 minutes, stretched, and iced low back for 5 minutes after the workout.    Plan:  Continue with established plan of care towards wellness goals.     Health  : Laura Lynch  6/27/2022

## 2022-06-27 NOTE — TELEPHONE ENCOUNTER
----- Message from Kim Alfaro sent at 6/27/2022  3:05 PM CDT -----  Pt requesting call back needs to schedule follow up for August . Please call         Confirmed patient's contact info below:  Contact Name: Jalyn Aaron  Phone Number: [phone]

## 2022-06-27 NOTE — PROGRESS NOTES
PCP: Annemarie Kilgore MD    REFERRING PHYSICIAN: Bere Myers MD    CHIEF COMPLAINT: Right neck pain    Original HISTORY OF PRESENT ILLNESS: Jalyn Aaron presents to the clinic for the evaluation of the above pain. The pain started about 6 weeks ago after no particular event.     Original Pain Description:  The pain is located in the right side of the neck and does not radiate. The pain is described as aching. Exacerbating factors: twisting neck. Mitigating factors ice. Symptoms interfere with daily activity. The patient feels like symptoms have been unchanged.     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    COLONOSCOPY N/A 3/30/2021    Procedure: COLONOSCOPY;  Surgeon: Joaquin Johnson MD;  Location: [address]);  Service: Endoscopy;  Laterality: N/A;  prep ins. emailed - COVID screening on 3/27/21 [facility] - [facility]    HYSTERECTOMY      INSERT / REPLACE /  REMOVE PACEMAKER      LASIK      LASIK Bilateral 2009     Social History     Socioeconomic History    Marital status:     Number of children: 1   Occupational History    Occupation:      Employer: [employer]   Tobacco Use    Smoking status: Never Smoker    Smokeless tobacco: Never Used   Substance and Sexual Activity    Alcohol use: Yes     Alcohol/week: 0.0 standard drinks     Types: 2 Glasses of wine per week     Comment: socially    Drug use: No    Sexual activity: Not Currently     Partners: Male     Birth control/protection: None   Other Topics Concern    Are you pregnant or think you may be? No    Breast-feeding No   Social History Narrative     (case management) at [employer]     Family History   Problem Relation Age of Onset    Cancer Mother 80        pagets    Cataracts Mother     Hypertension Mother     Breast cancer Mother     Multiple sclerosis Father     Multiple sclerosis Sister     No Known Problems Brother     No Known Problems Maternal Aunt     No Known Problems Maternal Uncle     No Known Problems Paternal Aunt     No Known Problems Paternal Uncle     No Known Problems Maternal Grandmother     Prostate cancer Maternal Grandfather     No Known Problems Paternal Grandmother     No Known Problems Paternal Grandfather     No Known Problems Son     No Known Problems Son     Amblyopia Neg Hx     Blindness Neg Hx     Diabetes Neg Hx     Glaucoma Neg Hx     Macular degeneration Neg Hx     Retinal detachment Neg Hx     Strabismus Neg Hx     Stroke Neg Hx     Thyroid disease Neg Hx     Melanoma Neg Hx     Ovarian cancer Neg Hx        Review of patient's allergies indicates:   Allergen Reactions    Bactrim [sulfamethoxazole-trimethoprim] Nausea Only       Current Outpatient Medications   Medication Sig    carvediloL (COREG) 3.125 MG tablet TAKE 1 TABLET(3.125 MG) BY MOUTH TWICE DAILY WITH MEALS    gabapentin (NEURONTIN)  "600 MG tablet Take 1.5 tablets (900 mg total) by mouth 3 (three) times daily.    linaCLOtide (LINZESS) 72 mcg Cap capsule Take 1 capsule (72 mcg total) by mouth before breakfast.    losartan (COZAAR) 25 MG tablet TAKE 1 TABLET BY MOUTH TWICE DAILY    multivitamin capsule Take 1 capsule by mouth once daily.    naproxen (NAPROSYN) 500 MG tablet Half tablet once daily    senna (SENOKOT) 8.6 mg tablet Take 1 tablet by mouth once daily. Twice a day    urea 20 % Crea Apply 1 application topically once daily. To dry skin on the feet.    vitamin D 1000 units Tab Take 1,000 Units by mouth once daily.    clobetasoL (TEMOVATE) 0.05 % cream AAA bid x 3 months     No current facility-administered medications for this visit.       ROS:  GENERAL: No fever. No chills. No fatigue. Denies weight loss. Denies weight gain.  HEENT: Denies headaches. Denies vision change. Denies eye pain. Denies double vision. Denies ear pain.   CV: Denies chest pain.   PULM: Denies of shortness of breath.  GI: Denies constipation. No diarrhea. No abdominal pain. Denies nausea. Denies vomiting. No blood in stool.  HEME: Denies bleeding problems.  : Denies urgency. No painful urination. No blood in urine.  MS: Denies joint stiffness. Denies joint swelling.  Denies back pain.  SKIN: Denies rash.   NEURO: Denies seizures. No weakness.  PSYCH:  Denies difficulty sleeping. No anxiety. Denies depression. No suicidal thoughts.       VITALS:   Vitals:    06/27/22 1309   BP: 122/74   Pulse: 73   Resp: 18   Weight: 60.4 kg (133 lb 2.5 oz)   Height: 5' 7" (1.702 m)   PainSc:   4         PHYSICAL EXAM:   GENERAL: Well appearing, in no acute distress, alert and oriented x3.  PSYCH:  Mood and affect appropriate.  SKIN: Skin color, texture, turgor normal, no rashes or lesions.  HEENT:  Normocephalic, atraumatic. Cranial nerves grossly intact.  NECK: Limited ROM. Slow/limited rotation. No pain over facets. Pain reproduced in right trapezius muscle.   PULM: No " evidence of respiratory difficulty, symmetric chest rise.  GI:  Non-distended  BACK: Normal range of motion. No pain to palpation over the spinous processes. No pain to palpation over facet joints. There is no pain with palpation over the sacroiliac joints bilaterally. Straight leg raising in the supine position is negative to radicular pain.   EXTREMITIES: No deformities, edema, or skin discoloration.   MUSCULOSKELETAL: Shoulder, hip, and knee provocative maneuvers are negative. Bilateral upper and lower extremity strength is normal and symmetric. No atrophy is noted.  NEURO: Sensation is equal and appropriate bilaterally. Bilateral upper and lower extremity coordination and muscle stretch reflexes are physiologic and symmetric. Plantar response are downgoing.   GAIT: normal.      LABS:      IMAGING:    XR CERVICAL SPINE AP LAT WITH FLEX EXTEN     CLINICAL HISTORY:  Cervicalgia     TECHNIQUE:  Three views of the cervical spine plus flexion and extension views were performed.     COMPARISON:  Cervical spine radiographs from 09/03/2013.     FINDINGS:  The cervical spine is visualized from the craniocervical junction to the inferior endplate of C7 on the lateral view.     There is no acute fracture or subluxation of the cervical spine.  The vertebral body heights are preserved.     At C3-C4 there is anterolisthesis measuring 3 mm on flexion, 0 mm on extension, and 1 mm on neutral.     At C4-C5 there is anterolisthesis measuring 4 mm on flexion, 2 mm on extension, and 2 mm on neutral.     At C7-T1 there is mild fixed anterolisthesis.     There is straightening of the usual cervical lordosis.     There is moderate disc height loss at C5-C6, C6-C7, and C7-T1.  There is multilevel bilateral facet arthropathy and uncovertebral joint spurring.     The remaining visualized osseous structures are intact.     Prevertebral soft tissues are within normal limits.     Partially imaged cardiac pacer and  leads.     Impression:     No acute osseous abnormality of the cervical spine.     Anterolisthesis at C3-C4, C4-C5, and C7-T1 with measurements on neutral, flexion, and extension as detailed above.        Electronically signed by: Porter Valdes  Date:                                            05/27/2022  Time:                                           11:02    ASSESSMENT: 72 y.o. year old female with pain, consistent with myalgia.     DISCUSSION: Ms. Balbuena comes to us with right sided neck pain after no particular event. Xray does show some significant DDD, but her pain is reproduced with palpation over the right trapezius muscle. She is seeing Dr. Segura and they are planning advanced imaging. She has a [employer] pacemaker that she doesn't think is MRI compatible. They are planning to change out her IPG soon due to end of life.     PLAN:  1. Continue neck PT  2. Schedule right neck TPI in 4 weeks  3. She is holding off on cervical myelogram at this time. She will see Colton in August 4. We will follow up with her after TPI to see if any more is done    I would like to thank Bere Myers MD for the opportunity to assist in the care of this patient. We had a very nice visit and I look forward to continuing their care. Please let me know if I can be of further assistance.     Angelika Weeks  06/27/2022

## 2022-06-29 ENCOUNTER — DOCUMENTATION ONLY (OUTPATIENT)
Dept: REHABILITATION | Facility: HOSPITAL | Age: 72
End: 2022-06-29
Payer: MEDICARE

## 2022-06-29 NOTE — PROGRESS NOTES
Physical therapist and physical therapy assistant(s) met face to face to discuss patient's treatment plan and progress towards established goals. Pt will be seen by a physical therapist minimally every 6th visit or every 30 days.    ADRIAN VILLASENOR, PT, DPT    Belinda Malcolm, PTA

## 2022-07-01 ENCOUNTER — CLINICAL SUPPORT (OUTPATIENT)
Dept: REHABILITATION | Facility: HOSPITAL | Age: 72
End: 2022-07-01
Payer: MEDICARE

## 2022-07-01 DIAGNOSIS — R29.898 DECREASED STRENGTH OF UPPER EXTREMITY: ICD-10-CM

## 2022-07-01 DIAGNOSIS — R29.898 DECREASED ROM OF NECK: Primary | ICD-10-CM

## 2022-07-01 PROCEDURE — 97110 THERAPEUTIC EXERCISES: CPT | Mod: KX,PN

## 2022-07-01 NOTE — PROGRESS NOTES
"[facility]   Physical Therapy Treatment Note     Name: Jalyn Aaron  Clinic Number: [id number]    Therapy Diagnosis:   No diagnosis found.  Physician: No ref. 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 Neurologist agreed but cautioned her to watch for symptoms of myelopathy  She was compliant with home exercise program.  Response to previous treatment:no adverse effects  Functional change: ongoing     Pain: 3-4/10  Location: right neck      OBJECTIVE     Objective Measures updated at progress report unless specified.     X-Ray: "At C3-C4 there is anterolisthesis measuring 3 mm on flexion, 0 mm on extension, and 1 mm on neutral.  At C4-C5 there is anterolisthesis measuring 4 mm on flexion, 2 mm on extension, and 2 mm on neutral.  At C7-T1 there is mild fixed anterolisthesis.  There is straightening of the usual cervical lordosis.  There is moderate disc height loss at C5-C6, C6-C7, and C7-T1.  There is multilevel bilateral facet arthropathy and uncovertebral joint spurring."    Treatment     Jalyn received the treatments listed below:      therapeutic exercises to develop strength, endurance, ROM, flexibility and posture for 30 minutes including:  · Seated Chin tucks   20x w/ 5" hold  · Supine chin tuck + head " "lift 3 x 10 w/ 5" hold  · Scap retractions at wall   20x w/ 5 "hold  · No moneys RTB    20x; 3" hold  · Wall posture   3 x 1 min   · Rows GTB    2x15 5" hold  · Shoulder extensions GTB  2x15 w/ 5" hold    Not Today:   Cervical snags 15x bilateral   Cervical isometrics 10x SB, rot, Flexion   Horizontal abduction red theraband 2x10  Wall slide into thoracic extension 15x5"  Standing open books 15x bilateral  (performed supine 6/21 at patient request)      manual therapy techniques: Joint mobilizations, soft tissue mobilization were applied to the: cervical spine for 10 minutes, including:  STM to cervical paraspinals with elongation  Gentle sub occipital release  STM/MFR to bilateral Upper traps     Patient Education and Home Exercises     Home Exercises Provided and Patient Education Provided     Education provided:   - Updated home exercise program   - Instructed on use of theraband in door frame rather than door knob  - Reviewed log roll technique for supine <> sit and practiced same.    Written Home Exercises Provided: yes. Exercises were reviewed and Jalyn was able to demonstrate them prior to the end of the session.  Jalyn demonstrated good  understanding of the education provided. See EMR under Patient Instructions for exercises provided during evaland updated on 6/21/2022    ASSESSMENT     Jalyn Solares is a 72 y.o. female referred to outpatient Physical Therapy with a medical diagnosis of neck pain. She was greatly fatigued by chin tuck and lift exercise as well as static postural exercises. She will benefit continued strengthening of deep neck flexors and parascapular musculature to off increased stability and motor control to her cervical spine.     Jalyn Is progressing well towards her goals.   Pt prognosis is Good.     Pt will continue to benefit from skilled outpatient physical therapy to address the deficits listed in the problem list box on initial evaluation, provide pt/family education and to " maximize pt's level of independence in the home and community environment.     Pt's spiritual, cultural and educational needs considered and pt agreeable to plan of care and goals.     Anticipated barriers to physical therapy: chronicity, co-morbidities     Goals:   Short Term Goals (4 Weeks):   1. Pt will be independent with HEP to supplement PT in improving pain free cervical mobility.  2. Pt will improve cervical SB AROM 5 deg to improve mobility for functional tasks.   3. Pt will improve UE MMTs by 1/2 grade in all planes to improve strength for lifting.     Long Term Goals (8 Weeks):   1. Pt will improve FOTO to </=28% limitation to improve perceived limitation with changing and maintaining mobility.  2. Pt will improve cervical rotation AROM to 70 deg bilateral  to improve mobility for driving.  3. Pt will improve UE MMTs 1 grade in all planes to improve strength for lifting and carrying tasks.  4. Pt will report no pain with oil painting for 1 hr.   5. Pt will report no pain with cervical AROM in all planes to promote unlimited functional mobility.     PLAN     Continue with established PT plan of care     Shanice Valente, PT, DPT

## 2022-07-01 NOTE — PROGRESS NOTES
"[facility]   Physical Therapy Treatment Note     Name: Jalyn Aaron  Clinic Number: [id number]    Therapy Diagnosis:   Encounter Diagnoses   Name Primary?    Decreased ROM of neck Yes    Decreased strength of upper extremity      Physician: No ref. 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  Response to previous treatment:no adverse effects  Functional change: ongoing     Pain: 4/10  Location: right neck      OBJECTIVE     Objective Measures updated at progress report unless specified.     X-Ray: "At C3-C4 there is anterolisthesis measuring 3 mm on flexion, 0 mm on extension, and 1 mm on neutral.   At C4-C5 there is anterolisthesis measuring 4 mm on flexion, 2 mm on extension, and 2 mm on neutral.   At C7-T1 there is mild fixed anterolisthesis.   There is straightening of the usual cervical lordosis.   There is moderate disc height loss at C5-C6, C6-C7, and C7-T1.  There is multilevel bilateral facet arthropathy and uncovertebral joint spurring."    Treatment     Jalyn received the treatments listed below:      therapeutic exercises to develop strength, endurance, ROM, flexibility and posture for 30 minutes of 1:1 and 30 minutes under supervision including:  · Seated Chin " "tucks   20x w/ 5" hold  · Supine chin tuck + head lift 3 x 10 w/ 5" hold  · Scap retractions at wall   20x w/ 5 "hold  · No moneys RTB    20x; 3" hold  · Wall posture   3 x 1 min   · Rows GTB    2x15 5" hold  · Shoulder extensions GTB  2x15 w/ 5" hold  · Open books   2 x 15  · Cervical snags   3 min L rotation    Not Today:   Cervical isometrics 10x SB, rot, Flexion   Horizontal abduction red theraband 2x10  Wall slide into thoracic extension 15x5"      manual therapy techniques: Joint mobilizations, soft tissue mobilization were applied to the: cervical spine for 00 minutes, including:  STM to cervical paraspinals with elongation  Gentle sub occipital release  STM/MFR to bilateral Upper traps     Patient Education and Home Exercises     Home Exercises Provided and Patient Education Provided     Education provided:   - Updated home exercise program   - Instructed on use of theraband in door frame rather than door knob  - Reviewed log roll technique for supine <> sit and practiced same.    Written Home Exercises Provided: yes. Exercises were reviewed and Jalyn was able to demonstrate them prior to the end of the session.  Jalyn demonstrated good  understanding of the education provided. See EMR under Patient Instructions for exercises provided during evaland updated on 6/21/2022    ASSESSMENT     Jalyn Solares is a 72 y.o. female referred to outpatient Physical Therapy with a medical diagnosis of neck pain. She was in increased pain today which was mildly decreased by end of session. She continues to be fatigued by deep neck flexor and postural exercises. Will benefit continued cervical stability training at follow up sessions     Jalyn Is progressing well towards her goals.   Pt prognosis is Good.     Pt will continue to benefit from skilled outpatient physical therapy to address the deficits listed in the problem list box on initial evaluation, provide pt/family education and to maximize pt's level of " independence in the home and community environment.     Pt's spiritual, cultural and educational needs considered and pt agreeable to plan of care and goals.     Anticipated barriers to physical therapy: chronicity, co-morbidities     Goals:   Short Term Goals (4 Weeks):   1. Pt will be independent with HEP to supplement PT in improving pain free cervical mobility.  2. Pt will improve cervical SB AROM 5 deg to improve mobility for functional tasks.   3. Pt will improve UE MMTs by 1/2 grade in all planes to improve strength for lifting.     Long Term Goals (8 Weeks):   1. Pt will improve FOTO to </=28% limitation to improve perceived limitation with changing and maintaining mobility.  2. Pt will improve cervical rotation AROM to 70 deg bilateral  to improve mobility for driving.  3. Pt will improve UE MMTs 1 grade in all planes to improve strength for lifting and carrying tasks.  4. Pt will report no pain with oil painting for 1 hr.   5. Pt will report no pain with cervical AROM in all planes to promote unlimited functional mobility.     PLAN     Continue with established PT plan of care     Shanice Valente, PT, DPT

## 2022-07-06 ENCOUNTER — CLINICAL SUPPORT (OUTPATIENT)
Dept: REHABILITATION | Facility: HOSPITAL | Age: 72
End: 2022-07-06
Payer: MEDICARE

## 2022-07-06 DIAGNOSIS — R29.898 DECREASED ROM OF NECK: Primary | ICD-10-CM

## 2022-07-06 DIAGNOSIS — R29.898 DECREASED STRENGTH OF UPPER EXTREMITY: ICD-10-CM

## 2022-07-06 PROCEDURE — 97110 THERAPEUTIC EXERCISES: CPT | Mod: PN,CQ

## 2022-07-06 PROCEDURE — 97140 MANUAL THERAPY 1/> REGIONS: CPT | Mod: PN,CQ

## 2022-07-06 NOTE — PROGRESS NOTES
"[facility]   Physical Therapy Treatment Note     Name: Jalyn Aaron  Clinic Number: [id number]    Therapy Diagnosis:   Encounter Diagnoses   Name Primary?   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  Response to previous treatment:no adverse effects  Functional change: ongoing     Pain: 4/10  Location: right neck      OBJECTIVE     Objective Measures updated at progress report unless specified.     X-Ray: "At C3-C4 there is anterolisthesis measuring 3 mm on flexion, 0 mm on extension, and 1 mm on neutral.   At C4-C5 there is anterolisthesis measuring 4 mm on flexion, 2 mm on extension, and 2 mm on neutral.   At C7-T1 there is mild fixed anterolisthesis.   There is straightening of the usual cervical lordosis.   There is moderate disc height loss at C5-C6, C6-C7, and C7-T1.  There is multilevel bilateral facet arthropathy and uncovertebral joint spurring."    Treatment     Jalyn received the treatments listed below:      therapeutic exercises to develop strength, endurance, ROM, flexibility and posture for 45 minutes of 1:1 including:    · Seated Chin tucks   20x w/ 5" hold  · Supine chin tuck + head lift 3 x 10 w/ 5" hold  · Scap " "retractions at wall   20x w/ 5 "hold  · No moneys RTB    20x; 3" hold  · Wall posture   3 x 1 min   · Rows GTB    2x15 5" hold  · Shoulder extensions GTB  2x15 w/ 5" hold  · Open books   2 x 15  · Cervical snags   3 min L rotation      Not Today:   Cervical isometrics 10x SB, rot, Flexion   Horizontal abduction red theraband 2x10  Wall slide into thoracic extension 15x5"      manual therapy techniques: Joint mobilizations, soft tissue mobilization were applied to the: cervical spine for 10 minutes, including:  STM to cervical paraspinals with elongation NP  Gentle sub occipital release  Man str B UT/lev  STM/MFR to bilateral Upper traps NP    Patient Education and Home Exercises     Home Exercises Provided and Patient Education Provided     Education provided:   - Updated home exercise program   - Instructed on use of theraband in door frame rather than door knob  - Reviewed log roll technique for supine <> sit and practiced same.    Written Home Exercises Provided: yes. Exercises were reviewed and Jalyn was able to demonstrate them prior to the end of the session.  Jalyn demonstrated good  understanding of the education provided. See EMR under Patient Instructions for exercises provided during evaland updated on 6/21/2022    ASSESSMENT     Jalyn Solares is a 72 y.o. female referred to outpatient Physical Therapy with a medical diagnosis of neck pain. She presents with fair technique throughout session but fatigues easily with scapular strength training. She reports overall decreased pain and improved mobility post treatment today.  Jalyn Is progressing well towards her goals.   Pt prognosis is Good.     Pt will continue to benefit from skilled outpatient physical therapy to address the deficits listed in the problem list box on initial evaluation, provide pt/family education and to maximize pt's level of independence in the home and community environment.     Pt's spiritual, cultural and educational needs " considered and pt agreeable to plan of care and goals.     Anticipated barriers to physical therapy: chronicity, co-morbidities     Goals:   Short Term Goals (4 Weeks):   1. Pt will be independent with HEP to supplement PT in improving pain free cervical mobility.  2. Pt will improve cervical SB AROM 5 deg to improve mobility for functional tasks.   3. Pt will improve UE MMTs by 1/2 grade in all planes to improve strength for lifting.     Long Term Goals (8 Weeks):   1. Pt will improve FOTO to </=28% limitation to improve perceived limitation with changing and maintaining mobility.  2. Pt will improve cervical rotation AROM to 70 deg bilateral  to improve mobility for driving.  3. Pt will improve UE MMTs 1 grade in all planes to improve strength for lifting and carrying tasks.  4. Pt will report no pain with oil painting for 1 hr.   5. Pt will report no pain with cervical AROM in all planes to promote unlimited functional mobility.     PLAN     Continue with established PT plan of care     ANNA VILLARREAL, PTA

## 2022-07-07 ENCOUNTER — PATIENT MESSAGE (OUTPATIENT)
Dept: NEUROSURGERY | Facility: CLINIC | Age: 72
End: 2022-07-07
Payer: MEDICARE

## 2022-07-07 ENCOUNTER — DOCUMENTATION ONLY (OUTPATIENT)
Dept: REHABILITATION | Facility: OTHER | Age: 72
End: 2022-07-07
Attending: INTERNAL MEDICINE
Payer: MEDICARE

## 2022-07-07 NOTE — PROGRESS NOTES
Health  Wellness Visit Note    Name: Jalyn Solares [facility]  Clinic Number: [id number]  Physician: Bere Myers MD  Diagnosis: No diagnosis found.  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    Left    AV block     exercised induced 2:1    Cataract     Fever blister     Heart block     History of acne     Joint pain     hands    Keratoconjunctivitis sicca not due to Sjogren's syndrome     Pacemaker      Visit Number: 31  Precautions: standard; pacemaker      1st PT visit:  1/21/22  Year of care end date: 1/21/23  6 Month test  Performed: July 2022  12 Month test performed: Jan 2023  Mind body plan: Plan A 8 months  Patient level: B    Time In: 9:00 AM  Time Out: 9:45 AM  Total Treatment Time: 45 minutes    [employer] 2022  Handout on this week's wellness topic Workaholic was provided along with a discussion on what it means, the benefits, and suggestions for practice.  Reviewed last week's topic of Emotions.      Subjective:   Patient reports that her back is pain-free, but she is going to rehab for her neck pain.  She hasn't noticed any neck improvement since last visit. Pt stretches and ices on a daily basis. For exercise, she typically does yoga, goes to the gym at least once/week and walks twice/day.     Objective:   Jalyn Solares completed therapeutic stretches (EIL, KATHY) and the following MedX exercise machines: core lumbar, torso rotation l/r, leg extension, leg curl, upright row, chest press, biceps curl, triceps extension, leg press    See exercise log in patient folder for rate of exertion and repetitions completed.       Fitness Machine Education Key:  E=education on equipment initiated and further follow up and education needed  I=independent with  and exercise.  The patient:   Adjusts machines to his/her settings   Uses equipment levers, pins, weights safely   Maintains safe and correct posture while  exercising   Moves through exercise with correct pace and control   Gets on and off equipment safely      Core lumbar strength E Core Torso Rotation E Leg Press E   Leg Extension E Seated Leg Curl E Chest Press E   Row E Abductor E Hip ADD X   Triceps  E Biceps E Other: X       Assessment:   Patient tolerated Patient tolerated MedX Core Lumbar Strength and all other peripheral exercises without an increase in symptoms. Patient warmed up on the treadmill for 5 minutes, stretched, and iced low back for 5 minutes after the workout.    Plan:  Continue with established plan of care towards wellness goals.     Health  : Elba Rodriguez  7/7/2022

## 2022-07-11 ENCOUNTER — OFFICE VISIT (OUTPATIENT)
Dept: PODIATRY | Facility: CLINIC | Age: 72
End: 2022-07-11
Payer: MEDICARE

## 2022-07-11 VITALS
SYSTOLIC BLOOD PRESSURE: 105 MMHG | HEIGHT: 67 IN | BODY MASS INDEX: 20.88 KG/M2 | HEART RATE: 68 BPM | DIASTOLIC BLOOD PRESSURE: 68 MMHG | WEIGHT: 133 LBS

## 2022-07-11 DIAGNOSIS — M79.674 TOE PAIN, RIGHT: ICD-10-CM

## 2022-07-11 DIAGNOSIS — L60.0 INGROWN NAIL: Primary | ICD-10-CM

## 2022-07-11 PROCEDURE — 3074F PR MOST RECENT SYSTOLIC BLOOD PRESSURE < 130 MM HG: ICD-10-PCS | Mod: CPTII,S$GLB,, | Performed by: PODIATRIST

## 2022-07-11 PROCEDURE — 3008F BODY MASS INDEX DOCD: CPT | Mod: CPTII,S$GLB,, | Performed by: PODIATRIST

## 2022-07-11 PROCEDURE — 3078F DIAST BP <80 MM HG: CPT | Mod: CPTII,S$GLB,, | Performed by: PODIATRIST

## 2022-07-11 PROCEDURE — 1125F AMNT PAIN NOTED PAIN PRSNT: CPT | Mod: CPTII,S$GLB,, | Performed by: PODIATRIST

## 2022-07-11 PROCEDURE — 11750 NAIL REMOVAL: ICD-10-PCS | Mod: T6,S$GLB,, | Performed by: PODIATRIST

## 2022-07-11 PROCEDURE — 4010F PR ACE/ARB THEARPY RXD/TAKEN: ICD-10-PCS | Mod: CPTII,S$GLB,, | Performed by: PODIATRIST

## 2022-07-11 PROCEDURE — 4010F ACE/ARB THERAPY RXD/TAKEN: CPT | Mod: CPTII,S$GLB,, | Performed by: PODIATRIST

## 2022-07-11 PROCEDURE — 1160F RVW MEDS BY RX/DR IN RCRD: CPT | Mod: CPTII,S$GLB,, | Performed by: PODIATRIST

## 2022-07-11 PROCEDURE — 11750 EXCISION NAIL&NAIL MATRIX: CPT | Mod: T6,S$GLB,, | Performed by: PODIATRIST

## 2022-07-11 PROCEDURE — 99999 PR PBB SHADOW E&M-EST. PATIENT-LVL III: CPT | Mod: PBBFAC,,, | Performed by: PODIATRIST

## 2022-07-11 PROCEDURE — 1157F PR ADVANCE CARE PLAN OR EQUIV PRESENT IN MEDICAL RECORD: ICD-10-PCS | Mod: CPTII,S$GLB,, | Performed by: PODIATRIST

## 2022-07-11 PROCEDURE — 1125F PR PAIN SEVERITY QUANTIFIED, PAIN PRESENT: ICD-10-PCS | Mod: CPTII,S$GLB,, | Performed by: PODIATRIST

## 2022-07-11 PROCEDURE — 1160F PR REVIEW ALL MEDS BY PRESCRIBER/CLIN PHARMACIST DOCUMENTED: ICD-10-PCS | Mod: CPTII,S$GLB,, | Performed by: PODIATRIST

## 2022-07-11 PROCEDURE — 3078F PR MOST RECENT DIASTOLIC BLOOD PRESSURE < 80 MM HG: ICD-10-PCS | Mod: CPTII,S$GLB,, | Performed by: PODIATRIST

## 2022-07-11 PROCEDURE — 1157F ADVNC CARE PLAN IN RCRD: CPT | Mod: CPTII,S$GLB,, | Performed by: PODIATRIST

## 2022-07-11 PROCEDURE — 3008F PR BODY MASS INDEX (BMI) DOCUMENTED: ICD-10-PCS | Mod: CPTII,S$GLB,, | Performed by: PODIATRIST

## 2022-07-11 PROCEDURE — 1159F MED LIST DOCD IN RCRD: CPT | Mod: CPTII,S$GLB,, | Performed by: PODIATRIST

## 2022-07-11 PROCEDURE — 3074F SYST BP LT 130 MM HG: CPT | Mod: CPTII,S$GLB,, | Performed by: PODIATRIST

## 2022-07-11 PROCEDURE — 99999 PR PBB SHADOW E&M-EST. PATIENT-LVL III: ICD-10-PCS | Mod: PBBFAC,,, | Performed by: PODIATRIST

## 2022-07-11 PROCEDURE — 99499 NO LOS: ICD-10-PCS | Mod: S$GLB,,, | Performed by: PODIATRIST

## 2022-07-11 PROCEDURE — 1159F PR MEDICATION LIST DOCUMENTED IN MEDICAL RECORD: ICD-10-PCS | Mod: CPTII,S$GLB,, | Performed by: PODIATRIST

## 2022-07-11 PROCEDURE — 99499 UNLISTED E&M SERVICE: CPT | Mod: S$GLB,,, | Performed by: PODIATRIST

## 2022-07-11 NOTE — PROGRESS NOTES
Subjective:      Patient ID: Jalyn Aaron is a 72 y.o. female.    Chief Complaint:   Ingrown Toenail    Jalyn Solares is a 72 y.o. female who presents to the podiatry clinic  with concerns of ingrown nail pain.  right 2nd toe.  Interested in chemical matrixectomy today.          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 135 tablet 11    linaCLOtide (LINZESS) 72 mcg Cap capsule Take 1 capsule (72 mcg total) by mouth before breakfast. 30 capsule 3    losartan (COZAAR) 25 MG tablet TAKE 1 TABLET BY MOUTH TWICE DAILY 60 tablet 6    multivitamin capsule Take 1 capsule by mouth once daily.      naproxen (NAPROSYN) 500 MG tablet Half tablet once daily 90 tablet 1    senna (SENOKOT) 8.6 mg tablet Take 1 tablet by mouth once daily. Twice a day      urea 20 % Crea Apply 1 application topically once daily. To dry skin on the feet. 75 g 10    vitamin D 1000 units Tab Take 1,000 Units by mouth once daily.       No current facility-administered medications on file prior to visit.     Review of patient's allergies indicates:   Allergen Reactions    Bactrim [sulfamethoxazole-trimethoprim] Nausea Only       Review of Systems   Constitutional: Negative for chills, decreased appetite, fever, malaise/fatigue, night sweats, weight gain and weight loss.   Cardiovascular: Negative for chest pain, claudication, dyspnea on exertion, leg  "swelling, palpitations and syncope.   Respiratory: Negative for cough and shortness of breath.    Endocrine: Negative for cold intolerance and heat intolerance.   Hematologic/Lymphatic: Negative for bleeding problem. Does not bruise/bleed easily.   Skin: Negative for color change, dry skin, flushing, itching, nail changes, poor wound healing, rash, skin cancer, suspicious lesions and unusual hair distribution.   Musculoskeletal: Positive for arthritis. Negative for back pain, falls, gout, joint pain, joint swelling, muscle cramps, muscle weakness, myalgias, neck pain and stiffness.   Gastrointestinal: Negative for diarrhea, nausea and vomiting.   Neurological: Positive for numbness and paresthesias. Negative for dizziness, focal weakness, light-headedness, tremors, vertigo and weakness.   Psychiatric/Behavioral: Negative for altered mental status and depression. The patient does not have insomnia.    Allergic/Immunologic: Negative.            Objective:       Vitals:    07/11/22 1205   BP: 105/68   Pulse: 68   Weight: 60.3 kg (133 lb)   Height: 5' 7" (1.702 m)   PainSc:   3   PainLoc: Foot   60.3 kg (133 lb)     Physical Exam  Vitals reviewed.   Constitutional:       General: She is not in acute distress.     Appearance: She is well-developed. She is not ill-appearing, toxic-appearing or diaphoretic.      Comments: Proper supportive shoegear   Cardiovascular:      Pulses:           Dorsalis pedis pulses are 2+ on the right side and 2+ on the left side.        Posterior tibial pulses are 1+ on the right side and 1+ on the left side.   Musculoskeletal:         General: No tenderness.      Right foot: Decreased range of motion. Deformity, bunion and prominent metatarsal heads present.      Left foot: Decreased range of motion. Deformity, bunion and prominent metatarsal heads present.      Comments: Flexible pes planus foot type w/ medial arch collapse and mild gastroc equinus     Reducible extensor and flexor " contractures at the MTPJ and PIPJ of toes 2-5, bilat. With right dorsal contraction at the 2nd MTPJ and lateral deviation of right 1st MTPJ/bunion    Left + prominent area 1st MT   Feet:      Right foot:      Protective Sensation: 10 sites tested. 10 sites sensed.      Skin integrity: No ulcer, blister, skin breakdown, erythema, warmth, callus or dry skin.      Toenail Condition: Right toenails are normal.      Left foot:      Protective Sensation: 10 sites tested. 10 sites sensed.      Skin integrity: No ulcer, blister, skin breakdown, erythema, warmth, callus or dry skin.      Toenail Condition: Left toenails are ingrown.      Comments: SWM some increased sensitivity to left digits    Right medial border 2nd toe incurvated . Mild edema, no erythema. No acute soi  Skin:     General: Skin is warm.      Capillary Refill: Capillary refill takes 2 to 3 seconds.      Coloration: Skin is not pale.      Findings: No erythema or rash.   Neurological:      Mental Status: She is alert and oriented to person, place, and time.      Gait: Gait is intact.   Psychiatric:         Attention and Perception: Attention normal.         Mood and Affect: Mood normal.         Speech: Speech normal.         Behavior: Behavior normal.         Thought Content: Thought content normal.         Judgment: Judgment normal.                 Assessment:       Encounter Diagnoses   Name Primary?    Ingrown nail Yes    Toe pain, right          Plan:         · I counseled the patient on her conditions, their implications and medical management.    Nail Removal    Date/Time: 7/11/2022 12:15 PM  Performed by: Louisa Almanzar DPM  Authorized by: Louisa Almanzar DPM     Consent Done?:  Yes (Written)  Prep: patient was prepped and draped in usual sterile fashion    Location:     Location:  Right foot    Location detail:  Right second toe  Anesthesia:     Anesthesia:  Local infiltration    Local anesthetic:  Bupivacaine 0.5% without epinephrine and lidocaine  2% without epinephrine    Anesthetic total (ml):  1  Procedure Details:     Preparation:  Skin prepped with Betadine    Amount removed:  Partial (medial border)    Side:  Medial    Wedge excision of skin of nail fold: No      Nail bed sutured?: No      Nail matrix removed:  Partial (phenol was used)    Removal method:  Phenol and alcohol    Dressing applied:  Antibiotic ointment and dressing applied    Patient tolerance:  Patient tolerated the procedure well with no immediate complications

## 2022-07-13 ENCOUNTER — CLINICAL SUPPORT (OUTPATIENT)
Dept: REHABILITATION | Facility: HOSPITAL | Age: 72
End: 2022-07-13
Payer: MEDICARE

## 2022-07-13 DIAGNOSIS — R29.898 DECREASED ROM OF NECK: Primary | ICD-10-CM

## 2022-07-13 DIAGNOSIS — R29.898 DECREASED STRENGTH OF UPPER EXTREMITY: ICD-10-CM

## 2022-07-13 PROCEDURE — 97140 MANUAL THERAPY 1/> REGIONS: CPT | Mod: PN,CQ

## 2022-07-13 PROCEDURE — 97110 THERAPEUTIC EXERCISES: CPT | Mod: PN,CQ

## 2022-07-13 NOTE — PROGRESS NOTES
"[facility]   Physical Therapy Treatment Note     Name: Jalyn Aaron  Clinic Number: [id number]    Therapy Diagnosis:   Encounter Diagnoses   Name Primary?   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  Response to previous treatment:no adverse effects  Functional change: ongoing     Pain: 4/10  Location: right neck      OBJECTIVE     Objective Measures updated at progress report unless specified.     X-Ray: "At C3-C4 there is anterolisthesis measuring 3 mm on flexion, 0 mm on extension, and 1 mm on neutral.   At C4-C5 there is anterolisthesis measuring 4 mm on flexion, 2 mm on extension, and 2 mm on neutral.   At C7-T1 there is mild fixed anterolisthesis.   There is straightening of the usual cervical lordosis.   There is moderate disc height loss at C5-C6, C6-C7, and C7-T1.  There is multilevel bilateral facet arthropathy and uncovertebral joint spurring."    Treatment     Jalyn received the treatments listed below:      therapeutic exercises to develop strength, endurance, ROM, flexibility and posture for 45 minutes of 1:1 including:    · Seated Chin tucks   20x w/ 5" hold  · Supine chin tuck + head lift 3 x 10 w/ 5" hold  · Scap retractions at wall " Reason For Visit  ABHINAV PITTS is here today for a nurse visit for immunizations FLU vaccine.      Current Meds   1. Aspir-81 81 MG Oral Tablet Delayed Release; TAKE 1 TABLET BY MOUTH DAILY;   Therapy: [id number] to Recorded   2. Simvastatin 20 MG Oral Tablet; TAKE ONE-HALF (1/2) TABLET DAILY;   Therapy: [id number] to (Last Rx:[id number])  Requested for: [id number] Ordered    Allergies  No Known Drug Allergies    Screening  Vaccine Screening (Adult):   1. Are you sick today? No.   2. Do you have allergies to medications, food, a vaccine component, or latex? No.   3. Have you ever had a serious reaction after receiving a vaccination? No.   4. Do you have long-term health problems with heart disease, lung disease, asthma, kidney disease, metabolic disease (e.g. diabetes), anemia, or other blood disorder? No.   5. Do you have cancer, leukemia, HIV/AIDS, or any other immune system problem? No.   6. In the past 3 months, have you taken medications that affect your immune system, such as cortisone, prednisone, other steroids, or anticancer drugs; drugs for the treatment of rheumatoid arthritis, Crohn's disease, or psoriasis; or have you had radiation treatments? No.   7. Have you had a seizure or a brain or other nervous system problem? No.   10. Have you received any vaccinations in the past 4 weeks? No.       See scanned screening form in the patients chart.       Assessment   1. Need for influenza vaccination (Z23)    Plan   1. Fluzone Quadrivalent 0.5 ML Intramuscular Suspension Prefilled Syringe    Signatures   Electronically signed by : MICHAEL JO; Oct 23 2018 11:59AM CST     "  20x w/ 5 "hold  · No moneys RTB    20x; 3" hold  · Wall posture   3 x 1 min -np  · Rows GTB    2x15 5" hold  · Shoulder extensions GTB  2x15 w/ 5" hold -np  · Open books   2 x 15 increased ROM  · Cervical snags   3 min L rotation      Not Today:   Cervical isometrics 10x SB, rot, Flexion   Horizontal abduction red theraband 2x10  Wall slide into thoracic extension 15x5"      manual therapy techniques: Joint mobilizations, soft tissue mobilization were applied to the: cervical spine for 10 minutes, including:  STM to cervical paraspinals with elongation NP  Gentle sub occipital release  Man str B UT/lev  STM/MFR to bilateral Upper traps NP    Patient Education and Home Exercises     Home Exercises Provided and Patient Education Provided     Education provided:   - Updated home exercise program   - Instructed on use of theraband in door frame rather than door knob  - Reviewed log roll technique for supine <> sit and practiced same.    Written Home Exercises Provided: yes. Exercises were reviewed and Jalyn was able to demonstrate them prior to the end of the session.  Jalyn demonstrated good  understanding of the education provided. See EMR under Patient Instructions for exercises provided during evaland updated on 6/21/2022    ASSESSMENT     Jalyn Solares is a 72 y.o. female referred to outpatient Physical Therapy with a medical diagnosis of neck pain. She presents with with slightly guarded cervical ROM with gait. Good tolerance to therex today. Increase open book ROM to the right. Added thoracic extension for increased mobility.  Jalyn Is progressing well towards her goals.   Pt prognosis is Good.     Pt will continue to benefit from skilled outpatient physical therapy to address the deficits listed in the problem list box on initial evaluation, provide pt/family education and to maximize pt's level of independence in the home and community environment.     Pt's spiritual, cultural and educational needs " considered and pt agreeable to plan of care and goals.     Anticipated barriers to physical therapy: chronicity, co-morbidities     Goals:   Short Term Goals (4 Weeks):   1. Pt will be independent with HEP to supplement PT in improving pain free cervical mobility.  2. Pt will improve cervical SB AROM 5 deg to improve mobility for functional tasks.   3. Pt will improve UE MMTs by 1/2 grade in all planes to improve strength for lifting.     Long Term Goals (8 Weeks):   1. Pt will improve FOTO to </=28% limitation to improve perceived limitation with changing and maintaining mobility.  2. Pt will improve cervical rotation AROM to 70 deg bilateral  to improve mobility for driving.  3. Pt will improve UE MMTs 1 grade in all planes to improve strength for lifting and carrying tasks.  4. Pt will report no pain with oil painting for 1 hr.   5. Pt will report no pain with cervical AROM in all planes to promote unlimited functional mobility.     PLAN     Continue with established PT plan of care     Michael Winter, PTA

## 2022-07-14 ENCOUNTER — DOCUMENTATION ONLY (OUTPATIENT)
Dept: REHABILITATION | Facility: OTHER | Age: 72
End: 2022-07-14
Attending: INTERNAL MEDICINE
Payer: MEDICARE

## 2022-07-14 NOTE — PROGRESS NOTES
Health  Wellness Visit Note    Name: Jalyn Solares [place]  Clinic Number: [id number]  Physician: Bere Myers MD  Diagnosis: No diagnosis found.  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    Left    AV block     exercised induced 2:1    Cataract     Fever blister     Heart block     History of acne     Joint pain     hands    Keratoconjunctivitis sicca not due to Sjogren's syndrome     Pacemaker      Visit Number: 32  Precautions: standard; pacemaker      1st PT visit:  1/21/22  Year of care end date: 1/21/23  6 Month test  Performed: July 2022  12 Month test performed: Jan 2023  Mind body plan: Plan A 8 months  Patient level: B    Time In: 8:58 AM  Time Out: 9:33 AM  Total Treatment Time: 35 minutes    [employer] 2022  Handout on this week's wellness topic Play was provided along with a discussion on what it means, the benefits, and suggestions for practice.  Reviewed last week's topic of Workaholic.      Subjective:   Patient reports that her back continues to be pain-free, but she  hasn't noticed any neck improvement since last visit. Pt stretches and ices on a daily basis. For exercise, she typically does yoga, goes to the gym at least once/week and walks twice/day. Pt is learning  and working on increasing resistance to build some strength.    Objective:   Jalyn Solares completed therapeutic stretches (EIL, KATHY) and the following MedX exercise machines: core lumbar, torso rotation l/r, leg extension, leg curl, upright row, chest press, biceps curl, triceps extension, leg press    See exercise log in patient folder for rate of exertion and repetitions completed.       Fitness Machine Education Key:  E=education on equipment initiated and further follow up and education needed  I=independent with  and exercise.  The patient:   Adjusts machines to his/her settings   Uses equipment levers, pins, weights  safely   Maintains safe and correct posture while exercising   Moves through exercise with correct pace and control   Gets on and off equipment safely      Core lumbar strength E Core Torso Rotation E Leg Press E   Leg Extension E Seated Leg Curl E Chest Press E   Row E Abductor E Hip ADD X   Triceps  E Biceps E Other: X       Assessment:   Patient tolerated Patient tolerated MedX Core Lumbar Strength and all other peripheral exercises without an increase in symptoms. Patient warmed up on the treadmill for 5 minutes, stretched, and iced low back for 5 minutes after the workout.    Plan:  Continue with established plan of care towards wellness goals.     Health  : Elba Rodriguez  7/14/2022

## 2022-07-15 ENCOUNTER — CLINICAL SUPPORT (OUTPATIENT)
Dept: REHABILITATION | Facility: HOSPITAL | Age: 72
End: 2022-07-15
Payer: MEDICARE

## 2022-07-15 DIAGNOSIS — R29.898 DECREASED STRENGTH OF UPPER EXTREMITY: ICD-10-CM

## 2022-07-15 DIAGNOSIS — R29.898 DECREASED ROM OF NECK: Primary | ICD-10-CM

## 2022-07-15 PROCEDURE — 97164 PT RE-EVAL EST PLAN CARE: CPT | Mod: PN

## 2022-07-15 PROCEDURE — 97110 THERAPEUTIC EXERCISES: CPT | Mod: PN

## 2022-07-15 NOTE — PLAN OF CARE
Outpatient Therapy Updated Plan of Care     Visit Date: 7/15/2022  Name: Jalyn Aaron  Clinic Number: [id number]    Therapy Diagnosis:   Encounter Diagnoses   Name Primary?   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     Time In: 0900 am  Time Out: 1000 am  Total Treatment Time: 60 minutes  Total Billable Time: 30 minutes (2 TE, 1 Re-Eval)    Subjective     Update: see daily treatment note    Objective     Update:     Range of Motion/Strength:   CERVICAL AROM Pain/Dysfunction with Movement   Flexion 45 Stiffness on R   Extension 45 R sided mid cervical pain    Right side bending 25 R sided pain     Left side bending 25     Right rotation 35 R sided pain    Left rotation 55 Stiffness on R      U/E MMT Right Left Pain/Dysfunction with Movement   Shoulder Flexion 4-/5 4-/5     Shoulder Abduction 4-/5 4-/5  pain on R   Shoulder IR 4+/5 4+/5     Shoulder ER    4+/5 4+/5     Elbow Flexion  5/5 5/5     Elbow Extension 5/5 5/5     Rhomboids 4-/5 4-/5     Mid Traps 4-/5 4-/5     Low Traps 4-/5 4-/5        Joint Mobility:               - Thoracic; grossly hypomobile              - Cervical: lower grossly hypomobile, mid and upper, grossly hypermobile, joint play assessed without high grade mobilization due to instability on imaging        Assessment     Update: Jalyn presents with neck pain and cervical instability which is steadily improving since eval. However, given measurements take today, she has not yet made the strength improvements needed for  long term benefit at this time. She will benefit continued DNF endurance and parascapular strengthening to improve her posture, cervical stability, and pain.     Short Term Goals (3 Weeks):   2. Pt will improve cervical SB AROM 5 deg to improve mobility for functional tasks. (Progressing, not met)  3. Pt will improve UE MMTs by 1/2 grade in all planes to improve strength for lifting.  (Progressing, not met)     Long Term Goals (6 Weeks):   1. Pt will improve FOTO to </=28% limitation to improve perceived limitation with changing and maintaining mobility.  (Progressing, not met)  2. Pt will improve cervical rotation AROM to 70 deg bilateral  to improve mobility for driving.  (Progressing, not met)  3. Pt will improve UE MMTs 1 grade in all planes to improve strength for lifting and carrying tasks.  (Progressing, not met)  4. Pt will report no pain with oil painting for 1 hr.   (Progressing, not met)  5. Pt will report no pain with cervical AROM in all planes to promote unlimited functional mobility.   (Progressing, not met)    Reasons for Recertification of Therapy:   Updated POC    Plan     Updated Certification Period: 7/15/2022 to 08/26/2022  Recommended Treatment Plan: 1 times per week for 6 weeks: Aquatic Therapy, Manual Therapy, Moist Heat/ Ice, Neuromuscular Re-ed, Patient Education, Therapeutic Activities and Therapeutic Exercise  Other Recommendations: none    Shanice Valente, PT, DPT  7/15/2022      I CERTIFY THE NEED FOR THESE SERVICES FURNISHED UNDER THIS PLAN OF TREATMENT AND WHILE UNDER MY CARE    Physician's comments:        Physician's Signature: ___________________________________________________

## 2022-07-15 NOTE — PROGRESS NOTES
"[facility]   Physical Therapy Treatment Note     Name: Jalyn Aaron  Clinic Number: [id number]    Therapy Diagnosis:   Encounter Diagnoses   Name Primary?   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  Response to previous treatment:no adverse effects  Functional change: ongoing     Pain: 3/10  Location: right neck      OBJECTIVE     Objective Measures updated at progress report unless specified.     Treatment     Jalyn received the treatments listed below:      therapeutic exercises to develop strength, endurance, ROM, flexibility and posture for  30 minutes of 1:1 and 30 minutes under supervision including reassessment:    · Seated Chin tucks  w/ scap retraciton  20x w/ 5" hold  · Supine chin tuck + head lift   3 x 15 w/ 5" hold  · No moneys RTB      3 x 15; 5" hold  · Wall posture     3 x 1 min  · Rows GTB      2x15 5" hold  · Shoulder extensions GTB    2x15 w/ 5" hold   · Open books     2 x 15 increased Range of Motion  · SA wall slides    Not Today:  Scap retractions at wall     20x w/ 5 "hold    manual therapy techniques: Joint mobilizations, soft tissue mobilization were applied to the: cervical spine for 00 minutes, " including:  STM to cervical paraspinals with elongation   Gentle sub occipital release  STM/MFR to bilateral Upper traps    Patient Education and Home Exercises     Home Exercises Provided and Patient Education Provided     Education provided:   - Updated home exercise program   - Instructed on use of theraband in door frame rather than door knob  - Reviewed log roll technique for supine <> sit and practiced same.    Written Home Exercises Provided: yes. Exercises were reviewed and Jalyn was able to demonstrate them prior to the end of the session.  Jalyn demonstrated good  understanding of the education provided. See EMR under Patient Instructions for exercises provided during evaland updated on 6/21/2022    ASSESSMENT   See updated POC in treatment section.    Jalyn Is progressing well towards her goals.   Pt prognosis is Good.     Pt will continue to benefit from skilled outpatient physical therapy to address the deficits listed in the problem list box on initial evaluation, provide pt/family education and to maximize pt's level of independence in the home and community environment.     Pt's spiritual, cultural and educational needs considered and pt agreeable to plan of care and goals.     Anticipated barriers to physical therapy: chronicity, co-morbidities     Goals:   Short Term Goals (4 Weeks):   1. Pt will be independent with HEP to supplement PT in improving pain free cervical mobility.  2. Pt will improve cervical SB AROM 5 deg to improve mobility for functional tasks.   3. Pt will improve UE MMTs by 1/2 grade in all planes to improve strength for lifting.     Long Term Goals (8 Weeks):   1. Pt will improve FOTO to </=28% limitation to improve perceived limitation with changing and maintaining mobility.  2. Pt will improve cervical rotation AROM to 70 deg bilateral  to improve mobility for driving.  3. Pt will improve UE MMTs 1 grade in all planes to improve strength for lifting and carrying tasks.  4. Pt  will report no pain with oil painting for 1 hr.   5. Pt will report no pain with cervical AROM in all planes to promote unlimited functional mobility.     PLAN     Continue with established PT plan of care     Shanice Valente, PT, DPT

## 2022-07-18 ENCOUNTER — CLINICAL SUPPORT (OUTPATIENT)
Dept: REHABILITATION | Facility: HOSPITAL | Age: 72
End: 2022-07-18
Payer: MEDICARE

## 2022-07-18 DIAGNOSIS — R29.898 DECREASED ROM OF NECK: Primary | ICD-10-CM

## 2022-07-18 DIAGNOSIS — R29.898 DECREASED STRENGTH OF UPPER EXTREMITY: ICD-10-CM

## 2022-07-18 PROCEDURE — 97110 THERAPEUTIC EXERCISES: CPT | Mod: PN

## 2022-07-18 NOTE — PROGRESS NOTES
"[facility]   Physical Therapy Treatment Note     Name: Jalyn Aaron  Clinic Number: [id number]    Therapy Diagnosis:   Encounter Diagnoses   Name Primary?   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  Response to previous treatment:no adverse effects  Functional change: ongoing     Pain: 3/10  Location: right neck      OBJECTIVE     Objective Measures updated at progress report unless specified.     Treatment     Jalyn received the treatments listed below:      therapeutic exercises to develop strength, endurance, ROM, flexibility and posture for  30 minutes of 1:1 and 30 minutes under supervision including reassessment:    · Seated Chin tucks  w/ scap retraciton  20x w/ 5" hold  · Supine chin tuck + head lift   3 x 15 w/ 5" hold  · No moneys RTB      3 x 15 w/ 5" hold  · Wall posture     3 x 1 min  · Rows GTB      3x15 w/ 5" hold  · Shoulder extensions GTB    3x15 w/ 5" hold   · Open books     2 x 15 B  · SA wall slides     4 x 5 w/ cues to avoid lumbar ext    Not Today:  Scap retractions at wall     20x w/ 5 "hold    manual therapy techniques: Joint mobilizations, soft tissue mobilization were applied to the: cervical spine " for 00 minutes, including:  STM to cervical paraspinals with elongation   Gentle sub occipital release  STM/MFR to bilateral Upper traps    Patient Education and Home Exercises     Home Exercises Provided and Patient Education Provided     Education provided:   - Updated home exercise program   - Instructed on use of theraband in door frame rather than door knob  - Reviewed log roll technique for supine <> sit and practiced same.    Written Home Exercises Provided: yes. Exercises were reviewed and Jalyn was able to demonstrate them prior to the end of the session.  Jalyn demonstrated good  understanding of the education provided. See EMR under Patient Instructions for exercises provided during evaland updated on 6/21/2022    ASSESSMENT   Jalyn showed good carry over from last session in her motor control during exercise and had improved subjective report. She will benefit continued progressive strengthening of DNF and parascapular musculature. Will progress as able.     Jalyn Is progressing well towards her goals.   Pt prognosis is Good.     Pt will continue to benefit from skilled outpatient physical therapy to address the deficits listed in the problem list box on initial evaluation, provide pt/family education and to maximize pt's level of independence in the home and community environment.     Pt's spiritual, cultural and educational needs considered and pt agreeable to plan of care and goals.     Anticipated barriers to physical therapy: chronicity, co-morbidities     Goals:   Short Term Goals (3 Weeks):   2. Pt will improve cervical SB AROM 5 deg to improve mobility for functional tasks. (Progressing, not met)  3. Pt will improve UE MMTs by 1/2 grade in all planes to improve strength for lifting.  (Progressing, not met)     Long Term Goals (6 Weeks):   1. Pt will improve FOTO to </=28% limitation to improve perceived limitation with changing and maintaining mobility.  (Progressing, not met)  2. Pt will improve  cervical rotation AROM to 70 deg bilateral  to improve mobility for driving.  (Progressing, not met)  3. Pt will improve UE MMTs 1 grade in all planes to improve strength for lifting and carrying tasks.  (Progressing, not met)  4. Pt will report no pain with oil painting for 1 hr.   (Progressing, not met)  5. Pt will report no pain with cervical AROM in all planes to promote unlimited functional mobility.   (Progressing, not met)       PLAN     Continue with established PT plan of care     Shanice Valente, PT, DPT

## 2022-07-21 ENCOUNTER — DOCUMENTATION ONLY (OUTPATIENT)
Dept: REHABILITATION | Facility: OTHER | Age: 72
End: 2022-07-21
Attending: INTERNAL MEDICINE
Payer: MEDICARE

## 2022-07-21 NOTE — PROGRESS NOTES
Health  Wellness Visit Note    Name: Jalyn Solares [facility]  Clinic Number: [id number]  Physician: Bere Myers MD  Diagnosis: No diagnosis found.  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    Left    AV block     exercised induced 2:1    Cataract     Fever blister     Heart block     History of acne     Joint pain     hands    Keratoconjunctivitis sicca not due to Sjogren's syndrome     Pacemaker      Visit Number: 33  Precautions: standard; pacemaker      1st PT visit:  1/21/22  Year of care end date: 1/21/23  6 Month test  Performed: July 2022  12 Month test performed: Jan 2023  Mind body plan: Plan A 8 months  Patient level: B    Time In: 8:55 AM  Time Out: 9:30 AM  Total Treatment Time: 35 minutes    Wellness Vision 2022  Handout on this week's wellness topic Laughter was provided along with a discussion on what it means, the benefits, and suggestions for practice.  Reviewed last week's topic of Play       Subjective:   Patient reports that her back feels pretty good today; she is continuing to rehab neck at [place]. Pt stretches and ices on a daily basis. For exercise, she typically does yoga, strength-trains i the gym at least 1x/week and walks 2x/day.     Objective:   Jalyn Solares completed therapeutic stretches (EIL, KATHY) and the following MedX exercise machines: core lumbar, torso rotation l/r, leg extension, leg curl, upright row, chest press, biceps curl, triceps extension, leg press    See exercise log in patient folder for rate of exertion and repetitions completed.       Fitness Machine Education Key:  E=education on equipment initiated and further follow up and education needed  I=independent with  and exercise.  The patient:   Adjusts machines to his/her settings   Uses equipment levers, pins, weights safely   Maintains safe and correct posture while exercising   Moves through exercise with correct pace  and control   Gets on and off equipment safely      Core lumbar strength E Core Torso Rotation E Leg Press E   Leg Extension E Seated Leg Curl E Chest Press E   Row E Abductor E Hip ADD X   Triceps  E Biceps E Other: X       Assessment:   Patient tolerated Patient tolerated MedX Core Lumbar Strength and all other peripheral exercises without an increase in symptoms. Patient warmed up on the treadmill for 5 minutes, stretched, and iced low back for 5 minutes after the workout.    Plan:  Continue with established plan of care towards wellness goals.     Health  : Laura Lynch  7/21/2022

## 2022-07-22 ENCOUNTER — CLINICAL SUPPORT (OUTPATIENT)
Dept: REHABILITATION | Facility: HOSPITAL | Age: 72
End: 2022-07-22
Payer: MEDICARE

## 2022-07-22 DIAGNOSIS — R29.898 DECREASED STRENGTH OF UPPER EXTREMITY: ICD-10-CM

## 2022-07-22 DIAGNOSIS — R29.898 DECREASED ROM OF NECK: Primary | ICD-10-CM

## 2022-07-22 PROCEDURE — 97110 THERAPEUTIC EXERCISES: CPT | Mod: PN,CQ

## 2022-07-22 PROCEDURE — 97140 MANUAL THERAPY 1/> REGIONS: CPT | Mod: PN,CQ

## 2022-07-22 NOTE — PROGRESS NOTES
"[facility]   Physical Therapy Treatment Note     Name: Jalyn Aaron  Clinic Number: [id number]    Therapy Diagnosis:   Encounter Diagnoses   Name Primary?   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 MTx1)    SUBJECTIVE     Pt reports: "I feel better, it's a 3/10 but it comes and goes so that's a big improvement!"  She was compliant with home exercise program.  Response to previous treatment:no adverse effects  Functional change: ongoing     Pain: 3/10  Location: right neck      OBJECTIVE     Objective Measures updated at progress report unless specified.     Treatment     Jalyn received the treatments listed below:      therapeutic exercises to develop strength, endurance, ROM, flexibility and posture for  45 minutes  including:    · Seated Chin tucks  w/ scap retraciton  20x w/ 5" hold  · Supine chin tuck + head lift   3 x 15 w/ 5" hold  · No moneys RTB      3 x 15 w/ 5" hold  · Wall posture     3 x 1 min  · Rows GTB      3x15 w/ 5" hold  · Shoulder extensions GTB    3x15 w/ 5" hold   · Open books     2 x 15 B  · SA wall slides     4 x 5 w/ cues to avoid lumbar ext    Not Today:  Scap retractions at wall     20x w/ 5 "hold    manual therapy techniques: Joint mobilizations, soft tissue mobilization were applied to the: cervical spine for 10 minutes, including:  STM to " cervical paraspinals with elongation   Gentle sub occipital release  STM/MFR to bilateral Upper traps    Patient Education and Home Exercises     Home Exercises Provided and Patient Education Provided     Education provided:   - Updated home exercise program   - Instructed on use of theraband in door frame rather than door knob  - Reviewed log roll technique for supine <> sit and practiced same.    Written Home Exercises Provided: yes. Exercises were reviewed and Jalyn was able to demonstrate them prior to the end of the session.  Jalyn demonstrated good  understanding of the education provided. See EMR under Patient Instructions for exercises provided during evaland updated on 6/21/2022    ASSESSMENT   Jalyn showed good carry over from last session in her motor control during exercise and had improved subjective report. Myofascial restrictions noted cervical paraspinals and upper traps  Decreased after manual interventions.  Reports relief after manual therapy.  States no pain after treatment. She will benefit continued progressive strengthening of DNF and parascapular musculature.   Will progress as able.     Jalyn Is progressing well towards her goals.   Pt prognosis is Good.     Pt will continue to benefit from skilled outpatient physical therapy to address the deficits listed in the problem list box on initial evaluation, provide pt/family education and to maximize pt's level of independence in the home and community environment.     Pt's spiritual, cultural and educational needs considered and pt agreeable to plan of care and goals.     Anticipated barriers to physical therapy: chronicity, co-morbidities     Goals:   Short Term Goals (3 Weeks):   2. Pt will improve cervical SB AROM 5 deg to improve mobility for functional tasks. (Progressing, not met)  3. Pt will improve UE MMTs by 1/2 grade in all planes to improve strength for lifting.  (Progressing, not met)     Long Term Goals (6 Weeks):   1. Pt will  improve FOTO to </=28% limitation to improve perceived limitation with changing and maintaining mobility.  (Progressing, not met)  2. Pt will improve cervical rotation AROM to 70 deg bilateral  to improve mobility for driving.  (Progressing, not met)  3. Pt will improve UE MMTs 1 grade in all planes to improve strength for lifting and carrying tasks.  (Progressing, not met)  4. Pt will report no pain with oil painting for 1 hr.   (Progressing, not met)  5. Pt will report no pain with cervical AROM in all planes to promote unlimited functional mobility.   (Progressing, not met)       PLAN     Continue with established PT plan of care     Belinda Malcolm, PTA

## 2022-07-25 ENCOUNTER — OFFICE VISIT (OUTPATIENT)
Dept: PODIATRY | Facility: CLINIC | Age: 72
End: 2022-07-25
Payer: MEDICARE

## 2022-07-25 VITALS
BODY MASS INDEX: 20.88 KG/M2 | DIASTOLIC BLOOD PRESSURE: 70 MMHG | HEIGHT: 67 IN | WEIGHT: 133 LBS | HEART RATE: 70 BPM | SYSTOLIC BLOOD PRESSURE: 115 MMHG

## 2022-07-25 DIAGNOSIS — L60.0 INGROWN NAIL: ICD-10-CM

## 2022-07-25 DIAGNOSIS — Z09 FOLLOW-UP EXAM AFTER TREATMENT: Primary | ICD-10-CM

## 2022-07-25 PROCEDURE — 1101F PR PT FALLS ASSESS DOC 0-1 FALLS W/OUT INJ PAST YR: ICD-10-PCS | Mod: CPTII,S$GLB,, | Performed by: PODIATRIST

## 2022-07-25 PROCEDURE — 99999 PR PBB SHADOW E&M-EST. PATIENT-LVL III: CPT | Mod: PBBFAC,,, | Performed by: PODIATRIST

## 2022-07-25 PROCEDURE — 3008F BODY MASS INDEX DOCD: CPT | Mod: CPTII,S$GLB,, | Performed by: PODIATRIST

## 2022-07-25 PROCEDURE — 1157F ADVNC CARE PLAN IN RCRD: CPT | Mod: CPTII,S$GLB,, | Performed by: PODIATRIST

## 2022-07-25 PROCEDURE — 1126F PR PAIN SEVERITY QUANTIFIED, NO PAIN PRESENT: ICD-10-PCS | Mod: CPTII,S$GLB,, | Performed by: PODIATRIST

## 2022-07-25 PROCEDURE — 99024 POSTOP FOLLOW-UP VISIT: CPT | Mod: S$GLB,,, | Performed by: PODIATRIST

## 2022-07-25 PROCEDURE — 99999 PR PBB SHADOW E&M-EST. PATIENT-LVL III: ICD-10-PCS | Mod: PBBFAC,,, | Performed by: PODIATRIST

## 2022-07-25 PROCEDURE — 3074F SYST BP LT 130 MM HG: CPT | Mod: CPTII,S$GLB,, | Performed by: PODIATRIST

## 2022-07-25 PROCEDURE — 3008F PR BODY MASS INDEX (BMI) DOCUMENTED: ICD-10-PCS | Mod: CPTII,S$GLB,, | Performed by: PODIATRIST

## 2022-07-25 PROCEDURE — 1126F AMNT PAIN NOTED NONE PRSNT: CPT | Mod: CPTII,S$GLB,, | Performed by: PODIATRIST

## 2022-07-25 PROCEDURE — 4010F ACE/ARB THERAPY RXD/TAKEN: CPT | Mod: CPTII,S$GLB,, | Performed by: PODIATRIST

## 2022-07-25 PROCEDURE — 1159F PR MEDICATION LIST DOCUMENTED IN MEDICAL RECORD: ICD-10-PCS | Mod: CPTII,S$GLB,, | Performed by: PODIATRIST

## 2022-07-25 PROCEDURE — 1160F PR REVIEW ALL MEDS BY PRESCRIBER/CLIN PHARMACIST DOCUMENTED: ICD-10-PCS | Mod: CPTII,S$GLB,, | Performed by: PODIATRIST

## 2022-07-25 PROCEDURE — 1101F PT FALLS ASSESS-DOCD LE1/YR: CPT | Mod: CPTII,S$GLB,, | Performed by: PODIATRIST

## 2022-07-25 PROCEDURE — 99024 PR POST-OP FOLLOW-UP VISIT: ICD-10-PCS | Mod: S$GLB,,, | Performed by: PODIATRIST

## 2022-07-25 PROCEDURE — 3288F PR FALLS RISK ASSESSMENT DOCUMENTED: ICD-10-PCS | Mod: CPTII,S$GLB,, | Performed by: PODIATRIST

## 2022-07-25 PROCEDURE — 1159F MED LIST DOCD IN RCRD: CPT | Mod: CPTII,S$GLB,, | Performed by: PODIATRIST

## 2022-07-25 PROCEDURE — 3074F PR MOST RECENT SYSTOLIC BLOOD PRESSURE < 130 MM HG: ICD-10-PCS | Mod: CPTII,S$GLB,, | Performed by: PODIATRIST

## 2022-07-25 PROCEDURE — 3288F FALL RISK ASSESSMENT DOCD: CPT | Mod: CPTII,S$GLB,, | Performed by: PODIATRIST

## 2022-07-25 PROCEDURE — 3078F DIAST BP <80 MM HG: CPT | Mod: CPTII,S$GLB,, | Performed by: PODIATRIST

## 2022-07-25 PROCEDURE — 4010F PR ACE/ARB THEARPY RXD/TAKEN: ICD-10-PCS | Mod: CPTII,S$GLB,, | Performed by: PODIATRIST

## 2022-07-25 PROCEDURE — 3078F PR MOST RECENT DIASTOLIC BLOOD PRESSURE < 80 MM HG: ICD-10-PCS | Mod: CPTII,S$GLB,, | Performed by: PODIATRIST

## 2022-07-25 PROCEDURE — 1160F RVW MEDS BY RX/DR IN RCRD: CPT | Mod: CPTII,S$GLB,, | Performed by: PODIATRIST

## 2022-07-25 PROCEDURE — 1157F PR ADVANCE CARE PLAN OR EQUIV PRESENT IN MEDICAL RECORD: ICD-10-PCS | Mod: CPTII,S$GLB,, | Performed by: PODIATRIST

## 2022-07-25 NOTE — PROGRESS NOTES
"  Chief Complaint   Patient presents with    Ingrown Toenail     Ingrown Toenail         S:   72 y.o. female returns for follow up s/p ingrown toenail procedure - right 2nd toe.   Patient is healing well, no complaints.  The patient has been keeping the toe covered as instructed.   Patient has no pain today. Patient denies constitutional symptoms.         O:   Vitals:    07/25/22 1135   BP: 115/70   Pulse: 70   Weight: 60.3 kg (133 lb)   Height: 5' 7" (1.702 m)        S/p  right  2nd toe ingrown toenail matrixectomy. no erythema;  no ascending cellulitis.   no swelling. No drainage.    The site is healing well. No signs of infection.   Pedal pulses are palpable.   Range of motion intact.   no  tenderness to palpation.             A/P:     1. Follow-up exam after treatment     2. Ingrown nail          S/p right 2nd toe ingrown toenail chemical matrixectomy.  Patient is healing well.      Return to regular activities as tolerated.     Call or return to clinic prn if these symptoms worsen or fail to improve as anticipated.     "

## 2022-07-28 ENCOUNTER — DOCUMENTATION ONLY (OUTPATIENT)
Dept: REHABILITATION | Facility: HOSPITAL | Age: 72
End: 2022-07-28
Payer: MEDICARE

## 2022-07-28 ENCOUNTER — TELEPHONE (OUTPATIENT)
Dept: NEUROSURGERY | Facility: CLINIC | Age: 72
End: 2022-07-28
Payer: MEDICARE

## 2022-07-28 DIAGNOSIS — M48.061 SPINAL STENOSIS OF LUMBAR REGION, UNSPECIFIED WHETHER NEUROGENIC CLAUDICATION PRESENT: ICD-10-CM

## 2022-07-28 DIAGNOSIS — M48.02 CERVICAL STENOSIS OF SPINAL CANAL: Primary | ICD-10-CM

## 2022-07-28 NOTE — PROGRESS NOTES
Physical therapist and physical therapy assistant(s) met face to face to discuss patient's treatment plan and progress towards established goals. Pt will be seen by a physical therapist minimally every 6th visit or every 30 days.    ADRIAN VILLASENOR, PT, DPT    Belinda Malcolm, PTA    ANNA VILLARREAL, PTA

## 2022-07-28 NOTE — TELEPHONE ENCOUNTER
----- Message from López Leslie sent at 7/28/2022  3:52 PM CDT -----  Contact: pt  Type: Requesting to speak with nurse        Who Called: PT  Regarding: would like a call back regarding  appt   Would the patient rather a call back or a response via [facility]? Call back  Best Call Back Number: [phone]  Additional Information:

## 2022-07-29 ENCOUNTER — PATIENT MESSAGE (OUTPATIENT)
Dept: SPINE | Facility: CLINIC | Age: 72
End: 2022-07-29
Payer: MEDICARE

## 2022-07-29 ENCOUNTER — CLINICAL SUPPORT (OUTPATIENT)
Dept: REHABILITATION | Facility: HOSPITAL | Age: 72
End: 2022-07-29
Payer: MEDICARE

## 2022-07-29 ENCOUNTER — DOCUMENTATION ONLY (OUTPATIENT)
Dept: REHABILITATION | Facility: OTHER | Age: 72
End: 2022-07-29
Attending: INTERNAL MEDICINE
Payer: MEDICARE

## 2022-07-29 ENCOUNTER — PES CALL (OUTPATIENT)
Dept: ADMINISTRATIVE | Facility: CLINIC | Age: 72
End: 2022-07-29
Payer: MEDICARE

## 2022-07-29 DIAGNOSIS — R29.898 DECREASED STRENGTH OF UPPER EXTREMITY: ICD-10-CM

## 2022-07-29 DIAGNOSIS — R29.898 DECREASED ROM OF NECK: Primary | ICD-10-CM

## 2022-07-29 PROCEDURE — 97110 THERAPEUTIC EXERCISES: CPT | Mod: PN,CQ

## 2022-07-29 PROCEDURE — 97140 MANUAL THERAPY 1/> REGIONS: CPT | Mod: PN,CQ

## 2022-07-29 NOTE — PROGRESS NOTES
Health  Wellness Visit Note    Name: Jalyn Solares [facility]  Clinic Number: [id number]  Physician: Bere Myers MD  Diagnosis: No diagnosis found.  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    Left    AV block     exercised induced 2:1    Cataract     Fever blister     Heart block     History of acne     Joint pain     hands    Keratoconjunctivitis sicca not due to Sjogren's syndrome     Pacemaker      Visit Number: 34  Precautions: standard; pacemaker      1st PT visit:  1/21/22  Year of care end date: 1/21/23  6 Month test  Performed: July 2022  12 Month test performed: Jan 2023  Mind body plan: Plan A 8 months  Patient level: B    Time In: 12:25 PM  Time Out: 1:06 PM  Total Treatment Time: 41 minutes    Wellness Vision 2022  Handout on this week's wellness topic Self-Dating was provided along with a discussion on what it means, the benefits, and suggestions for practice.  Reviewed last week's topic of Laughter      Subjective:   Patient reports that her back feels pretty good today; she is continuing to make progress with cervical rehabilitation at [place]. Pt stretches and ices regularly. For exercise, she typically does yoga, works out at the gym at least once per week and walks twice per day.     Objective:   Jalyn Solares completed therapeutic stretches (EIL, KATHY) and the following MedX exercise machines: core lumbar, torso rotation l/r, leg extension, leg curl, upright row, chest press, biceps curl, triceps extension, leg press    See exercise log in patient folder for rate of exertion and repetitions completed.       Fitness Machine Education Key:  E=education on equipment initiated and further follow up and education needed  I=independent with  and exercise.  The patient:   Adjusts machines to his/her settings   Uses equipment levers, pins, weights safely   Maintains safe and correct posture while  exercising   Moves through exercise with correct pace and control   Gets on and off equipment safely      Core lumbar strength E Core Torso Rotation E Leg Press E   Leg Extension E Seated Leg Curl E Chest Press E   Row E Abductor E Hip ADD X   Triceps  E Biceps E Other: X       Assessment:   Patient tolerated Patient tolerated MedX Core Lumbar Strength and all other peripheral exercises without an increase in symptoms. Patient warmed up on the treadmill for 5 minutes, stretched, and iced low back for 5 minutes after the workout.    Plan:  Continue with established plan of care towards wellness goals.     Health  : Laura Lynch  7/29/2022

## 2022-08-01 ENCOUNTER — OFFICE VISIT (OUTPATIENT)
Dept: SPINE | Facility: CLINIC | Age: 72
End: 2022-08-01
Payer: MEDICARE

## 2022-08-01 VITALS
SYSTOLIC BLOOD PRESSURE: 138 MMHG | DIASTOLIC BLOOD PRESSURE: 74 MMHG | RESPIRATION RATE: 17 BRPM | BODY MASS INDEX: 20.87 KG/M2 | HEIGHT: 67 IN | HEART RATE: 64 BPM | WEIGHT: 132.94 LBS

## 2022-08-01 DIAGNOSIS — M79.10 MYALGIA: Primary | ICD-10-CM

## 2022-08-01 PROCEDURE — 99999 PR PBB SHADOW E&M-EST. PATIENT-LVL III: ICD-10-PCS | Mod: PBBFAC,,, | Performed by: ANESTHESIOLOGY

## 2022-08-01 PROCEDURE — 3075F SYST BP GE 130 - 139MM HG: CPT | Mod: CPTII,S$GLB,, | Performed by: ANESTHESIOLOGY

## 2022-08-01 PROCEDURE — 1157F ADVNC CARE PLAN IN RCRD: CPT | Mod: CPTII,S$GLB,, | Performed by: ANESTHESIOLOGY

## 2022-08-01 PROCEDURE — 1157F PR ADVANCE CARE PLAN OR EQUIV PRESENT IN MEDICAL RECORD: ICD-10-PCS | Mod: CPTII,S$GLB,, | Performed by: ANESTHESIOLOGY

## 2022-08-01 PROCEDURE — 1159F PR MEDICATION LIST DOCUMENTED IN MEDICAL RECORD: ICD-10-PCS | Mod: CPTII,S$GLB,, | Performed by: ANESTHESIOLOGY

## 2022-08-01 PROCEDURE — 3288F FALL RISK ASSESSMENT DOCD: CPT | Mod: CPTII,S$GLB,, | Performed by: ANESTHESIOLOGY

## 2022-08-01 PROCEDURE — 3008F BODY MASS INDEX DOCD: CPT | Mod: CPTII,S$GLB,, | Performed by: ANESTHESIOLOGY

## 2022-08-01 PROCEDURE — 3288F PR FALLS RISK ASSESSMENT DOCUMENTED: ICD-10-PCS | Mod: CPTII,S$GLB,, | Performed by: ANESTHESIOLOGY

## 2022-08-01 PROCEDURE — 4010F ACE/ARB THERAPY RXD/TAKEN: CPT | Mod: CPTII,S$GLB,, | Performed by: ANESTHESIOLOGY

## 2022-08-01 PROCEDURE — 1160F PR REVIEW ALL MEDS BY PRESCRIBER/CLIN PHARMACIST DOCUMENTED: ICD-10-PCS | Mod: CPTII,S$GLB,, | Performed by: ANESTHESIOLOGY

## 2022-08-01 PROCEDURE — 99214 PR OFFICE/OUTPT VISIT, EST, LEVL IV, 30-39 MIN: ICD-10-PCS | Mod: S$GLB,,, | Performed by: ANESTHESIOLOGY

## 2022-08-01 PROCEDURE — 1160F RVW MEDS BY RX/DR IN RCRD: CPT | Mod: CPTII,S$GLB,, | Performed by: ANESTHESIOLOGY

## 2022-08-01 PROCEDURE — 1159F MED LIST DOCD IN RCRD: CPT | Mod: CPTII,S$GLB,, | Performed by: ANESTHESIOLOGY

## 2022-08-01 PROCEDURE — 1101F PR PT FALLS ASSESS DOC 0-1 FALLS W/OUT INJ PAST YR: ICD-10-PCS | Mod: CPTII,S$GLB,, | Performed by: ANESTHESIOLOGY

## 2022-08-01 PROCEDURE — 1125F PR PAIN SEVERITY QUANTIFIED, PAIN PRESENT: ICD-10-PCS | Mod: CPTII,S$GLB,, | Performed by: ANESTHESIOLOGY

## 2022-08-01 PROCEDURE — 1125F AMNT PAIN NOTED PAIN PRSNT: CPT | Mod: CPTII,S$GLB,, | Performed by: ANESTHESIOLOGY

## 2022-08-01 PROCEDURE — 1101F PT FALLS ASSESS-DOCD LE1/YR: CPT | Mod: CPTII,S$GLB,, | Performed by: ANESTHESIOLOGY

## 2022-08-01 PROCEDURE — 99999 PR PBB SHADOW E&M-EST. PATIENT-LVL III: CPT | Mod: PBBFAC,,, | Performed by: ANESTHESIOLOGY

## 2022-08-01 PROCEDURE — 3078F PR MOST RECENT DIASTOLIC BLOOD PRESSURE < 80 MM HG: ICD-10-PCS | Mod: CPTII,S$GLB,, | Performed by: ANESTHESIOLOGY

## 2022-08-01 PROCEDURE — 3075F PR MOST RECENT SYSTOLIC BLOOD PRESS GE 130-139MM HG: ICD-10-PCS | Mod: CPTII,S$GLB,, | Performed by: ANESTHESIOLOGY

## 2022-08-01 PROCEDURE — 4010F PR ACE/ARB THEARPY RXD/TAKEN: ICD-10-PCS | Mod: CPTII,S$GLB,, | Performed by: ANESTHESIOLOGY

## 2022-08-01 PROCEDURE — 3078F DIAST BP <80 MM HG: CPT | Mod: CPTII,S$GLB,, | Performed by: ANESTHESIOLOGY

## 2022-08-01 PROCEDURE — 3008F PR BODY MASS INDEX (BMI) DOCUMENTED: ICD-10-PCS | Mod: CPTII,S$GLB,, | Performed by: ANESTHESIOLOGY

## 2022-08-01 PROCEDURE — 99214 OFFICE O/P EST MOD 30 MIN: CPT | Mod: S$GLB,,, | Performed by: ANESTHESIOLOGY

## 2022-08-01 NOTE — PROGRESS NOTES
PCP: Annemarie Kligore MD    REFERRING PHYSICIAN: No ref. provider found    CHIEF COMPLAINT: Right neck pain    Original HISTORY OF PRESENT ILLNESS: Jalyn Aaron presents to the clinic for the evaluation of the above pain. The pain started about 6 weeks ago after no particular event.     Original Pain Description:  The pain is located in the right side of the neck and does not radiate. The pain is described as aching. Exacerbating factors: twisting neck. Mitigating factors ice. Symptoms interfere with daily activity. The patient feels like symptoms have been unchanged.     Original PAIN SCORES:  Best: Pain is 2  Worst: Pain is 6  Usually: Pain is 4  Current: Pain is 4    INTERVAL HISTORY:   Interval History 8/1/22: Ms. Aaron follows up today for TPI. She has been continuing PT.  She says she feels that the physical therapy has been helping the pain, saying that she used to have pain all the time but now it's not all the time and she feels with therapy she's gotten around 20% relief of pain. She says the pain is aching and is worse with turning the head to the right and looking up, is located on the right side of her neck, and on the worst day is a 6/10, today the pain is 4/10, and the best pain is 0/10. She notes no numbness/tingling down her upper extremities and no weakness of her upper extremities. She reports no other back pain. She notes she is still taking naproxen and gabapentin, and uses cold packs for relief of pain.    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    Left    AV block     exercised induced 2:1    Cataract     Fever blister     Heart  block     History of acne     Joint pain     hands    Keratoconjunctivitis sicca not due to Sjogren's syndrome     Pacemaker      Past Surgical History:   Procedure Laterality Date    BREAST BIOPSY Left 12/2013    papilloma with atypia on core biopsy    BREAST BIOPSY Left 01/2014    Ex.Bx., removal of ADH/Papilloma    COLONOSCOPY N/A 11/8/2016    Procedure: COLONOSCOPY;  Surgeon: Dl Caruso MD;  [facility]  Service: Endoscopy;  Laterality: N/A;  Pacemaker in place.    COLONOSCOPY N/A 3/30/2021    Procedure: COLONOSCOPY;  Surgeon: Joaquin Johnson MD;  [facility]  Service: Endoscopy;  Laterality: N/A;  prep ins. emailed - COVID screening on 3/27/21 PCW - ERW    HYSTERECTOMY      INSERT / REPLACE / REMOVE PACEMAKER      LASIK      LASIK Bilateral 2009     Social History     Socioeconomic History    Marital status:     Number of children: 1   Occupational History    Occupation:      Employer: [employer]   Tobacco Use    Smoking status: Never Smoker    Smokeless tobacco: Never Used   Substance and Sexual Activity    Alcohol use: Yes     Alcohol/week: 0.0 standard drinks     Types: 2 Glasses of wine per week     Comment: socially    Drug use: No    Sexual activity: Not Currently     Partners: Male     Birth control/protection: None   Other Topics Concern    Are you pregnant or think you may be? No    Breast-feeding No   Social History Narrative     (case management) at [employer]     Family History   Problem Relation Age of Onset    Cancer Mother 80        pagets    Cataracts Mother     Hypertension Mother     Breast cancer Mother     Multiple sclerosis Father     Multiple sclerosis Sister     No Known Problems Brother     No Known Problems Maternal Aunt     No Known Problems Maternal Uncle     No Known Problems Paternal Aunt     No Known Problems Paternal Uncle     No Known Problems Maternal  Grandmother     Prostate cancer Maternal Grandfather     No Known Problems Paternal Grandmother     No Known Problems Paternal Grandfather     No Known Problems Son     No Known Problems Son     Amblyopia Neg Hx     Blindness Neg Hx     Diabetes Neg Hx     Glaucoma Neg Hx     Macular degeneration Neg Hx     Retinal detachment Neg Hx     Strabismus Neg Hx     Stroke Neg Hx     Thyroid disease Neg Hx     Melanoma Neg Hx     Ovarian cancer Neg Hx        Review of patient's allergies indicates:   Allergen Reactions    Bactrim [sulfamethoxazole-trimethoprim] Nausea Only       Current Outpatient Medications   Medication Sig    carvediloL (COREG) 3.125 MG tablet TAKE 1 TABLET(3.125 MG) BY MOUTH TWICE DAILY WITH MEALS    gabapentin (NEURONTIN) 600 MG tablet Take 1.5 tablets (900 mg total) by mouth 3 (three) times daily.    linaCLOtide (LINZESS) 72 mcg Cap capsule Take 1 capsule (72 mcg total) by mouth before breakfast.    losartan (COZAAR) 25 MG tablet TAKE 1 TABLET BY MOUTH TWICE DAILY    multivitamin capsule Take 1 capsule by mouth once daily.    naproxen (NAPROSYN) 500 MG tablet Half tablet once daily    senna (SENOKOT) 8.6 mg tablet Take 1 tablet by mouth once daily. Twice a day    urea 20 % Crea Apply 1 application topically once daily. To dry skin on the feet.    vitamin D 1000 units Tab Take 1,000 Units by mouth once daily.     No current facility-administered medications for this visit.       ROS:  GENERAL: No fever. No chills. No fatigue. Denies weight loss. Denies weight gain.  HEENT: Denies headaches. Denies vision change. Denies eye pain. Denies double vision. Denies ear pain.   CV: Denies chest pain.   PULM: Denies of shortness of breath.  GI: Denies constipation. No diarrhea. No abdominal pain. Denies nausea. Denies vomiting. No blood in stool.  HEME: Denies bleeding problems.  : Denies urgency. No painful urination. No blood in urine.  MS: Denies joint stiffness. Denies joint  "swelling.  Denies back pain.  SKIN: Denies rash.   NEURO: Denies seizures. No weakness.  PSYCH:  Denies difficulty sleeping. No anxiety. Denies depression. No suicidal thoughts.       VITALS:   Vitals:    08/01/22 0956   BP: 138/74   Pulse: 64   Resp: 17   Weight: 60.3 kg (132 lb 15 oz)   Height: 5' 7" (1.702 m)   PainSc:   3         PHYSICAL EXAM:   GENERAL: Well appearing, in no acute distress, alert and oriented x3.  PSYCH:  Mood and affect appropriate.  SKIN: Skin color, texture, turgor normal, no rashes or lesions.  HEENT:  Normocephalic, atraumatic. Cranial nerves grossly intact.  NECK: Limited ROM. Slow/limited rotation secondary to pain. No pain over facets, no midline spinal tenderness. Pain reproduced in right trapezius muscle.   PULM: No evidence of respiratory difficulty, symmetric chest rise.  GI:  Non-distended  BACK: Normal range of motion. No pain to palpation over the spinous processes. No pain to palpation over facet joints. There is no pain with palpation over the sacroiliac joints bilaterally.   EXTREMITIES: No deformities, edema, or skin discoloration.   MUSCULOSKELETAL: Shoulder, hip, and knee provocative maneuvers are negative. Bilateral upper and lower extremity strength is normal and symmetric. No atrophy is noted.  NEURO: Sensation is equal and appropriate bilaterally. Bilateral upper and lower extremity coordination and muscle stretch reflexes are physiologic and symmetric. Plantar response are downgoing.   GAIT: normal.      LABS:      IMAGING:    XR CERVICAL SPINE AP LAT WITH FLEX EXTEN     CLINICAL HISTORY:  Cervicalgia     TECHNIQUE:  Three views of the cervical spine plus flexion and extension views were performed.     COMPARISON:  Cervical spine radiographs from 09/03/2013.     FINDINGS:  The cervical spine is visualized from the craniocervical junction to the inferior endplate of C7 on the lateral view.     There is no acute fracture or subluxation of the cervical spine.  The " vertebral body heights are preserved.     At C3-C4 there is anterolisthesis measuring 3 mm on flexion, 0 mm on extension, and 1 mm on neutral.     At C4-C5 there is anterolisthesis measuring 4 mm on flexion, 2 mm on extension, and 2 mm on neutral.     At C7-T1 there is mild fixed anterolisthesis.     There is straightening of the usual cervical lordosis.     There is moderate disc height loss at C5-C6, C6-C7, and C7-T1.  There is multilevel bilateral facet arthropathy and uncovertebral joint spurring.     The remaining visualized osseous structures are intact.     Prevertebral soft tissues are within normal limits.     Partially imaged cardiac pacer and leads.     Impression:     No acute osseous abnormality of the cervical spine.     Anterolisthesis at C3-C4, C4-C5, and C7-T1 with measurements on neutral, flexion, and extension as detailed above.        Electronically signed by: Porter Valdes  Date:                                            05/27/2022  Time:                                           11:02    ASSESSMENT: 72 y.o. year old female with pain, consistent with myalgia.     DISCUSSION: Ms. Balbuena comes to us with right sided neck pain after no particular event. Xray does show some significant DDD, but her pain is reproduced with palpation over the right trapezius muscle. She is seeing Dr. Segura and they are planning advanced imaging. She has a [employer] pacemaker that she doesn't think is MRI compatible. They are planning to change out her IPG soon due to end of life.     PLAN:  1. Continue neck PT  2. Did TPI in office today. Follow up in 4 weeks to assess progress  3. She is holding off on cervical myelogram at this time. She will see [facility] in August 4. We will follow up with her after TPI to see if any more is done    Marisel Mary  08/01/2022     Patient Name: Jalyn Aaron  MRN: [id number]    INFORMED CONSENT: The procedure, risks, benefits and options were discussed with patient. There are  no contraindications to the procedure. The patient expressed understanding and agreed to proceed. The personnel performing the procedure was discussed. I verify that I personally obtained Jalyn Aaron's consent prior to the start of the procedure and the signed consent can be found on the patient's chart.    Procedure Date: 08/01/2022    Anesthesia: None    Pre Procedure diagnosis: M79.1 Myalgia    Post-Procedure diagnosis: same    Sedation: None    PROCEDURE: Right Rhomboid TRIGGER POINT INJECTION  The patient was placed in a seated position and time out was perfomed. The patient's  trigger points were identified and marked within each muscle. The skin was prepped with chlorhexidine three times.  The muscle was grasped between the thumb and forefinger and a 25-gauge 1 inch  needle was advanced through the skin and subcutaneous tissues and into the muscle at each location. Aspiration for blood, air and CSF was negative.  A total of 4 ml of Bupivacaine 0.25% and 20mg Kenalog was divided among the trigger points. The needle was removed intact each time and bleeding was nil. No complications were evident.     Angelika Weeks MD  08/01/2022;i

## 2022-08-05 ENCOUNTER — CLINICAL SUPPORT (OUTPATIENT)
Dept: REHABILITATION | Facility: HOSPITAL | Age: 72
End: 2022-08-05
Payer: MEDICARE

## 2022-08-05 DIAGNOSIS — R29.898 DECREASED STRENGTH OF UPPER EXTREMITY: ICD-10-CM

## 2022-08-05 DIAGNOSIS — R29.898 DECREASED ROM OF NECK: Primary | ICD-10-CM

## 2022-08-05 PROCEDURE — 97110 THERAPEUTIC EXERCISES: CPT | Mod: KX,PN

## 2022-08-05 NOTE — PROGRESS NOTES
"[facility]   Physical Therapy Treatment Note     Name: Jalyn Aaron  Clinic Number: [id number]    Therapy Diagnosis:   Encounter Diagnoses   Name Primary?   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  Response to previous treatment: relief for a few hours  Functional change: ongoing     Pain: 3/10  Location: right neck      OBJECTIVE     Objective Measures updated at progress report unless specified.     Treatment     Jalyn received the treatments listed below:      therapeutic exercises to develop strength, endurance, ROM, flexibility and posture for  55 minutes  including:    · UBE Lv 2.0 3 min fwd/ 3 min bkwd for increased CV endurance and postural stability w/ UE mvmt  · Supine chin tuck + head lift   2 x 10 w/ 10" hold  · D2 Flexion, YTB     2 x 10 ea  · Prone row      10x w/ 5" hold- d/c next for Ts  · Prone T      2 x 10 w/ 5" hold  · Rows BTB      4 x 12 w/ 5" hold  · Shoulder extensions GTB    4 x 15 w/ 5" hold - NP  · SA wall slides     4 x 8 w/ cues to avoid lumbar ext      manual therapy techniques: Joint mobilizations, soft tissue mobilization " were applied to the: cervical spine for 00 minutes, including:  STM to cervical paraspinals with elongation   Gentle sub occipital release  STM/MFR to bilateral Upper traps    Patient Education and Home Exercises     Home Exercises Provided and Patient Education Provided     Education provided:   - Updated home exercise program   - Instructed on use of theraband in door frame rather than door knob  - Reviewed log roll technique for supine <> sit and practiced same.    Written Home Exercises Provided: yes. Exercises were reviewed and Jalyn was able to demonstrate them prior to the end of the session.  Jalyn demonstrated good  understanding of the education provided. See EMR under Patient Instructions for exercises provided during evaland updated on 6/21/2022    ASSESSMENT   Jalyn shows continued improvement in DNF endurance and parascapular strength with appropriate fatigue after advancements today. She will benefit continued progression of stability and strengthening as tolerable.     Jalyn Is progressing well towards her goals.   Pt prognosis is Good.     Pt will continue to benefit from skilled outpatient physical therapy to address the deficits listed in the problem list box on initial evaluation, provide pt/family education and to maximize pt's level of independence in the home and community environment.     Pt's spiritual, cultural and educational needs considered and pt agreeable to plan of care and goals.     Anticipated barriers to physical therapy: chronicity, co-morbidities     Goals:   Short Term Goals (3 Weeks):   2. Pt will improve cervical SB AROM 5 deg to improve mobility for functional tasks. (Progressing, not met)  3. Pt will improve UE MMTs by 1/2 grade in all planes to improve strength for lifting.  (Progressing, not met)     Long Term Goals (6 Weeks):   1. Pt will improve FOTO to </=28% limitation to improve perceived limitation with changing and maintaining mobility.  (Progressing, not met)  2.  Pt will improve cervical rotation AROM to 70 deg bilateral  to improve mobility for driving.  (Progressing, not met)  3. Pt will improve UE MMTs 1 grade in all planes to improve strength for lifting and carrying tasks.  (Progressing, not met)  4. Pt will report no pain with oil painting for 1 hr.   (Progressing, not met)  5. Pt will report no pain with cervical AROM in all planes to promote unlimited functional mobility.   (Progressing, not met)       PLAN     Continue with established PT plan of care     Shanice Valente, PT, DPT

## 2022-08-18 ENCOUNTER — DOCUMENTATION ONLY (OUTPATIENT)
Dept: REHABILITATION | Facility: OTHER | Age: 72
End: 2022-08-18
Attending: INTERNAL MEDICINE
Payer: MEDICARE

## 2022-08-18 NOTE — PROGRESS NOTES
Health  Wellness Visit Note    Name: Jalyn Solares [facility]  Clinic Number: [id number]  Physician: Bere Myers MD  Diagnosis: No diagnosis found.  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    Left    AV block     exercised induced 2:1    Cataract     Fever blister     Heart block     History of acne     Joint pain     hands    Keratoconjunctivitis sicca not due to Sjogren's syndrome     Pacemaker      Visit Number: 36  Precautions: standard; pacemaker      1st PT visit:  1/21/22  Year of care end date: 1/21/23  6 Month test  Performed: July 2022  12 Month test performed: Jan 2023  Mind body plan: Plan A 8 months  Patient level: B    Time In: 2:20 PM  Time Out: 3:00 PM  Total Treatment Time: 40 minutes    Wellness Vision 2022  Handout on this week's wellness topic Stages of Change was provided along with a discussion on what it means, the benefits, and suggestions for practice.  Reviewed last week's topic of n/a     Subjective:   Patient reports that her back feels good today; chief complaint is neck pain, but states that she has made progress in cervical PT at [facility]. Pt stretches daily and ices regularly. For exercise, she typically does yoga, works out at the gym at least once per week and walks twice per day.     Objective:   Jalyn Solares completed therapeutic stretches (EIL, KATHY) and the following MedX exercise machines: core lumbar, torso rotation l/r, leg extension, leg curl, upright row, chest press, biceps curl, triceps extension, leg press    See exercise log in patient folder for rate of exertion and repetitions completed.       Fitness Machine Education Key:  E=education on equipment initiated and further follow up and education needed  I=independent with  and exercise.  The patient:   Adjusts machines to his/her settings   Uses equipment levers, pins, weights safely   Maintains safe and correct posture while  exercising   Moves through exercise with correct pace and control   Gets on and off equipment safely      Core lumbar strength E Core Torso Rotation E Leg Press E   Leg Extension E Seated Leg Curl E Chest Press E   Row E Abductor E Hip ADD X   Triceps  E Biceps E Other: X       Assessment:   Patient tolerated Patient tolerated MedX Core Lumbar Strength and all other peripheral exercises without an increase in symptoms. Patient warmed up on the treadmill for 5 minutes, stretched, and iced low back for 5 minutes after the workout.    Plan:  Continue with established plan of care towards wellness goals.     Health  : Laura Lynch  8/18/2022

## 2022-08-19 ENCOUNTER — CLINICAL SUPPORT (OUTPATIENT)
Dept: REHABILITATION | Facility: HOSPITAL | Age: 72
End: 2022-08-19
Payer: MEDICARE

## 2022-08-19 DIAGNOSIS — R29.898 DECREASED ROM OF NECK: Primary | ICD-10-CM

## 2022-08-19 DIAGNOSIS — R29.898 DECREASED STRENGTH OF UPPER EXTREMITY: ICD-10-CM

## 2022-08-19 PROCEDURE — 97110 THERAPEUTIC EXERCISES: CPT | Mod: KX,PN

## 2022-08-19 NOTE — PROGRESS NOTES
"[facility]   Physical Therapy Treatment Note     Name: Jalyn Aaron  Clinic Number: [id number]    Therapy Diagnosis:   Encounter Diagnoses   Name Primary?   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  Response to previous treatment: relief for a few hours  Functional change: ongoing     Pain: 3/10  Location: right neck      OBJECTIVE     Objective Measures updated at progress report unless specified.     Treatment     Jalyn received the treatments listed below:      therapeutic exercises to develop strength, endurance, ROM, flexibility and posture for 60 minutes  including:    · UBE Lv 2.0 6 min fwd only for increased CV endurance and postural stability w/ UE mvmt  · Supine chin tuck + head lift   3 x 10 w/ 10" hold  · D2 Flexion, RTB     3 x 10 ea  · Prone T      3 x 10 w/ 5" hold  · Rows BTB      4 x 15 w/ 5" hold  · SA wall slides     2 x 10 w/ cues to avoid lumbar ext      manual therapy techniques: Joint mobilizations, soft tissue mobilization were applied to the: cervical spine for 00 minutes, including:  STM to cervical paraspinals with elongation   Gentle sub occipital release  STM/MFR to bilateral Upper " traps    Patient Education and Home Exercises     Home Exercises Provided and Patient Education Provided     Education provided:   - cont HEP    Written Home Exercises Provided: yes. Exercises were reviewed and Jalyn was able to demonstrate them prior to the end of the session.  Jalyn demonstrated good  understanding of the education provided. See EMR under Patient Instructions for exercises provided during evaland updated on 6/21/2022    ASSESSMENT   Jalyn is continuing to progress in DNF and parascapular strengthening. She is appropriately fatigued by these exercises and tolerated all advancements well. She will benefit continued loading to increase motor control and muscle hypertrophy to improve cervical stability.     Jalyn Is progressing well towards her goals.   Pt prognosis is Good.     Pt will continue to benefit from skilled outpatient physical therapy to address the deficits listed in the problem list box on initial evaluation, provide pt/family education and to maximize pt's level of independence in the home and community environment.     Pt's spiritual, cultural and educational needs considered and pt agreeable to plan of care and goals.     Anticipated barriers to physical therapy: chronicity, co-morbidities     Goals:   Short Term Goals (3 Weeks):   2. Pt will improve cervical SB AROM 5 deg to improve mobility for functional tasks. (Progressing, not met)  3. Pt will improve UE MMTs by 1/2 grade in all planes to improve strength for lifting.  (Progressing, not met)     Long Term Goals (6 Weeks):   1. Pt will improve FOTO to </=28% limitation to improve perceived limitation with changing and maintaining mobility.  (Progressing, not met)  2. Pt will improve cervical rotation AROM to 70 deg bilateral  to improve mobility for driving.  (Progressing, not met)  3. Pt will improve UE MMTs 1 grade in all planes to improve strength for lifting and carrying tasks.  (Progressing, not met)  4. Pt will report no  pain with oil painting for 1 hr.   (Progressing, not met)  5. Pt will report no pain with cervical AROM in all planes to promote unlimited functional mobility.   (Progressing, not met)       PLAN     Continue with established PT plan of care     Shanice Valente, PT, DPT

## 2022-08-22 ENCOUNTER — CLINICAL SUPPORT (OUTPATIENT)
Dept: REHABILITATION | Facility: HOSPITAL | Age: 72
End: 2022-08-22
Payer: MEDICARE

## 2022-08-22 DIAGNOSIS — R29.898 DECREASED STRENGTH OF UPPER EXTREMITY: ICD-10-CM

## 2022-08-22 DIAGNOSIS — R29.898 DECREASED ROM OF NECK: Primary | ICD-10-CM

## 2022-08-22 PROCEDURE — 97110 THERAPEUTIC EXERCISES: CPT | Mod: KX,PN

## 2022-08-22 NOTE — PROGRESS NOTES
"[facility]   Physical Therapy Treatment Note     Name: Jalyn Aaron  Clinic Number: [id number]    Therapy Diagnosis:   Encounter Diagnoses   Name Primary?   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  Response to previous treatment: relief for a few hours  Functional change: ongoing     Pain: 0/10 (at rest) 3/10 (with occasional movement)  Location: right neck      OBJECTIVE     Objective Measures updated at progress report unless specified.     Treatment     Jalyn received the treatments listed below:      therapeutic exercises to develop strength, endurance, ROM, flexibility and posture for 60 minutes  Including updated POC measures:    · UBE Lv 2.0 5 min fwd only for increased CV endurance and postural stability w/ UE mvmt  · Supine chin tuck + head lift   3 x 12 w/ 10" hold  · D2 Flexion, GTB     3 x 10 ea  · Prone T      3 x 10 w/ 5" hold  · Rows BTB      4 x 15 w/ 5" hold- NP  · SA wall slides     2 x 10 w/ cues to avoid lumbar ext      manual therapy techniques: Joint mobilizations, soft tissue mobilization were applied to the: cervical spine for 00 minutes, including:  STM to cervical paraspinals " with elongation   Gentle sub occipital release  STM/MFR to bilateral Upper traps    Patient Education and Home Exercises     Home Exercises Provided and Patient Education Provided     Education provided:   - cont HEP    Written Home Exercises Provided: yes. Exercises were reviewed and Jalyn was able to demonstrate them prior to the end of the session.  Jalyn demonstrated good  understanding of the education provided. See EMR under Patient Instructions for exercises provided during evaland updated on 6/21/2022    ASSESSMENT   See updated POC     Jalyn Is progressing well towards her goals.   Pt prognosis is Good.     Pt will continue to benefit from skilled outpatient physical therapy to address the deficits listed in the problem list box on initial evaluation, provide pt/family education and to maximize pt's level of independence in the home and community environment.     Pt's spiritual, cultural and educational needs considered and pt agreeable to plan of care and goals.     Anticipated barriers to physical therapy: chronicity, co-morbidities     Goals:   Short Term Goals (3 Weeks):   2. Pt will improve cervical SB AROM 5 deg to improve mobility for functional tasks. MET 8/22/22  3. Pt will improve UE MMTs by 1/2 grade in all planes to improve strength for lifting.  MET 8/22/22     Long Term Goals (6 Weeks):   1. Pt will improve FOTO to </=28% limitation to improve perceived limitation with changing and maintaining mobility.  (Progressing, not met)  2. Pt will improve cervical rotation AROM to 70 deg bilateral  to improve mobility for driving.  (Progressing, not met)  3. Pt will improve UE MMTs 1 grade in all planes to improve strength for lifting and carrying tasks.  (Progressing, not met)  4. Pt will report no pain with oil painting for 1 hr.   (Progressing, not met)  5. Pt will report no pain with cervical AROM in all planes to promote unlimited functional mobility.   (Progressing, not met)    PLAN     Continue  with established PT plan of care     Shanice Valente, PT, DPT

## 2022-08-23 ENCOUNTER — DOCUMENTATION ONLY (OUTPATIENT)
Dept: REHABILITATION | Facility: OTHER | Age: 72
End: 2022-08-23
Attending: INTERNAL MEDICINE
Payer: MEDICARE

## 2022-08-23 NOTE — PLAN OF CARE
Outpatient Therapy Updated Plan of Care     Visit Date: 8/22/2022  Name: Jalyn Aaron  Clinic Number: [id number]    Therapy Diagnosis:   Encounter Diagnoses   Name Primary?   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    Time In: 0905 am  Time Out: 1000 am  Total Treatment Time: 55 minutes  Total Billable Time: 55 minutes (4 TE)  Subjective     Update: see daily treatment note    Objective     Update:   Range of Motion/Strength:   CERVICAL AROM Pain/Dysfunction with Movement   Flexion 55 none   Extension 45 none   Right side bending 30 R sided pain     Left side bending 30 none   Right rotation 55 none   Left rotation 55 none      U/E MMT Right Left Pain/Dysfunction with Movement   Shoulder Flexion 4-/5 4-/5     Shoulder Abduction 4-/5 4-/5    Shoulder IR 4+/5 4+/5     Shoulder ER    4+/5 4+/5     Elbow Flexion  5/5 5/5     Elbow Extension 5/5 5/5     Rhomboids 4-/5 4-/5  increased R neck pain    Mid Traps 4-/5 4-/5  increased R neck pain    Low Traps 4-/5 4-/5  increased R neck pain        Assessment     Update: Jalyn presents with intermittent neck pain and dynamic instability of her cervical spine on flex/ext x-rays. She has improved greatly in range of motion, pain, function, and strength. However, she is continues to have weakness in her parascapular musculature and has neck pain with sudden motions due to still lacking some cervical stabilizer strength. She is nearing her therapy goals but still requires some skilled  interventions to manage this pain and appropriately progress her strengthening activities.     Goals:   Long Term Goals (4 Weeks):   1. Pt will improve FOTO to </=28% limitation to improve perceived limitation with changing and maintaining mobility.  (Progressing, not met)  2. Pt will improve cervical rotation AROM to 70 deg bilateral  to improve mobility for driving.  (Progressing, not met)  3. Pt will improve UE MMTs 1 grade in all planes to improve strength for lifting and carrying tasks.  (Progressing, not met)  4. Pt will report no pain with oil painting for 1 hr.   (Progressing, not met)  5. Pt will report no pain with cervical AROM in all planes to promote unlimited functional mobility.   (Progressing, not met)    Reasons for Recertification of Therapy:   Updated POC    Plan     Updated Certification Period: 8/22/2022 to 09/19/2022  Recommended Treatment Plan: 2 times per week for 4 weeks: Aquatic Therapy, Electrical Stimulation NMES, Manual Therapy, Moist Heat/ Ice, Neuromuscular Re-ed, Patient Education, Therapeutic Activities and Therapeutic Exercise  Other Recommendations: none    Shanice Valente, PT, DPT  8/22/2022      I CERTIFY THE NEED FOR THESE SERVICES FURNISHED UNDER THIS PLAN OF TREATMENT AND WHILE UNDER MY CARE    Physician's comments:        Physician's Signature: ___________________________________________________

## 2022-08-23 NOTE — PROGRESS NOTES
Health  Wellness Visit Note    Name: Jalyn Solares [place]en  Clinic Number: [id number]  Physician: Bere Myers MD  Diagnosis: No diagnosis found.  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    Left    AV block     exercised induced 2:1    Cataract     Fever blister     Heart block     History of acne     Joint pain     hands    Keratoconjunctivitis sicca not due to Sjogren's syndrome     Pacemaker      Visit Number: 37  Precautions: standard; pacemaker      1st PT visit:  1/21/22  Year of care end date: 1/21/23  6 Month test  Performed: July 2022  12 Month test performed: Jan 2023  Mind body plan: Plan A 8 months  Patient level: B    Time In: 9:55 AM  Time Out: 10:35 AM  Total Treatment Time: 40 minutes    [facility] 2022  Handout on this week's wellness topic Journaling Goals was provided along with a discussion on what it means, the benefits, and suggestions for practice.  Reviewed last week's topic of Stages of Change.    Subjective:   Patient reports that her back feels good and her neck and tightness is improving since she has been going for cervical PT at [place]. Pt does her neck and back stretches daily and ices regularly. For exercise, she typically does yoga, works out at the [facility] gym at least once per week and walks twice per day.     Objective:   Jalyn Solares completed therapeutic stretches (EIL, KATHY) and the following MedX exercise machines: core lumbar, torso rotation l/r, leg extension, leg curl, upright row, chest press, biceps curl, triceps extension, leg press    See exercise log in patient folder for rate of exertion and repetitions completed.       Fitness Machine Education Key:  E=education on equipment initiated and further follow up and education needed  I=independent with  and exercise.  The patient:   Adjusts machines to his/her settings   Uses equipment levers, pins, weights safely   Maintains  safe and correct posture while exercising   Moves through exercise with correct pace and control   Gets on and off equipment safely      Core lumbar strength E Core Torso Rotation E Leg Press E   Leg Extension E Seated Leg Curl E Chest Press E   Row E Abductor E Hip ADD X   Triceps  E Biceps E Other: X       Assessment:   Patient tolerated Patient tolerated MedX Core Lumbar Strength and all other peripheral exercises without an increase in symptoms. Patient warmed up on the treadmill for 5 minutes, stretched, and iced low back for 5 minutes after the workout.    Plan:  Continue with established plan of care towards wellness goals.     Health  : Elba Rodriguez  8/23/2022

## 2022-08-25 ENCOUNTER — TELEPHONE (OUTPATIENT)
Dept: NEUROLOGY | Facility: CLINIC | Age: 72
End: 2022-08-25
Payer: MEDICARE

## 2022-08-26 ENCOUNTER — TELEPHONE (OUTPATIENT)
Dept: SPINE | Facility: CLINIC | Age: 72
End: 2022-08-26
Payer: MEDICARE

## 2022-08-26 NOTE — TELEPHONE ENCOUNTER
This message is for patient in regards to her appointment 08/29/22 at 10:00 a   With Dr. Angelika Weeks M.D. staff was informed from patient that she needed to have this appointment rescheduled to another date and time,so staff rescheduled patient to 9/26/22 at 9:00 am with same provider patient confirmed appointment and verbalized understanding.

## 2022-08-29 ENCOUNTER — OFFICE VISIT (OUTPATIENT)
Dept: NEUROSURGERY | Facility: CLINIC | Age: 72
End: 2022-08-29
Payer: MEDICARE

## 2022-08-29 VITALS — BODY MASS INDEX: 20.76 KG/M2 | WEIGHT: 132.25 LBS | HEIGHT: 67 IN | TEMPERATURE: 98 F

## 2022-08-29 DIAGNOSIS — M54.2 NECK PAIN: ICD-10-CM

## 2022-08-29 DIAGNOSIS — M53.2X2 CERVICAL SPINE INSTABILITY: ICD-10-CM

## 2022-08-29 DIAGNOSIS — M47.812 CERVICAL SPONDYLOSIS WITHOUT MYELOPATHY: Primary | ICD-10-CM

## 2022-08-29 DIAGNOSIS — M54.16 LUMBAR RADICULOPATHY, CHRONIC: ICD-10-CM

## 2022-08-29 DIAGNOSIS — M43.12 SPONDYLOLISTHESIS OF CERVICAL REGION: ICD-10-CM

## 2022-08-29 DIAGNOSIS — M43.16 SPONDYLOLISTHESIS OF LUMBAR REGION: ICD-10-CM

## 2022-08-29 PROCEDURE — 4010F ACE/ARB THERAPY RXD/TAKEN: CPT | Mod: CPTII,S$GLB,, | Performed by: NEUROLOGICAL SURGERY

## 2022-08-29 PROCEDURE — 4010F PR ACE/ARB THEARPY RXD/TAKEN: ICD-10-PCS | Mod: CPTII,S$GLB,, | Performed by: NEUROLOGICAL SURGERY

## 2022-08-29 PROCEDURE — 1157F ADVNC CARE PLAN IN RCRD: CPT | Mod: CPTII,S$GLB,, | Performed by: NEUROLOGICAL SURGERY

## 2022-08-29 PROCEDURE — 1101F PR PT FALLS ASSESS DOC 0-1 FALLS W/OUT INJ PAST YR: ICD-10-PCS | Mod: CPTII,S$GLB,, | Performed by: NEUROLOGICAL SURGERY

## 2022-08-29 PROCEDURE — 1157F PR ADVANCE CARE PLAN OR EQUIV PRESENT IN MEDICAL RECORD: ICD-10-PCS | Mod: CPTII,S$GLB,, | Performed by: NEUROLOGICAL SURGERY

## 2022-08-29 PROCEDURE — 3008F BODY MASS INDEX DOCD: CPT | Mod: CPTII,S$GLB,, | Performed by: NEUROLOGICAL SURGERY

## 2022-08-29 PROCEDURE — 3288F FALL RISK ASSESSMENT DOCD: CPT | Mod: CPTII,S$GLB,, | Performed by: NEUROLOGICAL SURGERY

## 2022-08-29 PROCEDURE — 1159F MED LIST DOCD IN RCRD: CPT | Mod: CPTII,S$GLB,, | Performed by: NEUROLOGICAL SURGERY

## 2022-08-29 PROCEDURE — 99214 OFFICE O/P EST MOD 30 MIN: CPT | Mod: S$GLB,,, | Performed by: NEUROLOGICAL SURGERY

## 2022-08-29 PROCEDURE — 99214 PR OFFICE/OUTPT VISIT, EST, LEVL IV, 30-39 MIN: ICD-10-PCS | Mod: S$GLB,,, | Performed by: NEUROLOGICAL SURGERY

## 2022-08-29 PROCEDURE — 3288F PR FALLS RISK ASSESSMENT DOCUMENTED: ICD-10-PCS | Mod: CPTII,S$GLB,, | Performed by: NEUROLOGICAL SURGERY

## 2022-08-29 PROCEDURE — 1125F PR PAIN SEVERITY QUANTIFIED, PAIN PRESENT: ICD-10-PCS | Mod: CPTII,S$GLB,, | Performed by: NEUROLOGICAL SURGERY

## 2022-08-29 PROCEDURE — 1125F AMNT PAIN NOTED PAIN PRSNT: CPT | Mod: CPTII,S$GLB,, | Performed by: NEUROLOGICAL SURGERY

## 2022-08-29 PROCEDURE — 99999 PR PBB SHADOW E&M-EST. PATIENT-LVL III: ICD-10-PCS | Mod: PBBFAC,,, | Performed by: NEUROLOGICAL SURGERY

## 2022-08-29 PROCEDURE — 99999 PR PBB SHADOW E&M-EST. PATIENT-LVL III: CPT | Mod: PBBFAC,,, | Performed by: NEUROLOGICAL SURGERY

## 2022-08-29 PROCEDURE — 1101F PT FALLS ASSESS-DOCD LE1/YR: CPT | Mod: CPTII,S$GLB,, | Performed by: NEUROLOGICAL SURGERY

## 2022-08-29 PROCEDURE — 3008F PR BODY MASS INDEX (BMI) DOCUMENTED: ICD-10-PCS | Mod: CPTII,S$GLB,, | Performed by: NEUROLOGICAL SURGERY

## 2022-08-29 PROCEDURE — 1159F PR MEDICATION LIST DOCUMENTED IN MEDICAL RECORD: ICD-10-PCS | Mod: CPTII,S$GLB,, | Performed by: NEUROLOGICAL SURGERY

## 2022-08-29 NOTE — PROGRESS NOTES
NEUROSURGICAL PROGRESS NOTE    DATE OF SERVICE:  08/29/2022    ATTENDING PHYSICIAN:  Narciso Segura MD    SUBJECTIVE:  This is a 72 y.o. female, pacemaker for AV block, cardiomyopathy, who started to have left foot numbness in the L5 distribution about 2 years ago.  She then developed worsening left-sided back pain and leg pain.  The back and leg pain is rated 3 to 4/10.  The pain is not interfering with walking at this time.  Pain is usually worse with sitting or following physical activities such as lifting or bending forward.  She has been treated with the healthy back program and physical therapy with good pain relief.  She used to do a lot of yoga.  She is considering doing Pilates.  She denies having urinary retention or incontinence.  She is mainly complaining of constipation which is new for her.  No new onset of motor weakness.  No recent spinal injections.  More recently she has been complaining of right-sided neck pain.  She tried some stretches at home but the pain is constant and not improving.  She denies having upper extremity weakness numbness.  She has not dropping things.  No gait imbalance.    INTERIM HISTORY:    She is been doing physical therapy for her back and are cervical spine.  She has noticed significant improvement in her neck pain with the strengthening exercises.  No new onset of motor weakness, numbness, gait imbalance sphincter dysfunction.  She has some difficulty opening jars but overall she is not dropping things.  No significant hand numbness.  We scheduled an MRI but because of her pacemaker she was unable to get it done.  She still has some left buttock and leg pain but this is improving with physical therapy.  She denies having significant back pain.  She is not limited functionally.              PAST MEDICAL HISTORY:  Active Ambulatory Problems     Diagnosis Date Noted    AV block     Cardiac pacemaker in situ 07/26/2013    Anxiety 07/18/2014    Intraductal papilloma of  breast 06/06/2018    Ejection fraction < 50% 09/23/2020    Cardiomyopathy, nonischemic 10/12/2020    Lumbar stenosis 01/04/2021    Screening for colon cancer 03/30/2021    Disorder of muscle 05/05/2021    Decreased range of motion of trunk and back 01/25/2022    Decreased strength of trunk and back 01/25/2022    Weakness of left lower extremity 01/25/2022    Neuropathy 03/11/2022    Decreased ROM of neck 05/31/2022    Decreased strength of upper extremity 05/31/2022     Resolved Ambulatory Problems     Diagnosis Date Noted    Neck pain 10/02/2013    Papilloma of breast 12/16/2013    Breast mass 01/03/2014    Hallux valgus 04/01/2015    Screening 11/08/2016    Pain 12/02/2020     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    COLONOSCOPY N/A 3/30/2021    Procedure: COLONOSCOPY;  Surgeon: Joaquin Johnson MD;  Location: [facility] ([address] [place] [address]);  Service: Endoscopy;  Laterality: N/A;  prep ins. emailed - COVID screening on 3/27/21 PC[facility] - ERW    HYSTERECTOMY      INSERT / REPLACE / REMOVE PACEMAKER      LASIK      LASIK Bilateral 2009       SOCIAL HISTORY:   Social History     Socioeconomic History    Marital status:     Number of children: 1   Occupational History    Occupation:      Employer: [employer]   Tobacco Use    Smoking status: Never     Passive exposure: Never    Smokeless tobacco:  Never   Substance and Sexual Activity    Alcohol use: Yes     Alcohol/week: 0.0 standard drinks     Types: 2 Glasses of wine per week     Comment: socially    Drug use: No    Sexual activity: Not Currently     Partners: Male     Birth control/protection: None   Other Topics Concern    Are you pregnant or think you may be? No    Breast-feeding No   Social History Narrative     (case management) at [employer]       FAMILY HISTORY:  Family History   Problem Relation Age of Onset    Cancer Mother 80        pagets    Cataracts Mother     Hypertension Mother     Breast cancer Mother     Multiple sclerosis Father     Multiple sclerosis Sister     No Known Problems Brother     No Known Problems Maternal Aunt     No Known Problems Maternal Uncle     No Known Problems Paternal Aunt     No Known Problems Paternal Uncle     No Known Problems Maternal Grandmother     Prostate cancer Maternal Grandfather     No Known Problems Paternal Grandmother     No Known Problems Paternal Grandfather     No Known Problems Son     No Known Problems Son     Amblyopia Neg Hx     Blindness Neg Hx     Diabetes Neg Hx     Glaucoma Neg Hx     Macular degeneration Neg Hx     Retinal detachment Neg Hx     Strabismus Neg Hx     Stroke Neg Hx     Thyroid disease Neg Hx     Melanoma Neg Hx     Ovarian cancer Neg Hx        CURRENTS MEDICATIONS:  Current Outpatient Medications on File Prior to Visit   Medication Sig Dispense Refill    carvediloL (COREG) 3.125 MG tablet TAKE 1 TABLET(3.125 MG) BY MOUTH TWICE DAILY WITH MEALS 60 tablet 11    gabapentin (NEURONTIN) 600 MG tablet Take 1.5 tablets (900 mg total) by mouth 3 (three) times daily. 135 tablet 11    linaCLOtide (LINZESS) 72 mcg Cap capsule Take 1 capsule (72 mcg total) by mouth before breakfast. 30 capsule 3    losartan (COZAAR) 25 MG tablet TAKE 1 TABLET BY MOUTH TWICE DAILY 60 tablet 6    multivitamin capsule Take 1 capsule by mouth once daily.      naproxen (NAPROSYN) 500 MG tablet Half  tablet once daily 90 tablet 1    senna (SENOKOT) 8.6 mg tablet Take 1 tablet by mouth once daily. Twice a day      urea 20 % Crea Apply 1 application topically once daily. To dry skin on the feet. 75 g 10    vitamin D 1000 units Tab Take 1,000 Units by mouth once daily.       No current facility-administered medications on file prior to visit.       ALLERGIES:  Review of patient's allergies indicates:   Allergen Reactions    Bactrim [sulfamethoxazole-trimethoprim] Nausea Only       REVIEW OF SYSTEMS:  Review of Systems   Constitutional:  Negative for diaphoresis, fever and weight loss.   Respiratory:  Negative for shortness of breath.    Cardiovascular:  Negative for chest pain.   Gastrointestinal:  Negative for blood in stool.   Genitourinary:  Negative for hematuria.   Endo/Heme/Allergies:  Does not bruise/bleed easily.   All other systems reviewed and are negative.      OBJECTIVE:    PHYSICAL EXAMINATION:   Vitals:    08/29/22 0812   Temp: 98 °F (36.7 °C)       Physical Exam:  Vitals reviewed.    Constitutional: She appears well-developed and well-nourished.     Eyes: Pupils are equal, round, and reactive to light. Conjunctivae and EOM are normal.     Cardiovascular: Normal distal pulses and no edema.     Abdominal: Soft.     Skin: Skin displays no rash on trunk and no rash on extremities. Skin displays no lesions on trunk and no lesions on extremities.     Psych/Behavior: She is alert. She is oriented to person, place, and time. She has a normal mood and affect.     Musculoskeletal:        Neck: Range of motion is full.     Neurological:        DTRs: Tricep reflexes are 2+ on the right side and 2+ on the left side. Bicep reflexes are 2+ on the right side and 2+ on the left side. Brachioradialis reflexes are 2+ on the right side and 2+ on the left side. Patellar reflexes are 2+ on the right side and 2+ on the left side. Achilles reflexes are 2+ on the right side and 2+ on the left side.     Back Exam     Muscle  Strength   Right Quadriceps:  5/5   Left Quadriceps:  5/5   Right Hamstrings:  5/5   Left Hamstrings:  5/5             Neurologic Exam     Mental Status   Oriented to person, place, and time.   Speech: speech is normal   Level of consciousness: alert    Cranial Nerves   Cranial nerves II through XII intact.     CN III, IV, VI   Pupils are equal, round, and reactive to light.  Extraocular motions are normal.     Motor Exam   Muscle bulk: normal  Overall muscle tone: normal    Strength   Right deltoid: 5/5  Left deltoid: 5/5  Right biceps: 5/5  Left biceps: 5/5  Right triceps: 5/5  Left triceps: 5/5  Right wrist flexion: 5/5  Left wrist flexion: 5/5  Right wrist extension: 5/5  Left wrist extension: 5/5  Right interossei: 5/5  Left interossei: 5/5  Right iliopsoas: 5/5  Left iliopsoas: 5/5  Right quadriceps: 5/5  Left quadriceps: 5/5  Right hamstrin/5  Left hamstrin/5  Right anterior tibial: 5/5  Left anterior tibial: 5/5  Right posterior tibial: 5/5  Left posterior tibial: 5/5  Right peroneal: 5/5  Left peroneal: 5/5  Right gastroc: 5/5  Left gastroc: 5/5    Sensory Exam   Light touch normal.   Pinprick normal.     Gait, Coordination, and Reflexes     Gait  Gait: normal    Coordination   Finger to nose coordination: normal  Tandem walking coordination: normal    Reflexes   Right brachioradialis: 2+  Left brachioradialis: 2+  Right biceps: 2+  Left biceps: 2+  Right triceps: 2+  Left triceps: 2+  Right patellar: 2+  Left patellar: 2+  Right achilles: 2+  Left achilles: 2+  Right plantar: normal  Left plantar: normal  Right Alfredo: absent  Left Alfredo: absent  Right ankle clonus: absent  Left ankle clonus: absent      DIAGNOSTIC DATA:  I personally interpreted the following imaging:   Cervical flexion-extension x-rays shows C3-4 dynamic instability with complete reduction of spondylolisthesis in extension and anterolisthesis measuring 3 mm in flexion, C4-5 spondylolisthesis, significant spondylosis from C5-6  to C7-T1  CT of the lumbar spine shows grade 2 L4-5 degenerative spondylolisthesis with severe restrictive disease at L4-5 and L5-S1 with left more than right foraminal stenosis at L4-5    ASSESMENT:  This is a 72 y.o. female with     Problem List Items Addressed This Visit    None  Visit Diagnoses       Cervical spondylosis without myelopathy    -  Primary    Spondylolisthesis of cervical region        Spondylolisthesis of lumbar region        Lumbar radiculopathy, chronic        Neck pain        Cervical spine instability                  PLAN:  Her neck pain symptoms that started 4 months ago are now improving with PT.  She does not have myelopathy or radiculopathy clinically.  She is doing better in terms of her left lumbar radiculopathy and she has no back pain.  I recommended continued conservative management.  The CT myelogram is an invasive procedure and says she does not have significant radiculopathy or myelopathy clinically I think that we can postpone this radiology exam  She will follow-up in 4 months  All questions answered    More than 50% of the time was spent on discussing conservative management treatments (medication, physical therapy exercises) and possible interventions (spinal injections and surgical procedures). Care coordination was discussed.    30 minutes        Narciso Segura MD  Cell:[phone]

## 2022-08-31 ENCOUNTER — DOCUMENTATION ONLY (OUTPATIENT)
Dept: REHABILITATION | Facility: OTHER | Age: 72
End: 2022-08-31
Attending: PLASTIC SURGERY
Payer: MEDICARE

## 2022-08-31 NOTE — PROGRESS NOTES
Health  Wellness Visit Note    Name: Jalyn Aaron  Clinic Number: [id number]  Physician: No ref. provider found  Diagnosis: No diagnosis found.  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    Left    AV block     exercised induced 2:1    Cataract     Fever blister     Heart block     History of acne     Joint pain     hands    Keratoconjunctivitis sicca not due to Sjogren's syndrome     Pacemaker      Visit Number: 38  Precautions: standard; pacemaker      1st PT visit:  1/21/22  Year of care end date: 1/21/23  6 Month test  Performed: July 2022  12 Month test performed: Jan 2023  Mind body plan: Plan A 8 months  Patient level: B    Time In: 12:30 PM  Time Out: 1:10 PM  Total Treatment Time: 40 minutes    Wellness [facility] 2022  Handout on this week's wellness topic Decisional Balance was provided along with a discussion on what it means, the benefits, and suggestions for practice.  Reviewed last week's topic of Journaling     Subjective:   Patient reports that her back feels good and her neck is still a work in progress. Ppt stretches regularly and ices as needed. For exercise, she typically does yoga, works out at the [employer] gym at least once per week and walks twice per day.     Objective:   Jalyn Solares completed therapeutic stretches (EIL, KATHY) and the following MedX exercise machines: core lumbar, torso rotation l/r, leg extension, leg curl, upright row, chest press, biceps curl, triceps extension, leg press    See exercise log in patient folder for rate of exertion and repetitions completed.       Fitness Machine Education Key:  E=education on equipment initiated and further follow up and education needed  I=independent with  and exercise.  The patient:  Adjusts machines to his/her settings  Uses equipment levers, pins, weights safely  Maintains safe and correct posture while exercising  Moves through exercise with correct pace  and control  Gets on and off equipment safely      Core lumbar strength E Core Torso Rotation E Leg Press E   Leg Extension E Seated Leg Curl E Chest Press E   Row E Abductor E Hip ADD X   Triceps  E Biceps E Other: X       Assessment:   Patient tolerated Patient tolerated MedX Core Lumbar Strength and all other peripheral exercises without an increase in symptoms. Patient warmed up on the treadmill for 5 minutes, stretched, and iced low back for 5 minutes after the workout.    Plan:  Continue with established plan of care towards wellness goals.     Health  : Laura Lynch  8/31/2022

## 2022-09-02 ENCOUNTER — CLINICAL SUPPORT (OUTPATIENT)
Dept: REHABILITATION | Facility: HOSPITAL | Age: 72
End: 2022-09-02
Payer: MEDICARE

## 2022-09-02 DIAGNOSIS — R29.898 DECREASED ROM OF NECK: Primary | ICD-10-CM

## 2022-09-02 DIAGNOSIS — R29.898 DECREASED STRENGTH OF UPPER EXTREMITY: ICD-10-CM

## 2022-09-02 PROCEDURE — 97110 THERAPEUTIC EXERCISES: CPT | Mod: KX,PN

## 2022-09-02 NOTE — PROGRESS NOTES
"[facility]   Physical Therapy Treatment Note     Name: Jalyn Aaron  Clinic Number: [id number]    Therapy Diagnosis:   Encounter Diagnoses   Name Primary?    Decreased ROM of neck Yes    Decreased strength of upper extremity        Physician: Narciso Segura MD    Visit Date: 9/2/2022    Physician Orders: PT Eval and Treat   Medical Diagnosis from Referral: M54.2 (ICD-10-CM) - Neck pain  Evaluation Date: 5/31/2022  Authorization Period Expiration: 5/27/23  Plan of Care Expiration: 08/26/2022 to 09/19/2022  Visit # / Visits authorized: 16/22  FOTO: give at d/c  (#3 given on 7/15/2022)     PTA Visit #: 0/5     Precautions: Standard, pacemaker for AV block, cardiomyopathy,  C3-4 dynamic instability    Time In: 0800 am  Time Out: 0900 am  Total Treatment Time: 60 minutes  Total Billable Time: 60 minutes (4 TE-KX)    SUBJECTIVE     Pt reports: she feels she has come a long way and can manage her pain better when it comes, but she continues to have sharp pain with certain movements  She was compliant with home exercise program.  Response to previous treatment: relief for a few hours  Functional change: ongoing     Pain: 0/10 (at rest) 3/10 (with occasional movement)  Location: right neck      OBJECTIVE     Objective Measures updated at progress report unless specified.     Treatment     Jalyn received the treatments listed below:      therapeutic exercises to develop strength, endurance, ROM, flexibility and posture for 60 minutes  Including updated POC measures:    UBE Lv 2.0 5 min fwd only for increased CV endurance and postural stability w/ UE mvmt  Supine chin tuck + head lift   4 x 10 w/ 5" hold  D2 Flexion, GTB     3 x 10 ea  Prone T      4 x 10 w/ 5" hold  Rows CC 7#ea     4 x 10 w/ 5" hold  SA wall slides     2 x 10 w/ cues to avoid lumbar ext      manual therapy techniques: Joint mobilizations, soft tissue mobilization were applied to the: cervical spine for 00 minutes, " including:  STM to cervical paraspinals with elongation   Gentle sub occipital release  STM/MFR to bilateral Upper traps    Patient Education and Home Exercises     Home Exercises Provided and Patient Education Provided     Education provided:   - cont HEP    Written Home Exercises Provided: yes. Exercises were reviewed and Jalyn was able to demonstrate them prior to the end of the session.  Jalyn demonstrated good  understanding of the education provided. 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 She continues to have weakness in her parascapular musculature, but is progressing well in strength. She responded well to all advancements given today.   Jalyn Is progressing well towards her goals.   Pt prognosis is Good.     Pt will continue to benefit from skilled outpatient physical therapy to address the deficits listed in the problem list box on initial evaluation, provide pt/family education and to maximize pt's level of independence in the home and community environment.     Pt's spiritual, cultural and educational needs considered and pt agreeable to plan of care and goals.     Anticipated barriers to physical therapy: chronicity, co-morbidities     Goals:   Short Term Goals (3 Weeks):   2. Pt will improve cervical SB AROM 5 deg to improve mobility for functional tasks. MET 8/22/22  3. Pt will improve UE MMTs by 1/2 grade in all planes to improve strength for lifting.  MET 8/22/22     Long Term Goals (6 Weeks):   1. Pt will improve FOTO to </=28% limitation to improve perceived limitation with changing and maintaining mobility.  (Progressing, not met)  2. Pt will improve cervical rotation AROM to 70 deg bilateral  to improve mobility for driving.  (Progressing, not met)  3. Pt will improve UE MMTs 1 grade in all planes to improve strength  for lifting and carrying tasks.  (Progressing, not met)  4. Pt will report no pain with oil painting for 1 hr.   (Progressing, not met)  5. Pt will report no pain with cervical AROM in all planes to promote unlimited functional mobility.   (Progressing, not met)    PLAN     Continue with established PT plan of care     Shanice Valente, PT, DPT

## 2022-09-07 ENCOUNTER — CLINICAL SUPPORT (OUTPATIENT)
Dept: CARDIOLOGY | Facility: HOSPITAL | Age: 72
End: 2022-09-07
Payer: MEDICARE

## 2022-09-07 DIAGNOSIS — Z95.0 PRESENCE OF CARDIAC PACEMAKER: ICD-10-CM

## 2022-09-07 PROCEDURE — 93296 REM INTERROG EVL PM/IDS: CPT | Performed by: INTERNAL MEDICINE

## 2022-09-08 ENCOUNTER — DOCUMENTATION ONLY (OUTPATIENT)
Dept: REHABILITATION | Facility: OTHER | Age: 72
End: 2022-09-08
Attending: PHYSICAL MEDICINE & REHABILITATION
Payer: MEDICARE

## 2022-09-08 NOTE — PROGRESS NOTES
Health  Wellness Visit Note    [id number]  Physician: No ref. provider found  Diagnosis: No diagnosis found.  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    Left    AV block     exercised induced 2:1    Cataract     Fever blister     Heart block     History of acne     Joint pain     hands    Keratoconjunctivitis sicca not due to Sjogren's syndrome     Pacemaker      Visit Number: 38  Precautions: standard; pacemaker      1st PT visit:  1/21/22  Year of care end date: 1/21/23  6 Month test  Performed: July 2022  12 Month test performed: Jan 2023  Mind body plan: Plan A 8 months  Patient level: B    Time In: 8:00 AM  Time Out: 8:35 AM  Total Treatment Time: 35 minutes    Wellness Vision 2022  Handout on this week's wellness topic Body Image was provided along with a discussion on what it means, the benefits, and suggestions for practice.  Reviewed last week's topic of Decisional Balance.    Subjective:   Patient reports that her back feels good and she is still going to rehab for her neck. She stretches regularly and ices as needed. For exercise, she typically does yoga, works out at the [employer] gym at least once per week and walks twice per day. She was ready to increase weights on most of the machines today.  Tolerated better than she expected.    Objective:   Jalyn Solares completed therapeutic stretches (EIL, KATHY) and the following MedX exercise machines: core lumbar, torso rotation l/r, leg extension, leg curl, upright row, chest press, biceps curl, triceps extension, leg press    See exercise log in patient folder for rate of exertion and repetitions completed.       Fitness Machine Education Key:  E=education on equipment initiated and further follow up and education needed  I=independent with  and exercise.  The patient:  Adjusts machines to his/her settings  Uses equipment levers, pins,  weights safely  Maintains safe and correct posture while exercising  Moves through exercise with correct pace and control  Gets on and off equipment safely      Core lumbar strength E Core Torso Rotation E Leg Press E   Leg Extension E Seated Leg Curl E Chest Press E   Row E Abductor E Hip ADD X   Triceps  E Biceps E Other: X       Assessment:   Patient tolerated Patient tolerated MedX Core Lumbar Strength and all other peripheral exercises without an increase in symptoms. Patient warmed up on the treadmill for 5 minutes, stretched, and iced low back for 5 minutes after the workout.    Plan:  Continue with established plan of care towards wellness goals.     Health  : Elba Rodriguez  9/8/2022

## 2022-09-09 ENCOUNTER — CLINICAL SUPPORT (OUTPATIENT)
Dept: REHABILITATION | Facility: HOSPITAL | Age: 72
End: 2022-09-09
Payer: MEDICARE

## 2022-09-09 DIAGNOSIS — R29.898 DECREASED ROM OF NECK: Primary | ICD-10-CM

## 2022-09-09 DIAGNOSIS — R29.898 DECREASED STRENGTH OF UPPER EXTREMITY: ICD-10-CM

## 2022-09-09 PROCEDURE — 97110 THERAPEUTIC EXERCISES: CPT | Mod: KX,PN,CQ

## 2022-09-09 NOTE — PROGRESS NOTES
"[facility]   Physical Therapy Treatment Note     Name: Jalyn Aaron  Clinic Number: [id number]    Therapy Diagnosis:   Encounter Diagnoses   Name Primary?    Decreased ROM of neck Yes    Decreased strength of upper extremity        Physician: No ref. 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  Response to previous treatment: relief for a few hours  Functional change: ongoing     Pain: 0/10 (at rest) 3/10 (with right rotation greater than left)  Location: right neck      OBJECTIVE     Objective Measures updated at progress report unless specified.     Treatment     Jalyn received the treatments listed below:      therapeutic exercises to develop strength, endurance, ROM, flexibility and posture for 58 minutes  Including:    UBE Lv 2.0 5 min fwd only for increased CV endurance and postural stability w/ UE mvmt  Supine chin tuck + head lift   4 x 10 w/ 5" hold  D2 Flexion, Trial standing RTB   2 x 10 ea  Prone T      4 x 10 w/ 5" hold  Rows CC 7#ea     4 x 10 w/ 5" hold  SA wall slides     2 x 10 w/ cues to avoid lumbar ext  Standing horizontal abduction red theraband  2x10      manual therapy techniques: Joint mobilizations, soft tissue " "mobilization were applied to the: cervical spine for 00 minutes, including:  STM to cervical paraspinals with elongation   Gentle sub occipital release  STM/MFR to bilateral Upper traps    Patient Education and Home Exercises     Home Exercises Provided and Patient Education Provided     Education provided:   - cont home exercise program  - Towel roll positioning for sleeping and while traveling.      Written Home Exercises Provided: yes. Exercises were reviewed and Jalyn was able to demonstrate them prior to the end of the session.  Jalyn demonstrated good  understanding of the education provided. 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 She continues to have weakness in her parascapular musculature, but is progressing well in strength. She responded well to trial of Standing D2 flexion and added Theraband horizontal abduction with theraband as bolded.  States "good - just a little tired"  after treatment.  Jalyn Is progressing well towards her goals.   Pt prognosis is Good.     Pt will continue to benefit from skilled outpatient physical therapy to address the deficits listed in the problem list box on initial evaluation, provide pt/family education and to maximize pt's level of independence in the home and community environment.     Pt's spiritual, cultural and educational needs considered and pt agreeable to plan of care and goals.     Anticipated barriers to physical therapy: chronicity, co-morbidities     Goals:   Short Term Goals (3 Weeks):   2. Pt will improve cervical SB AROM 5 deg to improve mobility for functional tasks. MET 8/22/22  3. Pt will improve UE MMTs by 1/2 grade in all planes to improve strength for lifting.  MET 8/22/22     Long Term Goals (6 Weeks):   1. Pt will improve FOTO to </=28% limitation to improve perceived " limitation with changing and maintaining mobility.  (Progressing, not met)  2. Pt will improve cervical rotation AROM to 70 deg bilateral  to improve mobility for driving.  (Progressing, not met)  3. Pt will improve UE MMTs 1 grade in all planes to improve strength for lifting and carrying tasks.  (Progressing, not met)  4. Pt will report no pain with oil painting for 1 hr.   (Progressing, not met)  5. Pt will report no pain with cervical AROM in all planes to promote unlimited functional mobility.   (Progressing, not met)    PLAN     Continue with established PT plan of care     Belinda Malcolm, PTA, DPT

## 2022-09-09 NOTE — PROGRESS NOTES
"[facility]   Physical Therapy Treatment Note     Name: Jalyn Aaron  Clinic Number: [id number]    Therapy Diagnosis:   No diagnosis found.      Physician: No ref. 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  Response to previous treatment: relief for a few hours  Functional change: ongoing     Pain: 0/10 (at rest) 3/10 (with occasional movement)  Location: right neck      OBJECTIVE     Objective Measures updated at progress report unless specified.     Treatment     Jalyn received the treatments listed below:      therapeutic exercises to develop strength, endurance, ROM, flexibility and posture for 60 minutes  Including updated POC measures:    UBE Lv 2.0 5 min fwd only for increased CV endurance and postural stability w/ UE mvmt  Supine chin tuck + head lift   4 x 10 w/ 5" hold  D2 Flexion, GTB     3 x 10 ea  Prone T      4 x 10 w/ 5" hold  Rows CC 7#ea     4 x 10 w/ 5" hold  SA wall slides     2 x 10 w/ cues to avoid lumbar ext      manual therapy techniques: Joint mobilizations, soft tissue mobilization were applied to the: cervical spine for 00 minutes, including:  STM to cervical paraspinals with elongation   Gentle sub occipital " release  STM/MFR to bilateral Upper traps    Patient Education and Home Exercises     Home Exercises Provided and Patient Education Provided     Education provided:   - cont HEP    Written Home Exercises Provided: yes. Exercises were reviewed and Jalyn was able to demonstrate them prior to the end of the session.  Jalyn demonstrated good  understanding of the education provided. 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 She continues to have weakness in her parascapular musculature, but is progressing well in strength. She responded well to all advancements given today.   Jalyn Is progressing well towards her goals.   Pt prognosis is Good.     Pt will continue to benefit from skilled outpatient physical therapy to address the deficits listed in the problem list box on initial evaluation, provide pt/family education and to maximize pt's level of independence in the home and community environment.     Pt's spiritual, cultural and educational needs considered and pt agreeable to plan of care and goals.     Anticipated barriers to physical therapy: chronicity, co-morbidities     Goals:   Short Term Goals (3 Weeks):   2. Pt will improve cervical SB AROM 5 deg to improve mobility for functional tasks. MET 8/22/22  3. Pt will improve UE MMTs by 1/2 grade in all planes to improve strength for lifting.  MET 8/22/22     Long Term Goals (6 Weeks):   1. Pt will improve FOTO to </=28% limitation to improve perceived limitation with changing and maintaining mobility.  (Progressing, not met)  2. Pt will improve cervical rotation AROM to 70 deg bilateral  to improve mobility for driving.  (Progressing, not met)  3. Pt will improve UE MMTs 1 grade in all planes to improve strength for lifting and carrying tasks.  (Progressing, not met)  4. Pt will report no  pain with oil painting for 1 hr.   (Progressing, not met)  5. Pt will report no pain with cervical AROM in all planes to promote unlimited functional mobility.   (Progressing, not met)    PLAN     Continue with established PT plan of care     ADRIAN VILLASENOR, PT, DPT

## 2022-09-11 NOTE — PROGRESS NOTES
"[facility]   Physical Therapy Treatment Note     Name: Jalyn Rileyue  Clinic Number: [id number]    Therapy Diagnosis:   Encounter Diagnoses   Name Primary?    Decreased ROM of neck Yes    Decreased strength of upper extremity      Physician: No ref. 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  She was compliant with home exercise program.  Response to previous treatment: some soreness.  Functional change: ongoing     Pain: 0/10 (at rest) 3/10 (with right rotation greater than left)  Location: right neck      OBJECTIVE     Objective Measures updated at progress report unless specified.     Treatment     Jalyn received the treatments listed below:      therapeutic exercises to develop strength, endurance, ROM, flexibility and posture for 57 minutes  Including:  Supine chin tuck + head lift - 2 x 10 w/ 5" hold  Prone chin tuck with scapular retraction - 5 second holds; 2x10  Prone T - bilateral; 4 x 10 w/ 5" hold  Standing Wall Balls  - flexion / extension and rotation; x30 each with isometric contraction  SA wall slides - yellow band; 2x10  Standing horizontal abduction with marching - red theraband; 1 minute x2  NEXT > Prone Y, standing shoulder flexion (yellow band) with pulse at end range    Not Today:  UBE Lv 2.0 5 min fwd only for increased CV endurance and postural " "stability w/ UE mvmt  Rows CC 7#ea 4 x 10 w/ 5" hold    manual therapy techniques: Joint mobilizations, soft tissue mobilization were applied to the: cervical spine for 00 minutes, including:  STM to cervical paraspinals with elongation   Gentle sub occipital release  STM/MFR to bilateral Upper traps    Patient Education and Home Exercises     Home Exercises Provided and Patient Education Provided     Education provided:   - cont home exercise program  - Towel roll positioning for sleeping and while traveling.      Written Home Exercises Provided: yes. Exercises were reviewed and Jalyn was able to demonstrate them prior to the end of the session.  Jalyn demonstrated good  understanding of the education provided. 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 Progressed cervical and periscapular stabilization and strengthening activities today with adequate challenge as noted by no exacerbation in symptoms upon completion.    Jalyn Is progressing well towards her goals.   Pt prognosis is Good.     Pt will continue to benefit from skilled outpatient physical therapy to address the deficits listed in the problem list box on initial evaluation, provide pt/family education and to maximize pt's level of independence in the home and community environment. Pt's spiritual, cultural and educational needs considered and pt agreeable to plan of care and goals.     Anticipated barriers to physical therapy: chronicity, co-morbidities     Goals:   Short Term Goals (3 Weeks):   2. Pt will improve cervical SB AROM 5 deg to improve mobility for functional tasks. MET 8/22/22  3. Pt will improve UE MMTs by 1/2 grade in all planes to improve strength for lifting.  MET 8/22/22     Long Term Goals (6 Weeks):   1. Pt will improve FOTO to </=28% limitation to improve " perceived limitation with changing and maintaining mobility.  (Progressing, not met)  2. Pt will improve cervical rotation AROM to 70 deg bilateral  to improve mobility for driving.  (Progressing, not met)  3. Pt will improve UE MMTs 1 grade in all planes to improve strength for lifting and carrying tasks.  (Progressing, not met)  4. Pt will report no pain with oil painting for 1 hr.   (Progressing, not met)  5. Pt will report no pain with cervical AROM in all planes to promote unlimited functional mobility.   (Progressing, not met)    PLAN     Continue with established PT plan of care     Beltran Avitia, PT, DPT, OCS

## 2022-09-12 ENCOUNTER — CLINICAL SUPPORT (OUTPATIENT)
Dept: REHABILITATION | Facility: HOSPITAL | Age: 72
End: 2022-09-12
Payer: MEDICARE

## 2022-09-12 DIAGNOSIS — R29.898 DECREASED ROM OF NECK: Primary | ICD-10-CM

## 2022-09-12 DIAGNOSIS — R29.898 DECREASED STRENGTH OF UPPER EXTREMITY: ICD-10-CM

## 2022-09-12 PROCEDURE — 97110 THERAPEUTIC EXERCISES: CPT | Mod: KX,PN

## 2022-09-13 ENCOUNTER — PES CALL (OUTPATIENT)
Dept: ADMINISTRATIVE | Facility: CLINIC | Age: 72
End: 2022-09-13
Payer: MEDICARE

## 2022-09-22 NOTE — PROGRESS NOTES
"[facility]   Physical Therapy Treatment Note and Plan of Care Update    Name: Jalyn Aaron  Clinic Number: [id number]    Therapy Diagnosis:   Encounter Diagnoses   Name Primary?    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   She was compliant with home exercise program.  Response to previous treatment: some soreness.  Functional change: ongoing     Pain: 0/10 (at rest) 3/10 (with right rotation greater than left)  Location: right neck      OBJECTIVE     9/23/2022  Right Cervical Rotation = 75 degrees  Left Cervical Rotation = 70 degrees     Treatment     Jalyn received the treatments listed below:      therapeutic exercises to develop strength, endurance, ROM, flexibility and posture for 50 minutes  Including:  Supine chin tuck - 10 second holds x10 (manual cues for anterior scalenes)  Prone chin tuck with scapular retraction and shoulder extension - 1# wand; 5 second holds; 2x10  Prone T - bilateral; 4 x 10 w/ 5" hold  Standing Wall Balls  - flexion / extension and rotation; x30 each with isometric contraction  SA wall slides - yellow band; 2x10  Wall Clocks - Yellow band; x10  Standing shoulder flexion (yellow band) with pulse at end range - 10 pulses x10    Not Today:  Rows CC 7#ea 4 x 10 w/ " "5" hold    manual therapy techniques: Joint mobilizations, soft tissue mobilization were applied to the: cervical spine for 10 minutes, including:  Cervical segmental mobility assessment  C2-3 extension and sideflexion muscle energy technique - 6 seconds x3 each    Patient Education and Home Exercises     Home Exercises Provided and Patient Education Provided     Education provided:   Correction for supine chin tucks.    Written Home Exercises Provided: yes. Exercises were reviewed and Jalyn was able to demonstrate them prior to the end of the session.  Jalyn demonstrated good  understanding of the education provided. 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 Hypomobility present at C2-3 with extension and side flexion. No changes to cervical and periscapular stabilization and strengthening activities today in order to assess benefits of new manual techniques. 4 out of 7 goals met as of the 20th visit.    Jalyn Is progressing well towards her goals.   Pt prognosis is Good.     Pt will continue to benefit from skilled outpatient physical therapy to address the deficits listed in the problem list box on initial evaluation, provide pt/family education and to maximize pt's level of independence in the home and community environment. Pt's spiritual, cultural and educational needs considered and pt agreeable to plan of care and goals.     Anticipated barriers to physical therapy: chronicity, co-morbidities     Goals:   Short Term Goals (3 Weeks):   2. Pt will improve cervical SB AROM 5 deg to improve mobility for functional tasks. MET 8/22/22  3. Pt will improve UE MMTs by 1/2 grade in all planes to improve strength for lifting.  MET 8/22/22     Long Term Goals (6 Weeks):   1. Pt will improve FOTO to </=28% limitation to improve perceived " limitation with changing and maintaining mobility.  (Progressing, not met)  2. Pt will improve cervical rotation AROM to 70 deg bilateral  to improve mobility for driving.  (met - 9/23/2022)  3. Pt will improve UE MMTs 1 grade in all planes to improve strength for lifting and carrying tasks.  (Progressing, not met)  4. Pt will report no pain with oil painting for 1 hr.   (Progressing, not met)  5. Pt will report no pain with cervical AROM in all planes to promote unlimited functional mobility.   (met - 9/23/2022)    PLAN     Extend plan of care date range to cover discharge  Additional visits approved; therefore schedule as needed.  Progressing well under current plan.     Beltran Avitia, PT, DPT, OCS

## 2022-09-23 ENCOUNTER — CLINICAL SUPPORT (OUTPATIENT)
Dept: REHABILITATION | Facility: HOSPITAL | Age: 72
End: 2022-09-23
Payer: MEDICARE

## 2022-09-23 ENCOUNTER — TELEPHONE (OUTPATIENT)
Dept: SPINE | Facility: CLINIC | Age: 72
End: 2022-09-23
Payer: MEDICARE

## 2022-09-23 DIAGNOSIS — R29.898 DECREASED ROM OF NECK: Primary | ICD-10-CM

## 2022-09-23 DIAGNOSIS — R29.898 DECREASED STRENGTH OF UPPER EXTREMITY: ICD-10-CM

## 2022-09-23 PROCEDURE — 97110 THERAPEUTIC EXERCISES: CPT | Mod: PN

## 2022-09-23 PROCEDURE — 97140 MANUAL THERAPY 1/> REGIONS: CPT | Mod: PN

## 2022-09-23 NOTE — TELEPHONE ENCOUNTER
This message is for patient in regards to his/her appointment 09/26/22 at 9:00a   With Angelika Briceno.      For the safety of all patients and staff members. We are requesting during this visit that all patients and visitors to wear required face mask at all times here at [facility].     If you have any questions or concerns please contact [phone]     Staff left voicemail

## 2022-09-26 ENCOUNTER — OFFICE VISIT (OUTPATIENT)
Dept: SPINE | Facility: CLINIC | Age: 72
End: 2022-09-26
Payer: MEDICARE

## 2022-09-26 VITALS
BODY MASS INDEX: 20.76 KG/M2 | HEIGHT: 67 IN | SYSTOLIC BLOOD PRESSURE: 127 MMHG | DIASTOLIC BLOOD PRESSURE: 76 MMHG | HEART RATE: 65 BPM | WEIGHT: 132.25 LBS | TEMPERATURE: 98 F

## 2022-09-26 DIAGNOSIS — M79.10 MYALGIA: Primary | ICD-10-CM

## 2022-09-26 PROCEDURE — 1125F PR PAIN SEVERITY QUANTIFIED, PAIN PRESENT: ICD-10-PCS | Mod: CPTII,S$GLB,, | Performed by: ANESTHESIOLOGY

## 2022-09-26 PROCEDURE — 1159F MED LIST DOCD IN RCRD: CPT | Mod: CPTII,S$GLB,, | Performed by: ANESTHESIOLOGY

## 2022-09-26 PROCEDURE — 99999 PR PBB SHADOW E&M-EST. PATIENT-LVL III: CPT | Mod: PBBFAC,,, | Performed by: ANESTHESIOLOGY

## 2022-09-26 PROCEDURE — 3074F SYST BP LT 130 MM HG: CPT | Mod: CPTII,S$GLB,, | Performed by: ANESTHESIOLOGY

## 2022-09-26 PROCEDURE — 4010F PR ACE/ARB THEARPY RXD/TAKEN: ICD-10-PCS | Mod: CPTII,S$GLB,, | Performed by: ANESTHESIOLOGY

## 2022-09-26 PROCEDURE — 99999 PR PBB SHADOW E&M-EST. PATIENT-LVL III: ICD-10-PCS | Mod: PBBFAC,,, | Performed by: ANESTHESIOLOGY

## 2022-09-26 PROCEDURE — 1101F PR PT FALLS ASSESS DOC 0-1 FALLS W/OUT INJ PAST YR: ICD-10-PCS | Mod: CPTII,S$GLB,, | Performed by: ANESTHESIOLOGY

## 2022-09-26 PROCEDURE — 3288F PR FALLS RISK ASSESSMENT DOCUMENTED: ICD-10-PCS | Mod: CPTII,S$GLB,, | Performed by: ANESTHESIOLOGY

## 2022-09-26 PROCEDURE — 1157F ADVNC CARE PLAN IN RCRD: CPT | Mod: CPTII,S$GLB,, | Performed by: ANESTHESIOLOGY

## 2022-09-26 PROCEDURE — 99213 OFFICE O/P EST LOW 20 MIN: CPT | Mod: S$GLB,,, | Performed by: ANESTHESIOLOGY

## 2022-09-26 PROCEDURE — 4010F ACE/ARB THERAPY RXD/TAKEN: CPT | Mod: CPTII,S$GLB,, | Performed by: ANESTHESIOLOGY

## 2022-09-26 PROCEDURE — 3078F PR MOST RECENT DIASTOLIC BLOOD PRESSURE < 80 MM HG: ICD-10-PCS | Mod: CPTII,S$GLB,, | Performed by: ANESTHESIOLOGY

## 2022-09-26 PROCEDURE — 1125F AMNT PAIN NOTED PAIN PRSNT: CPT | Mod: CPTII,S$GLB,, | Performed by: ANESTHESIOLOGY

## 2022-09-26 PROCEDURE — 1101F PT FALLS ASSESS-DOCD LE1/YR: CPT | Mod: CPTII,S$GLB,, | Performed by: ANESTHESIOLOGY

## 2022-09-26 PROCEDURE — 1160F RVW MEDS BY RX/DR IN RCRD: CPT | Mod: CPTII,S$GLB,, | Performed by: ANESTHESIOLOGY

## 2022-09-26 PROCEDURE — 1159F PR MEDICATION LIST DOCUMENTED IN MEDICAL RECORD: ICD-10-PCS | Mod: CPTII,S$GLB,, | Performed by: ANESTHESIOLOGY

## 2022-09-26 PROCEDURE — 3078F DIAST BP <80 MM HG: CPT | Mod: CPTII,S$GLB,, | Performed by: ANESTHESIOLOGY

## 2022-09-26 PROCEDURE — 99213 PR OFFICE/OUTPT VISIT, EST, LEVL III, 20-29 MIN: ICD-10-PCS | Mod: S$GLB,,, | Performed by: ANESTHESIOLOGY

## 2022-09-26 PROCEDURE — 3008F PR BODY MASS INDEX (BMI) DOCUMENTED: ICD-10-PCS | Mod: CPTII,S$GLB,, | Performed by: ANESTHESIOLOGY

## 2022-09-26 PROCEDURE — 1160F PR REVIEW ALL MEDS BY PRESCRIBER/CLIN PHARMACIST DOCUMENTED: ICD-10-PCS | Mod: CPTII,S$GLB,, | Performed by: ANESTHESIOLOGY

## 2022-09-26 PROCEDURE — 3074F PR MOST RECENT SYSTOLIC BLOOD PRESSURE < 130 MM HG: ICD-10-PCS | Mod: CPTII,S$GLB,, | Performed by: ANESTHESIOLOGY

## 2022-09-26 PROCEDURE — 1157F PR ADVANCE CARE PLAN OR EQUIV PRESENT IN MEDICAL RECORD: ICD-10-PCS | Mod: CPTII,S$GLB,, | Performed by: ANESTHESIOLOGY

## 2022-09-26 PROCEDURE — 3288F FALL RISK ASSESSMENT DOCD: CPT | Mod: CPTII,S$GLB,, | Performed by: ANESTHESIOLOGY

## 2022-09-26 PROCEDURE — 3008F BODY MASS INDEX DOCD: CPT | Mod: CPTII,S$GLB,, | Performed by: ANESTHESIOLOGY

## 2022-09-26 NOTE — PROGRESS NOTES
PCP: Annemarie Kilgore MD    REFERRING PHYSICIAN: No ref. provider found    CHIEF COMPLAINT: Right neck pain    Original HISTORY OF PRESENT ILLNESS: Jalyn Aaron presents to the clinic for the evaluation of the above pain. The pain started about 6 weeks ago after no particular event.     Original Pain Description:  The pain is located in the right side of the neck and does not radiate. The pain is described as aching. Exacerbating factors: twisting neck. Mitigating factors ice. Symptoms interfere with daily activity. The patient feels like symptoms have been unchanged.     Original PAIN SCORES:  Best: Pain is 2  Worst: Pain is 6  Usually: Pain is 4  Current: Pain is 4    INTERVAL HISTORY:   Interval History 9/26/2022:   Mrs Aaron is here for follow up. She states that the TPI provided about a month of relief from her shoulder/neck pain but that pain has since started to return. She still has neck pain that is tolerable and improving with PT. She states she is about 40% improved. Recent follow up with Dr Segura, who wants to hold off on cervical myelogram given her improvement.     Interval History 8/1/22: Ms. Aaron follows up today for TPI. She has been continuing PT.  She says she feels that the physical therapy has been helping the pain, saying that she used to have pain all the time but now it's not all the time and she feels with therapy she's gotten around 20% relief of pain. She says the pain is aching and is worse with turning the head to the right and looking up, is located on the right side of her neck, and on the worst day is a 6/10, today the pain is 4/10, and the best pain is 0/10. She notes no numbness/tingling down her upper extremities and no weakness of her upper extremities. She reports no other back pain. She notes she is still taking naproxen and gabapentin, and uses cold packs for relief of pain.    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    Left    AV block     exercised induced 2:1    Cataract     Fever blister     Heart block     History of acne     Joint pain     hands    Keratoconjunctivitis sicca not due to Sjogren's syndrome     Pacemaker      Past Surgical History:   Procedure Laterality Date    BREAST BIOPSY Left 12/2013    papilloma with atypia on core biopsy    BREAST BIOPSY Left 01/2014    Ex.Bx., removal of ADH/Papilloma    COLONOSCOPY N/A 11/8/2016    Procedure: COLONOSCOPY;  Surgeon: Dl Caruso MD;  Location: [facility] ([address] [place] [address]);  Service: Endoscopy;  Laterality: N/A;  Pacemaker in place.    COLONOSCOPY N/A 3/30/2021    Procedure: COLONOSCOPY;  Surgeon: Joaquin Johnson MD;  [facility]  Service: Endoscopy;  Laterality: N/A;  prep ins. emailed - COVID screening on 3/27/21 PCW - ERW    HYSTERECTOMY      INSERT / REPLACE / REMOVE PACEMAKER      LASIK      LASIK Bilateral 2009     Social History     Socioeconomic History    Marital status:     Number of children: 1   Occupational History    Occupation:      Employer: [employer]   Tobacco Use    Smoking status: Never     Passive exposure: Never    Smokeless tobacco: Never   Substance and Sexual Activity    Alcohol use: Yes     Alcohol/week: 0.0 standard drinks     Types: 2 Glasses of wine per week     Comment: socially    Drug use: No    Sexual activity: Not Currently     Partners: Male     Birth control/protection: None   Other Topics Concern    Are you pregnant or think you may be? No    Breast-feeding No   Social History Narrative     (case management) at [employer]      Family History   Problem Relation Age of Onset    Cancer Mother 80        pagets    Cataracts Mother     Hypertension Mother     Breast cancer Mother     Multiple sclerosis Father     Multiple sclerosis Sister     No Known Problems Brother     No Known Problems Maternal Aunt     No Known Problems Maternal Uncle     No Known Problems Paternal Aunt     No Known Problems Paternal Uncle     No Known Problems Maternal Grandmother     Prostate cancer Maternal Grandfather     No Known Problems Paternal Grandmother     No Known Problems Paternal Grandfather     No Known Problems Son     No Known Problems Son     Amblyopia Neg Hx     Blindness Neg Hx     Diabetes Neg Hx     Glaucoma Neg Hx     Macular degeneration Neg Hx     Retinal detachment Neg Hx     Strabismus Neg Hx     Stroke Neg Hx     Thyroid disease Neg Hx     Melanoma Neg Hx     Ovarian cancer Neg Hx        Review of patient's allergies indicates:   Allergen Reactions    Bactrim [sulfamethoxazole-trimethoprim] Nausea Only       Current Outpatient Medications   Medication Sig    carvediloL (COREG) 3.125 MG tablet TAKE 1 TABLET(3.125 MG) BY MOUTH TWICE DAILY WITH MEALS    gabapentin (NEURONTIN) 600 MG tablet Take 1.5 tablets (900 mg total) by mouth 3 (three) times daily.    linaCLOtide (LINZESS) 72 mcg Cap capsule Take 1 capsule (72 mcg total) by mouth before breakfast.    losartan (COZAAR) 25 MG tablet TAKE 1 TABLET BY MOUTH TWICE DAILY    multivitamin capsule Take 1 capsule by mouth once daily.    naproxen (NAPROSYN) 500 MG tablet Half tablet once daily    senna (SENOKOT) 8.6 mg tablet Take 1 tablet by mouth once daily. Twice a day    vitamin D 1000 units Tab Take 1,000 Units by mouth once daily.    urea 20 % Crea Apply 1 application topically once daily. To dry skin on the feet. (Patient not taking: Reported on 9/26/2022)     No current facility-administered medications for this visit.       ROS:  GENERAL: No fever. No chills. No fatigue. Denies weight loss.  "Denies weight gain.  HEENT: Denies headaches. Denies vision change. Denies eye pain. Denies double vision. Denies ear pain.   CV: Denies chest pain.   PULM: Denies of shortness of breath.  GI: Denies constipation. No diarrhea. No abdominal pain. Denies nausea. Denies vomiting. No blood in stool.  HEME: Denies bleeding problems.  : Denies urgency. No painful urination. No blood in urine.  MS: Denies joint stiffness. Denies joint swelling.  Denies back pain.  SKIN: Denies rash.   NEURO: Denies seizures. No weakness.  PSYCH:  Denies difficulty sleeping. No anxiety. Denies depression. No suicidal thoughts.       VITALS:   Vitals:    09/26/22 0844   BP: 127/76   Pulse: 65   Temp: 97.6 °F (36.4 °C)   Weight: 60 kg (132 lb 4.4 oz)   Height: 5' 7" (1.702 m)   PainSc:   3   PainLoc: Neck         PHYSICAL EXAM:   GENERAL: Well appearing, in no acute distress, alert and oriented x3.  PSYCH:  Mood and affect appropriate.  SKIN: Skin color, texture, turgor normal, no rashes or lesions.  HEENT:  Normocephalic, atraumatic. Cranial nerves grossly intact.  NECK: Improved active ROM. No pain over facets, no midline spinal tenderness. Mild reproduced pain in right trapezius muscle.   PULM: No evidence of respiratory difficulty, symmetric chest rise.  GI:  Non-distended  BACK: Normal range of motion. No pain to palpation over the spinous processes. No pain to palpation over facet joints. There is no pain with palpation over the sacroiliac joints bilaterally.   EXTREMITIES: No deformities, edema, or skin discoloration.   MUSCULOSKELETAL: Shoulder, hip, and knee provocative maneuvers are negative. Bilateral upper and lower extremity strength is normal and symmetric. No atrophy is noted.  NEURO: Sensation is equal and appropriate bilaterally. Bilateral upper and lower extremity coordination and muscle stretch reflexes are physiologic and symmetric. Plantar response are downgoing.   GAIT: normal.      LABS:      IMAGING:    XR CERVICAL " SPINE AP LAT WITH FLEX EXTEN     CLINICAL HISTORY:  Cervicalgia     TECHNIQUE:  Three views of the cervical spine plus flexion and extension views were performed.     COMPARISON:  Cervical spine radiographs from 09/03/2013.     FINDINGS:  The cervical spine is visualized from the craniocervical junction to the inferior endplate of C7 on the lateral view.     There is no acute fracture or subluxation of the cervical spine.  The vertebral body heights are preserved.     At C3-C4 there is anterolisthesis measuring 3 mm on flexion, 0 mm on extension, and 1 mm on neutral.     At C4-C5 there is anterolisthesis measuring 4 mm on flexion, 2 mm on extension, and 2 mm on neutral.     At C7-T1 there is mild fixed anterolisthesis.     There is straightening of the usual cervical lordosis.     There is moderate disc height loss at C5-C6, C6-C7, and C7-T1.  There is multilevel bilateral facet arthropathy and uncovertebral joint spurring.     The remaining visualized osseous structures are intact.     Prevertebral soft tissues are within normal limits.     Partially imaged cardiac pacer and leads.     Impression:     No acute osseous abnormality of the cervical spine.     Anterolisthesis at C3-C4, C4-C5, and C7-T1 with measurements on neutral, flexion, and extension as detailed above.        Electronically signed by: Porter Valdes  Date:                                            05/27/2022  Time:                                           11:02    ASSESSMENT: 72 y.o. year old female with pain, consistent with myalgia.     DISCUSSION: Ms. Balbuena comes to us with right sided neck pain after no particular event. Xray does show some significant DDD, but her pain is reproduced with palpation over the right trapezius muscle. She has improved with conservative therapy including Physical Therapy and TPI. She is still following with Dr. Segura, but decision has been made to hold off on advanced imagine given her significant improvement.  She has a Medtronic pacemaker that is not MRI compatible, but she is planning on having it exchanged soon.     PLAN:  Continue neck PT  Continue follow up with Dr Segura  RTC NAOMIE Ford MD  Department of Anesthesiology  [facility]  09/26/2022 9:22 AM

## 2022-09-28 ENCOUNTER — DOCUMENTATION ONLY (OUTPATIENT)
Dept: REHABILITATION | Facility: OTHER | Age: 72
End: 2022-09-28
Attending: INTERNAL MEDICINE
Payer: MEDICARE

## 2022-09-28 NOTE — PROGRESS NOTES
Health  Wellness Visit Note    [facility]: [id number]  Physician: No ref. provider found  Diagnosis: No diagnosis found.  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    Left    AV block     exercised induced 2:1    Cataract     Fever blister     Heart block     History of acne     Joint pain     hands    Keratoconjunctivitis sicca not due to Sjogren's syndrome     Pacemaker      Visit Number: [id number]  Precautions: standard; pacemaker      1st PT visit:  1/21/22  Year of care end date: 1/21/23  6 Month test  Performed: July 2022  12 Month test performed: Jan 2023  Mind body plan: Plan A 8 months  Patient level: B    Time In: 9:55 AM  Time Out: 10:35 AM  Total Treatment Time: 30 minutes    Wellness [facility] 2022  Handout on this week's wellness topic Addiction was provided along with a discussion on what it means, the benefits, and suggestions for practice.  Reviewed last week's topic of n/a.    Subjective:   Patient reports that her back feels good today. She has continued to stretch on a regular basis at home, and she uses ice as she feels the need. For exercise, she typically does yoga, works out at the [facility] gym at least once per week and walks twice per day.     Objective:   Jalyn Solares completed therapeutic stretches (EIL, KATHY) and the following MedX exercise machines: core lumbar, torso rotation l/r, leg extension, leg curl, upright row, chest press, biceps curl, triceps extension, leg press    See exercise log in patient folder for rate of exertion and repetitions completed.       Fitness Machine Education Key:  E=education on equipment initiated and further follow up and education needed  I=independent with  and exercise.  The patient:  Adjusts machines to his/her settings  Uses equipment levers, pins, weights safely  Maintains safe and correct posture while exercising  Moves through exercise with correct  pace and control  Gets on and off equipment safely      Core lumbar strength E Core Torso Rotation E Leg Press E   Leg Extension E Seated Leg Curl E Chest Press E   Row E Abductor E Hip ADD X   Triceps  E Biceps E Other: X       Assessment:   Patient tolerated Patient tolerated MedX Core Lumbar Strength and all other peripheral exercises without an increase in symptoms. Patient warmed up on the treadmill for 5 minutes, stretched, and iced low back for 5 minutes after the workout.    Plan:  Continue with established plan of care towards wellness goals.     Health  : Laura Lynch  9/28/2022

## 2022-09-30 ENCOUNTER — CLINICAL SUPPORT (OUTPATIENT)
Dept: REHABILITATION | Facility: HOSPITAL | Age: 72
End: 2022-09-30
Payer: MEDICARE

## 2022-09-30 DIAGNOSIS — R29.898 DECREASED STRENGTH OF UPPER EXTREMITY: ICD-10-CM

## 2022-09-30 DIAGNOSIS — R29.898 DECREASED ROM OF NECK: Primary | ICD-10-CM

## 2022-09-30 PROCEDURE — 97140 MANUAL THERAPY 1/> REGIONS: CPT | Mod: KX,PN

## 2022-09-30 PROCEDURE — 97110 THERAPEUTIC EXERCISES: CPT | Mod: KX,PN

## 2022-09-30 NOTE — PROGRESS NOTES
"[facility]   Physical Therapy Treatment Note    Name: Jalyn Aaron  Clinic Number: [id number]    Therapy Diagnosis:   Encounter Diagnoses   Name Primary?    Decreased ROM of neck Yes    Decreased strength of upper extremity      Physician: Narciso Segura MD    Visit Date: 9/30/2022    Physician Orders: PT Eval and Treat   Medical Diagnosis from Referral: M54.2 (ICD-10-CM) - Neck pain  Evaluation Date: 5/31/2022  Authorization Period Expiration: 12/23/2022  Plan of Care Expiration: 11/4/2022  Visit # / Visits authorized: 2/20 (21 total)  FOTO: give at d/c  (#3 given on 7/15/2022)    Precautions: Standard, pacemaker for AV block, cardiomyopathy,  C3-4 dynamic instability    Time In: 0900  Time Out: 1000  Total Treatment Time: 60 minutes  Total Billable Time: 60 minutes (KX)    SUBJECTIVE     Pt reports: an overall 65% improvement in symptoms since the initial evaluation. Has been progressing much better over the last 2-3 sessions.   She was compliant with home exercise program.  Response to previous treatment: some soreness.  Functional change: ongoing     Pain: 0/10 (at rest) 3/10 (with right rotation greater than left)  Location: right neck      OBJECTIVE     9/23/2022  Right Cervical Rotation = 75 degrees  Left Cervical Rotation = 70 degrees     Treatment     Jalyn received the treatments listed below:      therapeutic exercises to develop strength, endurance, ROM, flexibility and posture for 50 minutes  Including:  Supine chin tuck - 10 second holds x10 ( decreased manual cues for anterior scalenes)  Prone chin tuck with scapular retraction and shoulder extension - 2# wand; 5 second holds; 2x10  Prone T - bilateral; 4 x 10 w/ 5" hold - NP  Standing Wall Balls  - flexion / extension and rotation; x30 each with isometric contraction  Unilateral shoulder extension and contralateral cervical rotation - blue theraband; x15 bilateral   SA wall slides - yellow band; 2x10  Wall " "Clocks - Yellow band; x10  Standing shoulder flexion (yellow band) with pulse at end range - 10 pulses x10    Not Today:  Rows CC 7#ea 4 x 10 w/ 5" hold    manual therapy techniques: Joint mobilizations, soft tissue mobilization were applied to the: cervical spine for 10 minutes, including:  Cervical segmental mobility assessment  C2-3 extension and sideflexion muscle energy technique - 6 seconds x3 each    Patient Education and Home Exercises     Home Exercises Provided and Patient Education Provided     Education provided:   Correction for supine chin tucks.    Written Home Exercises Provided: yes. Exercises were reviewed and Jalyn was able to demonstrate them prior to the end of the session.  Jalyn demonstrated good  understanding of the education provided. 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 Continued work on hypomobility present at C2-3 with extension and side flexion. Improved mobility following manual techniques as noted by less stiffness with active range of motion. Mild progressions to exercise program. Updated home exercise program with upper cervical extension SNAG. Tentative discharge day of 11/4/2022.    Jalyn Is progressing well towards her goals.   Pt prognosis is Good.     Pt will continue to benefit from skilled outpatient physical therapy to address the deficits listed in the problem list box on initial evaluation, provide pt/family education and to maximize pt's level of independence in the home and community environment. Pt's spiritual, cultural and educational needs considered and pt agreeable to plan of care and goals.     Anticipated barriers to physical therapy: chronicity, co-morbidities     Goals:   Short Term Goals (3 Weeks):   2. Pt will improve cervical SB AROM 5 deg to improve mobility for functional " tasks. MET 8/22/22  3. Pt will improve UE MMTs by 1/2 grade in all planes to improve strength for lifting.  MET 8/22/22     Long Term Goals (6 Weeks):   1. Pt will improve FOTO to </=28% limitation to improve perceived limitation with changing and maintaining mobility.  (Progressing, not met)  2. Pt will improve cervical rotation AROM to 70 deg bilateral  to improve mobility for driving.  (met - 9/23/2022)  3. Pt will improve UE MMTs 1 grade in all planes to improve strength for lifting and carrying tasks.  (Progressing, not met)  4. Pt will report no pain with oil painting for 1 hr.   (Progressing, not met)  5. Pt will report no pain with cervical AROM in all planes to promote unlimited functional mobility.   (met - 9/23/2022)    PLAN     Improve upper cervical mobility  Stabilize mid and lower cervical segments.  Postural strengthening and proprioceptive training.    Beltran Avitia, PT, DPT, OCS

## 2022-10-03 ENCOUNTER — TELEPHONE (OUTPATIENT)
Dept: INTERNAL MEDICINE | Facility: CLINIC | Age: 72
End: 2022-10-03

## 2022-10-03 ENCOUNTER — DOCUMENTATION ONLY (OUTPATIENT)
Dept: REHABILITATION | Facility: OTHER | Age: 72
End: 2022-10-03
Attending: INTERNAL MEDICINE
Payer: MEDICARE

## 2022-10-03 DIAGNOSIS — Z12.31 SCREENING MAMMOGRAM, ENCOUNTER FOR: Primary | ICD-10-CM

## 2022-10-03 NOTE — PROGRESS NOTES
Health  Wellness Visit Note    Name: Jalyn Solares Galen  Clinic Number: [id number]  Physician: No ref. provider found  Diagnosis: No diagnosis found.  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    Left    AV block     exercised induced 2:1    Cataract     Fever blister     Heart block     History of acne     Joint pain     hands    Keratoconjunctivitis sicca not due to Sjogren's syndrome     Pacemaker      Visit Number: 41  Precautions: standard; pacemaker      1st PT visit:  1/21/22  Year of care end date: 1/21/23  6 Month test  Performed: July 2022  12 Month test performed: Jan 2023  Mind body plan: Plan A 8 months  Patient level: B    Time In: 1:23 AM  Time Out:  2:05 AM  Total Treatment Time: 42 minutes    Wellness Vision 2022  Handout on this week's wellness topic Relationship was provided along with a discussion on what it means, the benefits, and suggestions for practice.  Reviewed last week's topic of Addiction.    Subjective:   Patient reports that her back feels okay today, but her neck is hurting. She has continued to stretch on a regular basis at home, and she uses ice as she feels the need. For exercise, she typically does yoga, works out at the [facility] gym at least once per week and walks twice per day.     Objective:   Jalyn Solares completed therapeutic stretches (EIL, KATHY) and the following MedX exercise machines: core lumbar, torso rotation l/r, leg extension, leg curl, upright row, chest press, biceps curl, triceps extension, leg press    See exercise log in patient folder for rate of exertion and repetitions completed.       Fitness Machine Education Key:  E=education on equipment initiated and further follow up and education needed  I=independent with  and exercise.  The patient:  Adjusts machines to his/her settings  Uses equipment levers, pins, weights safely  Maintains safe and correct posture while exercising  Moves  through exercise with correct pace and control  Gets on and off equipment safely      Core lumbar strength E Core Torso Rotation E Leg Press E   Leg Extension E Seated Leg Curl E Chest Press E   Row E Abductor E Hip ADD X   Triceps  E Biceps E Other: X       Assessment:   Patient tolerated Patient tolerated MedX Core Lumbar Strength and all other peripheral exercises without an increase in symptoms. Patient warmed up on the treadmill for 5 minutes, stretched, and iced low back for 5 minutes after the workout.    Plan:  Continue with established plan of care towards wellness goals.     Health  : Bev Horner  10/3/2022

## 2022-10-04 ENCOUNTER — OFFICE VISIT (OUTPATIENT)
Dept: DERMATOLOGY | Facility: CLINIC | Age: 72
End: 2022-10-04
Payer: MEDICARE

## 2022-10-04 DIAGNOSIS — Z12.83 SCREENING EXAM FOR SKIN CANCER: ICD-10-CM

## 2022-10-04 DIAGNOSIS — L81.4 LENTIGO: ICD-10-CM

## 2022-10-04 DIAGNOSIS — D18.01 ANGIOMA OF SKIN: ICD-10-CM

## 2022-10-04 DIAGNOSIS — L82.1 SK (SEBORRHEIC KERATOSIS): Primary | ICD-10-CM

## 2022-10-04 DIAGNOSIS — L98.9 DISEASE OF SKIN AND SUBCUTANEOUS TISSUE: ICD-10-CM

## 2022-10-04 PROCEDURE — 1160F PR REVIEW ALL MEDS BY PRESCRIBER/CLIN PHARMACIST DOCUMENTED: ICD-10-PCS | Mod: CPTII,S$GLB,, | Performed by: DERMATOLOGY

## 2022-10-04 PROCEDURE — 4010F PR ACE/ARB THEARPY RXD/TAKEN: ICD-10-PCS | Mod: CPTII,S$GLB,, | Performed by: DERMATOLOGY

## 2022-10-04 PROCEDURE — 1160F RVW MEDS BY RX/DR IN RCRD: CPT | Mod: CPTII,S$GLB,, | Performed by: DERMATOLOGY

## 2022-10-04 PROCEDURE — 99999 PR PBB SHADOW E&M-EST. PATIENT-LVL III: CPT | Mod: PBBFAC,,, | Performed by: DERMATOLOGY

## 2022-10-04 PROCEDURE — 99213 PR OFFICE/OUTPT VISIT, EST, LEVL III, 20-29 MIN: ICD-10-PCS | Mod: S$GLB,,, | Performed by: DERMATOLOGY

## 2022-10-04 PROCEDURE — 1101F PT FALLS ASSESS-DOCD LE1/YR: CPT | Mod: CPTII,S$GLB,, | Performed by: DERMATOLOGY

## 2022-10-04 PROCEDURE — 1125F AMNT PAIN NOTED PAIN PRSNT: CPT | Mod: CPTII,S$GLB,, | Performed by: DERMATOLOGY

## 2022-10-04 PROCEDURE — 99999 PR PBB SHADOW E&M-EST. PATIENT-LVL III: ICD-10-PCS | Mod: PBBFAC,,, | Performed by: DERMATOLOGY

## 2022-10-04 PROCEDURE — 3288F PR FALLS RISK ASSESSMENT DOCUMENTED: ICD-10-PCS | Mod: CPTII,S$GLB,, | Performed by: DERMATOLOGY

## 2022-10-04 PROCEDURE — 3288F FALL RISK ASSESSMENT DOCD: CPT | Mod: CPTII,S$GLB,, | Performed by: DERMATOLOGY

## 2022-10-04 PROCEDURE — 1101F PR PT FALLS ASSESS DOC 0-1 FALLS W/OUT INJ PAST YR: ICD-10-PCS | Mod: CPTII,S$GLB,, | Performed by: DERMATOLOGY

## 2022-10-04 PROCEDURE — 1125F PR PAIN SEVERITY QUANTIFIED, PAIN PRESENT: ICD-10-PCS | Mod: CPTII,S$GLB,, | Performed by: DERMATOLOGY

## 2022-10-04 PROCEDURE — 4010F ACE/ARB THERAPY RXD/TAKEN: CPT | Mod: CPTII,S$GLB,, | Performed by: DERMATOLOGY

## 2022-10-04 PROCEDURE — 1157F ADVNC CARE PLAN IN RCRD: CPT | Mod: CPTII,S$GLB,, | Performed by: DERMATOLOGY

## 2022-10-04 PROCEDURE — 1159F MED LIST DOCD IN RCRD: CPT | Mod: CPTII,S$GLB,, | Performed by: DERMATOLOGY

## 2022-10-04 PROCEDURE — 1159F PR MEDICATION LIST DOCUMENTED IN MEDICAL RECORD: ICD-10-PCS | Mod: CPTII,S$GLB,, | Performed by: DERMATOLOGY

## 2022-10-04 PROCEDURE — 99213 OFFICE O/P EST LOW 20 MIN: CPT | Mod: S$GLB,,, | Performed by: DERMATOLOGY

## 2022-10-04 PROCEDURE — 1157F PR ADVANCE CARE PLAN OR EQUIV PRESENT IN MEDICAL RECORD: ICD-10-PCS | Mod: CPTII,S$GLB,, | Performed by: DERMATOLOGY

## 2022-10-04 RX ORDER — FLUOCINONIDE TOPICAL SOLUTION USP, 0.05% 0.5 MG/ML
SOLUTION TOPICAL
Qty: 60 ML | Refills: 3 | Status: SHIPPED | OUTPATIENT
Start: 2022-10-04 | End: 2022-12-02

## 2022-10-04 NOTE — PROGRESS NOTES
Subjective:       Patient ID:  Jalyn Aaron is a 72 y.o. female who presents for   Chief Complaint   Patient presents with    Skin Check     TBSE     History of Present Illness: The patient presents for evaluation of rash, nos - resolved    The patient was last seen on: 3/22/22     Other skin complaints:   Patient complains of scalp tenderness on left side  Location: scalp  Duration:3 months  Symptoms: burns  Relieving factors/Previous treatments: none    Wants TBSE  No h/o nmsc or mm        Review of Systems   Skin:  Positive for daily sunscreen use, activity-related sunscreen use, recent sunburn (shoulders) and wears hat (activities).   Hematologic/Lymphatic: Bruises/bleeds easily (has pacemaker).      Objective:    Physical Exam   Constitutional: She appears well-developed and well-nourished. No distress.   Neurological: She is alert and oriented to person, place, and time. She is not disoriented.   Psychiatric: She has a normal mood and affect.   Skin:   Areas Examined (abnormalities noted in diagram):   Scalp / Hair Palpated and Inspected  Head / Face Inspection Performed  Neck Inspection Performed  Chest / Axilla Inspection Performed  Abdomen Inspection Performed  Genitals / Buttocks / Groin Inspection Performed  Back Inspection Performed  RUE Inspected  LUE Inspection Performed  RLE Inspected  LLE Inspection Performed  Nails and Digits Inspection Performed                 Diagram Legend     Erythematous scaling macule/papule c/w actinic keratosis       Vascular papule c/w angioma      Pigmented verrucoid papule/plaque c/w seborrheic keratosis      Yellow umbilicated papule c/w sebaceous hyperplasia      Irregularly shaped tan macule c/w lentigo     1-2 mm smooth white papules consistent with Milia      Movable subcutaneous cyst with punctum c/w epidermal inclusion cyst      Subcutaneous movable cyst c/w pilar cyst      Firm pink to brown papule c/w dermatofibroma      Pedunculated fleshy  papule(s) c/w skin tag(s)      Evenly pigmented macule c/w junctional nevus     Mildly variegated pigmented, slightly irregular-bordered macule c/w mildly atypical nevus      Flesh colored to evenly pigmented papule c/w intradermal nevus       Pink pearly papule/plaque c/w basal cell carcinoma      Erythematous hyperkeratotic cursted plaque c/w SCC      Surgical scar with no sign of skin cancer recurrence      Open and closed comedones      Inflammatory papules and pustules      Verrucoid papule consistent consistent with wart     Erythematous eczematous patches and plaques     Dystrophic onycholytic nail with subungual debris c/w onychomycosis     Umbilicated papule    Erythematous-base heme-crusted tan verrucoid plaque consistent with inflamed seborrheic keratosis     Erythematous Silvery Scaling Plaque c/w Psoriasis     See annotation      Assessment / Plan:        SK (seborrheic keratosis)   - minor problem and chronic.   Reassurance given to patient. No treatment necessary.         Lentigo   - minor problem and chronic.   Reassurance given to patient. No treatment necessary.       Angioma of skin  This is a benign vascular lesion. Reassurance given. No treatment required.       Disease of skin and subcutaneous tissue  + tender (chaz with water x 4 months. No itching. No h/o same. Localized area  Will trial lidex soln bid x 1 month. If not effective pt to schedule appt for punch bx  -     fluocinonide (LIDEX) 0.05 % external solution; AAA scalp qday - bid prn pruritus  Dispense: 60 mL; Refill: 3    Screening exam for skin cancer    Total body skin examination performed today including at least 12 points as noted in physical examination. No lesions suspicious for malignancy noted.    Recommend daily sun protection/avoidance, use of at least SPF 30, broad spectrum sunscreen (OTC drug), skin self examinations, and routine physician surveillance to optimize early detection             No follow-ups on file.

## 2022-10-05 ENCOUNTER — CLINICAL SUPPORT (OUTPATIENT)
Dept: REHABILITATION | Facility: HOSPITAL | Age: 72
End: 2022-10-05
Payer: MEDICARE

## 2022-10-05 DIAGNOSIS — R29.898 DECREASED STRENGTH OF UPPER EXTREMITY: ICD-10-CM

## 2022-10-05 DIAGNOSIS — R29.898 DECREASED ROM OF NECK: Primary | ICD-10-CM

## 2022-10-05 PROCEDURE — 97140 MANUAL THERAPY 1/> REGIONS: CPT | Mod: KX,PN

## 2022-10-05 PROCEDURE — 97110 THERAPEUTIC EXERCISES: CPT | Mod: KX,PN

## 2022-10-05 NOTE — PROGRESS NOTES
[facility]   Physical Therapy Treatment Note    Name: Jalyn Solares Galen  Clinic Number: [id number]    Therapy Diagnosis:   Encounter Diagnoses   Name Primary?    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   She was compliant with home exercise program.  Response to previous treatment: some soreness.  Functional change: ongoing     Pain: 0/10 (at rest) 3/10 (with right rotation greater than left)  Location: right neck      OBJECTIVE     9/23/2022  Right Cervical Rotation = 75 degrees  Left Cervical Rotation = 70 degrees     Treatment     Jalyn received the treatments listed below:      therapeutic exercises to develop strength, endurance, ROM, flexibility and posture for 35 minutes  Including:  Supine chin tuck with lift - 10 second holds x10  Prone chin tuck with scapular retraction and shoulder extension - 2# wand; 5 second holds; 2x10  Wide Row with external rotation and OH Lift - yellow theraband; 3x8  Standing Wall Balls  - flexion / extension and rotation; x30 each with isometric contraction  Unilateral shoulder extension and contralateral cervical rotation - blue theraband; x15 bilateral   SA wall slides - yellow band; 2x10 - NP  Wall Clocks - Yellow band; x10  Standing shoulder flexion  (yellow band) with pulse at end range - 10 pulses x10    manual therapy techniques: Joint mobilizations, soft tissue mobilization were applied to the: cervical spine for 10 minutes, including:  Cervical segmental mobility assessment  C2-3 extension and sideflexion muscle energy technique - 6 seconds x3 each    Patient Education and Home Exercises     Home Exercises Provided and Patient Education Provided     Education provided:   Correction for supine chin tucks.    Written Home Exercises Provided: yes. Exercises were reviewed and Jalyn was able to demonstrate them prior to the end of the session.  Jalyn demonstrated good  understanding of the education provided. 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 Gradually improving extension at C2-3, but still has significant restrictions with bilateral sideflexion. Improved mobility following manual techniques as noted by less stiffness with active range of motion. Progressed postural strengthening program with wide rows. Reviewed rest of exercise program in preparation for tentative discharge day of 11/4/2022.    Jalyn Is progressing well towards her goals.   Pt prognosis is Good.     Pt will continue to benefit from skilled outpatient physical therapy to address the deficits listed in the problem list box on initial evaluation, provide pt/family education and to maximize pt's level of independence in the home and community environment. Pt's spiritual, cultural and educational needs considered and pt agreeable to plan of care and goals.     Anticipated barriers to physical therapy: chronicity, co-morbidities     Goals:   Short Term Goals (3 Weeks):   2. Pt will improve cervical SB AROM 5 deg to improve mobility for functional tasks. MET 8/22/22  3. Pt will improve UE MMTs by 1/2 grade in all  planes to improve strength for lifting.  MET 8/22/22     Long Term Goals (6 Weeks):   1. Pt will improve FOTO to </=28% limitation to improve perceived limitation with changing and maintaining mobility.  (Progressing, not met)  2. Pt will improve cervical rotation AROM to 70 deg bilateral  to improve mobility for driving.  (met - 9/23/2022)  3. Pt will improve UE MMTs 1 grade in all planes to improve strength for lifting and carrying tasks.  (Progressing, not met)  4. Pt will report no pain with oil painting for 1 hr.   (Progressing, not met)  5. Pt will report no pain with cervical AROM in all planes to promote unlimited functional mobility.   (met - 9/23/2022)    PLAN     Improve upper cervical mobility  Stabilize mid and lower cervical segments.  Postural strengthening and proprioceptive training.    Beltran Avitia, PT, DPT, OCS

## 2022-10-11 ENCOUNTER — CLINICAL SUPPORT (OUTPATIENT)
Dept: REHABILITATION | Facility: HOSPITAL | Age: 72
End: 2022-10-11
Payer: MEDICARE

## 2022-10-11 DIAGNOSIS — R29.898 DECREASED ROM OF NECK: Primary | ICD-10-CM

## 2022-10-11 DIAGNOSIS — R29.898 DECREASED STRENGTH OF UPPER EXTREMITY: ICD-10-CM

## 2022-10-11 PROCEDURE — 97140 MANUAL THERAPY 1/> REGIONS: CPT | Mod: KX,PN

## 2022-10-11 PROCEDURE — 97110 THERAPEUTIC EXERCISES: CPT | Mod: KX,PN

## 2022-10-11 NOTE — PROGRESS NOTES
[facility]   Physical Therapy Treatment Note    Name: Jalyn Solares Galen  Clinic Number: [id number]    Therapy Diagnosis:   Encounter Diagnoses   Name Primary?    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   She was compliant with home exercise program.  Response to previous treatment: some soreness.  Functional change: ongoing     Pain: 0/10 (at rest) 3/10 (with right rotation greater than left)  Location: right neck      OBJECTIVE     10/11/2022  Right Cervical Rotation = 70 degrees  Left Cervical Rotation = 70 degrees     Treatment     Jalyn received the treatments listed below:      therapeutic exercises to develop strength, endurance, ROM, flexibility and posture for 45 minutes  Including:  Supine chin tuck with lift - 10 second holds x10  Prone chin tuck with scapular retraction and shoulder extension - 2# wand; 5 second holds; 2x10  Prone Thoracic Extension - hands on forehead; 5 second holds x20  Wide Row with external rotation and OH Lift - yellow theraband; 3x8  Standing Wall Balls  - flexion / extension and rotation; x30 each with isometric contraction - NP  SA wall slides - yellow band; 2x10 - NP  Wall Clocks - Yellow band; x10  Standing shoulder flexion (yellow band) with pulse at end range - 10 pulses x10  's  Carry - 7#; 100 feet x2    manual therapy techniques: Joint mobilizations, soft tissue mobilization were applied to the: cervical spine for 10 minutes, including:  Cervical segmental mobility assessment  C2-3 extension and sideflexion muscle energy technique - 6 seconds x3 each    Patient Education and Home Exercises     Home Exercises Provided and Patient Education Provided     Education provided:   Updated home exercise program - 10/11/2022    Written Home Exercises Provided: yes. Exercises were reviewed and Jalyn was able to demonstrate them prior to the end of the session.  Jalyn demonstrated good  understanding of the education provided. 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 Unremarkable neck pain over the last several visits., but ongoing right scapular pain with noticeable winging present. Progressed periscapular  strengthening while reviewing current postural program. Updated home exercise program in preparation for tentative discharge day of 11/4/2022.    Jalyn Is progressing well towards her goals.   Pt prognosis is Good.     Pt will continue to benefit from skilled outpatient physical therapy to address the deficits listed in the problem list box on initial evaluation, provide pt/family education and to maximize pt's level of independence in the home and community environment. Pt's spiritual, cultural and educational needs considered and pt agreeable to plan of care and goals.     Anticipated barriers to physical therapy: chronicity, co-morbidities     Goals:   Short Term Goals (3 Weeks):   2. Pt will improve cervical SB AROM 5 deg to improve mobility for functional tasks. MET 8/22/22  3. Pt will improve UE MMTs by 1/2 grade in all planes to improve strength for lifting.  MET 8/22/22     Long Term Goals (6 Weeks):   1. Pt  will improve FOTO to </=28% limitation to improve perceived limitation with changing and maintaining mobility.  (Progressing, not met)  2. Pt will improve cervical rotation AROM to 70 deg bilateral  to improve mobility for driving.  (met - 9/23/2022)  3. Pt will improve UE MMTs 1 grade in all planes to improve strength for lifting and carrying tasks.  (Progressing, not met)  4. Pt will report no pain with oil painting for 1 hr.   (Progressing, not met)  5. Pt will report no pain with cervical AROM in all planes to promote unlimited functional mobility.   (met - 9/23/2022)    PLAN     Improve upper cervical mobility  Stabilize mid and lower cervical segments.  Postural strengthening and proprioceptive training.    Beltran Avitia, PT, DPT, OCS

## 2022-10-20 ENCOUNTER — CLINICAL SUPPORT (OUTPATIENT)
Dept: REHABILITATION | Facility: HOSPITAL | Age: 72
End: 2022-10-20
Payer: MEDICARE

## 2022-10-20 DIAGNOSIS — R29.898 DECREASED ROM OF NECK: Primary | ICD-10-CM

## 2022-10-20 DIAGNOSIS — R29.898 DECREASED STRENGTH OF UPPER EXTREMITY: ICD-10-CM

## 2022-10-20 PROCEDURE — 97140 MANUAL THERAPY 1/> REGIONS: CPT | Mod: KX,PN

## 2022-10-20 PROCEDURE — 97110 THERAPEUTIC EXERCISES: CPT | Mod: KX,PN

## 2022-10-20 NOTE — PROGRESS NOTES
[facility] OUTPATIENT THERAPY AND WELLNESS   Physical Therapy Treatment Note    Name: Jalyn Solares Galen  Clinic Number: [id number]    Therapy Diagnosis:   Encounter Diagnoses   Name Primary?    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   She was compliant with home exercise program.  Response to previous treatment: some soreness.  Functional change: ongoing     Pain: 0/10 (at rest) 3/10 (with right rotation)  Location: right neck      OBJECTIVE     No objective measures today.    Treatment     Jalyn received the treatments listed below:      therapeutic exercises to develop strength, endurance, ROM, flexibility and posture for 45 minutes  Including:  Supine chin tuck with lift - 10 second holds x10  Prone chin tuck with scapular retraction and shoulder extension - 2# wand; 5 second holds; 2x10  Prone Thoracic Extension - hands on forehead; 5 second holds x20  Wide Row with external rotation and OH Lift - yellow theraband; 3x8  Standing Wall Balls  - flexion / extension and rotation; x30 each with isometric contraction  SA wall slides - yellow band; 2x10 - NP  Wall Clocks - Yellow band; x10  Standing shoulder flexion (yellow band) with pulse at end range - 10 pulses x10  's Carry - 7#; 100 feet x2    manual therapy techniques: Joint  mobilizations, soft tissue mobilization were applied to the: cervical spine for 8 minutes, including:  Cervical segmental motion assessment  C2-3 extension muscle energy technique - 6 seconds x3 each  C2-3 sideflexion muscle energy technique - 6 seconds x3 each    Patient Education and Home Exercises     Home Exercises Provided and Patient Education Provided     Education provided:   Updated home exercise program - 10/11/2022    Written Home Exercises Provided: yes. Exercises were reviewed and Jalyn was able to demonstrate them prior to the end of the session.  Jalyn demonstrated good  understanding of the education provided. 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 Decreases in neck and scapular pain since the last visit due to compliance with updated home exercise program. Continue home exercise program in preparation for tentative discharge day of 11/4/2022. No pain upon completion.    Jalyn Is progressing well towards her goals.   Pt prognosis is Good.     Pt will continue to benefit from skilled outpatient physical therapy to address the deficits listed in the problem list box on initial evaluation, provide pt/family education and to maximize pt's level of independence in the home and community environment. Pt's spiritual, cultural and educational needs considered and pt agreeable to plan of care and goals.     Anticipated barriers to physical therapy: chronicity, co-morbidities     Goals:   Short Term Goals (3 Weeks):   2. Pt will improve cervical SB AROM 5 deg to improve mobility for functional tasks. MET 8/22/22  3. Pt will improve UE MMTs by 1/2 grade in all planes to improve strength for lifting.  MET 8/22/22     Long Term Goals (6 Weeks):   1. Pt will improve FOTO to </=28% limitation to improve perceived limitation with  changing and maintaining mobility.  (Progressing, not met)  2. Pt will improve cervical rotation AROM to 70 deg bilateral  to improve mobility for driving.  (met - 9/23/2022)  3. Pt will improve UE MMTs 1 grade in all planes to improve strength for lifting and carrying tasks.  (Progressing, not met)  4. Pt will report no pain with oil painting for 1 hr.   (Progressing, not met)  5. Pt will report no pain with cervical AROM in all planes to promote unlimited functional mobility.   (met - 9/23/2022)    PLAN     Improve upper cervical mobility  Stabilize mid and lower cervical segments.  Postural strengthening and proprioceptive training.    Beltran Avitia, PT, DPT, OCS

## 2022-10-21 ENCOUNTER — DOCUMENTATION ONLY (OUTPATIENT)
Dept: REHABILITATION | Facility: OTHER | Age: 72
End: 2022-10-21
Attending: INTERNAL MEDICINE
Payer: MEDICARE

## 2022-10-21 NOTE — PROGRESS NOTES
Health  Wellness Visit Note    Name: Jalyn Solares Galen  Clinic Number: [id number]  Physician: No ref. provider found  Diagnosis: No diagnosis found.  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    Left    AV block     exercised induced 2:1    Cataract     Fever blister     Heart block     History of acne     Joint pain     hands    Keratoconjunctivitis sicca not due to Sjogren's syndrome     Pacemaker      Visit Number: 42  Precautions: standard; pacemaker      1st PT visit:  1/21/22  Year of care end date: 1/21/23  6 Month test  Performed: July 2022  12 Month test performed: Jan 2023  Mind body plan: Plan A 8 months  Patient level: B    Time In: 9:54 AM  Time Out: 10:37  AM  Total Treatment Time: 43 minutes    Wellness Vision 2022  Handout on this week's wellness topic was provided along with a discussion on Self-Assessment what it means, the benefits, and suggestions for practice.  Reviewed last week's topic of Relationship.    Subjective:   Patient reports that her back is not bothering her. She is dealing with neck pain and has iced it frequently during the week. She goes for walks twice a day and exercises for 25 minutes a day.      Objective:   Jalyn Solares completed therapeutic stretches (EIL, KATHY) and the following MedX exercise machines: core lumbar, torso rotation l/r, leg extension, leg curl, upright row, chest press, biceps curl, triceps extension, leg press    See exercise log in patient folder for rate of exertion and repetitions completed.       Fitness Machine Education Key:  E=education on equipment initiated and further follow up and education needed  I=independent with  and exercise.  The patient:  Adjusts machines to his/her settings  Uses equipment levers, pins, weights safely  Maintains safe and correct posture while exercising  Moves through exercise with correct pace and control  Gets on and off equipment  safely      Core lumbar strength E Core Torso Rotation E Leg Press E   Leg Extension E Seated Leg Curl E Chest Press E   Row E Abductor E Hip ADD X   Triceps  E Biceps E Other: X       Assessment:   Patient tolerated Patient tolerated MedX Core Lumbar Strength and all other peripheral exercises without an increase in symptoms. Patient warmed up on the treadmill for 5 minutes and stretched. Patient did not do 5 minutes of icing.     Plan:  Continue with established plan of care towards wellness goals.     Health  : Laura Lynch  10/21/2022

## 2022-10-27 DIAGNOSIS — R52 PAIN: Primary | ICD-10-CM

## 2022-10-28 ENCOUNTER — CLINICAL SUPPORT (OUTPATIENT)
Dept: REHABILITATION | Facility: HOSPITAL | Age: 72
End: 2022-10-28
Payer: MEDICARE

## 2022-10-28 ENCOUNTER — DOCUMENTATION ONLY (OUTPATIENT)
Dept: REHABILITATION | Facility: OTHER | Age: 72
End: 2022-10-28
Attending: INTERNAL MEDICINE
Payer: MEDICARE

## 2022-10-28 DIAGNOSIS — R29.898 DECREASED ROM OF NECK: Primary | ICD-10-CM

## 2022-10-28 DIAGNOSIS — R29.898 DECREASED STRENGTH OF UPPER EXTREMITY: ICD-10-CM

## 2022-10-28 PROCEDURE — 97140 MANUAL THERAPY 1/> REGIONS: CPT | Mod: KX,PN

## 2022-10-28 PROCEDURE — 97110 THERAPEUTIC EXERCISES: CPT | Mod: KX,PN

## 2022-10-28 NOTE — PROGRESS NOTES
Health  Wellness Visit Note    Name: Jalyn Solares [facility] [address] Number: [id number]  Physician: No ref. provider found  Diagnosis: No diagnosis found.  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    Left    AV block     exercised induced 2:1    Cataract     Fever blister     Heart block     History of acne     Joint pain     hands    Keratoconjunctivitis sicca not due to Sjogren's syndrome     Pacemaker      Visit Number: 43  Precautions: standard; pacemaker      1st PT visit:  1/21/22  Year of care end date: 1/21/23  6 Month test  Performed: July 2022  12 Month test performed: Jan 2023  Mind body plan: Plan A 8 months  Patient level: B    Time In: 12:25 PM  Time Out: 1:07 PM  Total Treatment Time: 42 minutes    Wellness Vision 2022  Handout on this week's wellness topic was provided along with a discussion on Boundaries what it means, the benefits, and suggestions for practice.  Reviewed last week's topic of Self-Assessment.    Subjective:   Patient reports that her back is not bothering her. She goes for walks twice a day and exercises for 25 minutes everyday.    Objective:   Jalyn Solares completed therapeutic stretches (EIL, KATHY) and the following MedX exercise machines: core lumbar, torso rotation l/r, leg extension, leg curl, upright row, chest press, biceps curl, triceps extension, leg press    See exercise log in patient folder for rate of exertion and repetitions completed.       Fitness Machine Education Key:  E=education on equipment initiated and further follow up and education needed  I=independent with  and exercise.  The patient:  Adjusts machines to his/her settings  Uses equipment levers, pins, weights safely  Maintains safe and correct posture while exercising  Moves through exercise with correct pace and control  Gets on and off equipment safely      Core lumbar strength E Core Torso Rotation E Leg Press E   Leg  Extension E Seated Leg Curl E Chest Press E   Row E Abductor E Hip ADD X   Triceps  E Biceps E Other: X       Assessment:   Patient tolerated Patient tolerated MedX Core Lumbar Strength and all other peripheral exercises without an increase in symptoms. Patient warmed up on the treadmill for 5 minutes and stretched. Patient iced her back and neck for 5 minutes     Plan:  Continue with established plan of care towards wellness goals.     Health  : Amy Richardson  10/28/2022

## 2022-10-28 NOTE — PROGRESS NOTES
[facility]   Physical Therapy Treatment Note    Name: Jalyn Solares [place]  Clinic Number: [id number]    Therapy Diagnosis:   Encounter Diagnoses   Name Primary?    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   She was compliant with home exercise program.  Response to previous treatment: some soreness.  Functional change: ongoing     Pain: 0/10 (at rest) 3/10 (with right rotation)  Location: right neck      OBJECTIVE     Tenderness to palpation at right 1st and 2nd ribs.  Resolved with CJT Grade V treatment    Treatment     Jalyn received the treatments listed below:      therapeutic exercises to develop strength, endurance, ROM, flexibility and posture for 45 minutes  Including:  Supine chin tuck with lift - 10 second holds x10  Prone chin tuck with scapular retraction and shoulder extension - 2# wand; 5 second holds; 2x10  Prone Thoracic Extension - hands on forehead; 5 second holds x20  Wide Row with external rotation and OH Lift - yellow theraband; 3x8  Standing Wall Balls  - flexion / extension and rotation; x30 each with isometric contraction  SA wall slides - yellow band; 2x10 - NP  Wall Clocks - Yellow band; x10  Standing shoulder flexion (yellow band) with pulse at end range - 10 pulses x10  's Carry -  7#; 100 feet x2    manual therapy techniques: Joint mobilizations, soft tissue mobilization were applied to the: cervical spine for 8 minutes, including:  Prone Right CT Junction mobilization - grade V  C2-3 extension muscle energy technique - 6 seconds x3 each  C2-3 sideflexion muscle energy technique - 6 seconds x3 each    Patient Education and Home Exercises     Home Exercises Provided and Patient Education Provided     Education provided:   Updated home exercise program - 10/11/2022    Written Home Exercises Provided: yes. Exercises were reviewed and Jalyn was able to demonstrate them prior to the end of the session.  Jalyn demonstrated good  understanding of the education provided. 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 Increased 1st and 2nd rib pain today that resolved with Right CTJ grade V mobilization. Continued ongoing reinforcement of positive manual therapy changes with exercises addressing cervical mobility, deep neck flexor endurance, and postural proprioception and strengthening. Expected to discharge at next visit.     Jalyn Is progressing well towards her goals.   Pt prognosis is Good.     Pt will continue to benefit from skilled outpatient physical therapy to address the deficits listed in the problem list box on initial evaluation, provide pt/family education and to maximize pt's level of independence in the home and community environment. Pt's spiritual, cultural and educational needs considered and pt agreeable to plan of care and goals.     Anticipated barriers to physical therapy: chronicity, co-morbidities     Goals:   Short Term Goals (3 Weeks):   2. Pt will improve cervical SB AROM 5 deg to improve mobility for functional tasks. MET 8/22/22  3. Pt will improve UE MMTs by 1/2 grade in all planes to improve  strength for lifting.  MET 8/22/22     Long Term Goals (6 Weeks):   1. Pt will improve FOTO to </=28% limitation to improve perceived limitation with changing and maintaining mobility.  (Progressing, not met)  2. Pt will improve cervical rotation AROM to 70 deg bilateral  to improve mobility for driving.  (met - 9/23/2022)  3. Pt will improve UE MMTs 1 grade in all planes to improve strength for lifting and carrying tasks.  (Progressing, not met)  4. Pt will report no pain with oil painting for 1 hr.   (Progressing, not met)  5. Pt will report no pain with cervical AROM in all planes to promote unlimited functional mobility.   (met - 9/23/2022)    PLAN     Improve upper cervical mobility  Stabilize mid and lower cervical segments.  Postural strengthening and proprioceptive training.    Beltran Avitia, PT, DPT, OCS

## 2022-10-31 ENCOUNTER — HOSPITAL ENCOUNTER (OUTPATIENT)
Dept: RADIOLOGY | Facility: HOSPITAL | Age: 72
Discharge: HOME | End: 2022-10-31
Attending: ORTHOPAEDIC SURGERY
Payer: MEDICARE

## 2022-10-31 ENCOUNTER — OFFICE VISIT (OUTPATIENT)
Dept: ORTHOPEDICS | Facility: CLINIC | Age: 72
End: 2022-10-31
Payer: MEDICARE

## 2022-10-31 VITALS — WEIGHT: 131.31 LBS | HEIGHT: 67 IN | BODY MASS INDEX: 20.61 KG/M2

## 2022-10-31 DIAGNOSIS — R52 PAIN: ICD-10-CM

## 2022-10-31 DIAGNOSIS — M25.552 LEFT HIP PAIN: Primary | ICD-10-CM

## 2022-10-31 PROCEDURE — 4010F PR ACE/ARB THEARPY RXD/TAKEN: ICD-10-PCS | Mod: CPTII,S$GLB,, | Performed by: ORTHOPAEDIC SURGERY

## 2022-10-31 PROCEDURE — 1125F PR PAIN SEVERITY QUANTIFIED, PAIN PRESENT: ICD-10-PCS | Mod: CPTII,S$GLB,, | Performed by: ORTHOPAEDIC SURGERY

## 2022-10-31 PROCEDURE — 4010F ACE/ARB THERAPY RXD/TAKEN: CPT | Mod: CPTII,S$GLB,, | Performed by: ORTHOPAEDIC SURGERY

## 2022-10-31 PROCEDURE — 73521 XR HIPS BILATERAL 2 VIEW INCL AP PELVIS: ICD-10-PCS | Mod: 26,,, | Performed by: RADIOLOGY

## 2022-10-31 PROCEDURE — 99999 PR PBB SHADOW E&M-EST. PATIENT-LVL III: CPT | Mod: PBBFAC,,, | Performed by: ORTHOPAEDIC SURGERY

## 2022-10-31 PROCEDURE — 1160F PR REVIEW ALL MEDS BY PRESCRIBER/CLIN PHARMACIST DOCUMENTED: ICD-10-PCS | Mod: CPTII,S$GLB,, | Performed by: ORTHOPAEDIC SURGERY

## 2022-10-31 PROCEDURE — 1125F AMNT PAIN NOTED PAIN PRSNT: CPT | Mod: CPTII,S$GLB,, | Performed by: ORTHOPAEDIC SURGERY

## 2022-10-31 PROCEDURE — 1157F PR ADVANCE CARE PLAN OR EQUIV PRESENT IN MEDICAL RECORD: ICD-10-PCS | Mod: CPTII,S$GLB,, | Performed by: ORTHOPAEDIC SURGERY

## 2022-10-31 PROCEDURE — 1159F MED LIST DOCD IN RCRD: CPT | Mod: CPTII,S$GLB,, | Performed by: ORTHOPAEDIC SURGERY

## 2022-10-31 PROCEDURE — 1159F PR MEDICATION LIST DOCUMENTED IN MEDICAL RECORD: ICD-10-PCS | Mod: CPTII,S$GLB,, | Performed by: ORTHOPAEDIC SURGERY

## 2022-10-31 PROCEDURE — 1157F ADVNC CARE PLAN IN RCRD: CPT | Mod: CPTII,S$GLB,, | Performed by: ORTHOPAEDIC SURGERY

## 2022-10-31 PROCEDURE — 99203 PR OFFICE/OUTPT VISIT, NEW, LEVL III, 30-44 MIN: ICD-10-PCS | Mod: S$GLB,,, | Performed by: ORTHOPAEDIC SURGERY

## 2022-10-31 PROCEDURE — 1101F PT FALLS ASSESS-DOCD LE1/YR: CPT | Mod: CPTII,S$GLB,, | Performed by: ORTHOPAEDIC SURGERY

## 2022-10-31 PROCEDURE — 73521 X-RAY EXAM HIPS BI 2 VIEWS: CPT | Mod: 26,,, | Performed by: RADIOLOGY

## 2022-10-31 PROCEDURE — 1160F RVW MEDS BY RX/DR IN RCRD: CPT | Mod: CPTII,S$GLB,, | Performed by: ORTHOPAEDIC SURGERY

## 2022-10-31 PROCEDURE — 3288F FALL RISK ASSESSMENT DOCD: CPT | Mod: CPTII,S$GLB,, | Performed by: ORTHOPAEDIC SURGERY

## 2022-10-31 PROCEDURE — 3288F PR FALLS RISK ASSESSMENT DOCUMENTED: ICD-10-PCS | Mod: CPTII,S$GLB,, | Performed by: ORTHOPAEDIC SURGERY

## 2022-10-31 PROCEDURE — 1101F PR PT FALLS ASSESS DOC 0-1 FALLS W/OUT INJ PAST YR: ICD-10-PCS | Mod: CPTII,S$GLB,, | Performed by: ORTHOPAEDIC SURGERY

## 2022-10-31 PROCEDURE — 73521 X-RAY EXAM HIPS BI 2 VIEWS: CPT | Mod: TC

## 2022-10-31 PROCEDURE — 99999 PR PBB SHADOW E&M-EST. PATIENT-LVL III: ICD-10-PCS | Mod: PBBFAC,,, | Performed by: ORTHOPAEDIC SURGERY

## 2022-10-31 PROCEDURE — 99203 OFFICE O/P NEW LOW 30 MIN: CPT | Mod: S$GLB,,, | Performed by: ORTHOPAEDIC SURGERY

## 2022-10-31 NOTE — PROGRESS NOTES
Subjective:      Patient ID: Jalyn Aaron is a 72 y.o. female.    Chief Complaint: Pain of the Left Hip    HPI  Jalyn Aaron is a 72 year old female here with a 6 months history of left hip pain. The patient is a  retired [employer] . There was not a history of trauma.  The pain was severe, bur has improved.The pain is located in the groin.  There is is not radiation. The pain is described as achy. The patient has not had prior surgery. It is aggravated by walking.  It  is alleviated by rest and a home exercise program.. There is not numbness or tingling of the lower extremity.  There is back pain.  She  has not tried medications or injections.  She does not have difficulty getting in or out of a car, getting dressed, or going up or down stairs.  The patient does not use an assistive device.    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    COLONOSCOPY N/A 3/30/2021    Procedure: COLONOSCOPY;  Surgeon: Joaquin Johnson MD;  Location: [facility] ([address] [place] [address]);  Service: Endoscopy;  Laterality: N/A;  prep ins. emailed - COVID screening on 3/27/21 PCW - ERW    HYSTERECTOMY      INSERT / REPLACE / REMOVE PACEMAKER      LASIK      LASIK Bilateral 2009     Family History   Problem Relation Age of Onset    Cancer Mother 80        pagets    Cataracts Mother     Hypertension  Mother     Breast cancer Mother     Multiple sclerosis Father     Multiple sclerosis Sister     No Known Problems Brother     No Known Problems Maternal Aunt     No Known Problems Maternal Uncle     No Known Problems Paternal Aunt     No Known Problems Paternal Uncle     No Known Problems Maternal Grandmother     Prostate cancer Maternal Grandfather     No Known Problems Paternal Grandmother     No Known Problems Paternal Grandfather     No Known Problems Son     No Known Problems Son     Amblyopia Neg Hx     Blindness Neg Hx     Diabetes Neg Hx     Glaucoma Neg Hx     Macular degeneration Neg Hx     Retinal detachment Neg Hx     Strabismus Neg Hx     Stroke Neg Hx     Thyroid disease Neg Hx     Melanoma Neg Hx     Ovarian cancer Neg Hx      Social History     Socioeconomic History    Marital status:     Number of children: 1   Occupational History    Occupation:      Employer: [employer]   Tobacco Use    Smoking status: Never     Passive exposure: Never    Smokeless tobacco: Never   Substance and Sexual Activity    Alcohol use: Yes     Alcohol/week: 0.0 standard drinks     Types: 2 Glasses of wine per week     Comment: socially    Drug use: No    Sexual activity: Not Currently     Partners: Male     Birth control/protection: None   Other Topics Concern    Are you pregnant or think you may be? No    Breast-feeding No   Social History Narrative     (case management) at [employer]     Current Outpatient Medications on File Prior to Visit   Medication Sig Dispense Refill    carvediloL (COREG) 3.125 MG tablet TAKE 1 TABLET(3.125 MG) BY MOUTH TWICE DAILY WITH MEALS 60 tablet 11    gabapentin (NEURONTIN) 600 MG tablet Take 1.5 tablets (900 mg total) by mouth 3 (three) times daily. 135 tablet 11    linaCLOtide (LINZESS) 72 mcg Cap capsule Take 1 capsule (72 mcg total) by mouth before breakfast. 30 capsule 3    losartan (COZAAR) 25 MG tablet TAKE 1 TABLET BY MOUTH TWICE DAILY 60  "tablet 6    multivitamin capsule Take 1 capsule by mouth once daily.      naproxen (NAPROSYN) 500 MG tablet Half tablet once daily 90 tablet 1    senna (SENOKOT) 8.6 mg tablet Take 1 tablet by mouth once daily. Twice a day      vitamin D 1000 units Tab Take 1,000 Units by mouth once daily.      fluocinonide (LIDEX) 0.05 % external solution AAA scalp qday - bid prn pruritus 60 mL 3    urea 20 % Crea Apply 1 application topically once daily. To dry skin on the feet. 75 g 10     No current facility-administered medications on file prior to visit.     Review of patient's allergies indicates:   Allergen Reactions    Bactrim [sulfamethoxazole-trimethoprim] Nausea Only       Review of Systems   Constitutional: Negative for chills, fever and night sweats.   HENT:  Negative for hearing loss.    Eyes:  Negative for blurred vision and double vision.   Cardiovascular:  Negative for chest pain, claudication and leg swelling.   Respiratory:  Negative for shortness of breath.    Endocrine: Negative for polydipsia, polyphagia and polyuria.   Hematologic/Lymphatic: Negative for adenopathy and bleeding problem. Does not bruise/bleed easily.   Skin:  Negative for poor wound healing.   Gastrointestinal:  Negative for diarrhea and heartburn.   Genitourinary:  Negative for bladder incontinence.   Neurological:  Negative for focal weakness, headaches, numbness, paresthesias and sensory change.   Psychiatric/Behavioral:  The patient is not nervous/anxious.    Allergic/Immunologic: Negative for persistent infections.       Objective:      Body mass index is 20.56 kg/m².  Vitals:    10/31/22 1321   Weight: 59.5 kg (131 lb 4.5 oz)   Height: 5' 7" (1.702 m)         General    Constitutional: She is oriented to person, place, and time. She appears well-developed and well-nourished.   HENT:   Head: Normocephalic and atraumatic.   Eyes: EOM are normal.   Cardiovascular:  Normal rate.            Pulmonary/Chest: Effort normal.   Neurological: She " is alert and oriented to person, place, and time.   Psychiatric: She has a normal mood and affect. Her behavior is normal.     General Musculoskeletal Exam   Gait: normal   Pelvic Obliquity: none      Right Knee Exam     Inspection   Alignment:  normal  Effusion: absent    Left Knee Exam     Inspection   Alignment:  normal  Effusion: absent    Right Hip Exam     Inspection   Scars: absent  Swelling: absent  Bruising: absent  No deformity of hip.  Quadriceps Atrophy:  Negative  Erythema: absent    Range of Motion   Abduction:  25   Adduction:  20   Extension:  0   Flexion:  100   External rotation:  30   Internal rotation:  25     Tests   Pain w/ forced internal rotation (NGUYEN): absent  Stinchfield test: negative    Other   Sensation: normal  Left Hip Exam     Inspection   Scars: absent  Swelling: absent  No deformity of hip.  Quadriceps Atrophy:  negative  Erythema: absent  Bruising: absent    Range of Motion   Abduction:  25   Adduction:  20   Extension:  0   Flexion:  100   External rotation:  30   Internal rotation: 25     Tests   Pain w/ forced internal rotation (NGUYEN): absent  Stinchfield test: negative    Other   Sensation: normal      Back (L-Spine & T-Spine) / Neck (C-Spine) Exam   Back exam is normal.      Muscle Strength   Right Lower Extremity   Hip Abduction: 5/5   Hip Adduction: 5/5   Hip Flexion: 5/5   Ankle Dorsiflexion:  5/5   Left Lower Extremity   Hip Abduction: 5/5   Hip Adduction: 5/5   Hip Flexion: 5/5   Ankle Dorsiflexion:  5/5     Reflexes     Left Side  Quadriceps:  2+    Right Side   Quadriceps:  2+    Vascular Exam     Right Pulses  Dorsalis Pedis:      2+          Left Pulses  Dorsalis Pedis:      2+          Capillary Refill  Right Hand: normal capillary refill  Left Hand: normal capillary refill        Edema  Right Upper Leg: absent  Left Upper Leg: absent    Radiographs taken today and reviewed by me demonstrate mild arthritic change of the left hip(s).There  is not bone destruction.   There is not a fracture.          Assessment:       Encounter Diagnosis   Name Primary?    Left hip pain Yes          Plan:       Jalyn Solares was seen today for pain.    Diagnoses and all orders for this visit:    Left hip pain      Options discussed. If pain recurs will get CT.  (Can't have an MRI due to pacemaker)

## 2022-11-02 ENCOUNTER — DOCUMENTATION ONLY (OUTPATIENT)
Dept: REHABILITATION | Facility: OTHER | Age: 72
End: 2022-11-02
Attending: INTERNAL MEDICINE
Payer: MEDICARE

## 2022-11-02 NOTE — PROGRESS NOTES
Health  Wellness Visit Note    Name: Jalyn Aaron  Clinic Number: [id number]  Physician: No ref. provider found  Diagnosis: No diagnosis found.  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    Left    AV block     exercised induced 2:1    Cataract     Fever blister     Heart block     History of acne     Joint pain     hands    Keratoconjunctivitis sicca not due to Sjogren's syndrome     Pacemaker      Visit Number: 44  Precautions: standard; pacemaker      1st PT visit:  1/21/22  Year of care end date: 1/21/23  6 Month test  Performed: July 2022  12 Month test performed: Jan 2023  Mind body plan: Plan A 8 months  Patient level: B    Time In: 8:55 AM  Time Out: 9:40 AM  Total Treatment Time: 45 minutes    Wellness Vision 2022  Handout on this week's wellness topic was provided along with a discussion on Requirements what it means, the benefits, and suggestions for practice.  Reviewed last week's topic of Boundaries.    Subjective:   Patient reports no back pain today. She goes for walks twice a day and exercises for 25 minutes everyday. She also stretches everyday. Pt is learning how to read the weight chart to exercise on her own.    Objective:   Jalyn Solares completed therapeutic stretches (EIL, KATHY) and the following MedX exercise machines: core lumbar, torso rotation l/r, leg extension, leg curl, upright row, chest press, biceps curl, triceps extension, leg press    See exercise log in patient folder for rate of exertion and repetitions completed.       Fitness Machine Education Key:  E=education on equipment initiated and further follow up and education needed  I=independent with  and exercise.  The patient:  Adjusts machines to his/her settings  Uses equipment levers, pins, weights safely  Maintains safe and correct posture while exercising  Moves through exercise with correct pace and control  Gets on and off equipment  safely      Core lumbar strength E Core Torso Rotation E Leg Press E   Leg Extension E Seated Leg Curl E Chest Press E   Row E Abductor E Hip ADD X   Triceps  E Biceps E Other: X       Assessment:   Patient tolerated Patient tolerated MedX Core Lumbar Strength and all other peripheral exercises without an increase in symptoms. Patient warmed up on the treadmill for 5 minutes and stretched. Patient iced her back and neck for 5 minutes     Plan:  Continue with established plan of care towards wellness goals.     Health  : Amy Richardson  11/2/2022

## 2022-11-04 ENCOUNTER — CLINICAL SUPPORT (OUTPATIENT)
Dept: REHABILITATION | Facility: HOSPITAL | Age: 72
End: 2022-11-04
Payer: MEDICARE

## 2022-11-04 DIAGNOSIS — R29.898 DECREASED ROM OF NECK: Primary | ICD-10-CM

## 2022-11-04 DIAGNOSIS — R29.898 DECREASED STRENGTH OF UPPER EXTREMITY: ICD-10-CM

## 2022-11-04 PROCEDURE — 97110 THERAPEUTIC EXERCISES: CPT | Mod: KX,PN

## 2022-11-04 NOTE — PROGRESS NOTES
[facility]   Physical Therapy Treatment Note and Discharge Summary    Name: Jalyn Aaron  Clinic Number: [id number]    Therapy Diagnosis:   Encounter Diagnoses   Name Primary?    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   She was compliant with home exercise program.  Response to previous treatment: some soreness.  Functional change: ongoing     Pain: 0/10 (at rest) 3/10 (with right rotation)  Location: right neck      OBJECTIVE     Upper Extremity Strength Right Left   Shoulder Flexion 5/5 5/5   Shoulder Extension 5/5 5/5   Shoulder Abduction 5/5 5/5   Shoulder IR 5/5 5/5   Shoulder ER 5/5 5/5   Elbow Flexion  5/5 5/5   Elbow Extension 5/5 5/5   Rhomboids 5/5 5/5   Mid Traps 5/5 5/5   Low Traps 5/5 5/5     Treatment     Jalyn received the treatments listed below:      therapeutic exercises to develop strength, endurance, ROM, flexibility and posture for 25 minutes  Including:  Reassessment  Home exercise program review  Education (see below)  Questions and answers    Not Today:  Supine chin tuck with lift - 10 second holds x10  Prone chin tuck with scapular retraction and shoulder extension - 2# wand; 5 second holds; 2x10  Prone Thoracic Extension - hands on forehead; 5 second holds x20  Wide  Row with external rotation and OH Lift - yellow theraband; 3x8  Standing Wall Balls  - flexion / extension and rotation; x30 each with isometric contraction  SA wall slides - yellow band; 2x10 - NP  Wall Clocks - Yellow band; x10  Standing shoulder flexion (yellow band) with pulse at end range - 10 pulses x10  's Carry - 7#; 100 feet x2    manual therapy techniques: Joint mobilizations, soft tissue mobilization were applied to the: cervical spine for 00 minutes, including:  Prone Right CT Junction mobilization - grade V  C2-3 extension muscle energy technique - 6 seconds x3 each  C2-3 sideflexion muscle energy technique - 6 seconds x3 each    Patient Education and Home Exercises     Home Exercises Provided and Patient Education Provided     Education provided:   Updated home exercise program - 11/4/2022  Anatomy and pathology  Discharge planning and instructions    Written Home Exercises Provided: yes. Exercises were reviewed and Jalyn was able to demonstrate them prior to the end of the session.  Jalyn demonstrated good  understanding of the education provided. See EMR under Patient Instructions for exercises provided during eval and updated on 6/21/2022    ASSESSMENT     Jalyn has met 6 out of 7 goals as of the 26th visit and is independent with her home exercise program; therefore, she is discharged from skilled physical therapy. She will call or return to the clinic if her symptoms return, or if any new symptoms arise. She gave verbal acknowledgement and understanding of all instructions and education, and is in agreement with the plan. Patient is discharged.    Jalyn Is progressing well towards her goals.   Pt prognosis is Good.     Pt will continue to benefit from skilled outpatient physical therapy to address the deficits listed in the problem list box on initial evaluation, provide pt/family education and to maximize pt's level of independence in the home and community environment. Pt's spiritual,  cultural and educational needs considered and pt agreeable to plan of care and goals.     Anticipated barriers to physical therapy: chronicity, co-morbidities     Goals:   Short Term Goals (3 Weeks):   2. Pt will improve cervical SB AROM 5 deg to improve mobility for functional tasks. MET 8/22/22  3. Pt will improve UE MMTs by 1/2 grade in all planes to improve strength for lifting.  MET 8/22/22     Long Term Goals (6 Weeks):   1. Pt will improve FOTO to </=28% limitation to improve perceived limitation with changing and maintaining mobility.  (not met)  2. Pt will improve cervical rotation AROM to 70 deg bilateral  to improve mobility for driving.  (met - 9/23/2022)  3. Pt will improve UE MMTs 1 grade in all planes to improve strength for lifting and carrying tasks.  (met)  4. Pt will report no pain with oil painting for 1 hr.    (met)  5. Pt will report no pain with cervical AROM in all planes to promote unlimited functional mobility.   (met - 9/23/2022)    PLAN     Patient is discharged.    Beltran Avitia, PT, DPT, OCS

## 2022-11-06 ENCOUNTER — PATIENT MESSAGE (OUTPATIENT)
Dept: INTERNAL MEDICINE | Facility: CLINIC | Age: 72
End: 2022-11-06
Payer: MEDICARE

## 2022-11-07 ENCOUNTER — PATIENT MESSAGE (OUTPATIENT)
Dept: INTERNAL MEDICINE | Facility: CLINIC | Age: 72
End: 2022-11-07
Payer: MEDICARE

## 2022-11-09 ENCOUNTER — TELEPHONE (OUTPATIENT)
Dept: ELECTROPHYSIOLOGY | Facility: CLINIC | Age: 72
End: 2022-11-09
Payer: MEDICARE

## 2022-11-09 ENCOUNTER — HOSPITAL ENCOUNTER (OUTPATIENT)
Dept: CARDIOLOGY | Facility: HOSPITAL | Age: 72
Discharge: HOME | End: 2022-11-09
Attending: INTERNAL MEDICINE
Payer: MEDICARE

## 2022-11-09 VITALS
SYSTOLIC BLOOD PRESSURE: 132 MMHG | DIASTOLIC BLOOD PRESSURE: 78 MMHG | BODY MASS INDEX: 20.56 KG/M2 | HEIGHT: 67 IN | WEIGHT: 131 LBS | HEART RATE: 65 BPM

## 2022-11-09 DIAGNOSIS — I44.2 ATRIOVENTRICULAR BLOCK, COMPLETE: ICD-10-CM

## 2022-11-09 LAB
ASCENDING AORTA: 3.36 CM
AV INDEX (PROSTH): 0.79
AV MEAN GRADIENT: 2 MMHG
AV PEAK GRADIENT: 3 MMHG
AV VALVE AREA: 2.77 CM2
AV VELOCITY RATIO: 0.75
BSA FOR ECHO PROCEDURE: 1.68 M2
CV ECHO LV RWT: 0.31 CM
DOP CALC AO PEAK VEL: 0.92 M/S
DOP CALC AO VTI: 16.79 CM
DOP CALC LVOT AREA: 3.5 CM2
DOP CALC LVOT DIAMETER: 2.12 CM
DOP CALC LVOT PEAK VEL: 0.69 M/S
DOP CALC LVOT STROKE VOLUME: 46.57 CM3
DOP CALCLVOT PEAK VEL VTI: 13.2 CM
E WAVE DECELERATION TIME: 119.88 MSEC
E/A RATIO: 0.67
E/E' RATIO: 10.6 M/S
ECHO LV POSTERIOR WALL: 0.68 CM (ref 0.6–1.1)
EJECTION FRACTION: 45 %
FRACTIONAL SHORTENING: 32 % (ref 28–44)
INTERVENTRICULAR SEPTUM: 0.67 CM (ref 0.6–1.1)
LA MAJOR: 4.04 CM
LA MINOR: 3.84 CM
LA WIDTH: 2.74 CM
LEFT ATRIUM SIZE: 2.62 CM
LEFT ATRIUM VOLUME INDEX MOD: 11.3 ML/M2
LEFT ATRIUM VOLUME INDEX: 14.2 ML/M2
LEFT ATRIUM VOLUME MOD: 19.09 CM3
LEFT ATRIUM VOLUME: 24.03 CM3
LEFT INTERNAL DIMENSION IN SYSTOLE: 2.97 CM (ref 2.1–4)
LEFT VENTRICLE DIASTOLIC VOLUME INDEX: 50.82 ML/M2
LEFT VENTRICLE DIASTOLIC VOLUME: 85.89 ML
LEFT VENTRICLE MASS INDEX: 51 G/M2
LEFT VENTRICLE SYSTOLIC VOLUME INDEX: 20.2 ML/M2
LEFT VENTRICLE SYSTOLIC VOLUME: 34.14 ML
LEFT VENTRICULAR INTERNAL DIMENSION IN DIASTOLE: 4.36 CM (ref 3.5–6)
LEFT VENTRICULAR MASS: 86.54 G
LV LATERAL E/E' RATIO: 10.6 M/S
LV SEPTAL E/E' RATIO: 10.6 M/S
MV A" WAVE DURATION": 12.56 MSEC
MV PEAK A VEL: 0.79 M/S
MV PEAK E VEL: 0.53 M/S
MV STENOSIS PRESSURE HALF TIME: 34.77 MS
MV VALVE AREA P 1/2 METHOD: 6.33 CM2
PISA TR MAX VEL: 2.2 M/S
PULM VEIN S/D RATIO: 1.52
PV PEAK D VEL: 0.21 M/S
PV PEAK S VEL: 0.32 M/S
RA MAJOR: 5.27 CM
RA PRESSURE: 3 MMHG
RA WIDTH: 2.94 CM
RIGHT VENTRICULAR END-DIASTOLIC DIMENSION: 3.1 CM
RV TISSUE DOPPLER FREE WALL SYSTOLIC VELOCITY 1 (APICAL 4 CHAMBER VIEW): 12.79 CM/S
SINUS: 3.2 CM
STJ: 3 CM
TDI LATERAL: 0.05 M/S
TDI SEPTAL: 0.05 M/S
TDI: 0.05 M/S
TR MAX PG: 19 MMHG
TRICUSPID ANNULAR PLANE SYSTOLIC EXCURSION: 2.22 CM
TV REST PULMONARY ARTERY PRESSURE: 22 MMHG

## 2022-11-09 PROCEDURE — 93306 ECHO (CUPID ONLY): ICD-10-PCS | Mod: 26,,, | Performed by: INTERNAL MEDICINE

## 2022-11-09 PROCEDURE — 93306 TTE W/DOPPLER COMPLETE: CPT

## 2022-11-09 PROCEDURE — 93306 TTE W/DOPPLER COMPLETE: CPT | Mod: 26,,, | Performed by: INTERNAL MEDICINE

## 2022-11-09 NOTE — TELEPHONE ENCOUNTER
----- Message from Chacho Colby MD sent at 11/9/2022 10:14 AM CST -----  EF remains stable at 45%, please relay to patient  ----- Message -----  From: Kiran Kellogg MD  Sent: 11/9/2022   8:45 AM CST  To: Chacho Colby MD

## 2022-11-09 NOTE — TELEPHONE ENCOUNTER
Spoke with patient and relayed echo results, per Dr Colby's note:    EF remains stable at 45%, please relay to patient     She verbalized understanding and was appreciative of the call.

## 2022-11-10 ENCOUNTER — DOCUMENTATION ONLY (OUTPATIENT)
Dept: REHABILITATION | Facility: OTHER | Age: 72
End: 2022-11-10
Attending: INTERNAL MEDICINE
Payer: MEDICARE

## 2022-11-10 NOTE — PROGRESS NOTES
Health  Wellness Visit Note    Name: Jalyn Aaron  Clinic Number: [id number]  Physician: No ref. provider found  Diagnosis: No diagnosis found.  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    Left    AV block     exercised induced 2:1    Cataract     Fever blister     Heart block     History of acne     Joint pain     hands    Keratoconjunctivitis sicca not due to Sjogren's syndrome     Pacemaker      Visit Number: 45  Precautions: standard; pacemaker      1st PT visit:  1/21/22  Year of care end date: 1/21/23  6 Month test  Performed: July 2022  12 Month test performed: Jan 2023  Mind body plan: Plan A 8 months  Patient level: B    Time In: 9:58 AM  Time Out: 10:38 AM  Total Treatment Time: 40 minutes    Wellness Vision 2022  Handout on this week's wellness topic was provided along with a discussion on Positions what it means, the benefits, and suggestions for practice.  Reviewed last week's topic of Requirements.    Subjective:   Patient reports no back pain. She continues to go for walks twice a day and exercises for 25 minutes everyday. She also stretches everyday. Pt is learning how to read the weight chart to exercise on her own.    Objective:   Jalyn Solares completed therapeutic stretches (EIL, KATHY) and the following MedX exercise machines: core lumbar, torso rotation l/r, leg extension, leg curl, upright row, chest press, biceps curl, triceps extension, leg press    See exercise log in patient folder for rate of exertion and repetitions completed.       Fitness Machine Education Key:  E=education on equipment initiated and further follow up and education needed  I=independent with  and exercise.  The patient:  Adjusts machines to his/her settings  Uses equipment levers, pins, weights safely  Maintains safe and correct posture while exercising  Moves through exercise with correct pace and control  Gets on and off equipment  safely      Core lumbar strength E Core Torso Rotation E Leg Press E   Leg Extension E Seated Leg Curl E Chest Press E   Row E Abductor E Hip ADD X   Triceps  E Biceps E Other: X       Assessment:   Patient tolerated Patient tolerated MedX Core Lumbar Strength and all other peripheral exercises without an increase in symptoms. Patient warmed up on the treadmill for 5 minutes and stretched. Patient iced her back and neck for 5 minutes     Plan:  Continue with established plan of care towards wellness goals.     Health  : Amy Richardson  11/10/2022

## 2022-11-14 ENCOUNTER — PATIENT MESSAGE (OUTPATIENT)
Dept: INTERNAL MEDICINE | Facility: CLINIC | Age: 72
End: 2022-11-14
Payer: MEDICARE

## 2022-11-14 NOTE — PROGRESS NOTES
Subjective:    Patient ID:  Jalyn Aaron is a 72 y.o. female who presents for follow-up of Pacemaker Check      HPI 71 yo female with AV block, NICM, PPM, anxiety.     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  Significant anxiety -- much improved since she has been on cymbalta  Echo 3/28/16 normal biventricular structure and function.  Echo 6/1/20 EF 45%.  We added Toprol. Already on Losartan.  Felt poorly on the Toprol. Noted heaviness, possibly shortness of breath, thinks anxiety may have been related. This was discontinued.     Retired,  3 years ago.    Echo 10/8/20 EF 45%  Echo 11/21 EF 45%.     Update:     Walks a mile in the morning, and 3/4 a mile in the evening. Notes mild increase in dyspnea with this over the past 6 months. Notes fatigue. Overall not as active as she was in the past.  Echo 11/16/22 EF 45% normal RV size and function.    Device interrogation reveals stable function of the leads, RA pacing 6% RV pacing 100%      Review of Systems   Constitutional: Negative. Negative for fever and malaise/fatigue.   HENT:  Negative for congestion and sore throat.    Cardiovascular:  Negative for chest pain, dyspnea on exertion, irregular heartbeat, leg swelling, near-syncope, orthopnea, palpitations, paroxysmal nocturnal dyspnea and syncope.   Respiratory:  Positive for shortness of breath. Negative for cough.    Gastrointestinal:  Negative for abdominal pain, constipation and diarrhea.   Neurological:  Negative for dizziness, light-headedness and weakness.   Psychiatric/Behavioral:  Negative for depression. The patient is not nervous/anxious.    All other systems reviewed and are negative.     Objective:    Physical Exam  Constitutional:       Appearance: She is well-developed.   Eyes:      General: No scleral icterus.     Conjunctiva/sclera: Conjunctivae normal.   Neck:      Vascular: No JVD.      Trachea: No tracheal  deviation.   Cardiovascular:      Rate and Rhythm: Normal rate and regular rhythm.      Chest Wall: PMI is not displaced.   Pulmonary:      Effort: Pulmonary effort is normal. No respiratory distress.      Breath sounds: Normal breath sounds.   Chest:      Comments: Varicosities noted across the chest  Abdominal:      Palpations: Abdomen is soft.      Tenderness: There is no abdominal tenderness.   Musculoskeletal:         General: No tenderness.   Skin:     General: Skin is warm and dry.      Findings: No rash.   Neurological:      Mental Status: She is alert and oriented to person, place, and time.   Psychiatric:         Behavior: Behavior normal.         Assessment:       1. AV block    2. Cardiac pacemaker in situ    3. Cardiomyopathy, nonischemic    4. Anxiety         Plan:       Complete AV block. EF remains stable at 45%.  We discussed consideration of upgrade at time of generator change. There is concern that she is occluded, given the varicosities observed on her chest. We will schedule venogram now to assess.  If occluded, would defer upgrade unless EF further deteriorates.

## 2022-11-15 ENCOUNTER — TELEPHONE (OUTPATIENT)
Dept: INTERNAL MEDICINE | Facility: CLINIC | Age: 72
End: 2022-11-15

## 2022-11-15 ENCOUNTER — IMMUNIZATION (OUTPATIENT)
Dept: INTERNAL MEDICINE | Facility: CLINIC | Age: 72
End: 2022-11-15
Payer: MEDICARE

## 2022-11-15 PROCEDURE — 90686 FLU VACCINE (QUAD) GREATER THAN OR EQUAL TO 3YO PRESERVATIVE FREE IM: ICD-10-PCS | Mod: S$GLB,,, | Performed by: INTERNAL MEDICINE

## 2022-11-15 PROCEDURE — G0008 FLU VACCINE (QUAD) GREATER THAN OR EQUAL TO 3YO PRESERVATIVE FREE IM: ICD-10-PCS | Mod: S$GLB,,, | Performed by: INTERNAL MEDICINE

## 2022-11-15 PROCEDURE — G0008 ADMIN INFLUENZA VIRUS VAC: HCPCS | Mod: S$GLB,,, | Performed by: INTERNAL MEDICINE

## 2022-11-15 PROCEDURE — 90686 IIV4 VACC NO PRSV 0.5 ML IM: CPT | Mod: S$GLB,,, | Performed by: INTERNAL MEDICINE

## 2022-11-16 ENCOUNTER — CLINICAL SUPPORT (OUTPATIENT)
Dept: CARDIOLOGY | Facility: HOSPITAL | Age: 72
End: 2022-11-16
Attending: INTERNAL MEDICINE
Payer: MEDICARE

## 2022-11-16 ENCOUNTER — PATIENT MESSAGE (OUTPATIENT)
Dept: INTERNAL MEDICINE | Facility: CLINIC | Age: 72
End: 2022-11-16
Payer: MEDICARE

## 2022-11-16 ENCOUNTER — OFFICE VISIT (OUTPATIENT)
Dept: ELECTROPHYSIOLOGY | Facility: CLINIC | Age: 72
End: 2022-11-16
Payer: MEDICARE

## 2022-11-16 ENCOUNTER — PATIENT MESSAGE (OUTPATIENT)
Dept: ELECTROPHYSIOLOGY | Facility: CLINIC | Age: 72
End: 2022-11-16

## 2022-11-16 VITALS
DIASTOLIC BLOOD PRESSURE: 72 MMHG | SYSTOLIC BLOOD PRESSURE: 98 MMHG | HEART RATE: 80 BPM | BODY MASS INDEX: 21.14 KG/M2 | WEIGHT: 134.69 LBS | HEIGHT: 67 IN

## 2022-11-16 DIAGNOSIS — Z95.0 CARDIAC PACEMAKER IN SITU: ICD-10-CM

## 2022-11-16 DIAGNOSIS — I44.30 AV BLOCK: Primary | ICD-10-CM

## 2022-11-16 DIAGNOSIS — I42.8 CARDIOMYOPATHY, NONISCHEMIC: ICD-10-CM

## 2022-11-16 DIAGNOSIS — I44.2 ATRIOVENTRICULAR BLOCK, COMPLETE: ICD-10-CM

## 2022-11-16 DIAGNOSIS — F41.9 ANXIETY: ICD-10-CM

## 2022-11-16 PROCEDURE — 1157F ADVNC CARE PLAN IN RCRD: CPT | Mod: CPTII,S$GLB,, | Performed by: INTERNAL MEDICINE

## 2022-11-16 PROCEDURE — 1101F PT FALLS ASSESS-DOCD LE1/YR: CPT | Mod: CPTII,S$GLB,, | Performed by: INTERNAL MEDICINE

## 2022-11-16 PROCEDURE — 3074F SYST BP LT 130 MM HG: CPT | Mod: CPTII,S$GLB,, | Performed by: INTERNAL MEDICINE

## 2022-11-16 PROCEDURE — 1125F PR PAIN SEVERITY QUANTIFIED, PAIN PRESENT: ICD-10-PCS | Mod: CPTII,S$GLB,, | Performed by: INTERNAL MEDICINE

## 2022-11-16 PROCEDURE — 4010F ACE/ARB THERAPY RXD/TAKEN: CPT | Mod: CPTII,S$GLB,, | Performed by: INTERNAL MEDICINE

## 2022-11-16 PROCEDURE — 93280 PM DEVICE PROGR EVAL DUAL: CPT

## 2022-11-16 PROCEDURE — 1101F PR PT FALLS ASSESS DOC 0-1 FALLS W/OUT INJ PAST YR: ICD-10-PCS | Mod: CPTII,S$GLB,, | Performed by: INTERNAL MEDICINE

## 2022-11-16 PROCEDURE — 1159F MED LIST DOCD IN RCRD: CPT | Mod: CPTII,S$GLB,, | Performed by: INTERNAL MEDICINE

## 2022-11-16 PROCEDURE — 99999 PR PBB SHADOW E&M-EST. PATIENT-LVL III: ICD-10-PCS | Mod: PBBFAC,,, | Performed by: INTERNAL MEDICINE

## 2022-11-16 PROCEDURE — 3078F PR MOST RECENT DIASTOLIC BLOOD PRESSURE < 80 MM HG: ICD-10-PCS | Mod: CPTII,S$GLB,, | Performed by: INTERNAL MEDICINE

## 2022-11-16 PROCEDURE — 3078F DIAST BP <80 MM HG: CPT | Mod: CPTII,S$GLB,, | Performed by: INTERNAL MEDICINE

## 2022-11-16 PROCEDURE — 3008F PR BODY MASS INDEX (BMI) DOCUMENTED: ICD-10-PCS | Mod: CPTII,S$GLB,, | Performed by: INTERNAL MEDICINE

## 2022-11-16 PROCEDURE — 3074F PR MOST RECENT SYSTOLIC BLOOD PRESSURE < 130 MM HG: ICD-10-PCS | Mod: CPTII,S$GLB,, | Performed by: INTERNAL MEDICINE

## 2022-11-16 PROCEDURE — 3288F FALL RISK ASSESSMENT DOCD: CPT | Mod: CPTII,S$GLB,, | Performed by: INTERNAL MEDICINE

## 2022-11-16 PROCEDURE — 3008F BODY MASS INDEX DOCD: CPT | Mod: CPTII,S$GLB,, | Performed by: INTERNAL MEDICINE

## 2022-11-16 PROCEDURE — 99999 PR PBB SHADOW E&M-EST. PATIENT-LVL III: CPT | Mod: PBBFAC,,, | Performed by: INTERNAL MEDICINE

## 2022-11-16 PROCEDURE — 1157F PR ADVANCE CARE PLAN OR EQUIV PRESENT IN MEDICAL RECORD: ICD-10-PCS | Mod: CPTII,S$GLB,, | Performed by: INTERNAL MEDICINE

## 2022-11-16 PROCEDURE — 1125F AMNT PAIN NOTED PAIN PRSNT: CPT | Mod: CPTII,S$GLB,, | Performed by: INTERNAL MEDICINE

## 2022-11-16 PROCEDURE — 99215 PR OFFICE/OUTPT VISIT, EST, LEVL V, 40-54 MIN: ICD-10-PCS | Mod: S$GLB,,, | Performed by: INTERNAL MEDICINE

## 2022-11-16 PROCEDURE — 4010F PR ACE/ARB THEARPY RXD/TAKEN: ICD-10-PCS | Mod: CPTII,S$GLB,, | Performed by: INTERNAL MEDICINE

## 2022-11-16 PROCEDURE — 93280 CARDIAC DEVICE CHECK - IN CLINIC & HOSPITAL: ICD-10-PCS | Mod: 26,,, | Performed by: INTERNAL MEDICINE

## 2022-11-16 PROCEDURE — 93280 PM DEVICE PROGR EVAL DUAL: CPT | Mod: 26,,, | Performed by: INTERNAL MEDICINE

## 2022-11-16 PROCEDURE — 1159F PR MEDICATION LIST DOCUMENTED IN MEDICAL RECORD: ICD-10-PCS | Mod: CPTII,S$GLB,, | Performed by: INTERNAL MEDICINE

## 2022-11-16 PROCEDURE — 3288F PR FALLS RISK ASSESSMENT DOCUMENTED: ICD-10-PCS | Mod: CPTII,S$GLB,, | Performed by: INTERNAL MEDICINE

## 2022-11-16 PROCEDURE — 99215 OFFICE O/P EST HI 40 MIN: CPT | Mod: S$GLB,,, | Performed by: INTERNAL MEDICINE

## 2022-11-17 ENCOUNTER — OFFICE VISIT (OUTPATIENT)
Dept: INTERNAL MEDICINE | Facility: CLINIC | Age: 72
End: 2022-11-17
Payer: MEDICARE

## 2022-11-17 VITALS
BODY MASS INDEX: 21 KG/M2 | HEIGHT: 67 IN | DIASTOLIC BLOOD PRESSURE: 86 MMHG | WEIGHT: 133.81 LBS | SYSTOLIC BLOOD PRESSURE: 134 MMHG | OXYGEN SATURATION: 98 % | HEART RATE: 74 BPM

## 2022-11-17 DIAGNOSIS — I42.8 CARDIOMYOPATHY, NONISCHEMIC: ICD-10-CM

## 2022-11-17 DIAGNOSIS — L81.9 DISCOLORATION OF SKIN: Primary | ICD-10-CM

## 2022-11-17 DIAGNOSIS — I44.2 ATRIOVENTRICULAR BLOCK, COMPLETE: ICD-10-CM

## 2022-11-17 DIAGNOSIS — R73.9 HYPERGLYCEMIA: ICD-10-CM

## 2022-11-17 DIAGNOSIS — R94.6 ABNORMAL RESULTS OF THYROID FUNCTION STUDIES: ICD-10-CM

## 2022-11-17 DIAGNOSIS — E78.5 HYPERLIPIDEMIA, UNSPECIFIED HYPERLIPIDEMIA TYPE: ICD-10-CM

## 2022-11-17 DIAGNOSIS — E55.9 MILD VITAMIN D DEFICIENCY: ICD-10-CM

## 2022-11-17 PROCEDURE — 99213 PR OFFICE/OUTPT VISIT, EST, LEVL III, 20-29 MIN: ICD-10-PCS | Mod: S$GLB,,, | Performed by: INTERNAL MEDICINE

## 2022-11-17 PROCEDURE — 3008F PR BODY MASS INDEX (BMI) DOCUMENTED: ICD-10-PCS | Mod: CPTII,S$GLB,, | Performed by: INTERNAL MEDICINE

## 2022-11-17 PROCEDURE — 99999 PR PBB SHADOW E&M-EST. PATIENT-LVL IV: CPT | Mod: PBBFAC,,, | Performed by: INTERNAL MEDICINE

## 2022-11-17 PROCEDURE — 99213 OFFICE O/P EST LOW 20 MIN: CPT | Mod: S$GLB,,, | Performed by: INTERNAL MEDICINE

## 2022-11-17 PROCEDURE — 1157F PR ADVANCE CARE PLAN OR EQUIV PRESENT IN MEDICAL RECORD: ICD-10-PCS | Mod: CPTII,S$GLB,, | Performed by: INTERNAL MEDICINE

## 2022-11-17 PROCEDURE — 4010F PR ACE/ARB THEARPY RXD/TAKEN: ICD-10-PCS | Mod: CPTII,S$GLB,, | Performed by: INTERNAL MEDICINE

## 2022-11-17 PROCEDURE — 4010F ACE/ARB THERAPY RXD/TAKEN: CPT | Mod: CPTII,S$GLB,, | Performed by: INTERNAL MEDICINE

## 2022-11-17 PROCEDURE — 1125F PR PAIN SEVERITY QUANTIFIED, PAIN PRESENT: ICD-10-PCS | Mod: CPTII,S$GLB,, | Performed by: INTERNAL MEDICINE

## 2022-11-17 PROCEDURE — 3079F DIAST BP 80-89 MM HG: CPT | Mod: CPTII,S$GLB,, | Performed by: INTERNAL MEDICINE

## 2022-11-17 PROCEDURE — 3008F BODY MASS INDEX DOCD: CPT | Mod: CPTII,S$GLB,, | Performed by: INTERNAL MEDICINE

## 2022-11-17 PROCEDURE — 99999 PR PBB SHADOW E&M-EST. PATIENT-LVL IV: ICD-10-PCS | Mod: PBBFAC,,, | Performed by: INTERNAL MEDICINE

## 2022-11-17 PROCEDURE — 3075F PR MOST RECENT SYSTOLIC BLOOD PRESS GE 130-139MM HG: ICD-10-PCS | Mod: CPTII,S$GLB,, | Performed by: INTERNAL MEDICINE

## 2022-11-17 PROCEDURE — 1157F ADVNC CARE PLAN IN RCRD: CPT | Mod: CPTII,S$GLB,, | Performed by: INTERNAL MEDICINE

## 2022-11-17 PROCEDURE — 3288F FALL RISK ASSESSMENT DOCD: CPT | Mod: CPTII,S$GLB,, | Performed by: INTERNAL MEDICINE

## 2022-11-17 PROCEDURE — 3288F PR FALLS RISK ASSESSMENT DOCUMENTED: ICD-10-PCS | Mod: CPTII,S$GLB,, | Performed by: INTERNAL MEDICINE

## 2022-11-17 PROCEDURE — 3079F PR MOST RECENT DIASTOLIC BLOOD PRESSURE 80-89 MM HG: ICD-10-PCS | Mod: CPTII,S$GLB,, | Performed by: INTERNAL MEDICINE

## 2022-11-17 PROCEDURE — 1101F PR PT FALLS ASSESS DOC 0-1 FALLS W/OUT INJ PAST YR: ICD-10-PCS | Mod: CPTII,S$GLB,, | Performed by: INTERNAL MEDICINE

## 2022-11-17 PROCEDURE — 1125F AMNT PAIN NOTED PAIN PRSNT: CPT | Mod: CPTII,S$GLB,, | Performed by: INTERNAL MEDICINE

## 2022-11-17 PROCEDURE — 3075F SYST BP GE 130 - 139MM HG: CPT | Mod: CPTII,S$GLB,, | Performed by: INTERNAL MEDICINE

## 2022-11-17 PROCEDURE — 1101F PT FALLS ASSESS-DOCD LE1/YR: CPT | Mod: CPTII,S$GLB,, | Performed by: INTERNAL MEDICINE

## 2022-11-17 NOTE — PROGRESS NOTES
Subjective:       Patient ID: Jalyn Aaron is a 72 y.o. female.    Chief Complaint: shoulder indentation (Right side)    HPIPt with lesion on shoulder blade.  Review of Systems   Respiratory:  Negative for shortness of breath (PND or orthopnea).    Cardiovascular:  Negative for chest pain (arm pain or jaw pain).   Gastrointestinal:  Negative for abdominal pain, diarrhea, nausea and vomiting.   Genitourinary:  Negative for dysuria.   Neurological:  Negative for seizures, syncope and headaches.     Objective:      Physical Exam  Constitutional:       General: She is not in acute distress.     Appearance: She is well-developed.   HENT:      Head: Normocephalic.   Eyes:      Pupils: Pupils are equal, round, and reactive to light.   Neck:      Thyroid: No thyromegaly.      Vascular: No JVD.   Cardiovascular:      Rate and Rhythm: Normal rate and regular rhythm.      Heart sounds: Normal heart sounds. No murmur heard.    No friction rub. No gallop.   Pulmonary:      Effort: Pulmonary effort is normal.      Breath sounds: Normal breath sounds. No wheezing or rales.   Abdominal:      General: Bowel sounds are normal. There is no distension.      Palpations: Abdomen is soft. There is no mass.      Tenderness: There is no abdominal tenderness. There is no guarding or rebound.   Musculoskeletal:      Cervical back: Neck supple.   Lymphadenopathy:      Cervical: No cervical adenopathy.   Skin:     General: Skin is warm and dry.   Neurological:      Mental Status: She is alert and oriented to person, place, and time.      Deep Tendon Reflexes: Reflexes are normal and symmetric.   Psychiatric:         Behavior: Behavior normal.         Thought Content: Thought content normal.         Judgment: Judgment normal.     Perfectly round lesion near R shoulder blade ?asymmetry?  Assessment:       1. Discoloration of skin    2. Cardiomyopathy, nonischemic    3. Hyperlipidemia, unspecified hyperlipidemia type    4.  Hyperglycemia    5. Mild vitamin D deficiency    6. Abnormal results of thyroid function studies          Plan:   Discoloration of skin  -     Ambulatory referral/consult to Dermatology; Future; Expected date: 11/24/2022  -     CT Chest Without Contrast; Future; Expected date: 11/17/2022    Cardiomyopathy, nonischemic  -     CBC Auto Differential; Future; Expected date: 11/17/2022  -     Comprehensive Metabolic Panel; Future; Expected date: 11/17/2022  -     TSH; Future; Expected date: 11/17/2022    Hyperlipidemia, unspecified hyperlipidemia type  -     Lipid Panel; Future; Expected date: 11/17/2022    Hyperglycemia  -     Hemoglobin A1C; Future; Expected date: 11/17/2022    Mild vitamin D deficiency  -     Vitamin D; Future; Expected date: 11/17/2022    Abnormal results of thyroid function studies  -     TSH; Future; Expected date: 11/17/2022    Complete AV block  Has pacer follows closely with Cardiology

## 2022-11-18 ENCOUNTER — HOSPITAL ENCOUNTER (OUTPATIENT)
Dept: RADIOLOGY | Facility: HOSPITAL | Age: 72
Discharge: HOME | End: 2022-11-18
Attending: INTERNAL MEDICINE
Payer: MEDICARE

## 2022-11-18 DIAGNOSIS — L81.9 DISCOLORATION OF SKIN: ICD-10-CM

## 2022-11-18 PROCEDURE — 71250 CT CHEST WITHOUT CONTRAST: ICD-10-PCS | Mod: 26,,, | Performed by: RADIOLOGY

## 2022-11-18 PROCEDURE — 71250 CT THORAX DX C-: CPT | Mod: 26,,, | Performed by: RADIOLOGY

## 2022-11-18 PROCEDURE — 71250 CT THORAX DX C-: CPT | Mod: TC

## 2022-11-21 ENCOUNTER — TELEPHONE (OUTPATIENT)
Dept: INTERNAL MEDICINE | Facility: CLINIC | Age: 72
End: 2022-11-21
Payer: MEDICARE

## 2022-11-21 ENCOUNTER — PATIENT MESSAGE (OUTPATIENT)
Dept: INTERNAL MEDICINE | Facility: CLINIC | Age: 72
End: 2022-11-21
Payer: MEDICARE

## 2022-11-21 DIAGNOSIS — R91.1 SOLITARY PULMONARY NODULE: Primary | ICD-10-CM

## 2022-11-22 ENCOUNTER — TELEPHONE (OUTPATIENT)
Dept: DERMATOLOGY | Facility: CLINIC | Age: 72
End: 2022-11-22
Payer: MEDICARE

## 2022-11-22 NOTE — TELEPHONE ENCOUNTER
Spoke with pt - new concern of rash per PCP - pt scheduled. Pt confirmed appt and thanked me for call    ----- Message from Fariha Bennett sent at 11/22/2022 12:41 PM CST -----  Jalyn Aaron calling regarding Appointment Access  for wanting to see Dr. Ward per referral, stating she called on Friday and have not heard back to schedule, call back [phone]

## 2022-11-23 ENCOUNTER — DOCUMENTATION ONLY (OUTPATIENT)
Dept: REHABILITATION | Facility: OTHER | Age: 72
End: 2022-11-23
Payer: MEDICARE

## 2022-11-23 NOTE — PROGRESS NOTES
Health  Wellness Visit Note    [id number]  Physician: No ref. provider found  Diagnosis: No diagnosis found.  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    Left    AV block     exercised induced 2:1    Cataract     Fever blister     Heart block     History of acne     Joint pain     hands    Keratoconjunctivitis sicca not due to Sjogren's syndrome     Pacemaker      Visit Number: 46  Precautions: standard; pacemaker      1st PT visit:  1/21/22  Year of care end date: 1/21/23  6 Month test  Performed: July 2022  12 Month test performed: Jan 2023  Mind body plan: Plan A 8 months  Patient level: B    Time In: 7:00 AM  Time Out: 7:41 AM  Total Treatment Time: 41 minutes    Wellness Vision 2022  Handout on this week's wellness topic was provided along with a discussion on Conditions and Tips what it means, the benefits, and suggestions for practice.  Reviewed last week's topic of NA.(Patient did not come to Wellness last week)    Subjective:   Patient reports no back pain but is dealing with pain in her neck. She goes for walks twice a day and exercises for 25 minutes everyday. She also stretches everyday. Pt is learning how to read the weight chart to exercise on her own.    Objective:   Jalyn Solares completed therapeutic stretches (EIL, KATHY) and the following MedX exercise machines: core lumbar, torso rotation l/r, leg extension, leg curl, upright row, chest press, biceps curl, triceps extension, leg press    See exercise log in patient folder for rate of exertion and repetitions completed.       Fitness Machine Education Key:  E=education on equipment initiated and further follow up and education needed  I=independent with  and exercise.  The patient:  Adjusts machines to his/her settings  Uses equipment levers, pins, weights safely  Maintains safe and correct posture while exercising  Moves  Stop lantus since fasting sugars are below target     Start januvia 50mg every day with breakfast    Check sugars twice daily, bring log to the next visit   Fasting   Before meals, should be less than 180  HS, should be less than 180 or around 180    Goal sugars are     Do labs in 3months     See what Dr Meade's office says about cutting back the carvedilol dose because of the lower heart rate        through exercise with correct pace and control  Gets on and off equipment safely      Core lumbar strength E Core Torso Rotation E Leg Press E   Leg Extension E Seated Leg Curl E Chest Press E   Row E Abductor E Hip ADD X   Triceps  E Biceps E Other: X       Assessment:   Patient tolerated Patient tolerated MedX Core Lumbar Strength and all other peripheral exercises without an increase in symptoms. Patient warmed up on the treadmill for 5 minutes and stretched. Patient iced her back and neck for 5 minutes     Plan:  Continue with established plan of care towards wellness goals.     Health  : Amy Richardson  11/23/2022

## 2022-12-01 ENCOUNTER — DOCUMENTATION ONLY (OUTPATIENT)
Dept: REHABILITATION | Facility: OTHER | Age: 72
End: 2022-12-01
Attending: INTERNAL MEDICINE
Payer: MEDICARE

## 2022-12-01 NOTE — PROGRESS NOTES
Health  Wellness Visit Note    Name: Jalyn Solares Galen  Clinic Number: [id number]  Physician: No ref. provider found  Diagnosis: No diagnosis found.  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    Left    AV block     exercised induced 2:1    Cataract     Fever blister     Heart block     History of acne     Joint pain     hands    Keratoconjunctivitis sicca not due to Sjogren's syndrome     Pacemaker      Visit Number: 47  Precautions: standard; pacemaker      1st PT visit:  1/21/22  Year of care end date: 1/21/23  6 Month test  Performed: July 2022  12 Month test performed: Jan 2023  Mind body plan: Plan A 8 months  Patient level: B    Time In: 8:56 AM  Time Out: 9:29 AM  Total Treatment Time: 33 minutes    Wellness Vision 2022  Handout on this week's wellness topic was provided along with a discussion on Move More, Sleep Better what it means, the benefits, and suggestions for practice. Reviewed last week's topic of Conditions and Tips.  Subjective:   Patient reports no back pain. She goes for walks twice a day and exercises for 25 minutes everyday. She also stretches everyday. Pt is learning how to read the weight chart to exercise on her own.    Objective:   Jalyn Solares completed therapeutic stretches (EIL, KATHY) and the following MedX exercise machines: core lumbar, torso rotation l/r, leg extension, leg curl, upright row, chest press, biceps curl, triceps extension, leg press    See exercise log in patient folder for rate of exertion and repetitions completed.       Fitness Machine Education Key:  E=education on equipment initiated and further follow up and education needed  I=independent with  and exercise.  The patient:  Adjusts machines to his/her settings  Uses equipment levers, pins, weights safely  Maintains safe and correct posture while exercising  Moves through exercise with correct pace and control  Gets on and off  equipment safely      Core lumbar strength E Core Torso Rotation E Leg Press E   Leg Extension E Seated Leg Curl E Chest Press E   Row E Abductor E Hip ADD X   Triceps  E Biceps E Other: X       Assessment:   Patient tolerated Patient tolerated MedX Core Lumbar Strength and all other peripheral exercises without an increase in symptoms. Patient warmed up on the treadmill for 5 minutes and stretched. Patient skipped ice.  Plan:  Continue with established plan of care towards wellness goals.     Health  : Amy Richardson  12/1/2022

## 2022-12-02 ENCOUNTER — OFFICE VISIT (OUTPATIENT)
Dept: DERMATOLOGY | Facility: CLINIC | Age: 72
End: 2022-12-02
Payer: MEDICARE

## 2022-12-02 DIAGNOSIS — L90.8 ATROPHY OF SKIN DUE TO SYSTEMIC CORTICOSTEROID: ICD-10-CM

## 2022-12-02 DIAGNOSIS — L98.9 DISEASE OF SKIN AND SUBCUTANEOUS TISSUE: ICD-10-CM

## 2022-12-02 DIAGNOSIS — L70.0 COMEDONE: Primary | ICD-10-CM

## 2022-12-02 DIAGNOSIS — T38.0X5A ATROPHY OF SKIN DUE TO SYSTEMIC CORTICOSTEROID: ICD-10-CM

## 2022-12-02 PROCEDURE — 3288F FALL RISK ASSESSMENT DOCD: CPT | Mod: CPTII,S$GLB,, | Performed by: DERMATOLOGY

## 2022-12-02 PROCEDURE — 99999 PR PBB SHADOW E&M-EST. PATIENT-LVL III: ICD-10-PCS | Mod: PBBFAC,,, | Performed by: DERMATOLOGY

## 2022-12-02 PROCEDURE — 1101F PR PT FALLS ASSESS DOC 0-1 FALLS W/OUT INJ PAST YR: ICD-10-PCS | Mod: CPTII,S$GLB,, | Performed by: DERMATOLOGY

## 2022-12-02 PROCEDURE — 1159F PR MEDICATION LIST DOCUMENTED IN MEDICAL RECORD: ICD-10-PCS | Mod: CPTII,S$GLB,, | Performed by: DERMATOLOGY

## 2022-12-02 PROCEDURE — 1126F AMNT PAIN NOTED NONE PRSNT: CPT | Mod: CPTII,S$GLB,, | Performed by: DERMATOLOGY

## 2022-12-02 PROCEDURE — 4010F ACE/ARB THERAPY RXD/TAKEN: CPT | Mod: CPTII,S$GLB,, | Performed by: DERMATOLOGY

## 2022-12-02 PROCEDURE — 1157F ADVNC CARE PLAN IN RCRD: CPT | Mod: CPTII,S$GLB,, | Performed by: DERMATOLOGY

## 2022-12-02 PROCEDURE — 1159F MED LIST DOCD IN RCRD: CPT | Mod: CPTII,S$GLB,, | Performed by: DERMATOLOGY

## 2022-12-02 PROCEDURE — 1126F PR PAIN SEVERITY QUANTIFIED, NO PAIN PRESENT: ICD-10-PCS | Mod: CPTII,S$GLB,, | Performed by: DERMATOLOGY

## 2022-12-02 PROCEDURE — 99213 OFFICE O/P EST LOW 20 MIN: CPT | Mod: S$GLB,,, | Performed by: DERMATOLOGY

## 2022-12-02 PROCEDURE — 99213 PR OFFICE/OUTPT VISIT, EST, LEVL III, 20-29 MIN: ICD-10-PCS | Mod: S$GLB,,, | Performed by: DERMATOLOGY

## 2022-12-02 PROCEDURE — 4010F PR ACE/ARB THEARPY RXD/TAKEN: ICD-10-PCS | Mod: CPTII,S$GLB,, | Performed by: DERMATOLOGY

## 2022-12-02 PROCEDURE — 3288F PR FALLS RISK ASSESSMENT DOCUMENTED: ICD-10-PCS | Mod: CPTII,S$GLB,, | Performed by: DERMATOLOGY

## 2022-12-02 PROCEDURE — 1101F PT FALLS ASSESS-DOCD LE1/YR: CPT | Mod: CPTII,S$GLB,, | Performed by: DERMATOLOGY

## 2022-12-02 PROCEDURE — 99999 PR PBB SHADOW E&M-EST. PATIENT-LVL III: CPT | Mod: PBBFAC,,, | Performed by: DERMATOLOGY

## 2022-12-02 PROCEDURE — 1160F PR REVIEW ALL MEDS BY PRESCRIBER/CLIN PHARMACIST DOCUMENTED: ICD-10-PCS | Mod: CPTII,S$GLB,, | Performed by: DERMATOLOGY

## 2022-12-02 PROCEDURE — 1160F RVW MEDS BY RX/DR IN RCRD: CPT | Mod: CPTII,S$GLB,, | Performed by: DERMATOLOGY

## 2022-12-02 PROCEDURE — 1157F PR ADVANCE CARE PLAN OR EQUIV PRESENT IN MEDICAL RECORD: ICD-10-PCS | Mod: CPTII,S$GLB,, | Performed by: DERMATOLOGY

## 2022-12-02 NOTE — PROGRESS NOTES
Subjective:       Patient ID:  Jalyn Aaron is a 72 y.o. female who presents for   Chief Complaint   Patient presents with    Spot     R upper back     History of Present Illness: The patient presents for follow up of skin check.    The patient was last seen on: 10/04/2022 for skin check and treatment of scalp red and tender area treated with lidex soln bid x 1 month - resolved    Other skin complaints:    Patient with new complaint of lesion(s)  Location: R upper back  Duration: 3 weeks  Symptoms: indented/tender -- not itchy  Relieving factors/Previous treatments: none    Has neck issues     No h/o nmsc or mm        Review of Systems   Skin:  Positive for daily sunscreen use, activity-related sunscreen use and wears hat (activities). Negative for itching, rash and recent sunburn.   Hematologic/Lymphatic: Bruises/bleeds easily (has pacemaker).      Objective:    Physical Exam   Constitutional: She appears well-developed and well-nourished. No distress.   Neurological: She is alert and oriented to person, place, and time. She is not disoriented.   Psychiatric: She has a normal mood and affect.   Skin:   Areas Examined (abnormalities noted in diagram):   Scalp / Hair Palpated and Inspected  Back Inspection Performed                 Diagram Legend     Erythematous scaling macule/papule c/w actinic keratosis       Vascular papule c/w angioma      Pigmented verrucoid papule/plaque c/w seborrheic keratosis      Yellow umbilicated papule c/w sebaceous hyperplasia      Irregularly shaped tan macule c/w lentigo     1-2 mm smooth white papules consistent with Milia      Movable subcutaneous cyst with punctum c/w epidermal inclusion cyst      Subcutaneous movable cyst c/w pilar cyst      Firm pink to brown papule c/w dermatofibroma      Pedunculated fleshy papule(s) c/w skin tag(s)      Evenly pigmented macule c/w junctional nevus     Mildly variegated pigmented, slightly irregular-bordered macule c/w mildly  atypical nevus      Flesh colored to evenly pigmented papule c/w intradermal nevus       Pink pearly papule/plaque c/w basal cell carcinoma      Erythematous hyperkeratotic cursted plaque c/w SCC      Surgical scar with no sign of skin cancer recurrence      Open and closed comedones      Inflammatory papules and pustules      Verrucoid papule consistent consistent with wart     Erythematous eczematous patches and plaques     Dystrophic onycholytic nail with subungual debris c/w onychomycosis     Umbilicated papule    Erythematous-base heme-crusted tan verrucoid plaque consistent with inflamed seborrheic keratosis     Erythematous Silvery Scaling Plaque c/w Psoriasis     See annotation      Assessment / Plan:        [place] - chin  Reassurance given to patient. No treatment is necessary.       Atrophy of skin due to injected corticosteroid  -     Ambulatory referral/consult to Dermatology  Reassurance given to patient. No treatment is necessary. Fill in is typical.       Disease of skin and subcutaneous tissue - scalp  No longer tender. No itching. No h/o same. Localized area  Cont lidex soln bid until next appt. If not resolved, consider punch bx         Follow up in about 6 months (around 6/2/2023).

## 2022-12-06 ENCOUNTER — CLINICAL SUPPORT (OUTPATIENT)
Dept: CARDIOLOGY | Facility: HOSPITAL | Age: 72
End: 2022-12-06
Payer: MEDICARE

## 2022-12-06 DIAGNOSIS — Z95.0 PRESENCE OF CARDIAC PACEMAKER: ICD-10-CM

## 2022-12-06 PROCEDURE — 93294 CARDIAC DEVICE CHECK - REMOTE: ICD-10-PCS | Mod: ,,, | Performed by: INTERNAL MEDICINE

## 2022-12-06 PROCEDURE — 93296 REM INTERROG EVL PM/IDS: CPT | Performed by: INTERNAL MEDICINE

## 2022-12-06 PROCEDURE — 93294 REM INTERROG EVL PM/LDLS PM: CPT | Mod: ,,, | Performed by: INTERNAL MEDICINE

## 2022-12-08 ENCOUNTER — DOCUMENTATION ONLY (OUTPATIENT)
Dept: REHABILITATION | Facility: OTHER | Age: 72
End: 2022-12-08
Attending: INTERNAL MEDICINE
Payer: MEDICARE

## 2022-12-08 NOTE — PROGRESS NOTES
Health  Wellness Visit Note    Name: Jalyn Solares Galen  Clinic Number: [id number]  Physician: No ref. provider found  Diagnosis: No diagnosis found.  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    Left    AV block     exercised induced 2:1    Cataract     Fever blister     Heart block     History of acne     Joint pain     hands    Keratoconjunctivitis sicca not due to Sjogren's syndrome     Pacemaker      Visit Number: 48  Precautions: standard; pacemaker      1st PT visit:  1/21/22  Year of care end date: 1/21/23  6 Month test  Performed: July 2022  12 Month test performed: Jan 2023  Mind body plan: Plan A 8 months  Patient level: B    Time In: 8:00 AM  Time Out: 8:35 AM  Total Treatment Time: 35 minutes    Wellness Vision 2022  Handout on this week's wellness topic Stress was provided along with a discussion on what it means, the benefits, and suggestions for practice.   Reviewed last week's topic of Move More, Sleep Better    Subjective:   Patient reports that her back is in no pain; she has also completed ortho PT for her neck and feels like she's made decent progress. She acknowledged the importance of consistent exercise at home. Pt completes her at-home stretches daily and walks regularly throughout the week. She has not been to [employer] as often as she'd like, but plans to work on getting back in the gym prior to her graduation date.     Objective:   Jalyn Solares completed therapeutic stretches (EIL, KATHY) and the following MedX exercise machines: core lumbar, torso rotation l/r, leg extension, leg curl, upright row, chest press, biceps curl, triceps extension, leg press    See exercise log in patient folder for rate of exertion and repetitions completed.       Fitness Machine Education Key:  E=education on equipment initiated and further follow up and education needed  I=independent with  and exercise.  The patient:  Adjusts  machines to his/her settings  Uses equipment levers, pins, weights safely  Maintains safe and correct posture while exercising  Moves through exercise with correct pace and control  Gets on and off equipment safely      Core lumbar strength E Core Torso Rotation E Leg Press E   Leg Extension E Seated Leg Curl E Chest Press E   Row E Abductor E Hip ADD X   Triceps  E Biceps E Other: X       Assessment:   Patient tolerated Patient tolerated MedX Core Lumbar Strength and all other peripheral exercises without an increase in symptoms. Patient warmed up on the treadmill for 5 minutes and stretched. Patient skipped ice.  Plan:  Continue with established plan of care towards wellness goals.     Health  : Laura Lynch  12/8/2022

## 2022-12-09 ENCOUNTER — PATIENT MESSAGE (OUTPATIENT)
Dept: NEUROSURGERY | Facility: CLINIC | Age: 72
End: 2022-12-09
Payer: MEDICARE

## 2022-12-13 ENCOUNTER — LAB VISIT (OUTPATIENT)
Dept: LAB | Facility: HOSPITAL | Age: 72
End: 2022-12-13
Attending: INTERNAL MEDICINE
Payer: MEDICARE

## 2022-12-13 DIAGNOSIS — R73.9 HYPERGLYCEMIA: ICD-10-CM

## 2022-12-13 DIAGNOSIS — Z95.0 CARDIAC PACEMAKER IN SITU: ICD-10-CM

## 2022-12-13 DIAGNOSIS — E78.5 HYPERLIPIDEMIA, UNSPECIFIED HYPERLIPIDEMIA TYPE: ICD-10-CM

## 2022-12-13 DIAGNOSIS — I42.8 CARDIOMYOPATHY, NONISCHEMIC: ICD-10-CM

## 2022-12-13 DIAGNOSIS — E55.9 MILD VITAMIN D DEFICIENCY: ICD-10-CM

## 2022-12-13 DIAGNOSIS — I44.30 AV BLOCK: ICD-10-CM

## 2022-12-13 DIAGNOSIS — R94.6 ABNORMAL RESULTS OF THYROID FUNCTION STUDIES: ICD-10-CM

## 2022-12-13 LAB
25(OH)D3+25(OH)D2 SERPL-MCNC: 46 NG/ML (ref 30–96)
ALBUMIN SERPL BCP-MCNC: 3.9 G/DL (ref 3.5–5.2)
ALP SERPL-CCNC: 66 U/L (ref 55–135)
ALT SERPL W/O P-5'-P-CCNC: 21 U/L (ref 10–44)
ANION GAP SERPL CALC-SCNC: 7 MMOL/L (ref 8–16)
ANION GAP SERPL CALC-SCNC: 7 MMOL/L (ref 8–16)
AST SERPL-CCNC: 23 U/L (ref 10–40)
BASOPHILS # BLD AUTO: 0.06 K/UL (ref 0–0.2)
BASOPHILS NFR BLD: 1.3 % (ref 0–1.9)
BILIRUB SERPL-MCNC: 0.8 MG/DL (ref 0.1–1)
BUN SERPL-MCNC: 17 MG/DL (ref 8–23)
BUN SERPL-MCNC: 17 MG/DL (ref 8–23)
CALCIUM SERPL-MCNC: 9.5 MG/DL (ref 8.7–10.5)
CALCIUM SERPL-MCNC: 9.5 MG/DL (ref 8.7–10.5)
CHLORIDE SERPL-SCNC: 105 MMOL/L (ref 95–110)
CHLORIDE SERPL-SCNC: 105 MMOL/L (ref 95–110)
CHOLEST SERPL-MCNC: 219 MG/DL (ref 120–199)
CHOLEST/HDLC SERPL: 3 {RATIO} (ref 2–5)
CO2 SERPL-SCNC: 28 MMOL/L (ref 23–29)
CO2 SERPL-SCNC: 28 MMOL/L (ref 23–29)
CREAT SERPL-MCNC: 0.7 MG/DL (ref 0.5–1.4)
CREAT SERPL-MCNC: 0.7 MG/DL (ref 0.5–1.4)
DIFFERENTIAL METHOD: ABNORMAL
EOSINOPHIL # BLD AUTO: 0.3 K/UL (ref 0–0.5)
EOSINOPHIL NFR BLD: 5.6 % (ref 0–8)
ERYTHROCYTE [DISTWIDTH] IN BLOOD BY AUTOMATED COUNT: 12.4 % (ref 11.5–14.5)
EST. GFR  (NO RACE VARIABLE): >60 ML/MIN/1.73 M^2
EST. GFR  (NO RACE VARIABLE): >60 ML/MIN/1.73 M^2
ESTIMATED AVG GLUCOSE: 108 MG/DL (ref 68–131)
GLUCOSE SERPL-MCNC: 90 MG/DL (ref 70–110)
GLUCOSE SERPL-MCNC: 90 MG/DL (ref 70–110)
HBA1C MFR BLD: 5.4 % (ref 4–5.6)
HCT VFR BLD AUTO: 37.3 % (ref 37–48.5)
HDLC SERPL-MCNC: 73 MG/DL (ref 40–75)
HDLC SERPL: 33.3 % (ref 20–50)
HGB BLD-MCNC: 12.4 G/DL (ref 12–16)
IMM GRANULOCYTES # BLD AUTO: 0.01 K/UL (ref 0–0.04)
IMM GRANULOCYTES NFR BLD AUTO: 0.2 % (ref 0–0.5)
LDLC SERPL CALC-MCNC: 128 MG/DL (ref 63–159)
LYMPHOCYTES # BLD AUTO: 1.9 K/UL (ref 1–4.8)
LYMPHOCYTES NFR BLD: 39.7 % (ref 18–48)
MCH RBC QN AUTO: 32.2 PG (ref 27–31)
MCHC RBC AUTO-ENTMCNC: 33.2 G/DL (ref 32–36)
MCV RBC AUTO: 97 FL (ref 82–98)
MONOCYTES # BLD AUTO: 0.4 K/UL (ref 0.3–1)
MONOCYTES NFR BLD: 7.9 % (ref 4–15)
NEUTROPHILS # BLD AUTO: 2.2 K/UL (ref 1.8–7.7)
NEUTROPHILS NFR BLD: 45.3 % (ref 38–73)
NONHDLC SERPL-MCNC: 146 MG/DL
NRBC BLD-RTO: 0 /100 WBC
PLATELET # BLD AUTO: 195 K/UL (ref 150–450)
PMV BLD AUTO: 10.9 FL (ref 9.2–12.9)
POTASSIUM SERPL-SCNC: 4 MMOL/L (ref 3.5–5.1)
POTASSIUM SERPL-SCNC: 4 MMOL/L (ref 3.5–5.1)
PROT SERPL-MCNC: 6.9 G/DL (ref 6–8.4)
RBC # BLD AUTO: 3.85 M/UL (ref 4–5.4)
SODIUM SERPL-SCNC: 140 MMOL/L (ref 136–145)
SODIUM SERPL-SCNC: 140 MMOL/L (ref 136–145)
TRIGL SERPL-MCNC: 90 MG/DL (ref 30–150)
TSH SERPL DL<=0.005 MIU/L-ACNC: 1.54 UIU/ML (ref 0.4–4)
WBC # BLD AUTO: 4.79 K/UL (ref 3.9–12.7)

## 2022-12-13 PROCEDURE — 80061 LIPID PANEL: CPT | Performed by: INTERNAL MEDICINE

## 2022-12-13 PROCEDURE — 82306 VITAMIN D 25 HYDROXY: CPT | Performed by: INTERNAL MEDICINE

## 2022-12-13 PROCEDURE — 84443 ASSAY THYROID STIM HORMONE: CPT | Performed by: INTERNAL MEDICINE

## 2022-12-13 PROCEDURE — 36415 COLL VENOUS BLD VENIPUNCTURE: CPT | Performed by: INTERNAL MEDICINE

## 2022-12-13 PROCEDURE — 80053 COMPREHEN METABOLIC PANEL: CPT | Performed by: INTERNAL MEDICINE

## 2022-12-13 PROCEDURE — 83036 HEMOGLOBIN GLYCOSYLATED A1C: CPT | Performed by: INTERNAL MEDICINE

## 2022-12-13 PROCEDURE — 85025 COMPLETE CBC W/AUTO DIFF WBC: CPT | Performed by: INTERNAL MEDICINE

## 2022-12-16 ENCOUNTER — DOCUMENTATION ONLY (OUTPATIENT)
Dept: REHABILITATION | Facility: OTHER | Age: 72
End: 2022-12-16
Attending: INTERNAL MEDICINE
Payer: MEDICARE

## 2022-12-16 NOTE — PROGRESS NOTES
Health  Wellness Visit Note    Name: Jalyn Solares Galen  Clinic Number: [id number]  Physician: No ref. provider found  Diagnosis: No diagnosis found.  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    Left    AV block     exercised induced 2:1    Cataract     Fever blister     Heart block     History of acne     Joint pain     hands    Keratoconjunctivitis sicca not due to Sjogren's syndrome     Pacemaker      Visit Number: 49  Precautions: standard; pacemaker      1st PT visit:  1/21/22  Year of care end date: 1/21/23  6 Month test  Performed: July 2022  12 Month test performed: Jan 2023  Mind body plan: Plan A 8 months  Patient level: B    Time In: 8:00 AM  Time Out: 8:35 AM  Total Treatment Time: 35 minutes    Wellness Vision 2022  Handout on this week's wellness topic [place] Peace-Coping was provided along with a discussion on what it means, the benefits, and suggestions for practice.   Reviewed last week's topic of [place] Peace- Stress.     Subjective:   Patient reports she is back pain-free today. Within this last week she has been walking and did her stretches every other day. Patient did not go to [employer], but plans to get back to it once her son is back in his house. She has been super busy with [place] shopping and cleaning so that's been keeping her moving. Patient did not ice or applied heat last week.     Objective:   Jalyn Solares completed therapeutic stretches (EIL, KATHY) and the following MedX exercise machines: core lumbar, torso rotation l/r, leg extension, leg curl, upright row, chest press, biceps curl, triceps extension, leg press    See exercise log in patient folder for rate of exertion and repetitions completed.       Fitness Machine Education Key:  E=education on equipment initiated and further follow up and education needed  I=independent with  and exercise.  The patient:  Adjusts machines to  his/her settings  Uses equipment levers, pins, weights safely  Maintains safe and correct posture while exercising  Moves through exercise with correct pace and control  Gets on and off equipment safely      Core lumbar strength E Core Torso Rotation E Leg Press E   Leg Extension E Seated Leg Curl E Chest Press E   Row E Abductor E Hip ADD X   Triceps  E Biceps E Other: X       Assessment:   Patient tolerated Patient tolerated MedX Core Lumbar Strength and all other peripheral exercises without an increase in symptoms. Patient warmed up on the treadmill for 5 minutes and stretched. Patient skipped ice.  Plan:  Continue with established plan of care towards wellness goals.     Health  : Cat Gilmore  12/16/2022

## 2022-12-19 ENCOUNTER — TELEPHONE (OUTPATIENT)
Dept: ELECTROPHYSIOLOGY | Facility: CLINIC | Age: 72
End: 2022-12-19
Payer: MEDICARE

## 2022-12-19 NOTE — TELEPHONE ENCOUNTER
Spoke to patient    CONFIRMED procedure arrival time of 11:30am for Venogram tomorrow    Reiterated instructions including:  -Directions to check in desk  -No fasting required  -Pre-procedure LABS reviewed, creatinine normal  -Confirmed no fever, cough, or shortness of breath in the past 30 days  -Reviewed current visitor policy    Patient verbalized understanding of above and appreciated the call.

## 2022-12-20 ENCOUNTER — HOSPITAL ENCOUNTER (OUTPATIENT)
Facility: HOSPITAL | Age: 72
Discharge: HOME | End: 2022-12-20
Attending: INTERNAL MEDICINE | Admitting: INTERNAL MEDICINE
Payer: MEDICARE

## 2022-12-20 ENCOUNTER — PATIENT MESSAGE (OUTPATIENT)
Dept: INTERNAL MEDICINE | Facility: CLINIC | Age: 72
End: 2022-12-20
Payer: MEDICARE

## 2022-12-20 VITALS
BODY MASS INDEX: 20.4 KG/M2 | RESPIRATION RATE: 18 BRPM | TEMPERATURE: 98 F | DIASTOLIC BLOOD PRESSURE: 65 MMHG | WEIGHT: 130 LBS | HEIGHT: 67 IN | SYSTOLIC BLOOD PRESSURE: 141 MMHG | HEART RATE: 67 BPM | OXYGEN SATURATION: 99 %

## 2022-12-20 DIAGNOSIS — I42.8 OTHER CARDIOMYOPATHY: ICD-10-CM

## 2022-12-20 DIAGNOSIS — Z95.0 CARDIAC PACEMAKER IN SITU: ICD-10-CM

## 2022-12-20 DIAGNOSIS — I44.30 AV BLOCK: Primary | ICD-10-CM

## 2022-12-20 DIAGNOSIS — I42.8 CARDIOMYOPATHY, NONISCHEMIC: ICD-10-CM

## 2022-12-20 PROCEDURE — 36005 PR INJECTION PROC,EXTREMITY,VENOGRAPHY: ICD-10-PCS | Mod: LT,,, | Performed by: INTERNAL MEDICINE

## 2022-12-20 PROCEDURE — 36005 INJECTION EXT VENOGRAPHY: CPT | Mod: LT | Performed by: INTERNAL MEDICINE

## 2022-12-20 PROCEDURE — 75820 VEIN X-RAY ARM/LEG: CPT | Performed by: INTERNAL MEDICINE

## 2022-12-20 PROCEDURE — 75820 VEIN X-RAY ARM/LEG: CPT | Mod: 26,,, | Performed by: INTERNAL MEDICINE

## 2022-12-20 PROCEDURE — 75820 PR VENOGRAM EXTREMITY UNILAT: ICD-10-PCS | Mod: 26,,, | Performed by: INTERNAL MEDICINE

## 2022-12-20 PROCEDURE — 93005 ELECTROCARDIOGRAM TRACING: CPT | Mod: 59

## 2022-12-20 PROCEDURE — 93010 EKG 12-LEAD: ICD-10-PCS | Mod: ,,, | Performed by: INTERNAL MEDICINE

## 2022-12-20 PROCEDURE — 36005 INJECTION EXT VENOGRAPHY: CPT | Mod: LT,,, | Performed by: INTERNAL MEDICINE

## 2022-12-20 PROCEDURE — 25500020 PHARM REV CODE 255: Performed by: INTERNAL MEDICINE

## 2022-12-20 PROCEDURE — 93010 ELECTROCARDIOGRAM REPORT: CPT | Mod: ,,, | Performed by: INTERNAL MEDICINE

## 2022-12-20 RX ORDER — LOSARTAN POTASSIUM 25 MG/1
TABLET ORAL
Qty: 60 TABLET | Refills: 3 | Status: SHIPPED | OUTPATIENT
Start: 2022-12-20 | End: 2022-12-22 | Stop reason: SDUPTHER

## 2022-12-20 RX ORDER — IODIXANOL 320 MG/ML
INJECTION, SOLUTION INTRAVASCULAR
Status: DISCONTINUED | OUTPATIENT
Start: 2022-12-20 | End: 2022-12-20 | Stop reason: HOSPADM

## 2022-12-20 NOTE — H&P
[facility] Short Stay Cardiac Unit  Cardiac Electrophysiology  History and Physical     Admission Date: 12/20/2022  Code Status: No Order   Attending Provider: Chacho Colby MD   Principal Problem:Atrioventricular block, complete    Subjective:     Chief Complaint:  Complete AV block     HPI: Ms. Aaron is a 72 year old female with AV block, NICM, PPM, anxiety.     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  Significant anxiety -- much improved since she has been on cymbalta  Echo 3/28/16 normal biventricular structure and function.  Echo 6/1/20 EF 45%.  We added Toprol. Already on Losartan.  Felt poorly on the Toprol. Noted heaviness, possibly shortness of breath, thinks anxiety may have been related. This was discontinued.     Echo 10/8/20 EF 45%  Echo 11/21 EF 45%.     During last office visit 11/16/22, she states she walks a mile in the morning, and 3/4 a mile in the evening. Notes mild increase in dyspnea with this over the past 6 months. Notes fatigue. Overall not as active as she was in the past.  Echo 11/16/22 EF 45% normal RV size and function.  Device interrogation reveals stable function of the leads, RA pacing 6% RV pacing 100%    Complete AV block. EF remains stable at 45%.  We discussed consideration of upgrade at time of generator change. There is concern that she is occluded, given the varicosities observed on her chest. We will schedule venogram now to assess.  If occluded, would defer upgrade unless EF further deteriorates.      Ms. Aaron presents today to [facility] for scheduled venogram with Dr. Colby. She denies any chest pain, palpitations, SOB, YORK, dizziness, light headedness, weakness, syncope, or near syncopal episodes. She denies any bleeding, infections, fevers, rashes, or surgeries in the past 30 days. She denies any allergy to IV contrast or dye and her renal function is WNL.    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    Left    AV block     exercised induced 2:1    Cataract     Fever blister     Heart block     History of acne     Joint pain     hands    Keratoconjunctivitis sicca not due to Sjogren's syndrome     Pacemaker        Past Surgical History:   Procedure Laterality Date    BREAST BIOPSY Left 12/2013    papilloma with atypia on core biopsy    BREAST BIOPSY Left 01/2014    Ex.Bx., removal of ADH/Papilloma    COLONOSCOPY N/A 11/8/2016    Procedure: COLONOSCOPY;  Surgeon: Dl Caruso MD;  Location: [facility] ENDO (4TH FLR);  Service: Endoscopy;  Laterality: N/A;  Pacemaker in place.    COLONOSCOPY N/A 3/30/2021    Procedure: COLONOSCOPY;  Surgeon: Joaquin Johnson MD;  Location: [facility] ENDO (4TH FLR);  Service: Endoscopy;  Laterality: N/A;  prep ins. emailed - COVID screening on 3/27/21 PCW - ERW    HYSTERECTOMY      INSERT / REPLACE / REMOVE PACEMAKER      LASIK      LASIK Bilateral 2009       Review of patient's allergies indicates:   Allergen Reactions    Bactrim [sulfamethoxazole-trimethoprim] Nausea Only       No current facility-administered medications on file prior to encounter.     Current Outpatient Medications on File Prior to Encounter   Medication Sig    carvediloL (COREG) 3.125 MG tablet TAKE 1 TABLET(3.125 MG) BY MOUTH TWICE DAILY WITH MEALS    gabapentin (NEURONTIN) 600 MG tablet Take 1.5 tablets (900 mg total) by mouth 3 (three) times daily. (Patient taking differently: Take 900 mg by mouth 2 (two) times daily.)    linaCLOtide (LINZESS) 72 mcg Cap capsule Take 1 capsule (72 mcg total) by mouth before breakfast. (Patient taking differently: Take 72 mcg by mouth before breakfast. PRN)    losartan (COZAAR) 25 MG tablet TAKE 1 TABLET BY MOUTH TWICE DAILY    multivitamin capsule Take 1 capsule by mouth once daily.    naproxen (NAPROSYN) 500 MG tablet  Half tablet once daily (Patient taking differently: 1/2 tablet once daily)    senna (SENOKOT) 8.6 mg tablet Take 1 tablet by mouth once daily. Twice a day    vitamin D 1000 units Tab Take 1,000 Units by mouth once daily.     Family History       Problem Relation (Age of Onset)    Breast cancer Mother    Cancer Mother (80)    Cataracts Mother    Hypertension Mother    Multiple sclerosis Father, Sister    No Known Problems Brother, Maternal Aunt, Maternal Uncle, Paternal Aunt, Paternal Uncle, Maternal Grandmother, Paternal Grandmother, Paternal Grandfather, Son, Son    Prostate cancer Maternal Grandfather          Tobacco Use    Smoking status: Never     Passive exposure: Never    Smokeless tobacco: Never   Substance and Sexual Activity    Alcohol use: Yes     Alcohol/week: 0.0 standard drinks     Types: 2 Glasses of wine per week     Comment: socially    Drug use: No    Sexual activity: Not Currently     Partners: Male     Birth control/protection: None     Review of Systems   Constitutional: Negative for chills, fever and malaise/fatigue.   HENT:  Negative for congestion and nosebleeds.    Eyes:  Negative for blurred vision.   Cardiovascular:  Negative for chest pain, dyspnea on exertion, irregular heartbeat, leg swelling, near-syncope, orthopnea, palpitations, paroxysmal nocturnal dyspnea and syncope.   Respiratory:  Negative for cough, hemoptysis, shortness of breath, sleep disturbances due to breathing, sputum production and wheezing.    Endocrine: Negative for polyphagia.   Hematologic/Lymphatic: Negative for bleeding problem. Does not bruise/bleed easily.   Skin:  Negative for itching and rash.   Musculoskeletal:  Negative for back pain, joint swelling, muscle cramps and muscle weakness.   Gastrointestinal:  Negative for bloating, abdominal pain, hematemesis, hematochezia, nausea and vomiting.   Genitourinary:  Negative for dysuria and hematuria.   Neurological:  Negative for dizziness, focal weakness,  headaches, light-headedness, loss of balance, numbness and weakness.   Psychiatric/Behavioral:  Negative for altered mental status.    Objective:     Vital Signs (Most Recent):  Temp: 98.8 °F (37.1 °C) (12/20/22 1152)  Pulse: 81 (12/20/22 1152)  Resp: 18 (12/20/22 1152)  BP: 125/69 (12/20/22 1153)  SpO2: 98 % (12/20/22 1152) Vital Signs (24h Range):  Temp:  [98.8 °F (37.1 °C)] 98.8 °F (37.1 °C)  Pulse:  [81] 81  Resp:  [18] 18  SpO2:  [98 %] 98 %  BP: (111-125)/(69-76) 125/69     Weight: 59 kg (130 lb)  Body mass index is 20.36 kg/m².    SpO2: 98 %     Physical Exam  Vitals and nursing note reviewed.   Constitutional:       General: She is not in acute distress.     Appearance: Normal appearance. She is well-developed. She is not diaphoretic.   HENT:      Head: Normocephalic and atraumatic.      Mouth/Throat:      Mouth: Mucous membranes are moist.      Pharynx: No oropharyngeal exudate.   Eyes:      General:         Right eye: No discharge.         Left eye: No discharge.      Conjunctiva/sclera: Conjunctivae normal.      Pupils: Pupils are equal, round, and reactive to light.   Cardiovascular:      Rate and Rhythm: Normal rate and regular rhythm. No extrasystoles are present.     Chest Wall: PMI is not displaced.      Pulses: Normal pulses and intact distal pulses.           Radial pulses are 2+ on the right side and 2+ on the left side.        Dorsalis pedis pulses are 2+ on the right side and 2+ on the left side.      Heart sounds: Normal heart sounds, S1 normal and S2 normal.      Comments: Left chest wall device site in good condition  Pulmonary:      Effort: Pulmonary effort is normal. No accessory muscle usage or respiratory distress.      Breath sounds: Normal breath sounds. No decreased breath sounds, wheezing, rhonchi or rales.   Chest:      Chest wall: No tenderness.   Abdominal:      General: Bowel sounds are normal. There is no distension.      Palpations: Abdomen is soft.      Tenderness: There is no  abdominal tenderness. There is no guarding.   Musculoskeletal:         General: Normal range of motion.      Cervical back: Normal range of motion and neck supple. IV to left forearm in good condition  Skin:     General: Skin is warm and dry.      Findings: No erythema or rash.   Neurological:      Mental Status: She is alert and oriented to person, place, and time. She is not disoriented.      Cranial Nerves: No cranial nerve deficit.      Sensory: No sensory deficit.      Motor: No abnormal muscle tone.      Coordination: Coordination normal.      Gait: Gait normal.   Psychiatric:         Mood and Affect: Mood normal.         Behavior: Behavior normal.         Thought Content: Thought content normal.         Judgment: Judgment normal.     Significant Labs: Pre procedure labs from 12/13/22 reviewed     Significant Imaging:  ECG shows ASVP 71 bpm  ms  ms QT/Qtc 452/491 ms     Assessment and Plan:   Plan:  -Venogram    Risks, indications, benefits, and alternatives of the planned procedure discussed with patient. All questions were answered. Patient understands and wishes to proceed. No further questioned voiced at this time Consents signed.    Laury Altamirano NP  Cardiac Electrophysiology  [facility] - Short Stay Cardiac Unit    Attending: Chacho Colby MD

## 2022-12-20 NOTE — PLAN OF CARE
Reviewed discharge material with patient. Verbalized understanding and given copy of discharge paperwork.

## 2022-12-20 NOTE — DISCHARGE INSTRUCTIONS
Activity as Tolerated:  -Continue all home medications.  -Dr. Colby's nurse will call you to schedule your upcoming procedure.  -Call the arrhythmia clinic with any questions or concerns.    Notify your health care provider if you experience any of the following:  -Difficulty breathing.   -Low or high blood pressure.   -Itching, swelling, or hives.  -Nausea or vomiting  -Metallic taste in the mouth  -Fever.  -Generalized warmth or flushing.   -Chills.  -Abdominal pain.  -Fatigue.   -Congestion.  -Redness, swelling, warmth, or drainage where your IVs were.  -For any concerning medical symptoms.

## 2022-12-20 NOTE — DISCHARGE SUMMARY
[facility] - Short Stay Cardiac Unit  Cardiac Electrophysiology  Discharge Summary      Patient Name: MICHAEL AARON  MRN: [id number]  Admission Date: 12/20/2022  Hospital Length of Stay: 0 days  Discharge Date and Time: 12/20/2022  5:03 PM  Attending Physician: Chacho Colby MD  Discharging Provider: Laury Altamirano NP  Primary Care Physician: Annemarie Kilgore MD    HPI: Ms. Aaron is a 72 year old female with AV block, NICM, PPM, anxiety.     History obtain through patient and clinic notes:     Dual chamber PPM implanted in 2011 for 2nd degree AVB.  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  Significant anxiety -- much improved since she has been on cymbalta  Echo 3/28/16 normal biventricular structure and function.  Echo 6/1/20 EF 45%.  We added Toprol. Already on Losartan.  Felt poorly on the Toprol. Noted heaviness, possibly shortness of breath, thinks anxiety may have been related. This was discontinued.     Echo 10/8/20 EF 45%  Echo 11/21 EF 45%.      During last office visit 11/16/22, she states she walks a mile in the morning, and 3/4 a mile in the evening. Notes mild increase in dyspnea with this over the past 6 months. Notes fatigue. Overall not as active as she was in the past.  Echo 11/16/22 EF 45% normal RV size and function.  Device interrogation reveals stable function of the leads, RA pacing 6% RV pacing 100%     Complete AV block. EF remains stable at 45%.  We discussed consideration of upgrade at time of generator change. There is concern that she is occluded, given the varicosities observed on her chest. We will schedule venogram now to assess.  If occluded, would defer upgrade unless EF further deteriorates.      Ms. Aaron presents today to [facility] for scheduled venogram with Dr. Colby. She denies any chest pain, palpitations, SOB, YORK, dizziness, light headedness, weakness, syncope, or near syncopal episodes. She denies any bleeding, infections, fevers, rashes, or surgeries in the  past 30 days. She denies any allergy to IV contrast or dye and her renal function is WNL.     ECG today shows ASVP 71 bpm  ms  ms QT/Qtc 452/491 ms     Procedure(s) (LRB):  Venogram, EP Lab (N/A)     Indwelling Lines/Drains at time of discharge:  Lines/Drains/Airways     None      Hospital Course: The patient presented today for a venogram with Dr. Colby prior to upcoming procedure. She tolerated the procedure well with no acute complications (see procedure note). VSS. No acute bronchospasm, hypotension, urticaria, nausea/vomiting, metallic taste in the mouth, fever, generalized warmth or flushing noted. She denies chills, abdominal pain, fatigue, and congestion. IV site is C/D/I without redness, swelling, warmth, drainage or signs of extravasation noted. Plan to continue home medications. EP clinic will coordinate with patient regarding upcoming procedure. Discharge instructions discussed with the patient who verbalized understanding.    Goals of Care Treatment Preferences:  Living Will: Yes    Consults: none    Significant Diagnostic Studies: none    Final Active Diagnoses:    Diagnosis Date Noted POA    PRINCIPAL PROBLEM:  Atrioventricular block, complete [I44.2]  Yes      Problems Resolved During this Admission:     Discharged Condition: stable    Disposition: Home or Self Care    Follow Up:   Follow-up Information     Chacho Colby MD Follow up.    Specialties: Electrophysiology, Cardiology  Why: The clinic will reach out to schedule your procedure  Contact information:  [address] [facility] [address]  [phone]                       Patient Instructions:      Other restrictions (specify):   Order Comments: Activity as Tolerated:  -Continue all home medications.  -Dr. Colby's nurse will call you to schedule your upcoming procedure.  -Call the arrhythmia clinic with any questions or concerns.    Notify your health care provider if you experience any of the following:  -Difficulty  breathing.   -Low or high blood pressure.   -Itching, swelling, or hives.  -Nausea or vomiting  -Metallic taste in the mouth  -Fever.  -Generalized warmth or flushing.   -Chills.  -Abdominal pain.  -Fatigue.   -Congestion.  -Redness, swelling, warmth, or drainage where your IVs were.  -For any concerning medical symptoms.     Notify your health care provider if you experience any of the following:  increased confusion or weakness     Notify your health care provider if you experience any of the following:  persistent dizziness, light-headedness, or visual disturbances     Notify your health care provider if you experience any of the following:  worsening rash     Notify your health care provider if you experience any of the following:  severe persistent headache     Notify your health care provider if you experience any of the following:  difficulty breathing or increased cough     Notify your health care provider if you experience any of the following:  redness, tenderness, or signs of infection (pain, swelling, redness, odor or green/yellow discharge around incision site)     Notify your health care provider if you experience any of the following:  severe uncontrolled pain     Notify your health care provider if you experience any of the following:  persistent nausea and vomiting or diarrhea     Notify your health care provider if you experience any of the following:  temperature >100.4     Medications:  Reconciled Home Medications:      Medication List      CHANGE how you take these medications    naproxen 500 MG tablet  Commonly known as: NAPROSYN  Half tablet once daily  What changed: additional instructions        CONTINUE taking these medications    carvediloL 3.125 MG tablet  Commonly known as: COREG  TAKE 1 TABLET(3.125 MG) BY MOUTH TWICE DAILY WITH MEALS     losartan 25 MG tablet  Commonly known as: COZAAR  TAKE 1 TABLET BY MOUTH TWICE DAILY     multivitamin capsule  Take 1 capsule by mouth once daily.      senna 8.6 mg tablet  Commonly known as: SENOKOT  Take 1 tablet by mouth once daily. Twice a day     vitamin D 1000 units Tab  Commonly known as: VITAMIN D3  Take 1,000 Units by mouth once daily.        ASK your doctor about these medications    gabapentin 600 MG tablet  Commonly known as: NEURONTIN  Take 1.5 tablets (900 mg total) by mouth 3 (three) times daily.     linaCLOtide 72 mcg Cap capsule  Commonly known as: LINZESS  Take 1 capsule (72 mcg total) by mouth before breakfast.          Plan:  -Continue all home medications  -Dr. Colby's nurse will follow up to schedule upcoming procedure    Time spent on the discharge of patient: 5 minutes    Laury Altamirano NP  Cardiac Electrophysiology  [facility] - Arrhythmia    Attending: Chacho Colby MD

## 2022-12-20 NOTE — TELEPHONE ENCOUNTER
No new care gaps identified.  [facility] Embedded Care Gaps. Reference number: [id number]. 12/20/2022   9:01:30 AM CST

## 2022-12-20 NOTE — HPI
73 yo female with AV block, NICM, PPM, anxiety.     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  Significant anxiety -- much improved since she has been on cymbalta  Echo 3/28/16 normal biventricular structure and function.  Echo 6/1/20 EF 45%.  We added Toprol. Already on Losartan.  Felt poorly on the Toprol. Noted heaviness, possibly shortness of breath, thinks anxiety may have been related. This was discontinued.     Retired,  3 years ago.     Echo 10/8/20 EF 45%  Echo 11/21 EF 45%.     Update:     Walks a mile in the morning, and 3/4 a mile in the evening. Notes mild increase in dyspnea with this over the past 6 months. Notes fatigue. Overall not as active as she was in the past.  Echo 11/16/22 EF 45% normal RV size and function.     Device interrogation reveals stable function of the leads, RA pacing 6% RV pacing 100%    Complete AV block. EF remains stable at 45%.  We discussed consideration of upgrade at time of generator change. There is concern that she is occluded, given the varicosities observed on her chest. We will schedule venogram now to assess.  If occluded, would defer upgrade unless EF further deteriorates.      *** presents today to [facility] for scheduled *** with  ***. *** denies any chest pain, palpitations, SOB, YORK, dizziness, light headedness, weakness, syncope, or near syncopal episodes. *** denies any bleeding, infections, fevers, rashes, or surgeries in the past 30 days.

## 2022-12-20 NOTE — TELEPHONE ENCOUNTER
----- Message from Cintia Kaiser sent at 12/20/2022  8:55 AM CST -----  Contact: [phone]  Requesting an RX refill or new RX.  Is this a refill or new RX: refill  RX name and strength (copy/paste from chart):  losartan (COZAAR) 25 MG tablet  Is this a 30 day or 90 day RX: 90 day   Pharmacy name and phone # (copy/paste from chart):    [id number] - [place], [place] - [address]  [address] [address]  Phone: [phone] Fax: [phone]  The doctors have asked that we provide their patients with the following 2 reminders -- prescription refills can take up to 72 hours, and a friendly reminder that in the future you can use your [facility] account to request refills: aware     Pt is completely out of this medication. Please Advise

## 2022-12-20 NOTE — SUBJECTIVE & OBJECTIVE
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    Left    AV block     exercised induced 2:1    Cataract     Fever blister     Heart block     History of acne     Joint pain     hands    Keratoconjunctivitis sicca not due to Sjogren's syndrome     Pacemaker        Past Surgical History:   Procedure Laterality Date    BREAST BIOPSY Left 12/2013    papilloma with atypia on core biopsy    BREAST BIOPSY Left 01/2014    Ex.Bx., removal of ADH/Papilloma    COLONOSCOPY N/A 11/8/2016    Procedure: COLONOSCOPY;  Surgeon: Dl Caruso MD;  [facility]  Service: Endoscopy;  Laterality: N/A;  Pacemaker in place.    COLONOSCOPY N/A 3/30/2021    Procedure: COLONOSCOPY;  Surgeon: Joaquin Johnson MD;  Location: [facility] ENDO ([facility]);  Service: Endoscopy;  Laterality: N/A;  prep ins. emailed - COVID screening on 3/27/21 PCW - ERW    HYSTERECTOMY      INSERT / REPLACE / REMOVE PACEMAKER      LASIK      LASIK Bilateral 2009       Review of patient's allergies indicates:   Allergen Reactions    Bactrim [sulfamethoxazole-trimethoprim] Nausea Only       No current facility-administered medications on file prior to encounter.     Current Outpatient Medications on File Prior to Encounter   Medication Sig    carvediloL (COREG) 3.125 MG tablet TAKE 1 TABLET(3.125 MG) BY MOUTH TWICE DAILY WITH MEALS    gabapentin (NEURONTIN) 600 MG tablet Take 1.5 tablets (900 mg total) by mouth 3 (three) times daily. (Patient taking differently: Take 900 mg by mouth 2 (two) times daily.)    linaCLOtide (LINZESS) 72 mcg Cap capsule Take 1 capsule (72 mcg total) by mouth before breakfast. (Patient taking differently: Take 72 mcg by mouth before breakfast. PRN)    losartan (COZAAR) 25 MG tablet TAKE 1 TABLET BY MOUTH TWICE DAILY    multivitamin capsule Take 1 capsule by mouth once daily.    naproxen (NAPROSYN) 500 MG tablet Half tablet once daily (Patient taking differently: 1/2 tablet once  daily)    senna (SENOKOT) 8.6 mg tablet Take 1 tablet by mouth once daily. Twice a day    vitamin D 1000 units Tab Take 1,000 Units by mouth once daily.     Family History       Problem Relation (Age of Onset)    Breast cancer Mother    Cancer Mother (80)    Cataracts Mother    Hypertension Mother    Multiple sclerosis Father, Sister    No Known Problems Brother, Maternal Aunt, Maternal Uncle, Paternal Aunt, Paternal Uncle, Maternal Grandmother, Paternal Grandmother, Paternal Grandfather, Son, Son    Prostate cancer Maternal Grandfather          Tobacco Use    Smoking status: Never     Passive exposure: Never    Smokeless tobacco: Never   Substance and Sexual Activity    Alcohol use: Yes     Alcohol/week: 0.0 standard drinks     Types: 2 Glasses of wine per week     Comment: socially    Drug use: No    Sexual activity: Not Currently     Partners: Male     Birth control/protection: None     Review of Systems   Constitutional: Negative for chills, fever and malaise/fatigue.   HENT:  Negative for congestion and nosebleeds.    Eyes:  Negative for blurred vision.   Cardiovascular:  Negative for chest pain, dyspnea on exertion, irregular heartbeat, leg swelling, near-syncope, orthopnea, palpitations, paroxysmal nocturnal dyspnea and syncope.   Respiratory:  Negative for cough, hemoptysis, shortness of breath, sleep disturbances due to breathing, sputum production and wheezing.    Endocrine: Negative for polyphagia.   Hematologic/Lymphatic: Negative for bleeding problem. Does not bruise/bleed easily.   Skin:  Negative for itching and rash.   Musculoskeletal:  Negative for back pain, joint swelling, muscle cramps and muscle weakness.   Gastrointestinal:  Negative for bloating, abdominal pain, hematemesis, hematochezia, nausea and vomiting.   Genitourinary:  Negative for dysuria and hematuria.   Neurological:  Negative for dizziness, focal weakness, headaches, light-headedness, loss of balance, numbness and weakness.    Psychiatric/Behavioral:  Negative for altered mental status.    Objective:     Vital Signs (Most Recent):  Temp: 98.8 °F (37.1 °C) (12/20/22 1152)  Pulse: 81 (12/20/22 1152)  Resp: 18 (12/20/22 1152)  BP: 125/69 (12/20/22 1153)  SpO2: 98 % (12/20/22 1152) Vital Signs (24h Range):  Temp:  [98.8 °F (37.1 °C)] 98.8 °F (37.1 °C)  Pulse:  [81] 81  Resp:  [18] 18  SpO2:  [98 %] 98 %  BP: (111-125)/(69-76) 125/69       Weight: 59 kg (130 lb)  Body mass index is 20.36 kg/m².    SpO2: 98 %       Physical Exam  Vitals and nursing note reviewed.   Constitutional:       General: She is not in acute distress.     Appearance: Normal appearance. She is well-developed. She is not diaphoretic.   HENT:      Head: Normocephalic and atraumatic.      Mouth/Throat:      Mouth: Mucous membranes are moist.      Pharynx: No oropharyngeal exudate.   Eyes:      General:         Right eye: No discharge.         Left eye: No discharge.      Conjunctiva/sclera: Conjunctivae normal.      Pupils: Pupils are equal, round, and reactive to light.   Cardiovascular:      Rate and Rhythm: Normal rate and regular rhythm. No extrasystoles are present.     Chest Wall: PMI is not displaced.      Pulses: Normal pulses and intact distal pulses.           Radial pulses are 2+ on the right side and 2+ on the left side.        Dorsalis pedis pulses are 2+ on the right side and 2+ on the left side.      Heart sounds: Normal heart sounds, S1 normal and S2 normal.      Comments: Left chest wall device site in good condition  Pulmonary:      Effort: Pulmonary effort is normal. No accessory muscle usage or respiratory distress.      Breath sounds: Normal breath sounds. No decreased breath sounds, wheezing, rhonchi or rales.   Chest:      Chest wall: No tenderness.   Abdominal:      General: Bowel sounds are normal. There is no distension.      Palpations: Abdomen is soft.      Tenderness: There is no abdominal tenderness. There is no guarding.   Musculoskeletal:          General: Normal range of motion.      Cervical back: Normal range of motion and neck supple.   Skin:     General: Skin is warm and dry.      Findings: No erythema or rash.   Neurological:      Mental Status: She is alert and oriented to person, place, and time. She is not disoriented.      Cranial Nerves: No cranial nerve deficit.      Sensory: No sensory deficit.      Motor: No abnormal muscle tone.      Coordination: Coordination normal.      Gait: Gait normal.   Psychiatric:         Mood and Affect: Mood normal.         Behavior: Behavior normal.         Thought Content: Thought content normal.         Judgment: Judgment normal.     Significant Labs: Pre procedure labs from 12/13/22 reviewed     Significant Imaging:  ECG

## 2022-12-20 NOTE — Clinical Note
A venogram was performed in the left axillary vein. The vessel was injected via hand injection  with 10 mL of contrast. Per RN

## 2022-12-21 ENCOUNTER — PES CALL (OUTPATIENT)
Dept: ADMINISTRATIVE | Facility: CLINIC | Age: 72
End: 2022-12-21
Payer: MEDICARE

## 2022-12-21 ENCOUNTER — TELEPHONE (OUTPATIENT)
Dept: CARDIOLOGY | Facility: CLINIC | Age: 72
End: 2022-12-21
Payer: MEDICARE

## 2022-12-21 NOTE — TELEPHONE ENCOUNTER
----- Message from Breana Nguyen MA sent at 12/21/2022 11:54 AM CST -----  Tiny the patient is returning your phone called  please call [phone]. Thank you

## 2022-12-22 ENCOUNTER — DOCUMENTATION ONLY (OUTPATIENT)
Dept: REHABILITATION | Facility: OTHER | Age: 72
End: 2022-12-22
Attending: INTERNAL MEDICINE
Payer: MEDICARE

## 2022-12-22 ENCOUNTER — TELEPHONE (OUTPATIENT)
Dept: INTERNAL MEDICINE | Facility: CLINIC | Age: 72
End: 2022-12-22

## 2022-12-22 DIAGNOSIS — I42.8 OTHER CARDIOMYOPATHY: ICD-10-CM

## 2022-12-22 RX ORDER — LOSARTAN POTASSIUM 25 MG/1
TABLET ORAL
Qty: 180 TABLET | Refills: 3 | Status: SHIPPED | OUTPATIENT
Start: 2022-12-22 | End: 2023-12-27 | Stop reason: SDUPTHER

## 2022-12-22 NOTE — PROGRESS NOTES
Health  Wellness Visit Note    Name: MICHAEL TINOCO  Clinic Number: [id number]  Physician: No ref. provider found  Diagnosis: No diagnosis found.  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    Left    AV block     exercised induced 2:1    Cataract     Fever blister     Heart block     History of acne     Joint pain     hands    Keratoconjunctivitis sicca not due to Sjogren's syndrome     Pacemaker      Visit Number: 50  Precautions: standard; pacemaker      1st PT visit:  1/21/22  Year of care end date: 1/21/23  6 Month test  Performed: July 2022  12 Month test performed: Jan 2023  Mind body plan: Plan A 8 months  Patient level: B    Time In: 7:55 AM  Time Out: 8:25 AM  Total Treatment Time: 30 minutes    Wellness [employer] 2022  Handout on this week's wellness topic Time Management was provided along with a discussion on what it means, the benefits, and suggestions for practice.   Reviewed last week's topic of Coping.     Subjective:   Patient reports she feels good today. Pt typically walks and works out at the fitness center during the week. Pt ices as needed.    Objective:   MICHAEL completed therapeutic stretches (EIL, KATHY) and the following MedX exercise machines: core lumbar, torso rotation l/r, leg extension, leg curl, upright row, chest press, biceps curl, triceps extension, leg press    See exercise log in patient folder for rate of exertion and repetitions completed.       Fitness Machine Education Key:  E=education on equipment initiated and further follow up and education needed  I=independent with  and exercise.  The patient:  Adjusts machines to his/her settings  Uses equipment levers, pins, weights safely  Maintains safe and correct posture while exercising  Moves through exercise with correct pace and control  Gets on and off equipment safely      Core lumbar strength E Core Torso Rotation E Leg Press E   Leg Extension E  Seated Leg Curl E Chest Press E   Row E Abductor E Hip ADD X   Triceps  E Biceps E Other: X       Assessment:   Patient tolerated Patient tolerated MedX Core Lumbar Strength and all other peripheral exercises without an increase in symptoms. Patient warmed up on the treadmill for 5 minutes and stretched. Patient skipped ice.  Plan:  Continue with established plan of care towards wellness goals.     Health  : Laura Lynch  12/22/2022

## 2022-12-27 ENCOUNTER — DOCUMENTATION ONLY (OUTPATIENT)
Dept: REHABILITATION | Facility: OTHER | Age: 72
End: 2022-12-27
Attending: INTERNAL MEDICINE
Payer: MEDICARE

## 2022-12-27 NOTE — PROGRESS NOTES
Health  Wellness Visit Note    Name: MICHAEL TINOCO  Clinic Number: [id number]  Physician: No ref. provider found  Diagnosis: No diagnosis found.  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    Left    AV block     exercised induced 2:1    Cataract     Fever blister     Heart block     History of acne     Joint pain     hands    Keratoconjunctivitis sicca not due to Sjogren's syndrome     Pacemaker      Visit Number: 51  Precautions: standard; pacemaker      1st PT visit:  1/21/22  Year of care end date: 1/21/23  6 Month test  Performed: July 2022  12 Month test performed: Jan 2023  Mind body plan: Plan A 8 months  Patient level: B    Time In: 3:00 PM  Time Out: 3:35 PM  Total Treatment Time: 35 minutes    Wellness [employer] 2022  Handout on this week's wellness topic [employer] / Prioritization was provided along with a discussion on what it means, the benefits, and suggestions for practice.   Reviewed last week's topic of [employer]-Time Management.    Subjective:   Patient reports no back pain. She walks regularly and goes to [employer] at least 2x a week. She hasn't been icing as much as she would like, she usually ices when need be.   Her goal in 2023 is to go to the gym 5x out the week with her . She enjoys group classes and plans to look into some of the cardio classes the [facility] offers.    Objective:   MICHAEL completed therapeutic stretches (EIL, KATHY) and the following MedX exercise machines: core lumbar, torso rotation l/r, leg extension, leg curl, upright row, chest press, biceps curl, triceps extension, leg press    See exercise log in patient folder for rate of exertion and repetitions completed.       Fitness Machine Education Key:  E=education on equipment initiated and further follow up and education needed  I=independent with  and exercise.  The patient:  Adjusts machines to  his/her settings  Uses equipment levers, pins, weights safely  Maintains safe and correct posture while exercising  Moves through exercise with correct pace and control  Gets on and off equipment safely      Core lumbar strength E Core Torso Rotation E Leg Press E   Leg Extension E Seated Leg Curl E Chest Press E   Row E Abductor E Hip ADD X   Triceps  E Biceps E Other: X       Assessment:   Patient tolerated Patient tolerated MedX Core Lumbar Strength and all other peripheral exercises without an increase in symptoms. Patient warmed up on the treadmill for 5 minutes and stretched. Patient skipped ice.  Plan:  Continue with established plan of care towards wellness goals.     Health  : Cat Gilmore  12/27/2022

## 2022-12-28 ENCOUNTER — TELEPHONE (OUTPATIENT)
Dept: OPTOMETRY | Facility: CLINIC | Age: 72
End: 2022-12-28
Payer: MEDICARE

## 2022-12-28 NOTE — TELEPHONE ENCOUNTER
----- Message from Belinda Ayoub sent at 12/28/2022 10:58 AM CST -----  Contact: Jalyn @[phone]  Pt calling to get her annual eye exam. Please give her a call back to further assist.

## 2022-12-29 ENCOUNTER — LAB VISIT (OUTPATIENT)
Dept: LAB | Facility: HOSPITAL | Age: 72
End: 2022-12-29
Attending: INTERNAL MEDICINE
Payer: MEDICARE

## 2022-12-29 ENCOUNTER — OFFICE VISIT (OUTPATIENT)
Dept: INTERNAL MEDICINE | Facility: CLINIC | Age: 72
End: 2022-12-29
Payer: MEDICARE

## 2022-12-29 VITALS
DIASTOLIC BLOOD PRESSURE: 68 MMHG | BODY MASS INDEX: 21 KG/M2 | HEIGHT: 67 IN | HEART RATE: 79 BPM | OXYGEN SATURATION: 98 % | SYSTOLIC BLOOD PRESSURE: 124 MMHG | WEIGHT: 133.81 LBS

## 2022-12-29 DIAGNOSIS — R41.3 OTHER AMNESIA: ICD-10-CM

## 2022-12-29 DIAGNOSIS — R41.3 OTHER AMNESIA: Primary | ICD-10-CM

## 2022-12-29 DIAGNOSIS — I25.10 CVD (CARDIOVASCULAR DISEASE): ICD-10-CM

## 2022-12-29 DIAGNOSIS — Z13.89 SCREENING FOR BLOOD OR PROTEIN IN URINE: ICD-10-CM

## 2022-12-29 DIAGNOSIS — M85.80 OSTEOPENIA, UNSPECIFIED LOCATION: ICD-10-CM

## 2022-12-29 DIAGNOSIS — H91.90 HEARING LOSS, UNSPECIFIED HEARING LOSS TYPE, UNSPECIFIED LATERALITY: ICD-10-CM

## 2022-12-29 DIAGNOSIS — H61.20 IMPACTED CERUMEN, UNSPECIFIED LATERALITY: ICD-10-CM

## 2022-12-29 DIAGNOSIS — M81.0 AGE-RELATED OSTEOPOROSIS WITHOUT CURRENT PATHOLOGICAL FRACTURE: ICD-10-CM

## 2022-12-29 LAB — VIT B12 SERPL-MCNC: 676 PG/ML (ref 210–950)

## 2022-12-29 PROCEDURE — 1125F AMNT PAIN NOTED PAIN PRSNT: CPT | Mod: CPTII,S$GLB,, | Performed by: INTERNAL MEDICINE

## 2022-12-29 PROCEDURE — 3078F DIAST BP <80 MM HG: CPT | Mod: CPTII,S$GLB,, | Performed by: INTERNAL MEDICINE

## 2022-12-29 PROCEDURE — 3288F FALL RISK ASSESSMENT DOCD: CPT | Mod: CPTII,S$GLB,, | Performed by: INTERNAL MEDICINE

## 2022-12-29 PROCEDURE — 1157F PR ADVANCE CARE PLAN OR EQUIV PRESENT IN MEDICAL RECORD: ICD-10-PCS | Mod: CPTII,S$GLB,, | Performed by: INTERNAL MEDICINE

## 2022-12-29 PROCEDURE — 3074F SYST BP LT 130 MM HG: CPT | Mod: CPTII,S$GLB,, | Performed by: INTERNAL MEDICINE

## 2022-12-29 PROCEDURE — 3008F PR BODY MASS INDEX (BMI) DOCUMENTED: ICD-10-PCS | Mod: CPTII,S$GLB,, | Performed by: INTERNAL MEDICINE

## 2022-12-29 PROCEDURE — 99499 UNLISTED E&M SERVICE: CPT | Mod: S$GLB,,, | Performed by: INTERNAL MEDICINE

## 2022-12-29 PROCEDURE — 1159F PR MEDICATION LIST DOCUMENTED IN MEDICAL RECORD: ICD-10-PCS | Mod: CPTII,S$GLB,, | Performed by: INTERNAL MEDICINE

## 2022-12-29 PROCEDURE — 83921 ORGANIC ACID SINGLE QUANT: CPT | Performed by: INTERNAL MEDICINE

## 2022-12-29 PROCEDURE — 3078F PR MOST RECENT DIASTOLIC BLOOD PRESSURE < 80 MM HG: ICD-10-PCS | Mod: CPTII,S$GLB,, | Performed by: INTERNAL MEDICINE

## 2022-12-29 PROCEDURE — 1157F ADVNC CARE PLAN IN RCRD: CPT | Mod: CPTII,S$GLB,, | Performed by: INTERNAL MEDICINE

## 2022-12-29 PROCEDURE — 82607 VITAMIN B-12: CPT | Performed by: INTERNAL MEDICINE

## 2022-12-29 PROCEDURE — 99499 RISK ADDL DX/OHS AUDIT: ICD-10-PCS | Mod: S$GLB,,, | Performed by: INTERNAL MEDICINE

## 2022-12-29 PROCEDURE — 1101F PT FALLS ASSESS-DOCD LE1/YR: CPT | Mod: CPTII,S$GLB,, | Performed by: INTERNAL MEDICINE

## 2022-12-29 PROCEDURE — 3008F BODY MASS INDEX DOCD: CPT | Mod: CPTII,S$GLB,, | Performed by: INTERNAL MEDICINE

## 2022-12-29 PROCEDURE — 3288F PR FALLS RISK ASSESSMENT DOCUMENTED: ICD-10-PCS | Mod: CPTII,S$GLB,, | Performed by: INTERNAL MEDICINE

## 2022-12-29 PROCEDURE — 99214 OFFICE O/P EST MOD 30 MIN: CPT | Mod: S$GLB,,, | Performed by: INTERNAL MEDICINE

## 2022-12-29 PROCEDURE — 1159F MED LIST DOCD IN RCRD: CPT | Mod: CPTII,S$GLB,, | Performed by: INTERNAL MEDICINE

## 2022-12-29 PROCEDURE — 99999 PR PBB SHADOW E&M-EST. PATIENT-LVL V: CPT | Mod: PBBFAC,,, | Performed by: INTERNAL MEDICINE

## 2022-12-29 PROCEDURE — 1101F PR PT FALLS ASSESS DOC 0-1 FALLS W/OUT INJ PAST YR: ICD-10-PCS | Mod: CPTII,S$GLB,, | Performed by: INTERNAL MEDICINE

## 2022-12-29 PROCEDURE — 36415 COLL VENOUS BLD VENIPUNCTURE: CPT | Performed by: INTERNAL MEDICINE

## 2022-12-29 PROCEDURE — 99999 PR PBB SHADOW E&M-EST. PATIENT-LVL V: ICD-10-PCS | Mod: PBBFAC,,, | Performed by: INTERNAL MEDICINE

## 2022-12-29 PROCEDURE — 86592 SYPHILIS TEST NON-TREP QUAL: CPT | Performed by: INTERNAL MEDICINE

## 2022-12-29 PROCEDURE — 1125F PR PAIN SEVERITY QUANTIFIED, PAIN PRESENT: ICD-10-PCS | Mod: CPTII,S$GLB,, | Performed by: INTERNAL MEDICINE

## 2022-12-29 PROCEDURE — 99214 PR OFFICE/OUTPT VISIT, EST, LEVL IV, 30-39 MIN: ICD-10-PCS | Mod: S$GLB,,, | Performed by: INTERNAL MEDICINE

## 2022-12-29 PROCEDURE — 3074F PR MOST RECENT SYSTOLIC BLOOD PRESSURE < 130 MM HG: ICD-10-PCS | Mod: CPTII,S$GLB,, | Performed by: INTERNAL MEDICINE

## 2022-12-29 RX ORDER — SERTRALINE HYDROCHLORIDE 25 MG/1
25 TABLET, FILM COATED ORAL DAILY
Qty: 30 TABLET | Refills: 3 | Status: SHIPPED | OUTPATIENT
Start: 2022-12-29 | End: 2023-05-29

## 2022-12-29 NOTE — PROGRESS NOTES
Subjective:       Patient ID: Jalyn Aaron is a 73 y.o. female.    Chief Complaint: Annual Exam    HPIPt is feeling well - No CP or SOB. Feels her memory is not as good - not getting lost, taking care of her finances without an issue.  Son has issue with addiction and this is very upsetting.    Review of Systems   Respiratory:  Negative for shortness of breath (PND or orthopnea).    Cardiovascular:  Negative for chest pain (arm pain or jaw pain).   Gastrointestinal:  Negative for abdominal pain, diarrhea, nausea and vomiting.   Genitourinary:  Negative for dysuria.   Neurological:  Negative for seizures, syncope and headaches.     Objective:      Physical Exam  Constitutional:       General: She is not in acute distress.     Appearance: She is well-developed.   HENT:      Head: Normocephalic.   Eyes:      Pupils: Pupils are equal, round, and reactive to light.   Neck:      Thyroid: No thyromegaly.      Vascular: No JVD.   Cardiovascular:      Rate and Rhythm: Normal rate and regular rhythm.      Heart sounds: Normal heart sounds. No murmur heard.    No friction rub. No gallop.   Pulmonary:      Effort: Pulmonary effort is normal.      Breath sounds: Normal breath sounds. No wheezing or rales.      Comments: Breasts: No masses, discharge or adenopathy bilaterally   Abdominal:      General: Bowel sounds are normal. There is no distension.      Palpations: Abdomen is soft. There is no mass.      Tenderness: There is no abdominal tenderness. There is no guarding or rebound.   Musculoskeletal:      Cervical back: Neck supple.   Lymphadenopathy:      Cervical: No cervical adenopathy.   Skin:     General: Skin is warm and dry.   Neurological:      Mental Status: She is alert and oriented to person, place, and time.      Deep Tendon Reflexes: Reflexes are normal and symmetric.   Psychiatric:         Behavior: Behavior normal.         Thought Content: Thought content normal.         Judgment: Judgment normal.        Assessment:       1. Other amnesia    2. Screening for blood or protein in urine    3. CVD (cardiovascular disease)    4. Hearing loss, unspecified hearing loss type, unspecified laterality    5. Osteopenia, unspecified location    6. Age-related osteoporosis without current pathological fracture    7. Impacted cerumen, unspecified laterality          Plan:   Other amnesia  -     CT Head Without Contrast; Future; Expected date: 12/29/2022  -     Vitamin B12; Future; Expected date: 01/29/2023  -     Methylmalonic Acid, Serum; Future; Expected date: 12/29/2022  -     RPR; Future; Expected date: 12/29/2022    Screening for blood or protein in urine  -     Urinalysis; Future; Expected date: 12/29/2022    CVD (cardiovascular disease)  -     US Carotid Bilateral; Future; Expected date: 12/29/2022    Hearing loss, unspecified hearing loss type, unspecified laterality  -     Comprehensive audiogram; Future    Osteopenia, unspecified location  -     DXA Bone Density Spine And Hip; Future; Expected date: 12/29/2022    Age-related osteoporosis without current pathological fracture  -     DXA Bone Density Spine And Hip; Future; Expected date: 12/29/2022    Impacted cerumen, unspecified laterality  -     Ambulatory referral/consult to ENT; Future; Expected date: 01/05/2023    Other orders  -     sertraline (ZOLOFT) 25 MG tablet; Take 1 tablet (25 mg total) by mouth once daily.  Dispense: 30 tablet; Refill: 3      Memory issue may be related to concentration due to some depression - will start low dose sertraline

## 2022-12-30 DIAGNOSIS — I49.8 OTHER SPECIFIED CARDIAC ARRHYTHMIAS: Primary | ICD-10-CM

## 2022-12-30 LAB — RPR SER QL: NORMAL

## 2023-01-03 ENCOUNTER — IMMUNIZATION (OUTPATIENT)
Dept: INTERNAL MEDICINE | Facility: CLINIC | Age: 73
End: 2023-01-03
Payer: MEDICARE

## 2023-01-03 DIAGNOSIS — Z23 NEED FOR VACCINATION: Primary | ICD-10-CM

## 2023-01-03 PROCEDURE — 91312 COVID-19, MRNA, LNP-S, BIVALENT BOOSTER, PF, 30 MCG/0.3 ML DOSE: ICD-10-PCS | Mod: S$GLB,,, | Performed by: INTERNAL MEDICINE

## 2023-01-03 PROCEDURE — 0124A COVID-19, MRNA, LNP-S, BIVALENT BOOSTER, PF, 30 MCG/0.3 ML DOSE: CPT | Mod: CV19,PBBFAC | Performed by: INTERNAL MEDICINE

## 2023-01-03 PROCEDURE — 91312 COVID-19, MRNA, LNP-S, BIVALENT BOOSTER, PF, 30 MCG/0.3 ML DOSE: CPT | Mod: S$GLB,,, | Performed by: INTERNAL MEDICINE

## 2023-01-04 ENCOUNTER — HOSPITAL ENCOUNTER (OUTPATIENT)
Dept: RADIOLOGY | Facility: HOSPITAL | Age: 73
Discharge: HOME | End: 2023-01-04
Attending: INTERNAL MEDICINE
Payer: MEDICARE

## 2023-01-04 DIAGNOSIS — R41.3 OTHER AMNESIA: ICD-10-CM

## 2023-01-04 LAB — METHYLMALONATE SERPL-SCNC: 0.24 UMOL/L

## 2023-01-04 PROCEDURE — 70450 CT HEAD/BRAIN W/O DYE: CPT | Mod: TC

## 2023-01-04 PROCEDURE — 70450 CT HEAD WITHOUT CONTRAST: ICD-10-PCS | Mod: 26,,, | Performed by: RADIOLOGY

## 2023-01-04 PROCEDURE — 70450 CT HEAD/BRAIN W/O DYE: CPT | Mod: 26,,, | Performed by: RADIOLOGY

## 2023-01-06 ENCOUNTER — PES CALL (OUTPATIENT)
Dept: ADMINISTRATIVE | Facility: CLINIC | Age: 73
End: 2023-01-06
Payer: MEDICARE

## 2023-01-06 ENCOUNTER — HOSPITAL ENCOUNTER (OUTPATIENT)
Dept: RADIOLOGY | Facility: HOSPITAL | Age: 73
Discharge: HOME | End: 2023-01-06
Attending: INTERNAL MEDICINE
Payer: MEDICARE

## 2023-01-06 DIAGNOSIS — I25.10 CVD (CARDIOVASCULAR DISEASE): ICD-10-CM

## 2023-01-06 PROCEDURE — 93880 EXTRACRANIAL BILAT STUDY: CPT | Mod: TC

## 2023-01-06 PROCEDURE — 93880 US CAROTID BILATERAL: ICD-10-PCS | Mod: 26,,, | Performed by: RADIOLOGY

## 2023-01-06 PROCEDURE — 93880 EXTRACRANIAL BILAT STUDY: CPT | Mod: 26,,, | Performed by: RADIOLOGY

## 2023-01-11 ENCOUNTER — DOCUMENTATION ONLY (OUTPATIENT)
Dept: REHABILITATION | Facility: OTHER | Age: 73
End: 2023-01-11
Attending: INTERNAL MEDICINE
Payer: MEDICARE

## 2023-01-11 NOTE — PATIENT INSTRUCTIONS
My Home Exercise Program      Lumbar Extension      Cervical Extension      Torso      Chest Press      Seated Row      Triceps Extension      Bicep Curls      Leg Press      Leg Extension      Leg Curls      Hip Abduction      Hip Adduction              .

## 2023-01-11 NOTE — PROGRESS NOTES
Health  Wellness Visit Note    Name: Jalyn Aaron  Clinic Number: [id number]  Physician: No ref. provider found  Diagnosis: No diagnosis found.  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    Left    AV block     exercised induced 2:1    Cataract     Fever blister     Heart block     History of acne     Joint pain     hands    Keratoconjunctivitis sicca not due to Sjogren's syndrome     Pacemaker      Visit Number: 52  Precautions: standard; pacemaker      1st PT visit:  1/21/22  Year of care end date: 1/21/23  6 Month test  Performed: July 2022  12 Month test performed: Jan 2023  Mind body plan: Plan A 8 months  Patient level: B    Time In: 9:30 AM  Time Out: 10:20 AM  Total Treatment Time: 50 minutes    Wellness Vision 2022  Handout on this week's wellness topic [employer] was provided along with a discussion on what it means, the benefits, and suggestions for practice.   Reviewed last week's topic of [employer] / Prioritization    Subjective:   Patient reports that her back is pain-free today; this is pt's final wellness visit. Pt completed 12-month isometric strength test today. Test reflected that she is 39% stronger than eval and 7% below avg norm. She walks regularly and plan to go to [facility] at least 2x a week.    Objective:   Jalyn completed therapeutic stretches (EIL, KATHY) and the following MedX exercise machines: core lumbar, torso rotation l/r, leg extension, leg curl, upright row, chest press, biceps curl, triceps extension, leg press    See exercise log in patient folder for rate of exertion and repetitions completed.       Fitness Machine Education Key:  E=education on equipment initiated and further follow up and education needed  I=independent with  and exercise.  The patient:  Adjusts machines to his/her settings  Uses equipment levers, pins, weights safely  Maintains safe and correct posture  while exercising  Moves through exercise with correct pace and control  Gets on and off equipment safely      Core lumbar strength E Core Torso Rotation E Leg Press E   Leg Extension E Seated Leg Curl E Chest Press E   Row E Abductor E Hip ADD X   Triceps  E Biceps E Other: X       Assessment:   Patient tolerated Patient tolerated MedX Core Lumbar Strength and all other peripheral exercises without an increase in symptoms. Patient warmed up on the treadmill for 5 minutes and stretched. Patient skipped ice.  Plan:  Continue with established plan of care towards wellness goals.     Health  : Laura Lynch  1/11/2023

## 2023-01-18 ENCOUNTER — TELEPHONE (OUTPATIENT)
Dept: NEUROSURGERY | Facility: CLINIC | Age: 73
End: 2023-01-18
Payer: MEDICARE

## 2023-01-18 ENCOUNTER — PES CALL (OUTPATIENT)
Dept: ADMINISTRATIVE | Facility: CLINIC | Age: 73
End: 2023-01-18
Payer: MEDICARE

## 2023-01-20 ENCOUNTER — OFFICE VISIT (OUTPATIENT)
Dept: NEUROSURGERY | Facility: CLINIC | Age: 73
End: 2023-01-20
Payer: MEDICARE

## 2023-01-20 VITALS — WEIGHT: 133.81 LBS | BODY MASS INDEX: 21 KG/M2 | HEIGHT: 67 IN

## 2023-01-20 DIAGNOSIS — M43.16 SPONDYLOLISTHESIS OF LUMBAR REGION: ICD-10-CM

## 2023-01-20 DIAGNOSIS — M47.22 OSTEOARTHRITIS OF SPINE WITH RADICULOPATHY, CERVICAL REGION: Primary | ICD-10-CM

## 2023-01-20 DIAGNOSIS — M43.12 SPONDYLOLISTHESIS OF CERVICAL REGION: ICD-10-CM

## 2023-01-20 PROCEDURE — 3288F PR FALLS RISK ASSESSMENT DOCUMENTED: ICD-10-PCS | Mod: CPTII,S$GLB,, | Performed by: NEUROLOGICAL SURGERY

## 2023-01-20 PROCEDURE — 3288F FALL RISK ASSESSMENT DOCD: CPT | Mod: CPTII,S$GLB,, | Performed by: NEUROLOGICAL SURGERY

## 2023-01-20 PROCEDURE — 1159F PR MEDICATION LIST DOCUMENTED IN MEDICAL RECORD: ICD-10-PCS | Mod: CPTII,S$GLB,, | Performed by: NEUROLOGICAL SURGERY

## 2023-01-20 PROCEDURE — 1125F AMNT PAIN NOTED PAIN PRSNT: CPT | Mod: CPTII,S$GLB,, | Performed by: NEUROLOGICAL SURGERY

## 2023-01-20 PROCEDURE — 1125F PR PAIN SEVERITY QUANTIFIED, PAIN PRESENT: ICD-10-PCS | Mod: CPTII,S$GLB,, | Performed by: NEUROLOGICAL SURGERY

## 2023-01-20 PROCEDURE — 3008F PR BODY MASS INDEX (BMI) DOCUMENTED: ICD-10-PCS | Mod: CPTII,S$GLB,, | Performed by: NEUROLOGICAL SURGERY

## 2023-01-20 PROCEDURE — 1157F ADVNC CARE PLAN IN RCRD: CPT | Mod: CPTII,S$GLB,, | Performed by: NEUROLOGICAL SURGERY

## 2023-01-20 PROCEDURE — 1101F PR PT FALLS ASSESS DOC 0-1 FALLS W/OUT INJ PAST YR: ICD-10-PCS | Mod: CPTII,S$GLB,, | Performed by: NEUROLOGICAL SURGERY

## 2023-01-20 PROCEDURE — 4010F ACE/ARB THERAPY RXD/TAKEN: CPT | Mod: CPTII,S$GLB,, | Performed by: NEUROLOGICAL SURGERY

## 2023-01-20 PROCEDURE — 99999 PR PBB SHADOW E&M-EST. PATIENT-LVL III: CPT | Mod: PBBFAC,,, | Performed by: NEUROLOGICAL SURGERY

## 2023-01-20 PROCEDURE — 1101F PT FALLS ASSESS-DOCD LE1/YR: CPT | Mod: CPTII,S$GLB,, | Performed by: NEUROLOGICAL SURGERY

## 2023-01-20 PROCEDURE — 4010F PR ACE/ARB THEARPY RXD/TAKEN: ICD-10-PCS | Mod: CPTII,S$GLB,, | Performed by: NEUROLOGICAL SURGERY

## 2023-01-20 PROCEDURE — 1159F MED LIST DOCD IN RCRD: CPT | Mod: CPTII,S$GLB,, | Performed by: NEUROLOGICAL SURGERY

## 2023-01-20 PROCEDURE — 99214 OFFICE O/P EST MOD 30 MIN: CPT | Mod: S$GLB,,, | Performed by: NEUROLOGICAL SURGERY

## 2023-01-20 PROCEDURE — 99999 PR PBB SHADOW E&M-EST. PATIENT-LVL III: ICD-10-PCS | Mod: PBBFAC,,, | Performed by: NEUROLOGICAL SURGERY

## 2023-01-20 PROCEDURE — 99214 PR OFFICE/OUTPT VISIT, EST, LEVL IV, 30-39 MIN: ICD-10-PCS | Mod: S$GLB,,, | Performed by: NEUROLOGICAL SURGERY

## 2023-01-20 PROCEDURE — 1157F PR ADVANCE CARE PLAN OR EQUIV PRESENT IN MEDICAL RECORD: ICD-10-PCS | Mod: CPTII,S$GLB,, | Performed by: NEUROLOGICAL SURGERY

## 2023-01-20 PROCEDURE — 3008F BODY MASS INDEX DOCD: CPT | Mod: CPTII,S$GLB,, | Performed by: NEUROLOGICAL SURGERY

## 2023-01-20 NOTE — PROGRESS NOTES
NEUROSURGICAL PROGRESS NOTE    DATE OF SERVICE:  01/20/2023    ATTENDING PHYSICIAN:  Narciso Segura MD    SUBJECTIVE:  This is a 72 y.o. female, pacemaker for AV block, cardiomyopathy, who started to have left foot numbness in the L5 distribution about 2 years ago.  She then developed worsening left-sided back pain and leg pain.  The back and leg pain is rated 3 to 4/10.  The pain is not interfering with walking at this time.  Pain is usually worse with sitting or following physical activities such as lifting or bending forward.  She has been treated with the healthy back program and physical therapy with good pain relief.  She used to do a lot of yoga.  She is considering doing Pilates.  She denies having urinary retention or incontinence.  She is mainly complaining of constipation which is new for her.  No new onset of motor weakness.  No recent spinal injections.  More recently she has been complaining of right-sided neck pain.  She tried some stretches at home but the pain is constant and not improving.  She denies having upper extremity weakness numbness.  She has not dropping things.  No gait imbalance.     INTERIM HISTORY:  08/2022   She is been doing physical therapy for her back and are cervical spine.  She has noticed significant improvement in her neck pain with the strengthening exercises.  No new onset of motor weakness, numbness, gait imbalance sphincter dysfunction.  She has some difficulty opening jars but overall she is not dropping things.  No significant hand numbness.  We scheduled an MRI but because of her pacemaker she was unable to get it done.  She still has some left buttock and leg pain but this is improving with physical therapy.  She denies having significant back pain.  She is not limited functionally.    INTERIM HISTORY:    She has completed physical therapy to the healthy back program.  She is doing her home cervical and lumbar physical therapy exercises.  Her main pain complaint is  still the right upper neck area.  Pain is interfering with her activity level and functional status.  She denies having worsening upper extremity weakness filled she has occasional left hand numbness.  She also has left leg pain and left leg numbness.  No gait imbalance or sphincter dysfunction symptoms.  She is waiting to change her pacemaker for a MRI compatible one in order to get a cervical and lumbar spine MRI done              PAST MEDICAL HISTORY:  Active Ambulatory Problems     Diagnosis Date Noted    Atrioventricular block, complete     Cardiac pacemaker in situ 07/26/2013    Anxiety 07/18/2014    Intraductal papilloma of breast 06/06/2018    Ejection fraction < 50% 09/23/2020    Cardiomyopathy, nonischemic 10/12/2020    Lumbar stenosis 01/04/2021    Screening for colon cancer 03/30/2021    Disorder of muscle 05/05/2021    Decreased range of motion of trunk and back 01/25/2022    Decreased strength of trunk and back 01/25/2022    Weakness of left lower extremity 01/25/2022    Neuropathy 03/11/2022    Decreased ROM of neck 05/31/2022    Decreased strength of upper extremity 05/31/2022     Resolved Ambulatory Problems     Diagnosis Date Noted    Neck pain 10/02/2013    Papilloma of breast 12/16/2013    Breast mass 01/03/2014    Hallux valgus 04/01/2015    Screening 11/08/2016    Pain 12/02/2020     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    AV block     Cataract     Fever blister     Heart block     History of acne     Joint pain     Keratoconjunctivitis sicca not due to Sjogren's syndrome     Pacemaker        PAST SURGICAL HISTORY:  Past Surgical History:   Procedure Laterality Date    BREAST BIOPSY Left 12/2013    papilloma with atypia on core biopsy    BREAST BIOPSY Left 01/2014    Ex.Bx., removal of ADH/Papilloma    COLONOSCOPY N/A 11/8/2016    Procedure: COLONOSCOPY;  Surgeon: Dl Caruso MD;  Location: [facility] ([address] [place] [address]);  Service:  Endoscopy;  Laterality: N/A;  Pacemaker in place.    COLONOSCOPY N/A 3/30/2021    Procedure: COLONOSCOPY;  Surgeon: Joaquin Johnson MD;  Location: [facility] ENDO ([address] [place] [address]);  Service: Endoscopy;  Laterality: N/A;  prep ins. emailed - COVID screening on 3/27/21 PCW - ERW    HYSTERECTOMY      INSERT / REPLACE / REMOVE PACEMAKER      LASIK      LASIK Bilateral 2009    VENOGRAM, EP LAB N/A 12/20/2022    Procedure: Venogram, EP Lab;  Surgeon: Chacho Colby MD;  Location: [facility] EP LAB;  Service: Cardiology;  Laterality: N/A;  NICM, Venogram- left side, SK, 3 Prep *MDT PPM in situ*       SOCIAL HISTORY:   Social History     Socioeconomic History    Marital status:     Number of children: 1   Occupational History    Occupation:      Employer: [employer]   Tobacco Use    Smoking status: Never     Passive exposure: Never    Smokeless tobacco: Never   Substance and Sexual Activity    Alcohol use: Yes     Alcohol/week: 0.0 standard drinks     Types: 2 Glasses of wine per week     Comment: socially    Drug use: No    Sexual activity: Not Currently     Partners: Male     Birth control/protection: None   Other Topics Concern    Are you pregnant or think you may be? No    Breast-feeding No   Social History Narrative     (case management) at [employer]       FAMILY HISTORY:  Family History   Problem Relation Age of Onset    Cancer Mother 80        pagets    Cataracts Mother     Hypertension Mother     Breast cancer Mother     Multiple sclerosis Father     Multiple sclerosis Sister     No Known Problems Brother     No Known Problems Maternal Aunt     No Known Problems Maternal Uncle     No Known Problems Paternal Aunt     No Known Problems Paternal Uncle     No Known Problems Maternal Grandmother     Prostate cancer Maternal Grandfather     No Known Problems Paternal Grandmother     No Known Problems Paternal Grandfather     No Known Problems Son     No Known Problems Son     Amblyopia Neg Hx      Blindness Neg Hx     Diabetes Neg Hx     Glaucoma Neg Hx     Macular degeneration Neg Hx     Retinal detachment Neg Hx     Strabismus Neg Hx     Stroke Neg Hx     Thyroid disease Neg Hx     Melanoma Neg Hx     Ovarian cancer Neg Hx        CURRENTS MEDICATIONS:  Current Outpatient Medications on File Prior to Visit   Medication Sig Dispense Refill    carvediloL (COREG) 3.125 MG tablet TAKE 1 TABLET(3.125 MG) BY MOUTH TWICE DAILY WITH MEALS 60 tablet 11    gabapentin (NEURONTIN) 600 MG tablet Take 1.5 tablets (900 mg total) by mouth 3 (three) times daily. (Patient taking differently: Take 900 mg by mouth 2 (two) times daily.) 135 tablet 11    linaCLOtide (LINZESS) 72 mcg Cap capsule Take 1 capsule (72 mcg total) by mouth before breakfast. (Patient taking differently: Take 72 mcg by mouth before breakfast. PRN) 30 capsule 3    losartan (COZAAR) 25 MG tablet TAKE 1 TABLET BY MOUTH TWICE DAILY 180 tablet 3    multivitamin capsule Take 1 capsule by mouth once daily.      naproxen (NAPROSYN) 500 MG tablet TAKE 1/2 TABLET BY MOUTH DAILY 90 tablet 1    senna (SENOKOT) 8.6 mg tablet Take 1 tablet by mouth once daily. Twice a day      sertraline (ZOLOFT) 25 MG tablet Take 1 tablet (25 mg total) by mouth once daily. 30 tablet 3    vitamin D 1000 units Tab Take 1,000 Units by mouth once daily.       No current facility-administered medications on file prior to visit.       ALLERGIES:  Review of patient's allergies indicates:   Allergen Reactions    Bactrim [sulfamethoxazole-trimethoprim] Nausea Only       REVIEW OF SYSTEMS:  Review of Systems   Constitutional:  Negative for diaphoresis, fever and weight loss.   Respiratory:  Negative for shortness of breath.    Cardiovascular:  Negative for chest pain.   Gastrointestinal:  Negative for blood in stool.   Genitourinary:  Negative for hematuria.   Endo/Heme/Allergies:  Does not bruise/bleed easily.   All other systems reviewed and are negative.      OBJECTIVE:    PHYSICAL  EXAMINATION:   There were no vitals filed for this visit.    Physical Exam:  Vitals reviewed.    Constitutional: She appears well-developed and well-nourished.     Eyes: Pupils are equal, round, and reactive to light. Conjunctivae and EOM are normal.     Cardiovascular: Normal distal pulses and no edema.     Abdominal: Soft.     Skin: Skin displays no rash on trunk and no rash on extremities. Skin displays no lesions on trunk and no lesions on extremities.     Psych/Behavior: She is alert. She is oriented to person, place, and time. She has a normal mood and affect.     Musculoskeletal:        Neck: Range of motion is limited.     Neurological:        DTRs: Tricep reflexes are 2+ on the right side and 2+ on the left side. Bicep reflexes are 2+ on the right side and 2+ on the left side. Brachioradialis reflexes are 2+ on the right side and 2+ on the left side. Patellar reflexes are 2+ on the right side and 2+ on the left side. Achilles reflexes are 2+ on the right side and 2+ on the left side.     Back Exam     Muscle Strength   Right Quadriceps:  5/5   Left Quadriceps:  5/5   Right Hamstrings:  5/5   Left Hamstrings:  5/5               Neurologic Exam     Mental Status   Oriented to person, place, and time.   Speech: speech is normal   Level of consciousness: alert    Cranial Nerves   Cranial nerves II through XII intact.     CN III, IV, VI   Pupils are equal, round, and reactive to light.  Extraocular motions are normal.     Motor Exam   Muscle bulk: normal  Overall muscle tone: normal    Strength   Right deltoid: 5/5  Left deltoid: 5/5  Right biceps: 5/5  Left biceps: 5/5  Right triceps: 5/5  Left triceps: 5/5  Right wrist flexion: 5/5  Left wrist flexion: 5/5  Right wrist extension: 5/5  Left wrist extension: 5/5  Right interossei: 5/5  Left interossei: 5/5  Right iliopsoas: 5/5  Left iliopsoas: 5/5  Right quadriceps: 5/5  Left quadriceps: 5/5  Right hamstrin/5  Left hamstrin/5  Right anterior tibial:  5/5  Left anterior tibial: 5/5  Right posterior tibial: 5/5  Left posterior tibial: 5/5  Right peroneal: 5/5  Left peroneal: 5/5  Right gastroc: 5/5  Left gastroc: 5/5    Sensory Exam   Light touch normal.   Pinprick normal.     Gait, Coordination, and Reflexes     Gait  Gait: normal    Coordination   Finger to nose coordination: normal  Tandem walking coordination: normal    Reflexes   Right brachioradialis: 2+  Left brachioradialis: 2+  Right biceps: 2+  Left biceps: 2+  Right triceps: 2+  Left triceps: 2+  Right patellar: 2+  Left patellar: 2+  Right achilles: 2+  Left achilles: 2+  Right plantar: normal  Left plantar: normal  Right Alfredo: absent  Left Alfredo: absent  Right ankle clonus: absent  Left ankle clonus: absent      DIAGNOSTIC DATA:  I personally interpreted the following imaging:   Cervical flexion-extension x-rays shows C3-4 and C4-5 spondylolisthesis.  The C3-4 spondylolisthesis measured 3 mm in flexion and comprehensive reduced in extension the 05/22C4-5 spondylolisthesis measured 4 mm in flexion and 2 mm in extension.   05/22 Lumbar x-ray shows grade 2 L4-5 degenerative spondylolisthesis  04/22Lumbar CT scan shows grade 2 L4-5 degenerative spondylolisthesis with severe bilateral foraminal stenosis and central canal stenosis    ASSESMENT:  This is a 73 y.o. female with     Problem List Items Addressed This Visit    None  Visit Diagnoses       Osteoarthritis of spine with radiculopathy, cervical region    -  Primary    Relevant Orders    CT Cervical Spine Without Contrast    Spondylolisthesis of cervical region        Relevant Orders    CT Cervical Spine Without Contrast              PLAN:  I will order a CT of the cervical spine to better assess her cervical spondylosis with radiculopathy.  Patient is not inclined with any invasive surgical intervention at this time  To treat her severe neck pain caused by the C3-4 and C4-5 spondylolisthesis with radiculopathy I recommended a C3 and C5 medial  branch peripheral electrode placement for neuromodulation using de SPRINT system  Patient is considering all treatment options    More than 50% of the time was spent on discussing conservative management treatments (medication, physical therapy exercises) and possible interventions (spinal injections and surgical procedures). Care coordination was discussed.    30 minutes        Narciso Segura MD  Cell:[phone]

## 2023-01-23 ENCOUNTER — OFFICE VISIT (OUTPATIENT)
Dept: CARDIOLOGY | Facility: CLINIC | Age: 73
End: 2023-01-23
Payer: MEDICARE

## 2023-01-23 VITALS
SYSTOLIC BLOOD PRESSURE: 119 MMHG | HEIGHT: 67 IN | DIASTOLIC BLOOD PRESSURE: 57 MMHG | HEART RATE: 69 BPM | BODY MASS INDEX: 20.62 KG/M2 | WEIGHT: 131.38 LBS

## 2023-01-23 DIAGNOSIS — I44.2 ATRIOVENTRICULAR BLOCK, COMPLETE: ICD-10-CM

## 2023-01-23 DIAGNOSIS — E78.2 MIXED HYPERLIPIDEMIA: ICD-10-CM

## 2023-01-23 DIAGNOSIS — I42.8 CARDIOMYOPATHY, NONISCHEMIC: Primary | ICD-10-CM

## 2023-01-23 DIAGNOSIS — R94.30 EJECTION FRACTION < 50%: ICD-10-CM

## 2023-01-23 DIAGNOSIS — I70.0 ATHEROSCLEROSIS OF AORTA: ICD-10-CM

## 2023-01-23 DIAGNOSIS — Z95.0 CARDIAC PACEMAKER IN SITU: ICD-10-CM

## 2023-01-23 DIAGNOSIS — Z13.6 ENCOUNTER FOR SCREENING FOR CARDIOVASCULAR DISORDERS: ICD-10-CM

## 2023-01-23 PROCEDURE — 1101F PT FALLS ASSESS-DOCD LE1/YR: CPT | Mod: CPTII,S$GLB,, | Performed by: INTERNAL MEDICINE

## 2023-01-23 PROCEDURE — 99999 PR PBB SHADOW E&M-EST. PATIENT-LVL III: CPT | Mod: PBBFAC,,, | Performed by: INTERNAL MEDICINE

## 2023-01-23 PROCEDURE — 3078F DIAST BP <80 MM HG: CPT | Mod: CPTII,S$GLB,, | Performed by: INTERNAL MEDICINE

## 2023-01-23 PROCEDURE — 1101F PR PT FALLS ASSESS DOC 0-1 FALLS W/OUT INJ PAST YR: ICD-10-PCS | Mod: CPTII,S$GLB,, | Performed by: INTERNAL MEDICINE

## 2023-01-23 PROCEDURE — 3008F BODY MASS INDEX DOCD: CPT | Mod: CPTII,S$GLB,, | Performed by: INTERNAL MEDICINE

## 2023-01-23 PROCEDURE — 3078F PR MOST RECENT DIASTOLIC BLOOD PRESSURE < 80 MM HG: ICD-10-PCS | Mod: CPTII,S$GLB,, | Performed by: INTERNAL MEDICINE

## 2023-01-23 PROCEDURE — 3008F PR BODY MASS INDEX (BMI) DOCUMENTED: ICD-10-PCS | Mod: CPTII,S$GLB,, | Performed by: INTERNAL MEDICINE

## 2023-01-23 PROCEDURE — 1157F PR ADVANCE CARE PLAN OR EQUIV PRESENT IN MEDICAL RECORD: ICD-10-PCS | Mod: CPTII,S$GLB,, | Performed by: INTERNAL MEDICINE

## 2023-01-23 PROCEDURE — 1157F ADVNC CARE PLAN IN RCRD: CPT | Mod: CPTII,S$GLB,, | Performed by: INTERNAL MEDICINE

## 2023-01-23 PROCEDURE — 3074F SYST BP LT 130 MM HG: CPT | Mod: CPTII,S$GLB,, | Performed by: INTERNAL MEDICINE

## 2023-01-23 PROCEDURE — 1159F MED LIST DOCD IN RCRD: CPT | Mod: CPTII,S$GLB,, | Performed by: INTERNAL MEDICINE

## 2023-01-23 PROCEDURE — 99214 OFFICE O/P EST MOD 30 MIN: CPT | Mod: S$GLB,,, | Performed by: INTERNAL MEDICINE

## 2023-01-23 PROCEDURE — 4010F ACE/ARB THERAPY RXD/TAKEN: CPT | Mod: CPTII,S$GLB,, | Performed by: INTERNAL MEDICINE

## 2023-01-23 PROCEDURE — 1126F PR PAIN SEVERITY QUANTIFIED, NO PAIN PRESENT: ICD-10-PCS | Mod: CPTII,S$GLB,, | Performed by: INTERNAL MEDICINE

## 2023-01-23 PROCEDURE — 99214 PR OFFICE/OUTPT VISIT, EST, LEVL IV, 30-39 MIN: ICD-10-PCS | Mod: S$GLB,,, | Performed by: INTERNAL MEDICINE

## 2023-01-23 PROCEDURE — 3288F FALL RISK ASSESSMENT DOCD: CPT | Mod: CPTII,S$GLB,, | Performed by: INTERNAL MEDICINE

## 2023-01-23 PROCEDURE — 1159F PR MEDICATION LIST DOCUMENTED IN MEDICAL RECORD: ICD-10-PCS | Mod: CPTII,S$GLB,, | Performed by: INTERNAL MEDICINE

## 2023-01-23 PROCEDURE — 3288F PR FALLS RISK ASSESSMENT DOCUMENTED: ICD-10-PCS | Mod: CPTII,S$GLB,, | Performed by: INTERNAL MEDICINE

## 2023-01-23 PROCEDURE — 3074F PR MOST RECENT SYSTOLIC BLOOD PRESSURE < 130 MM HG: ICD-10-PCS | Mod: CPTII,S$GLB,, | Performed by: INTERNAL MEDICINE

## 2023-01-23 PROCEDURE — 1126F AMNT PAIN NOTED NONE PRSNT: CPT | Mod: CPTII,S$GLB,, | Performed by: INTERNAL MEDICINE

## 2023-01-23 PROCEDURE — 4010F PR ACE/ARB THEARPY RXD/TAKEN: ICD-10-PCS | Mod: CPTII,S$GLB,, | Performed by: INTERNAL MEDICINE

## 2023-01-23 PROCEDURE — 99999 PR PBB SHADOW E&M-EST. PATIENT-LVL III: ICD-10-PCS | Mod: PBBFAC,,, | Performed by: INTERNAL MEDICINE

## 2023-01-25 PROBLEM — I70.0 ATHEROSCLEROSIS OF AORTA: Status: ACTIVE | Noted: 2023-01-25

## 2023-01-26 NOTE — PROGRESS NOTES
Subjective:   Chief Complaint: Cardiomyopathy  Last Clinic Visit: 11/12/2021     History of Present Illness: Jalyn Aaron is a 73 y.o. 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  Interval history:  She has continued to follow with Dr. Colby since we saw her last, LVEF continues to remain in a mid range.  Planning on taking a cruise in [place] 10 days in March.  Also requesting refills of losartan as well as carvedilol.  She denies any congestive heart failure symptoms: no orthopnea, no weight gain, no PND, no lower extremity edema, no dyspnea on exertion.  In planning for an upcoming PG change (EOL in 18 months) she had a venogram which showed not complete occlusion, but narrowing.  Given mid range EF, and 100% RV pacing discussion of CRT upgrade has been made.     11/12/2021  She was initially followed by Dr. Collins, and is now followed by Dr. Colby.  She has an underlying escape rhythm with a QRS of 92, prior to this paced . RV pacing percentage near 100% for some time, initially was intermittent, but over the last year at least has been over 100% RV paced.   Most recent time EF was normal was 2019, and then as of June 2020 EF has been mildly depressed.  She is reasonably physically active does Pilates twice a week as well as yoga and walks on a regular basis denies any lower extremity edema, no orthopnea, no weight gain, no PND, no heart failure symptoms.  She was started on metoprolol had significant fatigue then change to carvedilol tolerating low-dose carvedilol as well as losartan well, these were started for goal-directed medical therapy, but EF has been relatively unchanged over the past 18 months.  Normally blood pressure is well controlled, does have a arm cuff but does not check at home on a regular basis.  Denies any lightheadedness, no presyncope.  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  Recently remarried 2 years ago.  She got a 2nd opinion from Dr. Collins who discussed what sounds like his bundle pacing as well.    Dx:  AV block s/p ppm DC 2011, second-degree progressed to CHB  Nonischemic cardiomyopathy EF 45%.    Medications:  Outpatient Encounter Medications as of 1/23/2023   Medication Sig Dispense Refill    carvediloL (COREG) 3.125 MG tablet TAKE 1 TABLET(3.125 MG) BY MOUTH TWICE DAILY WITH MEALS 60 tablet 11    gabapentin (NEURONTIN) 600 MG tablet Take 1.5 tablets (900 mg total) by mouth 3 (three) times daily. (Patient taking differently: Take 900 mg by mouth 2 (two) times daily.) 135 tablet 11    linaCLOtide (LINZESS) 72 mcg Cap capsule Take 1 capsule (72 mcg total) by mouth before breakfast. (Patient taking differently: Take 72 mcg by mouth before breakfast. PRN) 30 capsule 3    losartan (COZAAR) 25 MG tablet TAKE 1 TABLET BY MOUTH TWICE DAILY 180 tablet 3    multivitamin capsule Take 1 capsule by mouth once daily.      naproxen (NAPROSYN) 500 MG tablet TAKE 1/2 TABLET BY MOUTH DAILY 90 tablet 1    senna (SENOKOT) 8.6 mg tablet Take 1 tablet by mouth once daily. Twice a day      sertraline (ZOLOFT) 25 MG tablet Take 1 tablet (25 mg total) by mouth once daily. 30 tablet 3    vitamin D 1000 units Tab Take 1,000 Units by mouth once daily.      [DISCONTINUED] naproxen (NAPROSYN) 500 MG tablet Half tablet once daily (Patient taking differently: 1/2 tablet once daily) 90 tablet 1     No facility-administered encounter medications on file as of 1/23/2023.     Social History:  Jalyn reports that she has never smoked. She has never been exposed to tobacco smoke. She has never used smokeless tobacco. She reports current alcohol use. She reports that she does not use drugs.  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  Remarried  "2019    Objective:   BP (!) 119/57 (BP Location: Left arm, Patient Position: Sitting)   Pulse 69   Ht 5' 7" (1.702 m)   Wt 59.6 kg (131 lb 6.3 oz)   BMI 20.58 kg/m²     Physical Exam   HENT:   Head: Normocephalic and atraumatic.   Mouth/Throat: Mucous membranes are moist.   Cardiovascular: Normal rate, regular rhythm and normal pulses. Exam reveals no gallop and no friction rub.   No murmur heard.  Pulmonary/Chest: Effort normal and breath sounds normal. No stridor. No respiratory distress. She has no rhonchi. She has no rales. She exhibits no tenderness.   Abdominal: Normal appearance. She exhibits no distension.   Musculoskeletal:      Right lower leg: No edema.      Left lower leg: No edema.   Neurological: She is alert.   Skin: Skin is warm. Capillary refill takes less than 2 seconds.      EKG:  My independent visualization of most recent EKG is ventricular paced rhythm , narrow intrinsic QRS on prior ECGs.    TTE:  11/09/2022   The left ventricle is normal in size with mildly decreased systolic function. The estimated ejection fraction is 45%.  There is abnormal septal wall motion consistent with right ventricular pacemaker.  Normal right ventricular size with normal right ventricular systolic function.  Grade I left ventricular diastolic dysfunction.  Mild tricuspid regurgitation.  The estimated PA systolic pressure is 22 mmHg.  Normal central venous pressure (3 mmHg).     11/03/2021  The estimated ejection fraction is 45%. Unchanged from prior reported.  The left ventricle is normal in size with mildly decreased systolic function.  There is mild left ventricular global hypokinesis.  There is abnormal septal wall motion consistent with right ventricular pacemaker.  Grade I left ventricular diastolic dysfunction.  Normal right ventricular size with normal right ventricular systolic function.  Mild to moderate tricuspid regurgitation.  The estimated PA systolic pressure is 27 mmHg.  Normal central " venous pressure (3 mmHg).     10/08/2021  The left ventricle is normal in size with mildly decreased systolic function. The estimated ejection fraction is 45%.  Grade I diastolic dysfunction.  Normal right ventricular systolic function.  The estimated PA systolic pressure is 27 mmHg.  Normal central venous pressure (3 mmHg).  Trivial pericardial effusion.     06/01/2021  Mildly decreased left ventricular systolic function. The estimated ejection fraction is 45%. QEF 43%. Global hypokinetic wall motion.  Grade I (mild) left ventricular diastolic dysfunction consistent with impaired relaxation.  Septal wall has abnormal motion.  Normal right ventricular systolic function.  Mild tricuspid regurgitation.  The estimated PA systolic pressure is 24 mmHg.  Normal central venous pressure (3 mmHg).     08/07/2019  Normal left ventricular systolic function. The estimated ejection fraction is 58%  Septal wall has abnormal motion.  Normal LV diastolic function.  Normal right ventricular systolic function.  Mild tricuspid regurgitation.  Mild pulmonic regurgitation.  Normal central venous pressure (3 mm Hg).  The estimated PA systolic pressure is 25 mm Hg    Lipids:  Recent Labs   Lab 12/13/22  0816   LDL Cholesterol 128.0   HDL 73        Renal:  Recent Labs   Lab 12/13/22  0816   Creatinine 0.7  0.7   Potassium 4.0  4.0   CO2 28  28   BUN 17  17       Liver:  Recent Labs   Lab 12/13/22  0816   AST 23   ALT 21       Venogram  12/20/2022     Venogram: significant narrowing at the left brachiocephalic region, but still with forward flow.    Assessment:     1. Cardiomyopathy, nonischemic    2. Atrioventricular block, complete    3. Encounter for screening for cardiovascular disorders    4. Mixed hyperlipidemia    5. Ejection fraction < 50%    6. Cardiac pacemaker in situ    7. Atherosclerosis of aorta      Plan:   1. Cardiomyopathy, nonischemic  Overall NYHA class 1 symptoms, doing well, notably she did not have significant  hypertension prior to starting GDMT continuing to take losartan 25, carvedilol 3.125.  Given that EF continues to remain in a mid range despite symptoms, would prefer to continue these medications at this time.  If she developed any significant symptoms then we could discuss stoppage, or if EF normalizes discuss stoppage at that time as well.    2. Atrioventricular block, complete  Will defer to EP for most appropriate upgrade plan.  Continue ppm.    3. Encounter for screening for cardiovascular disorders  Most recent lipid panel noted to be mildly elevated, ASCVD risk score 10% however optimal risk 8%, not clear that statin would lower her risk significantly, but will risk stratify further as below.  - Lipid Panel; Future    4. Mixed hyperlipidemia  Given risk between 8-10%, and this moderate range, with no other significant atherosclerotic risk factors, will risk stratify further with calcium score.  Decision made on lipid-lowering therapy pending this test.  - CT Cardiac Scoring; Future    5. Ejection fraction < 50%      6. Cardiac pacemaker in situ      7. Atherosclerosis of aorta  Addressing lipids as above.    Follow up in one year      Abbe Figueroa MD [facility]

## 2023-01-27 ENCOUNTER — HOSPITAL ENCOUNTER (OUTPATIENT)
Dept: RADIOLOGY | Facility: HOSPITAL | Age: 73
Discharge: HOME | End: 2023-01-27
Attending: NEUROLOGICAL SURGERY
Payer: MEDICARE

## 2023-01-27 DIAGNOSIS — M47.22 OSTEOARTHRITIS OF SPINE WITH RADICULOPATHY, CERVICAL REGION: ICD-10-CM

## 2023-01-27 DIAGNOSIS — M43.12 SPONDYLOLISTHESIS OF CERVICAL REGION: ICD-10-CM

## 2023-01-27 PROCEDURE — 72125 CT CERVICAL SPINE WITHOUT CONTRAST: ICD-10-PCS | Mod: 26,,, | Performed by: RADIOLOGY

## 2023-01-27 PROCEDURE — 72125 CT NECK SPINE W/O DYE: CPT | Mod: 26,,, | Performed by: RADIOLOGY

## 2023-01-27 PROCEDURE — 72125 CT NECK SPINE W/O DYE: CPT | Mod: TC

## 2023-01-31 ENCOUNTER — PATIENT MESSAGE (OUTPATIENT)
Dept: PHARMACY | Facility: CLINIC | Age: 73
End: 2023-01-31
Payer: MEDICARE

## 2023-01-31 ENCOUNTER — OFFICE VISIT (OUTPATIENT)
Dept: INTERNAL MEDICINE | Facility: CLINIC | Age: 73
End: 2023-01-31
Payer: MEDICARE

## 2023-01-31 VITALS
HEIGHT: 67 IN | HEART RATE: 70 BPM | OXYGEN SATURATION: 98 % | WEIGHT: 129 LBS | BODY MASS INDEX: 20.25 KG/M2 | DIASTOLIC BLOOD PRESSURE: 70 MMHG | SYSTOLIC BLOOD PRESSURE: 116 MMHG

## 2023-01-31 DIAGNOSIS — I70.0 ATHEROSCLEROSIS OF AORTA: ICD-10-CM

## 2023-01-31 DIAGNOSIS — F41.9 ANXIETY: ICD-10-CM

## 2023-01-31 DIAGNOSIS — G62.9 NEUROPATHY: ICD-10-CM

## 2023-01-31 DIAGNOSIS — Z00.00 ENCOUNTER FOR PREVENTIVE HEALTH EXAMINATION: Primary | ICD-10-CM

## 2023-01-31 DIAGNOSIS — Z95.0 CARDIAC PACEMAKER IN SITU: ICD-10-CM

## 2023-01-31 DIAGNOSIS — I42.8 CARDIOMYOPATHY, NONISCHEMIC: ICD-10-CM

## 2023-01-31 DIAGNOSIS — I44.2 ATRIOVENTRICULAR BLOCK, COMPLETE: ICD-10-CM

## 2023-01-31 DIAGNOSIS — M48.061 SPINAL STENOSIS OF LUMBAR REGION, UNSPECIFIED WHETHER NEUROGENIC CLAUDICATION PRESENT: ICD-10-CM

## 2023-01-31 PROCEDURE — 3008F PR BODY MASS INDEX (BMI) DOCUMENTED: ICD-10-PCS | Mod: CPTII,S$GLB,, | Performed by: NURSE PRACTITIONER

## 2023-01-31 PROCEDURE — 1159F PR MEDICATION LIST DOCUMENTED IN MEDICAL RECORD: ICD-10-PCS | Mod: CPTII,S$GLB,, | Performed by: NURSE PRACTITIONER

## 2023-01-31 PROCEDURE — 1157F ADVNC CARE PLAN IN RCRD: CPT | Mod: CPTII,S$GLB,, | Performed by: NURSE PRACTITIONER

## 2023-01-31 PROCEDURE — 3074F SYST BP LT 130 MM HG: CPT | Mod: CPTII,S$GLB,, | Performed by: NURSE PRACTITIONER

## 2023-01-31 PROCEDURE — 1170F FXNL STATUS ASSESSED: CPT | Mod: CPTII,S$GLB,, | Performed by: NURSE PRACTITIONER

## 2023-01-31 PROCEDURE — 3078F DIAST BP <80 MM HG: CPT | Mod: CPTII,S$GLB,, | Performed by: NURSE PRACTITIONER

## 2023-01-31 PROCEDURE — 1170F PR FUNCTIONAL STATUS ASSESSED: ICD-10-PCS | Mod: CPTII,S$GLB,, | Performed by: NURSE PRACTITIONER

## 2023-01-31 PROCEDURE — 3078F PR MOST RECENT DIASTOLIC BLOOD PRESSURE < 80 MM HG: ICD-10-PCS | Mod: CPTII,S$GLB,, | Performed by: NURSE PRACTITIONER

## 2023-01-31 PROCEDURE — 1126F AMNT PAIN NOTED NONE PRSNT: CPT | Mod: CPTII,S$GLB,, | Performed by: NURSE PRACTITIONER

## 2023-01-31 PROCEDURE — G0439 PPPS, SUBSEQ VISIT: HCPCS | Mod: S$GLB,,, | Performed by: NURSE PRACTITIONER

## 2023-01-31 PROCEDURE — 99999 PR PBB SHADOW E&M-EST. PATIENT-LVL IV: CPT | Mod: PBBFAC,,, | Performed by: NURSE PRACTITIONER

## 2023-01-31 PROCEDURE — 3008F BODY MASS INDEX DOCD: CPT | Mod: CPTII,S$GLB,, | Performed by: NURSE PRACTITIONER

## 2023-01-31 PROCEDURE — 3288F FALL RISK ASSESSMENT DOCD: CPT | Mod: CPTII,S$GLB,, | Performed by: NURSE PRACTITIONER

## 2023-01-31 PROCEDURE — 4010F ACE/ARB THERAPY RXD/TAKEN: CPT | Mod: CPTII,S$GLB,, | Performed by: NURSE PRACTITIONER

## 2023-01-31 PROCEDURE — 99999 PR PBB SHADOW E&M-EST. PATIENT-LVL IV: ICD-10-PCS | Mod: PBBFAC,,, | Performed by: NURSE PRACTITIONER

## 2023-01-31 PROCEDURE — 1101F PR PT FALLS ASSESS DOC 0-1 FALLS W/OUT INJ PAST YR: ICD-10-PCS | Mod: CPTII,S$GLB,, | Performed by: NURSE PRACTITIONER

## 2023-01-31 PROCEDURE — 1159F MED LIST DOCD IN RCRD: CPT | Mod: CPTII,S$GLB,, | Performed by: NURSE PRACTITIONER

## 2023-01-31 PROCEDURE — 1160F PR REVIEW ALL MEDS BY PRESCRIBER/CLIN PHARMACIST DOCUMENTED: ICD-10-PCS | Mod: CPTII,S$GLB,, | Performed by: NURSE PRACTITIONER

## 2023-01-31 PROCEDURE — 4010F PR ACE/ARB THEARPY RXD/TAKEN: ICD-10-PCS | Mod: CPTII,S$GLB,, | Performed by: NURSE PRACTITIONER

## 2023-01-31 PROCEDURE — 1160F RVW MEDS BY RX/DR IN RCRD: CPT | Mod: CPTII,S$GLB,, | Performed by: NURSE PRACTITIONER

## 2023-01-31 PROCEDURE — 1101F PT FALLS ASSESS-DOCD LE1/YR: CPT | Mod: CPTII,S$GLB,, | Performed by: NURSE PRACTITIONER

## 2023-01-31 PROCEDURE — 1126F PR PAIN SEVERITY QUANTIFIED, NO PAIN PRESENT: ICD-10-PCS | Mod: CPTII,S$GLB,, | Performed by: NURSE PRACTITIONER

## 2023-01-31 PROCEDURE — 1157F PR ADVANCE CARE PLAN OR EQUIV PRESENT IN MEDICAL RECORD: ICD-10-PCS | Mod: CPTII,S$GLB,, | Performed by: NURSE PRACTITIONER

## 2023-01-31 PROCEDURE — G0439 PR MEDICARE ANNUAL WELLNESS SUBSEQUENT VISIT: ICD-10-PCS | Mod: S$GLB,,, | Performed by: NURSE PRACTITIONER

## 2023-01-31 PROCEDURE — 3288F PR FALLS RISK ASSESSMENT DOCUMENTED: ICD-10-PCS | Mod: CPTII,S$GLB,, | Performed by: NURSE PRACTITIONER

## 2023-01-31 PROCEDURE — 3074F PR MOST RECENT SYSTOLIC BLOOD PRESSURE < 130 MM HG: ICD-10-PCS | Mod: CPTII,S$GLB,, | Performed by: NURSE PRACTITIONER

## 2023-01-31 RX ORDER — FERROUS SULFATE, DRIED 160(50) MG
1 TABLET, EXTENDED RELEASE ORAL 2 TIMES DAILY WITH MEALS
COMMUNITY
End: 2024-03-07 | Stop reason: ALTCHOICE

## 2023-01-31 NOTE — PROGRESS NOTES
"Jalyn Aaron presented for a  Medicare AWV and comprehensive Health Risk Assessment today. The following components were reviewed and updated:    Medical history  Family History  Social history  Allergies and Current Medications  Health Risk Assessment  Health Maintenance  Care Team         ** See Completed Assessments for Annual Wellness Visit within the encounter summary.**         The following assessments were completed:  Living Situation  CAGE  Depression Screening  Timed Get Up and Go  Whisper Test - N/A hearing impairment  Cognitive Function Screening    Nutrition Screening  ADL Screening  PAQ Screening  Review for Opioid Screening: Pt does not have Rx for Opioids.  Review for Substance Use Disorders: Patient does not use substances.        Vitals:    01/31/23 0806   BP: 116/70   BP Location: Left arm   Pulse: 70   SpO2: 98%   Weight: 58.5 kg (128 lb 15.5 oz)   Height: 5' 7" (1.702 m)     Body mass index is 20.2 kg/m².    Physical Exam  Vitals reviewed.   Constitutional:       Appearance: Normal appearance.   HENT:      Head: Normocephalic.   Cardiovascular:      Rate and Rhythm: Normal rate.   Pulmonary:      Effort: Pulmonary effort is normal.   Abdominal:      General: Bowel sounds are normal.   Musculoskeletal:         General: Normal range of motion.      Cervical back: Normal range of motion.      Right lower leg: No edema.      Left lower leg: No edema.   Skin:     General: Skin is warm and dry.      Capillary Refill: Capillary refill takes less than 2 seconds.   Neurological:      Mental Status: She is alert and oriented to person, place, and time.   Psychiatric:         Behavior: Behavior normal.         Thought Content: Thought content normal.         Judgment: Judgment normal.             Diagnoses and health risks identified today and associated recommendations/orders:    1. Encounter for preventive health examination  Assessments completed.   recommendations reviewed. Discussed shingrix " vaccines. Mammogram and dexa as scheduled.  F/u with PCP as instructed.    2. Atherosclerosis of aorta  Chronic, stable on current regimen. Followed by cardiology. Not on statin.    3. Atrioventricular block, complete  Chronic, stable on current regimen. Followed by cardiology. S/p pacemaker.    4. Cardiomyopathy, nonischemic  Chronic, stable on current regimen. Followed by cardiology.    5. Cardiac pacemaker in situ  Chronic, stable on current regimen. Followed by cardiology.    6. Spinal stenosis of lumbar region, unspecified whether neurogenic claudication present  Chronic, stable on current regimen. Followed by neurosurgery.    7. Neuropathy  Chronic, stable on current regimen. Followed by neurosurgery.    8. Anxiety  Chronic, stable on current regimen. Followed by PCP.       Provided Jalyn with a 5-10 year written screening schedule and personal prevention plan. Recommendations were developed using the USPSTF age appropriate recommendations. Education, counseling, and referrals were provided as needed. After Visit Summary printed and given to patient which includes a list of additional screenings\tests needed.    Follow up in about 1 year (around 1/31/2024) for Medicare AWV and with PCP as instructed.       Klarissa Blandon, ALESSANDRO    I offered to discuss advanced care planning, including how to pick a person who would make decisions for you if you were unable to make them for yourself, called a health care power of , and what kind of decisions you might make such as use of life sustaining treatments such as ventilators and tube feeding when faced with a life limiting illness recorded on a living will that they will need to know. (How you want to be cared for as you near the end of your natural life)     X  Patient has advanced directives on file, they would like to make changes. I provided information on how to revoke their previous directives and make new ones.

## 2023-01-31 NOTE — PATIENT INSTRUCTIONS
1. Follow up with Dr. Annemarie Kilgore MD as instructed.    2. Mammogram and bone density scans as scheduled.    3. Shingles vaccines (SHINGRIX) should be available at no out of pocket cost as of this year for medicare patients. Can price at pharmacy if interested.    Counseling and Referral of Other Preventative  (Italic type indicates deductible and co-insurance are waived)    Patient Name: Jalyn Aaron  Today's Date: 1/31/2023    Health Maintenance         Date Due Completion Date    Aspirin/Antiplatelet Therapy Never done ---    Shingles Vaccine (2 of 3) 11/02/2016 9/7/2016    Mammogram 07/14/2022 7/14/2021    DEXA Scan 12/08/2022 12/8/2020    TETANUS VACCINE 04/27/2024 4/27/2014    Lipid Panel 12/13/2027 12/13/2022    Colorectal Cancer Screening 03/30/2031 3/30/2021          No orders of the defined types were placed in this encounter.    The following information is provided to all patients.  This information is to help you find resources for any of the problems found today that may be affecting your health:                Living healthy guide: www.[facility].[place].gov      Understanding Diabetes: www.diabetes.org      Eating healthy: www.cdc.gov/healthyweight      CDC home safety checklist: www.cdc.gov/steadi/patient.html      Agency on Aging: www.goea.[place].[facility]      Alcoholics anonymous (AA): www.aa.org      Physical Activity: www.vega.nih.gov/qw9vfnm      Tobacco use: www.quitwithusla.org

## 2023-02-02 ENCOUNTER — HOSPITAL ENCOUNTER (OUTPATIENT)
Dept: RADIOLOGY | Facility: CLINIC | Age: 73
Discharge: HOME | End: 2023-02-02
Attending: INTERNAL MEDICINE
Payer: MEDICARE

## 2023-02-02 DIAGNOSIS — M85.80 OSTEOPENIA, UNSPECIFIED LOCATION: ICD-10-CM

## 2023-02-02 DIAGNOSIS — M81.0 AGE-RELATED OSTEOPOROSIS WITHOUT CURRENT PATHOLOGICAL FRACTURE: ICD-10-CM

## 2023-02-02 PROCEDURE — 77080 DXA BONE DENSITY AXIAL: CPT | Mod: TC

## 2023-02-02 PROCEDURE — 77080 DXA BONE DENSITY AXIAL: CPT | Mod: 26,,, | Performed by: INTERNAL MEDICINE

## 2023-02-02 PROCEDURE — 77080 DEXA BONE DENSITY SPINE HIP: ICD-10-PCS | Mod: 26,,, | Performed by: INTERNAL MEDICINE

## 2023-02-08 ENCOUNTER — PATIENT MESSAGE (OUTPATIENT)
Dept: NEUROSURGERY | Facility: CLINIC | Age: 73
End: 2023-02-08
Payer: MEDICARE

## 2023-02-10 ENCOUNTER — OFFICE VISIT (OUTPATIENT)
Dept: OPTOMETRY | Facility: CLINIC | Age: 73
End: 2023-02-10
Payer: MEDICARE

## 2023-02-10 DIAGNOSIS — H52.02 HYPEROPIA OF LEFT EYE: ICD-10-CM

## 2023-02-10 DIAGNOSIS — H02.883 MEIBOMIAN GLAND DYSFUNCTION (MGD) OF BOTH EYES: Primary | ICD-10-CM

## 2023-02-10 DIAGNOSIS — H52.221 REGULAR ASTIGMATISM OF RIGHT EYE: ICD-10-CM

## 2023-02-10 DIAGNOSIS — H02.886 MEIBOMIAN GLAND DYSFUNCTION (MGD) OF BOTH EYES: Primary | ICD-10-CM

## 2023-02-10 DIAGNOSIS — H50.30 INTERMITTENT ESOTROPIA: ICD-10-CM

## 2023-02-10 DIAGNOSIS — H25.13 NUCLEAR SCLEROSIS, BILATERAL: ICD-10-CM

## 2023-02-10 PROCEDURE — 92015 PR REFRACTION: ICD-10-PCS | Mod: S$GLB,,, | Performed by: OPTOMETRIST

## 2023-02-10 PROCEDURE — 92015 DETERMINE REFRACTIVE STATE: CPT | Mod: S$GLB,,, | Performed by: OPTOMETRIST

## 2023-02-10 PROCEDURE — 92014 PR EYE EXAM, EST PATIENT,COMPREHESV: ICD-10-PCS | Mod: S$GLB,,, | Performed by: OPTOMETRIST

## 2023-02-10 PROCEDURE — 1157F ADVNC CARE PLAN IN RCRD: CPT | Mod: CPTII,S$GLB,, | Performed by: OPTOMETRIST

## 2023-02-10 PROCEDURE — 92014 COMPRE OPH EXAM EST PT 1/>: CPT | Mod: S$GLB,,, | Performed by: OPTOMETRIST

## 2023-02-10 PROCEDURE — 1159F PR MEDICATION LIST DOCUMENTED IN MEDICAL RECORD: ICD-10-PCS | Mod: CPTII,S$GLB,, | Performed by: OPTOMETRIST

## 2023-02-10 PROCEDURE — 99999 PR PBB SHADOW E&M-EST. PATIENT-LVL III: CPT | Mod: PBBFAC,,, | Performed by: OPTOMETRIST

## 2023-02-10 PROCEDURE — 1157F PR ADVANCE CARE PLAN OR EQUIV PRESENT IN MEDICAL RECORD: ICD-10-PCS | Mod: CPTII,S$GLB,, | Performed by: OPTOMETRIST

## 2023-02-10 PROCEDURE — 4010F PR ACE/ARB THEARPY RXD/TAKEN: ICD-10-PCS | Mod: CPTII,S$GLB,, | Performed by: OPTOMETRIST

## 2023-02-10 PROCEDURE — 1159F MED LIST DOCD IN RCRD: CPT | Mod: CPTII,S$GLB,, | Performed by: OPTOMETRIST

## 2023-02-10 PROCEDURE — 4010F ACE/ARB THERAPY RXD/TAKEN: CPT | Mod: CPTII,S$GLB,, | Performed by: OPTOMETRIST

## 2023-02-10 PROCEDURE — 99999 PR PBB SHADOW E&M-EST. PATIENT-LVL III: ICD-10-PCS | Mod: PBBFAC,,, | Performed by: OPTOMETRIST

## 2023-02-10 NOTE — PATIENT INSTRUCTIONS
Meibomian Gland Dysfunction    The meibomian glands are located in the eyelids, near the eyelashes. Secretions from these glands make up the lipid (oily) layer of the tear film. This oily layer sits on top of the liquid (aqueous) tears to prevent rapid evaporation of the tears.         Failure of these glands to produce or secrete oil - due to chronic blockage, thickening of the oils, infection or inflammation, will affect the stability and quality of the tear film. This is known as meibomian gland dysfunction.        Meibomian Gland Dysfunction can be treated in several ways:  Warm Compresses: Heat applied to the eyelid margins (once to twice daily) liquefies the thickened oils, as well as helps to open the meibomian glands so that proper function can be restored.  Options for heated masks to unclog meibomian glands:  https://www.[employer]/Aroma-Season-Flaxseed-Syndrome-Blepharitis/dp/M01IGB139S/ref=sr_1_3?dchild=1&keywords=lipiflow&sng=4546475108&sr=8-3    https://SaaSMAX/Aroma-Season-Therapeutic-Treatment-Blepharitis/dp/M755BPQOO4/ref=sr_1_4?dchild=1&keywords=lipiflow&bel=8590295683&sr=8-4    https://Super Clean Jobsite.[employer]/Janny-Activated-Dysfunction-Recommended-Professional/dp/F71U009VXY/ref=sr_1_19?dchild=1&keywords=lipiflow&tpv=3398911290&sr=8-19    2. Eyelid Cleanser: It is important to keep the eyelid margins free of excess bacteria and debris. A cleanser specifically formulated for the delicate eye area is an ideal way to accomplish this. Gently cleaning the eyelash/eyelid margins once to twice a day will also stimulate the meibomian glands to properly secrete their oils. Using baby shampoo to clean the eye area strips away the natural oils which protect the eye, often leading to more irritation and problems. DO NOT USE ANY FORM OF BABY SHAMPOO TO CLEAN EYES!   Options for the most effective commercially available eyelid cleansers:   Avenova Lid and Lash Solution                        [facility]  Eyelid Solution    Thera Tears SteriLid    Ocusoft HypoChlor              3. Topical antibiotic ointment  4.  Oral Antibiotic   5. Omega 3 supplement daily (ex: Thera Tears Nutrition (3 sofgels po daily)

## 2023-02-10 NOTE — PROGRESS NOTES
HPI    Jalyn Aaron is a 73 y.o. female who returns, for continued eye care.   Her last exam with me was on 04/18/2022. She has a history intermittent   esotropia, hyperopia and presbyopia. She reports having diplopia at night   when looking far away (ex: at the moon). Progressive glasses are   prescribed and worn full time.  Jalyn explains that her distance vision at   least in one eye is not as good anymore.  She adds that her right eye gets   dry overnight (heat/ air flow).  This is resolved with artificial tears.     (+)blurred vision  (--)Headaches  (--)diplopia  (--)flashes  (--)floaters  (--)pain  (--)Itching  (--)tearing  (--)burning  (--)Dryness  (+) OTC Drops refresh sometimes  (--)Photophobia     Last edited by Sonido Mcgraw, OD on 2/10/2023 11:05 AM.        Review of Systems   Constitutional:  Negative for chills, fever and malaise/fatigue.   HENT:  Negative for congestion, hearing loss and sore throat.    Eyes:  Positive for blurred vision and double vision. Negative for photophobia, pain, discharge and redness.   Respiratory: Negative.  Negative for cough, shortness of breath and wheezing.    Cardiovascular: Negative.    Gastrointestinal: Negative.  Negative for nausea and vomiting.   Genitourinary: Negative.    Musculoskeletal: Negative.    Skin: Negative.    Neurological:  Negative for seizures.   Psychiatric/Behavioral: Negative.       For exam results, see encounter report    Assessment /Plan     1. Meibomian gland dysfunction (MGD) of both eyes  - Use hot compresses to both eyelids, 10 - 20 minutes 2-3 times a week.     2. Nuclear sclerosis, bilateral  - Becoming visually significant  - Will defer surgery  - Continue spec rx wear full time     3. Intermittent esotropia  - Symptomatic only at night, at distance, when fatigues  - No active treatment needed    4. Hyperopia of left eye; Regular astigmatism of right eye  - Spec Rx per final Rx below   Glasses Prescription (2/10/2023)          Sphere  Cylinder [place] Dist [place] Add    Right Marine +0.75 045 20/25 +3.25    Left +1.00 Sphere  20/20 +3.25      Type: PAL    Expiration Date: 2/10/2024              Patient education; RTC in 1 year with DFE; Ok to instill 1% Tropicamide after (normal) baseline workup, sooner as needed at [facility]

## 2023-03-06 ENCOUNTER — CLINICAL SUPPORT (OUTPATIENT)
Dept: CARDIOLOGY | Facility: HOSPITAL | Age: 73
End: 2023-03-06
Payer: MEDICARE

## 2023-03-06 DIAGNOSIS — Z95.0 PRESENCE OF CARDIAC PACEMAKER: ICD-10-CM

## 2023-03-06 PROCEDURE — 93296 REM INTERROG EVL PM/IDS: CPT | Performed by: INTERNAL MEDICINE

## 2023-03-10 ENCOUNTER — OFFICE VISIT (OUTPATIENT)
Dept: DERMATOLOGY | Facility: CLINIC | Age: 73
End: 2023-03-10
Payer: MEDICARE

## 2023-03-10 DIAGNOSIS — L98.9 DISEASE OF SKIN AND SUBCUTANEOUS TISSUE: Primary | ICD-10-CM

## 2023-03-10 PROCEDURE — 1101F PT FALLS ASSESS-DOCD LE1/YR: CPT | Mod: CPTII,S$GLB,, | Performed by: DERMATOLOGY

## 2023-03-10 PROCEDURE — 99499 UNLISTED E&M SERVICE: CPT | Mod: S$GLB,,, | Performed by: DERMATOLOGY

## 2023-03-10 PROCEDURE — 88313 PR  SPECIAL STAINS,GROUP II: ICD-10-PCS | Mod: 26,,, | Performed by: DERMATOLOGY

## 2023-03-10 PROCEDURE — 3288F PR FALLS RISK ASSESSMENT DOCUMENTED: ICD-10-PCS | Mod: CPTII,S$GLB,, | Performed by: DERMATOLOGY

## 2023-03-10 PROCEDURE — 1159F MED LIST DOCD IN RCRD: CPT | Mod: CPTII,S$GLB,, | Performed by: DERMATOLOGY

## 2023-03-10 PROCEDURE — 88342 IMHCHEM/IMCYTCHM 1ST ANTB: CPT | Mod: 26,,, | Performed by: DERMATOLOGY

## 2023-03-10 PROCEDURE — 99499 NO LOS: ICD-10-PCS | Mod: S$GLB,,, | Performed by: DERMATOLOGY

## 2023-03-10 PROCEDURE — 88313 SPECIAL STAINS GROUP 2: CPT | Performed by: DERMATOLOGY

## 2023-03-10 PROCEDURE — 88341 IMHCHEM/IMCYTCHM EA ADD ANTB: CPT | Performed by: PATHOLOGY

## 2023-03-10 PROCEDURE — 88305 TISSUE EXAM BY PATHOLOGIST: CPT | Mod: 26,,, | Performed by: DERMATOLOGY

## 2023-03-10 PROCEDURE — 88312 SPECIAL STAINS GROUP 1: CPT | Performed by: DERMATOLOGY

## 2023-03-10 PROCEDURE — 1160F RVW MEDS BY RX/DR IN RCRD: CPT | Mod: CPTII,S$GLB,, | Performed by: DERMATOLOGY

## 2023-03-10 PROCEDURE — 11104 PUNCH BX SKIN SINGLE LESION: CPT | Mod: S$GLB,,, | Performed by: DERMATOLOGY

## 2023-03-10 PROCEDURE — 1157F ADVNC CARE PLAN IN RCRD: CPT | Mod: CPTII,S$GLB,, | Performed by: DERMATOLOGY

## 2023-03-10 PROCEDURE — 11104 PR PUNCH BIOPSY, SKIN, SINGLE LESION: ICD-10-PCS | Mod: S$GLB,,, | Performed by: DERMATOLOGY

## 2023-03-10 PROCEDURE — 1160F PR REVIEW ALL MEDS BY PRESCRIBER/CLIN PHARMACIST DOCUMENTED: ICD-10-PCS | Mod: CPTII,S$GLB,, | Performed by: DERMATOLOGY

## 2023-03-10 PROCEDURE — 1101F PR PT FALLS ASSESS DOC 0-1 FALLS W/OUT INJ PAST YR: ICD-10-PCS | Mod: CPTII,S$GLB,, | Performed by: DERMATOLOGY

## 2023-03-10 PROCEDURE — 4010F PR ACE/ARB THEARPY RXD/TAKEN: ICD-10-PCS | Mod: CPTII,S$GLB,, | Performed by: DERMATOLOGY

## 2023-03-10 PROCEDURE — 88312 PR  SPECIAL STAINS,GROUP I: ICD-10-PCS | Mod: 26,,, | Performed by: DERMATOLOGY

## 2023-03-10 PROCEDURE — 1126F AMNT PAIN NOTED NONE PRSNT: CPT | Mod: CPTII,S$GLB,, | Performed by: DERMATOLOGY

## 2023-03-10 PROCEDURE — 88305 TISSUE EXAM BY PATHOLOGIST: ICD-10-PCS | Mod: 26,,, | Performed by: DERMATOLOGY

## 2023-03-10 PROCEDURE — 1157F PR ADVANCE CARE PLAN OR EQUIV PRESENT IN MEDICAL RECORD: ICD-10-PCS | Mod: CPTII,S$GLB,, | Performed by: DERMATOLOGY

## 2023-03-10 PROCEDURE — 99999 PR PBB SHADOW E&M-EST. PATIENT-LVL III: CPT | Mod: PBBFAC,,, | Performed by: DERMATOLOGY

## 2023-03-10 PROCEDURE — 1126F PR PAIN SEVERITY QUANTIFIED, NO PAIN PRESENT: ICD-10-PCS | Mod: CPTII,S$GLB,, | Performed by: DERMATOLOGY

## 2023-03-10 PROCEDURE — 99999 PR PBB SHADOW E&M-EST. PATIENT-LVL III: ICD-10-PCS | Mod: PBBFAC,,, | Performed by: DERMATOLOGY

## 2023-03-10 PROCEDURE — 88305 TISSUE EXAM BY PATHOLOGIST: CPT | Performed by: DERMATOLOGY

## 2023-03-10 PROCEDURE — 88313 SPECIAL STAINS GROUP 2: CPT | Mod: 26,,, | Performed by: DERMATOLOGY

## 2023-03-10 PROCEDURE — 4010F ACE/ARB THERAPY RXD/TAKEN: CPT | Mod: CPTII,S$GLB,, | Performed by: DERMATOLOGY

## 2023-03-10 PROCEDURE — 88342 CHG IMMUNOCYTOCHEMISTRY: ICD-10-PCS | Mod: 26,,, | Performed by: DERMATOLOGY

## 2023-03-10 PROCEDURE — 3288F FALL RISK ASSESSMENT DOCD: CPT | Mod: CPTII,S$GLB,, | Performed by: DERMATOLOGY

## 2023-03-10 PROCEDURE — 88312 SPECIAL STAINS GROUP 1: CPT | Mod: 26,,, | Performed by: DERMATOLOGY

## 2023-03-10 PROCEDURE — 1159F PR MEDICATION LIST DOCUMENTED IN MEDICAL RECORD: ICD-10-PCS | Mod: CPTII,S$GLB,, | Performed by: DERMATOLOGY

## 2023-03-10 PROCEDURE — 88342 IMHCHEM/IMCYTCHM 1ST ANTB: CPT | Performed by: PATHOLOGY

## 2023-03-10 NOTE — PROGRESS NOTES
Subjective:       Patient ID:  Jalyn Aaron is a 73 y.o. female who presents for   Chief Complaint   Patient presents with    Follow-up     Scalp      History of Present Illness: The patient presents for follow up for scalp.    The patient was last seen on: 112/2/2022 for skin check and treatment of scalp red and tender area treated with lidex soln bid x 4 months.  Pt reports improvement of her symptoms. Not tender and not itchy    Other skin complaints: none    no h/o nmsc or mm      Review of Systems   Skin:  Negative for itching and rash.      Objective:    Physical Exam   Constitutional: She appears well-developed and well-nourished. No distress.   Neurological: She is alert and oriented to person, place, and time. She is not disoriented.   Psychiatric: She has a normal mood and affect.   Skin:   Areas Examined (abnormalities noted in diagram):   Scalp / Hair Palpated and Inspected            Diagram Legend     Erythematous scaling macule/papule c/w actinic keratosis       Vascular papule c/w angioma      Pigmented verrucoid papule/plaque c/w seborrheic keratosis      Yellow umbilicated papule c/w sebaceous hyperplasia      Irregularly shaped tan macule c/w lentigo     1-2 mm smooth white papules consistent with Milia      Movable subcutaneous cyst with punctum c/w epidermal inclusion cyst      Subcutaneous movable cyst c/w pilar cyst      Firm pink to brown papule c/w dermatofibroma      Pedunculated fleshy papule(s) c/w skin tag(s)      Evenly pigmented macule c/w junctional nevus     Mildly variegated pigmented, slightly irregular-bordered macule c/w mildly atypical nevus      Flesh colored to evenly pigmented papule c/w intradermal nevus       Pink pearly papule/plaque c/w basal cell carcinoma      Erythematous hyperkeratotic cursted plaque c/w SCC      Surgical scar with no sign of skin cancer recurrence      Open and closed comedones      Inflammatory papules and pustules      Verrucoid  papule consistent consistent with wart     Erythematous eczematous patches and plaques     Dystrophic onycholytic nail with subungual debris c/w onychomycosis     Umbilicated papule    Erythematous-base heme-crusted tan verrucoid plaque consistent with inflamed seborrheic keratosis     Erythematous Silvery Scaling Plaque c/w Psoriasis     See annotation        Assessment / Plan:      Pathology Orders:       Normal Orders This Visit    Specimen to Pathology, Dermatology     Questions:    Procedure Type: Dermatology and skin neoplasms    Number of Specimens: 1    ------------------------: -------------------------    Spec 1 Procedure: Biopsy    Spec 1 Clinical Impression: r/o MAC vs DLE vs other    Spec 1 Source: scalp vertex    Release to patient: Immediate          Disease of skin and subcutaneous tissue  -     Specimen to Pathology, Dermatology  Punch biopsy procedure note:  Punch biopsy performed after verbal consent obtained. Area marked and prepped with alcohol. Approximately 1cc of 1% lidocaine with epinephrine injected. 3 mm disposable punch used to remove lesion. Hemostasis obtained and biopsy site closed with 1 - 2 Prolene sutures. Wound care instructions reviewed with patient and handout given.    D/c lidex soln unless becomes symptomatic, then ok to use    Leaving on cruise 3/23 - 4/3. Pt does not want bx results while on cruise             Follow up in about 2 weeks (around 3/24/2023).

## 2023-03-10 NOTE — PATIENT INSTRUCTIONS

## 2023-03-14 ENCOUNTER — TELEPHONE (OUTPATIENT)
Dept: CARDIOLOGY | Facility: CLINIC | Age: 73
End: 2023-03-14
Payer: MEDICARE

## 2023-03-15 NOTE — TELEPHONE ENCOUNTER
----- Message from Marlee Lopes sent at 3/14/2023  9:06 AM CDT -----  Regarding: [facility] appt  Pt [phone] says when she saw the doctor in 1/23 he asked her to [facility] an appt to see him after she have her test done in July that he ordered.    Thanks

## 2023-03-15 NOTE — TELEPHONE ENCOUNTER
Returned pt's call - Will schedule Calcium Score Scan and Lipids late July and mail appts to her.         Marlee COLLAZO Staff  Caller: Unspecified (Today,  9:06 AM)  Pt [phone] says when she saw the doctor in 1/23 he asked her to tonia an appt to see him after she have her test done in July that he ordered.     Thanks

## 2023-03-20 ENCOUNTER — OFFICE VISIT (OUTPATIENT)
Dept: OTOLARYNGOLOGY | Facility: CLINIC | Age: 73
End: 2023-03-20
Payer: MEDICARE

## 2023-03-20 VITALS — SYSTOLIC BLOOD PRESSURE: 123 MMHG | DIASTOLIC BLOOD PRESSURE: 74 MMHG | HEART RATE: 63 BPM

## 2023-03-20 DIAGNOSIS — J34.2 NASAL SEPTAL DEVIATION: ICD-10-CM

## 2023-03-20 DIAGNOSIS — R06.83 SNORING: ICD-10-CM

## 2023-03-20 DIAGNOSIS — M43.02 CERVICAL SPONDYLOLYSIS: ICD-10-CM

## 2023-03-20 DIAGNOSIS — H61.20 IMPACTED CERUMEN, UNSPECIFIED LATERALITY: ICD-10-CM

## 2023-03-20 DIAGNOSIS — H90.3 PERCEPTIVE HEARING LOSS, ASYMMETRICAL: Primary | ICD-10-CM

## 2023-03-20 DIAGNOSIS — Z87.81 HISTORY OF FRACTURE OF NASAL BONE: ICD-10-CM

## 2023-03-20 PROCEDURE — 1126F AMNT PAIN NOTED NONE PRSNT: CPT | Mod: CPTII,S$GLB,, | Performed by: OTOLARYNGOLOGY

## 2023-03-20 PROCEDURE — 1101F PT FALLS ASSESS-DOCD LE1/YR: CPT | Mod: CPTII,S$GLB,, | Performed by: OTOLARYNGOLOGY

## 2023-03-20 PROCEDURE — 3288F PR FALLS RISK ASSESSMENT DOCUMENTED: ICD-10-PCS | Mod: CPTII,S$GLB,, | Performed by: OTOLARYNGOLOGY

## 2023-03-20 PROCEDURE — 1159F PR MEDICATION LIST DOCUMENTED IN MEDICAL RECORD: ICD-10-PCS | Mod: CPTII,S$GLB,, | Performed by: OTOLARYNGOLOGY

## 2023-03-20 PROCEDURE — 3074F SYST BP LT 130 MM HG: CPT | Mod: CPTII,S$GLB,, | Performed by: OTOLARYNGOLOGY

## 2023-03-20 PROCEDURE — 99213 OFFICE O/P EST LOW 20 MIN: CPT | Mod: 25,S$GLB,, | Performed by: OTOLARYNGOLOGY

## 2023-03-20 PROCEDURE — 69210 PR REMOVAL IMPACTED CERUMEN REQUIRING INSTRUMENTATION, UNILATERAL: ICD-10-PCS | Mod: S$GLB,,, | Performed by: OTOLARYNGOLOGY

## 2023-03-20 PROCEDURE — 3288F FALL RISK ASSESSMENT DOCD: CPT | Mod: CPTII,S$GLB,, | Performed by: OTOLARYNGOLOGY

## 2023-03-20 PROCEDURE — 1159F MED LIST DOCD IN RCRD: CPT | Mod: CPTII,S$GLB,, | Performed by: OTOLARYNGOLOGY

## 2023-03-20 PROCEDURE — 4010F PR ACE/ARB THEARPY RXD/TAKEN: ICD-10-PCS | Mod: CPTII,S$GLB,, | Performed by: OTOLARYNGOLOGY

## 2023-03-20 PROCEDURE — 1157F ADVNC CARE PLAN IN RCRD: CPT | Mod: CPTII,S$GLB,, | Performed by: OTOLARYNGOLOGY

## 2023-03-20 PROCEDURE — 99999 PR PBB SHADOW E&M-EST. PATIENT-LVL III: CPT | Mod: PBBFAC,,, | Performed by: OTOLARYNGOLOGY

## 2023-03-20 PROCEDURE — 69210 REMOVE IMPACTED EAR WAX UNI: CPT | Mod: S$GLB,,, | Performed by: OTOLARYNGOLOGY

## 2023-03-20 PROCEDURE — 99999 PR PBB SHADOW E&M-EST. PATIENT-LVL III: ICD-10-PCS | Mod: PBBFAC,,, | Performed by: OTOLARYNGOLOGY

## 2023-03-20 PROCEDURE — 1126F PR PAIN SEVERITY QUANTIFIED, NO PAIN PRESENT: ICD-10-PCS | Mod: CPTII,S$GLB,, | Performed by: OTOLARYNGOLOGY

## 2023-03-20 PROCEDURE — 3078F DIAST BP <80 MM HG: CPT | Mod: CPTII,S$GLB,, | Performed by: OTOLARYNGOLOGY

## 2023-03-20 PROCEDURE — 3074F PR MOST RECENT SYSTOLIC BLOOD PRESSURE < 130 MM HG: ICD-10-PCS | Mod: CPTII,S$GLB,, | Performed by: OTOLARYNGOLOGY

## 2023-03-20 PROCEDURE — 99213 PR OFFICE/OUTPT VISIT, EST, LEVL III, 20-29 MIN: ICD-10-PCS | Mod: 25,S$GLB,, | Performed by: OTOLARYNGOLOGY

## 2023-03-20 PROCEDURE — 4010F ACE/ARB THERAPY RXD/TAKEN: CPT | Mod: CPTII,S$GLB,, | Performed by: OTOLARYNGOLOGY

## 2023-03-20 PROCEDURE — 3078F PR MOST RECENT DIASTOLIC BLOOD PRESSURE < 80 MM HG: ICD-10-PCS | Mod: CPTII,S$GLB,, | Performed by: OTOLARYNGOLOGY

## 2023-03-20 PROCEDURE — 1101F PR PT FALLS ASSESS DOC 0-1 FALLS W/OUT INJ PAST YR: ICD-10-PCS | Mod: CPTII,S$GLB,, | Performed by: OTOLARYNGOLOGY

## 2023-03-20 PROCEDURE — 1157F PR ADVANCE CARE PLAN OR EQUIV PRESENT IN MEDICAL RECORD: ICD-10-PCS | Mod: CPTII,S$GLB,, | Performed by: OTOLARYNGOLOGY

## 2023-03-20 NOTE — PATIENT INSTRUCTIONS
Small amount of cerumen removed from AD eac > AS cerumen  Avoid supine sleeping position if possible  Audiometry tomorrow  Pt. may consult with [facility] ( [facility]) Sleep Dept re: possible SARAI ( [phone])   Eustachian tube literature provided re: plane flights

## 2023-03-20 NOTE — PROGRESS NOTES
"Subjective:       Patient ID: Jalyn Aaron is a 73 y.o. female.    Chief Complaint: Cerumen Impaction    HPI: Ms. Aaron is a 73 year old CF whose chief complaint is right ear hearing loss. She was advised to have her right ear canal cleaned and have her hearing tested. She says that she is scheduled for audiometry tomorrow.  She parenthetically c/o some right sided nasal obstruction and nocturnal snoring and even "snorting" episodes which wake her up at times.   She tends to sleep on her back due  to neck pathologies( osteoarthritis of cervical spine; C3-4, C4-5 spondylolisthesis).   Her  uses CPAP nightly.  She is an ex- who worked here at [employer] for 10 years.  She is about to leave on a cruise/bicycling trip to the [place] in 2 days.    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    COLONOSCOPY N/A 3/30/2021    Procedure: COLONOSCOPY;  Surgeon: Joaquin Johnson MD;  Location: [address]);  Service: Endoscopy;  Laterality: N/A;  prep ins. emailed - COVID screening on 3/27/21 PCW - ERW    HYSTERECTOMY      INSERT / REPLACE / REMOVE PACEMAKER      LASIK      LASIK Bilateral 2009    VENOGRAM, EP LAB N/A 12/20/2022    Procedure: Venogram, EP Lab;  Surgeon: Chacho Colby MD;  Location: [facility] EP LAB;  Service: Cardiology;  Laterality: N/A;  " "NICM, Venogram- left side, SK, 3 Prep *MDT PPM in situ*       Review of Systems     Constitutional: Negative for appetite change, chills, fatigue, fever and unexpected weight loss.      HENT: Positive for hearing loss.      Eyes:  Negative for change in eyesight, eye drainage, eye itching and photophobia.     Respiratory:  Positive for snoring.      Cardiovascular:  Negative for chest pain, foot swelling, irregular heartbeat and swollen veins.     Gastrointestinal:  Positive for constipation.     Genitourinary: Negative for difficulty urinating, sexual problems and frequent urination.     Musc: Positive for aching muscles and neck pain.     Skin: Negative for rash.     Allergy: Negative for food allergies and seasonal allergies.     Endocrine: Negative for cold intolerance and heat intolerance.      Neurological: Negative for dizziness, headaches, light-headedness, seizures and tremors.      Hematologic: Positive for bruises/bleeds easily.     Psychiatric: Positive for nervous/anxious.             Objective:       Gen: Alert and oriented bespectacled CF in no acute distress  Ht 5 ft 7 in weight 128 lb blood pressure 123/74 pulse 63  Both ears are examined under the microscope.  Procedures: A small volume of cerumen is removed from the AD eac with a curette.  A tiny amount of cerumen is removed from the AS eac.  Physical Exam  HENT:      Ears:        Nose:        Mouth/Throat:         Assessment:       1. Perceptive hearing loss, asymmetrical    2. Impacted cerumen, unspecified laterality    3. Snoring ; sleeps on back; occasional "snorting" with awakening   4. Nasal septal deviation ; right anterior-superior   5. History of fracture of nasal bone ; leftward nasal dorsum shift       6.     Cervical osteoarthritis/ spondylolisthesis    Plan:     Small amount of cerumen removed from AD eac > AS cerumen  Avoid supine sleeping position if possible  Audiometry tomorrow  Pt. may consult with [facility] ( [facility]) Sleep " Dept re: possible SARAI ( [phone])   Eustachian tube literature provided re: plane flights

## 2023-03-21 ENCOUNTER — TELEPHONE (OUTPATIENT)
Dept: CARDIOLOGY | Facility: HOSPITAL | Age: 73
End: 2023-03-21
Payer: MEDICARE

## 2023-03-21 NOTE — TELEPHONE ENCOUNTER
Returned patients call.  I informed her that her device is functioning WNL.  No episodes/events detected since last send.  Her battery status has 1-25 months with an average of 12 months.  Patient stated understanding.   ----- Message from Alethea Cabezas sent at 3/21/2023  8:45 AM CDT -----  Regarding: transmission  The pt is calling to get her transmission. She is calling because she is going out of the country. Please call her back @ [phone]. Thanks, Alethea

## 2023-03-22 ENCOUNTER — CLINICAL SUPPORT (OUTPATIENT)
Dept: AUDIOLOGY | Facility: CLINIC | Age: 73
End: 2023-03-22
Payer: MEDICARE

## 2023-03-22 DIAGNOSIS — H90.3 SENSORINEURAL HEARING LOSS, BILATERAL: Primary | ICD-10-CM

## 2023-03-22 DIAGNOSIS — H91.90 HEARING LOSS, UNSPECIFIED HEARING LOSS TYPE, UNSPECIFIED LATERALITY: ICD-10-CM

## 2023-03-22 PROCEDURE — 92557 COMPREHENSIVE HEARING TEST: CPT | Mod: S$GLB,,, | Performed by: AUDIOLOGIST

## 2023-03-22 PROCEDURE — 92567 PR TYMPA2METRY: ICD-10-PCS | Mod: S$GLB,,, | Performed by: AUDIOLOGIST

## 2023-03-22 PROCEDURE — 92567 TYMPANOMETRY: CPT | Mod: S$GLB,,, | Performed by: AUDIOLOGIST

## 2023-03-22 PROCEDURE — 92557 PR COMPREHENSIVE HEARING TEST: ICD-10-PCS | Mod: S$GLB,,, | Performed by: AUDIOLOGIST

## 2023-03-22 NOTE — PROGRESS NOTES
Jalyn Aaron, a 73 y.o. female, was seen today in the clinic for an audiologic evaluation.  The patient's main complaint was hearing loss.  Mrs. Aaron reported that she has difficulty understanding the TV.  She was seen this week by ENT for an ear cleaning prior to today's testing.  Pt denied otalgia, aural fullness, tinnitus, and vertigo.      Tympanometry revealed Type A in the right ear and Type A in the left ear. Audiogram results revealed normal sloping to moderate sensorineural hearing loss (SNHL) in the right ear and normal sloping to moderate SNHL in the left ear.  Speech reception thresholds were noted at 15 dB in the right ear and 10 dB in the left ear.  Speech discrimination scores were 100% in the right ear and 100% in the left ear.    Test results were discussed in detail with pt.  Hearing aids are not recommended at this time.  TV Ears were recommended for use with the TV.    Recommendations:  Otologic evaluation  Annual audiogram  Hearing protection when in noise

## 2023-03-22 NOTE — Clinical Note
Mrs. Aaron was seen today for a hearing test.  Hearing aids are not recommended at this time.  We discussed TV Ears for use with the TV.   Celia

## 2023-03-23 ENCOUNTER — CLINICAL SUPPORT (OUTPATIENT)
Dept: DERMATOLOGY | Facility: CLINIC | Age: 73
End: 2023-03-23
Payer: MEDICARE

## 2023-03-23 DIAGNOSIS — Z48.02 VISIT FOR SUTURE REMOVAL: Primary | ICD-10-CM

## 2023-03-23 PROCEDURE — 99024 PR POST-OP FOLLOW-UP VISIT: ICD-10-PCS | Mod: S$GLB,,, | Performed by: DERMATOLOGY

## 2023-03-23 PROCEDURE — 99024 POSTOP FOLLOW-UP VISIT: CPT | Mod: S$GLB,,, | Performed by: DERMATOLOGY

## 2023-03-23 NOTE — PROGRESS NOTES
Suture Removal note:  CC: 73 y.o. female patient is here for suture removal.         HPI: Patient is s/p punch biopsy from the scalp vertex on 3/10/23.  Patient reports no problems.    WOUND PE:  Sutures intact.  Wound healing well.  Good approximation of skin edges.  No signs or symptoms of infection.    IMPRESSION:  Results pending.    PLAN:  Sutures removed today.  Continue wound care.    RTC: In 2 weeks.

## 2023-04-03 ENCOUNTER — HOSPITAL ENCOUNTER (OUTPATIENT)
Dept: RADIOLOGY | Facility: HOSPITAL | Age: 73
Discharge: HOME | End: 2023-04-03
Attending: INTERNAL MEDICINE
Payer: MEDICARE

## 2023-04-03 DIAGNOSIS — Z12.31 SCREENING MAMMOGRAM, ENCOUNTER FOR: ICD-10-CM

## 2023-04-03 PROCEDURE — 77067 SCR MAMMO BI INCL CAD: CPT | Mod: 26,,, | Performed by: RADIOLOGY

## 2023-04-03 PROCEDURE — 77063 MAMMO DIGITAL SCREENING BILAT WITH TOMO: ICD-10-PCS | Mod: 26,,, | Performed by: RADIOLOGY

## 2023-04-03 PROCEDURE — 77063 BREAST TOMOSYNTHESIS BI: CPT | Mod: 26,,, | Performed by: RADIOLOGY

## 2023-04-03 PROCEDURE — 77067 MAMMO DIGITAL SCREENING BILAT WITH TOMO: ICD-10-PCS | Mod: 26,,, | Performed by: RADIOLOGY

## 2023-04-03 PROCEDURE — 77067 SCR MAMMO BI INCL CAD: CPT | Mod: TC

## 2023-04-04 ENCOUNTER — PATIENT MESSAGE (OUTPATIENT)
Dept: INTERNAL MEDICINE | Facility: CLINIC | Age: 73
End: 2023-04-04
Payer: MEDICARE

## 2023-04-04 ENCOUNTER — TELEPHONE (OUTPATIENT)
Dept: INTERNAL MEDICINE | Facility: CLINIC | Age: 73
End: 2023-04-04
Payer: MEDICARE

## 2023-04-04 RX ORDER — BENZONATATE 100 MG/1
100 CAPSULE ORAL 3 TIMES DAILY PRN
Qty: 30 CAPSULE | Refills: 0 | Status: SHIPPED | OUTPATIENT
Start: 2023-04-04 | End: 2023-04-14

## 2023-04-10 ENCOUNTER — PATIENT MESSAGE (OUTPATIENT)
Dept: PHARMACY | Facility: CLINIC | Age: 73
End: 2023-04-10
Payer: MEDICARE

## 2023-04-17 ENCOUNTER — PATIENT MESSAGE (OUTPATIENT)
Dept: DERMATOLOGY | Facility: CLINIC | Age: 73
End: 2023-04-17
Payer: MEDICARE

## 2023-05-01 ENCOUNTER — OFFICE VISIT (OUTPATIENT)
Dept: DERMATOLOGY | Facility: CLINIC | Age: 73
End: 2023-05-01
Payer: MEDICARE

## 2023-05-01 DIAGNOSIS — L93.0 DISCOID LUPUS ERYTHEMATOSUS: Primary | ICD-10-CM

## 2023-05-01 PROCEDURE — 3288F PR FALLS RISK ASSESSMENT DOCUMENTED: ICD-10-PCS | Mod: CPTII,S$GLB,, | Performed by: DERMATOLOGY

## 2023-05-01 PROCEDURE — 1159F MED LIST DOCD IN RCRD: CPT | Mod: CPTII,S$GLB,, | Performed by: DERMATOLOGY

## 2023-05-01 PROCEDURE — 3288F FALL RISK ASSESSMENT DOCD: CPT | Mod: CPTII,S$GLB,, | Performed by: DERMATOLOGY

## 2023-05-01 PROCEDURE — 99214 OFFICE O/P EST MOD 30 MIN: CPT | Mod: 25,S$GLB,, | Performed by: DERMATOLOGY

## 2023-05-01 PROCEDURE — 11900 INJECT SKIN LESIONS </W 7: CPT | Mod: S$GLB,,, | Performed by: DERMATOLOGY

## 2023-05-01 PROCEDURE — 99999 PR PBB SHADOW E&M-EST. PATIENT-LVL III: ICD-10-PCS | Mod: PBBFAC,,, | Performed by: DERMATOLOGY

## 2023-05-01 PROCEDURE — 99214 PR OFFICE/OUTPT VISIT, EST, LEVL IV, 30-39 MIN: ICD-10-PCS | Mod: 25,S$GLB,, | Performed by: DERMATOLOGY

## 2023-05-01 PROCEDURE — 1157F ADVNC CARE PLAN IN RCRD: CPT | Mod: CPTII,S$GLB,, | Performed by: DERMATOLOGY

## 2023-05-01 PROCEDURE — 4010F ACE/ARB THERAPY RXD/TAKEN: CPT | Mod: CPTII,S$GLB,, | Performed by: DERMATOLOGY

## 2023-05-01 PROCEDURE — 1101F PR PT FALLS ASSESS DOC 0-1 FALLS W/OUT INJ PAST YR: ICD-10-PCS | Mod: CPTII,S$GLB,, | Performed by: DERMATOLOGY

## 2023-05-01 PROCEDURE — 99999 PR PBB SHADOW E&M-EST. PATIENT-LVL III: CPT | Mod: PBBFAC,,, | Performed by: DERMATOLOGY

## 2023-05-01 PROCEDURE — 1159F PR MEDICATION LIST DOCUMENTED IN MEDICAL RECORD: ICD-10-PCS | Mod: CPTII,S$GLB,, | Performed by: DERMATOLOGY

## 2023-05-01 PROCEDURE — 1126F AMNT PAIN NOTED NONE PRSNT: CPT | Mod: CPTII,S$GLB,, | Performed by: DERMATOLOGY

## 2023-05-01 PROCEDURE — 4010F PR ACE/ARB THEARPY RXD/TAKEN: ICD-10-PCS | Mod: CPTII,S$GLB,, | Performed by: DERMATOLOGY

## 2023-05-01 PROCEDURE — 1126F PR PAIN SEVERITY QUANTIFIED, NO PAIN PRESENT: ICD-10-PCS | Mod: CPTII,S$GLB,, | Performed by: DERMATOLOGY

## 2023-05-01 PROCEDURE — 1160F PR REVIEW ALL MEDS BY PRESCRIBER/CLIN PHARMACIST DOCUMENTED: ICD-10-PCS | Mod: CPTII,S$GLB,, | Performed by: DERMATOLOGY

## 2023-05-01 PROCEDURE — 1101F PT FALLS ASSESS-DOCD LE1/YR: CPT | Mod: CPTII,S$GLB,, | Performed by: DERMATOLOGY

## 2023-05-01 PROCEDURE — 11900 PR INJECTION INTO SKIN LESIONS, UP TO 7: ICD-10-PCS | Mod: S$GLB,,, | Performed by: DERMATOLOGY

## 2023-05-01 PROCEDURE — 1160F RVW MEDS BY RX/DR IN RCRD: CPT | Mod: CPTII,S$GLB,, | Performed by: DERMATOLOGY

## 2023-05-01 PROCEDURE — 1157F PR ADVANCE CARE PLAN OR EQUIV PRESENT IN MEDICAL RECORD: ICD-10-PCS | Mod: CPTII,S$GLB,, | Performed by: DERMATOLOGY

## 2023-05-01 NOTE — ASSESSMENT & PLAN NOTE
Today's Plan:      Intralesional Kenalog 10mg/cc (2.0 cc total) injected into 1 lesions on the scalp vertex today after obtaining verbal consent including risk of surrounding hypopigmentation. Patient tolerated procedure well.    Units: 2  NDC for Kenalog 10mg/cc:  [id number]    Restart lidex soln bid to scalp    Wear hat always

## 2023-05-01 NOTE — PROGRESS NOTES
Subjective:      Patient ID:  Jalyn Aaron is a 73 y.o. female who presents for   Chief Complaint   Patient presents with    Follow-up     Biopsy results     History of Present Illness: The patient presents for follow up of biopsy results.    The patient was last seen on: 3/10/23     Final Pathologic Diagnosis       Date                     Value               Ref Range           Status                03/10/2023                                                       Final             Skin, scalp vertex, punch biopsy: -DISCOID LUPUS ERYTHEMATOUS, CONSISTENT WITH     Comment:    Interp By Johanny Rush M.D., Signed on 03/28/2023 at 15:16  ----------    Pt states that scalp has improved. Pt states that scalp is occ sensitive and itchy. Pt stopped using lidex soln on scalp.        Review of Systems   Skin:  Positive for itching. Negative for rash.   Hematologic/Lymphatic: Bruises/bleeds easily.     Objective:   Physical Exam   Constitutional: She appears well-developed and well-nourished. No distress.   Neurological: She is alert and oriented to person, place, and time. She is not disoriented.   Psychiatric: She has a normal mood and affect.   Skin:   Areas Examined (abnormalities noted in diagram):   Scalp / Hair Palpated and Inspected          Diagram Legend     Erythematous scaling macule/papule c/w actinic keratosis       Vascular papule c/w angioma      Pigmented verrucoid papule/plaque c/w seborrheic keratosis      Yellow umbilicated papule c/w sebaceous hyperplasia      Irregularly shaped tan macule c/w lentigo     1-2 mm smooth white papules consistent with Milia      Movable subcutaneous cyst with punctum c/w epidermal inclusion cyst      Subcutaneous movable cyst c/w pilar cyst      Firm pink to brown papule c/w dermatofibroma      Pedunculated fleshy papule(s) c/w skin tag(s)      Evenly pigmented macule c/w junctional nevus     Mildly variegated pigmented, slightly irregular-bordered macule  c/w mildly atypical nevus      Flesh colored to evenly pigmented papule c/w intradermal nevus       Pink pearly papule/plaque c/w basal cell carcinoma      Erythematous hyperkeratotic cursted plaque c/w SCC      Surgical scar with no sign of skin cancer recurrence      Open and closed comedones      Inflammatory papules and pustules      Verrucoid papule consistent consistent with wart     Erythematous eczematous patches and plaques     Dystrophic onycholytic nail with subungual debris c/w onychomycosis     Umbilicated papule    Erythematous-base heme-crusted tan verrucoid plaque consistent with inflamed seborrheic keratosis     Erythematous Silvery Scaling Plaque c/w Psoriasis     See annotation      Assessment / Plan:        Discoid lupus erythematosus  -     triamcinolone acetonide injection 10 mg  -     IN [id number] ACETONIDE INJ PER 10MG  -     IN INJECTION INTO SKIN LESIONS, UP TO 7      Discoid lupus erythematosus  Today's Plan:      Intralesional Kenalog 10mg/cc (2.0 cc total) injected into 1 lesions on the scalp vertex today after obtaining verbal consent including risk of surrounding hypopigmentation. Patient tolerated procedure well.    Units: 2  NDC for Kenalog 10mg/cc:  3925-4385-23    Restart lidex soln bid to scalp    Wear hat always      No follow-ups on file.

## 2023-05-23 ENCOUNTER — HOSPITAL ENCOUNTER (OUTPATIENT)
Dept: RADIOLOGY | Facility: HOSPITAL | Age: 73
Discharge: HOME | End: 2023-05-23
Attending: INTERNAL MEDICINE
Payer: MEDICARE

## 2023-05-23 DIAGNOSIS — R91.1 SOLITARY PULMONARY NODULE: ICD-10-CM

## 2023-05-23 PROCEDURE — 71250 CT CHEST WITHOUT CONTRAST: ICD-10-PCS | Mod: 26,,, | Performed by: RADIOLOGY

## 2023-05-23 PROCEDURE — 71250 CT THORAX DX C-: CPT | Mod: TC

## 2023-05-23 PROCEDURE — 71250 CT THORAX DX C-: CPT | Mod: 26,,, | Performed by: RADIOLOGY

## 2023-05-24 ENCOUNTER — PATIENT MESSAGE (OUTPATIENT)
Dept: INTERNAL MEDICINE | Facility: CLINIC | Age: 73
End: 2023-05-24
Payer: MEDICARE

## 2023-05-29 ENCOUNTER — TELEPHONE (OUTPATIENT)
Dept: INTERNAL MEDICINE | Facility: CLINIC | Age: 73
End: 2023-05-29

## 2023-05-29 ENCOUNTER — OFFICE VISIT (OUTPATIENT)
Dept: SLEEP MEDICINE | Facility: CLINIC | Age: 73
End: 2023-05-29
Payer: MEDICARE

## 2023-05-29 VITALS
HEART RATE: 69 BPM | DIASTOLIC BLOOD PRESSURE: 72 MMHG | SYSTOLIC BLOOD PRESSURE: 109 MMHG | BODY MASS INDEX: 19.73 KG/M2 | WEIGHT: 125.69 LBS | HEIGHT: 67 IN

## 2023-05-29 DIAGNOSIS — R06.83 SNORING: Primary | ICD-10-CM

## 2023-05-29 DIAGNOSIS — R94.30 EJECTION FRACTION < 50%: ICD-10-CM

## 2023-05-29 DIAGNOSIS — K86.9 PANCREATIC LESION: Primary | ICD-10-CM

## 2023-05-29 DIAGNOSIS — G47.19 EXCESSIVE DAYTIME SLEEPINESS: ICD-10-CM

## 2023-05-29 DIAGNOSIS — I42.8 CARDIOMYOPATHY, NONISCHEMIC: ICD-10-CM

## 2023-05-29 PROCEDURE — 3288F FALL RISK ASSESSMENT DOCD: CPT | Mod: CPTII,S$GLB,, | Performed by: INTERNAL MEDICINE

## 2023-05-29 PROCEDURE — 1160F PR REVIEW ALL MEDS BY PRESCRIBER/CLIN PHARMACIST DOCUMENTED: ICD-10-PCS | Mod: CPTII,S$GLB,, | Performed by: INTERNAL MEDICINE

## 2023-05-29 PROCEDURE — 99999 PR PBB SHADOW E&M-EST. PATIENT-LVL III: ICD-10-PCS | Mod: PBBFAC,,, | Performed by: INTERNAL MEDICINE

## 2023-05-29 PROCEDURE — 1101F PT FALLS ASSESS-DOCD LE1/YR: CPT | Mod: CPTII,S$GLB,, | Performed by: INTERNAL MEDICINE

## 2023-05-29 PROCEDURE — 3008F PR BODY MASS INDEX (BMI) DOCUMENTED: ICD-10-PCS | Mod: CPTII,S$GLB,, | Performed by: INTERNAL MEDICINE

## 2023-05-29 PROCEDURE — 1125F PR PAIN SEVERITY QUANTIFIED, PAIN PRESENT: ICD-10-PCS | Mod: CPTII,S$GLB,, | Performed by: INTERNAL MEDICINE

## 2023-05-29 PROCEDURE — 1125F AMNT PAIN NOTED PAIN PRSNT: CPT | Mod: CPTII,S$GLB,, | Performed by: INTERNAL MEDICINE

## 2023-05-29 PROCEDURE — 1159F MED LIST DOCD IN RCRD: CPT | Mod: CPTII,S$GLB,, | Performed by: INTERNAL MEDICINE

## 2023-05-29 PROCEDURE — 3008F BODY MASS INDEX DOCD: CPT | Mod: CPTII,S$GLB,, | Performed by: INTERNAL MEDICINE

## 2023-05-29 PROCEDURE — 1160F RVW MEDS BY RX/DR IN RCRD: CPT | Mod: CPTII,S$GLB,, | Performed by: INTERNAL MEDICINE

## 2023-05-29 PROCEDURE — 99999 PR PBB SHADOW E&M-EST. PATIENT-LVL III: CPT | Mod: PBBFAC,,, | Performed by: INTERNAL MEDICINE

## 2023-05-29 PROCEDURE — 4010F ACE/ARB THERAPY RXD/TAKEN: CPT | Mod: CPTII,S$GLB,, | Performed by: INTERNAL MEDICINE

## 2023-05-29 PROCEDURE — 1157F PR ADVANCE CARE PLAN OR EQUIV PRESENT IN MEDICAL RECORD: ICD-10-PCS | Mod: CPTII,S$GLB,, | Performed by: INTERNAL MEDICINE

## 2023-05-29 PROCEDURE — 1101F PR PT FALLS ASSESS DOC 0-1 FALLS W/OUT INJ PAST YR: ICD-10-PCS | Mod: CPTII,S$GLB,, | Performed by: INTERNAL MEDICINE

## 2023-05-29 PROCEDURE — 3074F SYST BP LT 130 MM HG: CPT | Mod: CPTII,S$GLB,, | Performed by: INTERNAL MEDICINE

## 2023-05-29 PROCEDURE — 1159F PR MEDICATION LIST DOCUMENTED IN MEDICAL RECORD: ICD-10-PCS | Mod: CPTII,S$GLB,, | Performed by: INTERNAL MEDICINE

## 2023-05-29 PROCEDURE — 4010F PR ACE/ARB THEARPY RXD/TAKEN: ICD-10-PCS | Mod: CPTII,S$GLB,, | Performed by: INTERNAL MEDICINE

## 2023-05-29 PROCEDURE — 3078F DIAST BP <80 MM HG: CPT | Mod: CPTII,S$GLB,, | Performed by: INTERNAL MEDICINE

## 2023-05-29 PROCEDURE — 1157F ADVNC CARE PLAN IN RCRD: CPT | Mod: CPTII,S$GLB,, | Performed by: INTERNAL MEDICINE

## 2023-05-29 PROCEDURE — 3288F PR FALLS RISK ASSESSMENT DOCUMENTED: ICD-10-PCS | Mod: CPTII,S$GLB,, | Performed by: INTERNAL MEDICINE

## 2023-05-29 PROCEDURE — 3078F PR MOST RECENT DIASTOLIC BLOOD PRESSURE < 80 MM HG: ICD-10-PCS | Mod: CPTII,S$GLB,, | Performed by: INTERNAL MEDICINE

## 2023-05-29 PROCEDURE — 99204 PR OFFICE/OUTPT VISIT, NEW, LEVL IV, 45-59 MIN: ICD-10-PCS | Mod: S$GLB,,, | Performed by: INTERNAL MEDICINE

## 2023-05-29 PROCEDURE — 99204 OFFICE O/P NEW MOD 45 MIN: CPT | Mod: S$GLB,,, | Performed by: INTERNAL MEDICINE

## 2023-05-29 PROCEDURE — 3074F PR MOST RECENT SYSTOLIC BLOOD PRESSURE < 130 MM HG: ICD-10-PCS | Mod: CPTII,S$GLB,, | Performed by: INTERNAL MEDICINE

## 2023-05-29 NOTE — PROGRESS NOTES
"Subjective:   Patient ID: Jalyn Aaron is a 73 y.o. female    Chief Complaint:   Chief Complaint   Patient presents with    Snoring       HPI  Jalyn Aaron is a 73 y.o. female who presents for evaluation of possible sleep disordered breathing. She was seen by ENT for evaluation of right ear hearing loss. She reports snoring. She has awoken from "snorting". History includes HFrEF and hypertension.    The patient reports witnessed snoring noticed by her . She has  awoken from a snore and has no gasping for air. When she wakes up from sleep, she does  feel un refreshed. There is  dry mouth and she does grind her teeth.  There has no weight change.    Symptoms began several ago.    Prior sleep evaluation: none    Sleep medication taken: none.    Other sleep remedies: none    Retired. Worked as a . Retired.     Sleep summary:  Bedtime: 9:00 - 9:30 PM  Sleep Latency: 30 min  # Awakenings: 2  WASO (wakefulness after sleep onset) a few min  Risetime: 5:30 AM      Bed partner is  present.    Snoring is usually of moderate intensity.    Naps are not taken frequently.    There is  evidence of choking awakening.   Apneas while asleep have  been reported by others.    Bruxism is  reported at this time.      There are no reports of sleep talking no sleep walking .    Patient does  drink  caffeinated beverages daily.    Alcoholic beverages are not ingested on a regular basis.    The bedroom environment is  adequate and  comfortable.          CONTRIBUTING and/or CONFOUNDING FACTORS:    Nocturnal pain:   y    Nocturia >2/night:   n  Heartburn/GI pain:   n  Environment:    nn  Bed partner noise/movements : n      Patient provided ESS:    [place] Sleepiness Scale TOTAL   (validated sleepiness questionnaire with a higher score indicating greater sleepiness; range 0-24)  EPWORTH SLEEPINESS SCALE 5/22/2023   Sitting and reading 2   Watching TV 1   Sitting, inactive in a public place (e.g. a " theatre or a meeting) 0   As a passenger in a car for an hour without a break 2   Lying down to rest in the afternoon when circumstances permit 1   Sitting and talking to someone 0   Sitting quietly after a lunch without alcohol 0   In a car, while stopped for a few minutes in traffic 0   Total score 6                 STOP BANG questionnaire:    Snoring present: y  Tiredness present:y  Obstruction (apneas/choking episodes): y  Pressure (HTN): y    BMI greater than 35 kg/m2: n  Age greater than 50 years old: n  Neck circumference > than 17 inches if male or > than 16 inches if female : n  Gender being male: n    Total STOP BANG score = 4/8  Low risk SARAI: 0-2, Intermediate risk SARAI: 3-4, High risk SARAI: 5+      Most Recent Vital Signs:    The patient's body mass index is 19.68 kg/m².    Wt Readings from Last 5 Encounters:   05/29/23 57 kg (125 lb 10.6 oz)   01/31/23 58.5 kg (128 lb 15.5 oz)   01/23/23 59.6 kg (131 lb 6.3 oz)   01/20/23 60.7 kg (133 lb 13.1 oz)   12/29/22 60.7 kg (133 lb 13.1 oz)     BMI Readings from Last 5 Encounters:   05/29/23 19.68 kg/m²   01/31/23 20.20 kg/m²   01/23/23 20.58 kg/m²   01/20/23 20.96 kg/m²   12/29/22 20.96 kg/m²     Pulse Readings from Last 3 Encounters:   05/29/23 69   03/20/23 63   01/31/23 70         Current Outpatient Medications:     calcium-vitamin D3 (OS-TALHA 500 + D3) 500 mg-5 mcg (200 unit) per tablet, Take 1 tablet by mouth 2 (two) times daily with meals., Disp: , Rfl:     carvediloL (COREG) 3.125 MG tablet, TAKE 1 TABLET(3.125 MG) BY MOUTH TWICE DAILY WITH MEALS, Disp: 60 tablet, Rfl: 11    gabapentin (NEURONTIN) 600 MG tablet, Take 1.5 tablets (900 mg total) by mouth 3 (three) times daily. (Patient taking differently: Take 900 mg by mouth 2 (two) times daily.), Disp: 135 tablet, Rfl: 11    linaCLOtide (LINZESS) 72 mcg Cap capsule, Take 1 capsule (72 mcg total) by mouth before breakfast., Disp: 30 capsule, Rfl: 3    losartan (COZAAR) 25 MG tablet, TAKE 1 TABLET BY MOUTH  "TWICE DAILY, Disp: 180 tablet, Rfl: 3    multivitamin capsule, Take 1 capsule by mouth once daily., Disp: , Rfl:     naproxen (NAPROSYN) 500 MG tablet, TAKE 1/2 TABLET BY MOUTH DAILY, Disp: 90 tablet, Rfl: 1    senna (SENOKOT) 8.6 mg tablet, Take 1 tablet by mouth once daily. Twice a day, Disp: , Rfl:     vitamin D 1000 units Tab, Take 1,000 Units by mouth once daily., Disp: , Rfl:     sertraline (ZOLOFT) 25 MG tablet, Take 1 tablet (25 mg total) by mouth once daily., Disp: 30 tablet, Rfl: 3    Current Facility-Administered Medications:     triamcinolone acetonide injection 10 mg, 10 mg, Intradermal, Once, Christel Ward MD    Objective:   Vitals reviewed   Physical Exam  Constitutional:       Appearance: Normal appearance.   HENT:      Head: Normocephalic.      Mouth/Throat:      Comments: Mallampati 3  Neck:      Comments: 12" neck  Cardiovascular:      Rate and Rhythm: Normal rate and regular rhythm.   Pulmonary:      Effort: Pulmonary effort is normal. No respiratory distress.      Breath sounds: No wheezing or rales.   Musculoskeletal:      Cervical back: Normal range of motion and neck supple.   Neurological:      General: No focal deficit present.      Mental Status: She is alert and oriented to person, place, and time.   Psychiatric:         Mood and Affect: Mood normal.         Behavior: Behavior normal.       Assessment:     Problem List Items Addressed This Visit          Cardiac/Vascular    Ejection fraction < 50%    Cardiomyopathy, nonischemic     Other Visit Diagnoses       Snoring    -  Primary    Relevant Orders    Home Sleep Study    Excessive daytime sleepiness        Relevant Orders    Home Sleep Study              Plan:       Most likely secondary to sleep disordered breathing of obstructive or central origin.     Patient has been informed about the pathophysiology and prognosis associated with SARAI and CSA and has been advised to undergo a baseline Polysomnography (PSG) study for further " evaluation of his sleep disorder breathing.    Patient understood and agreed to undergo for a PSG study. Will order Home Sleep Study and proceed with further studies or prescription for CPAP as appropriate.    Today's office encounter included review of salient clinical notes from others involved in the care of the patient, discrete laboratory elements and, as available, prior and present intervention for the disturbance of sleep.  The information gleaned was used in establishing the diagnosis and in stratification of disease risk.    The patient has a disturbance of sleep with clinically relevant potential for adverse behavioral, cardiovascular, and metabolic outcomes.  Untreated, the disturbance of sleep can have significant effect on mental health, clinical health and survival.       All patient's questions and concerns regarding Sleep Disorder Breathing were addressed during this visit.    I will follow up the patient closely. Will follow-up patient with results of PSG

## 2023-05-30 ENCOUNTER — TELEPHONE (OUTPATIENT)
Dept: INTERNAL MEDICINE | Facility: CLINIC | Age: 73
End: 2023-05-30
Payer: MEDICARE

## 2023-05-30 NOTE — TELEPHONE ENCOUNTER
----- Message from Cassi Roy sent at 5/30/2023  9:11 AM CDT -----  Regarding: Orders                  Name of Who is Calling:  Jalyn Aaron    Who Left The Message:  Jalyn Aaron      What is the request in detail:      Patient called requesting her CT Abdomen Pelvis With Contrast; please put the orders in for labs prior to scheduling the CT scan. Please further advise. Thank you      Reply by [facility]: NO      Preferred Call Back  :  [phone]

## 2023-06-03 ENCOUNTER — HOSPITAL ENCOUNTER (OUTPATIENT)
Dept: RADIOLOGY | Facility: OTHER | Age: 73
Discharge: HOME | End: 2023-06-03
Attending: INTERNAL MEDICINE
Payer: MEDICARE

## 2023-06-03 DIAGNOSIS — K86.9 PANCREATIC LESION: ICD-10-CM

## 2023-06-03 PROCEDURE — 74177 CT ABD & PELVIS W/CONTRAST: CPT | Mod: TC

## 2023-06-03 PROCEDURE — 74177 CT ABD & PELVIS W/CONTRAST: CPT | Mod: 26,,, | Performed by: RADIOLOGY

## 2023-06-03 PROCEDURE — 74177 CT ABDOMEN PELVIS WITH CONTRAST: ICD-10-PCS | Mod: 26,,, | Performed by: RADIOLOGY

## 2023-06-03 PROCEDURE — 25500020 PHARM REV CODE 255: Performed by: INTERNAL MEDICINE

## 2023-06-03 RX ADMIN — IOHEXOL 75 ML: 350 INJECTION, SOLUTION INTRAVENOUS at 11:06

## 2023-06-04 ENCOUNTER — CLINICAL SUPPORT (OUTPATIENT)
Dept: CARDIOLOGY | Facility: HOSPITAL | Age: 73
End: 2023-06-04
Payer: MEDICARE

## 2023-06-04 DIAGNOSIS — Z95.0 PRESENCE OF CARDIAC PACEMAKER: ICD-10-CM

## 2023-06-04 PROCEDURE — 93296 REM INTERROG EVL PM/IDS: CPT | Performed by: INTERNAL MEDICINE

## 2023-06-04 PROCEDURE — 93294 CARDIAC DEVICE CHECK - REMOTE: ICD-10-PCS | Mod: ,,, | Performed by: INTERNAL MEDICINE

## 2023-06-04 PROCEDURE — 93294 REM INTERROG EVL PM/LDLS PM: CPT | Mod: ,,, | Performed by: INTERNAL MEDICINE

## 2023-06-05 ENCOUNTER — TELEPHONE (OUTPATIENT)
Dept: ENDOSCOPY | Facility: HOSPITAL | Age: 73
End: 2023-06-05
Payer: MEDICARE

## 2023-06-05 ENCOUNTER — TELEPHONE (OUTPATIENT)
Dept: INTERNAL MEDICINE | Facility: CLINIC | Age: 73
End: 2023-06-05

## 2023-06-05 DIAGNOSIS — K86.2 PANCREATIC CYST: Primary | ICD-10-CM

## 2023-06-05 NOTE — PROGRESS NOTES
I am referring you to gastro for the pancreatic cyst as they may want to do an endoscopic ultrasound to evaluate. If it is stable it is something we just follow yearly to check for change or growth.  We will call you to schedule.

## 2023-06-05 NOTE — TELEPHONE ENCOUNTER
----- Message from Marli Bennett sent at 6/5/2023 11:57 AM CDT -----  Dr Kilgore has placed a referral for a Consult to gastroenterology with Dr Sutherland. Please assist with scheduling. Contact # [phone].    Diagnosis:Pancreatic cyst [K86.2]      Thanks

## 2023-06-06 ENCOUNTER — TELEPHONE (OUTPATIENT)
Dept: ENDOSCOPY | Facility: HOSPITAL | Age: 73
End: 2023-06-06
Payer: MEDICARE

## 2023-06-06 ENCOUNTER — PATIENT MESSAGE (OUTPATIENT)
Dept: INTERNAL MEDICINE | Facility: CLINIC | Age: 73
End: 2023-06-06
Payer: MEDICARE

## 2023-06-06 NOTE — TELEPHONE ENCOUNTER
----- Message from Graciela VANEGAS Route sent at 6/6/2023  2:35 PM CDT -----  Regarding: Missed call  Contact: pt.[phone]  Pt missed Call from Amina she said she was in class but she is finished and available the rest of the day. Patient Requesting Call Back @ pt.[phone]

## 2023-06-06 NOTE — TELEPHONE ENCOUNTER
----- Message from Adelia Gabriel sent at 6/6/2023  9:11 AM CDT -----  Regarding: missed call  Contact: [phone]  Jalyn Aaron calling regarding Patient Advice (message) for #returning a missed call from Yeni. Please call

## 2023-06-07 ENCOUNTER — PATIENT MESSAGE (OUTPATIENT)
Dept: ENDOSCOPY | Facility: HOSPITAL | Age: 73
End: 2023-06-07
Payer: MEDICARE

## 2023-06-07 ENCOUNTER — TELEPHONE (OUTPATIENT)
Dept: ENDOSCOPY | Facility: HOSPITAL | Age: 73
End: 2023-06-07
Payer: MEDICARE

## 2023-06-07 RX ORDER — GABAPENTIN 600 MG/1
900 TABLET ORAL 3 TIMES DAILY
Qty: 135 TABLET | Refills: 11 | Status: CANCELLED | OUTPATIENT
Start: 2023-06-07 | End: 2024-06-06

## 2023-06-07 NOTE — TELEPHONE ENCOUNTER
No care due was identified.  [facility] Embedded Care Due Messages. Reference number: [id number].   6/07/2023 9:58:43 AM CDT

## 2023-06-08 ENCOUNTER — PATIENT MESSAGE (OUTPATIENT)
Dept: INTERNAL MEDICINE | Facility: CLINIC | Age: 73
End: 2023-06-08
Payer: MEDICARE

## 2023-06-09 ENCOUNTER — TELEPHONE (OUTPATIENT)
Dept: SLEEP MEDICINE | Facility: OTHER | Age: 73
End: 2023-06-09
Payer: MEDICARE

## 2023-06-10 ENCOUNTER — PATIENT MESSAGE (OUTPATIENT)
Dept: INTERNAL MEDICINE | Facility: CLINIC | Age: 73
End: 2023-06-10
Payer: MEDICARE

## 2023-06-12 ENCOUNTER — OFFICE VISIT (OUTPATIENT)
Dept: INTERNAL MEDICINE | Facility: CLINIC | Age: 73
End: 2023-06-12
Payer: MEDICARE

## 2023-06-12 VITALS
HEIGHT: 67 IN | HEART RATE: 70 BPM | WEIGHT: 123.25 LBS | OXYGEN SATURATION: 100 % | SYSTOLIC BLOOD PRESSURE: 120 MMHG | BODY MASS INDEX: 19.35 KG/M2 | DIASTOLIC BLOOD PRESSURE: 70 MMHG

## 2023-06-12 DIAGNOSIS — G62.9 NEUROPATHY: Primary | ICD-10-CM

## 2023-06-12 PROCEDURE — 3008F BODY MASS INDEX DOCD: CPT | Mod: CPTII,S$GLB,, | Performed by: NURSE PRACTITIONER

## 2023-06-12 PROCEDURE — 99999 PR PBB SHADOW E&M-EST. PATIENT-LVL III: CPT | Mod: PBBFAC,,, | Performed by: NURSE PRACTITIONER

## 2023-06-12 PROCEDURE — 1125F AMNT PAIN NOTED PAIN PRSNT: CPT | Mod: CPTII,S$GLB,, | Performed by: NURSE PRACTITIONER

## 2023-06-12 PROCEDURE — 1160F RVW MEDS BY RX/DR IN RCRD: CPT | Mod: CPTII,S$GLB,, | Performed by: NURSE PRACTITIONER

## 2023-06-12 PROCEDURE — 1160F PR REVIEW ALL MEDS BY PRESCRIBER/CLIN PHARMACIST DOCUMENTED: ICD-10-PCS | Mod: CPTII,S$GLB,, | Performed by: NURSE PRACTITIONER

## 2023-06-12 PROCEDURE — 1159F MED LIST DOCD IN RCRD: CPT | Mod: CPTII,S$GLB,, | Performed by: NURSE PRACTITIONER

## 2023-06-12 PROCEDURE — 3074F SYST BP LT 130 MM HG: CPT | Mod: CPTII,S$GLB,, | Performed by: NURSE PRACTITIONER

## 2023-06-12 PROCEDURE — 99214 PR OFFICE/OUTPT VISIT, EST, LEVL IV, 30-39 MIN: ICD-10-PCS | Mod: S$GLB,,, | Performed by: NURSE PRACTITIONER

## 2023-06-12 PROCEDURE — 1157F PR ADVANCE CARE PLAN OR EQUIV PRESENT IN MEDICAL RECORD: ICD-10-PCS | Mod: CPTII,S$GLB,, | Performed by: NURSE PRACTITIONER

## 2023-06-12 PROCEDURE — 3078F PR MOST RECENT DIASTOLIC BLOOD PRESSURE < 80 MM HG: ICD-10-PCS | Mod: CPTII,S$GLB,, | Performed by: NURSE PRACTITIONER

## 2023-06-12 PROCEDURE — 4010F PR ACE/ARB THEARPY RXD/TAKEN: ICD-10-PCS | Mod: CPTII,S$GLB,, | Performed by: NURSE PRACTITIONER

## 2023-06-12 PROCEDURE — 99999 PR PBB SHADOW E&M-EST. PATIENT-LVL III: ICD-10-PCS | Mod: PBBFAC,,, | Performed by: NURSE PRACTITIONER

## 2023-06-12 PROCEDURE — 1125F PR PAIN SEVERITY QUANTIFIED, PAIN PRESENT: ICD-10-PCS | Mod: CPTII,S$GLB,, | Performed by: NURSE PRACTITIONER

## 2023-06-12 PROCEDURE — 99214 OFFICE O/P EST MOD 30 MIN: CPT | Mod: S$GLB,,, | Performed by: NURSE PRACTITIONER

## 2023-06-12 PROCEDURE — 4010F ACE/ARB THERAPY RXD/TAKEN: CPT | Mod: CPTII,S$GLB,, | Performed by: NURSE PRACTITIONER

## 2023-06-12 PROCEDURE — 3078F DIAST BP <80 MM HG: CPT | Mod: CPTII,S$GLB,, | Performed by: NURSE PRACTITIONER

## 2023-06-12 PROCEDURE — 3074F PR MOST RECENT SYSTOLIC BLOOD PRESSURE < 130 MM HG: ICD-10-PCS | Mod: CPTII,S$GLB,, | Performed by: NURSE PRACTITIONER

## 2023-06-12 PROCEDURE — 1159F PR MEDICATION LIST DOCUMENTED IN MEDICAL RECORD: ICD-10-PCS | Mod: CPTII,S$GLB,, | Performed by: NURSE PRACTITIONER

## 2023-06-12 PROCEDURE — 1157F ADVNC CARE PLAN IN RCRD: CPT | Mod: CPTII,S$GLB,, | Performed by: NURSE PRACTITIONER

## 2023-06-12 PROCEDURE — 3008F PR BODY MASS INDEX (BMI) DOCUMENTED: ICD-10-PCS | Mod: CPTII,S$GLB,, | Performed by: NURSE PRACTITIONER

## 2023-06-12 RX ORDER — GABAPENTIN 600 MG/1
900 TABLET ORAL 3 TIMES DAILY
Qty: 135 TABLET | Refills: 11 | Status: SHIPPED | OUTPATIENT
Start: 2023-06-12 | End: 2024-06-11

## 2023-06-12 NOTE — PROGRESS NOTES
"Subjective     Patient ID: Jalyn Aaron is a 73 y.o. female.    Chief Complaint: Foot Pain (L-calf ) and Tingling (feet)    Pt of Dr. Kilgore, here for pain in feet. Messaged PCP recently stating, "I'm not sure you got my messages on 6/7 and 6/8 and my 2 phone calls.  I am out of my Gabapentin and they will not do a refill.  I called Neurology to get an appointment and get help with getting my Gabapentin but they have not called me back.  I tried to get a primary appointment today but they are booked.  I have an appointment with a nurse practitioner on Monday.  My feet numbness and pain have come back as well as my calf pain.  Is Gabapentin something you can follow me for or do I have to go back to Neurology?"    She was previously seeing Dr. Myers who was prescribing this paresthesia of the feet, last visit 6/2022. PCP has also previously prescribed this. The message above was not responded to and pt has been out of meds since last Wednesday and is having some pain and tingling.    Review of Systems   Constitutional:  Negative for chills and fever.   Respiratory:  Negative for chest tightness and shortness of breath.    Cardiovascular:  Negative for chest pain, palpitations, leg swelling and claudication.   Gastrointestinal:  Negative for abdominal pain, constipation, nausea, vomiting and reflux.   Genitourinary:  Negative for dysuria.   Musculoskeletal:  Positive for arthralgias. Negative for joint swelling, leg pain and myalgias.        As documented in HPI     Allergic/Immunologic: Negative for environmental allergies, food allergies and immunocompromised state.   Neurological:  Positive for numbness. Negative for weakness.        As documented in HPI     Hematological:  Negative for adenopathy. Does not bruise/bleed easily.   Psychiatric/Behavioral:  Negative for suicidal ideas.      Review of patient's allergies indicates:   Allergen Reactions    Bactrim [sulfamethoxazole-trimethoprim] Nausea " Only     Current Outpatient Medications:     calcium-vitamin D3 (OS-TALHA 500 + D3) 500 mg-5 mcg (200 unit) per tablet, Take 1 tablet by mouth 2 (two) times daily with meals., Disp: , Rfl:     carvediloL (COREG) 3.125 MG tablet, TAKE 1 TABLET(3.125 MG) BY MOUTH TWICE DAILY WITH MEALS, Disp: 60 tablet, Rfl: 11    gabapentin (NEURONTIN) 600 MG tablet, Take 1.5 tablets (900 mg total) by mouth 3 (three) times daily. Disp: 135 tablet, Rfl: 11    losartan (COZAAR) 25 MG tablet, TAKE 1 TABLET BY MOUTH TWICE DAILY, Disp: 180 tablet, Rfl: 3    multivitamin capsule, Take 1 capsule by mouth once daily., Disp: , Rfl:     naproxen (NAPROSYN) 500 MG tablet, TAKE 1/2 TABLET BY MOUTH DAILY, Disp: 90 tablet, Rfl: 1    senna (SENOKOT) 8.6 mg tablet, Take 1 tablet by mouth once daily. Twice a day, Disp: , Rfl:     vitamin D 1000 units Tab, Take 1,000 Units by mouth once daily., Disp: , Rfl:     Current Facility-Administered Medications:     triamcinolone acetonide injection 10 mg, 10 mg, Intradermal, Once, Christel Ward MD    Patient Active Problem List   Diagnosis    Atrioventricular block, complete    Cardiac pacemaker in situ    Anxiety    Intraductal papilloma of breast    Ejection fraction < 50%    Cardiomyopathy, nonischemic    Lumbar stenosis    Screening for colon cancer    Disorder of muscle    Decreased range of motion of trunk and back    Decreased strength of trunk and back    Weakness of left lower extremity    Neuropathy    Decreased ROM of neck    Decreased strength of upper extremity    Atherosclerosis of aorta    Discoid lupus erythematosus     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    COLONOSCOPY N/A 3/30/2021    Procedure: COLONOSCOPY;  Surgeon: Joaquin Johnson MD;  Location: [facility] ENDO ([address] [place] [address]);  Service: Endoscopy;  Laterality: N/A;  prep ins. emailed - COVID screening on 3/27/21 PCW - ERW    HYSTERECTOMY      INSERT / REPLACE / REMOVE PACEMAKER      LASIK      LASIK Bilateral 2009    VENOGRAM, EP LAB N/A 12/20/2022    Procedure: Venogram, EP Lab;  Surgeon: Chacho Colby MD;  Location: [facility] EP LAB;  Service: Cardiology;  Laterality: N/A;  NICM, Venogram- left side, SK, 3 Prep *MDT PPM in situ*     Social History     Socioeconomic History    Marital status:     Number of children: 1   Occupational History    Occupation:      Employer: [employer]   Tobacco Use    Smoking status: Never     Passive exposure: Never    Smokeless tobacco: Never   Substance and Sexual Activity    Alcohol use: Not Currently     Alcohol/week: 1.0 standard drink     Types: 1 Glasses of wine per week     Comment: weekly    Drug use: No    Sexual activity: Not Currently     Partners: Male     Birth control/protection: None   Other Topics Concern    Are you pregnant or think you may be? No    Breast-feeding No   Social History Narrative     (case management) at [employer]     Social Determinants of Health     Financial Resource Strain: Low Risk     Difficulty of Paying Living Expenses: Not hard at all   Food Insecurity: No Food Insecurity    Worried About Running Out of Food in the Last Year: Never true    Ran Out of Food in the Last Year: Never true   Transportation Needs: No Transportation Needs    Lack of Transportation (Medical): No    Lack of Transportation (Non-Medical): No  "  Physical Activity: Sufficiently Active    Days of Exercise per Week: 7 days    Minutes of Exercise per Session: 60 min   Stress: No Stress Concern Present    Feeling of Stress : Not at all   Social Connections: Moderately Integrated    Frequency of Communication with Friends and Family: More than three times a week    Frequency of Social Gatherings with Friends and Family: More than three times a week    Attends Rastafari Services: More than 4 times per year    Active Member of Clubs or Organizations: No    Attends Club or Organization Meetings: Never    Marital Status:    Housing Stability: Unknown    Unable to Pay for Housing in the Last Year: No    Unstable Housing in the Last Year: No     Family History   Problem Relation Age of Onset    Breast cancer Mother         Paget's dz    Cancer Mother 80        pagets    Cataracts Mother     Hypertension Mother     Multiple sclerosis Father     Multiple sclerosis Sister     No Known Problems Maternal Aunt     No Known Problems Maternal Uncle     No Known Problems Paternal Aunt     No Known Problems Paternal Uncle     No Known Problems Maternal Grandmother     Prostate cancer Maternal Grandfather     No Known Problems Paternal Grandmother     No Known Problems Paternal Grandfather     No Known Problems Brother     No Known Problems Son     No Known Problems Son     Amblyopia Neg Hx     Blindness Neg Hx     Diabetes Neg Hx     Glaucoma Neg Hx     Macular degeneration Neg Hx     Retinal detachment Neg Hx     Strabismus Neg Hx     Stroke Neg Hx     Thyroid disease Neg Hx     Melanoma Neg Hx     Ovarian cancer Neg Hx             Objective   Vitals:    06/12/23 0952   BP: 120/70   Pulse: 70   SpO2: 100%   Weight: 55.9 kg (123 lb 3.8 oz)   Height: 5' 7" (1.702 m)   PainSc:   4   PainLoc: Foot       Body mass index is 19.3 kg/m².    Physical Exam  Vitals and nursing note reviewed.   Constitutional:       Appearance: Normal appearance. She is normal weight.   HENT:      " Head: Normocephalic.      Nose: Nose normal.      Mouth/Throat:      Mouth: Mucous membranes are moist.   Eyes:      Conjunctiva/sclera: Conjunctivae normal.   Cardiovascular:      Rate and Rhythm: Normal rate.   Pulmonary:      Effort: Pulmonary effort is normal.   Musculoskeletal:         General: Normal range of motion.      Cervical back: Normal range of motion.   Neurological:      General: No focal deficit present.      Mental Status: She is alert and oriented to person, place, and time.   Psychiatric:         Mood and Affect: Mood normal.         Behavior: Behavior normal.         Thought Content: Thought content normal.         Judgment: Judgment normal.          Assessment and Plan     1. Neuropathy  -     gabapentin (NEURONTIN) 600 MG tablet; Take 1.5 tablets (900 mg total) by mouth 3 (three) times daily.  Dispense: 135 tablet; Refill: 11    2. Body mass index (BMI) of 19.0 to 19.9 in adult  BMI reviewed      Follow up in 7 months (on 1/2/2024), or if symptoms worsen or fail to improve, for with PCP Dr. Kilgore as scheduled.

## 2023-06-13 ENCOUNTER — HOSPITAL ENCOUNTER (OUTPATIENT)
Dept: SLEEP MEDICINE | Facility: OTHER | Age: 73
Discharge: HOME | End: 2023-06-13
Attending: INTERNAL MEDICINE
Payer: MEDICARE

## 2023-06-13 DIAGNOSIS — G47.19 EXCESSIVE DAYTIME SLEEPINESS: ICD-10-CM

## 2023-06-13 DIAGNOSIS — G47.33 OSA (OBSTRUCTIVE SLEEP APNEA): Primary | ICD-10-CM

## 2023-06-13 DIAGNOSIS — R06.83 SNORING: ICD-10-CM

## 2023-06-13 PROCEDURE — 95800 SLP STDY UNATTENDED: CPT

## 2023-06-14 PROBLEM — R06.83 SNORING: Status: ACTIVE | Noted: 2023-06-14

## 2023-06-20 NOTE — PROGRESS NOTES
Ms. Aaron is a patient of Dr. Colby and was last seen in clinic 11/16/2022.      Subjective:   Patient ID:  Jalyn Aaron is a 73 y.o. female who presents for follow up of Pacemaker Check  .     HPI:    Ms. Aaron is a 73 y.o. female with AV block, NICM, PPM, anxiety here for follow up.      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  Significant anxiety -- much improved since she has been on cymbalta  Echo 3/28/16 normal biventricular structure and function.  Echo 6/1/20 EF 45%.  We added Toprol. Already on Losartan.  Felt poorly on the Toprol. Noted heaviness, possibly shortness of breath, thinks anxiety may have been related. This was discontinued.     Retired,  3 years ago.  Echo 10/8/20 EF 45%  Echo 11/21 EF 45%.     11/26/2022: Complete AV block. EF remains stable at 45%.  We discussed consideration of upgrade at time of generator change. There is concern that she is occluded, given the varicosities observed on her chest. We will schedule venogram now to assess.  If occluded, would defer upgrade unless EF further deteriorates.    Update (06/21/2023):    12/20/2022: Venogram: significant narrowing at the left brachiocephalic region, but still with forward flow.    Today she says she is feeling well. Walking every day - new dog. No YORK, edema, palps, syncope. Some LH in the heat.    Device Interrogation (6/21/2023) reveals an intrinsic SR with CHB with stable lead and device function. No arrhythmias or treated episodes were noted.  She paces 7% in the RA and 100% in the RV. Estimated battery longevity 8 months.     I have personally reviewed the patient's EKG today, which shows ASVP at 63bpm. HI interval is 156. QRS is 140. QTc is 480.    Relevant Cardiac Test Results:    2D Echo (11/9/2022):  The left ventricle is normal in size with mildly decreased systolic function. The estimated ejection fraction is 45%.  There is  "abnormal septal wall motion consistent with right ventricular pacemaker.  Normal right ventricular size with normal right ventricular systolic function.  Grade I left ventricular diastolic dysfunction.  Mild tricuspid regurgitation.  The estimated PA systolic pressure is 22 mmHg.  Normal central venous pressure (3 mmHg).    Current Outpatient Medications   Medication Sig    calcium-vitamin D3 (OS-TALHA 500 + D3) 500 mg-5 mcg (200 unit) per tablet Take 1 tablet by mouth 2 (two) times daily with meals.    carvediloL (COREG) 3.125 MG tablet TAKE 1 TABLET(3.125 MG) BY MOUTH TWICE DAILY WITH MEALS    gabapentin (NEURONTIN) 600 MG tablet Take 1.5 tablets (900 mg total) by mouth 3 (three) times daily.    losartan (COZAAR) 25 MG tablet TAKE 1 TABLET BY MOUTH TWICE DAILY    multivitamin capsule Take 1 capsule by mouth once daily.    naproxen (NAPROSYN) 500 MG tablet TAKE 1/2 TABLET BY MOUTH DAILY    senna (SENOKOT) 8.6 mg tablet Take 1 tablet by mouth once daily. Twice a day    vitamin D 1000 units Tab Take 1,000 Units by mouth once daily.     Current Facility-Administered Medications   Medication    triamcinolone acetonide injection 10 mg       Review of Systems   Constitutional: Negative for malaise/fatigue.   Cardiovascular:  Negative for chest pain, dyspnea on exertion, irregular heartbeat, leg swelling and palpitations.   Respiratory:  Negative for shortness of breath.    Hematologic/Lymphatic: Negative for bleeding problem.   Skin:  Negative for rash.   Musculoskeletal:  Negative for myalgias.   Gastrointestinal:  Negative for hematemesis, hematochezia and nausea.   Genitourinary:  Negative for hematuria.   Neurological:  Positive for light-headedness.   Psychiatric/Behavioral:  Negative for altered mental status.    Allergic/Immunologic: Negative for persistent infections.     Objective:          /78   Pulse 63   Ht 5' 7" (1.702 m)   Wt 56.2 kg (123 lb 14.4 oz)   BMI 19.41 kg/m²     Physical Exam  Vitals and " nursing note reviewed.   Constitutional:       Appearance: Normal appearance. She is well-developed.   HENT:      Head: Normocephalic.      Nose: Nose normal.   Eyes:      Pupils: Pupils are equal, round, and reactive to light.   Cardiovascular:      Rate and Rhythm: Normal rate and regular rhythm.   Pulmonary:      Effort: No respiratory distress.      Breath sounds: Normal breath sounds.   Chest:      Comments: PPM in situ  Musculoskeletal:         General: Normal range of motion.   Skin:     General: Skin is warm and dry.      Findings: No erythema.   Neurological:      Mental Status: She is alert and oriented to person, place, and time.   Psychiatric:         Speech: Speech normal.         Behavior: Behavior normal.         Lab Results   Component Value Date     12/13/2022     12/13/2022    K 4.0 12/13/2022    K 4.0 12/13/2022    BUN 17 12/13/2022    BUN 17 12/13/2022    CREATININE 0.7 12/13/2022    CREATININE 0.7 12/13/2022    ALT 21 12/13/2022    AST 23 12/13/2022    HGB 12.4 12/13/2022    HCT 37.3 12/13/2022    TSH 1.545 12/13/2022    LDLCALC 128.0 12/13/2022             Assessment:     1. Cardiac pacemaker in situ    2. Cardiomyopathy, nonischemic    3. Atrioventricular block, complete      Plan:     In summary, Ms. Aaron is a 73 y.o. female with AV block, NICM, PPM, anxiety here for follow up.   Device and lead function stable. CHB with 100% RV pacing. EF 45% per echo 11/2022.   Venogram shows significant narrowing of left brachiocephalic region but still with forward flow. Plan is to attempt CRT upgrade at the time of generator change. Pt requests MRI compatible if possible.  No CHF symptoms. She is walking regularly and feeling well. 8 mo to RUTH.    Continue current medication regimen and device settings.   Follow up in device clinic as scheduled. MONTHLY  Follow up in EP clinic in 6 mo, sooner as needed.     *A copy of this note has been sent to Dr. Colby*    Follow up in about 6 months  (around 12/21/2023).    ------------------------------------------------------------------    RAMÓN Plata, NP-C  Cardiac Electrophysiology

## 2023-06-21 ENCOUNTER — CLINICAL SUPPORT (OUTPATIENT)
Dept: CARDIOLOGY | Facility: HOSPITAL | Age: 73
End: 2023-06-21
Attending: INTERNAL MEDICINE
Payer: MEDICARE

## 2023-06-21 ENCOUNTER — OFFICE VISIT (OUTPATIENT)
Dept: ELECTROPHYSIOLOGY | Facility: CLINIC | Age: 73
End: 2023-06-21
Payer: MEDICARE

## 2023-06-21 ENCOUNTER — HOSPITAL ENCOUNTER (OUTPATIENT)
Dept: CARDIOLOGY | Facility: CLINIC | Age: 73
Discharge: HOME | End: 2023-06-21
Payer: MEDICARE

## 2023-06-21 VITALS
HEART RATE: 63 BPM | HEIGHT: 67 IN | BODY MASS INDEX: 19.44 KG/M2 | SYSTOLIC BLOOD PRESSURE: 114 MMHG | WEIGHT: 123.88 LBS | DIASTOLIC BLOOD PRESSURE: 78 MMHG

## 2023-06-21 DIAGNOSIS — Z95.0 CARDIAC PACEMAKER IN SITU: Primary | ICD-10-CM

## 2023-06-21 DIAGNOSIS — I44.2 ATRIOVENTRICULAR BLOCK, COMPLETE: ICD-10-CM

## 2023-06-21 DIAGNOSIS — I49.8 OTHER SPECIFIED CARDIAC ARRHYTHMIAS: ICD-10-CM

## 2023-06-21 DIAGNOSIS — I42.8 CARDIOMYOPATHY, NONISCHEMIC: ICD-10-CM

## 2023-06-21 PROCEDURE — 3008F PR BODY MASS INDEX (BMI) DOCUMENTED: ICD-10-PCS | Mod: CPTII,S$GLB,, | Performed by: NURSE PRACTITIONER

## 2023-06-21 PROCEDURE — 1159F MED LIST DOCD IN RCRD: CPT | Mod: CPTII,S$GLB,, | Performed by: NURSE PRACTITIONER

## 2023-06-21 PROCEDURE — 93005 ELECTROCARDIOGRAM TRACING: CPT | Mod: S$GLB,,, | Performed by: INTERNAL MEDICINE

## 2023-06-21 PROCEDURE — 93010 RHYTHM STRIP: ICD-10-PCS | Mod: S$GLB,,, | Performed by: INTERNAL MEDICINE

## 2023-06-21 PROCEDURE — 4010F ACE/ARB THERAPY RXD/TAKEN: CPT | Mod: CPTII,S$GLB,, | Performed by: NURSE PRACTITIONER

## 2023-06-21 PROCEDURE — 99999 PR PBB SHADOW E&M-EST. PATIENT-LVL III: ICD-10-PCS | Mod: PBBFAC,,, | Performed by: NURSE PRACTITIONER

## 2023-06-21 PROCEDURE — 93010 ELECTROCARDIOGRAM REPORT: CPT | Mod: S$GLB,,, | Performed by: INTERNAL MEDICINE

## 2023-06-21 PROCEDURE — 3074F SYST BP LT 130 MM HG: CPT | Mod: CPTII,S$GLB,, | Performed by: NURSE PRACTITIONER

## 2023-06-21 PROCEDURE — 3288F PR FALLS RISK ASSESSMENT DOCUMENTED: ICD-10-PCS | Mod: CPTII,S$GLB,, | Performed by: NURSE PRACTITIONER

## 2023-06-21 PROCEDURE — 1126F AMNT PAIN NOTED NONE PRSNT: CPT | Mod: CPTII,S$GLB,, | Performed by: NURSE PRACTITIONER

## 2023-06-21 PROCEDURE — 3078F DIAST BP <80 MM HG: CPT | Mod: CPTII,S$GLB,, | Performed by: NURSE PRACTITIONER

## 2023-06-21 PROCEDURE — 3074F PR MOST RECENT SYSTOLIC BLOOD PRESSURE < 130 MM HG: ICD-10-PCS | Mod: CPTII,S$GLB,, | Performed by: NURSE PRACTITIONER

## 2023-06-21 PROCEDURE — 1157F PR ADVANCE CARE PLAN OR EQUIV PRESENT IN MEDICAL RECORD: ICD-10-PCS | Mod: CPTII,S$GLB,, | Performed by: NURSE PRACTITIONER

## 2023-06-21 PROCEDURE — 3008F BODY MASS INDEX DOCD: CPT | Mod: CPTII,S$GLB,, | Performed by: NURSE PRACTITIONER

## 2023-06-21 PROCEDURE — 1101F PT FALLS ASSESS-DOCD LE1/YR: CPT | Mod: CPTII,S$GLB,, | Performed by: NURSE PRACTITIONER

## 2023-06-21 PROCEDURE — 4010F PR ACE/ARB THEARPY RXD/TAKEN: ICD-10-PCS | Mod: CPTII,S$GLB,, | Performed by: NURSE PRACTITIONER

## 2023-06-21 PROCEDURE — 1160F PR REVIEW ALL MEDS BY PRESCRIBER/CLIN PHARMACIST DOCUMENTED: ICD-10-PCS | Mod: CPTII,S$GLB,, | Performed by: NURSE PRACTITIONER

## 2023-06-21 PROCEDURE — 99214 PR OFFICE/OUTPT VISIT, EST, LEVL IV, 30-39 MIN: ICD-10-PCS | Mod: S$GLB,,, | Performed by: NURSE PRACTITIONER

## 2023-06-21 PROCEDURE — 1101F PR PT FALLS ASSESS DOC 0-1 FALLS W/OUT INJ PAST YR: ICD-10-PCS | Mod: CPTII,S$GLB,, | Performed by: NURSE PRACTITIONER

## 2023-06-21 PROCEDURE — 3288F FALL RISK ASSESSMENT DOCD: CPT | Mod: CPTII,S$GLB,, | Performed by: NURSE PRACTITIONER

## 2023-06-21 PROCEDURE — 1160F RVW MEDS BY RX/DR IN RCRD: CPT | Mod: CPTII,S$GLB,, | Performed by: NURSE PRACTITIONER

## 2023-06-21 PROCEDURE — 99999 PR PBB SHADOW E&M-EST. PATIENT-LVL III: CPT | Mod: PBBFAC,,, | Performed by: NURSE PRACTITIONER

## 2023-06-21 PROCEDURE — 1126F PR PAIN SEVERITY QUANTIFIED, NO PAIN PRESENT: ICD-10-PCS | Mod: CPTII,S$GLB,, | Performed by: NURSE PRACTITIONER

## 2023-06-21 PROCEDURE — 3078F PR MOST RECENT DIASTOLIC BLOOD PRESSURE < 80 MM HG: ICD-10-PCS | Mod: CPTII,S$GLB,, | Performed by: NURSE PRACTITIONER

## 2023-06-21 PROCEDURE — 1157F ADVNC CARE PLAN IN RCRD: CPT | Mod: CPTII,S$GLB,, | Performed by: NURSE PRACTITIONER

## 2023-06-21 PROCEDURE — 1159F PR MEDICATION LIST DOCUMENTED IN MEDICAL RECORD: ICD-10-PCS | Mod: CPTII,S$GLB,, | Performed by: NURSE PRACTITIONER

## 2023-06-21 PROCEDURE — 99214 OFFICE O/P EST MOD 30 MIN: CPT | Mod: S$GLB,,, | Performed by: NURSE PRACTITIONER

## 2023-06-21 PROCEDURE — 93005 RHYTHM STRIP: ICD-10-PCS | Mod: S$GLB,,, | Performed by: INTERNAL MEDICINE

## 2023-06-27 ENCOUNTER — OFFICE VISIT (OUTPATIENT)
Dept: OTOLARYNGOLOGY | Facility: CLINIC | Age: 73
End: 2023-06-27
Payer: MEDICARE

## 2023-06-27 VITALS
HEIGHT: 66 IN | BODY MASS INDEX: 19.98 KG/M2 | SYSTOLIC BLOOD PRESSURE: 113 MMHG | HEART RATE: 73 BPM | DIASTOLIC BLOOD PRESSURE: 76 MMHG | WEIGHT: 124.31 LBS

## 2023-06-27 DIAGNOSIS — R06.83 SNORING: ICD-10-CM

## 2023-06-27 DIAGNOSIS — G47.9 SLEEP DISTURBANCE: Primary | ICD-10-CM

## 2023-06-27 PROCEDURE — 1126F AMNT PAIN NOTED NONE PRSNT: CPT | Mod: CPTII,S$GLB,, | Performed by: OTOLARYNGOLOGY

## 2023-06-27 PROCEDURE — 3074F PR MOST RECENT SYSTOLIC BLOOD PRESSURE < 130 MM HG: ICD-10-PCS | Mod: CPTII,S$GLB,, | Performed by: OTOLARYNGOLOGY

## 2023-06-27 PROCEDURE — 1101F PR PT FALLS ASSESS DOC 0-1 FALLS W/OUT INJ PAST YR: ICD-10-PCS | Mod: CPTII,S$GLB,, | Performed by: OTOLARYNGOLOGY

## 2023-06-27 PROCEDURE — 99214 OFFICE O/P EST MOD 30 MIN: CPT | Mod: S$GLB,,, | Performed by: OTOLARYNGOLOGY

## 2023-06-27 PROCEDURE — 4010F PR ACE/ARB THEARPY RXD/TAKEN: ICD-10-PCS | Mod: CPTII,S$GLB,, | Performed by: OTOLARYNGOLOGY

## 2023-06-27 PROCEDURE — 1157F ADVNC CARE PLAN IN RCRD: CPT | Mod: CPTII,S$GLB,, | Performed by: OTOLARYNGOLOGY

## 2023-06-27 PROCEDURE — 1126F PR PAIN SEVERITY QUANTIFIED, NO PAIN PRESENT: ICD-10-PCS | Mod: CPTII,S$GLB,, | Performed by: OTOLARYNGOLOGY

## 2023-06-27 PROCEDURE — 1157F PR ADVANCE CARE PLAN OR EQUIV PRESENT IN MEDICAL RECORD: ICD-10-PCS | Mod: CPTII,S$GLB,, | Performed by: OTOLARYNGOLOGY

## 2023-06-27 PROCEDURE — 3074F SYST BP LT 130 MM HG: CPT | Mod: CPTII,S$GLB,, | Performed by: OTOLARYNGOLOGY

## 2023-06-27 PROCEDURE — 3078F PR MOST RECENT DIASTOLIC BLOOD PRESSURE < 80 MM HG: ICD-10-PCS | Mod: CPTII,S$GLB,, | Performed by: OTOLARYNGOLOGY

## 2023-06-27 PROCEDURE — 3288F FALL RISK ASSESSMENT DOCD: CPT | Mod: CPTII,S$GLB,, | Performed by: OTOLARYNGOLOGY

## 2023-06-27 PROCEDURE — 1101F PT FALLS ASSESS-DOCD LE1/YR: CPT | Mod: CPTII,S$GLB,, | Performed by: OTOLARYNGOLOGY

## 2023-06-27 PROCEDURE — 1160F PR REVIEW ALL MEDS BY PRESCRIBER/CLIN PHARMACIST DOCUMENTED: ICD-10-PCS | Mod: CPTII,S$GLB,, | Performed by: OTOLARYNGOLOGY

## 2023-06-27 PROCEDURE — 3008F PR BODY MASS INDEX (BMI) DOCUMENTED: ICD-10-PCS | Mod: CPTII,S$GLB,, | Performed by: OTOLARYNGOLOGY

## 2023-06-27 PROCEDURE — 3288F PR FALLS RISK ASSESSMENT DOCUMENTED: ICD-10-PCS | Mod: CPTII,S$GLB,, | Performed by: OTOLARYNGOLOGY

## 2023-06-27 PROCEDURE — 99214 PR OFFICE/OUTPT VISIT, EST, LEVL IV, 30-39 MIN: ICD-10-PCS | Mod: S$GLB,,, | Performed by: OTOLARYNGOLOGY

## 2023-06-27 PROCEDURE — 1159F MED LIST DOCD IN RCRD: CPT | Mod: CPTII,S$GLB,, | Performed by: OTOLARYNGOLOGY

## 2023-06-27 PROCEDURE — 3078F DIAST BP <80 MM HG: CPT | Mod: CPTII,S$GLB,, | Performed by: OTOLARYNGOLOGY

## 2023-06-27 PROCEDURE — 4010F ACE/ARB THERAPY RXD/TAKEN: CPT | Mod: CPTII,S$GLB,, | Performed by: OTOLARYNGOLOGY

## 2023-06-27 PROCEDURE — 3008F BODY MASS INDEX DOCD: CPT | Mod: CPTII,S$GLB,, | Performed by: OTOLARYNGOLOGY

## 2023-06-27 PROCEDURE — 1160F RVW MEDS BY RX/DR IN RCRD: CPT | Mod: CPTII,S$GLB,, | Performed by: OTOLARYNGOLOGY

## 2023-06-27 PROCEDURE — 1159F PR MEDICATION LIST DOCUMENTED IN MEDICAL RECORD: ICD-10-PCS | Mod: CPTII,S$GLB,, | Performed by: OTOLARYNGOLOGY

## 2023-06-27 NOTE — PROGRESS NOTES
Ms. Aaron     Vitals:    06/27/23 0918   BP: 113/76   Pulse: 73       Chief Complaint:  snorting while sleeping (Snoring more than normal sleep study done and waiting for results Dr. Vega advised to follow up with Dr. Murray. Patient previously broken nose in college. Harder to breath out of right nostril. Waking up with really dry mouth deviated septum  )       HPI:   is a 73-year-old white female who presents with complaints of trouble sleeping.  She feels that her nose is obstructed on the right side and she does have a history of nasal trauma when in college.  She is undergone no treatment or previous nasal surgeries.  She denies any history of allergy symptoms.  She does report self awakening while sleeping at nighttime with difficulty breathing and difficulty getting back to sleep.  Her  reports marked increase in snoring and some apneic episodes. She is a mouth breather at night and wakes up with very dry mouth.  She underwent a home sleep study 2 weeks ago though no results are available at this time.    SNOT22- 28  NOSE- 30    Review of Systems:  Constitutional:   weight loss or weight gain: Negative  Allergy/Immunologic:   Negative  Nasal Congestion/Obstruction:   Positive  Nosebleeds:   Negative  Sinus infections:   Negative  Headache/Facial Pain:   Negative  Snoring/SARAI:   Positive for snoring and sleep disturbance.  Throat: Infections/Pain:   Negative  Hoarseness/Speech Disturbance:   Negative  Trauma Hx:  Negative    Cardiovascular:  M/I Angina: Negative, positive for atrioventricular block with pacemaker.  Hypertension: Negative  Endocrine:    DM/Steroids: Negative  GI:   Dysphagia/Reflux: Negative  :   GYN Pregnancy: Negative  Renal:   Dialysis: Negative  Lymphatic:   Neck Mass/Lymphadenopathy: Negative  Muscoloskeletal:   Positive for spinal stenosis  Hematologic:   Bleeding Disorders/Anemia: Negative  Neurologic:    Cranial/Neuralgia: Negative  Pulmonary:    Asthma/SOB/Cough: Negative  Skin Disorders: Negative    Past Medical/Surgical/Family/Social History:    ENT Surgery: Negative  Occupational Exposure: Negative   Problems: Negative  Cancer: Negative    Past Family History:   Family history of Cancer: Negative    Past Social History:   Tobacco: Nonsmoker   Alcohol:  Rare Social Drinker      Allergies and medications: Reviewed per med card.    Physical Examination:  Ears:   External auditory canals:  Clear   Hearing: Grossly intact   Tympanic Membranes: Clear  Nose:   External:  Dorsal deviation to the left with right internal valve collapse.  Positive Radha maneuver.   Intranasal:  Her septum appears relatively straight, turbinates 1 to 2+.  Her valve deformity creates proximally 75% obstruction.  Mouth:   Intraorally: Lips, teeth, and gums: Normal   Oropharynx: Normal   Mucosa: Normal   Tongue: Normal  Throat:      Palate: Normal palate with elevation, Mallampati 1   Tonsils:  1+ imbedded bilaterally.   Posterior Pharynx: Normal  Fiberoptic exam: Not performed  Head/Face:     Inspection: Normal and atraumatic   Palpation/Percussion: Non tender   Facial strength: Normal and symmetric   Salivary glands: Normal  Neck: Supple  Thyroid: No masses  Lymphatics: No nodes  Respiratory:   Effort: Normal  Eyes:   Ocular Mobility: Normal   Vision: Grossly intact  Neuro/Psych:   Cranial Nerves: Grossly Intact   Orientation: Normal   Mood/Affect: Normal      Assessment/Plan:  I have discussed my findings with her in detail as well as my recommendations for treatment.  I have encouraged her to follow-up with sleep Medicine regarding results of her sleep study.  We discussed the fact that only CPAP and tracheostomy are guarantee treatments of sleep apnea should she have this diagnosis.  We also discussed possible oral appliances which she states friend of hers uses successfully.  Surgically she could benefit from nasal reconstruction with osteotomies and right   graft if all of the above fails.  She will return to clinic p.r.n.

## 2023-06-30 ENCOUNTER — TELEPHONE (OUTPATIENT)
Dept: PULMONOLOGY | Facility: CLINIC | Age: 73
End: 2023-06-30
Payer: MEDICARE

## 2023-06-30 ENCOUNTER — LAB VISIT (OUTPATIENT)
Dept: LAB | Facility: HOSPITAL | Age: 73
End: 2023-06-30
Attending: INTERNAL MEDICINE
Payer: MEDICARE

## 2023-06-30 ENCOUNTER — PATIENT MESSAGE (OUTPATIENT)
Dept: SLEEP MEDICINE | Facility: CLINIC | Age: 73
End: 2023-06-30
Payer: MEDICARE

## 2023-06-30 ENCOUNTER — PATIENT MESSAGE (OUTPATIENT)
Dept: CARDIOLOGY | Facility: CLINIC | Age: 73
End: 2023-06-30
Payer: MEDICARE

## 2023-06-30 DIAGNOSIS — Z13.6 ENCOUNTER FOR SCREENING FOR CARDIOVASCULAR DISORDERS: ICD-10-CM

## 2023-06-30 DIAGNOSIS — G47.33 OSA (OBSTRUCTIVE SLEEP APNEA): Primary | ICD-10-CM

## 2023-06-30 LAB
CHOLEST SERPL-MCNC: 208 MG/DL (ref 120–199)
CHOLEST/HDLC SERPL: 2.5 {RATIO} (ref 2–5)
HDLC SERPL-MCNC: 82 MG/DL (ref 40–75)
HDLC SERPL: 39.4 % (ref 20–50)
LDLC SERPL CALC-MCNC: 111.4 MG/DL (ref 63–159)
NONHDLC SERPL-MCNC: 126 MG/DL
TRIGL SERPL-MCNC: 73 MG/DL (ref 30–150)

## 2023-06-30 PROCEDURE — 36415 COLL VENOUS BLD VENIPUNCTURE: CPT | Performed by: INTERNAL MEDICINE

## 2023-06-30 PROCEDURE — 95806 PR SLEEP STUDY, UNATTENDED, SIMUL RECORD HR/O2 SAT/RESP FLOW/RESP EFFT: ICD-10-PCS | Mod: 26,,, | Performed by: INTERNAL MEDICINE

## 2023-06-30 PROCEDURE — 95806 SLEEP STUDY UNATT&RESP EFFT: CPT | Mod: 26,,, | Performed by: INTERNAL MEDICINE

## 2023-06-30 PROCEDURE — 80061 LIPID PANEL: CPT | Mod: DBM | Performed by: INTERNAL MEDICINE

## 2023-06-30 NOTE — TELEPHONE ENCOUNTER
----- Message from Jamila Lucio MA sent at 6/30/2023  9:01 AM CDT -----  Regarding: FW: results  Contact: [phone]    ----- Message -----  From: Tuyet Lewis  Sent: 6/30/2023   8:55 AM CDT  To: [facility] Staff  Subject: results                                          Pt calling in requesting sleep study results that was done on June 13,2023 please call to discuss Further

## 2023-07-05 ENCOUNTER — HOSPITAL ENCOUNTER (OUTPATIENT)
Dept: RADIOLOGY | Facility: HOSPITAL | Age: 73
Discharge: HOME | End: 2023-07-05
Attending: INTERNAL MEDICINE
Payer: MEDICARE

## 2023-07-05 ENCOUNTER — PATIENT MESSAGE (OUTPATIENT)
Dept: CARDIOLOGY | Facility: CLINIC | Age: 73
End: 2023-07-05
Payer: MEDICARE

## 2023-07-05 DIAGNOSIS — I70.8 ATHEROSCLEROSIS OF OTHER ARTERIES: ICD-10-CM

## 2023-07-05 DIAGNOSIS — I25.10 CORONARY ARTERY DISEASE INVOLVING NATIVE CORONARY ARTERY OF NATIVE HEART WITHOUT ANGINA PECTORIS: ICD-10-CM

## 2023-07-05 DIAGNOSIS — E78.2 MIXED HYPERLIPIDEMIA: ICD-10-CM

## 2023-07-05 DIAGNOSIS — E78.2 MIXED HYPERLIPIDEMIA: Primary | ICD-10-CM

## 2023-07-05 PROCEDURE — 75571 CT HRT W/O DYE W/CA TEST: CPT | Mod: 26,,, | Performed by: RADIOLOGY

## 2023-07-05 PROCEDURE — 75571 CT CALCIUM SCORING CARDIAC: ICD-10-PCS | Mod: 26,,, | Performed by: RADIOLOGY

## 2023-07-05 PROCEDURE — 75571 CT HRT W/O DYE W/CA TEST: CPT | Mod: TC

## 2023-07-07 ENCOUNTER — PATIENT MESSAGE (OUTPATIENT)
Dept: SLEEP MEDICINE | Facility: CLINIC | Age: 73
End: 2023-07-07
Payer: MEDICARE

## 2023-07-10 RX ORDER — ATORVASTATIN CALCIUM 20 MG/1
20 TABLET, FILM COATED ORAL DAILY
Qty: 90 TABLET | Refills: 3 | Status: SHIPPED | OUTPATIENT
Start: 2023-07-10 | End: 2023-11-24

## 2023-07-10 NOTE — TELEPHONE ENCOUNTER
Possible symptoms with high coronary artery calcium score obtaining PET stress test, also starting on atorvastatin 20 mg.    -Abbe Figueroa

## 2023-07-11 ENCOUNTER — PATIENT MESSAGE (OUTPATIENT)
Dept: CARDIOLOGY | Facility: CLINIC | Age: 73
End: 2023-07-11
Payer: MEDICARE

## 2023-07-12 ENCOUNTER — PATIENT MESSAGE (OUTPATIENT)
Dept: DERMATOLOGY | Facility: CLINIC | Age: 73
End: 2023-07-12
Payer: MEDICARE

## 2023-07-21 ENCOUNTER — OFFICE VISIT (OUTPATIENT)
Dept: DERMATOLOGY | Facility: CLINIC | Age: 73
End: 2023-07-21
Payer: MEDICARE

## 2023-07-21 DIAGNOSIS — L93.0 DISCOID LUPUS ERYTHEMATOSUS: Primary | ICD-10-CM

## 2023-07-21 PROCEDURE — 3288F PR FALLS RISK ASSESSMENT DOCUMENTED: ICD-10-PCS | Mod: CPTII,S$GLB,, | Performed by: DERMATOLOGY

## 2023-07-21 PROCEDURE — 99214 OFFICE O/P EST MOD 30 MIN: CPT | Mod: 25,S$GLB,, | Performed by: DERMATOLOGY

## 2023-07-21 PROCEDURE — 99999 PR PBB SHADOW E&M-EST. PATIENT-LVL III: ICD-10-PCS | Mod: PBBFAC,,, | Performed by: DERMATOLOGY

## 2023-07-21 PROCEDURE — 11900 PR INJECTION INTO SKIN LESIONS, UP TO 7: ICD-10-PCS | Mod: S$GLB,,, | Performed by: DERMATOLOGY

## 2023-07-21 PROCEDURE — 1159F MED LIST DOCD IN RCRD: CPT | Mod: CPTII,S$GLB,, | Performed by: DERMATOLOGY

## 2023-07-21 PROCEDURE — 4010F ACE/ARB THERAPY RXD/TAKEN: CPT | Mod: CPTII,S$GLB,, | Performed by: DERMATOLOGY

## 2023-07-21 PROCEDURE — 99214 PR OFFICE/OUTPT VISIT, EST, LEVL IV, 30-39 MIN: ICD-10-PCS | Mod: 25,S$GLB,, | Performed by: DERMATOLOGY

## 2023-07-21 PROCEDURE — 1159F PR MEDICATION LIST DOCUMENTED IN MEDICAL RECORD: ICD-10-PCS | Mod: CPTII,S$GLB,, | Performed by: DERMATOLOGY

## 2023-07-21 PROCEDURE — 1126F PR PAIN SEVERITY QUANTIFIED, NO PAIN PRESENT: ICD-10-PCS | Mod: CPTII,S$GLB,, | Performed by: DERMATOLOGY

## 2023-07-21 PROCEDURE — 1157F PR ADVANCE CARE PLAN OR EQUIV PRESENT IN MEDICAL RECORD: ICD-10-PCS | Mod: CPTII,S$GLB,, | Performed by: DERMATOLOGY

## 2023-07-21 PROCEDURE — 3288F FALL RISK ASSESSMENT DOCD: CPT | Mod: CPTII,S$GLB,, | Performed by: DERMATOLOGY

## 2023-07-21 PROCEDURE — 11900 INJECT SKIN LESIONS </W 7: CPT | Mod: S$GLB,,, | Performed by: DERMATOLOGY

## 2023-07-21 PROCEDURE — 1126F AMNT PAIN NOTED NONE PRSNT: CPT | Mod: CPTII,S$GLB,, | Performed by: DERMATOLOGY

## 2023-07-21 PROCEDURE — 1101F PR PT FALLS ASSESS DOC 0-1 FALLS W/OUT INJ PAST YR: ICD-10-PCS | Mod: CPTII,S$GLB,, | Performed by: DERMATOLOGY

## 2023-07-21 PROCEDURE — 1101F PT FALLS ASSESS-DOCD LE1/YR: CPT | Mod: CPTII,S$GLB,, | Performed by: DERMATOLOGY

## 2023-07-21 PROCEDURE — 4010F PR ACE/ARB THEARPY RXD/TAKEN: ICD-10-PCS | Mod: CPTII,S$GLB,, | Performed by: DERMATOLOGY

## 2023-07-21 PROCEDURE — 1157F ADVNC CARE PLAN IN RCRD: CPT | Mod: CPTII,S$GLB,, | Performed by: DERMATOLOGY

## 2023-07-21 PROCEDURE — 1160F PR REVIEW ALL MEDS BY PRESCRIBER/CLIN PHARMACIST DOCUMENTED: ICD-10-PCS | Mod: CPTII,S$GLB,, | Performed by: DERMATOLOGY

## 2023-07-21 PROCEDURE — 99999 PR PBB SHADOW E&M-EST. PATIENT-LVL III: CPT | Mod: PBBFAC,,, | Performed by: DERMATOLOGY

## 2023-07-21 PROCEDURE — 1160F RVW MEDS BY RX/DR IN RCRD: CPT | Mod: CPTII,S$GLB,, | Performed by: DERMATOLOGY

## 2023-07-21 NOTE — PROGRESS NOTES
Subjective:      Patient ID:  Jalyn Aaron is a 73 y.o. female who presents for   Chief Complaint   Patient presents with    Follow-up     scalp     History of Present Illness: The patient presents for follow up of biopsy results.    The patient was last seen on: 05/01/2023 for K10 to scalp which pt states is stable. Pt used lidex solution to scalp BID.     Pt also states nose outbreak started x 3 weeks ago. Used lidex soln qday x 1 week - improved     Final Pathologic Diagnosis       Date                     Value               Ref Range           Status                03/10/2023                                                       Final             Skin, scalp vertex, punch biopsy: -DISCOID LUPUS ERYTHEMATOUS, CONSISTENT WITH     Comment:    Interp By Johanny Rush M.D., Signed on 03/28/2023 at 15:16  ----------        Review of Systems   Skin:  Positive for itching (occ - scalp), rash, daily sunscreen use, activity-related sunscreen use and wears hat (always).   Hematologic/Lymphatic: Bruises/bleeds easily.     Objective:   Physical Exam   Constitutional: She appears well-developed and well-nourished. No distress.   Neurological: She is alert and oriented to person, place, and time. She is not disoriented.   Psychiatric: She has a normal mood and affect.   Skin:   Areas Examined (abnormalities noted in diagram):   Scalp / Hair Palpated and Inspected  Head / Face Inspection Performed          Diagram Legend     Erythematous scaling macule/papule c/w actinic keratosis       Vascular papule c/w angioma      Pigmented verrucoid papule/plaque c/w seborrheic keratosis      Yellow umbilicated papule c/w sebaceous hyperplasia      Irregularly shaped tan macule c/w lentigo     1-2 mm smooth white papules consistent with Milia      Movable subcutaneous cyst with punctum c/w epidermal inclusion cyst      Subcutaneous movable cyst c/w pilar cyst      Firm pink to brown papule c/w dermatofibroma       Pedunculated fleshy papule(s) c/w skin tag(s)      Evenly pigmented macule c/w junctional nevus     Mildly variegated pigmented, slightly irregular-bordered macule c/w mildly atypical nevus      Flesh colored to evenly pigmented papule c/w intradermal nevus       Pink pearly papule/plaque c/w basal cell carcinoma      Erythematous hyperkeratotic cursted plaque c/w SCC      Surgical scar with no sign of skin cancer recurrence      Open and closed comedones      Inflammatory papules and pustules      Verrucoid papule consistent consistent with wart     Erythematous eczematous patches and plaques     Dystrophic onycholytic nail with subungual debris c/w onychomycosis     Umbilicated papule    Erythematous-base heme-crusted tan verrucoid plaque consistent with inflamed seborrheic keratosis     Erythematous Silvery Scaling Plaque c/w Psoriasis     See annotation      Assessment / Plan:        Discoid lupus erythematosus  -     triamcinolone acetonide injection 10 mg  -     DC [id number] ACETONIDE INJ PER 10MG  -     DC INJECTION INTO SKIN LESIONS, UP TO 7      Intralesional Kenalog 10mg/cc (0.3 cc total) injected into 1 lesions on the scalp today after obtaining verbal consent including risk of surrounding hypopigmentation. Patient tolerated procedure well.    Units: 1  NDC for Kenalog 10mg/cc:  2577-5823-60    Decrease lidex soln to qday  Wear hat when outdoors - always             No follow-ups on file.

## 2023-07-21 NOTE — PATIENT INSTRUCTIONS
To Whom It May Concern,    Ms. Aaron's scalp condition is not infectious or contagious.     Dr. Ward

## 2023-08-02 ENCOUNTER — PATIENT MESSAGE (OUTPATIENT)
Dept: NEUROLOGY | Facility: CLINIC | Age: 73
End: 2023-08-02
Payer: MEDICARE

## 2023-08-03 ENCOUNTER — PATIENT MESSAGE (OUTPATIENT)
Dept: ENDOSCOPY | Facility: HOSPITAL | Age: 73
End: 2023-08-03
Payer: MEDICARE

## 2023-08-03 ENCOUNTER — TELEPHONE (OUTPATIENT)
Dept: ENDOSCOPY | Facility: HOSPITAL | Age: 73
End: 2023-08-03
Payer: MEDICARE

## 2023-08-03 ENCOUNTER — OFFICE VISIT (OUTPATIENT)
Dept: GASTROENTEROLOGY | Facility: CLINIC | Age: 73
End: 2023-08-03
Payer: MEDICARE

## 2023-08-03 VITALS — BODY MASS INDEX: 19.31 KG/M2 | HEIGHT: 66 IN | WEIGHT: 120.13 LBS

## 2023-08-03 DIAGNOSIS — K86.2 PANCREAS CYST: Primary | ICD-10-CM

## 2023-08-03 PROCEDURE — 1126F AMNT PAIN NOTED NONE PRSNT: CPT | Mod: CPTII,S$GLB,, | Performed by: INTERNAL MEDICINE

## 2023-08-03 PROCEDURE — 1159F MED LIST DOCD IN RCRD: CPT | Mod: CPTII,S$GLB,, | Performed by: INTERNAL MEDICINE

## 2023-08-03 PROCEDURE — 1159F PR MEDICATION LIST DOCUMENTED IN MEDICAL RECORD: ICD-10-PCS | Mod: CPTII,S$GLB,, | Performed by: INTERNAL MEDICINE

## 2023-08-03 PROCEDURE — 99999 PR PBB SHADOW E&M-EST. PATIENT-LVL III: ICD-10-PCS | Mod: PBBFAC,,, | Performed by: INTERNAL MEDICINE

## 2023-08-03 PROCEDURE — 1157F ADVNC CARE PLAN IN RCRD: CPT | Mod: CPTII,S$GLB,, | Performed by: INTERNAL MEDICINE

## 2023-08-03 PROCEDURE — 1126F PR PAIN SEVERITY QUANTIFIED, NO PAIN PRESENT: ICD-10-PCS | Mod: CPTII,S$GLB,, | Performed by: INTERNAL MEDICINE

## 2023-08-03 PROCEDURE — 1157F PR ADVANCE CARE PLAN OR EQUIV PRESENT IN MEDICAL RECORD: ICD-10-PCS | Mod: CPTII,S$GLB,, | Performed by: INTERNAL MEDICINE

## 2023-08-03 PROCEDURE — 99999 PR PBB SHADOW E&M-EST. PATIENT-LVL III: CPT | Mod: PBBFAC,,, | Performed by: INTERNAL MEDICINE

## 2023-08-03 PROCEDURE — 1101F PT FALLS ASSESS-DOCD LE1/YR: CPT | Mod: CPTII,S$GLB,, | Performed by: INTERNAL MEDICINE

## 2023-08-03 PROCEDURE — 3008F BODY MASS INDEX DOCD: CPT | Mod: CPTII,S$GLB,, | Performed by: INTERNAL MEDICINE

## 2023-08-03 PROCEDURE — 99214 OFFICE O/P EST MOD 30 MIN: CPT | Mod: S$GLB,,, | Performed by: INTERNAL MEDICINE

## 2023-08-03 PROCEDURE — 3288F FALL RISK ASSESSMENT DOCD: CPT | Mod: CPTII,S$GLB,, | Performed by: INTERNAL MEDICINE

## 2023-08-03 PROCEDURE — 99214 PR OFFICE/OUTPT VISIT, EST, LEVL IV, 30-39 MIN: ICD-10-PCS | Mod: S$GLB,,, | Performed by: INTERNAL MEDICINE

## 2023-08-03 PROCEDURE — 4010F PR ACE/ARB THEARPY RXD/TAKEN: ICD-10-PCS | Mod: CPTII,S$GLB,, | Performed by: INTERNAL MEDICINE

## 2023-08-03 PROCEDURE — 4010F ACE/ARB THERAPY RXD/TAKEN: CPT | Mod: CPTII,S$GLB,, | Performed by: INTERNAL MEDICINE

## 2023-08-03 PROCEDURE — 1101F PR PT FALLS ASSESS DOC 0-1 FALLS W/OUT INJ PAST YR: ICD-10-PCS | Mod: CPTII,S$GLB,, | Performed by: INTERNAL MEDICINE

## 2023-08-03 PROCEDURE — 3288F PR FALLS RISK ASSESSMENT DOCUMENTED: ICD-10-PCS | Mod: CPTII,S$GLB,, | Performed by: INTERNAL MEDICINE

## 2023-08-03 PROCEDURE — 3008F PR BODY MASS INDEX (BMI) DOCUMENTED: ICD-10-PCS | Mod: CPTII,S$GLB,, | Performed by: INTERNAL MEDICINE

## 2023-08-03 NOTE — TELEPHONE ENCOUNTER
Stacey Sesay MD   EUS in 4-8 weeks at [facility], could be after I return from paternity leave. Thanks

## 2023-08-03 NOTE — PROGRESS NOTES
Advanced Endoscopy / Pancreaticobiliary Service    Reason for visit (Chief Complaint): Small pancreas body cyst 7mm detected on recent CT image incidentally    Referring provider/PCP: No referring provider defined for this encounter.    History of Present Illness: Jalyn Aaron is a 72yo female with a pacemaker currently not MRI compatible, and is otherwise quite healthy who presents to discuss recently detected 7 mm pancreas cystic lesion noted on CT in June 2023.  This has been apparently been seen on prior CT chest exams and is reportedly stable.  This is reassuring feature.  She denies any family history of pancreas disease or cancer, denies any upper abdominal pains with radiation to the back.  Does endorse an unintentional 7 or 8 lb weight loss over the past 2-3 months, endorses some early satiety and bowel irregularity with chronic constipation that she follows with GI for closely.  Denies any history of diabetes.    Social history:  Denies any significant alcohol use or tobacco abuse history.      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    Left    AV block     exercised induced 2:1    Cataract     Fever blister     Heart block     History of acne     Joint pain     hands    Keratoconjunctivitis sicca not due to Sjogren's syndrome     Pacemaker        Past Surgical History:   Procedure Laterality Date    BREAST BIOPSY Left 12/2013    papilloma with atypia on core biopsy    BREAST BIOPSY Left 01/2014    Ex.Bx., removal of ADH/Papilloma    COLONOSCOPY N/A 11/8/2016    Procedure: COLONOSCOPY;  Surgeon: Dl Caruso MD;  Location: [facility] ([address] [place] [address]);  Service: Endoscopy;  Laterality: N/A;  Pacemaker in place.    COLONOSCOPY N/A 3/30/2021    Procedure: COLONOSCOPY;  Surgeon: Joaquin Johnson MD;  Location: [facility] ([address] [place] [address]);  Service: Endoscopy;  Laterality: N/A;  prep ins. emailed - COVID screening on 3/27/21 PCW - ERW    HYSTERECTOMY      INSERT  / REPLACE / REMOVE PACEMAKER      LASIK      LASIK Bilateral 2009    VENOGRAM, EP LAB N/A 12/20/2022    Procedure: Venogram, EP Lab;  Surgeon: Chacho Colby MD;  Location: [facility] EP LAB;  Service: Cardiology;  Laterality: N/A;  NICM, Venogram- left side, SK, 3 Prep *MDT PPM in situ*       Family History   Problem Relation Age of Onset    Breast cancer Mother         Paget's dz    Cancer Mother 80        pagets    Cataracts Mother     Hypertension Mother     Multiple sclerosis Father     Multiple sclerosis Sister     No Known Problems Maternal Aunt     No Known Problems Maternal Uncle     No Known Problems Paternal Aunt     No Known Problems Paternal Uncle     No Known Problems Maternal Grandmother     Prostate cancer Maternal Grandfather     No Known Problems Paternal Grandmother     No Known Problems Paternal Grandfather     No Known Problems Brother     No Known Problems Son     No Known Problems Son     Amblyopia Neg Hx     Blindness Neg Hx     Diabetes Neg Hx     Glaucoma Neg Hx     Macular degeneration Neg Hx     Retinal detachment Neg Hx     Strabismus Neg Hx     Stroke Neg Hx     Thyroid disease Neg Hx     Melanoma Neg Hx     Ovarian cancer Neg Hx        Social History     Socioeconomic History    Marital status:     Number of children: 1   Occupational History    Occupation:      Employer: [employer]   Tobacco Use    Smoking status: Never     Passive exposure: Never    Smokeless tobacco: Never   Substance and Sexual Activity    Alcohol use: Not Currently     Alcohol/week: 1.0 standard drink of alcohol     Types: 1 Glasses of wine per week     Comment: weekly    Drug use: No    Sexual activity: Not Currently     Partners: Male     Birth control/protection: None   Other Topics Concern    Are you pregnant or think you may be? No    Breast-feeding No   Social History Narrative     (case management) at [employer]     Social Determinants of Health     Financial Resource  Strain: Low Risk  (1/31/2023)    Overall Financial Resource Strain (CARDIA)     Difficulty of Paying Living Expenses: Not hard at all   Food Insecurity: No Food Insecurity (1/31/2023)    Hunger Vital Sign     Worried About Running Out of Food in the Last Year: Never true     Ran Out of Food in the Last Year: Never true   Transportation Needs: No Transportation Needs (1/31/2023)    PRAPARE - Transportation     Lack of Transportation (Medical): No     Lack of Transportation (Non-Medical): No   Physical Activity: Sufficiently Active (1/31/2023)    Exercise Vital Sign     Days of Exercise per Week: 7 days     Minutes of Exercise per Session: 60 min   Stress: No Stress Concern Present (1/31/2023)    [place] [employer] - Occupational Stress Questionnaire     Feeling of Stress : Not at all   Social Connections: Moderately Integrated (1/31/2023)    Social Connection and Isolation Panel [NHANES]     Frequency of Communication with Friends and Family: More than three times a week     Frequency of Social Gatherings with Friends and Family: More than three times a week     Attends Islam Services: More than 4 times per year     Active Member of Clubs or Organizations: No     Attends Club or Organization Meetings: Never     Marital Status:    Housing Stability: Unknown (1/31/2023)    Housing Stability Vital Sign     Unable to Pay for Housing in the Last Year: No     Unstable Housing in the Last Year: No       ROS otherwise unremarkable outside of the aforementioned symptoms in HPI.    There were no vitals filed for this visit.      Physical Exam:  General: Well-developed, well-appearing, no acute distress  Neuro: alert and oriented to person, place, time; normal appearing gait  Eyes: No scleral icterus  Abdomen: soft, non-distended, non-tender, no rebound tenderness or guarding      Laboratory:   Reviewed in chart/records and relevant findings are summarized above in HPI.    Imaging:  Reviewed in  chart/records and relevant findings are summarized above in HPI.      Assessment/Plan:      # Pancreas body cyst lesion on recent CT about 7mm   # Pacemaker    Plan:  -discussed natural history and etiologies of pancreas cysts today and reassured by small size at this time on review of CT imaging, given patient has pacemaker that is currently not compatible with MRI we can proceed with EUS for further evaluation of small pancreas cyst, preferably at [facility] given pacemaker and questions as to whether battery may be running out soon  -EUS ordered for within the next 4-8 weeks at [facility] by myself for small pancreas cyst  -patient plans on having pacemaker replaced to an MRI compatible 1, so if this is found to be surveilled in the future with MRI scans we may be able to do so for next surveillance image  -reviewed CT imaging today     Follow-up annually in pancreas cyst clinic.    Stacey Sesay MD  [facility]

## 2023-08-03 NOTE — TELEPHONE ENCOUNTER
Telephoned pt and pt's , [place], to schedule EUS, both with no answer.  Voicemail messages left with direct contact number for pt to return call.

## 2023-08-04 ENCOUNTER — PATIENT MESSAGE (OUTPATIENT)
Dept: ENDOSCOPY | Facility: HOSPITAL | Age: 73
End: 2023-08-04
Payer: MEDICARE

## 2023-08-04 ENCOUNTER — TELEPHONE (OUTPATIENT)
Dept: ENDOSCOPY | Facility: HOSPITAL | Age: 73
End: 2023-08-04
Payer: MEDICARE

## 2023-08-04 NOTE — TELEPHONE ENCOUNTER
Spoke to pt to schedule procedure(s) Upper Endoscopy Ultrasound (EUS)       Physician to perform procedure(s) Dr. SHANTELL Sesay  Date of Procedure (s) 10/9/23  Arrival Time 7:00 AM  Time of Procedure(s) 8:00 AM   Location of Procedure(s) [address]  Type of Rx Prep sent to patient: N/A  Instructions provided to patient via [facility]    Patient was informed on the following information and verbalized understanding. Screening questionnaire reviewed with patient and complete. If procedure requires anesthesia, a responsible adult needs to be present to accompany the patient home, patient cannot drive after receiving anesthesia. Appointment details are tentative, especially check-in time. Patient will receive a prep-op call 4 days prior to confirm check-in time for procedure. If applicable the patient should contact their pharmacy to verify Rx for procedure prep is ready for pick-up. Patient was advised to call the scheduling department at [phone] if pharmacy states no Rx is available. Patient was advised to call the endoscopy scheduling department if any questions or concerns arise.      [facility] Scheduling Department

## 2023-08-04 NOTE — TELEPHONE ENCOUNTER
Telephoned pt to schedule EUS, both with no answer.  Voicemail messages left with direct contact number for pt to return call.

## 2023-08-10 ENCOUNTER — TELEPHONE (OUTPATIENT)
Dept: OTOLARYNGOLOGY | Facility: CLINIC | Age: 73
End: 2023-08-10
Payer: MEDICARE

## 2023-08-10 NOTE — TELEPHONE ENCOUNTER
Contacted patient in regards to her message, there was no answer, left voicemail, Dr. Murray is not in clinic today. He returns on Tuesday- she can contact the office to see if he has any availability at that time. They can also check [place] scheduled for Wednesday.

## 2023-08-10 NOTE — TELEPHONE ENCOUNTER
----- Message from Jackie Campa sent at 8/10/2023  8:26 AM CDT -----  Contact: [phone]  Jalyn Aaron calling regarding Appointment Access  (message)Pt asking if there is anyway she can get in today to see the doctor pt states she is having very bad ear pain and her nostrils are red in flames asking for a call back to try and help get her schedule

## 2023-08-11 ENCOUNTER — PATIENT MESSAGE (OUTPATIENT)
Dept: DERMATOLOGY | Facility: CLINIC | Age: 73
End: 2023-08-11
Payer: MEDICARE

## 2023-08-11 ENCOUNTER — TELEPHONE (OUTPATIENT)
Dept: OTOLARYNGOLOGY | Facility: CLINIC | Age: 73
End: 2023-08-11
Payer: MEDICARE

## 2023-08-11 NOTE — TELEPHONE ENCOUNTER
Spoke with patient in regards to her request to consult with Dr Murray for her nasal blockage and past chief complaint with Dr Murray about a deviated septum she has been placed his scheduled for nest Tuesday 8/15

## 2023-08-15 ENCOUNTER — OFFICE VISIT (OUTPATIENT)
Dept: OTOLARYNGOLOGY | Facility: CLINIC | Age: 73
End: 2023-08-15
Payer: MEDICARE

## 2023-08-15 VITALS
WEIGHT: 124.44 LBS | DIASTOLIC BLOOD PRESSURE: 71 MMHG | BODY MASS INDEX: 20 KG/M2 | RESPIRATION RATE: 18 BRPM | SYSTOLIC BLOOD PRESSURE: 110 MMHG | OXYGEN SATURATION: 100 % | HEART RATE: 69 BPM | HEIGHT: 66 IN

## 2023-08-15 DIAGNOSIS — J34.2 NASAL SEPTAL DEVIATION: Primary | ICD-10-CM

## 2023-08-15 DIAGNOSIS — M95.0 NASAL DEFORMITY, ACQUIRED: ICD-10-CM

## 2023-08-15 PROCEDURE — 1160F RVW MEDS BY RX/DR IN RCRD: CPT | Mod: CPTII,S$GLB,, | Performed by: OTOLARYNGOLOGY

## 2023-08-15 PROCEDURE — 1159F MED LIST DOCD IN RCRD: CPT | Mod: CPTII,S$GLB,, | Performed by: OTOLARYNGOLOGY

## 2023-08-15 PROCEDURE — 4010F ACE/ARB THERAPY RXD/TAKEN: CPT | Mod: CPTII,S$GLB,, | Performed by: OTOLARYNGOLOGY

## 2023-08-15 PROCEDURE — 3288F FALL RISK ASSESSMENT DOCD: CPT | Mod: CPTII,S$GLB,, | Performed by: OTOLARYNGOLOGY

## 2023-08-15 PROCEDURE — 3078F PR MOST RECENT DIASTOLIC BLOOD PRESSURE < 80 MM HG: ICD-10-PCS | Mod: CPTII,S$GLB,, | Performed by: OTOLARYNGOLOGY

## 2023-08-15 PROCEDURE — 1160F PR REVIEW ALL MEDS BY PRESCRIBER/CLIN PHARMACIST DOCUMENTED: ICD-10-PCS | Mod: CPTII,S$GLB,, | Performed by: OTOLARYNGOLOGY

## 2023-08-15 PROCEDURE — 3074F SYST BP LT 130 MM HG: CPT | Mod: CPTII,S$GLB,, | Performed by: OTOLARYNGOLOGY

## 2023-08-15 PROCEDURE — 3008F BODY MASS INDEX DOCD: CPT | Mod: CPTII,S$GLB,, | Performed by: OTOLARYNGOLOGY

## 2023-08-15 PROCEDURE — 1101F PT FALLS ASSESS-DOCD LE1/YR: CPT | Mod: CPTII,S$GLB,, | Performed by: OTOLARYNGOLOGY

## 2023-08-15 PROCEDURE — 99213 OFFICE O/P EST LOW 20 MIN: CPT | Mod: S$GLB,,, | Performed by: OTOLARYNGOLOGY

## 2023-08-15 PROCEDURE — 99213 PR OFFICE/OUTPT VISIT, EST, LEVL III, 20-29 MIN: ICD-10-PCS | Mod: S$GLB,,, | Performed by: OTOLARYNGOLOGY

## 2023-08-15 PROCEDURE — 1125F AMNT PAIN NOTED PAIN PRSNT: CPT | Mod: CPTII,S$GLB,, | Performed by: OTOLARYNGOLOGY

## 2023-08-15 PROCEDURE — 1157F PR ADVANCE CARE PLAN OR EQUIV PRESENT IN MEDICAL RECORD: ICD-10-PCS | Mod: CPTII,S$GLB,, | Performed by: OTOLARYNGOLOGY

## 2023-08-15 PROCEDURE — 3074F PR MOST RECENT SYSTOLIC BLOOD PRESSURE < 130 MM HG: ICD-10-PCS | Mod: CPTII,S$GLB,, | Performed by: OTOLARYNGOLOGY

## 2023-08-15 PROCEDURE — 4010F PR ACE/ARB THEARPY RXD/TAKEN: ICD-10-PCS | Mod: CPTII,S$GLB,, | Performed by: OTOLARYNGOLOGY

## 2023-08-15 PROCEDURE — 3078F DIAST BP <80 MM HG: CPT | Mod: CPTII,S$GLB,, | Performed by: OTOLARYNGOLOGY

## 2023-08-15 PROCEDURE — 1157F ADVNC CARE PLAN IN RCRD: CPT | Mod: CPTII,S$GLB,, | Performed by: OTOLARYNGOLOGY

## 2023-08-15 PROCEDURE — 1125F PR PAIN SEVERITY QUANTIFIED, PAIN PRESENT: ICD-10-PCS | Mod: CPTII,S$GLB,, | Performed by: OTOLARYNGOLOGY

## 2023-08-15 PROCEDURE — 1101F PR PT FALLS ASSESS DOC 0-1 FALLS W/OUT INJ PAST YR: ICD-10-PCS | Mod: CPTII,S$GLB,, | Performed by: OTOLARYNGOLOGY

## 2023-08-15 PROCEDURE — 3288F PR FALLS RISK ASSESSMENT DOCUMENTED: ICD-10-PCS | Mod: CPTII,S$GLB,, | Performed by: OTOLARYNGOLOGY

## 2023-08-15 PROCEDURE — 1159F PR MEDICATION LIST DOCUMENTED IN MEDICAL RECORD: ICD-10-PCS | Mod: CPTII,S$GLB,, | Performed by: OTOLARYNGOLOGY

## 2023-08-15 PROCEDURE — 3008F PR BODY MASS INDEX (BMI) DOCUMENTED: ICD-10-PCS | Mod: CPTII,S$GLB,, | Performed by: OTOLARYNGOLOGY

## 2023-08-15 NOTE — PROGRESS NOTES
is a 73-year-old white female who presents back proximally 2 months since last visit with continued complaints of trouble sleeping and nasal obstruction during the day.  She feels that her nose is obstructed on the right side and she does have a history of nasal trauma when in college.  She has undergone no treatment or previous nasal surgeries.  She denies any history of allergy symptoms.  She does report self awakening while sleeping at nighttime with difficulty breathing and difficulty getting back to sleep.  Her  reports marked increase in snoring and some apneic episodes. She is a mouth breather at night and wakes up with very dry mouth.  She underwent a home sleep study and was diagnosed with SARAI and has been on a trial of CPAP which she has been unable to tolerate.    Her nasal exam shows a narrow nasal dorsum which is deviated to the left with right internal valve collapse and positive Radha maneuver.  Her septum shows a marked high septal deviation to the right further narrowing her nasal valve on that side.  I have again discussed with her the fact that only CPAP and tracheostomy are guarantee treatments of sleep apnea.  We have discussed surgical options to address her nasal and septal deformity creating nasal obstruction including nasal reconstruction with osteotomies,  grafts, septoplasty, submucous resection of turbinates.  I have discussed the pros and cons risks and benefits as well as technical aspects of this procedure in detail with her.  She will consider this information and let us know of her decision.

## 2023-08-16 ENCOUNTER — PATIENT MESSAGE (OUTPATIENT)
Dept: SLEEP MEDICINE | Facility: CLINIC | Age: 73
End: 2023-08-16
Payer: MEDICARE

## 2023-08-22 ENCOUNTER — TELEPHONE (OUTPATIENT)
Dept: PULMONOLOGY | Facility: CLINIC | Age: 73
End: 2023-08-22
Payer: MEDICARE

## 2023-08-22 ENCOUNTER — PATIENT MESSAGE (OUTPATIENT)
Dept: OTOLARYNGOLOGY | Facility: CLINIC | Age: 73
End: 2023-08-22
Payer: MEDICARE

## 2023-08-22 NOTE — TELEPHONE ENCOUNTER
----- Message from Alisia Joshi sent at 8/22/2023 10:30 AM CDT -----  Regarding: Portal Message  Contact: Pt [phone]  Pt is calling stating she left messages on the portal for provider and never got a response pt is asking for a call please call

## 2023-08-23 ENCOUNTER — PATIENT MESSAGE (OUTPATIENT)
Dept: SLEEP MEDICINE | Facility: CLINIC | Age: 73
End: 2023-08-23
Payer: MEDICARE

## 2023-08-23 ENCOUNTER — TELEPHONE (OUTPATIENT)
Dept: CARDIOLOGY | Facility: HOSPITAL | Age: 73
End: 2023-08-23
Payer: MEDICARE

## 2023-08-23 DIAGNOSIS — R06.83 SNORING: ICD-10-CM

## 2023-08-23 DIAGNOSIS — G47.33 OSA (OBSTRUCTIVE SLEEP APNEA): Primary | ICD-10-CM

## 2023-08-23 NOTE — TELEPHONE ENCOUNTER
Patient reports difficulty with HSAT strap and had poor sleep that night. Interested in doing in-lab study for accurate testing. PSG ordered. Instructed to keep using CPAP that she has currently.

## 2023-08-23 NOTE — TELEPHONE ENCOUNTER
Spoke with patient and informed her that her MDT PPM battery shows 2 months.  I informed her to continue sending her transmissions monthly.  I informed her that her device should alarm once she reaches RRT.  Patient stated understanding.

## 2023-08-28 ENCOUNTER — TELEPHONE (OUTPATIENT)
Dept: SLEEP MEDICINE | Facility: OTHER | Age: 73
End: 2023-08-28
Payer: MEDICARE

## 2023-09-01 ENCOUNTER — PATIENT MESSAGE (OUTPATIENT)
Dept: INTERNAL MEDICINE | Facility: CLINIC | Age: 73
End: 2023-09-01
Payer: MEDICARE

## 2023-09-01 NOTE — TELEPHONE ENCOUNTER
----- Message from Kim Roche sent at 9/1/2023  8:58 AM CDT -----  Regarding: refill  Contact: @ [phone]  Pt is calling to get a refill on the following fluocinonide (LIDEX) 0.05 % external solution  she is in [place] care of family ...Please call and adv @ [phone]    [facility] [phone]

## 2023-09-02 ENCOUNTER — CLINICAL SUPPORT (OUTPATIENT)
Dept: CARDIOLOGY | Facility: HOSPITAL | Age: 73
End: 2023-09-02
Payer: MEDICARE

## 2023-09-02 DIAGNOSIS — Z95.0 PRESENCE OF CARDIAC PACEMAKER: ICD-10-CM

## 2023-09-02 PROCEDURE — 93296 REM INTERROG EVL PM/IDS: CPT | Performed by: INTERNAL MEDICINE

## 2023-09-04 RX ORDER — FLUOCINONIDE TOPICAL SOLUTION USP, 0.05% 0.5 MG/ML
SOLUTION TOPICAL
Qty: 60 ML | Refills: 3 | Status: SHIPPED | OUTPATIENT
Start: 2023-09-04 | End: 2024-01-09

## 2023-09-08 ENCOUNTER — TELEPHONE (OUTPATIENT)
Dept: SLEEP MEDICINE | Facility: OTHER | Age: 73
End: 2023-09-08
Payer: MEDICARE

## 2023-09-09 ENCOUNTER — HOSPITAL ENCOUNTER (OUTPATIENT)
Dept: SLEEP MEDICINE | Facility: OTHER | Age: 73
Discharge: HOME | End: 2023-09-09
Attending: INTERNAL MEDICINE
Payer: MEDICARE

## 2023-09-09 DIAGNOSIS — G47.33 OSA (OBSTRUCTIVE SLEEP APNEA): ICD-10-CM

## 2023-09-09 DIAGNOSIS — R06.83 SNORING: ICD-10-CM

## 2023-09-09 PROCEDURE — 95810 POLYSOM 6/> YRS 4/> PARAM: CPT

## 2023-09-10 NOTE — PROGRESS NOTES
A baseline PSG study was performed on Jalyn Aaron. The following was explained to the pt prior to the study: the time to bed and wake time, the set-up process timeframe and the purpose of each sensor, the reason (if sensors fall off) the technician will need to enter the room during the night, the possibility of the tech fitting a PAP mask on pt for treatment in the middle of the night, and how to call out for assistance during the night. A post-study letter was handed to the pt in the morning.

## 2023-09-11 ENCOUNTER — TELEPHONE (OUTPATIENT)
Dept: CARDIOLOGY | Facility: HOSPITAL | Age: 73
End: 2023-09-11
Payer: MEDICARE

## 2023-09-13 RX ORDER — CARVEDILOL 3.12 MG/1
TABLET ORAL
Qty: 120 TABLET | Refills: 0 | Status: SHIPPED | OUTPATIENT
Start: 2023-09-13 | End: 2023-09-18 | Stop reason: SDUPTHER

## 2023-09-14 ENCOUNTER — HOSPITAL ENCOUNTER (OUTPATIENT)
Dept: CARDIOLOGY | Facility: HOSPITAL | Age: 73
Discharge: HOME | End: 2023-09-14
Attending: INTERNAL MEDICINE
Payer: MEDICARE

## 2023-09-14 VITALS
HEIGHT: 66 IN | WEIGHT: 124 LBS | SYSTOLIC BLOOD PRESSURE: 140 MMHG | DIASTOLIC BLOOD PRESSURE: 106 MMHG | BODY MASS INDEX: 19.93 KG/M2 | HEART RATE: 71 BPM

## 2023-09-14 DIAGNOSIS — I25.10 CORONARY ARTERY DISEASE INVOLVING NATIVE CORONARY ARTERY OF NATIVE HEART WITHOUT ANGINA PECTORIS: ICD-10-CM

## 2023-09-14 DIAGNOSIS — I70.8 ATHEROSCLEROSIS OF OTHER ARTERIES: ICD-10-CM

## 2023-09-14 LAB
CFR FLOW - ANTERIOR: 2.35
CFR FLOW - INFERIOR: 2.19
CFR FLOW - LATERAL: 2.16
CFR FLOW - MAX: 2.83
CFR FLOW - MIN: 1.54
CFR FLOW - SEPTAL: 2.36
CFR FLOW - WHOLE HEART: 2.27
CV PHARM DOSE: 0.4 MG
CV STRESS BASE HR: 71 BPM
DIASTOLIC BLOOD PRESSURE: 106 MMHG
EJECTION FRACTION- HIGH: 59 %
END DIASTOLIC INDEX-HIGH: 155 ML/M2
END DIASTOLIC INDEX-LOW: 91 ML/M2
END SYSTOLIC INDEX-HIGH: 78 ML/M2
END SYSTOLIC INDEX-LOW: 40 ML/M2
NUC REST DIASTOLIC VOLUME INDEX: 66
NUC REST EJECTION FRACTION: 68
NUC REST SYSTOLIC VOLUME INDEX: 21
NUC STRESS DIASTOLIC VOLUME INDEX: 106
NUC STRESS EJECTION FRACTION: 79 %
NUC STRESS SYSTOLIC VOLUME INDEX: 22
OHS CV CPX 85 PERCENT MAX PREDICTED HEART RATE MALE: 121
OHS CV CPX MAX PREDICTED HEART RATE: 143
OHS CV CPX PATIENT IS FEMALE: 1
OHS CV CPX PATIENT IS MALE: 0
OHS CV CPX PEAK DIASTOLIC BLOOD PRESSURE: 59 MMHG
OHS CV CPX PEAK HEAR RATE: 77 BPM
OHS CV CPX PEAK RATE PRESSURE PRODUCT: 8932
OHS CV CPX PEAK SYSTOLIC BLOOD PRESSURE: 116 MMHG
OHS CV CPX PERCENT MAX PREDICTED HEART RATE ACHIEVED: 54
OHS CV CPX RATE PRESSURE PRODUCT PRESENTING: 9940
REST FLOW - ANTERIOR: 0.88 CC/MIN/G
REST FLOW - INFERIOR: 0.77 CC/MIN/G
REST FLOW - LATERAL: 1.06 CC/MIN/G
REST FLOW - MAX: 1.3 CC/MIN/G
REST FLOW - MIN: 0.49 CC/MIN/G
REST FLOW - SEPTAL: 0.71 CC/MIN/G
REST FLOW - WHOLE HEART: 0.86 CC/MIN/G
RETIRED EF AND QEF - SEE NOTES: 47 %
STRESS FLOW - ANTERIOR: 2.03 CC/MIN/G
STRESS FLOW - INFERIOR: 1.67 CC/MIN/G
STRESS FLOW - LATERAL: 2.29 CC/MIN/G
STRESS FLOW - MAX: 2.72 CC/MIN/G
STRESS FLOW - MIN: 0.89 CC/MIN/G
STRESS FLOW - SEPTAL: 1.68 CC/MIN/G
STRESS FLOW - WHOLE HEART: 1.92 CC/MIN/G
SYSTOLIC BLOOD PRESSURE: 140 MMHG

## 2023-09-14 PROCEDURE — 78434 AQMBF PET REST & RX STRESS: CPT

## 2023-09-14 PROCEDURE — 78431 MYOCRD IMG PET RST&STRS CT: CPT | Mod: 26,,, | Performed by: INTERNAL MEDICINE

## 2023-09-14 PROCEDURE — 93018 CV STRESS TEST I&R ONLY: CPT | Mod: ,,, | Performed by: INTERNAL MEDICINE

## 2023-09-14 PROCEDURE — 78434 AQMBF PET REST & RX STRESS: CPT | Mod: 26,,, | Performed by: INTERNAL MEDICINE

## 2023-09-14 PROCEDURE — 93018 CARDIAC PET SCAN STRESS (CUPID ONLY): ICD-10-PCS | Mod: ,,, | Performed by: INTERNAL MEDICINE

## 2023-09-14 PROCEDURE — 78431 CARDIAC PET SCAN STRESS (CUPID ONLY): ICD-10-PCS | Mod: 26,,, | Performed by: INTERNAL MEDICINE

## 2023-09-14 PROCEDURE — 78434 CARDIAC PET SCAN STRESS (CUPID ONLY): ICD-10-PCS | Mod: 26,,, | Performed by: INTERNAL MEDICINE

## 2023-09-14 PROCEDURE — 93016 CV STRESS TEST SUPVJ ONLY: CPT | Mod: ,,, | Performed by: INTERNAL MEDICINE

## 2023-09-14 PROCEDURE — 63600175 PHARM REV CODE 636 W HCPCS: Performed by: INTERNAL MEDICINE

## 2023-09-14 PROCEDURE — 93016 CARDIAC PET SCAN STRESS (CUPID ONLY): ICD-10-PCS | Mod: ,,, | Performed by: INTERNAL MEDICINE

## 2023-09-14 PROCEDURE — 78431 MYOCRD IMG PET RST&STRS CT: CPT

## 2023-09-14 RX ORDER — REGADENOSON 0.08 MG/ML
0.4 INJECTION, SOLUTION INTRAVENOUS
Status: COMPLETED | OUTPATIENT
Start: 2023-09-14 | End: 2023-09-14

## 2023-09-14 RX ORDER — AMINOPHYLLINE 25 MG/ML
75 INJECTION, SOLUTION INTRAVENOUS ONCE
Status: COMPLETED | OUTPATIENT
Start: 2023-09-14 | End: 2023-09-14

## 2023-09-14 RX ADMIN — REGADENOSON 0.4 MG: 0.08 INJECTION, SOLUTION INTRAVENOUS at 02:09

## 2023-09-14 RX ADMIN — AMINOPHYLLINE 75 MG: 25 INJECTION, SOLUTION INTRAVENOUS at 02:09

## 2023-09-18 ENCOUNTER — PATIENT MESSAGE (OUTPATIENT)
Dept: CARDIOLOGY | Facility: CLINIC | Age: 73
End: 2023-09-18
Payer: MEDICARE

## 2023-09-18 DIAGNOSIS — R94.30 EJECTION FRACTION < 50%: Primary | ICD-10-CM

## 2023-09-18 RX ORDER — CARVEDILOL 3.12 MG/1
3.12 TABLET ORAL 2 TIMES DAILY
Qty: 180 TABLET | Refills: 3 | Status: SHIPPED | OUTPATIENT
Start: 2023-09-18

## 2023-09-18 NOTE — TELEPHONE ENCOUNTER
Called patient, explained PET stress test negative for ischemia, no ischemic etiology of mildly depressed EF.  EF also noted to be better on PET stress test, will repeat TTE for confirmation.    She reports trace ankle swelling, will obtain TTE to evaluate for any elevated intracardiac filling pressures.  Not on any calcium channel blockers.  She does report occasional fried seafood.    -Abbe Figueroa

## 2023-09-20 ENCOUNTER — PATIENT MESSAGE (OUTPATIENT)
Dept: SLEEP MEDICINE | Facility: CLINIC | Age: 73
End: 2023-09-20
Payer: MEDICARE

## 2023-09-20 PROCEDURE — 95810 POLYSOM 6/> YRS 4/> PARAM: CPT | Mod: 26,,, | Performed by: INTERNAL MEDICINE

## 2023-09-20 PROCEDURE — 95810 PR POLYSOMNOGRAPHY, 4 OR MORE: ICD-10-PCS | Mod: 26,,, | Performed by: INTERNAL MEDICINE

## 2023-09-27 ENCOUNTER — PATIENT MESSAGE (OUTPATIENT)
Dept: INTERNAL MEDICINE | Facility: CLINIC | Age: 73
End: 2023-09-27
Payer: MEDICARE

## 2023-10-03 ENCOUNTER — PATIENT MESSAGE (OUTPATIENT)
Dept: INTERNAL MEDICINE | Facility: CLINIC | Age: 73
End: 2023-10-03
Payer: MEDICARE

## 2023-10-09 ENCOUNTER — ANESTHESIA EVENT (OUTPATIENT)
Dept: ENDOSCOPY | Facility: HOSPITAL | Age: 73
End: 2023-10-09
Payer: MEDICARE

## 2023-10-09 ENCOUNTER — ANESTHESIA (OUTPATIENT)
Dept: ENDOSCOPY | Facility: HOSPITAL | Age: 73
End: 2023-10-09
Payer: MEDICARE

## 2023-10-09 ENCOUNTER — HOSPITAL ENCOUNTER (OUTPATIENT)
Facility: HOSPITAL | Age: 73
Discharge: HOME | End: 2023-10-09
Attending: INTERNAL MEDICINE | Admitting: INTERNAL MEDICINE
Payer: MEDICARE

## 2023-10-09 VITALS
BODY MASS INDEX: 19.93 KG/M2 | DIASTOLIC BLOOD PRESSURE: 67 MMHG | WEIGHT: 124 LBS | HEIGHT: 66 IN | OXYGEN SATURATION: 100 % | RESPIRATION RATE: 19 BRPM | SYSTOLIC BLOOD PRESSURE: 116 MMHG | HEART RATE: 65 BPM | TEMPERATURE: 98 F

## 2023-10-09 DIAGNOSIS — K86.2 PANCREAS CYST: ICD-10-CM

## 2023-10-09 PROCEDURE — 63600175 PHARM REV CODE 636 W HCPCS: Performed by: NURSE ANESTHETIST, CERTIFIED REGISTERED

## 2023-10-09 PROCEDURE — D9220A PRA ANESTHESIA: Mod: CRNA,,, | Performed by: NURSE ANESTHETIST, CERTIFIED REGISTERED

## 2023-10-09 PROCEDURE — 43259 EGD US EXAM DUODENUM/JEJUNUM: CPT | Performed by: INTERNAL MEDICINE

## 2023-10-09 PROCEDURE — 43259 PR ENDOSCOPIC ULTRASOUND EXAM: ICD-10-PCS | Mod: ,,, | Performed by: INTERNAL MEDICINE

## 2023-10-09 PROCEDURE — 37000009 HC ANESTHESIA EA ADD 15 MINS: Performed by: INTERNAL MEDICINE

## 2023-10-09 PROCEDURE — 43259 EGD US EXAM DUODENUM/JEJUNUM: CPT | Mod: ,,, | Performed by: INTERNAL MEDICINE

## 2023-10-09 PROCEDURE — D9220A PRA ANESTHESIA: ICD-10-PCS | Mod: CRNA,,, | Performed by: NURSE ANESTHETIST, CERTIFIED REGISTERED

## 2023-10-09 PROCEDURE — 25000003 PHARM REV CODE 250: Performed by: NURSE ANESTHETIST, CERTIFIED REGISTERED

## 2023-10-09 PROCEDURE — D9220A PRA ANESTHESIA: ICD-10-PCS | Mod: ANES,,, | Performed by: ANESTHESIOLOGY

## 2023-10-09 PROCEDURE — 37000008 HC ANESTHESIA 1ST 15 MINUTES: Performed by: INTERNAL MEDICINE

## 2023-10-09 PROCEDURE — D9220A PRA ANESTHESIA: Mod: ANES,,, | Performed by: ANESTHESIOLOGY

## 2023-10-09 PROCEDURE — 25000003 PHARM REV CODE 250: Performed by: INTERNAL MEDICINE

## 2023-10-09 RX ORDER — LIDOCAINE HYDROCHLORIDE 20 MG/ML
INJECTION INTRAVENOUS
Status: DISCONTINUED | OUTPATIENT
Start: 2023-10-09 | End: 2023-10-09

## 2023-10-09 RX ORDER — FENTANYL CITRATE 50 UG/ML
INJECTION, SOLUTION INTRAMUSCULAR; INTRAVENOUS
Status: DISCONTINUED | OUTPATIENT
Start: 2023-10-09 | End: 2023-10-09

## 2023-10-09 RX ORDER — SODIUM CHLORIDE 0.9 % (FLUSH) 0.9 %
10 SYRINGE (ML) INJECTION
Status: DISCONTINUED | OUTPATIENT
Start: 2023-10-09 | End: 2023-10-09 | Stop reason: HOSPADM

## 2023-10-09 RX ORDER — PROPOFOL 10 MG/ML
VIAL (ML) INTRAVENOUS
Status: DISCONTINUED | OUTPATIENT
Start: 2023-10-09 | End: 2023-10-09

## 2023-10-09 RX ORDER — PHENYLEPHRINE HYDROCHLORIDE 10 MG/ML
INJECTION INTRAVENOUS
Status: DISCONTINUED | OUTPATIENT
Start: 2023-10-09 | End: 2023-10-09

## 2023-10-09 RX ORDER — SODIUM CHLORIDE 9 MG/ML
INJECTION, SOLUTION INTRAVENOUS CONTINUOUS
Status: DISCONTINUED | OUTPATIENT
Start: 2023-10-09 | End: 2023-10-09 | Stop reason: HOSPADM

## 2023-10-09 RX ADMIN — PROPOFOL 60 MG: 10 INJECTION, EMULSION INTRAVENOUS at 08:10

## 2023-10-09 RX ADMIN — PHENYLEPHRINE HYDROCHLORIDE 200 MCG: 10 INJECTION INTRAVENOUS at 08:10

## 2023-10-09 RX ADMIN — FENTANYL CITRATE 50 MCG: 50 INJECTION, SOLUTION INTRAMUSCULAR; INTRAVENOUS at 08:10

## 2023-10-09 RX ADMIN — PHENYLEPHRINE HYDROCHLORIDE 100 MCG: 10 INJECTION INTRAVENOUS at 08:10

## 2023-10-09 RX ADMIN — LIDOCAINE HYDROCHLORIDE 60 MG: 20 INJECTION INTRAVENOUS at 08:10

## 2023-10-09 RX ADMIN — SODIUM CHLORIDE: 0.9 INJECTION, SOLUTION INTRAVENOUS at 07:10

## 2023-10-09 NOTE — ANESTHESIA PREPROCEDURE EVALUATION
10/09/2023  Pre-operative evaluation for Procedure(s) (LRB):  ULTRASOUND, UPPER GI TRACT, ENDOSCOPIC (N/A)    Jalyn Aaron is a 73 y.o. female av block with ppm,      The left ventricle is normal in size with mildly decreased systolic function. The estimated ejection fraction is 45%.   There is abnormal septal wall motion consistent with right ventricular pacemaker.   Normal right ventricular size with normal right ventricular systolic function.   Grade I left ventricular diastolic dysfunction.   Mild tricuspid regurgitation.   The estimated PA systolic pressure is 22 mmHg.   Normal central venous pressure (3 mmHg).       The myocardial perfusion images are normal without evidence of ischemia or scar.    The whole heart absolute myocardial perfusion values averaged 0.86 cc/min/g at rest, which is normal; 1.92 cc/min/g at stress, which is low normal; and CFR is 2.27 , which is mildly reduced.    CT attenuation images demonstrate mild diffuse coronary calcifications in the LAD and LCX territory and mild diffuse aortic calcifications in the descending aorta.    The gated perfusion images showed an ejection fraction of 68% at rest and 79% during stress. A normal ejection fraction is greater than 47%.    The wall motion is normal at rest and during stress.    The LV cavity size is normal at rest and stress.    The ECG portion of the study is uninterpretable due to ventricular paced rhythm.    There were no arrhythmias during stress.    The patient reported chest pain during the stress test.    There are no prior studies for comparison.      Patient Active Problem List   Diagnosis    Atrioventricular block, complete    Cardiac pacemaker in situ    Anxiety    Intraductal papilloma of breast    Ejection fraction < 50%    Cardiomyopathy, nonischemic    Lumbar stenosis    Screening  for colon cancer    Disorder of muscle    Decreased range of motion of trunk and back    Decreased strength of trunk and back    Weakness of left lower extremity    Neuropathy    Decreased ROM of neck    Decreased strength of upper extremity    Atherosclerosis of aorta    Discoid lupus erythematosus    Snoring    SARAI (obstructive sleep apnea)       Review of patient's allergies indicates:   Allergen Reactions    Bactrim [sulfamethoxazole-trimethoprim] Nausea Only       No current facility-administered medications on file prior to encounter.     Current Outpatient Medications on File Prior to Encounter   Medication Sig Dispense Refill    atorvastatin (LIPITOR) 20 MG tablet Take 1 tablet (20 mg total) by mouth once daily. 90 tablet 3    calcium-vitamin D3 (OS-TALHA 500 + D3) 500 mg-5 mcg (200 unit) per tablet Take 1 tablet by mouth 2 (two) times daily with meals.      gabapentin (NEURONTIN) 600 MG tablet Take 1.5 tablets (900 mg total) by mouth 3 (three) times daily. 135 tablet 11    losartan (COZAAR) 25 MG tablet TAKE 1 TABLET BY MOUTH TWICE DAILY 180 tablet 3    multivitamin capsule Take 1 capsule by mouth once daily.      naproxen (NAPROSYN) 500 MG tablet TAKE 1/2 TABLET BY MOUTH DAILY 90 tablet 1    senna (SENOKOT) 8.6 mg tablet Take 1 tablet by mouth once daily. Twice a day      vitamin D 1000 units Tab Take 1,000 Units by mouth once daily.         Past Surgical History:   Procedure Laterality Date    BREAST BIOPSY Left 12/2013    papilloma with atypia on core biopsy    BREAST BIOPSY Left 01/2014    Ex.Bx., removal of ADH/Papilloma    COLONOSCOPY N/A 11/8/2016    Procedure: COLONOSCOPY;  Surgeon: Dl Caruso MD;  Location: [facility] ([address] [place] [address]);  Service: Endoscopy;  Laterality: N/A;  Pacemaker in place.    COLONOSCOPY N/A 3/30/2021    Procedure: COLONOSCOPY;  Surgeon: Joaquin Johnson MD;  [facility]  Service: Endoscopy;  Laterality: N/A;  prep ins. emailed - COVID  screening on 3/27/21 PCW - ERW    HYSTERECTOMY      INSERT / REPLACE / REMOVE PACEMAKER      LASIK      LASIK Bilateral 2009    VENOGRAM, EP LAB N/A 12/20/2022    Procedure: Venogram, EP Lab;  Surgeon: Chacho Colby MD;  Location: [facility] EP LAB;  Service: Cardiology;  Laterality: N/A;  NICM, Venogram- left side, SK, 3 Prep *MDT PPM in situ*       Social History     Socioeconomic History    Marital status:     Number of children: 1   Occupational History    Occupation:      Employer: [employer]   Tobacco Use    Smoking status: Never     Passive exposure: Never    Smokeless tobacco: Never   Substance and Sexual Activity    Alcohol use: Not Currently     Alcohol/week: 1.0 standard drink of alcohol     Types: 1 Glasses of wine per week     Comment: weekly    Drug use: No    Sexual activity: Not Currently     Partners: Male     Birth control/protection: None   Other Topics Concern    Are you pregnant or think you may be? No    Breast-feeding No   Social History Narrative     (case management) at [employer]     Social Determinants of Health     Financial Resource Strain: Low Risk  (1/31/2023)    Overall Financial Resource Strain (CARDIA)     Difficulty of Paying Living Expenses: Not hard at all   Food Insecurity: No Food Insecurity (1/31/2023)    Hunger Vital Sign     Worried About Running Out of Food in the Last Year: Never true     Ran Out of Food in the Last Year: Never true   Transportation Needs: No Transportation Needs (1/31/2023)    PRAPARE - Transportation     Lack of Transportation (Medical): No     Lack of Transportation (Non-Medical): No   Physical Activity: Sufficiently Active (1/31/2023)    Exercise Vital Sign     Days of Exercise per Week: 7 days     Minutes of Exercise per Session: 60 min   Stress: No Stress Concern Present (1/31/2023)    Citizen of [place] Branch of Occupational Health - Occupational Stress Questionnaire     Feeling of Stress : Not  "at all   Social Connections: Moderately Integrated (2023)    Social Connection and Isolation Panel [NHANES]     Frequency of Communication with Friends and Family: More than three times a week     Frequency of Social Gatherings with Friends and Family: More than three times a week     Attends Orthodoxy Services: More than 4 times per year     Active Member of Clubs or Organizations: No     Attends Club or Organization Meetings: Never     Marital Status:    Housing Stability: Unknown (2023)    Housing Stability Vital Sign     Unable to Pay for Housing in the Last Year: No     Unstable Housing in the Last Year: No         CBC: No results for input(s): "WBC", "RBC", "HGB", "HCT", "PLT", "MCV", "MCH", "MCHC" in the last 72 hours.    CMP: No results for input(s): "NA", "K", "CL", "CO2", "BUN", "CREATININE", "GLU", "MG", "PHOS", "CALCIUM", "ALBUMIN", "PROT", "ALKPHOS", "ALT", "AST", "BILITOT" in the last 72 hours.    INR  No results for input(s): "PT", "INR", "PROTIME", "APTT" in the last 72 hours.        Diagnostic Studies:      EKD Echo:  Results for orders placed or performed during the hospital encounter of 16   2D Echo w/ Color Flow Doppler   Result Value Ref Range    EF + QEF 55 55 - 65    Diastolic Dysfunction No     Est. PA Systolic Pressure 17.14            Pre-op Assessment    I have reviewed the Patient Summary Reports.     I have reviewed the Nursing Notes. I have reviewed the NPO Status.   I have reviewed the Medications.     Review of Systems  Anesthesia Hx:  No problems with previous Anesthesia  History of prior surgery of interest to airway management or planning: Denies Family Hx of Anesthesia complications.   Denies Personal Hx of Anesthesia complications.   Hematology/Oncology:         -- Denies Anemia:   Cardiovascular:   Exercise tolerance: good Pacemaker Dysrhythmias ECG has been reviewed.    Pulmonary:   Denies COPD.  Denies Asthma.  Denies Shortness of " breath. Sleep Apnea    Renal/:   Denies Chronic Renal Disease.     Hepatic/GI:   Denies GERD. Denies Liver Disease.    Neurological:   Denies CVA. Denies Seizures.    Endocrine:   Denies Diabetes.        Physical Exam  General: Well nourished, Cooperative, Alert and Oriented    Airway:  Mallampati: III / II  Mouth Opening: Normal  TM Distance: Normal  Tongue: Normal  Neck ROM: Normal ROM    Dental:  Intact    Chest/Lungs:  Normal Respiratory Rate    Heart:  Rate: Normal  Rhythm: Regular Rhythm        Anesthesia Plan  Type of Anesthesia, risks & benefits discussed:    Anesthesia Type: Gen Natural Airway  Intra-op Monitoring Plan: Standard ASA Monitors  Post Op Pain Control Plan: multimodal analgesia  Induction:  IV  Informed Consent: Informed consent signed with the Patient and all parties understand the risks and agree with anesthesia plan.  All questions answered.   ASA Score: 2  Day of Surgery Review of History & Physical: H&P Update referred to the surgeon/provider.    Ready For Surgery From Anesthesia Perspective.     .

## 2023-10-09 NOTE — ANESTHESIA POSTPROCEDURE EVALUATION
Anesthesia Post Evaluation    Patient: Jalyn Aaron    Procedure(s) Performed: Procedure(s) (LRB):  ULTRASOUND, UPPER GI TRACT, ENDOSCOPIC (N/A)    Final Anesthesia Type: general      Patient location during evaluation: PACU  Patient participation: Yes- Able to Participate  Level of consciousness: awake and alert and oriented  Post-procedure vital signs: reviewed and stable  Pain management: adequate  Airway patency: patent    PONV status at discharge: No PONV  Anesthetic complications: no      Cardiovascular status: blood pressure returned to baseline, hemodynamically stable and stable  Respiratory status: unassisted, room air and spontaneous ventilation  Hydration status: euvolemic  Follow-up not needed.          Vitals Value Taken Time   /67 10/09/23 0918   Temp 36.6 °C (97.9 °F) 10/09/23 0915   Pulse 65 10/09/23 0919   Resp 19 10/09/23 0918   SpO2 100 % 10/09/23 0919   Vitals shown include unvalidated device data.      No case tracking events are documented in the log.      Pain/Derik Score: Derik Score: 10 (10/9/2023  9:00 AM)

## 2023-10-09 NOTE — PLAN OF CARE
Discharge instructions discussed with pt and pt's spouse. Both verbalize understanding. MD Lázaro came to bedside to speak with pt and pt's spouse after the procedure. Consents in chart, vital signs stable, no complaints.

## 2023-10-09 NOTE — PROVATION PATIENT INSTRUCTIONS
Discharge Summary/Instructions after an Endoscopic Procedure  Patient Name: Jalyn Aaron  Patient MRN: [id number]  Patient YOB: 1950 Monday, October 9, 2023  Stacey Sesay MD  Dear patient,  As a result of recent federal legislation (The Federal Cures Act), you may   receive lab or pathology results from your procedure in your [facility]   account before your physician is able to contact you. Your physician or   their representative will relay the results to you with their   recommendations at their soonest availability.  Thank you,  RESTRICTIONS:  During your procedure today, you received medications for sedation.  These   medications may affect your judgment, balance and coordination.  Therefore,   for 24 hours, you have the following restrictions:   - DO NOT drive a car, operate machinery, make legal/financial decisions,   sign important papers or drink alcohol.    ACTIVITY:  Today: no heavy lifting, straining or running due to procedural   sedation/anesthesia.  The following day: return to full activity including work.  DIET:  Eat and drink normally unless instructed otherwise.     TREATMENT FOR COMMON SIDE EFFECTS:  - Mild abdominal pain, nausea, belching, bloating or excessive gas:  rest,   eat lightly and use a heating pad.  - Sore Throat: treat with throat lozenges and/or gargle with warm salt   water.  - Because air was used during the procedure, expelling large amounts of air   from your rectum or belching is normal.  - If a bowel prep was taken, you may not have a bowel movement for 1-3 days.    This is normal.  SYMPTOMS TO WATCH FOR AND REPORT TO YOUR PHYSICIAN:  1. Abdominal pain or bloating, other than gas cramps.  2. Chest pain.  3. Back pain.  4. Signs of infection such as: chills or fever occurring within 24 hours   after the procedure.  5. Rectal bleeding, which would show as bright red, maroon, or black stools.   (A tablespoon of blood from the rectum is not serious, especially if    hemorrhoids are present.)  6. Vomiting.  7. Weakness or dizziness.  GO DIRECTLY TO THE NEAREST EMERGENCY ROOM IF YOU HAVE ANY OF THE FOLLOWING:      Difficulty breathing              Chills and/or fever over 101 F   Persistent vomiting and/or vomiting blood   Severe abdominal pain   Severe chest pain   Black, tarry stools   Bleeding- more than one tablespoon   Any other symptom or condition that you feel may need urgent attention  Your doctor recommends these additional instructions:  If any biopsies were taken, your doctors clinic will contact you in 1 to 2   weeks with any results.  - Discharge patient to home (ambulatory).   - Resume previous diet.   - Patient has a contact number available for emergencies.  The signs and   symptoms of potential delayed complications were discussed with the   patient.  Return to normal activities tomorrow.  Written discharge   instructions were provided to the patient.   - Continue present medications.   - Next surveillance modality for cyst surveillance in 3-6 months (either EUS   or MRCP with contrast if MR compatible pacemaker is placed).  - Return to my office in 6 months to discuss cyst surveillance.  For questions, problems or results please call your physician - Stacey Sesay MD at Work:  [phone].  [facility], EMERGENCY ROOM PHONE NUMBER: [phone]  IF A COMPLICATION OR EMERGENCY SITUATION ARISES AND YOU ARE UNABLE TO REACH   YOUR PHYSICIAN - GO DIRECTLY TO THE EMERGENCY ROOM.  Stacey Sesay MD  10/9/2023 8:41:59 AM  This report has been verified and signed electronically.  Dear patient,  As a result of recent federal legislation (The Federal Cures Act), you may   receive lab or pathology results from your procedure in your [facility]   account before your physician is able to contact you. Your physician or   their representative will relay the results to you with their   recommendations at their soonest availability.  Thank you,  PROVATION

## 2023-10-09 NOTE — H&P
Short Stay Endoscopy History and Physical    PCP - Annemarie Kilgore MD  Referring Physician - Stacey Sesay MD  [address] [place] [address]    Procedure - EUS  ASA - per anesthesia  Mallampati - per anesthesia  History of Anesthesia problems - per anesthesia  Family history Anesthesia problems -  per anesthesia   Plan of anesthesia - per anesthesia    HPI:  This is a 73 y.o. 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    Surgical History:  has a past surgical history that includes Hysterectomy; LASIK; Insert / replace / remove pacemaker; Colonoscopy (N/A, 11/8/2016); Breast biopsy (Left, 12/2013); Breast biopsy (Left, 01/2014); LASIK (Bilateral, 2009); Colonoscopy (N/A, 3/30/2021); and venogram, ep lab (N/A, 12/20/2022).    Family History: family history includes Breast cancer in her mother; Cancer (age of onset: 80) in her mother; Cataracts in her mother; Hypertension in her mother; Multiple sclerosis in her father and sister; No Known Problems in her brother, maternal aunt, maternal grandmother, maternal uncle, paternal aunt, paternal grandfather, paternal grandmother, paternal uncle, son, and son; Prostate cancer in her maternal grandfather..    Social History:  reports that she has never smoked. She has never been exposed to tobacco smoke. She has never used smokeless tobacco. She reports that she does not currently use alcohol  after a past usage of about 1.0 standard drink of alcohol per week. She reports that she does not use drugs.    Review of patient's allergies indicates:   Allergen Reactions    Bactrim [sulfamethoxazole-trimethoprim] Nausea Only       Medications:   Facility-Administered Medications Prior to Admission   Medication Dose Route Frequency Provider Last Rate Last Admin    triamcinolone acetonide injection 10 mg  10 mg Intradermal Once Christel Ward MD         Medications Prior to Admission   Medication Sig Dispense Refill Last Dose    atorvastatin (LIPITOR) 20 MG tablet Take 1 tablet (20 mg total) by mouth once daily. 90 tablet 3 10/8/2023    calcium-vitamin D3 (OS-TALHA 500 + D3) 500 mg-5 mcg (200 unit) per tablet Take 1 tablet by mouth 2 (two) times daily with meals.   10/8/2023    carvediloL (COREG) 3.125 MG tablet Take 1 tablet (3.125 mg total) by mouth 2 (two) times daily. 180 tablet 3 10/9/2023    fluocinonide (LIDEX) 0.05 % external solution AAA scalp qday 60 mL 3 10/8/2023    gabapentin (NEURONTIN) 600 MG tablet Take 1.5 tablets (900 mg total) by mouth 3 (three) times daily. 135 tablet 11 10/8/2023    losartan (COZAAR) 25 MG tablet TAKE 1 TABLET BY MOUTH TWICE DAILY 180 tablet 3 10/9/2023    multivitamin capsule Take 1 capsule by mouth once daily.   10/8/2023    naproxen (NAPROSYN) 500 MG tablet TAKE 1/2 TABLET BY MOUTH DAILY 90 tablet 1 10/8/2023    senna (SENOKOT) 8.6 mg tablet Take 1 tablet by mouth once daily. Twice a day   10/8/2023    vitamin D 1000 units Tab Take 1,000 Units by mouth once daily.   10/8/2023       Physical Exam:    Vital Signs:   Vitals:    10/09/23 0711   BP: 115/68   Pulse: 65   Resp: 16   Temp: 98.1 °F (36.7 °C)       General Appearance: Well appearing in no acute distress    Labs:  Lab Results   Component Value Date    WBC 4.79 12/13/2022    HGB 12.4 12/13/2022    HCT 37.3 12/13/2022     12/13/2022    CHOL 208 (H) 06/30/2023    TRIG 73 06/30/2023    HDL 82 (H) 06/30/2023     ALT 21 12/13/2022    AST 23 12/13/2022     12/13/2022     12/13/2022    K 4.0 12/13/2022    K 4.0 12/13/2022     12/13/2022     12/13/2022    CREATININE 0.7 12/13/2022    CREATININE 0.7 12/13/2022    BUN 17 12/13/2022    BUN 17 12/13/2022    CO2 28 12/13/2022    CO2 28 12/13/2022    TSH 1.545 12/13/2022    INR 1.0 10/27/2011    HGBA1C 5.4 12/13/2022       I have explained the risks and benefits of this endoscopic procedure to the patient including but not limited to bleeding, inflammation, infection, perforation, pancreatitis, missing a lesion and death.      Stacey Sesay MD

## 2023-10-09 NOTE — TRANSFER OF CARE
"Anesthesia Transfer of Care Note    Patient: Jalyn Aaron    Procedure(s) Performed: Procedure(s) (LRB):  ULTRASOUND, UPPER GI TRACT, ENDOSCOPIC (N/A)    Patient location: [facility]    Anesthesia Type: general    Transport from OR: Transported from OR on room air with adequate spontaneous ventilation    Post pain: adequate analgesia    Post assessment: no apparent anesthetic complications and tolerated procedure well    Post vital signs: stable    Level of consciousness: awake, alert and oriented    Nausea/Vomiting: no nausea/vomiting    Complications: none    Transfer of care protocol was followed      Last vitals:   Visit Vitals  /68   Pulse 65   Temp 36.7 °C (98.1 °F)   Resp 16   Ht 5' 6" (1.676 m)   Wt 56.2 kg (124 lb)   SpO2 100%   Breastfeeding No   BMI 20.01 kg/m²     "

## 2023-10-10 ENCOUNTER — TELEPHONE (OUTPATIENT)
Dept: ENDOSCOPY | Facility: HOSPITAL | Age: 73
End: 2023-10-10
Payer: MEDICARE

## 2023-10-10 ENCOUNTER — OFFICE VISIT (OUTPATIENT)
Dept: INTERNAL MEDICINE | Facility: CLINIC | Age: 73
End: 2023-10-10
Payer: MEDICARE

## 2023-10-10 VITALS
HEIGHT: 66 IN | OXYGEN SATURATION: 99 % | HEART RATE: 65 BPM | DIASTOLIC BLOOD PRESSURE: 68 MMHG | SYSTOLIC BLOOD PRESSURE: 114 MMHG | WEIGHT: 129.88 LBS | BODY MASS INDEX: 20.87 KG/M2

## 2023-10-10 DIAGNOSIS — E78.5 HYPERLIPIDEMIA, UNSPECIFIED HYPERLIPIDEMIA TYPE: Primary | ICD-10-CM

## 2023-10-10 DIAGNOSIS — R60.9 EDEMA, UNSPECIFIED TYPE: ICD-10-CM

## 2023-10-10 DIAGNOSIS — R10.2 VAGINAL PAIN: ICD-10-CM

## 2023-10-10 PROCEDURE — 3074F PR MOST RECENT SYSTOLIC BLOOD PRESSURE < 130 MM HG: ICD-10-PCS | Mod: CPTII,S$GLB,, | Performed by: INTERNAL MEDICINE

## 2023-10-10 PROCEDURE — 87624 HPV HI-RISK TYP POOLED RSLT: CPT | Performed by: INTERNAL MEDICINE

## 2023-10-10 PROCEDURE — 3288F PR FALLS RISK ASSESSMENT DOCUMENTED: ICD-10-PCS | Mod: CPTII,S$GLB,, | Performed by: INTERNAL MEDICINE

## 2023-10-10 PROCEDURE — 3074F SYST BP LT 130 MM HG: CPT | Mod: CPTII,S$GLB,, | Performed by: INTERNAL MEDICINE

## 2023-10-10 PROCEDURE — 1126F PR PAIN SEVERITY QUANTIFIED, NO PAIN PRESENT: ICD-10-PCS | Mod: CPTII,S$GLB,, | Performed by: INTERNAL MEDICINE

## 2023-10-10 PROCEDURE — 88175 CYTOPATH C/V AUTO FLUID REDO: CPT | Performed by: INTERNAL MEDICINE

## 2023-10-10 PROCEDURE — 3078F PR MOST RECENT DIASTOLIC BLOOD PRESSURE < 80 MM HG: ICD-10-PCS | Mod: CPTII,S$GLB,, | Performed by: INTERNAL MEDICINE

## 2023-10-10 PROCEDURE — 4010F ACE/ARB THERAPY RXD/TAKEN: CPT | Mod: CPTII,S$GLB,, | Performed by: INTERNAL MEDICINE

## 2023-10-10 PROCEDURE — 3078F DIAST BP <80 MM HG: CPT | Mod: CPTII,S$GLB,, | Performed by: INTERNAL MEDICINE

## 2023-10-10 PROCEDURE — 1157F PR ADVANCE CARE PLAN OR EQUIV PRESENT IN MEDICAL RECORD: ICD-10-PCS | Mod: CPTII,S$GLB,, | Performed by: INTERNAL MEDICINE

## 2023-10-10 PROCEDURE — 99214 PR OFFICE/OUTPT VISIT, EST, LEVL IV, 30-39 MIN: ICD-10-PCS | Mod: S$GLB,,, | Performed by: INTERNAL MEDICINE

## 2023-10-10 PROCEDURE — 3008F PR BODY MASS INDEX (BMI) DOCUMENTED: ICD-10-PCS | Mod: CPTII,S$GLB,, | Performed by: INTERNAL MEDICINE

## 2023-10-10 PROCEDURE — 1101F PR PT FALLS ASSESS DOC 0-1 FALLS W/OUT INJ PAST YR: ICD-10-PCS | Mod: CPTII,S$GLB,, | Performed by: INTERNAL MEDICINE

## 2023-10-10 PROCEDURE — 3008F BODY MASS INDEX DOCD: CPT | Mod: CPTII,S$GLB,, | Performed by: INTERNAL MEDICINE

## 2023-10-10 PROCEDURE — 99214 OFFICE O/P EST MOD 30 MIN: CPT | Mod: S$GLB,,, | Performed by: INTERNAL MEDICINE

## 2023-10-10 PROCEDURE — 1101F PT FALLS ASSESS-DOCD LE1/YR: CPT | Mod: CPTII,S$GLB,, | Performed by: INTERNAL MEDICINE

## 2023-10-10 PROCEDURE — 3288F FALL RISK ASSESSMENT DOCD: CPT | Mod: CPTII,S$GLB,, | Performed by: INTERNAL MEDICINE

## 2023-10-10 PROCEDURE — 1126F AMNT PAIN NOTED NONE PRSNT: CPT | Mod: CPTII,S$GLB,, | Performed by: INTERNAL MEDICINE

## 2023-10-10 PROCEDURE — 99999 PR PBB SHADOW E&M-EST. PATIENT-LVL V: ICD-10-PCS | Mod: PBBFAC,,, | Performed by: INTERNAL MEDICINE

## 2023-10-10 PROCEDURE — 99999 PR PBB SHADOW E&M-EST. PATIENT-LVL V: CPT | Mod: PBBFAC,,, | Performed by: INTERNAL MEDICINE

## 2023-10-10 PROCEDURE — 4010F PR ACE/ARB THEARPY RXD/TAKEN: ICD-10-PCS | Mod: CPTII,S$GLB,, | Performed by: INTERNAL MEDICINE

## 2023-10-10 PROCEDURE — 1157F ADVNC CARE PLAN IN RCRD: CPT | Mod: CPTII,S$GLB,, | Performed by: INTERNAL MEDICINE

## 2023-10-10 NOTE — PROGRESS NOTES
Subjective:       Patient ID: Jalyn Aaron is a 73 y.o. female.    Chief Complaint: Follow-up and Vaginal Itching (discomfort)    Follow-up  Pertinent negatives include no abdominal pain, chest pain (arm pain or jaw pain), headaches, nausea or vomiting.   Vaginal Itching  Pertinent negatives include no abdominal pain, diarrhea, dysuria, headaches, nausea or vomiting.   Pt with new vaginal itching and some pain with intercourse. SOme new LE edema.  Need new pacer soon.  No CP.  SOme mild YORK.  Review of Systems   Respiratory:  Negative for shortness of breath (PND or orthopnea).    Cardiovascular:  Negative for chest pain (arm pain or jaw pain).   Gastrointestinal:  Negative for abdominal pain, diarrhea, nausea and vomiting.   Genitourinary:  Negative for dysuria.   Neurological:  Negative for seizures, syncope and headaches.       Objective:      Physical Exam  Constitutional:       General: She is not in acute distress.     Appearance: She is well-developed.   HENT:      Head: Normocephalic.   Eyes:      Pupils: Pupils are equal, round, and reactive to light.   Neck:      Thyroid: No thyromegaly.      Vascular: No JVD.   Cardiovascular:      Rate and Rhythm: Normal rate and regular rhythm.      Heart sounds: Normal heart sounds. No murmur heard.     No friction rub. No gallop.   Pulmonary:      Effort: Pulmonary effort is normal.      Breath sounds: Normal breath sounds. No wheezing or rales.   Abdominal:      General: Bowel sounds are normal. There is no distension.      Palpations: Abdomen is soft. There is no mass.      Tenderness: There is no abdominal tenderness. There is no guarding or rebound.   Genitourinary:     General: Normal vulva.      Comments: Pap obtained of the vaginal cuff.  No overt yeast or erythema xeen except right at the introitus.  No adnexal masses or tenderness.   Musculoskeletal:      Cervical back: Neck supple.   Lymphadenopathy:      Cervical: No cervical adenopathy.    Skin:     General: Skin is warm and dry.   Neurological:      Mental Status: She is alert and oriented to person, place, and time.      Deep Tendon Reflexes: Reflexes are normal and symmetric.   Psychiatric:         Behavior: Behavior normal.         Thought Content: Thought content normal.         Judgment: Judgment normal.         Assessment:       1. Hyperlipidemia, unspecified hyperlipidemia type    2. Edema, unspecified type    3. Vaginal pain        Plan:   Hyperlipidemia, unspecified hyperlipidemia type  -     Lipid Panel; Future; Expected date: 10/10/2023    Edema, unspecified type  -     Comprehensive Metabolic Panel; Future; Expected date: 10/10/2023  -     BNP; Future; Expected date: 10/10/2023  -     CBC Auto Differential; Future; Expected date: 10/10/2023  -     TSH; Future; Expected date: 10/10/2023  -     Urinalysis; Future; Expected date: 10/10/2023  -     Microalbumin/Creatinine Ratio, Urine; Future; Expected date: 10/10/2023  -     US Lower Extremity Veins Bilateral; Future; Expected date: 10/10/2023    Vaginal pain  -     Liquid-Based Pap Smear, Screening  -     HPV High Risk Genotypes, PCR  -     Ambulatory referral/consult to Gynecology; Future; Expected date: 10/17/2023

## 2023-10-10 NOTE — TELEPHONE ENCOUNTER
Returned pts call regarding nasal irritation and sneezing post EUS yesterday. Explained to the pt that during procedure, O2 can be used which can irritate nasal passages. Pt has a primary care visit today and stated she will address this with her primary care physician.

## 2023-10-11 ENCOUNTER — LAB VISIT (OUTPATIENT)
Dept: LAB | Facility: HOSPITAL | Age: 73
End: 2023-10-11
Payer: MEDICARE

## 2023-10-11 ENCOUNTER — OFFICE VISIT (OUTPATIENT)
Dept: URGENT CARE | Facility: CLINIC | Age: 73
End: 2023-10-11
Payer: MEDICARE

## 2023-10-11 ENCOUNTER — HOSPITAL ENCOUNTER (OUTPATIENT)
Dept: RADIOLOGY | Facility: HOSPITAL | Age: 73
Discharge: HOME | End: 2023-10-11
Attending: INTERNAL MEDICINE
Payer: MEDICARE

## 2023-10-11 VITALS
WEIGHT: 129 LBS | RESPIRATION RATE: 16 BRPM | DIASTOLIC BLOOD PRESSURE: 61 MMHG | SYSTOLIC BLOOD PRESSURE: 104 MMHG | HEART RATE: 64 BPM | TEMPERATURE: 98 F | BODY MASS INDEX: 20.82 KG/M2 | OXYGEN SATURATION: 99 %

## 2023-10-11 DIAGNOSIS — R60.9 EDEMA, UNSPECIFIED TYPE: ICD-10-CM

## 2023-10-11 DIAGNOSIS — S69.90XA FINGER INJURY, INITIAL ENCOUNTER: Primary | ICD-10-CM

## 2023-10-11 DIAGNOSIS — E78.5 HYPERLIPIDEMIA, UNSPECIFIED HYPERLIPIDEMIA TYPE: ICD-10-CM

## 2023-10-11 LAB
ALBUMIN SERPL BCP-MCNC: 3.7 G/DL (ref 3.5–5.2)
ALP SERPL-CCNC: 84 U/L (ref 55–135)
ALT SERPL W/O P-5'-P-CCNC: 99 U/L (ref 10–44)
ANION GAP SERPL CALC-SCNC: 8 MMOL/L (ref 8–16)
AST SERPL-CCNC: 62 U/L (ref 10–40)
BASOPHILS # BLD AUTO: 0.07 K/UL (ref 0–0.2)
BASOPHILS NFR BLD: 1.5 % (ref 0–1.9)
BILIRUB SERPL-MCNC: 0.8 MG/DL (ref 0.1–1)
BNP SERPL-MCNC: 448 PG/ML (ref 0–99)
BUN SERPL-MCNC: 14 MG/DL (ref 8–23)
CALCIUM SERPL-MCNC: 9.3 MG/DL (ref 8.7–10.5)
CHLORIDE SERPL-SCNC: 108 MMOL/L (ref 95–110)
CHOLEST SERPL-MCNC: 136 MG/DL (ref 120–199)
CHOLEST/HDLC SERPL: 2 {RATIO} (ref 2–5)
CO2 SERPL-SCNC: 27 MMOL/L (ref 23–29)
CREAT SERPL-MCNC: 0.7 MG/DL (ref 0.5–1.4)
DIFFERENTIAL METHOD: ABNORMAL
EOSINOPHIL # BLD AUTO: 0.2 K/UL (ref 0–0.5)
EOSINOPHIL NFR BLD: 4.3 % (ref 0–8)
ERYTHROCYTE [DISTWIDTH] IN BLOOD BY AUTOMATED COUNT: 13.1 % (ref 11.5–14.5)
EST. GFR  (NO RACE VARIABLE): >60 ML/MIN/1.73 M^2
GLUCOSE SERPL-MCNC: 85 MG/DL (ref 70–110)
HCT VFR BLD AUTO: 38.1 % (ref 37–48.5)
HDLC SERPL-MCNC: 68 MG/DL (ref 40–75)
HDLC SERPL: 50 % (ref 20–50)
HGB BLD-MCNC: 12.3 G/DL (ref 12–16)
IMM GRANULOCYTES # BLD AUTO: 0.02 K/UL (ref 0–0.04)
IMM GRANULOCYTES NFR BLD AUTO: 0.4 % (ref 0–0.5)
LDLC SERPL CALC-MCNC: 56.2 MG/DL (ref 63–159)
LYMPHOCYTES # BLD AUTO: 1.8 K/UL (ref 1–4.8)
LYMPHOCYTES NFR BLD: 39.5 % (ref 18–48)
MCH RBC QN AUTO: 31.8 PG (ref 27–31)
MCHC RBC AUTO-ENTMCNC: 32.3 G/DL (ref 32–36)
MCV RBC AUTO: 98 FL (ref 82–98)
MONOCYTES # BLD AUTO: 0.4 K/UL (ref 0.3–1)
MONOCYTES NFR BLD: 8.6 % (ref 4–15)
NEUTROPHILS # BLD AUTO: 2.1 K/UL (ref 1.8–7.7)
NEUTROPHILS NFR BLD: 45.7 % (ref 38–73)
NONHDLC SERPL-MCNC: 68 MG/DL
NRBC BLD-RTO: 0 /100 WBC
PLATELET # BLD AUTO: 159 K/UL (ref 150–450)
PMV BLD AUTO: 11.6 FL (ref 9.2–12.9)
POTASSIUM SERPL-SCNC: 4.7 MMOL/L (ref 3.5–5.1)
PROT SERPL-MCNC: 6.2 G/DL (ref 6–8.4)
RBC # BLD AUTO: 3.87 M/UL (ref 4–5.4)
SODIUM SERPL-SCNC: 143 MMOL/L (ref 136–145)
TRIGL SERPL-MCNC: 59 MG/DL (ref 30–150)
TSH SERPL DL<=0.005 MIU/L-ACNC: 1.6 UIU/ML (ref 0.4–4)
WBC # BLD AUTO: 4.63 K/UL (ref 3.9–12.7)

## 2023-10-11 PROCEDURE — 84443 ASSAY THYROID STIM HORMONE: CPT | Performed by: INTERNAL MEDICINE

## 2023-10-11 PROCEDURE — 80053 COMPREHEN METABOLIC PANEL: CPT | Performed by: INTERNAL MEDICINE

## 2023-10-11 PROCEDURE — 93970 US LOWER EXTREMITY VEINS BILATERAL: ICD-10-PCS | Mod: 26,,, | Performed by: RADIOLOGY

## 2023-10-11 PROCEDURE — 99203 PR OFFICE/OUTPT VISIT, NEW, LEVL III, 30-44 MIN: ICD-10-PCS | Mod: 25,S$GLB,, | Performed by: NURSE PRACTITIONER

## 2023-10-11 PROCEDURE — 93970 EXTREMITY STUDY: CPT | Mod: TC

## 2023-10-11 PROCEDURE — 83880 ASSAY OF NATRIURETIC PEPTIDE: CPT | Performed by: INTERNAL MEDICINE

## 2023-10-11 PROCEDURE — 93970 EXTREMITY STUDY: CPT | Mod: 26,,, | Performed by: RADIOLOGY

## 2023-10-11 PROCEDURE — 85025 COMPLETE CBC W/AUTO DIFF WBC: CPT | Performed by: INTERNAL MEDICINE

## 2023-10-11 PROCEDURE — 99203 OFFICE O/P NEW LOW 30 MIN: CPT | Mod: 25,S$GLB,, | Performed by: NURSE PRACTITIONER

## 2023-10-11 PROCEDURE — 80061 LIPID PANEL: CPT | Performed by: INTERNAL MEDICINE

## 2023-10-11 PROCEDURE — 12041: ICD-10-PCS | Mod: S$GLB,,, | Performed by: NURSE PRACTITIONER

## 2023-10-11 PROCEDURE — 12041 INTMD RPR N-HF/GENIT 2.5CM/<: CPT | Mod: S$GLB,,, | Performed by: NURSE PRACTITIONER

## 2023-10-11 PROCEDURE — 36415 COLL VENOUS BLD VENIPUNCTURE: CPT | Performed by: INTERNAL MEDICINE

## 2023-10-11 RX ORDER — MUPIROCIN 20 MG/G
OINTMENT TOPICAL 3 TIMES DAILY
Qty: 1 G | Refills: 0 | Status: SHIPPED | OUTPATIENT
Start: 2023-10-11 | End: 2023-10-21

## 2023-10-11 NOTE — PROGRESS NOTES
Subjective:      Patient ID: Jalyn Aaron is a 73 y.o. female.    Vitals:  weight is 58.5 kg (129 lb). Her oral temperature is 97.8 °F (36.6 °C). Her blood pressure is 104/61 and her pulse is 64. Her respiration is 16 and oxygen saturation is 99%.     Chief Complaint: thorn under skin    73 y.o. female who presents today with a complaint of possible thorn  in her left index nailbed.  Reports onset, 4 days ago following working in her garden.  States she accidentally stuck her finger with a thorn from a tila bush.  Reports moderate tenderness to nail bed of left index finger.  She denies drainage, fever chills, or swelling, or erythema to nailbed.      Injury  This is a new problem. The current episode started in the past 7 days. The problem has been unchanged. Pertinent negatives include no chest pain, chills, fatigue, fever, joint swelling, nausea, numbness or rash. Treatments tried: soaking and attempting to remove thorn, unsuccessful.       Constitution: Negative for chills, fatigue and fever.   Cardiovascular:  Negative for chest pain and palpitations.   Respiratory:  Negative for shortness of breath.    Gastrointestinal:  Negative for nausea.   Musculoskeletal:  Negative for joint swelling.   Skin:  Negative for rash, wound, laceration, erythema and bruising.        Tenderness to left index finger nailbed   Neurological:  Negative for numbness.      Objective:     Physical Exam   Constitutional: She is oriented to person, place, and time. She appears well-developed.  Non-toxic appearance. She does not appear ill. No distress.   HENT:   Head: Normocephalic and atraumatic. Head is without abrasion, without contusion and without laceration.   Ears:   Right Ear: External ear normal.   Left Ear: External ear normal.   Nose: Nose normal.   Mouth/Throat: Oropharynx is clear and moist and mucous membranes are normal.   Eyes: Conjunctivae, EOM and lids are normal. Pupils are equal, round, and reactive to  light.   Neck: Trachea normal and phonation normal. Neck supple.   Cardiovascular: Normal rate, regular rhythm and normal heart sounds.   Pulmonary/Chest: Effort normal and breath sounds normal. No stridor. No respiratory distress.   Musculoskeletal: Normal range of motion.         General: Normal range of motion.      Right hand: Normal.      Left hand: She exhibits tenderness. She exhibits normal range of motion, no bony tenderness, normal two-point discrimination, normal capillary refill and no swelling. Normal sensation noted. Normal strength noted.        Hands:       Comments: Mild tenderness to left index finger nailbed.  Non-visible tila thorn to nailbed, negative erythema, edema, or drainage.  Soaked finger in warm water, unable to visualize thorn in left index finger nailbed.   Neurological: She is alert and oriented to person, place, and time.   Skin: Skin is warm, dry, intact, not diaphoretic and no rash. Capillary refill takes less than 2 seconds. No abrasion, No burn, No bruising, No erythema and No ecchymosis   Psychiatric: Her speech is normal and behavior is normal. Judgment and thought content normal.   Nursing note and vitals reviewed.      Assessment:     1. Finger injury, initial encounter        Plan:       Finger injury, initial encounter  -     Wound soak    Thorn injury:    Soak affected finger twice a day in warm water with Epsom salt.    Apply topical antibiotic as prescribed.    Discussed signs and symptoms of infection to finger, such as increased redness, drainage, pain, or swelling; return to urgent care  For an immediate evaluation.    Use protective gloves while gardening.  You must understand that you've received an Urgent Care treatment only and that you may be released before all your medical problems are known or treated. You, the patient, will arrange for follow up care as instructed.  Follow up with your PCP or specialty clinic as directed in the next 1-2 weeks if not improved  or as needed.  You can call [phone] to schedule an appointment with the appropriate provider.  If your condition worsens we recommend that you receive another evaluation at the emergency room immediately or contact your primary medical clinics after hours call service to discuss your concerns.  Please return here or go to the Emergency Department for any concerns or worsening of condition.    If you were prescribed a narcotic or controlled medication, do not drive or operate heavy equipment or machinery while taking these medications.    Thank you for choosing [facility]!

## 2023-10-11 NOTE — PROCEDURES
Wound soak    Date/Time: 10/11/2023 8:45 AM    Performed by: Deirdre Davis MA  Authorized by: Peter Gabriel DNP  Body area: upper extremity  Location details: left index finger  Contamination: The wound is contaminated.  Foreign body present: thorn in finger.  Anesthesia: see MAR for details  Irrigation solution: saline (with chlorohexadine)  Amount of cleaning: standard  Patient tolerance: Patient tolerated the procedure well with no immediate complications

## 2023-10-11 NOTE — PATIENT INSTRUCTIONS
Thorn injury:    Soak affected finger twice a day in warm water with Epsom salt.    Apply topical antibiotic as prescribed.    Tylenol as needed for pain.  Read administration instructions prior to taking Tylenol.    Discussed signs and symptoms of infection to finger, such as increased redness, drainage, pain, or swelling; return to urgent care  For an immediate evaluation.    Use protective gloves while gardening.  You must understand that you've received an Urgent Care treatment only and that you may be released before all your medical problems are known or treated. You, the patient, will arrange for follow up care as instructed.  Follow up with your PCP or specialty clinic as directed in the next 1-2 weeks if not improved or as needed.  You can call [phone] to schedule an appointment with the appropriate provider.  If your condition worsens we recommend that you receive another evaluation at the emergency room immediately or contact your primary medical clinics after hours call service to discuss your concerns.  Please return here or go to the Emergency Department for any concerns or worsening of condition.    If you were prescribed a narcotic or controlled medication, do not drive or operate heavy equipment or machinery while taking these medications.    Thank you for choosing [facility]!    Our goal in the Urgent Care is to always provide outstanding medical care. You may receive a survey by mail or e-mail in the next week regarding your experience today. We would greatly appreciate you completing and returning the survey. Your feedback provides us with a way to recognize our staff who provide very good care, and it helps us learn how to improve when your experience was below our aspiration of excellence.      We appreciate you trusting us with your medical care. We hope you feel better soon. We will be happy to take care of you for all of your future medical needs.    Sincerely,    Peter  DAKOTAH Gabriel, FNP-C

## 2023-10-12 ENCOUNTER — PATIENT MESSAGE (OUTPATIENT)
Dept: INTERNAL MEDICINE | Facility: CLINIC | Age: 73
End: 2023-10-12
Payer: MEDICARE

## 2023-10-13 ENCOUNTER — TELEPHONE (OUTPATIENT)
Dept: CARDIOLOGY | Facility: HOSPITAL | Age: 73
End: 2023-10-13
Payer: MEDICARE

## 2023-10-13 NOTE — TELEPHONE ENCOUNTER
Called patient to let her know that her device has reached RRT.  No answer, I left a voice message for her to return my call.  I left the number to the device clinic.

## 2023-10-15 LAB
FINAL PATHOLOGIC DIAGNOSIS: NORMAL
Lab: NORMAL

## 2023-10-17 ENCOUNTER — OFFICE VISIT (OUTPATIENT)
Dept: DERMATOLOGY | Facility: CLINIC | Age: 73
End: 2023-10-17
Payer: MEDICARE

## 2023-10-17 ENCOUNTER — PATIENT MESSAGE (OUTPATIENT)
Dept: DERMATOLOGY | Facility: CLINIC | Age: 73
End: 2023-10-17

## 2023-10-17 DIAGNOSIS — L57.0 AK (ACTINIC KERATOSIS): ICD-10-CM

## 2023-10-17 DIAGNOSIS — L93.0 DISCOID LUPUS ERYTHEMATOSUS: Primary | ICD-10-CM

## 2023-10-17 LAB
HPV HR 12 DNA SPEC QL NAA+PROBE: POSITIVE
HPV16 AG SPEC QL: NEGATIVE
HPV18 DNA SPEC QL NAA+PROBE: NEGATIVE

## 2023-10-17 PROCEDURE — 3288F FALL RISK ASSESSMENT DOCD: CPT | Mod: CPTII,S$GLB,, | Performed by: DERMATOLOGY

## 2023-10-17 PROCEDURE — 1126F PR PAIN SEVERITY QUANTIFIED, NO PAIN PRESENT: ICD-10-PCS | Mod: CPTII,S$GLB,, | Performed by: DERMATOLOGY

## 2023-10-17 PROCEDURE — 99999 PR PBB SHADOW E&M-EST. PATIENT-LVL III: ICD-10-PCS | Mod: PBBFAC,,, | Performed by: DERMATOLOGY

## 2023-10-17 PROCEDURE — 1160F RVW MEDS BY RX/DR IN RCRD: CPT | Mod: CPTII,S$GLB,, | Performed by: DERMATOLOGY

## 2023-10-17 PROCEDURE — 99214 OFFICE O/P EST MOD 30 MIN: CPT | Mod: 25,S$GLB,, | Performed by: DERMATOLOGY

## 2023-10-17 PROCEDURE — 1101F PR PT FALLS ASSESS DOC 0-1 FALLS W/OUT INJ PAST YR: ICD-10-PCS | Mod: CPTII,S$GLB,, | Performed by: DERMATOLOGY

## 2023-10-17 PROCEDURE — 1126F AMNT PAIN NOTED NONE PRSNT: CPT | Mod: CPTII,S$GLB,, | Performed by: DERMATOLOGY

## 2023-10-17 PROCEDURE — 99999 PR PBB SHADOW E&M-EST. PATIENT-LVL III: CPT | Mod: PBBFAC,,, | Performed by: DERMATOLOGY

## 2023-10-17 PROCEDURE — 4010F PR ACE/ARB THEARPY RXD/TAKEN: ICD-10-PCS | Mod: CPTII,S$GLB,, | Performed by: DERMATOLOGY

## 2023-10-17 PROCEDURE — 4010F ACE/ARB THERAPY RXD/TAKEN: CPT | Mod: CPTII,S$GLB,, | Performed by: DERMATOLOGY

## 2023-10-17 PROCEDURE — 17000 DESTRUCT PREMALG LESION: CPT | Mod: S$GLB,,, | Performed by: DERMATOLOGY

## 2023-10-17 PROCEDURE — 1159F PR MEDICATION LIST DOCUMENTED IN MEDICAL RECORD: ICD-10-PCS | Mod: CPTII,S$GLB,, | Performed by: DERMATOLOGY

## 2023-10-17 PROCEDURE — 99214 PR OFFICE/OUTPT VISIT, EST, LEVL IV, 30-39 MIN: ICD-10-PCS | Mod: 25,S$GLB,, | Performed by: DERMATOLOGY

## 2023-10-17 PROCEDURE — 3288F PR FALLS RISK ASSESSMENT DOCUMENTED: ICD-10-PCS | Mod: CPTII,S$GLB,, | Performed by: DERMATOLOGY

## 2023-10-17 PROCEDURE — 1101F PT FALLS ASSESS-DOCD LE1/YR: CPT | Mod: CPTII,S$GLB,, | Performed by: DERMATOLOGY

## 2023-10-17 PROCEDURE — 17000 PR DESTRUCTION(LASER SURGERY,CRYOSURGERY,CHEMOSURGERY),PREMALIGNANT LESIONS,FIRST LESION: ICD-10-PCS | Mod: S$GLB,,, | Performed by: DERMATOLOGY

## 2023-10-17 PROCEDURE — 1157F PR ADVANCE CARE PLAN OR EQUIV PRESENT IN MEDICAL RECORD: ICD-10-PCS | Mod: CPTII,S$GLB,, | Performed by: DERMATOLOGY

## 2023-10-17 PROCEDURE — 11900 PR INJECTION INTO SKIN LESIONS, UP TO 7: ICD-10-PCS | Mod: XS,S$GLB,, | Performed by: DERMATOLOGY

## 2023-10-17 PROCEDURE — 1157F ADVNC CARE PLAN IN RCRD: CPT | Mod: CPTII,S$GLB,, | Performed by: DERMATOLOGY

## 2023-10-17 PROCEDURE — 1159F MED LIST DOCD IN RCRD: CPT | Mod: CPTII,S$GLB,, | Performed by: DERMATOLOGY

## 2023-10-17 PROCEDURE — 1160F PR REVIEW ALL MEDS BY PRESCRIBER/CLIN PHARMACIST DOCUMENTED: ICD-10-PCS | Mod: CPTII,S$GLB,, | Performed by: DERMATOLOGY

## 2023-10-17 PROCEDURE — 11900 INJECT SKIN LESIONS </W 7: CPT | Mod: XS,S$GLB,, | Performed by: DERMATOLOGY

## 2023-10-17 NOTE — PATIENT INSTRUCTIONS

## 2023-10-17 NOTE — Clinical Note
Brennan WRAY, super anxious pt. Bx'ed her 3/23 and read by [facility] as DLE. Clinically c/w DLE but has odd ecchymosis in plaque (pt denies any manipulation). Would you mind eyeballing the path for confirmation?   Thanks JAM

## 2023-10-17 NOTE — PROGRESS NOTES
Subjective:      Patient ID:  Jalyn Aaron is a 73 y.o. female who presents for   Chief Complaint   Patient presents with    Follow-up     Discoid lupus erythematosus     History of Present Illness: The patient presents for follow up of discoid lupus erythematosus    The patient was last seen on: 7/21/23 for treatment of discoid lupus erythematosus with IL K 10 to scalp which pt states is stable.   Pt uses lidex solution to scalp qday.     Pt also states that scalp improved but is still scabbing. Pt reports that redness has decreased. Tender+  Pt uses cool water. Warm water irritates scalp.     Final Pathologic Diagnosis       Date                     Value               Ref Range           Status                03/10/2023                                                       Final             Skin, scalp vertex, punch biopsy: -DISCOID LUPUS ERYTHEMATOUS, CONSISTENT WITH     Comment:    Interp By Johanny Rush M.D., Signed on 03/28/2023 at 15:16  ----------    Other skin complaints:  Patient with new complaint of lesion(s)  Location: L mid chest  Duration: 2 weeks  Symptoms: none  Relieving factors/Previous treatments: none        Review of Systems   Skin:  Positive for rash, daily sunscreen use, activity-related sunscreen use and wears hat (always). Negative for itching (sensitive).   Hematologic/Lymphatic: Bruises/bleeds easily.       Objective:   Physical Exam   Constitutional: She appears well-developed and well-nourished. No distress.   Neurological: She is alert and oriented to person, place, and time. She is not disoriented.   Psychiatric: She has a normal mood and affect.   Skin:   Areas Examined (abnormalities noted in diagram):   Scalp / Hair Palpated and Inspected  Head / Face Inspection Performed  Chest / Axilla Inspection Performed                 Diagram Legend     Erythematous scaling macule/papule c/w actinic keratosis       Vascular papule c/w angioma      Pigmented verrucoid  papule/plaque c/w seborrheic keratosis      Yellow umbilicated papule c/w sebaceous hyperplasia      Irregularly shaped tan macule c/w lentigo     1-2 mm smooth white papules consistent with Milia      Movable subcutaneous cyst with punctum c/w epidermal inclusion cyst      Subcutaneous movable cyst c/w pilar cyst      Firm pink to brown papule c/w dermatofibroma      Pedunculated fleshy papule(s) c/w skin tag(s)      Evenly pigmented macule c/w junctional nevus     Mildly variegated pigmented, slightly irregular-bordered macule c/w mildly atypical nevus      Flesh colored to evenly pigmented papule c/w intradermal nevus       Pink pearly papule/plaque c/w basal cell carcinoma      Erythematous hyperkeratotic cursted plaque c/w SCC      Surgical scar with no sign of skin cancer recurrence      Open and closed comedones      Inflammatory papules and pustules      Verrucoid papule consistent consistent with wart     Erythematous eczematous patches and plaques     Dystrophic onycholytic nail with subungual debris c/w onychomycosis     Umbilicated papule    Erythematous-base heme-crusted tan verrucoid plaque consistent with inflamed seborrheic keratosis     Erythematous Silvery Scaling Plaque c/w Psoriasis     See annotation    Lab Results   Component Value Date    WBC 4.63 10/11/2023    HGB 12.3 10/11/2023    HCT 38.1 10/11/2023    MCV 98 10/11/2023     10/11/2023         CMP  Sodium   Date Value Ref Range Status   10/11/2023 143 136 - 145 mmol/L Final     Potassium   Date Value Ref Range Status   10/11/2023 4.7 3.5 - 5.1 mmol/L Final     Chloride   Date Value Ref Range Status   10/11/2023 108 95 - 110 mmol/L Final     CO2   Date Value Ref Range Status   10/11/2023 27 23 - 29 mmol/L Final     Glucose   Date Value Ref Range Status   10/11/2023 85 70 - 110 mg/dL Final     BUN   Date Value Ref Range Status   10/11/2023 14 8 - 23 mg/dL Final     Creatinine   Date Value Ref Range Status   10/11/2023 0.7 0.5 - 1.4  mg/dL Final     Calcium   Date Value Ref Range Status   10/11/2023 9.3 8.7 - 10.5 mg/dL Final     Total Protein   Date Value Ref Range Status   10/11/2023 6.2 6.0 - 8.4 g/dL Final     Albumin   Date Value Ref Range Status   10/11/2023 3.7 3.5 - 5.2 g/dL Final     Total Bilirubin   Date Value Ref Range Status   10/11/2023 0.8 0.1 - 1.0 mg/dL Final     Comment:     For infants and newborns, interpretation of results should be based  on gestational age, weight and in agreement with clinical  observations.    Premature Infant recommended reference ranges:  Up to 24 hours.............<8.0 mg/dL  Up to 48 hours............<12.0 mg/dL  3-5 days..................<15.0 mg/dL  6-29 days.................<15.0 mg/dL       Alkaline Phosphatase   Date Value Ref Range Status   10/11/2023 84 55 - 135 U/L Final     AST   Date Value Ref Range Status   10/11/2023 62 (H) 10 - 40 U/L Final     ALT   Date Value Ref Range Status   10/11/2023 99 (H) 10 - 44 U/L Final     Anion Gap   Date Value Ref Range Status   10/11/2023 8 8 - 16 mmol/L Final     eGFR   Date Value Ref Range Status   10/11/2023 >60.0 >60 mL/min/1.73 m^2 Final   PCP thinks LFT's elevated 2/2 Lipitor (started 5 months ago)    Assessment / Plan:        Discoid lupus erythematosus  -     triamcinolone acetonide injection 10 mg  -     CA [id number] ACETONIDE INJ PER 10MG  -     CA INJECTION INTO SKIN LESIONS, UP TO 7    Intralesional Kenalog 5mg/cc (0.6 cc total) injected into 1 lesions on the scalp vertex today after obtaining verbal consent including risk of surrounding hypopigmentation. Patient tolerated procedure well.    Units: 1  NDC for Kenalog 10mg/cc:  3646-3191-86    Cont lidex soln qday  Consider adding plaquenil. Will wait for LFT normalization.       AK (actinic keratosis)  Cryosurgery Procedure Note    Verbal consent from the patient is obtained including, but not limited to, risk of hypopigmentation/hyperpigmentation, scar, recurrence of lesion. The patient is  aware of the precancerous quality and need for treatment of these lesions. Liquid nitrogen cryosurgery is applied to the 1 actinic keratoses, as detailed in the physical exam, to produce a freeze injury. The patient is aware that blisters may form and is instructed on wound care with gentle cleansing and use of vaseline ointment to keep moist until healed. The patient is supplied a handout on cryosurgery and is instructed to call if lesions do not completely resolve.               Follow up in about 4 weeks (around 11/14/2023).

## 2023-10-18 ENCOUNTER — PATIENT MESSAGE (OUTPATIENT)
Dept: ELECTROPHYSIOLOGY | Facility: CLINIC | Age: 73
End: 2023-10-18
Payer: MEDICARE

## 2023-10-19 ENCOUNTER — PATIENT MESSAGE (OUTPATIENT)
Dept: ELECTROPHYSIOLOGY | Facility: CLINIC | Age: 73
End: 2023-10-19
Payer: MEDICARE

## 2023-10-19 DIAGNOSIS — I42.8 CARDIOMYOPATHY, NONISCHEMIC: Primary | ICD-10-CM

## 2023-10-19 DIAGNOSIS — I44.30 AV BLOCK: ICD-10-CM

## 2023-10-19 NOTE — TELEPHONE ENCOUNTER
Discussed case with Dr Colby. He wants to move forward with CRT-P upgrade. Spoke with patient and scheduled the procedure for 10/26/2023. Will send all information to the portal, as requested.

## 2023-10-20 ENCOUNTER — PATIENT MESSAGE (OUTPATIENT)
Dept: DERMATOLOGY | Facility: CLINIC | Age: 73
End: 2023-10-20
Payer: MEDICARE

## 2023-10-20 ENCOUNTER — PATIENT MESSAGE (OUTPATIENT)
Dept: MEDSURG UNIT | Facility: HOSPITAL | Age: 73
End: 2023-10-20
Payer: MEDICARE

## 2023-10-25 ENCOUNTER — TELEPHONE (OUTPATIENT)
Dept: ELECTROPHYSIOLOGY | Facility: CLINIC | Age: 73
End: 2023-10-25
Payer: MEDICARE

## 2023-10-25 NOTE — TELEPHONE ENCOUNTER
Spoke to patient    CONFIRMED procedure arrival time of 12:30 PM    Reiterated instructions including:  -Directions to check in desk  -NPO after midnight night prior to procedure  -Pre-procedure LABS reviewed.  -Confirmed presence of implanted device/stimulator - medtronic PPM  -Confirmed no fever, cough, or shortness of breath in the past 30 days  -Bathe night prior and morning prior to procedure with Hibiclens solution or an antibacterial soap  -Reviewed current visitor policy    Patient verbalized understanding of above and appreciated the call.

## 2023-10-26 ENCOUNTER — ANESTHESIA (OUTPATIENT)
Dept: MEDSURG UNIT | Facility: HOSPITAL | Age: 73
End: 2023-10-26
Payer: MEDICARE

## 2023-10-26 ENCOUNTER — ANESTHESIA EVENT (OUTPATIENT)
Dept: MEDSURG UNIT | Facility: HOSPITAL | Age: 73
End: 2023-10-26
Payer: MEDICARE

## 2023-10-26 ENCOUNTER — HOSPITAL ENCOUNTER (OUTPATIENT)
Facility: HOSPITAL | Age: 73
Discharge: HOME | End: 2023-10-27
Attending: INTERNAL MEDICINE | Admitting: INTERNAL MEDICINE
Payer: MEDICARE

## 2023-10-26 DIAGNOSIS — I42.8 CARDIOMYOPATHY, NONISCHEMIC: ICD-10-CM

## 2023-10-26 DIAGNOSIS — I44.2 HEART BLOCK AV THIRD DEGREE: ICD-10-CM

## 2023-10-26 DIAGNOSIS — Z95.0 CARDIAC PACEMAKER IN SITU: Primary | ICD-10-CM

## 2023-10-26 DIAGNOSIS — I44.30 AV BLOCK: ICD-10-CM

## 2023-10-26 DIAGNOSIS — I49.9 ARRHYTHMIA: ICD-10-CM

## 2023-10-26 LAB
APTT PPP: 27.8 SEC (ref 21–32)
INR PPP: 1 (ref 0.8–1.2)
PROTHROMBIN TIME: 10.6 SEC (ref 9–12.5)

## 2023-10-26 PROCEDURE — 37000008 HC ANESTHESIA 1ST 15 MINUTES: Performed by: INTERNAL MEDICINE

## 2023-10-26 PROCEDURE — C1900 LEAD, CORONARY VENOUS: HCPCS | Performed by: INTERNAL MEDICINE

## 2023-10-26 PROCEDURE — 85730 THROMBOPLASTIN TIME PARTIAL: CPT | Performed by: INTERNAL MEDICINE

## 2023-10-26 PROCEDURE — C1730 CATH, EP, 19 OR FEW ELECT: HCPCS | Performed by: INTERNAL MEDICINE

## 2023-10-26 PROCEDURE — C1894 INTRO/SHEATH, NON-LASER: HCPCS | Performed by: INTERNAL MEDICINE

## 2023-10-26 PROCEDURE — 25000003 PHARM REV CODE 250: Performed by: NURSE ANESTHETIST, CERTIFIED REGISTERED

## 2023-10-26 PROCEDURE — 63600175 PHARM REV CODE 636 W HCPCS: Performed by: NURSE ANESTHETIST, CERTIFIED REGISTERED

## 2023-10-26 PROCEDURE — 33225 L VENTRIC PACING LEAD ADD-ON: CPT | Performed by: INTERNAL MEDICINE

## 2023-10-26 PROCEDURE — D9220A PRA ANESTHESIA: Mod: CRNA,,, | Performed by: NURSE ANESTHETIST, CERTIFIED REGISTERED

## 2023-10-26 PROCEDURE — C1898 LEAD, PMKR, OTHER THAN TRANS: HCPCS | Performed by: INTERNAL MEDICINE

## 2023-10-26 PROCEDURE — 93005 ELECTROCARDIOGRAM TRACING: CPT

## 2023-10-26 PROCEDURE — 93010 EKG 12-LEAD: ICD-10-PCS | Mod: ,,, | Performed by: INTERNAL MEDICINE

## 2023-10-26 PROCEDURE — 33225 L VENTRIC PACING LEAD ADD-ON: CPT | Mod: 22,GC,, | Performed by: INTERNAL MEDICINE

## 2023-10-26 PROCEDURE — 33228 REMV&REPLC PM GEN DUAL LEAD: CPT | Performed by: INTERNAL MEDICINE

## 2023-10-26 PROCEDURE — C1785 PMKR, DUAL, RATE-RESP: HCPCS | Performed by: INTERNAL MEDICINE

## 2023-10-26 PROCEDURE — 33228 PR REMV&REPLC PM GEN DUAL LEAD: ICD-10-PCS | Mod: 22,GC,, | Performed by: INTERNAL MEDICINE

## 2023-10-26 PROCEDURE — C1725 CATH, TRANSLUMIN NON-LASER: HCPCS | Performed by: INTERNAL MEDICINE

## 2023-10-26 PROCEDURE — D9220A PRA ANESTHESIA: ICD-10-PCS | Mod: ANES,,, | Performed by: ANESTHESIOLOGY

## 2023-10-26 PROCEDURE — C1887 CATHETER, GUIDING: HCPCS | Performed by: INTERNAL MEDICINE

## 2023-10-26 PROCEDURE — 93010 ELECTROCARDIOGRAM REPORT: CPT | Mod: ,,, | Performed by: INTERNAL MEDICINE

## 2023-10-26 PROCEDURE — 27201423 OPTIME MED/SURG SUP & DEVICES STERILE SUPPLY: Performed by: INTERNAL MEDICINE

## 2023-10-26 PROCEDURE — 25000003 PHARM REV CODE 250: Performed by: INTERNAL MEDICINE

## 2023-10-26 PROCEDURE — 63600175 PHARM REV CODE 636 W HCPCS: Performed by: INTERNAL MEDICINE

## 2023-10-26 PROCEDURE — 94761 N-INVAS EAR/PLS OXIMETRY MLT: CPT

## 2023-10-26 PROCEDURE — 33228 REMV&REPLC PM GEN DUAL LEAD: CPT | Mod: 22,GC,, | Performed by: INTERNAL MEDICINE

## 2023-10-26 PROCEDURE — D9220A PRA ANESTHESIA: ICD-10-PCS | Mod: CRNA,,, | Performed by: NURSE ANESTHETIST, CERTIFIED REGISTERED

## 2023-10-26 PROCEDURE — D9220A PRA ANESTHESIA: Mod: ANES,,, | Performed by: ANESTHESIOLOGY

## 2023-10-26 PROCEDURE — 37000009 HC ANESTHESIA EA ADD 15 MINS: Performed by: INTERNAL MEDICINE

## 2023-10-26 PROCEDURE — 33225 PR INSRT PACING ELECT,AT INSERT NEW DEVICE: ICD-10-PCS | Mod: 22,GC,, | Performed by: INTERNAL MEDICINE

## 2023-10-26 PROCEDURE — C1769 GUIDE WIRE: HCPCS | Performed by: INTERNAL MEDICINE

## 2023-10-26 PROCEDURE — 63600175 PHARM REV CODE 636 W HCPCS: Performed by: ANESTHESIOLOGY

## 2023-10-26 PROCEDURE — 85610 PROTHROMBIN TIME: CPT | Performed by: INTERNAL MEDICINE

## 2023-10-26 DEVICE — IPG W3DR01 AZURE S DR MRI USA
Type: IMPLANTABLE DEVICE | Site: HEART | Status: FUNCTIONAL
Brand: AZURE™ S DR MRI SURESCAN™

## 2023-10-26 RX ORDER — LOSARTAN POTASSIUM 25 MG/1
25 TABLET ORAL DAILY
Status: DISCONTINUED | OUTPATIENT
Start: 2023-10-27 | End: 2023-10-27 | Stop reason: HOSPADM

## 2023-10-26 RX ORDER — LIDOCAINE HYDROCHLORIDE 20 MG/ML
INJECTION, SOLUTION INFILTRATION; PERINEURAL
Status: DISCONTINUED | OUTPATIENT
Start: 2023-10-26 | End: 2023-10-27 | Stop reason: HOSPADM

## 2023-10-26 RX ORDER — ATORVASTATIN CALCIUM 20 MG/1
20 TABLET, FILM COATED ORAL DAILY
Status: DISCONTINUED | OUTPATIENT
Start: 2023-10-27 | End: 2023-10-27 | Stop reason: HOSPADM

## 2023-10-26 RX ORDER — CALCIUM CHLORIDE INJECTION 100 MG/ML
INJECTION, SOLUTION INTRAVENOUS
Status: DISCONTINUED | OUTPATIENT
Start: 2023-10-26 | End: 2023-10-26

## 2023-10-26 RX ORDER — CEFAZOLIN SODIUM 1 G/3ML
INJECTION, POWDER, FOR SOLUTION INTRAMUSCULAR; INTRAVENOUS
Status: DISCONTINUED | OUTPATIENT
Start: 2023-10-26 | End: 2023-10-26

## 2023-10-26 RX ORDER — LIDOCAINE HYDROCHLORIDE 20 MG/ML
INJECTION, SOLUTION EPIDURAL; INFILTRATION; INTRACAUDAL; PERINEURAL
Status: DISCONTINUED | OUTPATIENT
Start: 2023-10-26 | End: 2023-10-26

## 2023-10-26 RX ORDER — FENTANYL CITRATE 50 UG/ML
INJECTION, SOLUTION INTRAMUSCULAR; INTRAVENOUS
Status: DISCONTINUED | OUTPATIENT
Start: 2023-10-26 | End: 2023-10-26

## 2023-10-26 RX ORDER — PROPOFOL 10 MG/ML
VIAL (ML) INTRAVENOUS CONTINUOUS PRN
Status: DISCONTINUED | OUTPATIENT
Start: 2023-10-26 | End: 2023-10-26

## 2023-10-26 RX ORDER — DOXYCYCLINE HYCLATE 100 MG
100 TABLET ORAL EVERY 12 HOURS
Status: DISCONTINUED | OUTPATIENT
Start: 2023-10-26 | End: 2023-10-27 | Stop reason: HOSPADM

## 2023-10-26 RX ORDER — DIPHENHYDRAMINE HYDROCHLORIDE 50 MG/ML
25 INJECTION INTRAMUSCULAR; INTRAVENOUS EVERY 6 HOURS PRN
Status: DISCONTINUED | OUTPATIENT
Start: 2023-10-26 | End: 2023-10-27 | Stop reason: HOSPADM

## 2023-10-26 RX ORDER — ONDANSETRON 2 MG/ML
4 INJECTION INTRAMUSCULAR; INTRAVENOUS ONCE AS NEEDED
Status: COMPLETED | OUTPATIENT
Start: 2023-10-26 | End: 2023-10-26

## 2023-10-26 RX ORDER — ACETAMINOPHEN 325 MG/1
650 TABLET ORAL EVERY 4 HOURS PRN
Status: DISCONTINUED | OUTPATIENT
Start: 2023-10-26 | End: 2023-10-27 | Stop reason: HOSPADM

## 2023-10-26 RX ORDER — HYDROMORPHONE HYDROCHLORIDE 1 MG/ML
0.2 INJECTION, SOLUTION INTRAMUSCULAR; INTRAVENOUS; SUBCUTANEOUS EVERY 5 MIN PRN
Status: DISCONTINUED | OUTPATIENT
Start: 2023-10-26 | End: 2023-10-27 | Stop reason: HOSPADM

## 2023-10-26 RX ORDER — VANCOMYCIN HYDROCHLORIDE 1 G/20ML
INJECTION, POWDER, LYOPHILIZED, FOR SOLUTION INTRAVENOUS
Status: DISCONTINUED | OUTPATIENT
Start: 2023-10-26 | End: 2023-10-27 | Stop reason: HOSPADM

## 2023-10-26 RX ORDER — BUPIVACAINE HYDROCHLORIDE 2.5 MG/ML
INJECTION, SOLUTION EPIDURAL; INFILTRATION; INTRACAUDAL
Status: DISCONTINUED | OUTPATIENT
Start: 2023-10-26 | End: 2023-10-27 | Stop reason: HOSPADM

## 2023-10-26 RX ORDER — PHENYLEPHRINE HCL IN 0.9% NACL 1 MG/10 ML
SYRINGE (ML) INTRAVENOUS
Status: DISCONTINUED | OUTPATIENT
Start: 2023-10-26 | End: 2023-10-26

## 2023-10-26 RX ORDER — CARVEDILOL 3.12 MG/1
3.12 TABLET ORAL 2 TIMES DAILY
Status: DISCONTINUED | OUTPATIENT
Start: 2023-10-26 | End: 2023-10-27 | Stop reason: HOSPADM

## 2023-10-26 RX ORDER — SODIUM CHLORIDE 0.9 G/100ML
IRRIGANT IRRIGATION
Status: DISCONTINUED | OUTPATIENT
Start: 2023-10-26 | End: 2023-10-27 | Stop reason: HOSPADM

## 2023-10-26 RX ORDER — FENTANYL CITRATE 50 UG/ML
25 INJECTION, SOLUTION INTRAMUSCULAR; INTRAVENOUS EVERY 5 MIN PRN
Status: DISCONTINUED | OUTPATIENT
Start: 2023-10-26 | End: 2023-10-26

## 2023-10-26 RX ORDER — GABAPENTIN 300 MG/1
600 CAPSULE ORAL 3 TIMES DAILY
Status: DISCONTINUED | OUTPATIENT
Start: 2023-10-26 | End: 2023-10-27 | Stop reason: HOSPADM

## 2023-10-26 RX ADMIN — SODIUM CHLORIDE: 0.9 INJECTION, SOLUTION INTRAVENOUS at 01:10

## 2023-10-26 RX ADMIN — SODIUM CHLORIDE 0.5 MCG/KG/MIN: 9 INJECTION, SOLUTION INTRAVENOUS at 03:10

## 2023-10-26 RX ADMIN — FENTANYL CITRATE 25 MCG: 50 INJECTION INTRAMUSCULAR; INTRAVENOUS at 03:10

## 2023-10-26 RX ADMIN — Medication 200 MCG: at 02:10

## 2023-10-26 RX ADMIN — GABAPENTIN 600 MG: 300 CAPSULE ORAL at 08:10

## 2023-10-26 RX ADMIN — Medication 100 MCG: at 02:10

## 2023-10-26 RX ADMIN — CARVEDILOL 3.12 MG: 3.12 TABLET, FILM COATED ORAL at 08:10

## 2023-10-26 RX ADMIN — LIDOCAINE HYDROCHLORIDE 50 MG: 20 INJECTION, SOLUTION EPIDURAL; INFILTRATION; INTRACAUDAL at 01:10

## 2023-10-26 RX ADMIN — PROPOFOL 100 MCG/KG/MIN: 10 INJECTION, EMULSION INTRAVENOUS at 01:10

## 2023-10-26 RX ADMIN — DOXYCYCLINE HYCLATE 100 MG: 100 TABLET, COATED ORAL at 08:10

## 2023-10-26 RX ADMIN — ONDANSETRON 4 MG: 2 INJECTION INTRAMUSCULAR; INTRAVENOUS at 09:10

## 2023-10-26 RX ADMIN — FENTANYL CITRATE 50 MCG: 50 INJECTION INTRAMUSCULAR; INTRAVENOUS at 02:10

## 2023-10-26 RX ADMIN — FENTANYL CITRATE 25 MCG: 50 INJECTION INTRAMUSCULAR; INTRAVENOUS at 07:10

## 2023-10-26 RX ADMIN — CALCIUM CHLORIDE INJECTION 500 MG: 100 INJECTION, SOLUTION INTRAVENOUS at 02:10

## 2023-10-26 RX ADMIN — FENTANYL CITRATE 25 MCG: 50 INJECTION INTRAMUSCULAR; INTRAVENOUS at 05:10

## 2023-10-26 RX ADMIN — CEFAZOLIN 2 G: 330 INJECTION, POWDER, FOR SOLUTION INTRAMUSCULAR; INTRAVENOUS at 01:10

## 2023-10-26 NOTE — PLAN OF CARE
Patient arrived to room. PIV placed. Admit assessment completed. Plan of care discussed with patient.  Mepilex dressings placed to sacrum and bilateral heels per protocol.

## 2023-10-26 NOTE — HPI
Jalyn Aaron is a 73 y.o. female who presents for generator change and BiV upgrade secondary to reduction in EF with chronic RV pacing. No complaints this morning. Denies fevers or active infections. She is not on OAC. ECG today shows ***.     HPI:     Ms. Aaron is a 73 y.o. female with AV block, NICM, PPM, anxiety here for follow up.      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  Significant anxiety -- much improved since she has been on cymbalta  Echo 3/28/16 normal biventricular structure and function.  Echo 6/1/20 EF 45%.  We added Toprol. Already on Losartan.  Felt poorly on the Toprol. Noted heaviness, possibly shortness of breath, thinks anxiety may have been related. This was discontinued.     Retired,  3 years ago.  Echo 10/8/20 EF 45%  Echo 11/21 EF 45%.     11/26/2022: Complete AV block. EF remains stable at 45%.  We discussed consideration of upgrade at time of generator change. There is concern that she is occluded, given the varicosities observed on her chest. We will schedule venogram now to assess.  If occluded, would defer upgrade unless EF further deteriorates.     Update (06/21/2023):     12/20/2022: Venogram: significant narrowing at the left brachiocephalic region, but still with forward flow.     Today she says she is feeling well. Walking every day - new dog. No YORK, edema, palps, syncope. Some LH in the heat.     Device Interrogation (6/21/2023) reveals an intrinsic SR with CHB with stable lead and device function. No arrhythmias or treated episodes were noted.  She paces 7% in the RA and 100% in the RV. Estimated battery longevity 8 months.      I have personally reviewed the patient's EKG today, which shows ASVP at 63bpm. ME interval is 156. QRS is 140. QTc is 480.

## 2023-10-26 NOTE — Clinical Note
The lead was inserted under fluorscopic guidance and was not successfully inserted. A new lead was attached to the coronary sinus.

## 2023-10-26 NOTE — ANESTHESIA PREPROCEDURE EVALUATION
10/26/2023  Jalyn Aaron is a 73 y.o., female.  Patient Active Problem List   Diagnosis    Atrioventricular block, complete    Cardiac pacemaker in situ    Anxiety    Intraductal papilloma of breast    Ejection fraction < 50%    Cardiomyopathy, nonischemic    Lumbar stenosis    Screening for colon cancer    Disorder of muscle    Decreased range of motion of trunk and back    Decreased strength of trunk and back    Weakness of left lower extremity    Neuropathy    Decreased ROM of neck    Decreased strength of upper extremity    Atherosclerosis of aorta    Discoid lupus erythematosus    Snoring    SARAI (obstructive sleep apnea)           Pre-op Assessment    I have reviewed the Patient Summary Reports.       I have reviewed the Medications.     Review of Systems  Anesthesia Hx:  No problems with previous Anesthesia   Denies Personal Hx of Anesthesia complications.       Physical Exam    Airway:  No airway management difficulties anticipated  Dental:No active dental issues noted  Chest/Lungs:  Clear to auscultation    Heart:  Rate: Normal  Rhythm: Regular Rhythm  Sounds: Normal        Anesthesia Plan  Type of Anesthesia, risks & benefits discussed:    Anesthesia Type: Gen Natural Airway  Informed Consent: Informed consent signed with the Patient and all parties understand the risks and agree with anesthesia plan.  All questions answered.   ASA Score: 3  Anesthesia Plan Notes: Chart reviewed. Patient seen and examined. Anesthesia plan discussed and questions answered. E-consent signed. James Nesbitt MD    Ready For Surgery From Anesthesia Perspective.     .

## 2023-10-26 NOTE — SUBJECTIVE & OBJECTIVE
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    Left    AV block     exercised induced 2:1    Cataract     Fever blister     Heart block     History of acne     Joint pain     hands    Keratoconjunctivitis sicca not due to Sjogren's syndrome     Pacemaker     Sleep apnea, unspecified        Past Surgical History:   Procedure Laterality Date    BREAST BIOPSY Left 12/2013    papilloma with atypia on core biopsy    BREAST BIOPSY Left 01/2014    Ex.Bx., removal of ADH/Papilloma    COLONOSCOPY N/A 11/8/2016    Procedure: COLONOSCOPY;  Surgeon: Dl Caruso MD;  Location: [facility] (4TH FLR);  Service: Endoscopy;  Laterality: N/A;  Pacemaker in place.    COLONOSCOPY N/A 3/30/2021    Procedure: COLONOSCOPY;  Surgeon: Joaquin Johnson MD;  Location: [facility] (4TH FLR);  Service: Endoscopy;  Laterality: N/A;  prep ins. emailed - COVID screening on 3/27/21 PCW - ERW    ENDOSCOPIC ULTRASOUND OF UPPER GASTROINTESTINAL TRACT N/A 10/9/2023    Procedure: ULTRASOUND, UPPER GI TRACT, ENDOSCOPIC;  Surgeon: Stacey Sesay MD;  Location: [facility] (2ND FLR);  Service: Endoscopy;  Laterality: N/A;  pancreas cyst 7mm on CT (r/o nodule or worrisome features)    8/4/23-Instructions via portal-DS  10/2-precall complete-MS    HYSTERECTOMY      INSERT / REPLACE / REMOVE PACEMAKER      LASIK      LASIK Bilateral 2009    VENOGRAM, EP LAB N/A 12/20/2022    Procedure: Venogram, EP Lab;  Surgeon: Chacho Colby MD;  Location: [facility] EP LAB;  Service: Cardiology;  Laterality: N/A;  NICM, Venogram- left side, SK, 3 Prep *MDT PPM in situ*       Review of patient's allergies indicates:   Allergen Reactions    Bactrim [sulfamethoxazole-trimethoprim] Nausea Only       Current Facility-Administered Medications on File Prior to Encounter   Medication    triamcinolone acetonide injection 10 mg     Current Outpatient Medications on File Prior to Encounter   Medication Sig    atorvastatin (LIPITOR) 20 MG  tablet Take 1 tablet (20 mg total) by mouth once daily.    calcium-vitamin D3 (OS-TALHA 500 + D3) 500 mg-5 mcg (200 unit) per tablet Take 1 tablet by mouth 2 (two) times daily with meals.    carvediloL (COREG) 3.125 MG tablet Take 1 tablet (3.125 mg total) by mouth 2 (two) times daily.    fluocinonide (LIDEX) 0.05 % external solution AAA scalp qday    gabapentin (NEURONTIN) 600 MG tablet Take 1.5 tablets (900 mg total) by mouth 3 (three) times daily.    losartan (COZAAR) 25 MG tablet TAKE 1 TABLET BY MOUTH TWICE DAILY    multivitamin capsule Take 1 capsule by mouth once daily.    naproxen (NAPROSYN) 500 MG tablet TAKE 1/2 TABLET BY MOUTH DAILY    senna (SENOKOT) 8.6 mg tablet Take 1 tablet by mouth once daily. Twice a day    vitamin D 1000 units Tab Take 1,000 Units by mouth once daily.     Family History       Problem Relation (Age of Onset)    Breast cancer Mother    Cancer Mother (80)    Cataracts Mother    Hypertension Mother    Multiple sclerosis Father, Sister    No Known Problems Maternal Aunt, Maternal Uncle, Paternal Aunt, Paternal Uncle, Maternal Grandmother, Paternal Grandmother, Paternal Grandfather, Brother, Son, Son    Prostate cancer Maternal Grandfather          Tobacco Use    Smoking status: Never     Passive exposure: Never    Smokeless tobacco: Never   Substance and Sexual Activity    Alcohol use: Not Currently     Alcohol/week: 1.0 standard drink of alcohol     Types: 1 Glasses of wine per week     Comment: weekly    Drug use: No    Sexual activity: Not Currently     Partners: Male     Birth control/protection: None     Review of Systems   All other systems reviewed and are negative.    Objective:     Vital Signs (Most Recent):    Vital Signs (24h Range):  BP: ()/()   Arterial Line BP: ()/()           There is no height or weight on file to calculate BMI.             Physical Exam  General: NAD. AAO  HENT: EOMI  Neck: supple. No JVD  CV: RRR. Normal S1/S2. No gallops, rubs, or murmurs. 2+ radial  pulses  Resp: CTAB. No increased work of breathing  Ext: warm. No edema.         Significant Labs: All pertinent lab results from the last 24 hours have been reviewed.    Significant Imaging:  Reviewed

## 2023-10-26 NOTE — TRANSFER OF CARE
"Anesthesia Transfer of Care Note    Patient: Jalyn Aaron    Procedure(s) Performed: Procedure(s) (LRB):  Biventricular pacemaker upgrade (N/A)    Patient location: PACU    Anesthesia Type: general    Transport from OR: Transported from OR on 2-3 L/min O2 by NC with adequate spontaneous ventilation    Post pain: adequate analgesia    Post assessment: no apparent anesthetic complications and tolerated procedure well    Post vital signs: stable    Level of consciousness: awake and alert    Nausea/Vomiting: no nausea/vomiting    Complications: none    Transfer of care protocol was followed      Last vitals:   Visit Vitals  /85 (BP Location: Right arm, Patient Position: Lying)   Pulse 64   Temp 36.5 °C (97.7 °F) (Temporal)   Resp 18   Ht 5' 7" (1.702 m)   Wt 55.8 kg (123 lb)   SpO2 98%   Breastfeeding No   BMI 19.26 kg/m²     "

## 2023-10-26 NOTE — Clinical Note
The groin and chest was prepped. The site was prepped with ChloraPrep and Ioban. The site was clipped. The patient was draped.

## 2023-10-26 NOTE — Clinical Note
Progress Note               Author: Khang Delgado Date & Time created: 2019  11:22 AM     Interval History:  Pt stood up with help yesteray    Review of Systems:  ROS    Physical Exam:  Physical Exam  Musculoskeletal:      Comments: Wound essentialy healed with no drainage  Sensation and motor intact         Labs:          Recent Labs     19  0516 19  0310 19  0336   SODIUM 140 136 142   POTASSIUM 4.0 3.9 4.2   CHLORIDE 108 106 106   CO2 26 27 30   BUN 12 13 14   CREATININE 0.64 0.58 0.63   CALCIUM 8.1* 7.8* 8.1*     Recent Labs     19  0516 19  0310 19  0336   GLUCOSE 123* 145* 108*     Recent Labs     19  1040 19  0310 19  1232 19  1347 19  0336   RBC  --  2.71*  --  3.52* 3.14*   HEMOGLOBIN  --  6.9*  --  9.2* 8.1*   HEMATOCRIT  --  23.1*  --  29.8* 26.9*   PLATELETCT  --  268  --  347 319   APTT 106.6*  --  46.8*  --   --      Recent Labs     19  0310 19  1347 19  0336   WBC 9.8 13.2* 10.8   NEUTSPOLYS 72.40* 88.60* 77.00*   LYMPHOCYTES 11.90* 3.50* 10.60*   MONOCYTES 11.80 5.30 6.20   EOSINOPHILS 0.20 0.00 0.00   BASOPHILS 0.10 0.00 0.00     Hemodynamics:  No data recorded.  Monitored Temp: 38 °C (100.4 °F)  Pulse  Av.2  Min: 59  Max: 114   Blood Pressure : 135/69     Respiratory:    Respiration: (!) 36, Pulse Oximetry: 91 %, O2 Daily Delivery Respiratory : Silicone Nasal Cannula     PEP/CPT Method: Positive Airway Pressure Device, Work Of Breathing / Effort: Mild  RUL Breath Sounds: Clear, RML Breath Sounds: Diminished, RLL Breath Sounds: Diminished, HAJA Breath Sounds: Clear, LLL Breath Sounds: Diminished  Fluids:    Intake/Output Summary (Last 24 hours) at 2019 1122  Last data filed at 2019 0800  Gross per 24 hour   Intake 340 ml   Output 3440 ml   Net -3100 ml     Weight: 81.2 kg (179 lb 0.2 oz)  GI/Nutrition:  Orders Placed This Encounter   Procedures   • Diet NPO     Standing Status:   Standing      The generator pocket was sutured closed. Number of Occurrences:   1     Order Specific Question:   Restrict to:     Answer:   Sips with Medications [3]     Medical Decision Making, by Problem:  Active Hospital Problems    Diagnosis   • *Shock (HCC) [R57.9]   • Fever [R50.9]   • Acute pulmonary embolism with acute cor pulmonale (HCC) [I26.09]   • Acute respiratory failure (HCC) [J96.00]   • Closed dislocation of right hip (HCC) [S73.004A]   • COPD (chronic obstructive pulmonary disease) (HCC) [J44.9]   • Elevated troponin [R79.89]   • Gastroesophageal reflux disease [K21.9]   • Obesity (BMI 30-39.9) [E66.9]   • Dyslipidemia [E78.5]   • Normocytic anemia [D64.9]       Plan:  No new recs  Post approach total hip precautions wbat    Quality-Core Measures

## 2023-10-26 NOTE — NURSING
Notified nurse that PT/INR is not resulted.  She states that is ok and will take her to procedure at this time.

## 2023-10-26 NOTE — DISCHARGE INSTRUCTIONS
Dad called this morning stating that over the weekend Sandra Kerr had a "flair up" with a little swelling  Dad has had her on 5 ML of her medication since Christmas Navya  Her weight has gone back up and he gave her a water pill  He also mentioned that her BP was 138/116 on 01/24/2021  He wants to know what else he can do  Medication instructions:    Complete your 5 days of antibiotics  If you are on a blood thinner (examples: apixiban [Eliquis], rivaroxaban [Xarelto], dabigatran [Pradaxa], or enoxaparin [Lovenox]), do not take your blood thinner for the next 5 days after your procedure. You can resume after 5 days post-procedure (i.e., when you complete your antibiotics). If you are on Coumadin you can continue to take it the day of your procedure  Pain control: you can take up to Tylenol 1000 mg every 6 hours as needed or (if you do not have kidney disease) ibuprofen 600 mg every 6 hours as needed as needed for pain control (if you do not have kidney disease). If unsure, please contact your primary care physician for recommendations.      Activity restrictions & precautions:    Sling & arm instructions:  Wear your sling for 48 hours. After 48 hours, you can take your arm out of your sling.   You must wear your sling at night when you sleep for 6 weeks after your procedure  Do not lift >5 lbs for 2 weeks.  Do not raise your elbow above your shoulder on the same side as your device for 6 weeks.     Surgical site   Dressing (see images below)  **Note: these are general rules to follow unless instructed otherwise** - see images below  If you have a brown rectangular gel bandage (A.K.A. Aquacel Ag dressing) over your surgical incision do not remove it. This will be removed at your device clinic follow up appointment in 1 week.  If you have a square light brown bandage (A.K.A Mepilex dressing) you should remove in 48 hours after your procedure.  If you have a pressure dressing (white elastic tape with gauze underneath or a very large bandage that covers your left chest and is shaped like a triangle) over your surgical site, this should be removed the morning after your procedure unless instructed otherwise.  Do not place any creams or ointments over the surgical site. This could effect the integrity of the Dermabond glue.  You can shower  after 24 hours post-procedure, but do not let the jet directly hit your pocket site for at least 2 weeks. Recommend showering with your back to the shower head.   Do not reach your arm (on the same side as your device) around your back during showering for 6 weeks.  Do not submerge your surgical incision site under water (swimming, bathing if you submerge the actual surgical site) for at least 6 week. It is imperative that the surgical site is completely healed prior to doing so    Follow up in device clinic in 1 week to check your incision and device function  Please contact the electrophysiology clinic if you have any questions or if you experience: potential surgical/pocket site complications (pain, swelling, bleeding, drainage), fevers, chest pain/shortness of breath, or for any other concerns.

## 2023-10-26 NOTE — Clinical Note
A venogram was performed in the coronary sinus. The vessel was injected via hand injection  with 2 mL of contrast. Per TREVORAJairon

## 2023-10-26 NOTE — Clinical Note
A venogram was performed in the coronary sinus. The vessel was injected via hand injection  with 10 mL of contrast. Per YAHAIRA

## 2023-10-26 NOTE — Clinical Note
12 ml of contrast were injected throughout the case. 88 mL of contrast was the total wasted during the case. 100 mL was the total amount used during the case.

## 2023-10-26 NOTE — H&P
[facility] - Short Stay Cardiac Unit  Cardiac Electrophysiology  History and Physical     Admission Date: 10/26/2023  Code Status: No Order   Attending Provider: Chacho Colby MD   Principal Problem:Cardiomyopathy, nonischemic    Subjective:     Chief Complaint:  DEVICE  UPGRADE    HPI:  Jalyn Aaron is a 73 y.o. female who presents for generator change and BiV upgrade secondary to reduction in EF with chronic RV pacing. No complaints this morning. Denies fevers or active infections. She is not on OAC.      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  Significant anxiety -- much improved since she has been on cymbalta  Echo 3/28/16 normal biventricular structure and function.  Echo 6/1/20 EF 45%.  We added Toprol. Already on Losartan.  Felt poorly on the Toprol. Noted heaviness, possibly shortness of breath, thinks anxiety may have been related. This was discontinued.     Retired,  3 years ago.  Echo 10/8/20 EF 45%  Echo 11/21 EF 45%.     11/26/2022: Complete AV block. EF remains stable at 45%.  We discussed consideration of upgrade at time of generator change. There is concern that she is occluded, given the varicosities observed on her chest. We will schedule venogram now to assess.  If occluded, would defer upgrade unless EF further deteriorates.     Update (06/21/2023):     12/20/2022: Venogram: significant narrowing at the left brachiocephalic region, but still with forward flow.     Today she says she is feeling well. Walking every day - new dog. No YORK, edema, palps, syncope. Some LH in the heat.     Device Interrogation (6/21/2023) reveals an intrinsic SR with CHB with stable lead and device function. No arrhythmias or treated episodes were noted.  She paces 7% in the RA and 100% in the RV. Estimated battery longevity 8 months.      I have personally reviewed the patient's EKG today, which shows ASVP at 63bpm. MN interval is  156. QRS is 140. QTc is 480.      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    Left    AV block     exercised induced 2:1    Cataract     Fever blister     Heart block     History of acne     Joint pain     hands    Keratoconjunctivitis sicca not due to Sjogren's syndrome     Pacemaker     Sleep apnea, unspecified        Past Surgical History:   Procedure Laterality Date    BREAST BIOPSY Left 12/2013    papilloma with atypia on core biopsy    BREAST BIOPSY Left 01/2014    Ex.Bx., removal of ADH/Papilloma    COLONOSCOPY N/A 11/8/2016    Procedure: COLONOSCOPY;  Surgeon: Dl Caruso MD;  Location: [facility] (4TH FLR);  Service: Endoscopy;  Laterality: N/A;  Pacemaker in place.    COLONOSCOPY N/A 3/30/2021    Procedure: COLONOSCOPY;  Surgeon: Joaquin Johnson MD;  Location: [facility] (4TH FLR);  Service: Endoscopy;  Laterality: N/A;  prep ins. emailed - COVID screening on 3/27/21 PCW - ERW    ENDOSCOPIC ULTRASOUND OF UPPER GASTROINTESTINAL TRACT N/A 10/9/2023    Procedure: ULTRASOUND, UPPER GI TRACT, ENDOSCOPIC;  Surgeon: Stacey Sesay MD;  Location: [facility] (2ND FLR);  Service: Endoscopy;  Laterality: N/A;  pancreas cyst 7mm on CT (r/o nodule or worrisome features)    8/4/23-Instructions via portal-DS  10/2-precall complete-MS    HYSTERECTOMY      INSERT / REPLACE / REMOVE PACEMAKER      LASIK      LASIK Bilateral 2009    VENOGRAM, EP LAB N/A 12/20/2022    Procedure: Venogram, EP Lab;  Surgeon: Chacho Colby MD;  Location: [facility] EP LAB;  Service: Cardiology;  Laterality: N/A;  NICM, Venogram- left side, SK, 3 Prep *MDT PPM in situ*       Review of patient's allergies indicates:   Allergen Reactions    Bactrim [sulfamethoxazole-trimethoprim] Nausea Only       Current Facility-Administered Medications on File Prior to Encounter   Medication    triamcinolone acetonide injection 10 mg     Current Outpatient Medications on File Prior to  Encounter   Medication Sig    atorvastatin (LIPITOR) 20 MG tablet Take 1 tablet (20 mg total) by mouth once daily.    calcium-vitamin D3 (OS-TALHA 500 + D3) 500 mg-5 mcg (200 unit) per tablet Take 1 tablet by mouth 2 (two) times daily with meals.    carvediloL (COREG) 3.125 MG tablet Take 1 tablet (3.125 mg total) by mouth 2 (two) times daily.    fluocinonide (LIDEX) 0.05 % external solution AAA scalp qday    gabapentin (NEURONTIN) 600 MG tablet Take 1.5 tablets (900 mg total) by mouth 3 (three) times daily.    losartan (COZAAR) 25 MG tablet TAKE 1 TABLET BY MOUTH TWICE DAILY    multivitamin capsule Take 1 capsule by mouth once daily.    naproxen (NAPROSYN) 500 MG tablet TAKE 1/2 TABLET BY MOUTH DAILY    senna (SENOKOT) 8.6 mg tablet Take 1 tablet by mouth once daily. Twice a day    vitamin D 1000 units Tab Take 1,000 Units by mouth once daily.     Family History       Problem Relation (Age of Onset)    Breast cancer Mother    Cancer Mother (80)    Cataracts Mother    Hypertension Mother    Multiple sclerosis Father, Sister    No Known Problems Maternal Aunt, Maternal Uncle, Paternal Aunt, Paternal Uncle, Maternal Grandmother, Paternal Grandmother, Paternal Grandfather, Brother, Son, Son    Prostate cancer Maternal Grandfather          Tobacco Use    Smoking status: Never     Passive exposure: Never    Smokeless tobacco: Never   Substance and Sexual Activity    Alcohol use: Not Currently     Alcohol/week: 1.0 standard drink of alcohol     Types: 1 Glasses of wine per week     Comment: weekly    Drug use: No    Sexual activity: Not Currently     Partners: Male     Birth control/protection: None     Review of Systems   All other systems reviewed and are negative.    Objective:     Vital Signs (Most Recent):    Vital Signs (24h Range):  BP: ()/()   Arterial Line BP: ()/()           There is no height or weight on file to calculate BMI.             Physical Exam  General: NAD. AAO  HENT: EOMI  Neck: supple.  No JVD  CV: RRR. Normal S1/S2. No gallops, rubs, or murmurs. 2+ radial pulses  Resp: CTAB. No increased work of breathing  Ext: warm. No edema.         Significant Labs: All pertinent lab results from the last 24 hours have been reviewed.    Significant Imaging:  Reviewed    Assessment and Plan:     * Cardiomyopathy, nonischemic  Patient is here for device upgrade to BiV  - CXR and ECG post-implant  - Device clinic follow up in 1 week  - Antibiotics x 5 days  - If patient is on oral anticoagulation, they will hold this for 5 days post-procedure    Indication: reduction in EF in the setting of chronic RV pacing  LVEF on most recent imagin%  Anticoagulation status: None  Hgb: 12.3  Cr: 0.7  INR: 1  Constrast allergy: no    The indication(s), risks, benefits and alternatives of the procedure were explained to the patient, patient's family and/or surrogate decision maker. Risks include (but not limited to) bleeding, pocket site hematoma, pocket and/or device infection which would often require extraction, pain, damage of vascular structures or surrounding structures, myocardial damage [perforation & tamponade requiring pericardiocentesis, valvular damage, injury to conduction system], pneumothorax, CVA, MI, and death. All questions were answered. Patient is understanding of these risks, and wishes to proceed. Consents signed.          Marcie Abdi MD  Cardiac Electrophysiology  [facility] - Short Stay Cardiac Unit

## 2023-10-26 NOTE — ASSESSMENT & PLAN NOTE
Patient is here for device upgrade to BiV  - CXR and ECG post-implant  - Device clinic follow up in 1 week  - Antibiotics x 5 days  - If patient is on oral anticoagulation, they will hold this for 5 days post-procedure    Indication: reduction in EF in the setting of chronic RV pacing  LVEF on most recent imagin%  Anticoagulation status: None  Hgb: 12.3  Cr: 0.7  INR: 1  Constrast allergy: no    The indication(s), risks, benefits and alternatives of the procedure were explained to the patient, patient's family and/or surrogate decision maker. Risks include (but not limited to) bleeding, pocket site hematoma, pocket and/or device infection which would often require extraction, pain, damage of vascular structures or surrounding structures, myocardial damage [perforation & tamponade requiring pericardiocentesis, valvular damage, injury to conduction system], pneumothorax, CVA, MI, and death. All questions were answered. Patient is understanding of these risks, and wishes to proceed. Consents signed.

## 2023-10-27 ENCOUNTER — PATIENT MESSAGE (OUTPATIENT)
Dept: CARDIOLOGY | Facility: CLINIC | Age: 73
End: 2023-10-27
Payer: MEDICARE

## 2023-10-27 VITALS
HEIGHT: 67 IN | BODY MASS INDEX: 19.3 KG/M2 | SYSTOLIC BLOOD PRESSURE: 122 MMHG | HEART RATE: 74 BPM | RESPIRATION RATE: 17 BRPM | DIASTOLIC BLOOD PRESSURE: 73 MMHG | WEIGHT: 123 LBS | TEMPERATURE: 98 F | OXYGEN SATURATION: 98 %

## 2023-10-27 PROCEDURE — 25000003 PHARM REV CODE 250: Performed by: INTERNAL MEDICINE

## 2023-10-27 RX ORDER — DOXYCYCLINE 100 MG/1
100 CAPSULE ORAL EVERY 12 HOURS
Qty: 8 CAPSULE | Refills: 0 | Status: SHIPPED | OUTPATIENT
Start: 2023-10-27 | End: 2023-10-31

## 2023-10-27 RX ADMIN — GABAPENTIN 600 MG: 300 CAPSULE ORAL at 08:10

## 2023-10-27 RX ADMIN — CARVEDILOL 3.12 MG: 3.12 TABLET, FILM COATED ORAL at 08:10

## 2023-10-27 RX ADMIN — LOSARTAN POTASSIUM 25 MG: 25 TABLET, FILM COATED ORAL at 08:10

## 2023-10-27 RX ADMIN — ACETAMINOPHEN 650 MG: 325 TABLET ORAL at 05:10

## 2023-10-27 RX ADMIN — DOXYCYCLINE HYCLATE 100 MG: 100 TABLET, COATED ORAL at 08:10

## 2023-10-27 RX ADMIN — ACETAMINOPHEN 650 MG: 325 TABLET ORAL at 12:10

## 2023-10-27 NOTE — ANESTHESIA POSTPROCEDURE EVALUATION
Anesthesia Post Evaluation    Patient: Jalyn Aaron    Procedure(s) Performed: Procedure(s) (LRB):  Biventricular pacemaker upgrade (N/A)    Final Anesthesia Type: general (Natural airway)      Patient location during evaluation: PACU  Patient participation: Yes- Able to Participate  Level of consciousness: awake and alert  Post-procedure vital signs: reviewed and stable  Pain management: adequate  Airway patency: patent    PONV status at discharge: No PONV  Anesthetic complications: no      Cardiovascular status: hemodynamically stable  Respiratory status: unassisted  Hydration status: euvolemic  Follow-up not needed.          Vitals Value Taken Time   BP 97/56 10/27/23 0409   Temp 36.6 °C (97.9 °F) 10/27/23 0409   Pulse 69 10/27/23 0409   Resp 18 10/27/23 0409   SpO2 93 % 10/27/23 0409         Event Time   Out of Recovery 20:25:00         Pain/Derik Score: Pain Rating Prior to Med Admin: 3 (10/27/2023  5:21 AM)  Pain Rating Post Med Admin: 0 (10/27/2023  1:40 AM)  Derik Score: 9 (10/26/2023  6:15 PM)

## 2023-10-27 NOTE — NURSING
Patient arrived to floor at this time. Ambulated from stretcher to bed without complications. No distress at this time. VSS. Physical assessment done. Skin assessment done. Oriented patient to room and surroundings. Instructed on use of call light. Encouraged to call with needs. Call light in reach. Bed in lowest and locked position. Side rails up x 2. Informed patient of restrictions. Verbalized understanding. Nurse will continue to monitor.

## 2023-10-27 NOTE — DISCHARGE SUMMARY
[facility] - Cardiology Stepdown  Cardiac Electrophysiology  Discharge Summary      Patient Name: Jalyn Aaron  MRN: [id number]  Admission Date: 10/26/2023  Hospital Length of Stay: 0 days  Discharge Date and Time: No discharge date for patient encounter.  Attending Physician: Chacho Colby MD    Discharging Provider: Bryon España MD  Primary Care Physician: Annemarie Kilgore MD    HPI:   See admit H&P      Procedure(s) (LRB):  Biventricular pacemaker upgrade (N/A)     Indwelling Lines/Drains at time of discharge:  Lines/Drains/Airways     None                 Hospital Course:  S/p generator change. Attempted CS lead and LBBAP lead placement. Doxy x 5 days.       Goals of Care Treatment Preferences:  Code Status: Full Code    Living Will: Yes              Consults:     Significant Diagnostic Studies: Labs: All labs within the past 24 hours have been reviewed    Pending Diagnostic Studies:     None          Final Active Diagnoses:    Diagnosis Date Noted POA    PRINCIPAL PROBLEM:  Cardiomyopathy, nonischemic [I42.8] 10/12/2020 Yes    Atrioventricular block, complete [I44.2]  Yes      Problems Resolved During this Admission:     No new Assessment & Plan notes have been filed under this hospital service since the last note was generated.  Service: Arrhythmia      Discharged Condition: stable    Disposition: Home or Self Care    Follow Up:   Follow-up Information     Chacho Colby MD Follow up in 3 month(s).    Specialties: Electrophysiology, Cardiology  Contact information:  [id number]  [facility] [address]  [phone]             DEVICE CHECK CLINIC Follow up in 1 week(s).                     Patient Instructions:   Medication instructions:     Complete your 5 days of antibiotics   If you are on a blood thinner (examples: apixiban [Eliquis], rivaroxaban [Xarelto], dabigatran [Pradaxa], or enoxaparin [Lovenox]), do not take your blood thinner for the next 5 days after your procedure. You  can resume after 5 days post-procedure (i.e., when you complete your antibiotics). If you are on Coumadin you can continue to take it the day of your procedure   Pain control: you can take up to Tylenol 1000 mg every 6 hours as needed or (if you do not have kidney disease) ibuprofen 600 mg every 6 hours as needed as needed for pain control (if you do not have kidney disease). If unsure, please contact your primary care physician for recommendations.      Activity restrictions & precautions:     Sling & arm instructions:  o Wear your sling for 48 hours. After 48 hours, you can take your arm out of your sling.   o You must wear your sling at night when you sleep for 6 weeks after your procedure  o Do not lift >5 lbs for 2 weeks.  o Do not raise your elbow above your shoulder on the same side as your device for 6 weeks.      Surgical site   o Dressing (see images below)  **Note: these are general rules to follow unless instructed otherwise** - see images below  - If you have a brown rectangular gel bandage (A.K.A. Aquacel Ag dressing) over your surgical incision do not remove it. This will be removed at your device clinic follow up appointment in 1 week.  - If you have a square light brown bandage (A.K.A Mepilex dressing) you should remove in 48 hours after your procedure.  - If you have a pressure dressing (white elastic tape with gauze underneath or a very large bandage that covers your left chest and is shaped like a triangle) over your surgical site, this should be removed the morning after your procedure unless instructed otherwise.  o Do not place any creams or ointments over the surgical site. This could effect the integrity of the Dermabond glue.  o You can shower after 24 hours post-procedure, but do not let the jet directly hit your pocket site for at least 2 weeks. Recommend showering with your back to the shower head.   o Do not reach your arm (on the same side as your device) around your back during  showering for 6 weeks.  o Do not submerge your surgical incision site under water (swimming, bathing if you submerge the actual surgical site) for at least 6 week. It is imperative that the surgical site is completely healed prior to doing so     Follow up in device clinic in 1 week to check your incision and device function   Please contact the electrophysiology clinic if you have any questions or if you experience: potential surgical/pocket site complications (pain, swelling, bleeding, drainage), fevers, chest pain/shortness of breath, or for any other concerns.           Medications:  Reconciled Home Medications:      Medication List      START taking these medications    doxycycline 100 MG Cap  Commonly known as: VIBRAMYCIN  Take 1 capsule (100 mg total) by mouth every 12 (twelve) hours. for 4 days        CONTINUE taking these medications    atorvastatin 20 MG tablet  Commonly known as: LIPITOR  Take 1 tablet (20 mg total) by mouth once daily.     calcium-vitamin D3 500 mg-5 mcg (200 unit) per tablet  Commonly known as: OS-TALHA 500 + D3  Take 1 tablet by mouth 2 (two) times daily with meals.     carvediloL 3.125 MG tablet  Commonly known as: COREG  Take 1 tablet (3.125 mg total) by mouth 2 (two) times daily.     fluocinonide 0.05 % external solution  Commonly known as: LIDEX  AAA scalp qday     gabapentin 600 MG tablet  Commonly known as: NEURONTIN  Take 1.5 tablets (900 mg total) by mouth 3 (three) times daily.     losartan 25 MG tablet  Commonly known as: COZAAR  TAKE 1 TABLET BY MOUTH TWICE DAILY     multivitamin capsule  Take 1 capsule by mouth once daily.     naproxen 500 MG tablet  Commonly known as: NAPROSYN  TAKE 1/2 TABLET BY MOUTH DAILY     senna 8.6 mg tablet  Commonly known as: SENOKOT  Take 1 tablet by mouth once daily. Twice a day     vitamin D 1000 units Tab  Commonly known as: VITAMIN D3  Take 1,000 Units by mouth once daily.            Time spent on the discharge of patient: 15  minutes    Bryon España MD  Cardiac Electrophysiology  [facility] - Card[facility] Stepdown

## 2023-10-30 ENCOUNTER — PATIENT MESSAGE (OUTPATIENT)
Dept: CARDIOLOGY | Facility: CLINIC | Age: 73
End: 2023-10-30
Payer: MEDICARE

## 2023-10-31 ENCOUNTER — PATIENT MESSAGE (OUTPATIENT)
Dept: INTERNAL MEDICINE | Facility: CLINIC | Age: 73
End: 2023-10-31
Payer: MEDICARE

## 2023-11-01 RX ORDER — FLUOROURACIL 50 MG/G
CREAM TOPICAL
Qty: 40 G | Refills: 0 | Status: SHIPPED | OUTPATIENT
Start: 2023-11-01 | End: 2024-01-12

## 2023-11-01 NOTE — PROGRESS NOTES
Had pathology slides from punch biopsy looked at by Dr. Collier and Dr. Bo. Both dermatopathologists agree that the specimen contains precancerous cells. No signs of cutaneous lupus appreciated.     Will d/c tx with topical steroids. Sent Rx for efudex - will treat scalp vertex bid x 4 weeks.     Explained that area may get inflamed, red, crusty or painful with use of cream. Patient to message me should area become ulcerated or bleed.     F/u with me early jan 2024

## 2023-11-03 ENCOUNTER — PATIENT MESSAGE (OUTPATIENT)
Dept: DERMATOLOGY | Facility: CLINIC | Age: 73
End: 2023-11-03
Payer: MEDICARE

## 2023-11-04 NOTE — TELEPHONE ENCOUNTER
Apixaban 5 mg by mouth BID   Dr Quinteros (Vascular) [facility] completed 9/18/2023  Compression stockings    Spoke with pt to inform her that she is on our waitlist, and as soon as something becomes available, we will contact her to schedule her appt.    ----- Message from Aubree Ravi RN sent at 5/8/2020  9:22 AM CDT -----  Contact: Patient      ----- Message -----  From: Alethea Cabezas  Sent: 5/8/2020   9:11 AM CDT  To: Lasha Flor Staff    The pt is calling to schedule her recall. Please  call her back @ [phone]. Thanks, Alethea

## 2023-11-05 ENCOUNTER — TELEPHONE (OUTPATIENT)
Dept: INTERNAL MEDICINE | Facility: CLINIC | Age: 73
End: 2023-11-05

## 2023-11-05 ENCOUNTER — TELEPHONE (OUTPATIENT)
Dept: INTERNAL MEDICINE | Facility: CLINIC | Age: 73
End: 2023-11-05
Payer: MEDICARE

## 2023-11-05 DIAGNOSIS — R94.5 ABNORMAL RESULTS OF LIVER FUNCTION STUDIES: ICD-10-CM

## 2023-11-05 DIAGNOSIS — R79.89 ELEVATED LFTS: Primary | ICD-10-CM

## 2023-11-05 NOTE — PROGRESS NOTES
Pap was normal but HPV testing is positive for high risk types - please keep appointment with GYN.  We will schedule your repeat lab of your liver tests this week.

## 2023-11-06 ENCOUNTER — PATIENT MESSAGE (OUTPATIENT)
Dept: INTERNAL MEDICINE | Facility: CLINIC | Age: 73
End: 2023-11-06
Payer: MEDICARE

## 2023-11-06 ENCOUNTER — CLINICAL SUPPORT (OUTPATIENT)
Dept: CARDIOLOGY | Facility: HOSPITAL | Age: 73
End: 2023-11-06
Attending: INTERNAL MEDICINE
Payer: MEDICARE

## 2023-11-06 DIAGNOSIS — I44.30 AV BLOCK: ICD-10-CM

## 2023-11-06 DIAGNOSIS — I44.2 ATRIOVENTRICULAR BLOCK, COMPLETE: ICD-10-CM

## 2023-11-06 DIAGNOSIS — Z95.0 PRESENCE OF CARDIAC PACEMAKER: ICD-10-CM

## 2023-11-06 DIAGNOSIS — I42.8 CARDIOMYOPATHY, NONISCHEMIC: ICD-10-CM

## 2023-11-06 LAB
FINAL PATHOLOGIC DIAGNOSIS: NORMAL
GROSS: NORMAL
Lab: NORMAL
MICROSCOPIC EXAM: NORMAL

## 2023-11-06 PROCEDURE — 93280 PM DEVICE PROGR EVAL DUAL: CPT

## 2023-11-06 PROCEDURE — 93280 PM DEVICE PROGR EVAL DUAL: CPT | Mod: 26,,, | Performed by: INTERNAL MEDICINE

## 2023-11-06 PROCEDURE — 93280 CARDIAC DEVICE CHECK - IN CLINIC & HOSPITAL: ICD-10-PCS | Mod: 26,,, | Performed by: INTERNAL MEDICINE

## 2023-11-07 LAB
OHS CV AF BURDEN PERCENT: < 1
OHS CV RV PACING PERCENT: 100 %

## 2023-11-14 ENCOUNTER — PATIENT MESSAGE (OUTPATIENT)
Dept: DERMATOLOGY | Facility: CLINIC | Age: 73
End: 2023-11-14
Payer: MEDICARE

## 2023-11-14 ENCOUNTER — PATIENT MESSAGE (OUTPATIENT)
Dept: ELECTROPHYSIOLOGY | Facility: CLINIC | Age: 73
End: 2023-11-14
Payer: MEDICARE

## 2023-11-15 ENCOUNTER — PATIENT MESSAGE (OUTPATIENT)
Dept: CARDIOLOGY | Facility: CLINIC | Age: 73
End: 2023-11-15
Payer: MEDICARE

## 2023-11-16 ENCOUNTER — PATIENT MESSAGE (OUTPATIENT)
Dept: DERMATOLOGY | Facility: CLINIC | Age: 73
End: 2023-11-16
Payer: MEDICARE

## 2023-11-20 ENCOUNTER — HOSPITAL ENCOUNTER (OUTPATIENT)
Dept: CARDIOLOGY | Facility: HOSPITAL | Age: 73
Discharge: HOME | End: 2023-11-20
Attending: INTERNAL MEDICINE
Payer: MEDICARE

## 2023-11-20 VITALS
HEART RATE: 66 BPM | SYSTOLIC BLOOD PRESSURE: 108 MMHG | HEIGHT: 67 IN | WEIGHT: 123 LBS | BODY MASS INDEX: 19.3 KG/M2 | DIASTOLIC BLOOD PRESSURE: 62 MMHG

## 2023-11-20 DIAGNOSIS — R94.30 EJECTION FRACTION < 50%: ICD-10-CM

## 2023-11-20 LAB
ASCENDING AORTA: 3 CM
AV INDEX (PROSTH): 0.79
AV MEAN GRADIENT: 2 MMHG
AV PEAK GRADIENT: 4 MMHG
AV VALVE AREA BY VELOCITY RATIO: 2.72 CM²
AV VALVE AREA: 2.78 CM²
AV VELOCITY RATIO: 0.77
BSA FOR ECHO PROCEDURE: 1.62 M2
CV ECHO LV RWT: 0.35 CM
DOP CALC AO PEAK VEL: 1 M/S
DOP CALC AO VTI: 21.98 CM
DOP CALC LVOT AREA: 3.5 CM2
DOP CALC LVOT DIAMETER: 2.12 CM
DOP CALC LVOT PEAK VEL: 0.77 M/S
DOP CALC LVOT STROKE VOLUME: 61.18 CM3
DOP CALCLVOT PEAK VEL VTI: 17.34 CM
E WAVE DECELERATION TIME: 198.13 MSEC
E/A RATIO: 0.87
E/E' RATIO: 8.13 M/S
ECHO LV POSTERIOR WALL: 0.76 CM (ref 0.6–1.1)
FRACTIONAL SHORTENING: 25 % (ref 28–44)
INTERVENTRICULAR SEPTUM: 0.71 CM (ref 0.6–1.1)
LA MAJOR: 5.11 CM
LA MINOR: 4.86 CM
LA WIDTH: 3.94 CM
LEFT ATRIUM SIZE: 2.64 CM
LEFT ATRIUM VOLUME INDEX MOD: 40.2 ML/M2
LEFT ATRIUM VOLUME INDEX: 26.9 ML/M2
LEFT ATRIUM VOLUME MOD: 65.97 CM3
LEFT ATRIUM VOLUME: 44.05 CM3
LEFT INTERNAL DIMENSION IN SYSTOLE: 3.28 CM (ref 2.1–4)
LEFT VENTRICLE DIASTOLIC VOLUME INDEX: 52.41 ML/M2
LEFT VENTRICLE DIASTOLIC VOLUME: 85.96 ML
LEFT VENTRICLE MASS INDEX: 59 G/M2
LEFT VENTRICLE SYSTOLIC VOLUME INDEX: 26.6 ML/M2
LEFT VENTRICLE SYSTOLIC VOLUME: 43.62 ML
LEFT VENTRICULAR INTERNAL DIMENSION IN DIASTOLE: 4.36 CM (ref 3.5–6)
LEFT VENTRICULAR MASS: 96.51 G
LV LATERAL E/E' RATIO: 7.63 M/S
LV SEPTAL E/E' RATIO: 8.71 M/S
MV A" WAVE DURATION": 7.61 MSEC
MV PEAK A VEL: 0.7 M/S
MV PEAK E VEL: 0.61 M/S
OHS LV EJECTION FRACTION SIMPSONS BIPLANE MOD: 49 %
PISA TR MAX VEL: 2.15 M/S
PULM VEIN S/D RATIO: 1.05
PV PEAK D VEL: 0.41 M/S
PV PEAK S VEL: 0.43 M/S
RA MAJOR: 5.66 CM
RA PRESSURE ESTIMATED: 3 MMHG
RA WIDTH: 3.57 CM
RIGHT VENTRICULAR END-DIASTOLIC DIMENSION: 3.46 CM
RV TB RVSP: 5 MMHG
SINUS: 3.09 CM
STJ: 2.72 CM
TDI LATERAL: 0.08 M/S
TDI SEPTAL: 0.07 M/S
TDI: 0.08 M/S
TR MAX PG: 18 MMHG
TRICUSPID ANNULAR PLANE SYSTOLIC EXCURSION: 2.41 CM
TV REST PULMONARY ARTERY PRESSURE: 21 MMHG
Z-SCORE OF LEFT VENTRICULAR DIMENSION IN END DIASTOLE: -0.57
Z-SCORE OF LEFT VENTRICULAR DIMENSION IN END SYSTOLE: 1.08

## 2023-11-20 PROCEDURE — 93306 TTE W/DOPPLER COMPLETE: CPT | Mod: 26,,, | Performed by: INTERNAL MEDICINE

## 2023-11-20 PROCEDURE — 93306 ECHO (CUPID ONLY): ICD-10-PCS | Mod: 26,,, | Performed by: INTERNAL MEDICINE

## 2023-11-20 PROCEDURE — 93306 TTE W/DOPPLER COMPLETE: CPT

## 2023-11-22 ENCOUNTER — PATIENT MESSAGE (OUTPATIENT)
Dept: INTERNAL MEDICINE | Facility: CLINIC | Age: 73
End: 2023-11-22
Payer: MEDICARE

## 2023-11-22 ENCOUNTER — OFFICE VISIT (OUTPATIENT)
Dept: OBSTETRICS AND GYNECOLOGY | Facility: CLINIC | Age: 73
End: 2023-11-22
Payer: MEDICARE

## 2023-11-22 VITALS
BODY MASS INDEX: 19.02 KG/M2 | HEIGHT: 67 IN | SYSTOLIC BLOOD PRESSURE: 110 MMHG | WEIGHT: 121.19 LBS | DIASTOLIC BLOOD PRESSURE: 75 MMHG

## 2023-11-22 DIAGNOSIS — N95.2 VAGINAL ATROPHY: Primary | ICD-10-CM

## 2023-11-22 DIAGNOSIS — R10.2 VAGINAL PAIN: ICD-10-CM

## 2023-11-22 DIAGNOSIS — R87.618 PAP SMEAR ABNORMALITY OF CERVIX/HUMAN PAPILLOMAVIRUS (HPV) POSITIVE: ICD-10-CM

## 2023-11-22 PROCEDURE — 1157F ADVNC CARE PLAN IN RCRD: CPT | Mod: CPTII,S$GLB,, | Performed by: PHYSICIAN ASSISTANT

## 2023-11-22 PROCEDURE — 4010F PR ACE/ARB THEARPY RXD/TAKEN: ICD-10-PCS | Mod: CPTII,S$GLB,, | Performed by: PHYSICIAN ASSISTANT

## 2023-11-22 PROCEDURE — 1159F PR MEDICATION LIST DOCUMENTED IN MEDICAL RECORD: ICD-10-PCS | Mod: CPTII,S$GLB,, | Performed by: PHYSICIAN ASSISTANT

## 2023-11-22 PROCEDURE — 1126F PR PAIN SEVERITY QUANTIFIED, NO PAIN PRESENT: ICD-10-PCS | Mod: CPTII,S$GLB,, | Performed by: PHYSICIAN ASSISTANT

## 2023-11-22 PROCEDURE — 1160F RVW MEDS BY RX/DR IN RCRD: CPT | Mod: CPTII,S$GLB,, | Performed by: PHYSICIAN ASSISTANT

## 2023-11-22 PROCEDURE — 4010F ACE/ARB THERAPY RXD/TAKEN: CPT | Mod: CPTII,S$GLB,, | Performed by: PHYSICIAN ASSISTANT

## 2023-11-22 PROCEDURE — 3074F SYST BP LT 130 MM HG: CPT | Mod: CPTII,S$GLB,, | Performed by: PHYSICIAN ASSISTANT

## 2023-11-22 PROCEDURE — 3078F DIAST BP <80 MM HG: CPT | Mod: CPTII,S$GLB,, | Performed by: PHYSICIAN ASSISTANT

## 2023-11-22 PROCEDURE — 1160F PR REVIEW ALL MEDS BY PRESCRIBER/CLIN PHARMACIST DOCUMENTED: ICD-10-PCS | Mod: CPTII,S$GLB,, | Performed by: PHYSICIAN ASSISTANT

## 2023-11-22 PROCEDURE — 99204 PR OFFICE/OUTPT VISIT, NEW, LEVL IV, 45-59 MIN: ICD-10-PCS | Mod: S$GLB,,, | Performed by: PHYSICIAN ASSISTANT

## 2023-11-22 PROCEDURE — 99999 PR PBB SHADOW E&M-EST. PATIENT-LVL IV: CPT | Mod: PBBFAC,,, | Performed by: PHYSICIAN ASSISTANT

## 2023-11-22 PROCEDURE — 3008F PR BODY MASS INDEX (BMI) DOCUMENTED: ICD-10-PCS | Mod: CPTII,S$GLB,, | Performed by: PHYSICIAN ASSISTANT

## 2023-11-22 PROCEDURE — 1157F PR ADVANCE CARE PLAN OR EQUIV PRESENT IN MEDICAL RECORD: ICD-10-PCS | Mod: CPTII,S$GLB,, | Performed by: PHYSICIAN ASSISTANT

## 2023-11-22 PROCEDURE — 3078F PR MOST RECENT DIASTOLIC BLOOD PRESSURE < 80 MM HG: ICD-10-PCS | Mod: CPTII,S$GLB,, | Performed by: PHYSICIAN ASSISTANT

## 2023-11-22 PROCEDURE — 1126F AMNT PAIN NOTED NONE PRSNT: CPT | Mod: CPTII,S$GLB,, | Performed by: PHYSICIAN ASSISTANT

## 2023-11-22 PROCEDURE — 3008F BODY MASS INDEX DOCD: CPT | Mod: CPTII,S$GLB,, | Performed by: PHYSICIAN ASSISTANT

## 2023-11-22 PROCEDURE — 3074F PR MOST RECENT SYSTOLIC BLOOD PRESSURE < 130 MM HG: ICD-10-PCS | Mod: CPTII,S$GLB,, | Performed by: PHYSICIAN ASSISTANT

## 2023-11-22 PROCEDURE — 99999 PR PBB SHADOW E&M-EST. PATIENT-LVL IV: ICD-10-PCS | Mod: PBBFAC,,, | Performed by: PHYSICIAN ASSISTANT

## 2023-11-22 PROCEDURE — 99204 OFFICE O/P NEW MOD 45 MIN: CPT | Mod: S$GLB,,, | Performed by: PHYSICIAN ASSISTANT

## 2023-11-22 PROCEDURE — 1159F MED LIST DOCD IN RCRD: CPT | Mod: CPTII,S$GLB,, | Performed by: PHYSICIAN ASSISTANT

## 2023-11-22 RX ORDER — PRASTERONE 6.5 MG/1
INSERT VAGINAL
Qty: 28 EACH | Refills: 11 | Status: SHIPPED | OUTPATIENT
Start: 2023-11-22 | End: 2023-12-04

## 2023-11-22 NOTE — PROGRESS NOTES
Subjective:      Jalyn Aaron is a 73 y.o. female who presents for vaginal pain. Menarche at age 13. She is . Menopause around age 51. Hyst/Bso at age 61 for ovarian mass. Path was benign per pt.  about 4 years ago.  had prostate cancer but now has device to help maintain erections. Is again sexually active and had irritation and pain after intercourse.  Saw her PCP who did a pap/HPV. Normal pap but +other HPV. No prior history of abnormal pap that she can remember. Vaginal irritation has resolved but she does feel dry with intercourse. No discharge. She denies dyspareunia. Using Ky Jelly prn that helps some.    Mammogram: 4/3/2023 TC  12.2 %   Pap: 10/10/2023 negative, + other HPV  PCP: Annemarie Kilgore MD  Routine Screening Labs: 10/11/2023  Colonoscopy: 2026  DEXA: 2023 osteopenia      Past Medical History:   Diagnosis Date    Arthritis     Atypical ductal hyperplasia, breast 2013    Left    AV block     exercised induced 2:1    Cataract     Fever blister     Heart block     History of acne     Joint pain     hands    Keratoconjunctivitis sicca not due to Sjogren's syndrome     Pacemaker     Sleep apnea, unspecified      Past Surgical History:   Procedure Laterality Date    BREAST BIOPSY Left 2013    papilloma with atypia on core biopsy    BREAST BIOPSY Left 2014    Ex.Bx., removal of ADH/Papilloma    COLONOSCOPY N/A 2016    Procedure: COLONOSCOPY;  Surgeon: Dl Caruso MD;  Location: [facility] (4TH FLR);  Service: Endoscopy;  Laterality: N/A;  Pacemaker in place.    COLONOSCOPY N/A 3/30/2021    Procedure: COLONOSCOPY;  Surgeon: Joaquin Johnson MD;  Location: [facility] (4TH FLR);  Service: Endoscopy;  Laterality: N/A;  prep ins. emailed - COVID screening on 3/27/21 PCW - ERW    ENDOSCOPIC ULTRASOUND OF UPPER GASTROINTESTINAL TRACT N/A 10/9/2023    Procedure: ULTRASOUND, UPPER GI TRACT, ENDOSCOPIC;  Surgeon: Stacey Sesay MD;  Location: [facility] (2ND FLR);   Service: Endoscopy;  Laterality: N/A;  pancreas cyst 7mm on CT (r/o nodule or worrisome features)    8/4/23-Instructions via portal-DS  10/2-precall complete-MS    HYSTERECTOMY      IMPLANTATION OF BIVENTRICULAR PERMANENT PACEMAKER AS UPGRADE TO EXISTING PACEMAKER N/A 10/26/2023    Procedure: Biventricular pacemaker upgrade;  Surgeon: Chacho Colby MD;  Location: [facility] EP LAB;  Service: Cardiology;  Laterality: N/A;  CHB, CRT-P upgrade, MDT, MAC, SK, 3 Prep *MDT DC PPM in situ*    INSERT / REPLACE / REMOVE PACEMAKER      LASIK      LASIK Bilateral 2009    VENOGRAM, EP LAB N/A 12/20/2022    Procedure: Venogram, EP Lab;  Surgeon: Chacho Colby MD;  Location: [facility] EP LAB;  Service: Cardiology;  Laterality: N/A;  NICM, Venogram- left side, SK, 3 Prep *MDT PPM in situ*     Social History     Tobacco Use    Smoking status: Never     Passive exposure: Never    Smokeless tobacco: Never   Substance Use Topics    Alcohol use: Not Currently     Alcohol/week: 1.0 standard drink of alcohol     Types: 1 Glasses of wine per week     Comment: weekly    Drug use: No     Family History   Problem Relation Age of Onset    Breast cancer Mother         Paget's dz    Cancer Mother 80        pagets    Cataracts Mother     Hypertension Mother     Multiple sclerosis Father     Multiple sclerosis Sister     No Known Problems Maternal Aunt     No Known Problems Maternal Uncle     No Known Problems Paternal Aunt     No Known Problems Paternal Uncle     No Known Problems Maternal Grandmother     Prostate cancer Maternal Grandfather     No Known Problems Paternal Grandmother     No Known Problems Paternal Grandfather     No Known Problems Brother     No Known Problems Son     No Known Problems Son     Amblyopia Neg Hx     Blindness Neg Hx     Diabetes Neg Hx     Glaucoma Neg Hx     Macular degeneration Neg Hx     Retinal detachment Neg Hx     Strabismus Neg Hx     Stroke Neg Hx     Thyroid disease Neg Hx     Melanoma Neg Hx     Ovarian cancer  Neg Hx      OB History    Para Term  AB Living   2 2 2         SAB IAB Ectopic Multiple Live Births                  # Outcome Date GA Lbr Allen/2nd Weight Sex Delivery Anes PTL Lv   2 Term            1 Term                Current Outpatient Medications:     atorvastatin (LIPITOR) 20 MG tablet, Take 1 tablet (20 mg total) by mouth once daily., Disp: 90 tablet, Rfl: 3    calcium-vitamin D3 (OS-TALHA 500 + D3) 500 mg-5 mcg (200 unit) per tablet, Take 1 tablet by mouth 2 (two) times daily with meals., Disp: , Rfl:     carvediloL (COREG) 3.125 MG tablet, Take 1 tablet (3.125 mg total) by mouth 2 (two) times daily., Disp: 180 tablet, Rfl: 3    fluocinonide (LIDEX) 0.05 % external solution, AAA scalp qday, Disp: 60 mL, Rfl: 3    fluorouraciL (EFUDEX) 5 % cream, AAA scalp vertex bid x 4 weeks, Disp: 40 g, Rfl: 0    gabapentin (NEURONTIN) 600 MG tablet, Take 1.5 tablets (900 mg total) by mouth 3 (three) times daily., Disp: 135 tablet, Rfl: 11    losartan (COZAAR) 25 MG tablet, TAKE 1 TABLET BY MOUTH TWICE DAILY, Disp: 180 tablet, Rfl: 3    multivitamin capsule, Take 1 capsule by mouth once daily., Disp: , Rfl:     naproxen (NAPROSYN) 500 MG tablet, TAKE 1/2 TABLET BY MOUTH DAILY, Disp: 90 tablet, Rfl: 1    prasterone, dhea, (INTRAROSA) 6.5 mg Inst, 1 suppository vaginally at night, Disp: 28 each, Rfl: 11    senna (SENOKOT) 8.6 mg tablet, Take 1 tablet by mouth once daily. Twice a day, Disp: , Rfl:     vitamin D 1000 units Tab, Take 1,000 Units by mouth once daily., Disp: , Rfl:     Current Facility-Administered Medications:     triamcinolone acetonide injection 10 mg, 10 mg, Intradermal, Once, Christel Ward MD    Facility-Administered Medications Ordered in Other Visits:     ceFAZolin 2 g in dextrose 5 % in water (D5W) 50 mL IVPB (MB+), 2 g, Intravenous, On Call Procedure, Chacho Colby MD    Review of Systems:  General: No fever, chills, or weight loss.  Chest: No chest pain, shortness of breath, or  "palpitations.  Breast: No pain, masses, or nipple discharge.  Vulva: No pain, lesions, or itching.  Vagina: No relaxation, itching, discharge, or lesions.  Abdomen: No pain, nausea, vomiting, diarrhea, or constipation.  Urinary: No incontinence, nocturia, frequency, or dysuria.  Extremities:  No leg cramps, edema, or calf pain.  Neurologic: No headaches, dizziness, or visual changes.    Objective:     Vitals:    11/22/23 0905   BP: 110/75   Weight: 55 kg (121 lb 3.2 oz)   Height: 5' 7" (1.702 m)   PainSc: 0-No pain     Body mass index is 18.98 kg/m².    PHYSICAL EXAM:  APPEARANCE: Well nourished, well developed, in no acute distress.  AFFECT: WNL, alert and oriented x 3  PELVIC: Normal external genitalia without lesions. +atrophy with labial fusion  Normal hair distribution.  Adequate perineal body, normal urethral meatus.  Vagina  well rugated without lesions or discharge.  Cervix and uterus are surgically absent.  Adnexa without masses or tenderness.    EXTREMITIES: No edema.    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  Repeat pap/HPV in 1 year  Intrarosa nightly, can reduce to 2-3x per week.  Reviewed safety of this.   Reviewed vaginal moisturizers and lubricants to use prn  Handouts and samples provided.  Discussed pelvic floor PT if needed in the future.  Follow up in 1 year or sooner PRN    Instructed patient to call if she experiences any side effects or has any questions.    I spent a total of 30 minutes on the day of the visit.This includes face to face time and non-face to face time preparing to see the patient (eg, review of tests), obtaining and/or reviewing separately obtained history, documenting clinical information " in the electronic or other health record, independently interpreting results and communicating results to the patient/family/caregiver, or care coordinator.

## 2023-11-24 ENCOUNTER — PATIENT MESSAGE (OUTPATIENT)
Dept: ELECTROPHYSIOLOGY | Facility: CLINIC | Age: 73
End: 2023-11-24
Payer: MEDICARE

## 2023-11-24 ENCOUNTER — PATIENT MESSAGE (OUTPATIENT)
Dept: CARDIOLOGY | Facility: CLINIC | Age: 73
End: 2023-11-24
Payer: MEDICARE

## 2023-11-24 DIAGNOSIS — I42.8 CARDIOMYOPATHY, NONISCHEMIC: ICD-10-CM

## 2023-11-24 DIAGNOSIS — I25.10 CORONARY ARTERY DISEASE INVOLVING NATIVE CORONARY ARTERY OF NATIVE HEART WITHOUT ANGINA PECTORIS: Primary | ICD-10-CM

## 2023-11-24 DIAGNOSIS — I12.9 HYPERTENSIVE CHRONIC KIDNEY DISEASE WITH STAGE 1 THROUGH STAGE 4 CHRONIC KIDNEY DISEASE, OR UNSPECIFIED CHRONIC KIDNEY DISEASE: ICD-10-CM

## 2023-11-24 RX ORDER — ROSUVASTATIN CALCIUM 10 MG/1
10 TABLET, COATED ORAL DAILY
Qty: 90 TABLET | Refills: 3 | Status: SHIPPED | OUTPATIENT
Start: 2023-11-24 | End: 2024-11-23

## 2023-11-24 NOTE — TELEPHONE ENCOUNTER
Called patient, discussed echocardiogram showing low normal EF, no significantly elevated filling pressures.  Thus I do not suspect that the lower extremity swelling she is having is related to her heart.    She did have elevated BNP during blood work in October which is interesting, maybe related to device functioning prior to attempted reprogramming battery replacement.    She also had mildly elevated liver enzymes likely related to atorvastatin, normalized after stopping atorvastatin, will stop atorvastatin, try rosuvastatin 10 mg, follow-up LFTs in 4 weeks.    She reports taking naproxen daily, explained this could be contributing.    She also would like a referral to dietitian.    Encouraged regular physical exercise, minimizing salt intake.    Will follow-up LFTs in 4 weeks, she will let us know if any symptoms worsen.    -Abbe Figueroa

## 2023-11-29 ENCOUNTER — PATIENT MESSAGE (OUTPATIENT)
Dept: INTERNAL MEDICINE | Facility: CLINIC | Age: 73
End: 2023-11-29
Payer: MEDICARE

## 2023-12-01 ENCOUNTER — CLINICAL SUPPORT (OUTPATIENT)
Dept: CARDIOLOGY | Facility: HOSPITAL | Age: 73
End: 2023-12-01
Attending: INTERNAL MEDICINE
Payer: MEDICARE

## 2023-12-01 DIAGNOSIS — I44.2 ATRIOVENTRICULAR BLOCK, COMPLETE: ICD-10-CM

## 2023-12-01 DIAGNOSIS — Z95.0 PRESENCE OF CARDIAC PACEMAKER: ICD-10-CM

## 2023-12-01 PROCEDURE — 93296 REM INTERROG EVL PM/IDS: CPT | Performed by: INTERNAL MEDICINE

## 2023-12-01 PROCEDURE — 93294 REM INTERROG EVL PM/LDLS PM: CPT | Mod: ,,, | Performed by: INTERNAL MEDICINE

## 2023-12-01 PROCEDURE — 93294 CARDIAC DEVICE CHECK - REMOTE: ICD-10-PCS | Mod: ,,, | Performed by: INTERNAL MEDICINE

## 2023-12-04 ENCOUNTER — PATIENT MESSAGE (OUTPATIENT)
Dept: CARDIOLOGY | Facility: CLINIC | Age: 73
End: 2023-12-04
Payer: MEDICARE

## 2023-12-04 ENCOUNTER — PATIENT MESSAGE (OUTPATIENT)
Dept: OBSTETRICS AND GYNECOLOGY | Facility: CLINIC | Age: 73
End: 2023-12-04
Payer: MEDICARE

## 2023-12-04 DIAGNOSIS — N95.2 VAGINAL ATROPHY: Primary | ICD-10-CM

## 2023-12-04 RX ORDER — ESTRADIOL 0.1 MG/G
1 CREAM VAGINAL
Qty: 42.5 G | Refills: 1 | Status: SHIPPED | OUTPATIENT
Start: 2023-12-04 | End: 2024-12-03

## 2023-12-07 ENCOUNTER — TELEPHONE (OUTPATIENT)
Dept: INTERNAL MEDICINE | Facility: CLINIC | Age: 73
End: 2023-12-07
Payer: MEDICARE

## 2023-12-07 DIAGNOSIS — E55.9 MILD VITAMIN D DEFICIENCY: ICD-10-CM

## 2023-12-07 DIAGNOSIS — E78.5 HYPERLIPIDEMIA, UNSPECIFIED HYPERLIPIDEMIA TYPE: Primary | ICD-10-CM

## 2023-12-07 DIAGNOSIS — R73.9 HYPERGLYCEMIA: ICD-10-CM

## 2023-12-08 LAB
OHS CV AF BURDEN PERCENT: < 1
OHS CV DC REMOTE DEVICE TYPE: NORMAL
OHS CV RV PACING PERCENT: 99.93 %

## 2023-12-27 ENCOUNTER — PATIENT MESSAGE (OUTPATIENT)
Dept: CARDIOLOGY | Facility: CLINIC | Age: 73
End: 2023-12-27
Payer: MEDICARE

## 2023-12-27 DIAGNOSIS — I42.8 OTHER CARDIOMYOPATHY: ICD-10-CM

## 2023-12-27 RX ORDER — LOSARTAN POTASSIUM 25 MG/1
TABLET ORAL
Qty: 180 TABLET | Refills: 3 | Status: SHIPPED | OUTPATIENT
Start: 2023-12-27

## 2023-12-27 NOTE — TELEPHONE ENCOUNTER
Jalyn COLLAZO Staff (supporting Abbe Figueroa MD)1 hour ago (3:22 PM)       My Losartin is running out.  It was filled by Dr Kilgore previously and [facility] Pharmacist said they would need a new prescription from you in order for you to start filling this medicine.  Its the [facility] on [address] in [place].  Thank you!  Jalyn Aaron

## 2023-12-29 ENCOUNTER — LAB VISIT (OUTPATIENT)
Dept: LAB | Facility: HOSPITAL | Age: 73
End: 2023-12-29
Attending: INTERNAL MEDICINE
Payer: MEDICARE

## 2023-12-29 DIAGNOSIS — E78.5 HYPERLIPIDEMIA, UNSPECIFIED HYPERLIPIDEMIA TYPE: ICD-10-CM

## 2023-12-29 DIAGNOSIS — I25.10 CORONARY ARTERY DISEASE INVOLVING NATIVE CORONARY ARTERY OF NATIVE HEART WITHOUT ANGINA PECTORIS: ICD-10-CM

## 2023-12-29 DIAGNOSIS — I42.8 CARDIOMYOPATHY, NONISCHEMIC: ICD-10-CM

## 2023-12-29 DIAGNOSIS — E55.9 MILD VITAMIN D DEFICIENCY: ICD-10-CM

## 2023-12-29 DIAGNOSIS — R73.9 HYPERGLYCEMIA: ICD-10-CM

## 2023-12-29 LAB
25(OH)D3+25(OH)D2 SERPL-MCNC: 42 NG/ML (ref 30–96)
ALBUMIN SERPL BCP-MCNC: 3.8 G/DL (ref 3.5–5.2)
ALP SERPL-CCNC: 58 U/L (ref 55–135)
ALT SERPL W/O P-5'-P-CCNC: 28 U/L (ref 10–44)
ANION GAP SERPL CALC-SCNC: 7 MMOL/L (ref 8–16)
AST SERPL-CCNC: 28 U/L (ref 10–40)
BILIRUB SERPL-MCNC: 0.8 MG/DL (ref 0.1–1)
BNP SERPL-MCNC: 58 PG/ML (ref 0–99)
BUN SERPL-MCNC: 17 MG/DL (ref 8–23)
CALCIUM SERPL-MCNC: 9.6 MG/DL (ref 8.7–10.5)
CHLORIDE SERPL-SCNC: 107 MMOL/L (ref 95–110)
CHOLEST SERPL-MCNC: 146 MG/DL (ref 120–199)
CHOLEST/HDLC SERPL: 2.1 {RATIO} (ref 2–5)
CO2 SERPL-SCNC: 30 MMOL/L (ref 23–29)
CREAT SERPL-MCNC: 0.8 MG/DL (ref 0.5–1.4)
EST. GFR  (NO RACE VARIABLE): >60 ML/MIN/1.73 M^2
ESTIMATED AVG GLUCOSE: 111 MG/DL (ref 68–131)
GLUCOSE SERPL-MCNC: 81 MG/DL (ref 70–110)
HBA1C MFR BLD: 5.5 % (ref 4–5.6)
HDLC SERPL-MCNC: 69 MG/DL (ref 40–75)
HDLC SERPL: 47.3 % (ref 20–50)
LDLC SERPL CALC-MCNC: 65.2 MG/DL (ref 63–159)
NONHDLC SERPL-MCNC: 77 MG/DL
POTASSIUM SERPL-SCNC: 4.8 MMOL/L (ref 3.5–5.1)
PROT SERPL-MCNC: 6.6 G/DL (ref 6–8.4)
SODIUM SERPL-SCNC: 144 MMOL/L (ref 136–145)
TRIGL SERPL-MCNC: 59 MG/DL (ref 30–150)

## 2023-12-29 PROCEDURE — 83880 ASSAY OF NATRIURETIC PEPTIDE: CPT | Performed by: INTERNAL MEDICINE

## 2023-12-29 PROCEDURE — 82306 VITAMIN D 25 HYDROXY: CPT | Performed by: INTERNAL MEDICINE

## 2023-12-29 PROCEDURE — 80061 LIPID PANEL: CPT | Performed by: INTERNAL MEDICINE

## 2023-12-29 PROCEDURE — 83036 HEMOGLOBIN GLYCOSYLATED A1C: CPT | Performed by: INTERNAL MEDICINE

## 2023-12-29 PROCEDURE — 80053 COMPREHEN METABOLIC PANEL: CPT | Performed by: INTERNAL MEDICINE

## 2023-12-29 PROCEDURE — 36415 COLL VENOUS BLD VENIPUNCTURE: CPT | Performed by: INTERNAL MEDICINE

## 2024-01-09 ENCOUNTER — OFFICE VISIT (OUTPATIENT)
Dept: DERMATOLOGY | Facility: CLINIC | Age: 74
End: 2024-01-09
Payer: MEDICARE

## 2024-01-09 DIAGNOSIS — L57.0 AK (ACTINIC KERATOSIS): Primary | ICD-10-CM

## 2024-01-09 DIAGNOSIS — L82.1 SK (SEBORRHEIC KERATOSIS): ICD-10-CM

## 2024-01-09 DIAGNOSIS — L72.11 PILAR CYST: ICD-10-CM

## 2024-01-09 PROBLEM — L93.0 DISCOID LUPUS ERYTHEMATOSUS: Status: RESOLVED | Noted: 2023-05-01 | Resolved: 2024-01-09

## 2024-01-09 PROCEDURE — 17000 DESTRUCT PREMALG LESION: CPT | Mod: S$GLB,,, | Performed by: DERMATOLOGY

## 2024-01-09 PROCEDURE — 99213 OFFICE O/P EST LOW 20 MIN: CPT | Mod: 25,S$GLB,, | Performed by: DERMATOLOGY

## 2024-01-09 PROCEDURE — 17003 DESTRUCT PREMALG LES 2-14: CPT | Mod: S$GLB,,, | Performed by: DERMATOLOGY

## 2024-01-09 PROCEDURE — 99999 PR PBB SHADOW E&M-EST. PATIENT-LVL III: CPT | Mod: PBBFAC,,, | Performed by: DERMATOLOGY

## 2024-01-09 NOTE — PROGRESS NOTES
Subjective:      Patient ID:  Jalyn Aaron is a 74 y.o. female who presents for   Chief Complaint   Patient presents with    Lupus     Efudex f/u     History of Present Illness: The patient presents for follow up of efudex - treated bid x 2 weeks scalp vertex (course was for 4 weeks but pt d/c'ed prematurely 2/2 injury to area).    Path scalp vertex:  Had pathology slides from punch biopsy looked at by Dr. Collier and Dr. Bo. Both dermatopathologists agree that the specimen contains precancerous cells. No signs of cutaneous lupus appreciated.   Pt states she has stopped taking the efudex 11/16/23.        Review of Systems   Skin:  Positive for daily sunscreen use, activity-related sunscreen use and wears hat (always). Negative for itching (sensitive) and rash.   Hematologic/Lymphatic: Bruises/bleeds easily.       Objective:   Physical Exam   Constitutional: She appears well-developed and well-nourished. No distress.   Neurological: She is alert and oriented to person, place, and time. She is not disoriented.   Psychiatric: She has a normal mood and affect.   Skin:   Areas Examined (abnormalities noted in diagram):   Scalp / Hair Palpated and Inspected  Chest / Axilla Inspection Performed                 Diagram Legend     Erythematous scaling macule/papule c/w actinic keratosis       Vascular papule c/w angioma      Pigmented verrucoid papule/plaque c/w seborrheic keratosis      Yellow umbilicated papule c/w sebaceous hyperplasia      Irregularly shaped tan macule c/w lentigo     1-2 mm smooth white papules consistent with Milia      Movable subcutaneous cyst with punctum c/w epidermal inclusion cyst      Subcutaneous movable cyst c/w pilar cyst      Firm pink to brown papule c/w dermatofibroma      Pedunculated fleshy papule(s) c/w skin tag(s)      Evenly pigmented macule c/w junctional nevus     Mildly variegated pigmented, slightly irregular-bordered macule c/w mildly atypical nevus      Flesh  colored to evenly pigmented papule c/w intradermal nevus       Pink pearly papule/plaque c/w basal cell carcinoma      Erythematous hyperkeratotic cursted plaque c/w SCC      Surgical scar with no sign of skin cancer recurrence      Open and closed comedones      Inflammatory papules and pustules      Verrucoid papule consistent consistent with wart     Erythematous eczematous patches and plaques     Dystrophic onycholytic nail with subungual debris c/w onychomycosis     Umbilicated papule    Erythematous-base heme-crusted tan verrucoid plaque consistent with inflamed seborrheic keratosis     Erythematous Silvery Scaling Plaque c/w Psoriasis     See annotation      Assessment / Plan:        AK (actinic keratosis)  S/p efudex bid x 2 weeks - No clinical evidence of lesions today. No further treatment needed at this time. Patient instructed to return to clinic if lesions recur or new lesions appear.    And    Cryosurgery Procedure Note    Verbal consent from the patient is obtained including, but not limited to, risk of hypopigmentation/hyperpigmentation, scar, recurrence of lesion. The patient is aware of the precancerous quality and need for treatment of these lesions. Liquid nitrogen cryosurgery is applied to the 2 actinic keratoses, as detailed in the physical exam, to produce a freeze injury. The patient is aware that blisters may form and is instructed on wound care with gentle cleansing and use of vaseline ointment to keep moist until healed. The patient is supplied a handout on cryosurgery and is instructed to call if lesions do not completely resolve.      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   Reassurance given to patient. No treatment necessary.       SK (seborrheic keratosis)  These are benign inherited growths without a malignant potential. Reassurance given to patient. No treatment is necessary.                Follow up in about 3 months (around 4/9/2024).

## 2024-01-09 NOTE — PATIENT INSTRUCTIONS

## 2024-01-12 ENCOUNTER — OFFICE VISIT (OUTPATIENT)
Dept: CARDIOLOGY | Facility: CLINIC | Age: 74
End: 2024-01-12
Payer: MEDICARE

## 2024-01-12 ENCOUNTER — OFFICE VISIT (OUTPATIENT)
Dept: INTERNAL MEDICINE | Facility: CLINIC | Age: 74
End: 2024-01-12
Payer: MEDICARE

## 2024-01-12 ENCOUNTER — PATIENT MESSAGE (OUTPATIENT)
Dept: DERMATOLOGY | Facility: CLINIC | Age: 74
End: 2024-01-12
Payer: MEDICARE

## 2024-01-12 VITALS
DIASTOLIC BLOOD PRESSURE: 70 MMHG | HEIGHT: 67 IN | WEIGHT: 118.38 LBS | OXYGEN SATURATION: 99 % | SYSTOLIC BLOOD PRESSURE: 116 MMHG | HEART RATE: 62 BPM | BODY MASS INDEX: 18.58 KG/M2

## 2024-01-12 VITALS
SYSTOLIC BLOOD PRESSURE: 116 MMHG | BODY MASS INDEX: 18.62 KG/M2 | HEIGHT: 67 IN | HEART RATE: 74 BPM | WEIGHT: 118.63 LBS | OXYGEN SATURATION: 97 % | DIASTOLIC BLOOD PRESSURE: 64 MMHG

## 2024-01-12 DIAGNOSIS — I70.0 ATHEROSCLEROSIS OF AORTA: ICD-10-CM

## 2024-01-12 DIAGNOSIS — Z95.0 CARDIAC PACEMAKER IN SITU: ICD-10-CM

## 2024-01-12 DIAGNOSIS — R94.30 EJECTION FRACTION < 50%: ICD-10-CM

## 2024-01-12 DIAGNOSIS — I25.10 CORONARY ARTERY DISEASE INVOLVING NATIVE CORONARY ARTERY OF NATIVE HEART WITHOUT ANGINA PECTORIS: ICD-10-CM

## 2024-01-12 DIAGNOSIS — Z23 NEED FOR VACCINATION: ICD-10-CM

## 2024-01-12 DIAGNOSIS — I44.2 ATRIOVENTRICULAR BLOCK, COMPLETE: ICD-10-CM

## 2024-01-12 DIAGNOSIS — K86.2 PANCREATIC CYST: Primary | ICD-10-CM

## 2024-01-12 DIAGNOSIS — I42.8 CARDIOMYOPATHY, NONISCHEMIC: Primary | ICD-10-CM

## 2024-01-12 PROCEDURE — 90694 VACC AIIV4 NO PRSRV 0.5ML IM: CPT | Mod: S$GLB,,, | Performed by: INTERNAL MEDICINE

## 2024-01-12 PROCEDURE — 99214 OFFICE O/P EST MOD 30 MIN: CPT | Mod: S$GLB,,, | Performed by: INTERNAL MEDICINE

## 2024-01-12 PROCEDURE — 99999 PR PBB SHADOW E&M-EST. PATIENT-LVL IV: CPT | Mod: PBBFAC,,, | Performed by: INTERNAL MEDICINE

## 2024-01-12 PROCEDURE — 99213 OFFICE O/P EST LOW 20 MIN: CPT | Mod: 25,S$GLB,, | Performed by: INTERNAL MEDICINE

## 2024-01-12 PROCEDURE — G0008 ADMIN INFLUENZA VIRUS VAC: HCPCS | Mod: S$GLB,,, | Performed by: INTERNAL MEDICINE

## 2024-01-12 RX ORDER — NAPROXEN 250 MG/1
TABLET ORAL
Qty: 30 TABLET | Refills: 1 | Status: SHIPPED | OUTPATIENT
Start: 2024-01-12

## 2024-01-12 RX ORDER — ALPRAZOLAM 0.25 MG/1
TABLET ORAL
Qty: 4 TABLET | Refills: 0 | Status: SHIPPED | OUTPATIENT
Start: 2024-01-12

## 2024-01-12 NOTE — PROGRESS NOTES
Subjective:   Chief Complaint: Follow-up  Last Clinic Visit: 1/23/2023    History of Present Illness: Jalyn Aaron is a 74 y.o. 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  Interval history:  She was taken for PG replacement given battery at EOL, and given that EF was mildly depressed attempted Bi V device.  LV lead was successfully placed however would not remain in position despite multiple different techniques, thus continues to be RV paced.  She had follow-up echocardiogram though in EF 50-55% slightly improved relatively unchanged.  Tolerating GDMT well carvedilol 3, losartan 25.  Denies any shortness of breath, no lightheadedness walking on a regular basis not exercising aerobically, but denies any difficulty with exercise tolerance.  We also obtained a calcium score after we saw her last, discussed with her over the phone, does have elevated calcium increasing atherosclerotic risk she was amenable and we started statin LDL has come down nicely initially to 56 most recently 65.  She had a PET stress test as well which was negative for ischemia    1/23/2023  She has continued to follow with Dr. Colby since we saw her last, LVEF continues to remain in a mid range.  Planning on taking a cruise in [place] 10 days in March.  Also requesting refills of losartan as well as carvedilol.  She denies any congestive heart failure symptoms: no orthopnea, no weight gain, no PND, no lower extremity edema, no dyspnea on exertion.  In planning for an upcoming PG change (EOL in 18 months) she had a venogram which showed not complete occlusion, but narrowing.  Given mid range EF, and 100% RV pacing discussion of CRT upgrade has been made.     11/12/2021  She was initially followed by Dr. Collins, and is now followed by Dr. Colby.  She has an underlying escape rhythm with a QRS of 92, prior to this paced QRS  148. RV pacing percentage near 100% for some time, initially was intermittent, but over the last year at least has been over 100% RV paced.   Most recent time EF was normal was 2019, and then as of June 2020 EF has been mildly depressed.  She is reasonably physically active does Pilates twice a week as well as yoga and walks on a regular basis denies any lower extremity edema, no orthopnea, no weight gain, no PND, no heart failure symptoms.  She was started on metoprolol had significant fatigue then change to carvedilol tolerating low-dose carvedilol as well as losartan well, these were started for goal-directed medical therapy, but EF has been relatively unchanged over the past 18 months.  Normally blood pressure is well controlled, does have a arm cuff but does not check at home on a regular basis.  Denies any lightheadedness, no presyncope.  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  Recently remarried 2 years ago.  She got a 2nd opinion from Dr. Collins who discussed what sounds like his bundle pacing as well.    Dx:  AV block s/p ppm DC 2011, second-degree progressed to CHB  S/p PG change 2023, unable to place LV lead.  Nonischemic cardiomyopathy EF 45%.  Coronary artery disease by calcium  -no ischemia by PET stress test 2023  Medications:  Outpatient Encounter Medications as of 1/12/2024   Medication Sig Dispense Refill    ALPRAZolam (XANAX) 0.25 MG tablet One-two tabs one hour before procedure then may repeat immediately before procedure 4 tablet 0    calcium-vitamin D3 (OS-TALHA 500 + D3) 500 mg-5 mcg (200 unit) per tablet Take 1 tablet by mouth 2 (two) times daily with meals.      carvediloL (COREG) 3.125 MG tablet Take 1 tablet (3.125 mg total) by mouth 2 (two) times daily. 180 tablet 3    estradioL (ESTRACE) 0.01 % (0.1 mg/gram) vaginal cream Place 1 g vaginally twice a week. 42.5 g 1    gabapentin (NEURONTIN) 600 MG tablet Take 1.5  "tablets (900 mg total) by mouth 3 (three) times daily. 135 tablet 11    losartan (COZAAR) 25 MG tablet TAKE 1 TABLET BY MOUTH TWICE DAILY 180 tablet 3    multivitamin capsule Take 1 capsule by mouth once daily.      naproxen (NAPROSYN) 250 MG tablet One daily as needed for arthritis pain 30 tablet 1    rosuvastatin (CRESTOR) 10 MG tablet Take 1 tablet (10 mg total) by mouth once daily. 90 tablet 3    senna (SENOKOT) 8.6 mg tablet Take 1 tablet by mouth once daily. Twice a day      vitamin D 1000 units Tab Take 1,000 Units by mouth once daily.      [DISCONTINUED] fluorouraciL (EFUDEX) 5 % cream AAA scalp vertex bid x 4 weeks 40 g 0    [DISCONTINUED] naproxen (NAPROSYN) 500 MG tablet TAKE 1/2 TABLET BY MOUTH DAILY 90 tablet 1     Facility-Administered Encounter Medications as of 1/12/2024   Medication Dose Route Frequency Provider Last Rate Last Admin    ceFAZolin 2 g in dextrose 5 % in water (D5W) 50 mL IVPB (MB+)  2 g Intravenous On Call Procedure Chacho Colby MD        triamcinolone acetonide injection 10 mg  10 mg Intradermal Once Christel Ward MD         Social History:  Jalyn reports that she has never smoked. She has never been exposed to tobacco smoke. She has never used smokeless tobacco. She reports that she does not currently use alcohol after a past usage of about 1.0 standard drink of alcohol per week. She reports that she does not use drugs.  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  Remarried 2019    Objective:   /64   Pulse 74   Ht 5' 7" (1.702 m)   Wt 53.8 kg (118 lb 9.7 oz)   SpO2 97%   BMI 18.58 kg/m²     Physical Exam   HENT:   Head: Normocephalic and atraumatic.   Mouth/Throat: Mucous membranes are moist.   Cardiovascular: Normal rate, regular rhythm and normal pulses. Exam reveals no gallop and no friction rub.   No murmur heard.  Pulmonary/Chest: Effort normal and breath sounds normal. No stridor. No " respiratory distress. She has no rhonchi. She has no rales. She exhibits no tenderness.   Abdominal: Normal appearance. She exhibits no distension.   Musculoskeletal:      Right lower leg: No edema.      Left lower leg: No edema.   Neurological: She is alert.   Skin: Skin is warm. Capillary refill takes less than 2 seconds.        EKG:  My independent visualization of most recent EKG is ventricular paced rhythm     TTE:  11/20/2023    Left Ventricle: The left ventricle is normal in size. Ventricular mass is normal. Normal wall thickness. Septal motion is consistent with pacing. There is low normal systolic function with a visually estimated ejection fraction of 50%. Biplane (2D) method of discs ejection fraction is 49%.    Left Ventricle: Grade I diastolic dysfunction.    Right Ventricle: Normal right ventricular cavity size. Systolic function is normal. Pacemaker lead present in the ventricle.    Left Atrium: Left atrium is mildly dilated.    Right Atrium: Right atrium is mildly dilated.    Pulmonary Artery: The estimated pulmonary artery systolic pressure is 21 mmHg.    IVC/SVC: Normal venous pressure at 3 mmHg.     11/09/2022   The left ventricle is normal in size with mildly decreased systolic function. The estimated ejection fraction is 45%.  There is abnormal septal wall motion consistent with right ventricular pacemaker.  Normal right ventricular size with normal right ventricular systolic function.  Grade I left ventricular diastolic dysfunction.  Mild tricuspid regurgitation.  The estimated PA systolic pressure is 22 mmHg.  Normal central venous pressure (3 mmHg).     11/03/2021  The estimated ejection fraction is 45%. Unchanged from prior reported.  The left ventricle is normal in size with mildly decreased systolic function.  There is mild left ventricular global hypokinesis.  There is abnormal septal wall motion consistent with right ventricular pacemaker.  Grade I left ventricular diastolic  dysfunction.  Normal right ventricular size with normal right ventricular systolic function.  Mild to moderate tricuspid regurgitation.  The estimated PA systolic pressure is 27 mmHg.  Normal central venous pressure (3 mmHg).     10/08/2021  The left ventricle is normal in size with mildly decreased systolic function. The estimated ejection fraction is 45%.  Grade I diastolic dysfunction.  Normal right ventricular systolic function.  The estimated PA systolic pressure is 27 mmHg.  Normal central venous pressure (3 mmHg).  Trivial pericardial effusion.     06/01/2021  Mildly decreased left ventricular systolic function. The estimated ejection fraction is 45%. QEF 43%. Global hypokinetic wall motion.  Grade I (mild) left ventricular diastolic dysfunction consistent with impaired relaxation.  Septal wall has abnormal motion.  Normal right ventricular systolic function.  Mild tricuspid regurgitation.  The estimated PA systolic pressure is 24 mmHg.  Normal central venous pressure (3 mmHg).     08/07/2019  Normal left ventricular systolic function. The estimated ejection fraction is 58%  Septal wall has abnormal motion.  Normal LV diastolic function.  Normal right ventricular systolic function.  Mild tricuspid regurgitation.  Mild pulmonic regurgitation.  Normal central venous pressure (3 mm Hg).  The estimated PA systolic pressure is 25 mm Hg    Lipids:  Recent Labs   Lab 12/29/23 0721   LDL Cholesterol 65.2   HDL 69        Renal:  Recent Labs   Lab 12/29/23 0721   Creatinine 0.8   Potassium 4.8   CO2 30 H   BUN 17       Liver:  Recent Labs   Lab 12/29/23 0721   AST 28   ALT 28       Calcium  7/5/2023  Impression:  Your total calcium score is 166.  Moderate to high coronary heart disease risk.    PET stress test  9/14/2023    The myocardial perfusion images are normal without evidence of ischemia or scar.    The whole heart absolute myocardial perfusion values averaged 0.86 cc/min/g at rest, which is normal; 1.92  cc/min/g at stress, which is low normal; and CFR is 2.27 , which is mildly reduced.    CT attenuation images demonstrate mild diffuse coronary calcifications in the LAD and LCX territory and mild diffuse aortic calcifications in the descending aorta.    The gated perfusion images showed an ejection fraction of 68% at rest and 79% during stress. A normal ejection fraction is greater than 47%.    The wall motion is normal at rest and during stress.    The LV cavity size is normal at rest and stress.    The ECG portion of the study is uninterpretable due to ventricular paced rhythm.    There were no arrhythmias during stress.    The patient reported chest pain during the stress test.    There are no prior studies for comparison.     Venogram  12/20/2022     Venogram: significant narrowing at the left brachiocephalic region, but still with forward flow.    Assessment:     1. Cardiomyopathy, nonischemic    2. Atrioventricular block, complete    3. Atherosclerosis of aorta    4. Cardiac pacemaker in situ    5. Ejection fraction < 50%      Plan:   1. Cardiomyopathy, nonischemic  No significant heart failure symptoms at this time, continue to monitor EF annually periodically sooner if she has any additional symptoms.  No indication for further uptitration of GDMT with relative absence of symptoms, well-controlled blood pressure, an EF now near normal.  - Echo; Future    2. Coronary artery disease involving native coronary artery of native heart without angina pectoris  LDL lowered nicely, coronary calcium in the 100s discussed increasing her cardiovascular risk, she was amenable with current therapy.    3. Atrioventricular block, complete  Continue pacemaker, continue to monitor EF periodically, consider more aggressive Bi V strategies if EF should drop further or she develops any heart failure symptoms.    4. Atherosclerosis of aorta  No symptoms of claudication    5. Cardiac pacemaker in situ      6. Ejection fraction <  50%      Follow up in one year      Abbe Figueroa MD FACC

## 2024-01-12 NOTE — PROGRESS NOTES
Subjective:       Patient ID: Jalyn Aaron is a 74 y.o. female.    Chief Complaint: No chief complaint on file.    HPIShjohn is doing better since her pacer was changed out.  No CP or SOB. Leg swelling is better too.   Review of Systems   Respiratory:  Negative for shortness of breath (PND or orthopnea).    Cardiovascular:  Negative for chest pain (arm pain or jaw pain).   Gastrointestinal:  Negative for abdominal pain, diarrhea, nausea and vomiting.   Genitourinary:  Negative for dysuria.   Neurological:  Negative for seizures, syncope and headaches.       Objective:      Physical Exam  Constitutional:       General: She is not in acute distress.     Appearance: She is well-developed.   HENT:      Head: Normocephalic.   Eyes:      Pupils: Pupils are equal, round, and reactive to light.   Neck:      Thyroid: No thyromegaly.      Vascular: No JVD.   Cardiovascular:      Rate and Rhythm: Normal rate and regular rhythm.      Heart sounds: Normal heart sounds. No murmur heard.     No friction rub. No gallop.   Pulmonary:      Effort: Pulmonary effort is normal.      Breath sounds: Normal breath sounds. No wheezing or rales.   Abdominal:      General: Bowel sounds are normal. There is no distension.      Palpations: Abdomen is soft. There is no mass.      Tenderness: There is no abdominal tenderness. There is no guarding or rebound.   Musculoskeletal:      Cervical back: Neck supple.   Lymphadenopathy:      Cervical: No cervical adenopathy.   Skin:     General: Skin is warm and dry.   Neurological:      Mental Status: She is alert and oriented to person, place, and time.      Deep Tendon Reflexes: Reflexes are normal and symmetric.   Psychiatric:         Behavior: Behavior normal.         Thought Content: Thought content normal.         Judgment: Judgment normal.         Assessment:       1. Pancreatic cyst    2. Need for vaccination        Plan:   Pancreatic cyst  -     MRI Abdomen W WO Contrast_INC MRCP (XPD);  Future; Expected date: 01/12/2024    Need for vaccination  -     Influenza - Quadrivalent (Adjuvanted)    Other orders  -     ALPRAZolam (XANAX) 0.25 MG tablet; One-two tabs one hour before procedure then may repeat immediately before procedure  Dispense: 4 tablet; Refill: 0  -     naproxen (NAPROSYN) 250 MG tablet; One daily as needed for arthritis pain  Dispense: 30 tablet; Refill: 1

## 2024-01-16 ENCOUNTER — TELEPHONE (OUTPATIENT)
Dept: ENDOSCOPY | Facility: HOSPITAL | Age: 74
End: 2024-01-16
Payer: MEDICARE

## 2024-01-16 NOTE — TELEPHONE ENCOUNTER
Spoke with pt. Her pacer is mr compatible and she is scheduled for mri at the [facility].message sent to dr loo clinic nurse.

## 2024-01-23 NOTE — PROGRESS NOTES
Ms. Aaron is a patient of Dr. Colby and was last seen in clinic 6/21/2023.      Subjective:   Patient ID:  Jalyn Aaron is a 74 y.o. female who presents for follow up of Pacemaker Check  .     HPI:      Ms. Aaron is a 74 y.o. female with AV block, NICM, PPM, anxiety here for follow up after generator change.      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  Significant anxiety -- much improved since she has been on cymbalta  Echo 3/28/16 normal biventricular structure and function.  Echo 6/1/20 EF 45%.  We added Toprol. Already on Losartan.  Felt poorly on the Toprol. Noted heaviness, possibly shortness of breath, thinks anxiety may have been related. This was discontinued.     Retired,  3 years ago.  Echo 10/8/20 EF 45%  Echo 11/21 EF 45%.     11/26/2022: Complete AV block. EF remains stable at 45%.  We discussed consideration of upgrade at time of generator change. There is concern that she is occluded, given the varicosities observed on her chest. We will schedule venogram now to assess.  If occluded, would defer upgrade unless EF further deteriorates.    12/20/2022: Venogram: significant narrowing at the left brachiocephalic region, but still with forward flow.    6/21/2023: Device and lead function stable. CHB with 100% RV pacing. EF 45% per echo 11/2022.   Venogram shows significant narrowing of left brachiocephalic region but still with forward flow. Plan is to attempt CRT upgrade at the time of generator change. Pt requests MRI compatible if possible.  No CHF symptoms. She is walking regularly and feeling well. 8 mo to RUTH.    Update (01/26/2024):    10/26/2023:    Successful generator change.    Complex procedure, with unsuccessful placement of coronary sinus or left bundle branch area pacing lead    Today she says she is actually feeling quite well physically. Changed her diet a lot and is exercising more. Feels  well. Walking 2 miles a day and getting back to gym. No YORK, CP, palps, LH, syncope reported.    Device Interrogation (1/26/2024) reveals an intrinsic SR with stable lead and device function. No arrhythmias or treated episodes were noted.  She paces 4.2% in the RA and 100% in the RV. Estimated battery longevity 12.5 years.     I have personally reviewed the patient's EKG today, which shows ASVP at 70bpm.     Relevant Cardiac Test Results:    2D Echo (11/20/2023):    Left Ventricle: The left ventricle is normal in size. Ventricular mass is normal. Normal wall thickness. Septal motion is consistent with pacing. There is low normal systolic function with a visually estimated ejection fraction of 50%. Biplane (2D) method of discs ejection fraction is 49%.    Left Ventricle: Grade I diastolic dysfunction.    Right Ventricle: Normal right ventricular cavity size. Systolic function is normal. Pacemaker lead present in the ventricle.    Left Atrium: Left atrium is mildly dilated.    Right Atrium: Right atrium is mildly dilated.    Pulmonary Artery: The estimated pulmonary artery systolic pressure is 21 mmHg.    IVC/SVC: Normal venous pressure at 3 mmHg.       Current Outpatient Medications   Medication Sig    ALPRAZolam (XANAX) 0.25 MG tablet One-two tabs one hour before procedure then may repeat immediately before procedure    calcium-vitamin D3 (OS-TALHA 500 + D3) 500 mg-5 mcg (200 unit) per tablet Take 1 tablet by mouth 2 (two) times daily with meals.    carvediloL (COREG) 3.125 MG tablet Take 1 tablet (3.125 mg total) by mouth 2 (two) times daily.    estradioL (ESTRACE) 0.01 % (0.1 mg/gram) vaginal cream Place 1 g vaginally twice a week.    gabapentin (NEURONTIN) 600 MG tablet Take 1.5 tablets (900 mg total) by mouth 3 (three) times daily.    losartan (COZAAR) 25 MG tablet TAKE 1 TABLET BY MOUTH TWICE DAILY    multivitamin capsule Take 1 capsule by mouth once daily.    naproxen (NAPROSYN) 250 MG tablet One daily as needed  "for arthritis pain    rosuvastatin (CRESTOR) 10 MG tablet Take 1 tablet (10 mg total) by mouth once daily.    senna (SENOKOT) 8.6 mg tablet Take 1 tablet by mouth once daily. Twice a day    vitamin D 1000 units Tab Take 1,000 Units by mouth once daily.     Current Facility-Administered Medications   Medication    triamcinolone acetonide injection 10 mg     Facility-Administered Medications Ordered in Other Visits   Medication    ceFAZolin 2 g in dextrose 5 % in water (D5W) 50 mL IVPB (MB+)       Review of Systems   Constitutional: Negative for malaise/fatigue.   Cardiovascular:  Negative for chest pain, dyspnea on exertion, irregular heartbeat, leg swelling and palpitations.   Respiratory:  Negative for shortness of breath.    Hematologic/Lymphatic: Negative for bleeding problem.   Skin:  Negative for rash.   Musculoskeletal:  Negative for myalgias.   Gastrointestinal:  Negative for hematemesis, hematochezia and nausea.   Genitourinary:  Negative for hematuria.   Neurological:  Negative for light-headedness.   Psychiatric/Behavioral:  Negative for altered mental status. The patient is nervous/anxious.    Allergic/Immunologic: Negative for persistent infections.       Objective:          BP (!) 123/56   Pulse 68   Ht 5' 7" (1.702 m)   Wt 53.6 kg (118 lb 2.7 oz)   BMI 18.51 kg/m²     Physical Exam  Vitals and nursing note reviewed.   Constitutional:       Appearance: Normal appearance. She is well-developed.   HENT:      Head: Normocephalic.      Nose: Nose normal.   Eyes:      Pupils: Pupils are equal, round, and reactive to light.   Cardiovascular:      Rate and Rhythm: Normal rate and regular rhythm.   Pulmonary:      Effort: No respiratory distress.      Breath sounds: Normal breath sounds.   Chest:      Comments: PPM in situ  Musculoskeletal:         General: Normal range of motion.   Skin:     General: Skin is warm and dry.      Findings: No erythema.   Neurological:      Mental Status: She is alert and " oriented to person, place, and time.   Psychiatric:         Speech: Speech normal.         Behavior: Behavior normal.           Lab Results   Component Value Date     12/29/2023    K 4.8 12/29/2023    BUN 17 12/29/2023    CREATININE 0.8 12/29/2023    ALT 28 12/29/2023    AST 28 12/29/2023    HGB 12.3 10/11/2023    HCT 38.1 10/11/2023    TSH 1.601 10/11/2023    LDLCALC 65.2 12/29/2023       Recent Labs   Lab 10/26/23  1249   INR 1.0       Assessment:     1. Cardiac pacemaker in situ    2. Atrioventricular block, complete    3. Cardiomyopathy, nonischemic    4. SARAI (obstructive sleep apnea)      Plan:     In summary, Ms. Aaron is a 74 y.o. female with AV block, NICM, PPM, anxiety here for follow up after generator change.   She is 3 months s/p generator change. CRT lead and LBBA pacing lead unable to be placed. Echo 11/2023 shows EF 50%.   Ms. Aaron is doing well from a device perspective with stable lead and device function. No arrhythmia noted. Hx fo CHB and 100% RV pacing. Echo 11/2023 EF 50%. She follows with general cardiology.  Pt had concerns about failure to upgrade device to CRT or LBBA pacing. We reviewed procedure note in detail. Reassurance provided that she is on a good regimen at this time, echo with low normal function, and no CHF symptoms. Other attempts at CRT are not indicated at this time. She reported some reassurance but she says she may make a virtual visit with Dr. Colby to discuss prior procedure as well.     Continue current medication regimen and device settings.   Follow up in device clinic as scheduled.   Follow up in EP clinic in 1 year, sooner as needed.     *A copy of this note has been sent to Dr. Colby*    Follow up in about 1 year (around 1/26/2025).    ------------------------------------------------------------------    RAMÓN Plata, NP-C  Cardiac Electrophysiology

## 2024-01-26 ENCOUNTER — HOSPITAL ENCOUNTER (OUTPATIENT)
Dept: CARDIOLOGY | Facility: CLINIC | Age: 74
Discharge: HOME | End: 2024-01-26
Payer: MEDICARE

## 2024-01-26 ENCOUNTER — CLINICAL SUPPORT (OUTPATIENT)
Dept: CARDIOLOGY | Facility: HOSPITAL | Age: 74
End: 2024-01-26
Attending: INTERNAL MEDICINE
Payer: MEDICARE

## 2024-01-26 ENCOUNTER — OFFICE VISIT (OUTPATIENT)
Dept: ELECTROPHYSIOLOGY | Facility: CLINIC | Age: 74
End: 2024-01-26
Payer: MEDICARE

## 2024-01-26 VITALS
HEART RATE: 68 BPM | SYSTOLIC BLOOD PRESSURE: 123 MMHG | BODY MASS INDEX: 18.55 KG/M2 | WEIGHT: 118.19 LBS | HEIGHT: 67 IN | DIASTOLIC BLOOD PRESSURE: 56 MMHG

## 2024-01-26 DIAGNOSIS — I44.30 AV BLOCK: ICD-10-CM

## 2024-01-26 DIAGNOSIS — I42.8 CARDIOMYOPATHY, NONISCHEMIC: ICD-10-CM

## 2024-01-26 DIAGNOSIS — I44.2 ATRIOVENTRICULAR BLOCK, COMPLETE: ICD-10-CM

## 2024-01-26 DIAGNOSIS — I49.8 OTHER SPECIFIED CARDIAC ARRHYTHMIAS: ICD-10-CM

## 2024-01-26 DIAGNOSIS — Z95.0 CARDIAC PACEMAKER IN SITU: Primary | ICD-10-CM

## 2024-01-26 DIAGNOSIS — G47.33 OSA (OBSTRUCTIVE SLEEP APNEA): ICD-10-CM

## 2024-01-26 PROCEDURE — 93280 PM DEVICE PROGR EVAL DUAL: CPT | Mod: 26,,, | Performed by: INTERNAL MEDICINE

## 2024-01-26 PROCEDURE — 93280 PM DEVICE PROGR EVAL DUAL: CPT

## 2024-01-26 PROCEDURE — 93010 ELECTROCARDIOGRAM REPORT: CPT | Mod: S$GLB,,, | Performed by: INTERNAL MEDICINE

## 2024-01-26 PROCEDURE — 93005 ELECTROCARDIOGRAM TRACING: CPT | Mod: S$GLB,,, | Performed by: INTERNAL MEDICINE

## 2024-01-26 PROCEDURE — 99214 OFFICE O/P EST MOD 30 MIN: CPT | Mod: S$GLB,,, | Performed by: NURSE PRACTITIONER

## 2024-01-26 PROCEDURE — 99999 PR PBB SHADOW E&M-EST. PATIENT-LVL IV: CPT | Mod: PBBFAC,,, | Performed by: NURSE PRACTITIONER

## 2024-02-05 ENCOUNTER — PATIENT MESSAGE (OUTPATIENT)
Dept: INTERNAL MEDICINE | Facility: CLINIC | Age: 74
End: 2024-02-05
Payer: MEDICARE

## 2024-02-05 ENCOUNTER — PATIENT MESSAGE (OUTPATIENT)
Dept: GASTROENTEROLOGY | Facility: CLINIC | Age: 74
End: 2024-02-05
Payer: MEDICARE

## 2024-02-05 LAB
OHS CV AF BURDEN PERCENT: < 1
OHS CV DC REMOTE DEVICE TYPE: NORMAL
OHS CV RV PACING PERCENT: 100 %

## 2024-02-06 ENCOUNTER — DOCUMENTATION ONLY (OUTPATIENT)
Dept: PALLIATIVE MEDICINE | Facility: CLINIC | Age: 74
End: 2024-02-06
Payer: MEDICARE

## 2024-02-06 NOTE — PROGRESS NOTES
"Patient called 1-800-WISH line for information about advance care planning. Pt reports she has completed the LW in the "How to Start the Conversation" guide. Pt does not wish to include her social security number. Pt instructed to include full name as it appears in medical record, date of birth and correct contact information for follow up if needed.  Referred to page 2 in guide for process to send completed documents to HIM for placement in EMR. Jalyn verbalized understanding.  Pt asked to call WISH line for further assistance if needed.    "

## 2024-02-09 ENCOUNTER — PATIENT MESSAGE (OUTPATIENT)
Dept: RADIOLOGY | Facility: HOSPITAL | Age: 74
End: 2024-02-09
Payer: MEDICARE

## 2024-02-16 ENCOUNTER — PATIENT MESSAGE (OUTPATIENT)
Dept: INTERNAL MEDICINE | Facility: CLINIC | Age: 74
End: 2024-02-16
Payer: MEDICARE

## 2024-02-16 ENCOUNTER — TELEPHONE (OUTPATIENT)
Dept: INTERNAL MEDICINE | Facility: CLINIC | Age: 74
End: 2024-02-16

## 2024-02-16 DIAGNOSIS — R11.0 NAUSEA: Primary | ICD-10-CM

## 2024-02-16 RX ORDER — ONDANSETRON 4 MG/1
4 TABLET, FILM COATED ORAL EVERY 8 HOURS PRN
Qty: 30 TABLET | Refills: 1 | Status: SHIPPED | OUTPATIENT
Start: 2024-02-16 | End: 2024-08-05

## 2024-02-20 ENCOUNTER — DOCUMENTATION ONLY (OUTPATIENT)
Dept: ELECTROPHYSIOLOGY | Facility: CLINIC | Age: 74
End: 2024-02-20
Payer: MEDICARE

## 2024-02-20 NOTE — PROGRESS NOTES
Received a message from Christin in the MRI Department in relation to this patient needing to be scheduled for a MRI and has a [employer] Pacemaker.  Please see information below as it relates to patient's Device being MRI compatible/conditional.  To meet protocol the Pacemaker/ICD must have been implanted no less than 6 weeks prior to scheduled date of Scan.  ICD/PPM and leads must be from same .  MRI informed ordering MD must input a Cardiac Device Check in clinic and hospital order, patient must have an xray within 6 months or less prior to MRI reprogramming.  Chest xray to be reviewed by Radiologist.      As per [employer]'s MR-Conditional product listing site this PPM system (IS) MRI compatible.  MRI scheduled on (2/21@ Noon).  [employer] Rep (Fanny RENE) notified on (2/20/24).  Confirmation received from Rep.

## 2024-02-21 ENCOUNTER — HOSPITAL ENCOUNTER (OUTPATIENT)
Dept: RADIOLOGY | Facility: HOSPITAL | Age: 74
Discharge: HOME | End: 2024-02-21
Attending: INTERNAL MEDICINE
Payer: MEDICARE

## 2024-02-21 VITALS — HEART RATE: 85 BPM | OXYGEN SATURATION: 94 %

## 2024-02-21 DIAGNOSIS — K86.2 PANCREATIC CYST: ICD-10-CM

## 2024-02-21 PROCEDURE — 74183 MRI ABD W/O CNTR FLWD CNTR: CPT | Mod: 26,,, | Performed by: STUDENT IN AN ORGANIZED HEALTH CARE EDUCATION/TRAINING PROGRAM

## 2024-02-21 PROCEDURE — A9585 GADOBUTROL INJECTION: HCPCS | Performed by: INTERNAL MEDICINE

## 2024-02-21 PROCEDURE — 76376 3D RENDER W/INTRP POSTPROCES: CPT | Mod: TC

## 2024-02-21 PROCEDURE — 25500020 PHARM REV CODE 255: Performed by: INTERNAL MEDICINE

## 2024-02-21 PROCEDURE — 76376 3D RENDER W/INTRP POSTPROCES: CPT | Mod: 26,,, | Performed by: STUDENT IN AN ORGANIZED HEALTH CARE EDUCATION/TRAINING PROGRAM

## 2024-02-21 RX ORDER — GADOBUTROL 604.72 MG/ML
10 INJECTION INTRAVENOUS
Status: COMPLETED | OUTPATIENT
Start: 2024-02-21 | End: 2024-02-21

## 2024-02-21 RX ADMIN — GADOBUTROL 10 ML: 604.72 INJECTION INTRAVENOUS at 01:02

## 2024-02-21 NOTE — NURSING
Patient presents for outpatient MRI. Pacemaker rep placed device in MRI safe mode. Pacing at 85. PIV started. Continuous cardiac monitoring in place.

## 2024-02-21 NOTE — NURSING
Patient tolerated MRI well with no adverse effects. Pacemaker returned to previous mode by device rep. PIV removed. Patient to discharge home.

## 2024-02-23 ENCOUNTER — PATIENT MESSAGE (OUTPATIENT)
Dept: INTERNAL MEDICINE | Facility: CLINIC | Age: 74
End: 2024-02-23
Payer: MEDICARE

## 2024-02-23 ENCOUNTER — TELEPHONE (OUTPATIENT)
Dept: INTERNAL MEDICINE | Facility: CLINIC | Age: 74
End: 2024-02-23

## 2024-02-23 ENCOUNTER — PATIENT MESSAGE (OUTPATIENT)
Dept: GASTROENTEROLOGY | Facility: CLINIC | Age: 74
End: 2024-02-23
Payer: MEDICARE

## 2024-02-23 ENCOUNTER — HOSPITAL ENCOUNTER (OUTPATIENT)
Dept: CARDIOLOGY | Facility: CLINIC | Age: 74
Discharge: HOME | End: 2024-02-23
Payer: MEDICARE

## 2024-02-23 DIAGNOSIS — I49.8 OTHER SPECIFIED CARDIAC ARRHYTHMIAS: ICD-10-CM

## 2024-02-23 LAB
OHS QRS DURATION: 104 MS
OHS QRS DURATION: 124 MS
OHS QTC CALCULATION: 427 MS
OHS QTC CALCULATION: 454 MS

## 2024-02-23 PROCEDURE — 93005 ELECTROCARDIOGRAM TRACING: CPT | Mod: S$GLB,,, | Performed by: INTERNAL MEDICINE

## 2024-02-23 PROCEDURE — 93010 ELECTROCARDIOGRAM REPORT: CPT | Mod: S$GLB,,, | Performed by: INTERNAL MEDICINE

## 2024-02-24 ENCOUNTER — PATIENT MESSAGE (OUTPATIENT)
Dept: ENDOSCOPY | Facility: HOSPITAL | Age: 74
End: 2024-02-24
Payer: MEDICARE

## 2024-02-26 ENCOUNTER — PATIENT MESSAGE (OUTPATIENT)
Dept: INTERNAL MEDICINE | Facility: CLINIC | Age: 74
End: 2024-02-26
Payer: MEDICARE

## 2024-02-26 NOTE — PROGRESS NOTES
Subjective:   Patient ID:  Jalyn Aaron is a 74 y.o. female who presents for follow-up of No chief complaint on file.      HPI 75 yo  female with AV block, NICM, PPM, anxiety.    The patient location is: Home  The chief complaint leading to consultation is: pacemaker check     Visit type: audiovisual     Face to Face time with patient: 10 minutes  25 minutes of total time spent on the encounter, which includes face to face time and non-face to face time preparing to see the patient (eg, review of tests), Obtaining and/or reviewing separately obtained history, Documenting clinical information in the electronic or other health record, Independently interpreting results (not separately reported) and communicating results to the patient/family/caregiver, or Care coordination (not separately reported).        Each patient to whom he or she provides medical services by telemedicine is:  (1) informed of the relationship between the physician and patient and the respective role of any other health care provider with respect to management of the patient; and (2) notified that he or she may decline to receive medical services by telemedicine and may withdraw from such care at any time.       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  Significant anxiety -- much improved since she has been on cymbalta  Echo 3/28/16 normal biventricular structure and function.  Echo 6/1/20 EF 45%.  We added Toprol. Already on Losartan.  Felt poorly on the Toprol. Noted heaviness, possibly shortness of breath, thinks anxiety may have been related. This was discontinued.    Echo 10/8/20 EF 45%  Echo 11/21 EF 45%.     12/20/2022: Venogram: significant narrowing at the left brachiocephalic region, but still with forward flow.  10/26/2023:    Successful generator change.    Complex procedure, with unsuccessful placement of coronary sinus or left bundle branch area pacing  lead     Echo 11/20/23       Left Ventricle: The left ventricle is normal in size. Ventricular mass is normal. Normal wall thickness. Septal motion is consistent with pacing. There is low normal systolic function with a visually estimated ejection fraction of 50%. Biplane (2D) method of discs ejection fraction is 49%.    Left Ventricle: Grade I diastolic dysfunction.    Right Ventricle: Normal right ventricular cavity size. Systolic function is normal. Pacemaker lead present in the ventricle.    Left Atrium: Left atrium is mildly dilated.    Right Atrium: Right atrium is mildly dilated.    Pulmonary Artery: The estimated pulmonary artery systolic pressure is 21 mmHg.    IVC/SVC: Normal venous pressure at 3 mmHg.     Update:     Doing well overall. Denies shortness of breath. Wanted to [place] back about the procedure and inability to place resynchronization therapy lead.  Losing weight overall, undergoing work-up.      Review of Systems   Constitutional: Negative. Negative for fever and malaise/fatigue.   HENT:  Negative for congestion and sore throat.    Cardiovascular:  Negative for chest pain, dyspnea on exertion, irregular heartbeat, leg swelling, near-syncope, orthopnea, palpitations, paroxysmal nocturnal dyspnea and syncope.   Respiratory:  Negative for cough and shortness of breath.    Gastrointestinal:  Negative for abdominal pain, constipation and diarrhea.   Neurological:  Negative for dizziness, light-headedness and weakness.   Psychiatric/Behavioral:  Negative for depression. The patient is not nervous/anxious.    All other systems reviewed and are negative.          Assessment:      1. Atrioventricular block, complete    2. Cardiomyopathy, nonischemic    3. Anxiety    4. Cardiac pacemaker in situ    5. SARAI (obstructive sleep apnea)        Plan:     All questions answered.   She also asked if she were to reach a point of being comfort care, would there be an option to turn off device. We discussed that  yes this is always an option in that setting.  Continue current settings and medications.  F/u with monitoring as scheduled, with me in one year.

## 2024-02-27 ENCOUNTER — TELEPHONE (OUTPATIENT)
Dept: ELECTROPHYSIOLOGY | Facility: CLINIC | Age: 74
End: 2024-02-27
Payer: MEDICARE

## 2024-02-27 DIAGNOSIS — Z00.00 ENCOUNTER FOR MEDICARE ANNUAL WELLNESS EXAM: ICD-10-CM

## 2024-02-28 ENCOUNTER — OFFICE VISIT (OUTPATIENT)
Dept: INTERNAL MEDICINE | Facility: CLINIC | Age: 74
End: 2024-02-28
Payer: MEDICARE

## 2024-02-28 ENCOUNTER — PATIENT MESSAGE (OUTPATIENT)
Dept: DERMATOLOGY | Facility: CLINIC | Age: 74
End: 2024-02-28
Payer: MEDICARE

## 2024-02-28 ENCOUNTER — LAB VISIT (OUTPATIENT)
Dept: LAB | Facility: HOSPITAL | Age: 74
End: 2024-02-28
Attending: INTERNAL MEDICINE
Payer: MEDICARE

## 2024-02-28 ENCOUNTER — OFFICE VISIT (OUTPATIENT)
Dept: ELECTROPHYSIOLOGY | Facility: CLINIC | Age: 74
End: 2024-02-28
Payer: MEDICARE

## 2024-02-28 VITALS
OXYGEN SATURATION: 99 % | HEIGHT: 67 IN | HEART RATE: 66 BPM | SYSTOLIC BLOOD PRESSURE: 116 MMHG | BODY MASS INDEX: 18.2 KG/M2 | DIASTOLIC BLOOD PRESSURE: 62 MMHG | WEIGHT: 115.94 LBS

## 2024-02-28 DIAGNOSIS — F41.9 ANXIETY: ICD-10-CM

## 2024-02-28 DIAGNOSIS — I44.2 ATRIOVENTRICULAR BLOCK, COMPLETE: Primary | ICD-10-CM

## 2024-02-28 DIAGNOSIS — G47.33 OSA (OBSTRUCTIVE SLEEP APNEA): ICD-10-CM

## 2024-02-28 DIAGNOSIS — Z95.0 CARDIAC PACEMAKER IN SITU: ICD-10-CM

## 2024-02-28 DIAGNOSIS — R63.4 WEIGHT LOSS: ICD-10-CM

## 2024-02-28 DIAGNOSIS — R63.4 WEIGHT LOSS: Primary | ICD-10-CM

## 2024-02-28 DIAGNOSIS — I42.8 CARDIOMYOPATHY, NONISCHEMIC: ICD-10-CM

## 2024-02-28 DIAGNOSIS — K64.9 HEMORRHOIDS, UNSPECIFIED HEMORRHOID TYPE: ICD-10-CM

## 2024-02-28 LAB
ALBUMIN SERPL BCP-MCNC: 4.1 G/DL (ref 3.5–5.2)
ALP SERPL-CCNC: 74 U/L (ref 55–135)
ALT SERPL W/O P-5'-P-CCNC: 26 U/L (ref 10–44)
ANION GAP SERPL CALC-SCNC: 8 MMOL/L (ref 8–16)
AST SERPL-CCNC: 28 U/L (ref 10–40)
BASOPHILS # BLD AUTO: 0.08 K/UL (ref 0–0.2)
BASOPHILS NFR BLD: 1.1 % (ref 0–1.9)
BILIRUB SERPL-MCNC: 0.6 MG/DL (ref 0.1–1)
BUN SERPL-MCNC: 13 MG/DL (ref 8–23)
CALCIUM SERPL-MCNC: 9.9 MG/DL (ref 8.7–10.5)
CHLORIDE SERPL-SCNC: 105 MMOL/L (ref 95–110)
CO2 SERPL-SCNC: 28 MMOL/L (ref 23–29)
CREAT SERPL-MCNC: 0.8 MG/DL (ref 0.5–1.4)
CRP SERPL-MCNC: <0.3 MG/L (ref 0–8.2)
DIFFERENTIAL METHOD BLD: ABNORMAL
EOSINOPHIL # BLD AUTO: 0.2 K/UL (ref 0–0.5)
EOSINOPHIL NFR BLD: 3.2 % (ref 0–8)
ERYTHROCYTE [DISTWIDTH] IN BLOOD BY AUTOMATED COUNT: 13 % (ref 11.5–14.5)
ERYTHROCYTE [SEDIMENTATION RATE] IN BLOOD BY PHOTOMETRIC METHOD: 11 MM/HR (ref 0–36)
EST. GFR  (NO RACE VARIABLE): >60 ML/MIN/1.73 M^2
GLUCOSE SERPL-MCNC: 87 MG/DL (ref 70–110)
HCT VFR BLD AUTO: 41 % (ref 37–48.5)
HGB BLD-MCNC: 13.2 G/DL (ref 12–16)
IMM GRANULOCYTES # BLD AUTO: 0.01 K/UL (ref 0–0.04)
IMM GRANULOCYTES NFR BLD AUTO: 0.1 % (ref 0–0.5)
LYMPHOCYTES # BLD AUTO: 2.6 K/UL (ref 1–4.8)
LYMPHOCYTES NFR BLD: 35.6 % (ref 18–48)
MCH RBC QN AUTO: 32.5 PG (ref 27–31)
MCHC RBC AUTO-ENTMCNC: 32.2 G/DL (ref 32–36)
MCV RBC AUTO: 101 FL (ref 82–98)
MONOCYTES # BLD AUTO: 0.4 K/UL (ref 0.3–1)
MONOCYTES NFR BLD: 5.7 % (ref 4–15)
NEUTROPHILS # BLD AUTO: 3.9 K/UL (ref 1.8–7.7)
NEUTROPHILS NFR BLD: 54.3 % (ref 38–73)
NRBC BLD-RTO: 0 /100 WBC
PLATELET # BLD AUTO: 159 K/UL (ref 150–450)
PMV BLD AUTO: 11.6 FL (ref 9.2–12.9)
POTASSIUM SERPL-SCNC: 5 MMOL/L (ref 3.5–5.1)
PROT SERPL-MCNC: 7.1 G/DL (ref 6–8.4)
RBC # BLD AUTO: 4.06 M/UL (ref 4–5.4)
SODIUM SERPL-SCNC: 141 MMOL/L (ref 136–145)
TSH SERPL DL<=0.005 MIU/L-ACNC: 0.68 UIU/ML (ref 0.4–4)
WBC # BLD AUTO: 7.25 K/UL (ref 3.9–12.7)

## 2024-02-28 PROCEDURE — 80053 COMPREHEN METABOLIC PANEL: CPT | Performed by: INTERNAL MEDICINE

## 2024-02-28 PROCEDURE — 99214 OFFICE O/P EST MOD 30 MIN: CPT | Mod: S$GLB,,, | Performed by: INTERNAL MEDICINE

## 2024-02-28 PROCEDURE — 99999 PR PBB SHADOW E&M-EST. PATIENT-LVL V: CPT | Mod: PBBFAC,,, | Performed by: INTERNAL MEDICINE

## 2024-02-28 PROCEDURE — 86140 C-REACTIVE PROTEIN: CPT | Performed by: INTERNAL MEDICINE

## 2024-02-28 PROCEDURE — 85652 RBC SED RATE AUTOMATED: CPT | Performed by: INTERNAL MEDICINE

## 2024-02-28 PROCEDURE — 85025 COMPLETE CBC W/AUTO DIFF WBC: CPT | Performed by: INTERNAL MEDICINE

## 2024-02-28 PROCEDURE — 84443 ASSAY THYROID STIM HORMONE: CPT | Performed by: INTERNAL MEDICINE

## 2024-02-28 PROCEDURE — 99213 OFFICE O/P EST LOW 20 MIN: CPT | Mod: 95,,, | Performed by: INTERNAL MEDICINE

## 2024-02-28 PROCEDURE — 36415 COLL VENOUS BLD VENIPUNCTURE: CPT | Mod: PO | Performed by: INTERNAL MEDICINE

## 2024-02-28 NOTE — PROGRESS NOTES
Subjective:       Patient ID: Jalyn Aaron is a 74 y.o. female.    Chief Complaint: Weight Loss    HPIPt is feeling well but is losing weight.  She thinks it started when she started a Mediterranean diet when she found she had a higher calcium score.  No CP or SOB. She is eating.  No GI symptoms. She does have a new hemorrhoid.  Review of Systems   Respiratory:  Negative for shortness of breath (PND or orthopnea).    Cardiovascular:  Negative for chest pain (arm pain or jaw pain).   Gastrointestinal:  Negative for abdominal pain, diarrhea, nausea and vomiting.   Genitourinary:  Negative for dysuria.   Neurological:  Negative for seizures, syncope and headaches.       Objective:      Physical Exam  Constitutional:       General: She is not in acute distress.     Appearance: She is well-developed.   HENT:      Head: Normocephalic.   Eyes:      Pupils: Pupils are equal, round, and reactive to light.   Neck:      Thyroid: No thyromegaly.      Vascular: No JVD.   Cardiovascular:      Rate and Rhythm: Normal rate and regular rhythm.      Heart sounds: Normal heart sounds. No murmur heard.     No friction rub. No gallop.   Pulmonary:      Effort: Pulmonary effort is normal.      Breath sounds: Normal breath sounds. No wheezing or rales.   Abdominal:      General: Bowel sounds are normal. There is no distension.      Palpations: Abdomen is soft. There is no mass.      Tenderness: There is no abdominal tenderness. There is no guarding or rebound.   Genitourinary:     Comments: ?thrombosed hemorrhoid?  Musculoskeletal:      Cervical back: Neck supple.   Lymphadenopathy:      Cervical: No cervical adenopathy.   Skin:     General: Skin is warm and dry.   Neurological:      Mental Status: She is alert and oriented to person, place, and time.      Deep Tendon Reflexes: Reflexes are normal and symmetric.   Psychiatric:         Behavior: Behavior normal.         Thought Content: Thought content normal.         Judgment:  Judgment normal.         Assessment:       1. Weight loss    2. Hemorrhoids, unspecified hemorrhoid type        Plan:   Weight loss  -     CT Chest Without Contrast; Future; Expected date: 02/28/2024  -     CBC Auto Differential; Future; Expected date: 02/28/2024  -     Comprehensive Metabolic Panel; Future; Expected date: 02/28/2024  -     TSH; Future; Expected date: 02/28/2024  -     Sedimentation rate; Future; Expected date: 02/28/2024  -     C-Reactive Protein; Future; Expected date: 02/28/2024    Hemorrhoids, unspecified hemorrhoid type  -     Ambulatory referral/consult to Colorectal Surgery; Future; Expected date: 03/06/2024

## 2024-03-01 ENCOUNTER — CLINICAL SUPPORT (OUTPATIENT)
Dept: CARDIOLOGY | Facility: HOSPITAL | Age: 74
End: 2024-03-01
Payer: MEDICARE

## 2024-03-01 ENCOUNTER — CLINICAL SUPPORT (OUTPATIENT)
Dept: CARDIOLOGY | Facility: HOSPITAL | Age: 74
End: 2024-03-01
Attending: INTERNAL MEDICINE
Payer: MEDICARE

## 2024-03-01 DIAGNOSIS — Z95.0 PRESENCE OF CARDIAC PACEMAKER: ICD-10-CM

## 2024-03-01 DIAGNOSIS — I44.2 ATRIOVENTRICULAR BLOCK, COMPLETE: ICD-10-CM

## 2024-03-01 PROCEDURE — 93296 REM INTERROG EVL PM/IDS: CPT | Performed by: INTERNAL MEDICINE

## 2024-03-01 PROCEDURE — 93294 REM INTERROG EVL PM/LDLS PM: CPT | Mod: ,,, | Performed by: INTERNAL MEDICINE

## 2024-03-02 ENCOUNTER — PATIENT MESSAGE (OUTPATIENT)
Dept: DERMATOLOGY | Facility: CLINIC | Age: 74
End: 2024-03-02
Payer: MEDICARE

## 2024-03-04 ENCOUNTER — HOSPITAL ENCOUNTER (OUTPATIENT)
Dept: RADIOLOGY | Facility: HOSPITAL | Age: 74
Discharge: HOME | End: 2024-03-04
Attending: INTERNAL MEDICINE
Payer: MEDICARE

## 2024-03-04 DIAGNOSIS — R63.4 WEIGHT LOSS: ICD-10-CM

## 2024-03-04 LAB
OHS CV AF BURDEN PERCENT: < 1
OHS CV DC REMOTE DEVICE TYPE: NORMAL
OHS CV RV PACING PERCENT: 99.56 %

## 2024-03-04 PROCEDURE — 71250 CT THORAX DX C-: CPT | Mod: TC

## 2024-03-04 PROCEDURE — 71250 CT THORAX DX C-: CPT | Mod: 26,,, | Performed by: RADIOLOGY

## 2024-03-05 ENCOUNTER — TELEPHONE (OUTPATIENT)
Dept: INTERNAL MEDICINE | Facility: CLINIC | Age: 74
End: 2024-03-05
Payer: MEDICARE

## 2024-03-05 NOTE — TELEPHONE ENCOUNTER
Noted.  ----- Message from Stacey Sesay MD sent at 2/23/2024  3:28 PM CST -----  Hi thanks, her suspicion is correct, nothing alarming noted on the MRI. Cysts are the same size I measured with the EUS, and we can have her follow up in Pancreas cyst clinic in 1 year after next MRCP to be done in Feb 2025 (one year from now) for surveillance of pancreas cysts.    The bile duct looked fine also.    Would have her follow with general GI and/or PCP for the difficulty gaining weight back, but it does not appear to be due to the very small pancreas cysts.    Thanks  ----- Message -----  From: Karla Arora RN  Sent: 2/23/2024  10:51 AM CST  To: Stacey Sesay MD    Pt requesting info after MRi resulted. Please advise.  Thank you,  [place]      Please let me know your thoughts.  It looks like nothing bad showed up.  I am eating more but it doesn't look like I'm able to gain weight.  My scale says 112 lb.  Before I started losing weight I was 130. Thank you, Jalyn Aaron

## 2024-03-06 ENCOUNTER — TELEPHONE (OUTPATIENT)
Dept: SURGERY | Facility: CLINIC | Age: 74
End: 2024-03-06
Payer: MEDICARE

## 2024-03-06 NOTE — TELEPHONE ENCOUNTER
----- Message from Frederick Velázquez RN sent at 3/5/2024  4:55 PM CST -----  Regarding: FW: pt advice  Contact: [phone]    ----- Message -----  From: Frederick Velázquez RN  Sent: 3/5/2024   4:55 PM CST  To: Frederick Velázquez RN  Subject: FW: pt advice                                      ----- Message -----  From: Alexa Marsh  Sent: 3/5/2024   4:53 PM CST  To: Drew [facility] Staff  Subject: pt advice                                        Pt returning callback from missed call. Requesting to speak with somebody in Dr. Russell office. Please call.

## 2024-03-07 ENCOUNTER — OFFICE VISIT (OUTPATIENT)
Dept: OPTOMETRY | Facility: CLINIC | Age: 74
End: 2024-03-07
Payer: MEDICARE

## 2024-03-07 ENCOUNTER — TELEPHONE (OUTPATIENT)
Dept: GASTROENTEROLOGY | Facility: CLINIC | Age: 74
End: 2024-03-07
Payer: MEDICARE

## 2024-03-07 ENCOUNTER — TELEPHONE (OUTPATIENT)
Dept: INTERNAL MEDICINE | Facility: CLINIC | Age: 74
End: 2024-03-07
Payer: MEDICARE

## 2024-03-07 ENCOUNTER — OFFICE VISIT (OUTPATIENT)
Dept: SURGERY | Facility: CLINIC | Age: 74
End: 2024-03-07
Payer: MEDICARE

## 2024-03-07 VITALS
HEART RATE: 71 BPM | DIASTOLIC BLOOD PRESSURE: 75 MMHG | SYSTOLIC BLOOD PRESSURE: 113 MMHG | HEIGHT: 67 IN | BODY MASS INDEX: 18.48 KG/M2 | WEIGHT: 117.75 LBS

## 2024-03-07 DIAGNOSIS — K64.9 HEMORRHOIDS, UNSPECIFIED HEMORRHOID TYPE: ICD-10-CM

## 2024-03-07 DIAGNOSIS — K59.09 OTHER CONSTIPATION: Primary | ICD-10-CM

## 2024-03-07 DIAGNOSIS — H02.886 MEIBOMIAN GLAND DYSFUNCTION (MGD) OF BOTH EYES: Primary | ICD-10-CM

## 2024-03-07 DIAGNOSIS — H25.13 NUCLEAR SCLEROSIS, BILATERAL: ICD-10-CM

## 2024-03-07 DIAGNOSIS — R63.4 WEIGHT LOSS: ICD-10-CM

## 2024-03-07 DIAGNOSIS — K22.89 ESOPHAGEAL THICKENING: Primary | ICD-10-CM

## 2024-03-07 DIAGNOSIS — H02.883 MEIBOMIAN GLAND DYSFUNCTION (MGD) OF BOTH EYES: Primary | ICD-10-CM

## 2024-03-07 DIAGNOSIS — H50.30 INTERMITTENT ESOTROPIA: Chronic | ICD-10-CM

## 2024-03-07 DIAGNOSIS — H51.8 DIVERGENCE INSUFFICIENCY: ICD-10-CM

## 2024-03-07 DIAGNOSIS — H53.2 DIPLOPIA: ICD-10-CM

## 2024-03-07 PROCEDURE — 99204 OFFICE O/P NEW MOD 45 MIN: CPT | Mod: 25,S$GLB,, | Performed by: NURSE PRACTITIONER

## 2024-03-07 PROCEDURE — 46600 DIAGNOSTIC ANOSCOPY SPX: CPT | Mod: S$GLB,,, | Performed by: NURSE PRACTITIONER

## 2024-03-07 PROCEDURE — 99999 PR PBB SHADOW E&M-EST. PATIENT-LVL IV: CPT | Mod: PBBFAC,,, | Performed by: NURSE PRACTITIONER

## 2024-03-07 PROCEDURE — 92014 COMPRE OPH EXAM EST PT 1/>: CPT | Mod: S$GLB,,, | Performed by: OPTOMETRIST

## 2024-03-07 PROCEDURE — 92015 DETERMINE REFRACTIVE STATE: CPT | Mod: S$GLB,,, | Performed by: OPTOMETRIST

## 2024-03-07 PROCEDURE — 99999 PR PBB SHADOW E&M-EST. PATIENT-LVL III: CPT | Mod: PBBFAC,,, | Performed by: OPTOMETRIST

## 2024-03-07 RX ORDER — LUBIPROSTONE 8 UG/1
8 CAPSULE ORAL 2 TIMES DAILY WITH MEALS
Qty: 60 CAPSULE | Refills: 5 | Status: SHIPPED | OUTPATIENT
Start: 2024-03-07

## 2024-03-07 RX ORDER — HYDROCORTISONE 25 MG/G
CREAM TOPICAL 2 TIMES DAILY
Qty: 28 G | Refills: 1 | Status: SHIPPED | OUTPATIENT
Start: 2024-03-07 | End: 2024-04-01

## 2024-03-07 NOTE — PROGRESS NOTES
CRS Office Visit History and Physical    Referring Md:   Annemarie Kilgore Md  [address]  [address]  [place] [address]    SUBJECTIVE:     Chief Complaint: hemorrhoids    History of Present Illness:  The patient is new patient to this practice.   Course is as follows:  Patient is a 74 y.o. female presents with hemorrhoids. She saw her PCP 3/1, exam revealed possible thrombosed hemorrhoid. Started around 2/21/24, treated with over the counter prep H, today size is smaller. At onset it was very painful, today pain is about a 2-3/10 with sitting. It is not bleeding and it never bled. This   Over the last 4 months she has lost 20 lbs for unknown reasons. She did transition to mediterranean diet for heart issues.  She has lost the ability to feel rectal nerves due to spinal stenosis that started a few years ago. Has constipation. This is described as going 4-5 days without a BM. She eats 2 kiwis a day, 4 prunes, two sennas a day and that helps her go every 2-3 days.   She has completed PFPT, about 1.5 years ago  Denies n/v, decreased appetite.     Last Colonoscopy: 2021 colonoscopy  The colon (entire examined portion) revealed significantly excessive        looping.        Scope only able to be advanced to hepatic flexure due to excessive        looping, in spite of changing to supine position and using abdominal        pressure.   Family history of colorectal cancer or IBD: no.    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    Left    AV block     exercised induced 2:1    Cataract     Fever blister     Heart block     History of acne     Joint pain     hands    Keratoconjunctivitis sicca not due to Sjogren's syndrome     Pacemaker     Sleep apnea, unspecified       Past Surgical History:   Procedure Laterality Date    BREAST BIOPSY Left 12/2013    papilloma with atypia on core biopsy    BREAST BIOPSY Left 01/2014    Ex.Bx., removal of ADH/Papilloma    COLONOSCOPY N/A 11/8/2016    Procedure: COLONOSCOPY;  Surgeon: Dl Caruso MD;  Location: [facility] (4TH FLR);  Service: Endoscopy;  Laterality: N/A;  Pacemaker in place.    COLONOSCOPY N/A 3/30/2021    Procedure: COLONOSCOPY;  Surgeon: Joaquin Johnson MD;  Location: [facility] ENDO (4TH FLR);  Service: Endoscopy;  Laterality: N/A;  prep ins. emailed - COVID screening on 3/27/21 PCW - ERW    ENDOSCOPIC ULTRASOUND OF UPPER GASTROINTESTINAL TRACT N/A 10/9/2023    Procedure: ULTRASOUND, UPPER GI TRACT, ENDOSCOPIC;  Surgeon: Stacey Sesay MD;  Location: [facility] (2ND FLR);  Service: Endoscopy;  Laterality: N/A;  pancreas cyst 7mm on CT (r/o nodule or worrisome features)    8/4/23-Instructions via portal-DS  10/2-precall complete-MS    HYSTERECTOMY      IMPLANTATION OF BIVENTRICULAR PERMANENT PACEMAKER AS UPGRADE TO EXISTING PACEMAKER N/A 10/26/2023    Procedure: Biventricular pacemaker upgrade;  Surgeon: Chacho Colby MD;  Location: [facility] EP LAB;  Service: Cardiology;  Laterality: N/A;  CHB, CRT-P upgrade, MDT, MAC, SK, 3 Prep *MDT DC PPM in situ*    INSERT / REPLACE / REMOVE PACEMAKER      LASIK      LASIK Bilateral 2009    VENOGRAM, EP LAB N/A 12/20/2022    Procedure: Venogram, EP Lab;  Surgeon: Chacho Colby MD;  Location: [facility] EP LAB;  Service: Cardiology;  Laterality: N/A;  NICM, Venogram- left side, SK, 3 Prep *MDT PPM in situ*     Family History   Problem Relation Age of Onset    Breast cancer Mother         Paget's dz    Cancer Mother 80        pagets    Cataracts Mother     Hypertension Mother     Multiple sclerosis Father     Multiple sclerosis Sister     No Known Problems Maternal Aunt     No Known Problems Maternal Uncle     No Known Problems Paternal Aunt     No Known Problems Paternal Uncle     No Known Problems  "Maternal Grandmother     Prostate cancer Maternal Grandfather     No Known Problems Paternal Grandmother     No Known Problems Paternal Grandfather     No Known Problems Brother     No Known Problems Son     No Known Problems Son     Amblyopia Neg Hx     Blindness Neg Hx     Diabetes Neg Hx     Glaucoma Neg Hx     Macular degeneration Neg Hx     Retinal detachment Neg Hx     Strabismus Neg Hx     Stroke Neg Hx     Thyroid disease Neg Hx     Melanoma Neg Hx     Ovarian cancer Neg Hx      Social History     Tobacco Use    Smoking status: Never     Passive exposure: Never    Smokeless tobacco: Never   Substance Use Topics    Alcohol use: Not Currently     Alcohol/week: 1.0 standard drink of alcohol     Types: 1 Glasses of wine per week     Comment: weekly    Drug use: No        Review of Systems:  ROS    OBJECTIVE:     Vital Signs (Most Recent)  /75 (BP Location: Right arm, Patient Position: Sitting, BP Method: Medium (Manual))   Pulse 71   Ht 5' 7" (1.702 m)   Wt 53.4 kg (117 lb 11.6 oz)   BMI 18.44 kg/m²     Physical Exam:  General: White female in no distress   Neuro: Alert and oriented to person, place, and time.  Moves all extremities.     HEENT: No icterus.  Trachea midline  Respiratory: Respirations are even and unlabored, no cough or audible wheezing  Skin: Warm dry and intact, No visible rashes, no jaundice    Labs reviewed today:  Lab Results   Component Value Date    WBC 7.25 02/28/2024    HGB 13.2 02/28/2024    HCT 41.0 02/28/2024     02/28/2024    CHOL 146 12/29/2023    TRIG 59 12/29/2023    HDL 69 12/29/2023    ALT 26 02/28/2024    AST 28 02/28/2024     02/28/2024    K 5.0 02/28/2024     02/28/2024    CREATININE 0.8 02/28/2024    BUN 13 02/28/2024    CO2 28 02/28/2024    TSH 0.684 02/28/2024    INR 1.0 10/26/2023    HGBA1C 5.5 12/29/2023       Endoscopy reviewed today:  See HPI    EUS 2023 for pancreatic cyst    Anorectal Exam:    Anal Skin:  L Lateral bulging, but no true " hemorrhoid externally    Digital Rectal Exam:  Resting Tone high  Mass none  Rectocele  absent  Tenderness  absent    Anoscopy:  Verbal consent was obtained.   Clear plastic anoscope inserted.    Hemorrhoids  present  Stigmata of bleeding  absent  Stigmata of prolapsed  absent  Distal rectal mucosa normal      ASSESSMENT/PLAN:     Diagnoses and all orders for this visit:    Other constipation  -     lubiprostone (AMITIZA) 8 MCG Cap; Take 1 capsule (8 mcg total) by mouth 2 (two) times daily with meals.    Hemorrhoids, unspecified hemorrhoid type  -     Ambulatory referral/consult to Colorectal Surgery    Weight loss    74 y.o. F here today for weight loss, hemorrhoids and constipation. I do not think her hemorrhoids are a source for weight loss. Her constipations is not well controlled currently so will trial amitiza, we discussed possibly could cause diarrhea as linzess did. I recommend following up with GI for further management for this.  The hemorrhoid today looked benign, mild bulging. No need to excise. She'd like to try some steroid cream which is reasonable    F/u PRN    Tami Russell, TIMOTEOP-C  Colon and Rectal Surgery

## 2024-03-07 NOTE — PROGRESS NOTES
CT chest looks good but they did note some diffuse esophageal thickening - I recommend an EGD.  Please call [phone] to schedule.

## 2024-03-07 NOTE — TELEPHONE ENCOUNTER
Attempted to contact pt to discuss constipation. Left vm and call back number    ----- Message from Tami Russell NP sent at 3/7/2024  1:23 PM CST -----  Plz call pt for constipation follow up

## 2024-03-07 NOTE — PATIENT INSTRUCTIONS
Meibomian Gland Dysfunction    The meibomian glands are located in the eyelids, near the eyelashes. Secretions from these glands make up the lipid (oily) layer of the tear film. This oily layer sits on top of the liquid (aqueous) tears to prevent rapid evaporation of the tears.         Failure of these glands to produce or secrete oil - due to chronic blockage, thickening of the oils, infection or inflammation, will affect the stability and quality of the tear film. This is known as meibomian gland dysfunction.        Meibomian Gland Dysfunction can be treated in several ways:  Warm Compresses: Heat applied to the eyelid margins (once to twice daily) liquefies the thickened oils, as well as helps to open the meibomian glands so that proper function can be restored.  Options for heated masks to unclog meibomian glands:  https://www.Joyme.com/Aroma-Season-Flaxseed-Syndrome-Blepharitis/dp/D05UIQ028X/ref=sr_1_3?dchild=1&keywords=lipiflow&enn=9833718765&sr=8-3    https://GlobeRanger/Aroma-Season-[email or web address]-Blepharitis/dp/B631UYNKW8/ref=sr_1_4?dchild=1&keywords=lipiflow&rjr=5225713902&sr=8-4    https://Adhere2Care.Joyme.com/Janny-Activated-Dysfunction-Recommended-Professional/dp/O82T389CHB/ref=sr_1_19?dchild=1&keywords=lipiflow&fpp=6838580988&sr=8-19    2. Eyelid Cleanser: It is important to keep the eyelid margins free of excess bacteria and debris. A cleanser specifically formulated for the delicate eye area is an ideal way to accomplish this. Gently cleaning the eyelash/eyelid margins once to twice a day will also stimulate the meibomian glands to properly secrete their oils. Using baby shampoo to clean the eye area strips away the natural oils which protect the eye, often leading to more irritation and problems. DO NOT USE ANY FORM OF BABY SHAMPOO TO CLEAN EYES!   Options for the most effective commercially available eyelid cleansers:   Avenova Lid and Lash Solution                        [facility]  Eyelid Solution    Thera Tears SteriLid    Ocusoft HypoChlor              3. Topical antibiotic ointment  4.  Oral Antibiotic   5. Omega 3 supplement daily (ex: Thera Tears Nutrition (3 sofgels po daily)              DIVERGENCE EXCESS    When we look at an object far away, the eyes have to aim, equally at the object.  When we look up close, the eyes must turn in (converge) evenly to see the object as one.          When the eyes (or one eye) overshoot(s)  when looking far away, this is called divergence excess.  When the eyes undershoot when looking up close, this is called convergence insufficiency.        Both can be treated with vision therapy.    Explanation of Exotropia (eye turn out):  https://www.LaunchCyte/url?sa=i&url=https%3A%2F%2FP[employer]%2Fblog%1A9627%2F12%2F01%5Jucqr-sl-vfvwdarfq%2F&psig=RVnFap2bboYuRGYr8x1RbC30AYWn&jia=[id number]&source=images&cd=vfe&ermias=9IFuCoKocQfbPPASs65Z7gNHDJAQSERVtVFGPPYIu    Explanation of Divergence excess:  https://wwwSDI-Solution/url?sa=i&url=https%3A%2F%2Fwww.Heyo.[employer]%2Fwatch%3Fv%6Ly5rBID9JLeX&psig=VXmYsn9npqUfDJRg5l8LhL77FFSp&zdq=[id number]&source=images&cd=vfe&ermias=1ZEkGjZkcAswATZIp72W9eQHMWNXNIRQzXIJBSWOR

## 2024-03-08 ENCOUNTER — TELEPHONE (OUTPATIENT)
Dept: ENDOSCOPY | Facility: HOSPITAL | Age: 74
End: 2024-03-08
Payer: MEDICARE

## 2024-03-08 ENCOUNTER — PATIENT MESSAGE (OUTPATIENT)
Dept: INTERNAL MEDICINE | Facility: CLINIC | Age: 74
End: 2024-03-08
Payer: MEDICARE

## 2024-03-08 ENCOUNTER — PATIENT MESSAGE (OUTPATIENT)
Dept: ENDOSCOPY | Facility: HOSPITAL | Age: 74
End: 2024-03-08
Payer: MEDICARE

## 2024-03-08 DIAGNOSIS — R63.4 WEIGHT LOSS: ICD-10-CM

## 2024-03-08 DIAGNOSIS — K22.89 ESOPHAGEAL THICKENING: Primary | ICD-10-CM

## 2024-03-08 NOTE — TELEPHONE ENCOUNTER
Spoke to patient to schedule procedure(s) Upper Endoscopy (EGD)       Physician to perform procedure(s) Dr. AMANDA Valencia  Date of Procedure (s) 3/14/24  Arrival Time 8:15 AM  Time of Procedure(s) 9:15 AM   Location of Procedure(s) [place] 2nd Floor  Type of Rx Prep sent to patient: N/A  Instructions provided to patient via [facility]    Patient was informed on the following information and verbalized understanding. Screening questionnaire reviewed with patient and complete. If procedure requires anesthesia, a responsible adult needs to be present to accompany the patient home, patient cannot drive after receiving anesthesia. Appointment details are tentative, especially check-in time. Patient will receive a prep-op call 7 days prior to confirm check-in time for procedure. If applicable the patient should contact their pharmacy to verify Rx for procedure prep is ready for pick-up. Patient was advised to call the scheduling department at [phone] if pharmacy states no Rx is available. Patient was advised to call the endoscopy scheduling department if any questions or concerns arise.      [facility] Scheduling Department

## 2024-03-12 ENCOUNTER — ANESTHESIA EVENT (OUTPATIENT)
Dept: ENDOSCOPY | Facility: HOSPITAL | Age: 74
End: 2024-03-12
Payer: MEDICARE

## 2024-03-12 NOTE — ANESTHESIA PREPROCEDURE EVALUATION
[facility]  Anesthesia Pre-Operative Evaluation       Patient Name: Jalyn Aaron  YOB: 1950  MRN: [id number]  [facility]: [id number]      Code Status: Prior   Date of Procedure: 3/19/2024  Anesthesia: Choice Procedure: Procedure(s) (LRB):  EGD (ESOPHAGOGASTRODUODENOSCOPY) (N/A)  Pre-Operative Diagnosis: Esophageal thickening [K22.89]  Weight loss [R63.4]  Proceduralist: Surgeon(s) and Role:     * Mynor Watts MD - Primary        SUBJECTIVE:   Jalyn Aaron is a 74 y.o. 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 No notes on file    Anticoagulants   Medication Route Frequency       she has a current medication list which includes the following long-term medication(s): carvedilol, gabapentin, losartan, rosuvastatin, alprazolam, and estradiol.   ALLERGIES:     Review of patient's allergies indicates:   Allergen Reactions    Atorvastatin Other (See Comments)     Elevated LFTs    Bactrim [sulfamethoxazole-trimethoprim] Nausea Only     LDA:          Lines/Drains/Airways       Peripheral Intravenous Line  Duration                  Peripheral IV - Single Lumen 03/19/24 0725 20 G Right Wrist <1 day                  MEDICATIONS:     Antibiotics (From admission, onward)      None          VTE Risk Mitigation (From admission, onward)      None          Current Facility-Administered Medications   Medication Dose Route Frequency Provider Last Rate Last Admin    0.9%  NaCl infusion   Intravenous Continuous Mynor Watts MD 10 mL/hr at 03/19/24 0725 New Bag at 03/19/24 0725     Facility-Administered Medications Ordered in Other Encounters   Medication Dose Route Frequency Provider Last Rate Last Admin    ceFAZolin 2 g in dextrose 5 % in water (D5W)  50 mL IVPB (MB+)  2 g Intravenous On Call Procedure Chacho Colby MD              History:   There are no hospital problems to display for this patient.    Surgical History:    has a past surgical history that includes Hysterectomy; LASIK; Insert / replace / remove pacemaker; Colonoscopy (N/A, 11/8/2016); Breast biopsy (Left, 12/2013); Breast biopsy (Left, 01/2014); LASIK (Bilateral, 2009); Colonoscopy (N/A, 3/30/2021); venogram, ep lab (N/A, 12/20/2022); Endoscopic ultrasound of upper gastrointestinal tract (N/A, 10/9/2023); and Implantation of biventricular permanent pacemaker as upgrade to existing pacemaker (N/A, 10/26/2023).   Social History:    reports that she is not currently sexually active and has had partner(s) who are male. She reports using the following method of birth control/protection: None.  reports that she has never smoked. She has never been exposed to tobacco smoke. She has never used smokeless tobacco. She reports that she does not currently use alcohol after a past usage of about 1.0 standard drink of alcohol per week. She reports that she does not use drugs.     OBJECTIVE:     Vital Signs (Most Recent):  Temp: 36.6 °C (97.9 °F) (03/19/24 0711)  Pulse: 63 (03/19/24 0711)  Resp: 16 (03/19/24 0711)  BP: 128/78 (03/19/24 0711)  SpO2: 100 % (03/19/24 0711) Vital Signs Range (Last 24H):  Temp:  [36.6 °C (97.9 °F)]   Pulse:  [63]   Resp:  [16]   BP: (128)/(78)   SpO2:  [100 %]        Body mass index is 18.32 kg/m².   Wt Readings from Last 4 Encounters:   03/19/24 53.1 kg (117 lb)   03/07/24 53.4 kg (117 lb 11.6 oz)   02/28/24 52.6 kg (115 lb 15.4 oz)   01/26/24 53.6 kg (118 lb 2.7 oz)     Significant Labs:  Lab Results   Component Value Date    WBC 7.25 02/28/2024    HGB 13.2 02/28/2024    HCT 41.0 02/28/2024     02/28/2024     02/28/2024    K 5.0 02/28/2024     02/28/2024    CREATININE 0.8 02/28/2024    BUN 13 02/28/2024    CO2 28 02/28/2024    GLU 87 02/28/2024    CALCIUM 9.9  "02/28/2024    ALKPHOS 74 02/28/2024    ALT 26 02/28/2024    AST 28 02/28/2024    ALBUMIN 4.1 02/28/2024    INR 1.0 10/26/2023    APTT 27.8 10/26/2023    HGBA1C 5.5 12/29/2023    CPK 85 05/26/2020    CPKMB 2.2 05/26/2020    TROPONINI 0.010 05/26/2020    MB 2.6 05/26/2020    BNP 58 12/29/2023     No LMP recorded. Patient has had a hysterectomy.  No results found for this or any previous visit (from the past 72 hour(s)).    EKG:   Results for orders placed or performed during the hospital encounter of 10/26/23   EKG 12-lead    Collection Time: 10/26/23  5:11 PM    Narrative    Test Reason : I49.9,    Vent. Rate : 067 BPM     Atrial Rate : 067 BPM     P-R Int : 168 ms          QRS Dur : 162 ms      QT Int : 464 ms       P-R-T Axes : 073 091 -02 degrees     QTc Int : 490 ms    Atrial-sensed ventricular-paced rhythm  Abnormal ECG  When compared with ECG of 26-OCT-2023 12:15,  Sinus rhythm is no longer with complete heart block  Vent. rate has increased BY   2 BPM  Confirmed by Bob JIMENEZ MD ([id number]) on 10/27/2023 7:40:47 PM    Referred By: MARIA ANTONIA MAYEN           Confirmed By:Bob JIMENEZ MD       TTE:  Results for orders placed or performed during the hospital encounter of 11/20/23   Echo   Result Value Ref Range    BSA 1.62 m2    LVOT stroke volume 61.18 cm3    LVIDd 4.36 3.5 - 6.0 cm    LV Systolic Volume 43.62 mL    LV Systolic Volume Index 26.6 mL/m2    LVIDs 3.28 2.1 - 4.0 cm    LV Diastolic Volume 85.96 mL    LV Diastolic Volume Index 52.41 mL/m2    IVS 0.71 0.6 - 1.1 cm    LVOT diameter 2.12 cm    LVOT area 3.5 cm2    FS 25 (A) 28 - 44 %    Left Ventricle Relative Wall Thickness 0.35 cm    Posterior Wall 0.76 0.6 - 1.1 cm    LV mass 96.51 g    LV Mass Index 59 g/m2    MV Peak E Mike 0.61 m/s    TDI LATERAL 0.08 m/s    TDI SEPTAL 0.07 m/s    E/E' ratio 8.13 m/s    MV Peak A Mike 0.70 m/s    TR Max Mike 2.15 m/s    E/A ratio 0.87     E wave deceleration time 198.13 msec    MV "A" wave duration 7.61 msec    LV SEPTAL E/E' " RATIO 8.71 m/s    LA Volume Index 26.9 mL/m2    LV LATERAL E/E' RATIO 7.63 m/s    LA volume 44.05 cm3    PV Peak S Mike 0.43 m/s    PV Peak D Mike 0.41 m/s    Pulm vein S/D ratio 1.05     LVOT peak mike 0.77 m/s    RVDD 3.46 cm    TAPSE 2.41 cm    LA size 2.64 cm    Left Atrium Minor Axis 4.86 cm    Left Atrium Major Axis 5.11 cm    LA volume (mod) 65.97 cm3    LA WIDTH 3.94 cm    LA Volume Index (Mod) 40.2 mL/m2    RA Major Axis 5.66 cm    RA Width 3.57 cm    AV mean gradient 2 mmHg    AV peak gradient 4 mmHg    Ao peak mike 1.00 m/s    Ao VTI 21.98 cm    LVOT peak VTI 17.34 cm    AV valve area 2.78 cm²    AV Velocity Ratio 0.77     AV index (prosthetic) 0.79     MANUELA by Velocity Ratio 2.72 cm²    Triscuspid Valve Regurgitation Peak Gradient 18 mmHg    Sinus 3.09 cm    STJ 2.72 cm    Ascending aorta 3.00 cm    Mean e' 0.08 m/s    ZLVIDS 1.08     ZLVIDD -0.57     TV resting pulmonary artery pressure 21 mmHg    RV TB RVSP 5 mmHg    Est. RA pres 3 mmHg    Tavarez's Biplane MOD Ejection Fraction 49 %    Narrative      Left Ventricle: The left ventricle is normal in size. Ventricular mass   is normal. Normal wall thickness. Septal motion is consistent with pacing.   There is low normal systolic function with a visually estimated ejection   fraction of 50%. Biplane (2D) method of discs ejection fraction is 49%.    Left Ventricle: Grade I diastolic dysfunction.    Right Ventricle: Normal right ventricular cavity size. Systolic   function is normal. Pacemaker lead present in the ventricle.    Left Atrium: Left atrium is mildly dilated.    Right Atrium: Right atrium is mildly dilated.    Pulmonary Artery: The estimated pulmonary artery systolic pressure is   21 mmHg.    IVC/SVC: Normal venous pressure at 3 mmHg.       EF   Date Value Ref Range Status   11/09/2022 45 % Final   11/03/2021 45 % Final     Nuc Stress EF   Date Value Ref Range Status   09/14/2023 79 % Final     Nuc Rest EF   Date Value Ref Range Status   09/14/2023 68  " Final      No results found. However, due to the size of the patient record, not all encounters were searched. Please check Results Review for a complete set of results.  JAYSHREE:  No results found. However, due to the size of the patient record, not all encounters were searched. Please check Results Review for a complete set of results.  Stress Test:  No results found for this or any previous visit.     LHC:  No results found for this or any previous visit.     PFT:  No results found for: "FEV1", "FVC", "AZL9VCF", "TLC", "DLCO"   ASSESSMENT/PLAN:          Pre-op Assessment    I have reviewed the Patient Summary Reports.       I have reviewed the Medications.     Review of Systems  Anesthesia Hx:             Denies Family Hx of Anesthesia complications.    Denies Personal Hx of Anesthesia complications.                    Social:  Social Alcohol Use, Non-Smoker       Hematology/Oncology:  Hematology Normal   Oncology Normal                                   EENT/Dental:  EENT/Dental Normal           Cardiovascular:  Exercise tolerance: good  Pacemaker     Dysrhythmias                                    Pulmonary:        Sleep Apnea                Renal/:  Renal/ Normal                 Hepatic/GI:  Hepatic/GI Normal                 Musculoskeletal:  Musculoskeletal Normal                Neurological:    Neuromuscular Disease,                                   Endocrine:  Endocrine Normal            Dermatological:  Skin Normal    Psych:  Psychiatric Normal                   Patient Active Problem List   Diagnosis    Atrioventricular block, complete    Cardiac pacemaker in situ    Anxiety    Intraductal papilloma of breast    Ejection fraction < 50%    Cardiomyopathy, nonischemic    Lumbar stenosis    Screening for colon cancer    Disorder of muscle    Decreased range of motion of trunk and back    Decreased strength of trunk and back    Weakness of left lower extremity    Neuropathy    Decreased ROM of neck    " Decreased strength of upper extremity    Atherosclerosis of aorta    Snoring    SARAI (obstructive sleep apnea)       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    Left    AV block     exercised induced 2:1    Cataract     Fever blister     Heart block     History of acne     Joint pain     hands    Keratoconjunctivitis sicca not due to Sjogren's syndrome     Pacemaker     Sleep apnea, unspecified        ECHO: Results for orders placed during the hospital encounter of 11/20/23    Echo    Interpretation Summary    Left Ventricle: The left ventricle is normal in size. Ventricular mass is normal. Normal wall thickness. Septal motion is consistent with pacing. There is low normal systolic function with a visually estimated ejection fraction of 50%. Biplane (2D) method of discs ejection fraction is 49%.    Left Ventricle: Grade I diastolic dysfunction.    Right Ventricle: Normal right ventricular cavity size. Systolic function is normal. Pacemaker lead present in the ventricle.    Left Atrium: Left atrium is mildly dilated.    Right Atrium: Right atrium is mildly dilated.    Pulmonary Artery: The estimated pulmonary artery systolic pressure is 21 mmHg.    IVC/SVC: Normal venous pressure at 3 mmHg.      There is no height or weight on file to calculate BMI.    Tobacco Use: Low Risk  (3/12/2024)    Patient History     Smoking Tobacco Use: Never     Smokeless Tobacco Use: Never     Passive Exposure: Never       Social History     Substance and Sexual Activity   Drug Use No        Alcohol Use: Not At Risk (1/6/2024)    AUDIT-C     Frequency of Alcohol Consumption: Never     Average Number of Drinks: Patient does not drink     Frequency of Binge Drinking: Never       Review of patient's allergies indicates:   Allergen Reactions    Atorvastatin Other (See Comments)     Elevated LFTs    Bactrim [sulfamethoxazole-trimethoprim] Nausea Only         Airway:  No value filed.      Physical Exam  General: Well nourished, Cooperative, Oriented and Alert    Airway:  Mallampati: II   Mouth Opening: Normal  TM Distance: Normal  Tongue: Normal    Dental:  Intact        Anesthesia Plan  Type of Anesthesia, risks & benefits discussed:    Anesthesia Type: Gen Natural Airway  Intra-op Monitoring Plan: Standard ASA Monitors  Induction:  IV  Informed Consent: Informed consent signed with the Patient and all parties understand the risks and agree with anesthesia plan.  All questions answered. Patient consented to blood products? No  ASA Score: 2  Day of Surgery Review of History & Physical: H&P Update referred to the surgeon/provider.    Ready For Surgery From Anesthesia Perspective.     .

## 2024-03-13 ENCOUNTER — PATIENT MESSAGE (OUTPATIENT)
Dept: ENDOSCOPY | Facility: HOSPITAL | Age: 74
End: 2024-03-13
Payer: MEDICARE

## 2024-03-13 ENCOUNTER — TELEPHONE (OUTPATIENT)
Dept: ENDOSCOPY | Facility: HOSPITAL | Age: 74
End: 2024-03-13
Payer: MEDICARE

## 2024-03-13 NOTE — TELEPHONE ENCOUNTER
Called pt to reschedule procedure at hospital base location. Pt did not answer the phone . Message was left on pts voicemail.

## 2024-03-13 NOTE — TELEPHONE ENCOUNTER
EGD Procedure Prep Instructions      Date of procedure: 3/19/24 Arrive at: 7:00AM      Location of Department: [address] [place] [address]  Take the Atrium Elevators to 2nd Floor Outpatient Surgery    How to prep:    Day Before Procedure: 3/18/24     You may have a light evening meal.   No solid food after 7:00 pm.   Continue drinking clear liquids.       Day of the Procedure:  3/19/24     You may have water/clear liquids until 4 hours before your procedure or as directed by the scheduling nurse  4:00AM. See below for list.    What You CANNOT do:   Do not drink milk or anything colored red.  Do not drink alcohol.  No gum chewing or candy morning of procedure.    Liquids That Are OK to Drink:   Water  Sports drinks (Gatorade, Power-Aid)  Coffee or tea (no cream or nondairy creamer)  Clear juices without pulp (apple, white grape)  Gelatin desserts (no fruit or toppings)  Clear soda (sprite, coke, ginger ale)  Chicken broth (until 12 midnight the night before procedure)      Comments:         IMPORTANT INFORMATION TO KNOW BEFORE YOUR PROCEDURE    [facility] 2nd Floor    If your procedure requires the administration of anesthesia, it is necessary for a responsible adult to drive you home. (Medical Transportation, Uber, Lyft, Taxi, etc. may ONLY be used if a responsible adult is present to accompany you home.  The responsible adult CAN'T be the  of the service).      person must be available to return to pick you up within 15 minutes of being notified of discharge.       Please bring a picture ID, insurance card, & copayment      Take Medications as directed below:        If you begin taking any blood thinning medications or injectable weight loss/diabetes medications (other than insulin) , please contact the endoscopy scheduling department listed below as soon as possible.    If you are diabetic see the attached instruction sheet regarding your medication.     If  you take HEART, BLOOD PRESSURE, SEIZURE, PAIN, LUNG (including inhalers/nebulizers), ANTI-REJECTION (transplant patients), or PSYCHIATRIC medications, please take at your regular times with a sip of water or as directed by the scheduling nurse.     Important contact information:    Endoscopy [phone] Hours of operation Monday-Friday 8:00-4:30pm.    Questions about insurance or financial obligations call [phone] or [phone].    If you have questions regarding the prep or need to reschedule, please call [phone]. After hours questions requiring immediate assistance, contact [facility] On-Call nurse line at [phone] or [phone].   NOTE:     On occasion, unforeseen circumstances may cause a delay in your procedure start time. We respect your time and appreciate your patience during these circumstances.      Comments:

## 2024-03-14 ENCOUNTER — PATIENT MESSAGE (OUTPATIENT)
Dept: DERMATOLOGY | Facility: CLINIC | Age: 74
End: 2024-03-14
Payer: MEDICARE

## 2024-03-14 ENCOUNTER — ANESTHESIA (OUTPATIENT)
Dept: ENDOSCOPY | Facility: HOSPITAL | Age: 74
End: 2024-03-14
Payer: MEDICARE

## 2024-03-15 ENCOUNTER — TELEPHONE (OUTPATIENT)
Dept: DERMATOLOGY | Facility: CLINIC | Age: 74
End: 2024-03-15
Payer: MEDICARE

## 2024-03-15 NOTE — TELEPHONE ENCOUNTER
----- Message from Servando Cain LPN sent at 3/13/2024  4:55 PM CDT -----  Regarding: FW: Requesting a sooner appt  Contact: [phone]    ----- Message -----  From: Bee Harper  Sent: 3/13/2024   4:43 PM CDT  To: [facility] Staff  Subject: Requesting a sooner appt                         Patient is calling to requesting a sooner appointment.  Please contact patient to further discuss.

## 2024-03-19 ENCOUNTER — HOSPITAL ENCOUNTER (OUTPATIENT)
Facility: HOSPITAL | Age: 74
Discharge: HOME | End: 2024-03-19
Attending: INTERNAL MEDICINE | Admitting: INTERNAL MEDICINE
Payer: MEDICARE

## 2024-03-19 ENCOUNTER — PATIENT MESSAGE (OUTPATIENT)
Dept: DERMATOLOGY | Facility: CLINIC | Age: 74
End: 2024-03-19
Payer: MEDICARE

## 2024-03-19 VITALS
SYSTOLIC BLOOD PRESSURE: 114 MMHG | HEIGHT: 67 IN | HEART RATE: 68 BPM | RESPIRATION RATE: 20 BRPM | BODY MASS INDEX: 18.36 KG/M2 | WEIGHT: 117 LBS | TEMPERATURE: 98 F | OXYGEN SATURATION: 100 % | DIASTOLIC BLOOD PRESSURE: 70 MMHG

## 2024-03-19 DIAGNOSIS — K21.00 REFLUX ESOPHAGITIS: ICD-10-CM

## 2024-03-19 PROCEDURE — 37000008 HC ANESTHESIA 1ST 15 MINUTES: Performed by: INTERNAL MEDICINE

## 2024-03-19 PROCEDURE — 37000009 HC ANESTHESIA EA ADD 15 MINS: Performed by: INTERNAL MEDICINE

## 2024-03-19 PROCEDURE — D9220A PRA ANESTHESIA: Mod: CRNA,,, | Performed by: NURSE ANESTHETIST, CERTIFIED REGISTERED

## 2024-03-19 PROCEDURE — 25000003 PHARM REV CODE 250: Performed by: INTERNAL MEDICINE

## 2024-03-19 PROCEDURE — 25000003 PHARM REV CODE 250: Performed by: NURSE ANESTHETIST, CERTIFIED REGISTERED

## 2024-03-19 PROCEDURE — D9220A PRA ANESTHESIA: Mod: ANES,,, | Performed by: INTERNAL MEDICINE

## 2024-03-19 PROCEDURE — 43235 EGD DIAGNOSTIC BRUSH WASH: CPT | Mod: GC,,, | Performed by: INTERNAL MEDICINE

## 2024-03-19 PROCEDURE — 43235 EGD DIAGNOSTIC BRUSH WASH: CPT | Performed by: INTERNAL MEDICINE

## 2024-03-19 RX ORDER — SODIUM CHLORIDE 9 MG/ML
INJECTION, SOLUTION INTRAVENOUS CONTINUOUS
Status: DISCONTINUED | OUTPATIENT
Start: 2024-03-19 | End: 2024-03-19 | Stop reason: HOSPADM

## 2024-03-19 RX ORDER — PROPOFOL 10 MG/ML
VIAL (ML) INTRAVENOUS
Status: DISCONTINUED | OUTPATIENT
Start: 2024-03-19 | End: 2024-03-19

## 2024-03-19 RX ORDER — LIDOCAINE HYDROCHLORIDE 20 MG/ML
INJECTION INTRAVENOUS
Status: DISCONTINUED | OUTPATIENT
Start: 2024-03-19 | End: 2024-03-19

## 2024-03-19 RX ORDER — SODIUM CHLORIDE 0.9 % (FLUSH) 0.9 %
10 SYRINGE (ML) INJECTION
Status: DISCONTINUED | OUTPATIENT
Start: 2024-03-19 | End: 2024-03-19 | Stop reason: HOSPADM

## 2024-03-19 RX ORDER — HALOPERIDOL 5 MG/ML
0.5 INJECTION INTRAMUSCULAR EVERY 10 MIN PRN
Status: DISCONTINUED | OUTPATIENT
Start: 2024-03-19 | End: 2024-03-19 | Stop reason: HOSPADM

## 2024-03-19 RX ADMIN — SODIUM CHLORIDE: 9 INJECTION, SOLUTION INTRAVENOUS at 07:03

## 2024-03-19 RX ADMIN — SODIUM CHLORIDE: 0.9 INJECTION, SOLUTION INTRAVENOUS at 07:03

## 2024-03-19 RX ADMIN — Medication 20 MG: at 08:03

## 2024-03-19 RX ADMIN — Medication 40 MG: at 08:03

## 2024-03-19 RX ADMIN — LIDOCAINE HYDROCHLORIDE 50 MG: 20 INJECTION INTRAVENOUS at 08:03

## 2024-03-19 NOTE — ANESTHESIA POSTPROCEDURE EVALUATION
Anesthesia Post Evaluation    Patient: Jalyn Aaron    Procedure(s) Performed: Procedure(s) (LRB):  EGD (ESOPHAGOGASTRODUODENOSCOPY) (N/A)    Final Anesthesia Type: general      Patient location during evaluation: PACU  Patient participation: Yes- Able to Participate  Level of consciousness: awake and alert  Post-procedure vital signs: reviewed and stable  Airway patency: patent    PONV status at discharge: No PONV  Anesthetic complications: no      Cardiovascular status: blood pressure returned to baseline  Respiratory status: unassisted  Hydration status: euvolemic  Follow-up not needed.              Vitals Value Taken Time   /70 03/19/24 0845   Temp 36.7 °C (98 °F) 03/19/24 0845   Pulse 68 03/19/24 0845   Resp 20 03/19/24 0845   SpO2 100 % 03/19/24 0845         No case tracking events are documented in the log.      Pain/Derik Score: Derik Score: 10 (3/19/2024  8:30 AM)

## 2024-03-19 NOTE — TRANSFER OF CARE
"Anesthesia Transfer of Care Note    Patient: Jalyn Aaron    Procedure(s) Performed: Procedure(s) (LRB):  EGD (ESOPHAGOGASTRODUODENOSCOPY) (N/A)    Patient location: PACU    Anesthesia Type: general    Transport from OR: Transported from OR on room air with adequate spontaneous ventilation    Post pain: adequate analgesia    Post assessment: no apparent anesthetic complications and tolerated procedure well    Post vital signs: stable    Level of consciousness: awake, alert and oriented    Nausea/Vomiting: no nausea/vomiting    Complications: none    Transfer of care protocol was followed      Last vitals: Visit Vitals  BP 97/56 (Patient Position: Lying)   Pulse 63   Temp 36.6 °C (97.9 °F) (Temporal)   Resp 16   Ht 5' 7" (1.702 m)   Wt 53.1 kg (117 lb)   SpO2 100%   Breastfeeding No   BMI 18.32 kg/m²     "

## 2024-03-19 NOTE — PROVATION PATIENT INSTRUCTIONS
Discharge Summary/Instructions after an Endoscopic Procedure  Patient Name: Jalyn Aaron  Patient MRN: [id number]  Patient YOB: 1950 Tuesday, March 19, 2024  Mynor Watts MD  Dear patient,  As a result of recent federal legislation (The Federal Cures Act), you may   receive lab or pathology results from your procedure in your [facility]   account before your physician is able to contact you. Your physician or   their representative will relay the results to you with their   recommendations at their soonest availability.  Thank you,  RESTRICTIONS:  During your procedure today, you received medications for sedation.  These   medications may affect your judgment, balance and coordination.  Therefore,   for 24 hours, you have the following restrictions:   - DO NOT drive a car, operate machinery, make legal/financial decisions,   sign important papers or drink alcohol.    ACTIVITY:  Today: no heavy lifting, straining or running due to procedural   sedation/anesthesia.  The following day: return to full activity including work.  DIET:  Eat and drink normally unless instructed otherwise.     TREATMENT FOR COMMON SIDE EFFECTS:  - Mild abdominal pain, nausea, belching, bloating or excessive gas:  rest,   eat lightly and use a heating pad.  - Sore Throat: treat with throat lozenges and/or gargle with warm salt   water.  - Because air was used during the procedure, expelling large amounts of air   from your rectum or belching is normal.  - If a bowel prep was taken, you may not have a bowel movement for 1-3 days.    This is normal.  SYMPTOMS TO WATCH FOR AND REPORT TO YOUR PHYSICIAN:  1. Abdominal pain or bloating, other than gas cramps.  2. Chest pain.  3. Back pain.  4. Signs of infection such as: chills or fever occurring within 24 hours   after the procedure.  5. Rectal bleeding, which would show as bright red, maroon, or black stools.   (A tablespoon of blood from the rectum is not serious, especially if    hemorrhoids are present.)  6. Vomiting.  7. Weakness or dizziness.  GO DIRECTLY TO THE NEAREST EMERGENCY ROOM IF YOU HAVE ANY OF THE FOLLOWING:      Difficulty breathing              Chills and/or fever over 101 F   Persistent vomiting and/or vomiting blood   Severe abdominal pain   Severe chest pain   Black, tarry stools   Bleeding- more than one tablespoon   Any other symptom or condition that you feel may need urgent attention  Your doctor recommends these additional instructions:  If any biopsies were taken, your doctors clinic will contact you in 1 to 2   weeks with any results.  - Discharge patient to home (ambulatory).   - Resume previous diet.   - Continue present medications.   - Patient has a contact number available for emergencies.  The signs and   symptoms of potential delayed complications were discussed with the   patient.  Return to normal activities tomorrow.  Written discharge   instructions were provided to the patient.   For questions, problems or results please call your physician - Mynor Watts MD at Work:  [phone].  [facility], EMERGENCY ROOM PHONE NUMBER: [phone]  IF A COMPLICATION OR EMERGENCY SITUATION ARISES AND YOU ARE UNABLE TO REACH   YOUR PHYSICIAN - GO DIRECTLY TO THE EMERGENCY ROOM.  Mynor Watts MD  3/19/2024 8:16:48 AM  This report has been verified and signed electronically.  Dear patient,  As a result of recent federal legislation (The Federal Cures Act), you may   receive lab or pathology results from your procedure in your [facility]   account before your physician is able to contact you. Your physician or   their representative will relay the results to you with their   recommendations at their soonest availability.  Thank you,  PROVATION

## 2024-03-19 NOTE — H&P
Short Stay Endoscopy History and Physical    PCP - Annemarie Kilgore MD  Referring Physician - Radha Valencia MD  [address] [place] [address]    Procedure - Endoscopy  ASA - per anesthesia  Mallampati - per anesthesia  History of Anesthesia problems - no  Family history Anesthesia problems -  no   Plan of anesthesia - General    HPI  74 y.o. 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    Surgical History:  has a past surgical history that includes Hysterectomy; LASIK; Insert / replace / remove pacemaker; Colonoscopy (N/A, 11/8/2016); Breast biopsy (Left, 12/2013); Breast biopsy (Left, 01/2014); LASIK (Bilateral, 2009); Colonoscopy (N/A, 3/30/2021); venogram, ep lab (N/A, 12/20/2022); Endoscopic ultrasound of upper gastrointestinal tract (N/A, 10/9/2023); and Implantation of biventricular permanent pacemaker as upgrade to existing pacemaker (N/A, 10/26/2023).    Family History: family history includes Breast cancer in her mother; Cancer (age of onset: 80) in her mother; Cataracts in her mother; Hypertension in her mother; Multiple sclerosis in her father and sister; No Known Problems in her brother, maternal aunt, maternal grandmother, maternal uncle, paternal aunt, paternal grandfather, paternal grandmother, paternal uncle, son, and son; Prostate cancer in her maternal grandfather..    Social History:  reports that she has never smoked. She has never been exposed to tobacco smoke. She has never used smokeless tobacco. She reports that she does not currently use alcohol after a past  usage of about 1.0 standard drink of alcohol per week. She reports that she does not use drugs.    Review of patient's allergies indicates:   Allergen Reactions    Atorvastatin Other (See Comments)     Elevated LFTs    Bactrim [sulfamethoxazole-trimethoprim] Nausea Only       Medications:   Facility-Administered Medications Prior to Admission   Medication Dose Route Frequency Provider Last Rate Last Admin    triamcinolone acetonide injection 10 mg  10 mg Intradermal Once Christel Ward MD         Medications Prior to Admission   Medication Sig Dispense Refill Last Dose    carvediloL (COREG) 3.125 MG tablet Take 1 tablet (3.125 mg total) by mouth 2 (two) times daily. 180 tablet 3 3/19/2024    gabapentin (NEURONTIN) 600 MG tablet Take 1.5 tablets (900 mg total) by mouth 3 (three) times daily. 135 tablet 11 Past Week    losartan (COZAAR) 25 MG tablet TAKE 1 TABLET BY MOUTH TWICE DAILY 180 tablet 3 3/19/2024    multivitamin capsule Take 1 capsule by mouth once daily.   Past Week    rosuvastatin (CRESTOR) 10 MG tablet Take 1 tablet (10 mg total) by mouth once daily. 90 tablet 3 3/19/2024    vitamin D 1000 units Tab Take 1,000 Units by mouth once daily.   Past Week    ALPRAZolam (XANAX) 0.25 MG tablet One-two tabs one hour before procedure then may repeat immediately before procedure (Patient not taking: Reported on 3/7/2024) 4 tablet 0     diphth,pertus,acell,,tetanus (BOOSTRIX) 2.5-8-5 Lf-mcg-Lf/0.5mL Susp Inject into the muscle. 0.5 mL 0     estradioL (ESTRACE) 0.01 % (0.1 mg/gram) vaginal cream Place 1 g vaginally twice a week. (Patient not taking: Reported on 3/7/2024) 42.5 g 1     hydrocortisone (ANUSOL-HC) 2.5 % rectal cream Place rectally 2 (two) times daily. 28 g 1     lubiprostone (AMITIZA) 8 MCG Cap Take 1 capsule (8 mcg total) by mouth 2 (two) times daily with meals. 60 capsule 5     naproxen (NAPROSYN) 250 MG tablet One daily as needed for arthritis pain (Patient not taking: Reported on 3/7/2024) 30  tablet 1     ondansetron (ZOFRAN) 4 MG tablet Take 1 tablet (4 mg total) by mouth every 8 (eight) hours as needed for Nausea. (Patient not taking: Reported on 3/7/2024) 30 tablet 1     senna (SENOKOT) 8.6 mg tablet Take 1 tablet by mouth once daily. Twice a day          Physical Exam:    Vital Signs:   Vitals:    03/19/24 0711   BP: 128/78   Pulse: 63   Resp: 16   Temp: 97.9 °F (36.6 °C)       General Appearance: Well appearing in no acute distress  Abdomen: Soft, non tender, non distended with normal bowel sounds, no masses    Labs:  Lab Results   Component Value Date    WBC 7.25 02/28/2024    HGB 13.2 02/28/2024    HCT 41.0 02/28/2024     02/28/2024    CHOL 146 12/29/2023    TRIG 59 12/29/2023    HDL 69 12/29/2023    ALT 26 02/28/2024    AST 28 02/28/2024     02/28/2024    K 5.0 02/28/2024     02/28/2024    CREATININE 0.8 02/28/2024    BUN 13 02/28/2024    CO2 28 02/28/2024    TSH 0.684 02/28/2024    INR 1.0 10/26/2023    HGBA1C 5.5 12/29/2023       I have explained the risks and benefits of this endoscopic procedure to the patient including but not limited to bleeding, inflammation, infection, perforation, and death.      Manuel Tran MD

## 2024-04-01 ENCOUNTER — OFFICE VISIT (OUTPATIENT)
Dept: DERMATOLOGY | Facility: CLINIC | Age: 74
End: 2024-04-01
Attending: INTERNAL MEDICINE
Payer: MEDICARE

## 2024-04-01 DIAGNOSIS — L72.0 MILIA: ICD-10-CM

## 2024-04-01 DIAGNOSIS — L82.1 SK (SEBORRHEIC KERATOSIS): ICD-10-CM

## 2024-04-01 DIAGNOSIS — Z12.83 SCREENING EXAM FOR SKIN CANCER: ICD-10-CM

## 2024-04-01 DIAGNOSIS — L57.0 AK (ACTINIC KERATOSIS): Primary | ICD-10-CM

## 2024-04-01 DIAGNOSIS — L81.4 LENTIGO: ICD-10-CM

## 2024-04-01 DIAGNOSIS — D22.9 MULTIPLE BENIGN NEVI: ICD-10-CM

## 2024-04-01 PROCEDURE — 99999 PR PBB SHADOW E&M-EST. PATIENT-LVL III: CPT | Mod: PBBFAC,,, | Performed by: DERMATOLOGY

## 2024-04-01 PROCEDURE — 99214 OFFICE O/P EST MOD 30 MIN: CPT | Mod: S$GLB,,, | Performed by: DERMATOLOGY

## 2024-04-01 NOTE — PATIENT INSTRUCTIONS
Field Treatment for Actinic Keratoses (precancerous lesions)    5-Fluorouracil - This is a topical chemotherapy for your skin. This cream should never be applied without discussing with you dermatologist.    This treatment gets your immune system involved in fighting precancers (actinic keratoses), even those we can't yet see.     HOW TO APPLY: Apply a fingertip length amount to the vertex area scalp  2x/day for 2 - 4 weeks. Wash your hands carefully after applying the cream and wipe glasses, BIPAP/CPAP machines, or anything else that comes in frequent contact with the cream.    WHAT TO EXPECT: Your skin will likely become red, crusted, sore, and tender during the treatment usually starting around day 3 and continuing for about 1 week after last application. Your skin may take up to 6 weeks to return to its normal coloring (lose the pink).     HOW TO HEAL FAST: To speed healing, wash with a gentle wash and apply Vaseline jelly especially to crusted open areas.    WE CAN HELP: Please contact us for any questions or problem shooting the application of these creams: [phone] or [email or web address]    IT WILL BE WORTH IT!!!

## 2024-04-01 NOTE — PROGRESS NOTES
Subjective:      Patient ID:  Jalyn Aaron is a 74 y.o. female who presents for   Chief Complaint   Patient presents with    Skin Check     Follow up     History of Present Illness: The patient presents for follow up of skin check.    The patient was last seen on: 1/9/2024 for cryosurgery to actinic keratoses which have resolved.     Other skin complaints:   Patient with new complaint of lesion(s)  Location: scalp  Duration: about a month  Symptoms: tender,itchy  Relieving factors/Previous treatments: Vaseline    Path scalp vertex:  Had pathology slides from punch biopsy looked at by Dr. Collier and Dr. Bo. Both dermatopathologists agree that the specimen contains precancerous cells. No signs of cutaneous lupus appreciated.    S/p treatment of efudex bid x 2 weeks to area 11/2023    Review of Systems   Skin:  Positive for daily sunscreen use, activity-related sunscreen use and wears hat (always). Negative for itching (sensitive) and rash.   Hematologic/Lymphatic: Bruises/bleeds easily.       Objective:   Physical Exam   Constitutional: She appears well-developed and well-nourished. No distress.   Neurological: She is alert and oriented to person, place, and time. She is not disoriented.   Psychiatric: She has a normal mood and affect.   Skin:   Areas Examined (abnormalities noted in diagram):   Scalp / Hair Palpated and Inspected  Head / Face Inspection Performed  Neck Inspection Performed  Chest / Axilla Inspection Performed  Back Inspection Performed  RUE Inspected  LUE Inspection Performed                 Diagram Legend     Erythematous scaling macule/papule c/w actinic keratosis       Vascular papule c/w angioma      Pigmented verrucoid papule/plaque c/w seborrheic keratosis      Yellow umbilicated papule c/w sebaceous hyperplasia      Irregularly shaped tan macule c/w lentigo     1-2 mm smooth white papules consistent with Milia      Movable subcutaneous cyst with punctum c/w epidermal  inclusion cyst      Subcutaneous movable cyst c/w pilar cyst      Firm pink to brown papule c/w dermatofibroma      Pedunculated fleshy papule(s) c/w skin tag(s)      Evenly pigmented macule c/w junctional nevus     Mildly variegated pigmented, slightly irregular-bordered macule c/w mildly atypical nevus      Flesh colored to evenly pigmented papule c/w intradermal nevus       Pink pearly papule/plaque c/w basal cell carcinoma      Erythematous hyperkeratotic cursted plaque c/w SCC      Surgical scar with no sign of skin cancer recurrence      Open and closed comedones      Inflammatory papules and pustules      Verrucoid papule consistent consistent with wart     Erythematous eczematous patches and plaques     Dystrophic onycholytic nail with subungual debris c/w onychomycosis     Umbilicated papule    Erythematous-base heme-crusted tan verrucoid plaque consistent with inflamed seborrheic keratosis     Erythematous Silvery Scaling Plaque c/w Psoriasis     See annotation      Assessment / Plan:        AK (actinic keratosis) - HAK - scalp vertex  Efudex vertex area scalp  2x/day for 2 - 4 weeks (pt with Rx at home)  Wear hat always    SK (seborrheic keratosis)  These are benign inherited growths without a malignant potential. Reassurance given to patient. No treatment is necessary.       Multiple benign nevi   - minor problem and chronic.   Reassurance given to patient. No treatment necessary.       Lentigo  This is a benign hyperpigmented sun induced lesion. Recommend daily sun protection/avoidance and use of at least SPF 30, broad spectrum sunscreen (OTC drug) will reduce the number of new lesions. Treatment of these benign lesions are considered cosmetic.'    Milia   - minor problem and chronic.   Reassurance given to patient. No treatment necessary.       Screening exam for skin cancer  Upper body skin examination performed today including at least 6 points as noted in physical examination. No lesions suspicious  for malignancy noted.    Recommend daily sun protection/avoidance and use of at least SPF 30, broad spectrum sunscreen (OTC drug).                Follow up in about 3 months (around 7/1/2024) for to recheck scalp.

## 2024-04-04 ENCOUNTER — HOSPITAL ENCOUNTER (OUTPATIENT)
Dept: RADIOLOGY | Facility: HOSPITAL | Age: 74
Discharge: HOME | End: 2024-04-04
Attending: INTERNAL MEDICINE
Payer: MEDICARE

## 2024-04-04 DIAGNOSIS — Z12.31 ENCOUNTER FOR SCREENING MAMMOGRAM FOR BREAST CANCER: ICD-10-CM

## 2024-04-04 PROCEDURE — 77067 SCR MAMMO BI INCL CAD: CPT | Mod: 26,,, | Performed by: RADIOLOGY

## 2024-04-04 PROCEDURE — 77067 SCR MAMMO BI INCL CAD: CPT | Mod: TC

## 2024-04-04 PROCEDURE — 77063 BREAST TOMOSYNTHESIS BI: CPT | Mod: 26,,, | Performed by: RADIOLOGY

## 2024-04-10 ENCOUNTER — TELEPHONE (OUTPATIENT)
Dept: GASTROENTEROLOGY | Facility: CLINIC | Age: 74
End: 2024-04-10
Payer: MEDICARE

## 2024-04-10 NOTE — TELEPHONE ENCOUNTER
----- Message from Levi Hardin sent at 4/10/2024 10:02 AM CDT -----  Type: appt     Who Called:pt   Does the patient know what this is regarding?:retrieve appt she cxled by mistake   Would the patient rather a call back or a response via [facility]? Call   Best Call Back Number:  [phone]  Additional Information:

## 2024-04-11 ENCOUNTER — OFFICE VISIT (OUTPATIENT)
Dept: GASTROENTEROLOGY | Facility: CLINIC | Age: 74
End: 2024-04-11
Payer: MEDICARE

## 2024-04-11 VITALS
HEIGHT: 67 IN | DIASTOLIC BLOOD PRESSURE: 72 MMHG | HEART RATE: 76 BPM | SYSTOLIC BLOOD PRESSURE: 115 MMHG | BODY MASS INDEX: 18.75 KG/M2 | WEIGHT: 119.5 LBS

## 2024-04-11 DIAGNOSIS — K86.2 PANCREAS CYST: Primary | ICD-10-CM

## 2024-04-11 PROCEDURE — 3078F DIAST BP <80 MM HG: CPT | Mod: CPTII,S$GLB,, | Performed by: INTERNAL MEDICINE

## 2024-04-11 PROCEDURE — 3008F BODY MASS INDEX DOCD: CPT | Mod: CPTII,S$GLB,, | Performed by: INTERNAL MEDICINE

## 2024-04-11 PROCEDURE — 4010F ACE/ARB THERAPY RXD/TAKEN: CPT | Mod: CPTII,S$GLB,, | Performed by: INTERNAL MEDICINE

## 2024-04-11 PROCEDURE — 99999 PR PBB SHADOW E&M-EST. PATIENT-LVL III: CPT | Mod: PBBFAC,,, | Performed by: INTERNAL MEDICINE

## 2024-04-11 PROCEDURE — 99214 OFFICE O/P EST MOD 30 MIN: CPT | Mod: S$GLB,,, | Performed by: INTERNAL MEDICINE

## 2024-04-11 PROCEDURE — 3074F SYST BP LT 130 MM HG: CPT | Mod: CPTII,S$GLB,, | Performed by: INTERNAL MEDICINE

## 2024-04-11 PROCEDURE — 1159F MED LIST DOCD IN RCRD: CPT | Mod: CPTII,S$GLB,, | Performed by: INTERNAL MEDICINE

## 2024-04-11 PROCEDURE — 1157F ADVNC CARE PLAN IN RCRD: CPT | Mod: CPTII,S$GLB,, | Performed by: INTERNAL MEDICINE

## 2024-04-11 NOTE — PROGRESS NOTES
Advanced Endoscopy / Pancreaticobiliary Service    Reason for visit (Chief Complaint): Small pancreas cysts 8mm and 4mm in pancreas body and body/tail s/p previous CT June 2023, EUS Oct 2023 and MRCP Feb 2024    Referring provider/PCP: No referring provider defined for this encounter.    History of Present Illness: Jalyn Aaron is a pleasant 73yo who presents for pancreas cysts follow up.  Her pancreas cysts are very small and subcentimeter, located within the pancreas body, 8 mm, and the pancreas body/tail about 4 mm and has previously had CT/EUS/MRCP.  Most recent imaging revealed stable, on alarming and not worrisome features of the small subcentimeter pancreas cysts.  We discussed pancreas cysts in detail today and the goals of a surveillance imaging program to monitor for stability.  I think it is fine for her to spread her next interval surveillance imaging modality with MRCP with contrast in the next 1 year, and she would agree.  She is otherwise quite healthy and active.  She does have chronic constipation followed by GI closely, which she is to continue.    No recent or new onset diabetes, no jaundice/pruritus, has had previous weight loss but that is stable.  No family history of pancreas cancer/disease.      No significant at risk social history behaviors.    Reviewed imaging in detail with the patient in today's visit.         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    Left    AV block     exercised induced 2:1    Cataract     Fever blister     Heart block     History of acne     Joint pain     hands    Keratoconjunctivitis sicca not due to Sjogren's syndrome     Pacemaker     Sleep apnea, unspecified        Past Surgical History:   Procedure Laterality Date    BREAST BIOPSY Left 12/2013    papilloma with atypia on core biopsy    BREAST BIOPSY Left 01/2014    Ex.Bx., removal of ADH/Papilloma    COLONOSCOPY N/A 11/8/2016    Procedure:  COLONOSCOPY;  Surgeon: Dl Caruso MD;  Location: [facility] (4TH FLR);  Service: Endoscopy;  Laterality: N/A;  Pacemaker in place.    COLONOSCOPY N/A 3/30/2021    Procedure: COLONOSCOPY;  Surgeon: Joaquin Johnson MD;  Location: [facility] ENDO (4TH FLR);  Service: Endoscopy;  Laterality: N/A;  prep ins. emailed - COVID screening on 3/27/21 PCW - ERW    ENDOSCOPIC ULTRASOUND OF UPPER GASTROINTESTINAL TRACT N/A 10/9/2023    Procedure: ULTRASOUND, UPPER GI TRACT, ENDOSCOPIC;  Surgeon: Stacey Sesay MD;  Location: [facility] (2ND FLR);  Service: Endoscopy;  Laterality: N/A;  pancreas cyst 7mm on CT (r/o nodule or worrisome features)    8/4/23-Instructions via portal-DS  10/2-precall complete-MS    ESOPHAGOGASTRODUODENOSCOPY N/A 3/19/2024    Procedure: EGD (ESOPHAGOGASTRODUODENOSCOPY);  Surgeon: Mynor Watts MD;  Location: [facility] (2ND FLR);  Service: Endoscopy;  Laterality: N/A;  moved to 2nd floor endoscopy per recommendations of crna  pt has    HYSTERECTOMY      IMPLANTATION OF BIVENTRICULAR PERMANENT PACEMAKER AS UPGRADE TO EXISTING PACEMAKER N/A 10/26/2023    Procedure: Biventricular pacemaker upgrade;  Surgeon: Chacho Colby MD;  Location: [facility] EP LAB;  Service: Cardiology;  Laterality: N/A;  CHB, CRT-P upgrade, MDT, MAC, SK, 3 Prep *MDT DC PPM in situ*    INSERT / REPLACE / REMOVE PACEMAKER      LASIK      LASIK Bilateral 2009    VENOGRAM, EP LAB N/A 12/20/2022    Procedure: Venogram, EP Lab;  Surgeon: Chacho Colby MD;  Location: [facility] EP LAB;  Service: Cardiology;  Laterality: N/A;  NICM, Venogram- left side, SK, 3 Prep *MDT PPM in situ*       Family History   Problem Relation Age of Onset    Breast cancer Mother         Paget's dz    Cancer Mother 80        pagets    Cataracts Mother     Hypertension Mother     Multiple sclerosis Father     Multiple sclerosis Sister     No Known Problems Maternal Aunt     No Known Problems Maternal Uncle     No Known Problems Paternal Aunt     No Known Problems Paternal Uncle      No Known Problems Maternal Grandmother     Prostate cancer Maternal Grandfather     No Known Problems Paternal Grandmother     No Known Problems Paternal Grandfather     No Known Problems Brother     No Known Problems Son     No Known Problems Son     Amblyopia Neg Hx     Blindness Neg Hx     Diabetes Neg Hx     Glaucoma Neg Hx     Macular degeneration Neg Hx     Retinal detachment Neg Hx     Strabismus Neg Hx     Stroke Neg Hx     Thyroid disease Neg Hx     Melanoma Neg Hx     Ovarian cancer Neg Hx        Social History     Socioeconomic History    Marital status:     Number of children: 1   Occupational History    Occupation:      Employer: [employer]   Tobacco Use    Smoking status: Never     Passive exposure: Never    Smokeless tobacco: Never   Substance and Sexual Activity    Alcohol use: Not Currently     Alcohol/week: 1.0 standard drink of alcohol     Types: 1 Glasses of wine per week     Comment: weekly    Drug use: No    Sexual activity: Not Currently     Partners: Male     Birth control/protection: None   Other Topics Concern    Are you pregnant or think you may be? No    Breast-feeding No   Social History Narrative     (case management) at [employer]     Social Determinants of Health     Financial Resource Strain: Low Risk  (1/6/2024)    Overall Financial Resource Strain (CARDIA)     Difficulty of Paying Living Expenses: Not hard at all   Food Insecurity: No Food Insecurity (1/6/2024)    Hunger Vital Sign     Worried About Running Out of Food in the Last Year: Never true     Ran Out of Food in the Last Year: Never true   Transportation Needs: No Transportation Needs (1/6/2024)    PRAPARE - Transportation     Lack of Transportation (Medical): No     Lack of Transportation (Non-Medical): No   Physical Activity: Insufficiently Active (1/6/2024)    Exercise Vital Sign     Days of Exercise per Week: 5 days     Minutes of Exercise per Session: 20 min   Stress: No  Stress Concern Present (1/6/2024)    [place] [employer] - Occupational Stress Questionnaire     Feeling of Stress : Not at all   Social Connections: Unknown (1/6/2024)    Social Connection and Isolation Panel [NHANES]     Frequency of Communication with Friends and Family: Three times a week     Frequency of Social Gatherings with Friends and Family: Twice a week     Active Member of Clubs or Organizations: Yes     Attends Club or Organization Meetings: More than 4 times per year     Marital Status:    Housing Stability: Low Risk  (1/6/2024)    Housing Stability Vital Sign     Unable to Pay for Housing in the Last Year: No     Number of Places Lived in the Last Year: 1     Unstable Housing in the Last Year: No       ROS otherwise unremarkable outside of the aforementioned symptoms in HPI.    Vitals:    04/11/24 1300   BP: 115/72   Pulse: 76         Physical Exam:  General: Well-developed, well-appearing, no acute distress  Neuro: alert and oriented to person, place, time; normal appearing gait  Eyes: No scleral icterus  Abdomen: soft, non-distended, non-tender, no rebound tenderness or guarding      Laboratory:   Reviewed in chart/records and relevant findings are summarized above in HPI.    Imaging:  Reviewed in chart/records and relevant findings are summarized above in HPI.      Assessment/Plan:      # Pancreas cysts small 8mm and 4mm in body and body/tail  # Chronic constipation    Plan:  - reviewed most recent MRCP imaging with stable small subcentimeter pancreas cysts demonstrated.  - discussed pancreas cysts in detail with the patient and the goals of a surveillance imaging program.  - MRCP with contrast in 1 year from last so Feb 2025  - annual follow-up in pancreas cyst clinic with myself, next visit can be in 1 year from now so April 2025 to review imaging  - continue follow-up with GI for chronic constipation.          Stacey Sesay MD  [facility]  [place]

## 2024-04-11 NOTE — PROGRESS NOTES
"GENERAL GI PATIENT INTAKE:    COVID symptoms in the last 7 days (runny nose, sore throat, congestion, cough, fever): No  PCP: Annemarie Kilgore  If not PCP-  number given to establish [phone]: N/A    ALLERGIES REVIEWED:  Yes    CHIEF COMPLAINT:    Chief Complaint   Patient presents with    Follow-up       VITAL SIGNS:  /72   Pulse 76   Ht 5' 7" (1.702 m)   Wt 54.2 kg (119 lb 7.8 oz)   BMI 18.71 kg/m²      Change in medical, surgical, family or social history: No      REVIEWED MEDICATION LIST RECONCILED INCLUDING ABOVE MEDS:  Yes     "

## 2024-04-12 ENCOUNTER — OFFICE VISIT (OUTPATIENT)
Dept: INTERNAL MEDICINE | Facility: CLINIC | Age: 74
End: 2024-04-12
Payer: MEDICARE

## 2024-04-12 VITALS
OXYGEN SATURATION: 97 % | BODY MASS INDEX: 18.48 KG/M2 | HEIGHT: 67 IN | DIASTOLIC BLOOD PRESSURE: 60 MMHG | HEART RATE: 62 BPM | SYSTOLIC BLOOD PRESSURE: 110 MMHG | WEIGHT: 117.75 LBS

## 2024-04-12 DIAGNOSIS — R60.9 EDEMA, UNSPECIFIED TYPE: ICD-10-CM

## 2024-04-12 DIAGNOSIS — J34.89 NASAL OBSTRUCTION: Primary | ICD-10-CM

## 2024-04-12 DIAGNOSIS — R63.4 WEIGHT LOSS: ICD-10-CM

## 2024-04-12 PROCEDURE — 3078F DIAST BP <80 MM HG: CPT | Mod: CPTII,S$GLB,, | Performed by: INTERNAL MEDICINE

## 2024-04-12 PROCEDURE — 3074F SYST BP LT 130 MM HG: CPT | Mod: CPTII,S$GLB,, | Performed by: INTERNAL MEDICINE

## 2024-04-12 PROCEDURE — 3288F FALL RISK ASSESSMENT DOCD: CPT | Mod: CPTII,S$GLB,, | Performed by: INTERNAL MEDICINE

## 2024-04-12 PROCEDURE — 1159F MED LIST DOCD IN RCRD: CPT | Mod: CPTII,S$GLB,, | Performed by: INTERNAL MEDICINE

## 2024-04-12 PROCEDURE — 1157F ADVNC CARE PLAN IN RCRD: CPT | Mod: CPTII,S$GLB,, | Performed by: INTERNAL MEDICINE

## 2024-04-12 PROCEDURE — 99999 PR PBB SHADOW E&M-EST. PATIENT-LVL V: CPT | Mod: PBBFAC,,, | Performed by: INTERNAL MEDICINE

## 2024-04-12 PROCEDURE — 4010F ACE/ARB THERAPY RXD/TAKEN: CPT | Mod: CPTII,S$GLB,, | Performed by: INTERNAL MEDICINE

## 2024-04-12 PROCEDURE — 99213 OFFICE O/P EST LOW 20 MIN: CPT | Mod: S$GLB,,, | Performed by: INTERNAL MEDICINE

## 2024-04-12 PROCEDURE — 1126F AMNT PAIN NOTED NONE PRSNT: CPT | Mod: CPTII,S$GLB,, | Performed by: INTERNAL MEDICINE

## 2024-04-12 PROCEDURE — 1101F PT FALLS ASSESS-DOCD LE1/YR: CPT | Mod: CPTII,S$GLB,, | Performed by: INTERNAL MEDICINE

## 2024-04-12 PROCEDURE — 3008F BODY MASS INDEX DOCD: CPT | Mod: CPTII,S$GLB,, | Performed by: INTERNAL MEDICINE

## 2024-04-12 NOTE — PROGRESS NOTES
Subjective:       Patient ID: Jalyn Aaron is a 74 y.o. female.    Chief Complaint: Follow-up    Follow-up  Pertinent negatives include no abdominal pain, chest pain (arm pain or jaw pain), headaches, nausea or vomiting.   Pt is feeling better - wants ENT eval so she can tolerate CPAP.  Weight is stable.  No CP or SOB. R calf is slightly larger than left.   Review of Systems   Respiratory:  Negative for shortness of breath (PND or orthopnea).    Cardiovascular:  Negative for chest pain (arm pain or jaw pain).   Gastrointestinal:  Negative for abdominal pain, diarrhea, nausea and vomiting.   Genitourinary:  Negative for dysuria.   Neurological:  Negative for seizures, syncope and headaches.       Objective:      Physical Exam  Constitutional:       General: She is not in acute distress.     Appearance: She is well-developed.   HENT:      Head: Normocephalic.   Eyes:      Pupils: Pupils are equal, round, and reactive to light.   Neck:      Thyroid: No thyromegaly.      Vascular: No JVD.   Cardiovascular:      Rate and Rhythm: Normal rate and regular rhythm.      Heart sounds: Normal heart sounds. No murmur heard.     No friction rub. No gallop.   Pulmonary:      Effort: Pulmonary effort is normal.      Breath sounds: Normal breath sounds. No wheezing or rales.   Abdominal:      General: Bowel sounds are normal. There is no distension.      Palpations: Abdomen is soft. There is no mass.      Tenderness: There is no abdominal tenderness. There is no guarding or rebound.   Musculoskeletal:      Cervical back: Neck supple.      Comments: R calf larger than left   Lymphadenopathy:      Cervical: No cervical adenopathy.   Skin:     General: Skin is warm and dry.   Neurological:      Mental Status: She is alert and oriented to person, place, and time.      Deep Tendon Reflexes: Reflexes are normal and symmetric.   Psychiatric:         Behavior: Behavior normal.         Thought Content: Thought content normal.          Judgment: Judgment normal.         Assessment:       1. Nasal obstruction    2. Edema, unspecified type    3. Weight loss        Plan:   Nasal obstruction  -     Ambulatory referral/consult to ENT; Future; Expected date: 04/19/2024    Edema, unspecified type  -      Lower Extremity Veins Bilateral; Future; Expected date: 04/12/2024    Weight loss    Has stabilized

## 2024-04-16 ENCOUNTER — PATIENT MESSAGE (OUTPATIENT)
Dept: DERMATOLOGY | Facility: CLINIC | Age: 74
End: 2024-04-16
Payer: MEDICARE

## 2024-04-16 ENCOUNTER — HOSPITAL ENCOUNTER (OUTPATIENT)
Dept: RADIOLOGY | Facility: HOSPITAL | Age: 74
Discharge: HOME | End: 2024-04-16
Attending: INTERNAL MEDICINE
Payer: MEDICARE

## 2024-04-16 DIAGNOSIS — R60.9 EDEMA, UNSPECIFIED TYPE: ICD-10-CM

## 2024-04-16 PROCEDURE — 93970 EXTREMITY STUDY: CPT | Mod: 26,,, | Performed by: RADIOLOGY

## 2024-04-16 PROCEDURE — 93970 EXTREMITY STUDY: CPT | Mod: TC

## 2024-05-14 ENCOUNTER — PATIENT MESSAGE (OUTPATIENT)
Dept: DERMATOLOGY | Facility: CLINIC | Age: 74
End: 2024-05-14
Payer: MEDICARE

## 2024-05-14 ENCOUNTER — PATIENT MESSAGE (OUTPATIENT)
Dept: INTERNAL MEDICINE | Facility: CLINIC | Age: 74
End: 2024-05-14
Payer: MEDICARE

## 2024-05-15 ENCOUNTER — PATIENT MESSAGE (OUTPATIENT)
Dept: INTERNAL MEDICINE | Facility: CLINIC | Age: 74
End: 2024-05-15
Payer: MEDICARE

## 2024-05-15 ENCOUNTER — PATIENT MESSAGE (OUTPATIENT)
Dept: DERMATOLOGY | Facility: CLINIC | Age: 74
End: 2024-05-15
Payer: MEDICARE

## 2024-05-17 ENCOUNTER — PATIENT MESSAGE (OUTPATIENT)
Dept: INTERNAL MEDICINE | Facility: CLINIC | Age: 74
End: 2024-05-17
Payer: MEDICARE

## 2024-05-28 ENCOUNTER — OFFICE VISIT (OUTPATIENT)
Dept: OTOLARYNGOLOGY | Facility: CLINIC | Age: 74
End: 2024-05-28
Payer: MEDICARE

## 2024-05-28 ENCOUNTER — PATIENT MESSAGE (OUTPATIENT)
Dept: INTERNAL MEDICINE | Facility: CLINIC | Age: 74
End: 2024-05-28
Payer: MEDICARE

## 2024-05-28 DIAGNOSIS — L57.0 ACTINIC KERATOSIS: ICD-10-CM

## 2024-05-28 DIAGNOSIS — M95.0 NASAL VALVE COLLAPSE: ICD-10-CM

## 2024-05-28 DIAGNOSIS — J34.2 NASAL SEPTAL DEVIATION: Primary | ICD-10-CM

## 2024-05-28 DIAGNOSIS — J34.89 NASAL OBSTRUCTION: ICD-10-CM

## 2024-05-28 DIAGNOSIS — J34.3 HYPERTROPHY OF BOTH INFERIOR NASAL TURBINATES: ICD-10-CM

## 2024-05-28 PROCEDURE — 1157F ADVNC CARE PLAN IN RCRD: CPT | Mod: CPTII,S$GLB,, | Performed by: STUDENT IN AN ORGANIZED HEALTH CARE EDUCATION/TRAINING PROGRAM

## 2024-05-28 PROCEDURE — 4010F ACE/ARB THERAPY RXD/TAKEN: CPT | Mod: CPTII,S$GLB,, | Performed by: STUDENT IN AN ORGANIZED HEALTH CARE EDUCATION/TRAINING PROGRAM

## 2024-05-28 PROCEDURE — 1159F MED LIST DOCD IN RCRD: CPT | Mod: CPTII,S$GLB,, | Performed by: STUDENT IN AN ORGANIZED HEALTH CARE EDUCATION/TRAINING PROGRAM

## 2024-05-28 PROCEDURE — 1160F RVW MEDS BY RX/DR IN RCRD: CPT | Mod: CPTII,S$GLB,, | Performed by: STUDENT IN AN ORGANIZED HEALTH CARE EDUCATION/TRAINING PROGRAM

## 2024-05-28 PROCEDURE — 99999 PR PBB SHADOW E&M-EST. PATIENT-LVL III: CPT | Mod: PBBFAC,,, | Performed by: STUDENT IN AN ORGANIZED HEALTH CARE EDUCATION/TRAINING PROGRAM

## 2024-05-28 PROCEDURE — 99213 OFFICE O/P EST LOW 20 MIN: CPT | Mod: S$GLB,,, | Performed by: STUDENT IN AN ORGANIZED HEALTH CARE EDUCATION/TRAINING PROGRAM

## 2024-05-28 NOTE — PROGRESS NOTES
Subjective:      Jalyn is a 74 y.o. female who comes for evaluation of nasal obstruction. She was previously evaluated by Dr. Murray who recommended rhinoplasty for her nasal obstruction. Referred to me by PCP. She reports still having issues with nasal obstruction. Had SARAI and not able to tolerate CPAP at night due to her scalp AK.     Her current sinus regime consists of: No medications.    SNOT-22 score: : (P) 25    The patient's medications, allergies, past medical, surgical, social and family histories were reviewed and updated as appropriate.    Review of Systems   Constitutional:  Positive for malaise/fatigue.   HENT: Negative.     Eyes: Negative.    Cardiovascular: Negative.    Gastrointestinal:  Positive for constipation.   Genitourinary: Negative.    Musculoskeletal:  Positive for back pain and neck pain.   Skin: Negative.    Neurological: Negative.    Endo/Heme/Allergies:  Bruises/bleeds easily.      Answers submitted by the patient for this visit:  Review of Symptoms Questionnaire  (Submitted on 5/22/2024)  Unexpected weight loss?: Yes  Snoring?: Yes  Sleep Apnea?: Yes  None of these: Yes  None of these : Yes  sleep disturbance: Yes    A detailed review of systems was obtained with pertinent positives as per the above HPI, and otherwise negative.        Objective:     There were no vitals taken for this visit.       Constitutional:   Vital signs are normal. She appears well-developed. Normal speech.      Head:  Normocephalic and atraumatic.         Ears:    Right Ear: No drainage or tenderness. No middle ear effusion.   Left Ear: No drainage or tenderness.  No middle ear effusion.     Nose:  Septal deviation present. Turbinates abnormal and turbinate hypertrophy.    Positive pia    Mouth/Throat  Oropharynx clear and moist without lesions or asymmetry and normal uvula midline. No trismus. No oropharyngeal exudate. Mirror exam not performed due to patient tolerance.  Mirror exam not performed due to  "patient tolerance.      Neck:  Neck normal without thyromegaly masses, asymmetry, normal tracheal structure, crepitus, and tenderness, thyroid normal and trachea normal.     Pulmonary/Chest:   Effort normal.     Psychiatric:   She has a normal mood and affect. Her speech is normal.     Skin:   No abrasions, lacerations, lesions, or rashes.       Procedure    None    Data Reviewed    WBC (K/uL)   Date Value   02/28/2024 7.25     Eosinophil % (%)   Date Value   02/28/2024 3.2     Eos # (K/uL)   Date Value   02/28/2024 0.2     Platelets (K/uL)   Date Value   02/28/2024 159     Glucose (mg/dL)   Date Value   02/28/2024 87     No results found for: "IGE"    No sinus imaging available.      Assessment:     1. Nasal septal deviation    2. Nasal obstruction    3. Hypertrophy of both inferior nasal turbinates    4. Nasal valve collapse         Plan:     - Recommend f/u with facial plastic surgeon to discuss rhinoplasy  - AK management per jem Liu MD     "

## 2024-05-29 ENCOUNTER — PATIENT MESSAGE (OUTPATIENT)
Dept: INTERNAL MEDICINE | Facility: CLINIC | Age: 74
End: 2024-05-29
Payer: MEDICARE

## 2024-05-29 NOTE — TELEPHONE ENCOUNTER
Pt requesting referral to rheumatologist for continued joint pain to hand and wants to know who you recommend?

## 2024-05-31 ENCOUNTER — CLINICAL SUPPORT (OUTPATIENT)
Dept: CARDIOLOGY | Facility: HOSPITAL | Age: 74
End: 2024-05-31
Attending: INTERNAL MEDICINE
Payer: MEDICARE

## 2024-05-31 ENCOUNTER — CLINICAL SUPPORT (OUTPATIENT)
Dept: CARDIOLOGY | Facility: HOSPITAL | Age: 74
End: 2024-05-31
Payer: MEDICARE

## 2024-05-31 DIAGNOSIS — Z95.0 PRESENCE OF CARDIAC PACEMAKER: ICD-10-CM

## 2024-05-31 DIAGNOSIS — I44.2 ATRIOVENTRICULAR BLOCK, COMPLETE: ICD-10-CM

## 2024-05-31 PROCEDURE — 93294 REM INTERROG EVL PM/LDLS PM: CPT | Mod: ,,, | Performed by: INTERNAL MEDICINE

## 2024-05-31 PROCEDURE — 93296 REM INTERROG EVL PM/IDS: CPT | Performed by: INTERNAL MEDICINE

## 2024-06-10 ENCOUNTER — OFFICE VISIT (OUTPATIENT)
Dept: OTOLARYNGOLOGY | Facility: CLINIC | Age: 74
End: 2024-06-10
Payer: MEDICARE

## 2024-06-10 VITALS
SYSTOLIC BLOOD PRESSURE: 125 MMHG | DIASTOLIC BLOOD PRESSURE: 78 MMHG | WEIGHT: 120.56 LBS | BODY MASS INDEX: 18.89 KG/M2 | HEART RATE: 60 BPM

## 2024-06-10 DIAGNOSIS — J34.3 HYPERTROPHY OF BOTH INFERIOR NASAL TURBINATES: ICD-10-CM

## 2024-06-10 DIAGNOSIS — M95.0 NASAL DEFORMITY, ACQUIRED: ICD-10-CM

## 2024-06-10 DIAGNOSIS — J34.89 NASAL OBSTRUCTION: ICD-10-CM

## 2024-06-10 DIAGNOSIS — R06.83 SNORING: ICD-10-CM

## 2024-06-10 DIAGNOSIS — J34.2 NASAL SEPTAL DEVIATION: Primary | ICD-10-CM

## 2024-06-10 DIAGNOSIS — G47.9 SLEEP DISTURBANCE: ICD-10-CM

## 2024-06-10 DIAGNOSIS — M95.0 NASAL VALVE COLLAPSE: ICD-10-CM

## 2024-06-10 LAB
OHS CV AF BURDEN PERCENT: < 1
OHS CV DC REMOTE DEVICE TYPE: NORMAL
OHS CV RV PACING PERCENT: 99.71 %

## 2024-06-10 PROCEDURE — 1160F RVW MEDS BY RX/DR IN RCRD: CPT | Mod: CPTII,S$GLB,, | Performed by: OTOLARYNGOLOGY

## 2024-06-10 PROCEDURE — 99999 PR PBB SHADOW E&M-EST. PATIENT-LVL III: CPT | Mod: PBBFAC,,, | Performed by: OTOLARYNGOLOGY

## 2024-06-10 PROCEDURE — 3008F BODY MASS INDEX DOCD: CPT | Mod: CPTII,S$GLB,, | Performed by: OTOLARYNGOLOGY

## 2024-06-10 PROCEDURE — 1157F ADVNC CARE PLAN IN RCRD: CPT | Mod: CPTII,S$GLB,, | Performed by: OTOLARYNGOLOGY

## 2024-06-10 PROCEDURE — 3074F SYST BP LT 130 MM HG: CPT | Mod: CPTII,S$GLB,, | Performed by: OTOLARYNGOLOGY

## 2024-06-10 PROCEDURE — 3078F DIAST BP <80 MM HG: CPT | Mod: CPTII,S$GLB,, | Performed by: OTOLARYNGOLOGY

## 2024-06-10 PROCEDURE — 1126F AMNT PAIN NOTED NONE PRSNT: CPT | Mod: CPTII,S$GLB,, | Performed by: OTOLARYNGOLOGY

## 2024-06-10 PROCEDURE — 99214 OFFICE O/P EST MOD 30 MIN: CPT | Mod: S$GLB,,, | Performed by: OTOLARYNGOLOGY

## 2024-06-10 PROCEDURE — 1159F MED LIST DOCD IN RCRD: CPT | Mod: CPTII,S$GLB,, | Performed by: OTOLARYNGOLOGY

## 2024-06-10 PROCEDURE — 4010F ACE/ARB THERAPY RXD/TAKEN: CPT | Mod: CPTII,S$GLB,, | Performed by: OTOLARYNGOLOGY

## 2024-06-10 RX ORDER — FLUTICASONE PROPIONATE 50 MCG
2 SPRAY, SUSPENSION (ML) NASAL DAILY
Qty: 18.2 ML | Refills: 11 | Status: SHIPPED | OUTPATIENT
Start: 2024-06-10 | End: 2024-07-10

## 2024-06-10 NOTE — PROGRESS NOTES
History of Present Illness:   Jalyn Aaron is a 74 y.o. year old female evaluated in the Otolaryngology-Head and Neck Surgery Clinic at [facility]. The patient is self-referred for evaluation  for consideration for septorhinoplasty.  Patient reports all this started with sleeping problems that she noted several years back.  She has had a sleep study with CPAP that she could not tolerate either the fullface mask or the nasal prongs with nasal prongs causing her mouth to be open.  She previously was referred by Dr. Garza to Dr. Murray who evaluated her and discussed with her functional septorhinoplasty.  She has a history of nasal trauma remotely 40 years ago with trampoline incident but not sure if nose was broken.  She has had crookedness of the nose since then.  She has not had recent allergy testing.  She has not had prior nasal surgery. She also  more recently saw Dr. Liu for actinic keratosis assessment on the scalp as well as nose problem and was referred back to facial plastics for consideration functional rhinoplasty.  She has tried nasal steroids intermittently but has not done them consistently for more than a week.             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    Left    AV block     exercised induced 2:1    Cataract     Fever blister     Heart block     History of acne     Joint pain     hands    Keratoconjunctivitis sicca not due to Sjogren's syndrome     Pacemaker     Sleep apnea, unspecified      Her  has a past surgical history that includes Hysterectomy; LASIK; Insert / replace / remove pacemaker; Colonoscopy (N/A, 11/8/2016); Breast biopsy (Left, 12/2013); Breast biopsy (Left, 01/2014); LASIK (Bilateral, 2009); Colonoscopy (N/A, 3/30/2021); venogram, ep lab (N/A, 12/20/2022); Endoscopic ultrasound of upper gastrointestinal tract (N/A, 10/9/2023); Implantation of biventricular  permanent pacemaker as upgrade to existing pacemaker (N/A, 10/26/2023); and Esophagogastroduodenoscopy (N/A, 3/19/2024).     Past Family/Social History  Her family history includes Breast cancer in her mother; Cancer (age of onset: 80) in her mother; Cataracts in her mother; Hypertension in her mother; Multiple sclerosis in her father and sister; No Known Problems in her brother, maternal aunt, maternal grandmother, maternal uncle, paternal aunt, paternal grandfather, paternal grandmother, paternal uncle, son, and son; Prostate cancer in her maternal grandfather.  She  reports that she has never smoked. She has never been exposed to tobacco smoke. She has never used smokeless tobacco. She reports that she does not currently use alcohol after a past usage of about 1.0 standard drink of alcohol per week. She reports that she does not use drugs.     Medications/Allergies/Immunizations  Her current medication(s) include:   Current Outpatient Medications   Medication Sig Dispense Refill    ALPRAZolam (XANAX) 0.25 MG tablet One-two tabs one hour before procedure then may repeat immediately before procedure (Patient not taking: Reported on 4/12/2024) 4 tablet 0    carvediloL (COREG) 3.125 MG tablet Take 1 tablet (3.125 mg total) by mouth 2 (two) times daily. 180 tablet 3    diphth,pertus,acell,,tetanus (BOOSTRIX) 2.5-8-5 Lf-mcg-Lf/0.5mL Susp Inject into the muscle. (Patient not taking: Reported on 4/12/2024) 0.5 mL 0    estradioL (ESTRACE) 0.01 % (0.1 mg/gram) vaginal cream Place 1 g vaginally twice a week. 42.5 g 1    fluticasone propionate (FLONASE) 50 mcg/actuation nasal spray 2 sprays (100 mcg total) by Each Nostril route once daily. 18.2 mL 11    gabapentin (NEURONTIN) 600 MG tablet Take 1.5 tablets (900 mg total) by mouth 3 (three) times daily. 135 tablet 11    losartan (COZAAR) 25 MG tablet TAKE 1 TABLET BY MOUTH TWICE DAILY 180 tablet 3    lubiprostone (AMITIZA) 8 MCG Cap Take 1 capsule (8 mcg total) by mouth 2  (two) times daily with meals. 60 capsule 5    multivitamin capsule Take 1 capsule by mouth once daily.      naproxen (NAPROSYN) 250 MG tablet One daily as needed for arthritis pain 30 tablet 1    ondansetron (ZOFRAN) 4 MG tablet Take 1 tablet (4 mg total) by mouth every 8 (eight) hours as needed for Nausea. (Patient not taking: Reported on 3/7/2024) 30 tablet 1    rosuvastatin (CRESTOR) 10 MG tablet Take 1 tablet (10 mg total) by mouth once daily. 90 tablet 3    senna (SENOKOT) 8.6 mg tablet Take 1 tablet by mouth once daily. Twice a day      vitamin D 1000 units Tab Take 1,000 Units by mouth once daily.       Current Facility-Administered Medications   Medication Dose Route Frequency Provider Last Rate Last Admin    triamcinolone acetonide injection 10 mg  10 mg Intradermal Once Christel Ward MD         Facility-Administered Medications Ordered in Other Visits   Medication Dose Route Frequency Provider Last Rate Last Admin    ceFAZolin 2 g in dextrose 5 % in water (D5W) 50 mL IVPB (MB+)  2 g Intravenous On Call Procedure Chacho Colby MD            Allergies: Atorvastatin and Bactrim [sulfamethoxazole-trimethoprim]     Immunizations:   Immunization History   Administered Date(s) Administered    COVID-19, MRNA, LN-S, PF (Pfizer) (Purple Cap) 01/17/2021, 02/09/2021, 10/12/2021    COVID-19, mRNA, LNP-S, bivalent booster, PF (PFIZER OMICRON) 01/03/2023    Hepatitis A / Hepatitis B 03/22/2011, 04/20/2011, 09/20/2011    Influenza (FLUAD) - Quadrivalent - Adjuvanted - PF *Preferred* (65+) 01/12/2024    Influenza - Quadrivalent - High Dose - PF (65 years and older) 09/09/2020    Influenza - Quadrivalent - PF *Preferred* (6 months and older) 11/15/2022    Pneumococcal Conjugate - 13 Valent 02/25/2016    Pneumococcal Polysaccharide - 23 Valent 04/06/2017    Tdap 04/27/2014, 01/26/2024    Typhoid - ViCPs 03/22/2011    Zoster 04/06/2016, 09/07/2016    Zoster Recombinant 01/31/2023, 05/10/2023         Review of Systems    Constitutional: Negative for fever, weight loss and weight gain.  Skin: Negative for rash, itchiness, dryness  HENT:  As per HPI  Cardiovascular: Negative for chest pain and dyspnea on exertion .   Respiratory: Is not experiencing shortness of breath.   Gastrointestinal: Negative for nausea and vomiting.   Neurological: Negative for headaches.   Lymph/Heme: Negative for lymphadenopathy or easy bruising  Musculoskeletal: Negative for joint or muscle pain  Psychiatric: The patient is not nervous/anxious.        Answers submitted by the patient for this visit:  Review of Symptoms Questionnaire  (Submitted on 6/3/2024)  Fatigue (Tiredness)?: Yes  Light sensitivity / Light hurts the eyes?: Yes  Snoring?: Yes  Sleep Apnea?: Yes  None of these : Yes  constipation: Yes  None of these: Yes  Muscle aches / pain?: Yes  None of these : Yes  None of these: Yes  None of these : Yes  None of these: Yes  Bruises or bleeds easily: Yes  nervous/ anxious: Yes  All other systems are negative except for that listed in the HPI.      PHYSICAL EXAM:   Vital Signs:  /78 (BP Location: Right arm, Patient Position: Sitting, BP Method: Small (Automatic))   Pulse 60   Wt 54.7 kg (120 lb 9.5 oz)   BMI 18.89 kg/m²      General:  Well-developed, well-nourished  Communication and Voice:  Clear pitch and clarity  Hearing: Hearing adequate for verbal communication bilaterally   Inspection:  Normocephalic and atraumatic without mass or lesion  Palpation:  Facial skeleton intact without bony stepoffs  Parotid Glands:  No mass or tenderness  Facial Strength:  Facial motility symmetric and full bilaterally  Pinna:  External ear intact and fully developed  External canal:  Canal is patent with intact skin  Tympanic Membrane:  Clear and mobile  External nose:    Thin skin with deviation of middle and lower 3rd to the left at the nasal tip with asymmetry of apertures.  Distal Mohs cm of caudal septum is actually midline with deviation to the right  only posterior to this.  Dorsum deviated secondary to septum and to asymmetry of upper lateral cartilages.  Patient has very mild nasal valve collapse in zone 1 which mildly improves with Kearny and modified Kearny maneuvers  Internal Nose:  Septum intact   With deviation to the right causing at least 75% obstruction.  There is bilateral inferior turbinate hypertrophy.  There is narrowing of internal nasal valve as described above  TMJ:  No pain to palpation with full mobility  Oral cavity, Lips, Teeth, and Gums:  Mucosa and teeth intact and viable, No lesions, masses or ulcers  Oropharynx: No erythema or exudate, no masses or ulcerations, non-obstructive tonsils  Nasopharynx:  No mass or lesion with intact mucosa  Hypopharynx:  Not well visualized secondary to gagging  Larynx:  Not well visualized secondary to gagging  Neck, Trachea, Lymphatics:  Midline trachea without mass or lesion, no lymphadenopathy  Thyroid:  No mass or nodularity  Eyes: No nystagmus with equal extraocular motion bilaterally  Neuro/Psych/Balance: Patient oriented and appropriate in interaction;  Appropriate mood and affect;  Gait is intact with no imbalance; Cranial nerves I-XII are intact  Respiratory effort:  Equal inspiration and expiration without stridor  Peripheral Vascular:  Warm extremities with equal pulses       ASSESSMENT:   1. Nasal septal deviation    2. Hypertrophy of both inferior nasal turbinates    3. Nasal deformity, acquired    4. Nasal valve collapse    5. Snoring    6. Sleep disturbance    7. Nasal obstruction            PLAN:    I again discussed with her as did Dr. Murray options to include re trial of medical therapy versus septoplasty and  grafts and turbinate reduction only versus  rhino septoplasty.  I discussed surgery in detail with expected recovery.  I explained to her that at least some of her obstruction secondary to allergic disease.  She is unsure about proceeding with surgery at her age.  I  recommended at least trial of medical set therapy with irrigation and Flonase for at least a month.  If she has no improvement and wishes to proceed with nasal surgery she will reach out.    I believe that Ms. Aaron has a good understanding of the issues involved and I answered all of her questions.     DISCLAIMER: This note was prepared with [employer] voice recognition transcription software. Garbled syntax, mangled pronouns, and other bizarre constructions may be attributed to that software system. While efforts were made to correct any mistakes made by this voice recognition program, some errors and/or omissions may remain in the note that were missed when the note was originally created.

## 2024-06-20 DIAGNOSIS — N95.2 VAGINAL ATROPHY: ICD-10-CM

## 2024-06-20 RX ORDER — ESTRADIOL 0.1 MG/G
CREAM VAGINAL
Qty: 42.5 G | Refills: 1 | Status: SHIPPED | OUTPATIENT
Start: 2024-06-20

## 2024-06-28 ENCOUNTER — PATIENT MESSAGE (OUTPATIENT)
Dept: INTERNAL MEDICINE | Facility: CLINIC | Age: 74
End: 2024-06-28
Payer: MEDICARE

## 2024-06-28 DIAGNOSIS — G62.9 NEUROPATHY: ICD-10-CM

## 2024-06-28 RX ORDER — GABAPENTIN 600 MG/1
900 TABLET ORAL 3 TIMES DAILY
Qty: 135 TABLET | Refills: 11 | Status: CANCELLED | OUTPATIENT
Start: 2024-06-28 | End: 2025-06-28

## 2024-06-28 NOTE — TELEPHONE ENCOUNTER
No care due was identified.  [facility] Embedded Care Due Messages. Reference number: [id number].   6/28/2024 2:44:41 PM CDT

## 2024-07-01 ENCOUNTER — OFFICE VISIT (OUTPATIENT)
Dept: DERMATOLOGY | Facility: CLINIC | Age: 74
End: 2024-07-01
Payer: MEDICARE

## 2024-07-01 DIAGNOSIS — L98.9 DISEASE OF SKIN AND SUBCUTANEOUS TISSUE: Primary | ICD-10-CM

## 2024-07-01 DIAGNOSIS — D18.01 ANGIOMA OF SKIN: ICD-10-CM

## 2024-07-01 DIAGNOSIS — G62.9 NEUROPATHY: ICD-10-CM

## 2024-07-01 PROCEDURE — 1159F MED LIST DOCD IN RCRD: CPT | Mod: CPTII,S$GLB,, | Performed by: DERMATOLOGY

## 2024-07-01 PROCEDURE — 4010F ACE/ARB THERAPY RXD/TAKEN: CPT | Mod: CPTII,S$GLB,, | Performed by: DERMATOLOGY

## 2024-07-01 PROCEDURE — 3288F FALL RISK ASSESSMENT DOCD: CPT | Mod: CPTII,S$GLB,, | Performed by: DERMATOLOGY

## 2024-07-01 PROCEDURE — 1160F RVW MEDS BY RX/DR IN RCRD: CPT | Mod: CPTII,S$GLB,, | Performed by: DERMATOLOGY

## 2024-07-01 PROCEDURE — 1101F PT FALLS ASSESS-DOCD LE1/YR: CPT | Mod: CPTII,S$GLB,, | Performed by: DERMATOLOGY

## 2024-07-01 PROCEDURE — 1157F ADVNC CARE PLAN IN RCRD: CPT | Mod: CPTII,S$GLB,, | Performed by: DERMATOLOGY

## 2024-07-01 PROCEDURE — 1125F AMNT PAIN NOTED PAIN PRSNT: CPT | Mod: CPTII,S$GLB,, | Performed by: DERMATOLOGY

## 2024-07-01 PROCEDURE — 99999 PR PBB SHADOW E&M-EST. PATIENT-LVL III: CPT | Mod: PBBFAC,,, | Performed by: DERMATOLOGY

## 2024-07-01 PROCEDURE — 99214 OFFICE O/P EST MOD 30 MIN: CPT | Mod: S$GLB,,, | Performed by: DERMATOLOGY

## 2024-07-01 RX ORDER — CLOBETASOL PROPIONATE 0.5 MG/G
CREAM TOPICAL
Qty: 60 G | Refills: 0 | Status: SHIPPED | OUTPATIENT
Start: 2024-07-01

## 2024-07-01 NOTE — TELEPHONE ENCOUNTER
----- Message from Rosamaria Velez sent at 2024  1:56 PM CDT -----  Contact: Self/ [phone]  Requesting an RX refill or new RX.    Is this a refill or new RX: New    RX name and strength :  gabapentin (NEURONTIN) 600 MG tablet    Is this a 30 day or 90 day RX:     Pharmacy name and phone #[facility]  [id number] - [facility] [address] AT [address] [address]  Phone: [phone] Fax: [phone]       The doctors have asked that we provide their patients with the following 2 reminders -- prescription refills can take up to 72 hours, and a friendly reminder that in the future you can use your [facility] account to request refills: pt stated that she had the medication last filled by the NP at [facility] , pt stated that Dr Kilgore told her when that rx  that she would write a new rx for the medication and she requested a rx last week but has not received a response. Please advise

## 2024-07-01 NOTE — TELEPHONE ENCOUNTER
No care due was identified.  [facility] Embedded Care Due Messages. Reference number: [id number].   7/01/2024 2:20:43 PM CDT

## 2024-07-01 NOTE — PROGRESS NOTES
Subjective:      Patient ID:  Jalyn Aaron is a 74 y.o. female who presents for   Chief Complaint   Patient presents with    Follow-up     Efudex to scalp      History of Present Illness: The patient presents for follow up of  Efudex to entire scalp vertex- Pt tx 2x/day for 4 weeks.    The patient was last seen on: 4/1/2024 for skin check.  Pt states scalp is still very tender and redness.       Review of Systems   Skin:  Positive for daily sunscreen use, activity-related sunscreen use and wears hat (always). Negative for itching (sensitive), rash and recent sunburn.   Hematologic/Lymphatic: Bruises/bleeds easily.       Objective:   Physical Exam   Constitutional: She appears well-developed and well-nourished. No distress.   Neurological: She is alert and oriented to person, place, and time. She is not disoriented.   Psychiatric: She has a normal mood and affect.   Skin:   Areas Examined (abnormalities noted in diagram):   Scalp / Hair Palpated and Inspected  Back Inspection Performed                 Diagram Legend     Erythematous scaling macule/papule c/w actinic keratosis       Vascular papule c/w angioma      Pigmented verrucoid papule/plaque c/w seborrheic keratosis      Yellow umbilicated papule c/w sebaceous hyperplasia      Irregularly shaped tan macule c/w lentigo     1-2 mm smooth white papules consistent with Milia      Movable subcutaneous cyst with punctum c/w epidermal inclusion cyst      Subcutaneous movable cyst c/w pilar cyst      Firm pink to brown papule c/w dermatofibroma      Pedunculated fleshy papule(s) c/w skin tag(s)      Evenly pigmented macule c/w junctional nevus     Mildly variegated pigmented, slightly irregular-bordered macule c/w mildly atypical nevus      Flesh colored to evenly pigmented papule c/w intradermal nevus       Pink pearly papule/plaque c/w basal cell carcinoma      Erythematous hyperkeratotic cursted plaque c/w SCC      Surgical scar with no sign of skin  cancer recurrence      Open and closed comedones      Inflammatory papules and pustules      Verrucoid papule consistent consistent with wart     Erythematous eczematous patches and plaques     Dystrophic onycholytic nail with subungual debris c/w onychomycosis     Umbilicated papule    Erythematous-base heme-crusted tan verrucoid plaque consistent with inflamed seborrheic keratosis     Erythematous Silvery Scaling Plaque c/w Psoriasis     See annotation      Assessment / Plan:        Disease of skin and subcutaneous tissue - scalp vertex  Pt with h/o AK - treated with efudex x several courses  Now with erythema and tenderness ? Erosions  Ddx: EPD  -     clobetasoL (TEMOVATE) 0.05 % cream; AAA scalp bid x 1 month  Dispense: 60 g; Refill: 0  Wear hat always    Angioma - right back  This is a benign vascular lesion. Reassurance given. No treatment required.              No follow-ups on file.

## 2024-07-02 RX ORDER — GABAPENTIN 600 MG/1
900 TABLET ORAL 3 TIMES DAILY
Qty: 135 TABLET | Refills: 11 | Status: SHIPPED | OUTPATIENT
Start: 2024-07-02 | End: 2025-07-02

## 2024-08-05 ENCOUNTER — OFFICE VISIT (OUTPATIENT)
Dept: DERMATOLOGY | Facility: CLINIC | Age: 74
End: 2024-08-05
Payer: MEDICARE

## 2024-08-05 DIAGNOSIS — L98.9 DISEASE OF SKIN AND SUBCUTANEOUS TISSUE: Primary | ICD-10-CM

## 2024-08-05 PROCEDURE — 1160F RVW MEDS BY RX/DR IN RCRD: CPT | Mod: CPTII,S$GLB,, | Performed by: DERMATOLOGY

## 2024-08-05 PROCEDURE — 1101F PT FALLS ASSESS-DOCD LE1/YR: CPT | Mod: CPTII,S$GLB,, | Performed by: DERMATOLOGY

## 2024-08-05 PROCEDURE — 3288F FALL RISK ASSESSMENT DOCD: CPT | Mod: CPTII,S$GLB,, | Performed by: DERMATOLOGY

## 2024-08-05 PROCEDURE — 1157F ADVNC CARE PLAN IN RCRD: CPT | Mod: CPTII,S$GLB,, | Performed by: DERMATOLOGY

## 2024-08-05 PROCEDURE — 4010F ACE/ARB THERAPY RXD/TAKEN: CPT | Mod: CPTII,S$GLB,, | Performed by: DERMATOLOGY

## 2024-08-05 PROCEDURE — 99999 PR PBB SHADOW E&M-EST. PATIENT-LVL III: CPT | Mod: PBBFAC,,, | Performed by: DERMATOLOGY

## 2024-08-05 PROCEDURE — 1125F AMNT PAIN NOTED PAIN PRSNT: CPT | Mod: CPTII,S$GLB,, | Performed by: DERMATOLOGY

## 2024-08-05 PROCEDURE — 1159F MED LIST DOCD IN RCRD: CPT | Mod: CPTII,S$GLB,, | Performed by: DERMATOLOGY

## 2024-08-05 PROCEDURE — 99213 OFFICE O/P EST LOW 20 MIN: CPT | Mod: S$GLB,,, | Performed by: DERMATOLOGY

## 2024-08-08 ENCOUNTER — TELEPHONE (OUTPATIENT)
Dept: DERMATOLOGY | Facility: CLINIC | Age: 74
End: 2024-08-08
Payer: MEDICARE

## 2024-08-10 DIAGNOSIS — I25.10 CORONARY ARTERY DISEASE INVOLVING NATIVE CORONARY ARTERY OF NATIVE HEART WITHOUT ANGINA PECTORIS: ICD-10-CM

## 2024-08-12 ENCOUNTER — LAB VISIT (OUTPATIENT)
Dept: LAB | Facility: HOSPITAL | Age: 74
End: 2024-08-12
Attending: INTERNAL MEDICINE
Payer: MEDICARE

## 2024-08-12 ENCOUNTER — OFFICE VISIT (OUTPATIENT)
Dept: INTERNAL MEDICINE | Facility: CLINIC | Age: 74
End: 2024-08-12
Payer: MEDICARE

## 2024-08-12 VITALS
WEIGHT: 120.5 LBS | OXYGEN SATURATION: 97 % | HEIGHT: 67 IN | SYSTOLIC BLOOD PRESSURE: 110 MMHG | BODY MASS INDEX: 18.91 KG/M2 | DIASTOLIC BLOOD PRESSURE: 70 MMHG | HEART RATE: 64 BPM

## 2024-08-12 DIAGNOSIS — R41.3 MEMORY CHANGES: ICD-10-CM

## 2024-08-12 DIAGNOSIS — R41.3 OTHER AMNESIA: ICD-10-CM

## 2024-08-12 DIAGNOSIS — M20.41 HAMMER TOES OF BOTH FEET: ICD-10-CM

## 2024-08-12 DIAGNOSIS — M20.42 HAMMER TOES OF BOTH FEET: ICD-10-CM

## 2024-08-12 DIAGNOSIS — R53.83 FATIGUE, UNSPECIFIED TYPE: ICD-10-CM

## 2024-08-12 DIAGNOSIS — R53.83 FATIGUE, UNSPECIFIED TYPE: Primary | ICD-10-CM

## 2024-08-12 LAB
ALBUMIN SERPL BCP-MCNC: 3.9 G/DL (ref 3.5–5.2)
ALP SERPL-CCNC: 60 U/L (ref 55–135)
ALT SERPL W/O P-5'-P-CCNC: 23 U/L (ref 10–44)
ANION GAP SERPL CALC-SCNC: 8 MMOL/L (ref 8–16)
AST SERPL-CCNC: 24 U/L (ref 10–40)
BASOPHILS # BLD AUTO: 0.07 K/UL (ref 0–0.2)
BASOPHILS NFR BLD: 1.1 % (ref 0–1.9)
BILIRUB SERPL-MCNC: 0.5 MG/DL (ref 0.1–1)
BNP SERPL-MCNC: 46 PG/ML (ref 0–99)
BUN SERPL-MCNC: 20 MG/DL (ref 8–23)
CALCIUM SERPL-MCNC: 9.5 MG/DL (ref 8.7–10.5)
CHLORIDE SERPL-SCNC: 105 MMOL/L (ref 95–110)
CO2 SERPL-SCNC: 26 MMOL/L (ref 23–29)
CREAT SERPL-MCNC: 0.7 MG/DL (ref 0.5–1.4)
CRP SERPL-MCNC: <0.3 MG/L (ref 0–8.2)
DIFFERENTIAL METHOD BLD: ABNORMAL
EOSINOPHIL # BLD AUTO: 0.2 K/UL (ref 0–0.5)
EOSINOPHIL NFR BLD: 3.5 % (ref 0–8)
ERYTHROCYTE [DISTWIDTH] IN BLOOD BY AUTOMATED COUNT: 13.1 % (ref 11.5–14.5)
ERYTHROCYTE [SEDIMENTATION RATE] IN BLOOD BY PHOTOMETRIC METHOD: 2 MM/HR (ref 0–36)
EST. GFR  (NO RACE VARIABLE): >60 ML/MIN/1.73 M^2
GLUCOSE SERPL-MCNC: 88 MG/DL (ref 70–110)
HCT VFR BLD AUTO: 37.4 % (ref 37–48.5)
HGB BLD-MCNC: 12.3 G/DL (ref 12–16)
IMM GRANULOCYTES # BLD AUTO: 0.02 K/UL (ref 0–0.04)
IMM GRANULOCYTES NFR BLD AUTO: 0.3 % (ref 0–0.5)
LYMPHOCYTES # BLD AUTO: 2.5 K/UL (ref 1–4.8)
LYMPHOCYTES NFR BLD: 38.6 % (ref 18–48)
MCH RBC QN AUTO: 32.7 PG (ref 27–31)
MCHC RBC AUTO-ENTMCNC: 32.9 G/DL (ref 32–36)
MCV RBC AUTO: 100 FL (ref 82–98)
MONOCYTES # BLD AUTO: 0.4 K/UL (ref 0.3–1)
MONOCYTES NFR BLD: 6.5 % (ref 4–15)
NEUTROPHILS # BLD AUTO: 3.2 K/UL (ref 1.8–7.7)
NEUTROPHILS NFR BLD: 50 % (ref 38–73)
NRBC BLD-RTO: 0 /100 WBC
PLATELET # BLD AUTO: 155 K/UL (ref 150–450)
PMV BLD AUTO: 11.1 FL (ref 9.2–12.9)
POTASSIUM SERPL-SCNC: 4.7 MMOL/L (ref 3.5–5.1)
PROT SERPL-MCNC: 6.8 G/DL (ref 6–8.4)
RBC # BLD AUTO: 3.76 M/UL (ref 4–5.4)
SODIUM SERPL-SCNC: 139 MMOL/L (ref 136–145)
TREPONEMA PALLIDUM IGG+IGM AB [PRESENCE] IN SERUM OR PLASMA BY IMMUNOASSAY: NONREACTIVE
TSH SERPL DL<=0.005 MIU/L-ACNC: 1.1 UIU/ML (ref 0.4–4)
VIT B12 SERPL-MCNC: 651 PG/ML (ref 210–950)
WBC # BLD AUTO: 6.34 K/UL (ref 3.9–12.7)

## 2024-08-12 PROCEDURE — 86140 C-REACTIVE PROTEIN: CPT | Performed by: INTERNAL MEDICINE

## 2024-08-12 PROCEDURE — 99214 OFFICE O/P EST MOD 30 MIN: CPT | Mod: S$GLB,,, | Performed by: INTERNAL MEDICINE

## 2024-08-12 PROCEDURE — 1157F ADVNC CARE PLAN IN RCRD: CPT | Mod: CPTII,S$GLB,, | Performed by: INTERNAL MEDICINE

## 2024-08-12 PROCEDURE — 3078F DIAST BP <80 MM HG: CPT | Mod: CPTII,S$GLB,, | Performed by: INTERNAL MEDICINE

## 2024-08-12 PROCEDURE — 3288F FALL RISK ASSESSMENT DOCD: CPT | Mod: CPTII,S$GLB,, | Performed by: INTERNAL MEDICINE

## 2024-08-12 PROCEDURE — 82607 VITAMIN B-12: CPT | Performed by: INTERNAL MEDICINE

## 2024-08-12 PROCEDURE — 85025 COMPLETE CBC W/AUTO DIFF WBC: CPT | Performed by: INTERNAL MEDICINE

## 2024-08-12 PROCEDURE — 80053 COMPREHEN METABOLIC PANEL: CPT | Performed by: INTERNAL MEDICINE

## 2024-08-12 PROCEDURE — 4010F ACE/ARB THERAPY RXD/TAKEN: CPT | Mod: CPTII,S$GLB,, | Performed by: INTERNAL MEDICINE

## 2024-08-12 PROCEDURE — 1101F PT FALLS ASSESS-DOCD LE1/YR: CPT | Mod: CPTII,S$GLB,, | Performed by: INTERNAL MEDICINE

## 2024-08-12 PROCEDURE — 83880 ASSAY OF NATRIURETIC PEPTIDE: CPT | Performed by: INTERNAL MEDICINE

## 2024-08-12 PROCEDURE — 84443 ASSAY THYROID STIM HORMONE: CPT | Performed by: INTERNAL MEDICINE

## 2024-08-12 PROCEDURE — 3008F BODY MASS INDEX DOCD: CPT | Mod: CPTII,S$GLB,, | Performed by: INTERNAL MEDICINE

## 2024-08-12 PROCEDURE — 86593 SYPHILIS TEST NON-TREP QUANT: CPT | Performed by: INTERNAL MEDICINE

## 2024-08-12 PROCEDURE — 1126F AMNT PAIN NOTED NONE PRSNT: CPT | Mod: CPTII,S$GLB,, | Performed by: INTERNAL MEDICINE

## 2024-08-12 PROCEDURE — 83921 ORGANIC ACID SINGLE QUANT: CPT | Performed by: INTERNAL MEDICINE

## 2024-08-12 PROCEDURE — 85652 RBC SED RATE AUTOMATED: CPT | Performed by: INTERNAL MEDICINE

## 2024-08-12 PROCEDURE — 3074F SYST BP LT 130 MM HG: CPT | Mod: CPTII,S$GLB,, | Performed by: INTERNAL MEDICINE

## 2024-08-12 PROCEDURE — 99999 PR PBB SHADOW E&M-EST. PATIENT-LVL V: CPT | Mod: PBBFAC,,, | Performed by: INTERNAL MEDICINE

## 2024-08-12 RX ORDER — CARVEDILOL 3.12 MG/1
3.12 TABLET ORAL 2 TIMES DAILY
Qty: 180 TABLET | Refills: 3 | Status: SHIPPED | OUTPATIENT
Start: 2024-08-12

## 2024-08-12 RX ORDER — DULOXETIN HYDROCHLORIDE 20 MG/1
20 CAPSULE, DELAYED RELEASE ORAL DAILY
Qty: 30 CAPSULE | Refills: 2 | Status: SHIPPED | OUTPATIENT
Start: 2024-08-12 | End: 2024-08-12

## 2024-08-12 RX ORDER — DULOXETIN HYDROCHLORIDE 20 MG/1
20 CAPSULE, DELAYED RELEASE ORAL DAILY
Qty: 30 CAPSULE | Refills: 2 | Status: SHIPPED | OUTPATIENT
Start: 2024-08-12 | End: 2025-08-12

## 2024-08-12 RX ORDER — ALPRAZOLAM 0.25 MG/1
TABLET ORAL
Qty: 4 TABLET | Refills: 0 | Status: SHIPPED | OUTPATIENT
Start: 2024-08-12

## 2024-08-12 NOTE — PROGRESS NOTES
Subjective:       Patient ID: Jalyn Aaron is a 74 y.o. female.    Chief Complaint: Follow-up    Follow-up  Pertinent negatives include no abdominal pain, chest pain (arm pain or jaw pain), headaches, nausea or vomiting.   Pt with increased fatigue of late - concerned about her memory too.  No CP or SOB.  Review of Systems   Respiratory:  Negative for shortness of breath (PND or orthopnea).    Cardiovascular:  Negative for chest pain (arm pain or jaw pain).   Gastrointestinal:  Negative for abdominal pain, diarrhea, nausea and vomiting.   Genitourinary:  Negative for dysuria.   Neurological:  Negative for seizures, syncope and headaches.       Objective:      Physical Exam  Constitutional:       General: She is not in acute distress.     Appearance: She is well-developed.   HENT:      Head: Normocephalic.   Eyes:      Pupils: Pupils are equal, round, and reactive to light.   Neck:      Thyroid: No thyromegaly.      Vascular: No JVD.   Cardiovascular:      Rate and Rhythm: Normal rate and regular rhythm.      Heart sounds: Normal heart sounds. No murmur heard.     No friction rub. No gallop.   Pulmonary:      Effort: Pulmonary effort is normal.      Breath sounds: Normal breath sounds. No wheezing or rales.   Abdominal:      General: Bowel sounds are normal. There is no distension.      Palpations: Abdomen is soft. There is no mass.      Tenderness: There is no abdominal tenderness. There is no guarding or rebound.   Musculoskeletal:      Cervical back: Neck supple.   Lymphadenopathy:      Cervical: No cervical adenopathy.   Skin:     General: Skin is warm and dry.   Neurological:      Mental Status: She is alert and oriented to person, place, and time.      Deep Tendon Reflexes: Reflexes are normal and symmetric.   Psychiatric:         Behavior: Behavior normal.         Thought Content: Thought content normal.         Judgment: Judgment normal.         Assessment:       1. Fatigue, unspecified type    2.  Hammer toes of both feet    3. Memory changes    4. Other amnesia        Plan:   Fatigue, unspecified type  -     Echo Saline Bubble? No; Ultrasound enhancing contrast? No  -     CBC Auto Differential; Future; Expected date: 08/12/2024  -     Comprehensive Metabolic Panel; Future; Expected date: 08/12/2024  -     TSH; Future; Expected date: 08/12/2024  -     Sedimentation rate; Future; Expected date: 08/12/2024  -     C-Reactive Protein; Future; Expected date: 08/12/2024  -     BNP; Future; Expected date: 08/12/2024    Hammer toes of both feet  -     Ambulatory referral/consult to Podiatry; Future; Expected date: 08/19/2024    Memory changes  -     Treponema Pallidium Antibodies IgG, IgM; Future; Expected date: 08/12/2024  -     Vitamin B12; Future; Expected date: 09/12/2024  -     Methylmalonic Acid, Serum; Future; Expected date: 08/12/2024    Other amnesia  -     MRI Brain W WO Contrast; Future; Expected date: 08/12/2024    Other orders  -     carvediloL (COREG) 3.125 MG tablet; Take 1 tablet (3.125 mg total) by mouth 2 (two) times daily.  Dispense: 180 tablet; Refill: 3  -     plecanatide (TRULANCE) 3 mg Tab; One daily  Dispense: 30 tablet; Refill: 2  -     Discontinue: DULoxetine (CYMBALTA) 20 MG capsule; Take 1 capsule (20 mg total) by mouth once daily.  Dispense: 30 capsule; Refill: 2  -     ALPRAZolam (XANAX) 0.25 MG tablet; One-two tabs one hour before procedure then may repeat immediately before procedure  Dispense: 4 tablet; Refill: 0  -     DULoxetine (CYMBALTA) 20 MG capsule; Take 1 capsule (20 mg total) by mouth once daily.  Dispense: 30 capsule; Refill: 2

## 2024-08-14 RX ORDER — ROSUVASTATIN CALCIUM 10 MG/1
10 TABLET, COATED ORAL
Qty: 90 TABLET | Refills: 3 | Status: SHIPPED | OUTPATIENT
Start: 2024-08-14

## 2024-08-16 ENCOUNTER — HOSPITAL ENCOUNTER (OUTPATIENT)
Dept: CARDIOLOGY | Facility: HOSPITAL | Age: 74
Discharge: HOME | End: 2024-08-16
Attending: INTERNAL MEDICINE
Payer: MEDICARE

## 2024-08-16 VITALS
WEIGHT: 120.5 LBS | HEART RATE: 68 BPM | DIASTOLIC BLOOD PRESSURE: 68 MMHG | SYSTOLIC BLOOD PRESSURE: 120 MMHG | BODY MASS INDEX: 18.91 KG/M2 | HEIGHT: 67 IN

## 2024-08-16 LAB
ASCENDING AORTA: 2.72 CM
AV INDEX (PROSTH): 0.76
AV MEAN GRADIENT: 3 MMHG
AV PEAK GRADIENT: 5 MMHG
AV VALVE AREA BY VELOCITY RATIO: 2.77 CM²
AV VALVE AREA: 2.95 CM²
AV VELOCITY RATIO: 0.72
BSA FOR ECHO PROCEDURE: 1.61 M2
CV ECHO LV RWT: 0.29 CM
DOP CALC AO PEAK VEL: 1.13 M/S
DOP CALC AO VTI: 23.26 CM
DOP CALC LVOT AREA: 3.9 CM2
DOP CALC LVOT DIAMETER: 2.22 CM
DOP CALC LVOT PEAK VEL: 0.81 M/S
DOP CALC LVOT STROKE VOLUME: 68.56 CM3
DOP CALCLVOT PEAK VEL VTI: 17.72 CM
E WAVE DECELERATION TIME: 273.62 MSEC
E/A RATIO: 0.93
E/E' RATIO: 6.48 M/S
ECHO LV POSTERIOR WALL: 0.57 CM (ref 0.6–1.1)
FRACTIONAL SHORTENING: 29 % (ref 28–44)
GLOBAL LONGITUIDAL STRAIN: 17 %
INTERVENTRICULAR SEPTUM: 0.6 CM (ref 0.6–1.1)
IVRT: 91.34 MSEC
LA MAJOR: 5.46 CM
LA MINOR: 5.4 CM
LA WIDTH: 3.5 CM
LEFT ATRIUM SIZE: 3.6 CM
LEFT ATRIUM VOLUME INDEX MOD: 33.4 ML/M2
LEFT ATRIUM VOLUME INDEX: 35.7 ML/M2
LEFT ATRIUM VOLUME MOD: 54.52 CM3
LEFT ATRIUM VOLUME: 58.15 CM3
LEFT INTERNAL DIMENSION IN SYSTOLE: 2.86 CM (ref 2.1–4)
LEFT VENTRICLE DIASTOLIC VOLUME INDEX: 42.88 ML/M2
LEFT VENTRICLE DIASTOLIC VOLUME: 69.89 ML
LEFT VENTRICLE MASS INDEX: 38 G/M2
LEFT VENTRICLE SYSTOLIC VOLUME INDEX: 19.1 ML/M2
LEFT VENTRICLE SYSTOLIC VOLUME: 31.2 ML
LEFT VENTRICULAR INTERNAL DIMENSION IN DIASTOLE: 4 CM (ref 3.5–6)
LEFT VENTRICULAR MASS: 62.32 G
LV LATERAL E/E' RATIO: 6.8 M/S
LV SEPTAL E/E' RATIO: 6.18 M/S
METHYLMALONATE SERPL-SCNC: 0.35 UMOL/L
MV A" WAVE DURATION": 11.42 MSEC
MV PEAK A VEL: 0.73 M/S
MV PEAK E VEL: 0.68 M/S
MV STENOSIS PRESSURE HALF TIME: 79.35 MS
MV VALVE AREA P 1/2 METHOD: 2.77 CM2
OHS LV EJECTION FRACTION SIMPSONS BIPLANE MOD: 57 %
PISA TR MAX VEL: 2.33 M/S
PULM VEIN S/D RATIO: 1.02
PV PEAK D VEL: 0.43 M/S
PV PEAK S VEL: 0.44 M/S
RA MAJOR: 5.77 CM
RA PRESSURE ESTIMATED: 3 MMHG
RA WIDTH: 4.26 CM
RIGHT VENTRICLE DIASTOLIC BASEL DIMENSION: 3.7 CM
RV TB RVSP: 5 MMHG
SINUS: 3.09 CM
STJ: 2.54 CM
TDI LATERAL: 0.1 M/S
TDI SEPTAL: 0.11 M/S
TDI: 0.11 M/S
TR MAX PG: 22 MMHG
TRICUSPID ANNULAR PLANE SYSTOLIC EXCURSION: 2.57 CM
TV REST PULMONARY ARTERY PRESSURE: 25 MMHG
Z-SCORE OF LEFT VENTRICULAR DIMENSION IN END DIASTOLE: -1.39
Z-SCORE OF LEFT VENTRICULAR DIMENSION IN END SYSTOLE: 0.02

## 2024-08-16 PROCEDURE — 93306 TTE W/DOPPLER COMPLETE: CPT | Mod: 26,,, | Performed by: INTERNAL MEDICINE

## 2024-08-16 PROCEDURE — 93356 MYOCRD STRAIN IMG SPCKL TRCK: CPT | Mod: ,,, | Performed by: INTERNAL MEDICINE

## 2024-08-16 PROCEDURE — 93356 MYOCRD STRAIN IMG SPCKL TRCK: CPT

## 2024-08-16 PROCEDURE — 93306 TTE W/DOPPLER COMPLETE: CPT

## 2024-08-20 ENCOUNTER — PATIENT MESSAGE (OUTPATIENT)
Dept: INTERNAL MEDICINE | Facility: CLINIC | Age: 74
End: 2024-08-20
Payer: MEDICARE

## 2024-08-29 ENCOUNTER — CLINICAL SUPPORT (OUTPATIENT)
Dept: CARDIOLOGY | Facility: HOSPITAL | Age: 74
End: 2024-08-29
Payer: MEDICARE

## 2024-08-29 DIAGNOSIS — Z95.0 PRESENCE OF CARDIAC PACEMAKER: ICD-10-CM

## 2024-08-29 DIAGNOSIS — I44.2 ATRIOVENTRICULAR BLOCK, COMPLETE: ICD-10-CM

## 2024-08-30 ENCOUNTER — CLINICAL SUPPORT (OUTPATIENT)
Dept: CARDIOLOGY | Facility: HOSPITAL | Age: 74
End: 2024-08-30
Attending: INTERNAL MEDICINE
Payer: MEDICARE

## 2024-08-30 DIAGNOSIS — Z95.0 PRESENCE OF CARDIAC PACEMAKER: ICD-10-CM

## 2024-08-30 DIAGNOSIS — I44.2 ATRIOVENTRICULAR BLOCK, COMPLETE: ICD-10-CM

## 2024-08-30 PROCEDURE — 93296 REM INTERROG EVL PM/IDS: CPT | Performed by: INTERNAL MEDICINE

## 2024-08-30 PROCEDURE — 93294 REM INTERROG EVL PM/LDLS PM: CPT | Mod: ,,, | Performed by: INTERNAL MEDICINE

## 2024-09-05 ENCOUNTER — OFFICE VISIT (OUTPATIENT)
Dept: DERMATOLOGY | Facility: CLINIC | Age: 74
End: 2024-09-05
Payer: MEDICARE

## 2024-09-05 DIAGNOSIS — L98.9 DISEASE OF SKIN AND SUBCUTANEOUS TISSUE: Primary | ICD-10-CM

## 2024-09-05 PROCEDURE — 3288F FALL RISK ASSESSMENT DOCD: CPT | Mod: CPTII,S$GLB,, | Performed by: DERMATOLOGY

## 2024-09-05 PROCEDURE — 1101F PT FALLS ASSESS-DOCD LE1/YR: CPT | Mod: CPTII,S$GLB,, | Performed by: DERMATOLOGY

## 2024-09-05 PROCEDURE — 1159F MED LIST DOCD IN RCRD: CPT | Mod: CPTII,S$GLB,, | Performed by: DERMATOLOGY

## 2024-09-05 PROCEDURE — 99999 PR PBB SHADOW E&M-EST. PATIENT-LVL III: CPT | Mod: PBBFAC,,, | Performed by: DERMATOLOGY

## 2024-09-05 PROCEDURE — 1125F AMNT PAIN NOTED PAIN PRSNT: CPT | Mod: CPTII,S$GLB,, | Performed by: DERMATOLOGY

## 2024-09-05 PROCEDURE — 1160F RVW MEDS BY RX/DR IN RCRD: CPT | Mod: CPTII,S$GLB,, | Performed by: DERMATOLOGY

## 2024-09-05 PROCEDURE — 99213 OFFICE O/P EST LOW 20 MIN: CPT | Mod: S$GLB,,, | Performed by: DERMATOLOGY

## 2024-09-05 PROCEDURE — 4010F ACE/ARB THERAPY RXD/TAKEN: CPT | Mod: CPTII,S$GLB,, | Performed by: DERMATOLOGY

## 2024-09-05 PROCEDURE — 1157F ADVNC CARE PLAN IN RCRD: CPT | Mod: CPTII,S$GLB,, | Performed by: DERMATOLOGY

## 2024-09-05 NOTE — PROGRESS NOTES
Subjective:      Patient ID:  Jalyn Aaron is a 74 y.o. female who presents for   Chief Complaint   Patient presents with    Follow-up     scalp     History of Present Illness: The patient presents for follow up of skin check.    The patient was last seen on: 8/5/24 for follow up of scalp vertex - prob EPD. Pt states she washes scalp daily and reapply vaseline immediately after.   Pt states scalp is      PMHx:  Pt with h/o AK - treated with efudex x several courses  Now with scarring alopecia, some erythema and tenderness and few superficial erosions  Ddx: EPD  S/p clob cream bid x 1 month.           Hair/Scalp Problem - Follow-up  Symptom course: unchanged  Currently using: vaseline jelly.  Affected locations: scalp  Signs / symptoms: tender      Review of Systems   Skin:  Positive for daily sunscreen use, activity-related sunscreen use and wears hat (always). Negative for itching (sensitive), rash and recent sunburn.   Hematologic/Lymphatic: Bruises/bleeds easily.       Objective:   Physical Exam   Constitutional: She appears well-developed and well-nourished. No distress.   Neurological: She is alert and oriented to person, place, and time. She is not disoriented.   Psychiatric: She has a normal mood and affect.   Skin:   Areas Examined (abnormalities noted in diagram):   Scalp / Hair Palpated and Inspected            Diagram Legend     Erythematous scaling macule/papule c/w actinic keratosis       Vascular papule c/w angioma      Pigmented verrucoid papule/plaque c/w seborrheic keratosis      Yellow umbilicated papule c/w sebaceous hyperplasia      Irregularly shaped tan macule c/w lentigo     1-2 mm smooth white papules consistent with Milia      Movable subcutaneous cyst with punctum c/w epidermal inclusion cyst      Subcutaneous movable cyst c/w pilar cyst      Firm pink to brown papule c/w dermatofibroma      Pedunculated fleshy papule(s) c/w skin tag(s)      Evenly pigmented macule  c/w junctional nevus     Mildly variegated pigmented, slightly irregular-bordered macule c/w mildly atypical nevus      Flesh colored to evenly pigmented papule c/w intradermal nevus       Pink pearly papule/plaque c/w basal cell carcinoma      Erythematous hyperkeratotic cursted plaque c/w SCC      Surgical scar with no sign of skin cancer recurrence      Open and closed comedones      Inflammatory papules and pustules      Verrucoid papule consistent consistent with wart     Erythematous eczematous patches and plaques     Dystrophic onycholytic nail with subungual debris c/w onychomycosis     Umbilicated papule    Erythematous-base heme-crusted tan verrucoid plaque consistent with inflamed seborrheic keratosis     Erythematous Silvery Scaling Plaque c/w Psoriasis     See annotation      Assessment / Plan:        Disease of skin and subcutaneous tissue - scalp vertex  Pt with h/o AK - treated with efudex x several courses  Now with scarring alopecia, some tenderness   Ddx: EPD - treated with clob cream bid x 1 month. Debrided non-adherent crusting at last visit 8/2024 followed by treatment of daily gentle cleansing and vaseline jelly    On PE today alopetic scar with no erythema, no erosions and no crusting  Cont gentle daily washing      Cont wear hat always    Pt to notify me if notes new erosions or scabs     Per pt's SARINA's request, pt has appt with Dr. Chavez in 1 month for 2nd opinion. Will send pt's chart to J             No follow-ups on file.

## 2024-09-05 NOTE — Clinical Note
This one's a kalani. Super-anxious. Has EPD - currently clear. Her SARINA wants her to see you for a 2nd opinion. She has appt with you in 1 month. Have fun! JAM

## 2024-09-16 LAB
OHS CV AF BURDEN PERCENT: < 1
OHS CV DC REMOTE DEVICE TYPE: NORMAL
OHS CV RV PACING PERCENT: 99.8 %

## 2024-09-24 ENCOUNTER — OFFICE VISIT (OUTPATIENT)
Dept: URGENT CARE | Facility: CLINIC | Age: 74
End: 2024-09-24
Payer: MEDICARE

## 2024-09-24 VITALS
SYSTOLIC BLOOD PRESSURE: 95 MMHG | HEIGHT: 67 IN | HEART RATE: 73 BPM | TEMPERATURE: 98 F | DIASTOLIC BLOOD PRESSURE: 63 MMHG | RESPIRATION RATE: 18 BRPM | OXYGEN SATURATION: 96 % | WEIGHT: 115 LBS | BODY MASS INDEX: 18.05 KG/M2

## 2024-09-24 DIAGNOSIS — T63.461A WASP STING, ACCIDENTAL OR UNINTENTIONAL, INITIAL ENCOUNTER: Primary | ICD-10-CM

## 2024-09-24 PROCEDURE — 96372 THER/PROPH/DIAG INJ SC/IM: CPT | Mod: S$GLB,,, | Performed by: NURSE PRACTITIONER

## 2024-09-24 PROCEDURE — 99213 OFFICE O/P EST LOW 20 MIN: CPT | Mod: 25,S$GLB,, | Performed by: NURSE PRACTITIONER

## 2024-09-24 RX ORDER — CETIRIZINE HYDROCHLORIDE 10 MG/1
10 TABLET ORAL DAILY
Qty: 7 TABLET | Refills: 0 | Status: SHIPPED | OUTPATIENT
Start: 2024-09-24 | End: 2024-10-01

## 2024-09-24 RX ORDER — TRIAMCINOLONE ACETONIDE 0.25 MG/G
CREAM TOPICAL 2 TIMES DAILY
Qty: 80 G | Refills: 0 | Status: SHIPPED | OUTPATIENT
Start: 2024-09-24 | End: 2024-09-27

## 2024-09-24 RX ORDER — DEXAMETHASONE SODIUM PHOSPHATE 10 MG/ML
6 INJECTION INTRAMUSCULAR; INTRAVENOUS
Status: COMPLETED | OUTPATIENT
Start: 2024-09-24 | End: 2024-09-24

## 2024-09-24 RX ADMIN — DEXAMETHASONE SODIUM PHOSPHATE 6 MG: 10 INJECTION INTRAMUSCULAR; INTRAVENOUS at 01:09

## 2024-09-24 NOTE — PROGRESS NOTES
"Subjective:      Patient ID: Jalyn Aaron is a 74 y.o. female.    Vitals:  height is 5' 7" (1.702 m) and weight is 52.2 kg (115 lb). Her oral temperature is 97.7 °F (36.5 °C). Her blood pressure is 95/63 and her pulse is 73. Her respiration is 18 and oxygen saturation is 96%.     Chief Complaint: Insect Bite    This is a 74 y.o. female who presents today with a chief complaint of wasp sting to left hand 2 days ago. S/S: itching, redness, and swelling . Pt says the itching has gotten worst. No nausea, vomiting, diarrhea, or fever.    Home tx: ice on hand 2 days ago, Benadryl     PMH: none  Provider note begins below    Patient reports being stung by a wasp possibly twice on her left ring finger approximately 2 days ago.  Been using ice.  The swelling has gone down with the redness has spread.    Insect Bite  This is a new problem. The current episode started in the past 7 days. The problem occurs constantly. The problem has been gradually worsening. Associated symptoms include a rash. Pertinent negatives include no chest pain, chills, diaphoresis, fatigue, fever, headaches, nausea, numbness or vomiting.       Constitution: Negative for chills, sweating, fatigue and fever.   Cardiovascular:  Negative for chest pain and sob on exertion.   Gastrointestinal:  Negative for nausea, vomiting, constipation and diarrhea.   Musculoskeletal:  Positive for pain. Negative for trauma.   Skin:  Positive for rash and erythema.   Neurological:  Negative for headaches and numbness.      Objective:     Physical Exam   Constitutional: She is oriented to person, place, and time.   HENT:   Head: Normocephalic and atraumatic.   Cardiovascular: Normal rate.   Pulmonary/Chest: Effort normal. No respiratory distress.   Abdominal: Normal appearance.   Neurological: She is alert and oriented to person, place, and time.   Skin: Skin is warm and dry. erythema   Psychiatric: Her behavior is normal. Mood normal.           Assessment: "     1. Wasp sting, accidental or unintentional, initial encounter        Plan:   Continue ice   Injection   Topical triamcinolone   Zyrtec   Follow up if not improving or if worsening        You received a steroid shot today - this can elevate your blood pressure, elevate your blood sugar, cause water weight gain, nervous energy, redness to the face and dimpling of the skin where the shot goes in.  Pt agrees with proceeding.          Wasp sting, accidental or unintentional, initial encounter  -     dexAMETHasone injection 6 mg  -     triamcinolone acetonide 0.025% (KENALOG) 0.025 % cream; Apply topically 2 (two) times daily. for 3 days  Dispense: 80 g; Refill: 0  -     cetirizine (ZYRTEC) 10 MG tablet; Take 1 tablet (10 mg total) by mouth once daily. for 7 days  Dispense: 7 tablet; Refill: 0

## 2024-09-30 ENCOUNTER — OFFICE VISIT (OUTPATIENT)
Dept: PODIATRY | Facility: CLINIC | Age: 74
End: 2024-09-30
Payer: MEDICARE

## 2024-09-30 ENCOUNTER — PATIENT OUTREACH (OUTPATIENT)
Dept: ADMINISTRATIVE | Facility: HOSPITAL | Age: 74
End: 2024-09-30
Payer: MEDICARE

## 2024-09-30 ENCOUNTER — HOSPITAL ENCOUNTER (OUTPATIENT)
Dept: RADIOLOGY | Facility: HOSPITAL | Age: 74
Discharge: HOME | End: 2024-09-30
Attending: PODIATRIST
Payer: MEDICARE

## 2024-09-30 VITALS
BODY MASS INDEX: 18.06 KG/M2 | HEIGHT: 67 IN | WEIGHT: 115.06 LBS | DIASTOLIC BLOOD PRESSURE: 84 MMHG | HEART RATE: 75 BPM | SYSTOLIC BLOOD PRESSURE: 125 MMHG

## 2024-09-30 DIAGNOSIS — M20.42 HAMMER TOES OF BOTH FEET: ICD-10-CM

## 2024-09-30 DIAGNOSIS — M20.41 HAMMER TOES OF BOTH FEET: ICD-10-CM

## 2024-09-30 DIAGNOSIS — M20.10 ACQUIRED HALLUX VALGUS, UNSPECIFIED LATERALITY: Primary | ICD-10-CM

## 2024-09-30 DIAGNOSIS — B35.1 ONYCHOMYCOSIS: ICD-10-CM

## 2024-09-30 DIAGNOSIS — Z98.890 HISTORY OF FOOT SURGERY: ICD-10-CM

## 2024-09-30 DIAGNOSIS — M20.10 ACQUIRED HALLUX VALGUS, UNSPECIFIED LATERALITY: ICD-10-CM

## 2024-09-30 PROCEDURE — 3074F SYST BP LT 130 MM HG: CPT | Mod: CPTII,S$GLB,, | Performed by: PODIATRIST

## 2024-09-30 PROCEDURE — 73630 X-RAY EXAM OF FOOT: CPT | Mod: 26,50,, | Performed by: RADIOLOGY

## 2024-09-30 PROCEDURE — 1159F MED LIST DOCD IN RCRD: CPT | Mod: CPTII,S$GLB,, | Performed by: PODIATRIST

## 2024-09-30 PROCEDURE — 1157F ADVNC CARE PLAN IN RCRD: CPT | Mod: CPTII,S$GLB,, | Performed by: PODIATRIST

## 2024-09-30 PROCEDURE — 3288F FALL RISK ASSESSMENT DOCD: CPT | Mod: CPTII,S$GLB,, | Performed by: PODIATRIST

## 2024-09-30 PROCEDURE — 4010F ACE/ARB THERAPY RXD/TAKEN: CPT | Mod: CPTII,S$GLB,, | Performed by: PODIATRIST

## 2024-09-30 PROCEDURE — 1160F RVW MEDS BY RX/DR IN RCRD: CPT | Mod: CPTII,S$GLB,, | Performed by: PODIATRIST

## 2024-09-30 PROCEDURE — 99999 PR PBB SHADOW E&M-EST. PATIENT-LVL IV: CPT | Mod: PBBFAC,,, | Performed by: PODIATRIST

## 2024-09-30 PROCEDURE — 99214 OFFICE O/P EST MOD 30 MIN: CPT | Mod: S$GLB,,, | Performed by: PODIATRIST

## 2024-09-30 PROCEDURE — 73630 X-RAY EXAM OF FOOT: CPT | Mod: TC,50,PN

## 2024-09-30 PROCEDURE — 1125F AMNT PAIN NOTED PAIN PRSNT: CPT | Mod: CPTII,S$GLB,, | Performed by: PODIATRIST

## 2024-09-30 PROCEDURE — 3079F DIAST BP 80-89 MM HG: CPT | Mod: CPTII,S$GLB,, | Performed by: PODIATRIST

## 2024-09-30 PROCEDURE — 1101F PT FALLS ASSESS-DOCD LE1/YR: CPT | Mod: CPTII,S$GLB,, | Performed by: PODIATRIST

## 2024-09-30 PROCEDURE — 3008F BODY MASS INDEX DOCD: CPT | Mod: CPTII,S$GLB,, | Performed by: PODIATRIST

## 2024-09-30 NOTE — PROGRESS NOTES
"Subjective:     Patient ID: Jalyn Aaron is a 74 y.o. female.    Chief Complaint: Bunions (Bilateral )    Presents today with chief complaint of discomfort to the right 3rd toenail.  She also has a history of a thickened, discolored left great toenail that does not grow out completely.  History of bunion surgery to the left foot in 2007 and right foot 2015 per .  Change her shoes around in order to wear longer shoes help with any discomfort to her toes.  History of spinal stenosis causing some lost sensation/numbness to the left calf region.    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    Left    AV block     exercised induced 2:1    Cataract     Fever blister     Heart block     History of acne     Joint pain     hands    Keratoconjunctivitis sicca not due to Sjogren's syndrome     Pacemaker     Sleep apnea, unspecified        Past Surgical History:   Procedure Laterality Date    BREAST BIOPSY Left 12/2013    papilloma with atypia on core biopsy    BREAST BIOPSY Left 01/2014    Ex.Bx., removal of ADH/Papilloma    COLONOSCOPY N/A 11/8/2016    Procedure: COLONOSCOPY;  Surgeon: Dl Caruso MD;  Location: [facility] (4TH FLR);  Service: Endoscopy;  Laterality: N/A;  Pacemaker in place.    COLONOSCOPY N/A 3/30/2021    Procedure: COLONOSCOPY;  Surgeon: Joaquin Johnson MD;  Location: [facility] (4TH FLR);  Service: Endoscopy;  Laterality: N/A;  prep ins. emailed - COVID screening on 3/27/21 PC[facility] - ERW    ENDOSCOPIC ULTRASOUND OF UPPER GASTROINTESTINAL TRACT N/A 10/9/2023    Procedure: ULTRASOUND, UPPER GI TRACT, ENDOSCOPIC;  Surgeon: Stacey Sesay MD;  Location: [facility] (2ND FLR);  Service: Endoscopy;  Laterality: N/A;  pancreas cyst 7mm on CT (r/o nodule or worrisome features)    8/4/23-Instructions via " portal-DS  10/2-precall complete-MS    ESOPHAGOGASTRODUODENOSCOPY N/A 3/19/2024    Procedure: EGD (ESOPHAGOGASTRODUODENOSCOPY);  Surgeon: Mynor Watts MD;  Location: [facility] ([address] [place] [address]);  Service: Endoscopy;  Laterality: N/A;  moved to 2nd floor endoscopy per recommendations of crna  pt has    HYSTERECTOMY      IMPLANTATION OF BIVENTRICULAR PERMANENT PACEMAKER AS UPGRADE TO EXISTING PACEMAKER N/A 10/26/2023    Procedure: Biventricular pacemaker upgrade;  Surgeon: Chacho Colby MD;  Location: [facility] EP LAB;  Service: Cardiology;  Laterality: N/A;  CHB, CRT-P upgrade, MDT, MAC, SK, 3 Prep *MDT DC PPM in situ*    INSERT / REPLACE / REMOVE PACEMAKER      LASIK      LASIK Bilateral 2009    VENOGRAM, EP LAB N/A 12/20/2022    Procedure: Venogram, EP Lab;  Surgeon: Chacho Colby MD;  Location: [facility] EP LAB;  Service: Cardiology;  Laterality: N/A;  NICM, Venogram- left side, SK, 3 Prep *MDT PPM in situ*       Family History   Problem Relation Name Age of Onset    Breast cancer Mother          Paget's dz    Cancer Mother  80        pagets    Cataracts Mother      Hypertension Mother      Multiple sclerosis Father Devyn Love     Multiple sclerosis Sister Lulu Pratt     No Known Problems Maternal Aunt      No Known Problems Maternal Uncle      No Known Problems Paternal Aunt      No Known Problems Paternal Uncle      No Known Problems Maternal Grandmother      Prostate cancer Maternal Grandfather      No Known Problems Paternal Grandmother      No Known Problems Paternal Grandfather      No Known Problems Brother      No Known Problems Son      No Known Problems Son      Amblyopia Neg Hx      Blindness Neg Hx      Diabetes Neg Hx      Glaucoma Neg Hx      Macular degeneration Neg Hx      Retinal detachment Neg Hx      Strabismus Neg Hx      Stroke Neg Hx      Thyroid disease Neg Hx      Melanoma Neg Hx      Ovarian cancer Neg Hx         Social History     Socioeconomic History    Marital status:     Number of  children: 1   Occupational History    Occupation:      Employer: [employer]   Tobacco Use    Smoking status: Never     Passive exposure: Never    Smokeless tobacco: Never   Substance and Sexual Activity    Alcohol use: Not Currently     Alcohol/week: 1.0 standard drink of alcohol     Types: 1 Glasses of wine per week     Comment: weekly    Drug use: No    Sexual activity: Not Currently     Partners: Male     Birth control/protection: None   Other Topics Concern    Are you pregnant or think you may be? No    Breast-feeding No   Social History Narrative     (case management) at [employer]     Social Drivers of Health     Financial Resource Strain: Low Risk  (1/6/2024)    Overall Financial Resource Strain (CARDIA)     Difficulty of Paying Living Expenses: Not hard at all   Food Insecurity: No Food Insecurity (1/6/2024)    Hunger Vital Sign     Worried About Running Out of Food in the Last Year: Never true     Ran Out of Food in the Last Year: Never true   Transportation Needs: No Transportation Needs (1/6/2024)    PRAPARE - Transportation     Lack of Transportation (Medical): No     Lack of Transportation (Non-Medical): No   Physical Activity: Insufficiently Active (1/6/2024)    Exercise Vital Sign     Days of Exercise per Week: 5 days     Minutes of Exercise per Session: 20 min   Stress: No Stress Concern Present (1/6/2024)    [employer] - Occupational Stress Questionnaire     Feeling of Stress : Not at all   Housing Stability: Low Risk  (1/6/2024)    Housing Stability Vital Sign     Unable to Pay for Housing in the Last Year: No     Number of Places Lived in the Last Year: 1     Unstable Housing in the Last Year: No       Current Outpatient Medications   Medication Sig Dispense Refill    ALPRAZolam (XANAX) 0.25 MG tablet One-two tabs one hour before procedure then may repeat immediately before procedure 4 tablet 0    carvediloL (COREG) 3.125 MG  tablet Take 1 tablet (3.125 mg total) by mouth 2 (two) times daily. 180 tablet 3    cetirizine (ZYRTEC) 10 MG tablet Take 1 tablet (10 mg total) by mouth once daily. for 7 days 7 tablet 0    clobetasoL (TEMOVATE) 0.05 % cream AAA scalp bid x 1 month 60 g 0    diphth,pertus,acell,,tetanus (BOOSTRIX) 2.5-8-5 Lf-mcg-Lf/0.5mL Susp Inject into the muscle. 0.5 mL 0    estradioL (ESTRACE) 0.01 % (0.1 mg/gram) vaginal cream PLACE 1 GRAM VAGINALLY 2 TIMES A WEEK 42.5 g 1    gabapentin (NEURONTIN) 600 MG tablet Take 1.5 tablets (900 mg total) by mouth 3 (three) times daily. 135 tablet 11    losartan (COZAAR) 25 MG tablet TAKE 1 TABLET BY MOUTH TWICE DAILY 180 tablet 3    multivitamin capsule Take 1 capsule by mouth once daily.      naproxen (NAPROSYN) 250 MG tablet One daily as needed for arthritis pain 30 tablet 1    rosuvastatin (CRESTOR) 10 MG tablet TAKE 1 TABLET(10 MG) BY MOUTH EVERY DAY 90 tablet 3    senna (SENOKOT) 8.6 mg tablet Take 1 tablet by mouth once daily. Twice a day      vitamin D 1000 units Tab Take 1,000 Units by mouth once daily.      triamcinolone acetonide 0.025% (KENALOG) 0.025 % cream Apply topically 2 (two) times daily. for 3 days 80 g 0     Current Facility-Administered Medications   Medication Dose Route Frequency Provider Last Rate Last Admin    triamcinolone acetonide injection 10 mg  10 mg Intradermal Once Christel Ward MD         Facility-Administered Medications Ordered in Other Visits   Medication Dose Route Frequency Provider Last Rate Last Admin    ceFAZolin 2 g in dextrose 5 % in water (D5W) 50 mL IVPB (MB+)  2 g Intravenous On Call Procedure Chacho Colby MD           Review of patient's allergies indicates:   Allergen Reactions    Atorvastatin Other (See Comments)     Elevated LFTs    Bactrim [sulfamethoxazole-trimethoprim] Nausea Only         Review of Systems   Constitutional: Negative for chills and fever.   HENT:  Negative for congestion and hearing loss.    Cardiovascular:   Negative for chest pain and claudication.   Respiratory:  Negative for cough and shortness of breath.    Skin:  Positive for nail changes. Negative for color change and poor wound healing.   Gastrointestinal:  Negative for nausea and vomiting.   Neurological:  Positive for numbness.   Psychiatric/Behavioral:  Negative for altered mental status.         Objective:     Physical Exam  Constitutional:       General: She is not in acute distress.     Appearance: Normal appearance. She is not ill-appearing.   Cardiovascular:      Pulses:           Dorsalis pedis pulses are 2+ on the right side and 2+ on the left side.        Posterior tibial pulses are 2+ on the right side and 2+ on the left side.      Comments: No significant lower extremity edema bilateral.  Skin temp is warm to foot bilateral.  No rubor on dependency bilateral foot.  Musculoskeletal:      Comments: Moderate track bound hallux abductovalgus bilateral overlapping the 2nd toe.  Semi-rigid flexion contracture of the right 2nd toe PIPJ and DIPJ.  Flexible mild flexion contractures of the lesser toes 2-4 left and 3-4 right.   Skin:     General: Skin is warm.      Capillary Refill: Capillary refill takes less than 2 seconds.      Findings: No ecchymosis or erythema.      Nails: There is no clubbing.      Comments: No significant evidence of incurvated medial right 3rd toe nail border or signs infection.  The left hallux nail is severely thickened dystrophic with yellow-brown discoloration and moderate loosening noting that it appears only attached at the proximal nail fold.  No signs infection noted.  No open wounds to the foot bilateral.   Neurological:      Mental Status: She is alert.           Assessment:      Encounter Diagnoses   Name Primary?    Hammer toes of both feet     Acquired hallux valgus, unspecified laterality Yes    History of foot surgery     Onychomycosis      Plan:     Jalyn was seen today for bunions.    Diagnoses and all orders for this  visit:    Acquired hallux valgus, unspecified laterality  -     X-Ray Foot Complete 3 view Bilateral; Future    Hammer toes of both feet  -     Ambulatory referral/consult to Podiatry  -     X-Ray Foot Complete 3 view Bilateral; Future    History of foot surgery    Onychomycosis      I counseled the patient on her conditions, their implications and medical management.    Last bilateral foot x-rays completed in 2020 noting prior opening base wedge 1st metatarsal osteotomy with plate fixation and partial subluxation of the 1 MTP also noting some arthritic changes.  Left foot with evidence of previous proximal base 1st metatarsal osteotomy also with partial subluxation of the hallux of the MTP and arthritic changes at the joint.    Obtain follow-up weight-bearing bilateral foot x-ray to further assess the underlying osseous pathology.  We briefly discussed continued conservative care such as wearing a bunion splint to help pull of the big toe over.  A crest pad was applied to the right 2nd toe to help prevent it from overlapping the 3rd toe and causing irritation.  We discussed possibility of revision surgical intervention consisting of 1st metatarsophalangeal joint arthrodesis to help realign the hallux prevent recurrence of the hallux valgus.    In addition we discussed left hallux nail avulsion phenol and alcohol matrixectomy to help treat the dystrophic and onychomycotic left hallux nail.  Risks, benefits and anticipated postop course were discussed in detail.  No guarantees were given or implied.    RTC p.r.n. as discussed following x-ray imaging.    Assisted by Srinivasa Damon DPM PGY 2    A portion of this note was generated by voice recognition software and may contain spelling and grammar errors.      .

## 2024-09-30 NOTE — PROGRESS NOTES
Population Health Chart Review & Patient Outreach Details      Additional [facility] Notes:    Per LEONEL in basket message, pt declined flu vaccine. HM updated.           Updates Requested / Reviewed:      Updated Care Coordination Note, Care Everywhere, and Immunizations Reconciliation Completed or Queried: [facility] Topics Overdue:      [facility] Score: 0     Patient is not due for any topics at this time.    RSV Vaccine                  Health Maintenance Topic(s) Outreach Outcomes & Actions Taken:

## 2024-10-03 ENCOUNTER — OFFICE VISIT (OUTPATIENT)
Dept: DERMATOLOGY | Facility: CLINIC | Age: 74
End: 2024-10-03
Payer: MEDICARE

## 2024-10-03 ENCOUNTER — PATIENT MESSAGE (OUTPATIENT)
Dept: DERMATOLOGY | Facility: CLINIC | Age: 74
End: 2024-10-03

## 2024-10-03 ENCOUNTER — PATIENT MESSAGE (OUTPATIENT)
Dept: DERMATOLOGY | Facility: CLINIC | Age: 74
End: 2024-10-03
Payer: MEDICARE

## 2024-10-03 DIAGNOSIS — L90.5 SCAR: ICD-10-CM

## 2024-10-03 DIAGNOSIS — L57.0 AK (ACTINIC KERATOSIS): Primary | ICD-10-CM

## 2024-10-03 PROCEDURE — 99213 OFFICE O/P EST LOW 20 MIN: CPT | Mod: 25,S$GLB,, | Performed by: DERMATOLOGY

## 2024-10-03 PROCEDURE — 1101F PT FALLS ASSESS-DOCD LE1/YR: CPT | Mod: CPTII,S$GLB,, | Performed by: DERMATOLOGY

## 2024-10-03 PROCEDURE — 1160F RVW MEDS BY RX/DR IN RCRD: CPT | Mod: CPTII,S$GLB,, | Performed by: DERMATOLOGY

## 2024-10-03 PROCEDURE — 1126F AMNT PAIN NOTED NONE PRSNT: CPT | Mod: CPTII,S$GLB,, | Performed by: DERMATOLOGY

## 2024-10-03 PROCEDURE — 17000 DESTRUCT PREMALG LESION: CPT | Mod: S$GLB,,, | Performed by: DERMATOLOGY

## 2024-10-03 PROCEDURE — 1157F ADVNC CARE PLAN IN RCRD: CPT | Mod: CPTII,S$GLB,, | Performed by: DERMATOLOGY

## 2024-10-03 PROCEDURE — 1159F MED LIST DOCD IN RCRD: CPT | Mod: CPTII,S$GLB,, | Performed by: DERMATOLOGY

## 2024-10-03 PROCEDURE — 3288F FALL RISK ASSESSMENT DOCD: CPT | Mod: CPTII,S$GLB,, | Performed by: DERMATOLOGY

## 2024-10-03 PROCEDURE — 4010F ACE/ARB THERAPY RXD/TAKEN: CPT | Mod: CPTII,S$GLB,, | Performed by: DERMATOLOGY

## 2024-10-03 PROCEDURE — 99999 PR PBB SHADOW E&M-EST. PATIENT-LVL III: CPT | Mod: PBBFAC,,, | Performed by: DERMATOLOGY

## 2024-10-03 NOTE — PROGRESS NOTES
Subjective:      Patient ID:  Jalyn Aaron is a 74 y.o. female who presents for   Chief Complaint   Patient presents with    Hair/Scalp Problem     Scalp      Pt here today for second opinion on EPD of the scalp, pt is not currently treating; wsahing her hair every other day .    only using OTC shampoo, pt states she may have new scabs but not in same area, pt states is tender to touch; this is not in the area of scarring hairloss.       Hair/Scalp Problem      Review of Systems   Skin:  Positive for daily sunscreen use, activity-related sunscreen use and wears hat (always). Negative for itching (sensitive), rash and recent sunburn.   Hematologic/Lymphatic: Bruises/bleeds easily.       Objective:   Physical Exam   Constitutional: She appears well-developed and well-nourished. No distress.   Neurological: She is alert and oriented to person, place, and time. She is not disoriented.   Psychiatric: She has a normal mood and affect.   Skin:   Areas Examined (abnormalities noted in diagram):   Scalp / Hair Palpated and Inspected            Diagram Legend     Erythematous scaling macule/papule c/w actinic keratosis       Vascular papule c/w angioma      Pigmented verrucoid papule/plaque c/w seborrheic keratosis      Yellow umbilicated papule c/w sebaceous hyperplasia      Irregularly shaped tan macule c/w lentigo     1-2 mm smooth white papules consistent with Milia      Movable subcutaneous cyst with punctum c/w epidermal inclusion cyst      Subcutaneous movable cyst c/w pilar cyst      Firm pink to brown papule c/w dermatofibroma      Pedunculated fleshy papule(s) c/w skin tag(s)      Evenly pigmented macule c/w junctional nevus     Mildly variegated pigmented, slightly irregular-bordered macule c/w mildly atypical nevus      Flesh colored to evenly pigmented papule c/w intradermal nevus       Pink pearly papule/plaque c/w basal cell carcinoma      Erythematous hyperkeratotic cursted plaque c/w SCC       Surgical scar with no sign of skin cancer recurrence      Open and closed comedones      Inflammatory papules and pustules      Verrucoid papule consistent consistent with wart     Erythematous eczematous patches and plaques     Dystrophic onycholytic nail with subungual debris c/w onychomycosis     Umbilicated papule    Erythematous-base heme-crusted tan verrucoid plaque consistent with inflamed seborrheic keratosis     Erythematous Silvery Scaling Plaque c/w Psoriasis     See annotation      Assessment / Plan:        AK (actinic keratosis)  Cryosurgery Procedure Note    Verbal consent from the patient is obtained including, but not limited to, risk of hypopigmentation/hyperpigmentation, scar, recurrence of lesion. The patient is aware of the precancerous quality and need for treatment of these lesions. Liquid nitrogen cryosurgery is applied to the 1 actinic keratoses, as detailed in the physical exam, to produce a freeze injury. The patient is aware that blisters may form and is instructed on wound care with gentle cleansing and use of vaseline ointment to keep moist until healed. The patient is supplied a handout on cryosurgery and is instructed to call if lesions do not completely resolve.    Scar- scalp ; secondary to EPD v other    Skin, scalp vertex, punch biopsy:   - ACTINIC KERATOSIS WITH FOLLICULAR INVOLVEMENT, INFLAMED AND FOCALLY EXCORIATED (SEE COMMENT). - 2023    Stable for now but pt still with tenderness  Trial of dermeleve  Sun protection with hat always  DHS zinc shampoo recommended              Follow up in about 6 months (around 4/3/2025) for UBSE.

## 2024-10-14 ENCOUNTER — OFFICE VISIT (OUTPATIENT)
Dept: PODIATRY | Facility: CLINIC | Age: 74
End: 2024-10-14
Payer: MEDICARE

## 2024-10-14 VITALS
BODY MASS INDEX: 18.06 KG/M2 | HEIGHT: 67 IN | WEIGHT: 115.06 LBS | SYSTOLIC BLOOD PRESSURE: 112 MMHG | HEART RATE: 73 BPM | DIASTOLIC BLOOD PRESSURE: 74 MMHG

## 2024-10-14 DIAGNOSIS — Z98.890 HISTORY OF FOOT SURGERY: ICD-10-CM

## 2024-10-14 DIAGNOSIS — L60.3 ONYCHODYSTROPHY: ICD-10-CM

## 2024-10-14 DIAGNOSIS — M20.10 ACQUIRED HALLUX VALGUS, UNSPECIFIED LATERALITY: Primary | ICD-10-CM

## 2024-10-14 PROCEDURE — 1157F ADVNC CARE PLAN IN RCRD: CPT | Mod: CPTII,S$GLB,, | Performed by: PODIATRIST

## 2024-10-14 PROCEDURE — 1125F AMNT PAIN NOTED PAIN PRSNT: CPT | Mod: CPTII,S$GLB,, | Performed by: PODIATRIST

## 2024-10-14 PROCEDURE — 3074F SYST BP LT 130 MM HG: CPT | Mod: CPTII,S$GLB,, | Performed by: PODIATRIST

## 2024-10-14 PROCEDURE — 1160F RVW MEDS BY RX/DR IN RCRD: CPT | Mod: CPTII,S$GLB,, | Performed by: PODIATRIST

## 2024-10-14 PROCEDURE — 1159F MED LIST DOCD IN RCRD: CPT | Mod: CPTII,S$GLB,, | Performed by: PODIATRIST

## 2024-10-14 PROCEDURE — 1101F PT FALLS ASSESS-DOCD LE1/YR: CPT | Mod: CPTII,S$GLB,, | Performed by: PODIATRIST

## 2024-10-14 PROCEDURE — 4010F ACE/ARB THERAPY RXD/TAKEN: CPT | Mod: CPTII,S$GLB,, | Performed by: PODIATRIST

## 2024-10-14 PROCEDURE — 99213 OFFICE O/P EST LOW 20 MIN: CPT | Mod: S$GLB,,, | Performed by: PODIATRIST

## 2024-10-14 PROCEDURE — 3078F DIAST BP <80 MM HG: CPT | Mod: CPTII,S$GLB,, | Performed by: PODIATRIST

## 2024-10-14 PROCEDURE — 3288F FALL RISK ASSESSMENT DOCD: CPT | Mod: CPTII,S$GLB,, | Performed by: PODIATRIST

## 2024-10-14 PROCEDURE — 3008F BODY MASS INDEX DOCD: CPT | Mod: CPTII,S$GLB,, | Performed by: PODIATRIST

## 2024-10-14 PROCEDURE — 99999 PR PBB SHADOW E&M-EST. PATIENT-LVL III: CPT | Mod: PBBFAC,,, | Performed by: PODIATRIST

## 2024-10-14 NOTE — PROGRESS NOTES
"Subjective:     Patient ID: Jalyn Aaron is a 74 y.o. female.    Chief Complaint: Results    Presents today with chief complaint of discomfort to the right 3rd toenail.  She also has a history of a thickened, discolored left great toenail that does not grow out completely.  History of bunion surgery to the left foot in 2007 and right foot 2015 per .  Change her shoes around in order to wear longer shoes help with any discomfort to her toes.  History of spinal stenosis causing some lost sensation/numbness to the left calf region.    10/14/2024: Returns to review x-rays taken of both feet and to discuss the left great toe.  She was brought 3 pairs of different tennis shoes to look at and assess for sizing.  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    Left    AV block     exercised induced 2:1    Cataract     Fever blister     Heart block     History of acne     Joint pain     hands    Keratoconjunctivitis sicca not due to Sjogren's syndrome     Pacemaker     Sleep apnea, unspecified        Past Surgical History:   Procedure Laterality Date    BREAST BIOPSY Left 12/2013    papilloma with atypia on core biopsy    BREAST BIOPSY Left 01/2014    Ex.Bx., removal of ADH/Papilloma    COLONOSCOPY N/A 11/8/2016    Procedure: COLONOSCOPY;  Surgeon: Dl Caruso MD;  Location: [address]);  Service: Endoscopy;  Laterality: N/A;  Pacemaker in place.    COLONOSCOPY N/A 3/30/2021    Procedure: COLONOSCOPY;  Surgeon: Joaquin Johnson MD;  Location: [facility] ([address] [place] [address]);  Service: Endoscopy;  Laterality: N/A;  prep ins. emailed - COVID screening on 3/27/21 PCW - ERW    ENDOSCOPIC ULTRASOUND OF UPPER GASTROINTESTINAL TRACT N/A 10/9/2023    Procedure: ULTRASOUND, UPPER GI TRACT, ENDOSCOPIC;  Surgeon: Stacey Sesay MD;  " Location: [facility] ENDO (2ND FLR);  Service: Endoscopy;  Laterality: N/A;  pancreas cyst 7mm on CT (r/o nodule or worrisome features)    8/4/23-Instructions via portal-DS  10/2-precall complete-MS    ESOPHAGOGASTRODUODENOSCOPY N/A 3/19/2024    Procedure: EGD (ESOPHAGOGASTRODUODENOSCOPY);  Surgeon: Mynor Watts MD;  Location: [facility] ENDO (2ND FLR);  Service: Endoscopy;  Laterality: N/A;  moved to 2nd floor endoscopy per recommendations of crna  pt has    HYSTERECTOMY      IMPLANTATION OF BIVENTRICULAR PERMANENT PACEMAKER AS UPGRADE TO EXISTING PACEMAKER N/A 10/26/2023    Procedure: Biventricular pacemaker upgrade;  Surgeon: Chacho Colby MD;  Location: [facility] EP LAB;  Service: Cardiology;  Laterality: N/A;  CHB, CRT-P upgrade, MDT, MAC, SK, 3 Prep *MDT DC PPM in situ*    INSERT / REPLACE / REMOVE PACEMAKER      LASIK      LASIK Bilateral 2009    VENOGRAM, EP LAB N/A 12/20/2022    Procedure: Venogram, EP Lab;  Surgeon: Chacho Colby MD;  Location: [facility] EP LAB;  Service: Cardiology;  Laterality: N/A;  NICM, Venogram- left side, SK, 3 Prep *MDT PPM in situ*       Family History   Problem Relation Name Age of Onset    Breast cancer Mother          Paget's dz    Cancer Mother  80        pagets    Cataracts Mother      Hypertension Mother      Multiple sclerosis Father Devyn Love     Multiple sclerosis Sister Lulu Pratt     No Known Problems Maternal Aunt      No Known Problems Maternal Uncle      No Known Problems Paternal Aunt      No Known Problems Paternal Uncle      No Known Problems Maternal Grandmother      Prostate cancer Maternal Grandfather      No Known Problems Paternal Grandmother      No Known Problems Paternal Grandfather      No Known Problems Brother      No Known Problems Son      No Known Problems Son      Amblyopia Neg Hx      Blindness Neg Hx      Diabetes Neg Hx      Glaucoma Neg Hx      Macular degeneration Neg Hx      Retinal detachment Neg Hx      Strabismus Neg Hx      Stroke Neg Hx      Thyroid  disease Neg Hx      Melanoma Neg Hx      Ovarian cancer Neg Hx         Social History     Socioeconomic History    Marital status:     Number of children: 1   Occupational History    Occupation:      Employer: [employer]   Tobacco Use    Smoking status: Never     Passive exposure: Never    Smokeless tobacco: Never   Substance and Sexual Activity    Alcohol use: Not Currently     Alcohol/week: 1.0 standard drink of alcohol     Types: 1 Glasses of wine per week     Comment: weekly    Drug use: No    Sexual activity: Not Currently     Partners: Male     Birth control/protection: None   Other Topics Concern    Are you pregnant or think you may be? No    Breast-feeding No   Social History Narrative     (case management) at [employer]     Social Drivers of Health     Financial Resource Strain: Low Risk  (1/6/2024)    Overall Financial Resource Strain (CARDIA)     Difficulty of Paying Living Expenses: Not hard at all   Food Insecurity: No Food Insecurity (1/6/2024)    Hunger Vital Sign     Worried About Running Out of Food in the Last Year: Never true     Ran Out of Food in the Last Year: Never true   Transportation Needs: No Transportation Needs (1/6/2024)    PRAPARE - Transportation     Lack of Transportation (Medical): No     Lack of Transportation (Non-Medical): No   Physical Activity: Insufficiently Active (1/6/2024)    Exercise Vital Sign     Days of Exercise per Week: 5 days     Minutes of Exercise per Session: 20 min   Stress: No Stress Concern Present (1/6/2024)    [employer] [place] [employer] - Occupational Stress Questionnaire     Feeling of Stress : Not at all   Housing Stability: Low Risk  (1/6/2024)    Housing Stability Vital Sign     Unable to Pay for Housing in the Last Year: No     Number of Places Lived in the Last Year: 1     Unstable Housing in the Last Year: No       Current Outpatient Medications   Medication Sig Dispense Refill    ALPRAZolam  (XANAX) 0.25 MG tablet One-two tabs one hour before procedure then may repeat immediately before procedure 4 tablet 0    carvediloL (COREG) 3.125 MG tablet Take 1 tablet (3.125 mg total) by mouth 2 (two) times daily. 180 tablet 3    clobetasoL (TEMOVATE) 0.05 % cream AAA scalp bid x 1 month 60 g 0    diphth,pertus,acell,,tetanus (BOOSTRIX) 2.5-8-5 Lf-mcg-Lf/0.5mL Susp Inject into the muscle. 0.5 mL 0    estradioL (ESTRACE) 0.01 % (0.1 mg/gram) vaginal cream PLACE 1 GRAM VAGINALLY 2 TIMES A WEEK 42.5 g 1    gabapentin (NEURONTIN) 600 MG tablet Take 1.5 tablets (900 mg total) by mouth 3 (three) times daily. 135 tablet 11    losartan (COZAAR) 25 MG tablet TAKE 1 TABLET BY MOUTH TWICE DAILY 180 tablet 3    multivitamin capsule Take 1 capsule by mouth once daily.      naproxen (NAPROSYN) 250 MG tablet One daily as needed for arthritis pain 30 tablet 1    rosuvastatin (CRESTOR) 10 MG tablet TAKE 1 TABLET(10 MG) BY MOUTH EVERY DAY 90 tablet 3    senna (SENOKOT) 8.6 mg tablet Take 1 tablet by mouth once daily. Twice a day      vitamin D 1000 units Tab Take 1,000 Units by mouth once daily.      cetirizine (ZYRTEC) 10 MG tablet Take 1 tablet (10 mg total) by mouth once daily. for 7 days 7 tablet 0    triamcinolone acetonide 0.025% (KENALOG) 0.025 % cream Apply topically 2 (two) times daily. for 3 days 80 g 0     Current Facility-Administered Medications   Medication Dose Route Frequency Provider Last Rate Last Admin    triamcinolone acetonide injection 10 mg  10 mg Intradermal Once Christel Ward MD         Facility-Administered Medications Ordered in Other Visits   Medication Dose Route Frequency Provider Last Rate Last Admin    ceFAZolin 2 g in dextrose 5 % in water (D5W) 50 mL IVPB (MB+)  2 g Intravenous On Call Procedure Chacho Colby MD           Review of patient's allergies indicates:   Allergen Reactions    Atorvastatin Other (See Comments)     Elevated LFTs    Bactrim [sulfamethoxazole-trimethoprim] Nausea Only          Review of Systems   Constitutional: Negative for chills and fever.   HENT:  Negative for congestion and hearing loss.    Cardiovascular:  Negative for chest pain and claudication.   Respiratory:  Negative for cough and shortness of breath.    Skin:  Positive for nail changes. Negative for color change and poor wound healing.   Gastrointestinal:  Negative for nausea and vomiting.   Neurological:  Positive for numbness.   Psychiatric/Behavioral:  Negative for altered mental status.         Objective:     Physical Exam  Constitutional:       General: She is not in acute distress.     Appearance: Normal appearance. She is not ill-appearing.   Cardiovascular:      Pulses:           Dorsalis pedis pulses are 2+ on the right side and 2+ on the left side.        Posterior tibial pulses are 2+ on the right side and 2+ on the left side.      Comments: No significant lower extremity edema bilateral.  Skin temp is warm to foot bilateral.  No rubor on dependency bilateral foot.  Musculoskeletal:      Comments: Moderate track bound hallux abductovalgus bilateral overlapping the 2nd toe.  Semi-rigid flexion contracture of the right 2nd toe PIPJ and DIPJ.  Flexible mild flexion contractures of the lesser toes 2-4 left and 3-4 right.   Skin:     General: Skin is warm.      Capillary Refill: Capillary refill takes less than 2 seconds.      Findings: No ecchymosis or erythema.      Nails: There is no clubbing.      Comments: No significant evidence of incurvated medial right 3rd toe nail border or signs infection.  The left hallux nail is severely thickened dystrophic with yellow-brown discoloration and moderate loosening noting that it appears only attached at the proximal nail fold.  No signs infection noted.  No open wounds to the foot bilateral.   Neurological:      Mental Status: She is alert.           Assessment:      Encounter Diagnoses   Name Primary?    Acquired hallux valgus, unspecified laterality Yes    History of  foot surgery     Onychodystrophy      Plan:     Jalyn was seen today for results.    Diagnoses and all orders for this visit:    Acquired hallux valgus, unspecified laterality    History of foot surgery    Onychodystrophy      I counseled the patient on her conditions, their implications and medical management.    Reviewed bilateral foot weight-bearing x-ray in detail with the patient.  As previously discussed prior evidence of a base osteotomy on left foot single screw fixation and opening base wedge osteotomy of the 1st metatarsal right foot with plate fixation.  Moderate degenerative changes noted to the 1st metatarsophalangeal joint bilateral with multiple subchondral cysts and enlargement of the sesamoids at the 1 MTP left foot.  The 1 MTP bilateral is partially subluxed with the base of the proximal phalanx riding on the lateral aspect of the 1st met head.    Today we discussed continued conservative care wearing shoes that have adequate room in the toe box to prevent any direct rubbing or irritation to the toes and avoiding further trauma to the toenail as discussed.  Her left hallux nail appears to have a clear section involving the proximal 1/2 of the nail and moderate loosening and of the distal 1/2 of the nail consistent with prior trauma.  No significant evidence of fungal involvement today.  Most likely the nail could continue to grow out if there is no further trauma.    We discussed surgical intervention consisting of hardware removal of the 1st metatarsal left foot with arthrodesis/fusion of the 1st metatarsophalangeal joint to realign the hallux.  Risks, benefits and anticipated postoperative course were discussed in great detail today.  No guarantees were given or implied.  Patient would like to continue conservative care for now and consider her options.    RTC p.r.n. as discussed    A portion of this note was generated by voice recognition software and may contain spelling and grammar  errors.        .

## 2024-10-21 DIAGNOSIS — I42.8 CARDIOMYOPATHY, NONISCHEMIC: Primary | ICD-10-CM

## 2024-10-22 NOTE — PROGRESS NOTES
Ms. Aaron is a patient of Dr. Colby and was last seen in clinic 2/28/2024.      Subjective:   Patient ID:  Jalyn Aaron is a 74 y.o. female who presents for follow up of Pacemaker Check  .     HPI:    Ms. Aaron is a 74 y.o. female with AV block, NICM, PPM, anxiety here for follow up.      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  Significant anxiety -- much improved since she has been on cymbalta  Echo 3/28/16 normal biventricular structure and function.  Echo 6/1/20 EF 45%.  We added Toprol. Already on Losartan.  Felt poorly on the Toprol. Noted heaviness, possibly shortness of breath, thinks anxiety may have been related. This was discontinued.    Echo 10/8/20 EF 45%  Echo 11/21 EF 45%.     12/20/2022: Venogram: significant narrowing at the left brachiocephalic region, but still with forward flow.    10/26/2023: Successful generator change. Complex procedure, with unsuccessful placement of coronary sinus or left bundle branch area pacing lead    2/28/2024:   Doing well overall. Denies shortness of breath. Wanted to [place] back about the procedure and inability to place resynchronization therapy lead.  She also asked if she were to reach a point of being comfort care, would there be an option to turn off device. We discussed that yes this is always an option in that setting. Continue current settings and medications. F/u with monitoring as scheduled, with me in one year.    Update (10/24/2024):    Today she reports ongoing fatigue in the PM. No YORK, CP, palps, Lh, syncope reported. Says her BPs are normally a bit higher.    Device Interrogation (10/24/2024) reveals an intrinsic NSR with stable lead and device function. No arrhythmias or treated episodes were noted.  She paces 1.3% in the RA and 89.8% in the RV. Estimated battery longevity 11.7 years. PAV and ELLEN extended from 180 to 200 given intrinsic NSR.    I have personally  reviewed the patient's EKG today, which shows ASVP at 75bpm. CA interval is 164. QRS is 114. QTc is 446.    Relevant Cardiac Test Results:    2D Echo (8/16/2024):    Left Ventricle: The left ventricle is normal in size. Normal wall thickness. There is normal systolic function with a visually estimated ejection fraction of 55 - 60%. Biplane (2D) method of discs ejection fraction is 57%. Global longitudinal strain is -17.0%. There is normal diastolic function.    Right Ventricle: Normal right ventricular cavity size. Wall thickness is normal. Systolic function is normal. Pacemaker lead present in the ventricle.    Left Atrium: Left atrium is mildly dilated.    Right Atrium: Right atrium is mildly dilated.    Tricuspid Valve: There is moderate regurgitation.    Pulmonary Artery: The estimated pulmonary artery systolic pressure is 25 mmHg.    IVC/SVC: Normal venous pressure at 3 mmHg.    Current Outpatient Medications   Medication Sig    ALPRAZolam (XANAX) 0.25 MG tablet One-two tabs one hour before procedure then may repeat immediately before procedure    carvediloL (COREG) 3.125 MG tablet Take 1 tablet (3.125 mg total) by mouth 2 (two) times daily.    cetirizine (ZYRTEC) 10 MG tablet Take 1 tablet (10 mg total) by mouth once daily. for 7 days    clobetasoL (TEMOVATE) 0.05 % cream AAA scalp bid x 1 month    diphth,pertus,acell,,tetanus (BOOSTRIX) 2.5-8-5 Lf-mcg-Lf/0.5mL Susp Inject into the muscle.    estradioL (ESTRACE) 0.01 % (0.1 mg/gram) vaginal cream PLACE 1 GRAM VAGINALLY 2 TIMES A WEEK    gabapentin (NEURONTIN) 600 MG tablet Take 1.5 tablets (900 mg total) by mouth 3 (three) times daily.    losartan (COZAAR) 25 MG tablet TAKE 1 TABLET BY MOUTH TWICE DAILY    multivitamin capsule Take 1 capsule by mouth once daily.    naproxen (NAPROSYN) 250 MG tablet One daily as needed for arthritis pain    rosuvastatin (CRESTOR) 10 MG tablet TAKE 1 TABLET(10 MG) BY MOUTH EVERY DAY    senna (SENOKOT) 8.6 mg tablet Take 1 tablet  "by mouth once daily. Twice a day    triamcinolone acetonide 0.025% (KENALOG) 0.025 % cream Apply topically 2 (two) times daily. for 3 days    vitamin D 1000 units Tab Take 1,000 Units by mouth once daily.     Current Facility-Administered Medications   Medication    triamcinolone acetonide injection 10 mg     Facility-Administered Medications Ordered in Other Visits   Medication    ceFAZolin 2 g in dextrose 5 % in water (D5W) 50 mL IVPB (MB+)       Review of Systems   Constitutional: Positive for malaise/fatigue.   Cardiovascular:  Negative for chest pain, dyspnea on exertion, irregular heartbeat, leg swelling and palpitations.   Respiratory:  Negative for shortness of breath.    Hematologic/Lymphatic: Negative for bleeding problem.   Skin:  Negative for rash.   Musculoskeletal:  Negative for myalgias.   Gastrointestinal:  Negative for hematemesis, hematochezia and nausea.   Genitourinary:  Negative for hematuria.   Neurological:  Negative for light-headedness.   Psychiatric/Behavioral:  Negative for altered mental status.    Allergic/Immunologic: Negative for persistent infections.       Objective:          BP 98/60 (BP Location: Left arm, Patient Position: Sitting)   Pulse 75   Ht 5' 7" (1.702 m)   Wt 55.2 kg (121 lb 11.1 oz)   BMI 19.06 kg/m²     Physical Exam  Vitals and nursing note reviewed.   Constitutional:       Appearance: Normal appearance. She is well-developed.   HENT:      Head: Normocephalic.      Nose: Nose normal.   Eyes:      Pupils: Pupils are equal, round, and reactive to light.   Cardiovascular:      Rate and Rhythm: Normal rate and regular rhythm.   Pulmonary:      Effort: No respiratory distress.   Chest:      Comments: PPM in situ  Musculoskeletal:         General: Normal range of motion.   Skin:     General: Skin is warm and dry.      Findings: No erythema.   Neurological:      Mental Status: She is alert and oriented to person, place, and time.   Psychiatric:         Speech: Speech " normal.         Behavior: Behavior normal.           Lab Results   Component Value Date     08/12/2024    K 4.7 08/12/2024    BUN 20 08/12/2024    CREATININE 0.7 08/12/2024    ALT 23 08/12/2024    AST 24 08/12/2024    HGB 12.3 08/12/2024    HCT 37.4 08/12/2024    TSH 1.099 08/12/2024    LDLCALC 65.2 12/29/2023       Recent Labs   Lab 10/26/23  1249   INR 1.0       Assessment:     1. Cardiac pacemaker in situ    2. Cardiomyopathy, nonischemic    3. Atrioventricular block, complete    4. Anxiety    5. SARAI (obstructive sleep apnea)        Plan:     In summary, Ms. Aaron is a 74 y.o. female with AV block, NICM, PPM, anxiety here for follow up.   Ms. Aaron is doing well from a device perspective with stable lead and device function. No arrhythmia noted. 89% RV pacing.   Intrinsic NSR and adjustments made to device settings to reduce RV pacing (see above). No CHF symptoms but she does report fatigue. Echo 8/2024 shows recovery of LV function with EF 55-60%. Continue GDMT.    Continue current medication regimen and device settings.   Follow up in device clinic as scheduled.   Follow up in EP clinic in 6 mo, sooner as needed.    *A copy of this note has been sent to Dr. Colby*    Follow up in about 6 months (around 4/24/2025).    ------------------------------------------------------------------    RAMÓN Plata, NP-C  Cardiac Electrophysiology

## 2024-10-24 ENCOUNTER — HOSPITAL ENCOUNTER (OUTPATIENT)
Dept: CARDIOLOGY | Facility: CLINIC | Age: 74
Discharge: HOME | End: 2024-10-24
Payer: MEDICARE

## 2024-10-24 ENCOUNTER — OFFICE VISIT (OUTPATIENT)
Dept: ELECTROPHYSIOLOGY | Facility: CLINIC | Age: 74
End: 2024-10-24
Attending: INTERNAL MEDICINE
Payer: MEDICARE

## 2024-10-24 ENCOUNTER — CLINICAL SUPPORT (OUTPATIENT)
Dept: CARDIOLOGY | Facility: HOSPITAL | Age: 74
End: 2024-10-24
Attending: INTERNAL MEDICINE
Payer: MEDICARE

## 2024-10-24 VITALS
SYSTOLIC BLOOD PRESSURE: 98 MMHG | HEART RATE: 75 BPM | HEIGHT: 67 IN | WEIGHT: 121.69 LBS | DIASTOLIC BLOOD PRESSURE: 60 MMHG | BODY MASS INDEX: 19.1 KG/M2

## 2024-10-24 DIAGNOSIS — I44.30 AV BLOCK: ICD-10-CM

## 2024-10-24 DIAGNOSIS — I44.2 ATRIOVENTRICULAR BLOCK, COMPLETE: ICD-10-CM

## 2024-10-24 DIAGNOSIS — Z95.0 CARDIAC PACEMAKER IN SITU: Primary | ICD-10-CM

## 2024-10-24 DIAGNOSIS — I42.8 CARDIOMYOPATHY, NONISCHEMIC: ICD-10-CM

## 2024-10-24 DIAGNOSIS — G47.33 OSA (OBSTRUCTIVE SLEEP APNEA): ICD-10-CM

## 2024-10-24 DIAGNOSIS — F41.9 ANXIETY: ICD-10-CM

## 2024-10-24 LAB
OHS QRS DURATION: 114 MS
OHS QTC CALCULATION: 446 MS

## 2024-10-24 PROCEDURE — 99214 OFFICE O/P EST MOD 30 MIN: CPT | Mod: S$GLB,,, | Performed by: NURSE PRACTITIONER

## 2024-10-24 PROCEDURE — 1160F RVW MEDS BY RX/DR IN RCRD: CPT | Mod: CPTII,S$GLB,, | Performed by: NURSE PRACTITIONER

## 2024-10-24 PROCEDURE — 93280 PM DEVICE PROGR EVAL DUAL: CPT

## 2024-10-24 PROCEDURE — 1101F PT FALLS ASSESS-DOCD LE1/YR: CPT | Mod: CPTII,S$GLB,, | Performed by: NURSE PRACTITIONER

## 2024-10-24 PROCEDURE — 93010 ELECTROCARDIOGRAM REPORT: CPT | Mod: S$GLB,,, | Performed by: INTERNAL MEDICINE

## 2024-10-24 PROCEDURE — 99999 PR PBB SHADOW E&M-EST. PATIENT-LVL IV: CPT | Mod: PBBFAC,,, | Performed by: NURSE PRACTITIONER

## 2024-10-24 PROCEDURE — 1157F ADVNC CARE PLAN IN RCRD: CPT | Mod: CPTII,S$GLB,, | Performed by: NURSE PRACTITIONER

## 2024-10-24 PROCEDURE — 3288F FALL RISK ASSESSMENT DOCD: CPT | Mod: CPTII,S$GLB,, | Performed by: NURSE PRACTITIONER

## 2024-10-24 PROCEDURE — 3008F BODY MASS INDEX DOCD: CPT | Mod: CPTII,S$GLB,, | Performed by: NURSE PRACTITIONER

## 2024-10-24 PROCEDURE — 1126F AMNT PAIN NOTED NONE PRSNT: CPT | Mod: CPTII,S$GLB,, | Performed by: NURSE PRACTITIONER

## 2024-10-24 PROCEDURE — 3078F DIAST BP <80 MM HG: CPT | Mod: CPTII,S$GLB,, | Performed by: NURSE PRACTITIONER

## 2024-10-24 PROCEDURE — 93005 ELECTROCARDIOGRAM TRACING: CPT | Mod: S$GLB,,, | Performed by: INTERNAL MEDICINE

## 2024-10-24 PROCEDURE — 3074F SYST BP LT 130 MM HG: CPT | Mod: CPTII,S$GLB,, | Performed by: NURSE PRACTITIONER

## 2024-10-24 PROCEDURE — 4010F ACE/ARB THERAPY RXD/TAKEN: CPT | Mod: CPTII,S$GLB,, | Performed by: NURSE PRACTITIONER

## 2024-10-24 PROCEDURE — 1159F MED LIST DOCD IN RCRD: CPT | Mod: CPTII,S$GLB,, | Performed by: NURSE PRACTITIONER

## 2024-11-01 ENCOUNTER — DOCUMENTATION ONLY (OUTPATIENT)
Dept: ELECTROPHYSIOLOGY | Facility: CLINIC | Age: 74
End: 2024-11-01
Payer: MEDICARE

## 2024-11-01 ENCOUNTER — DOCUMENTATION ONLY (OUTPATIENT)
Dept: CARDIOLOGY | Facility: HOSPITAL | Age: 74
End: 2024-11-01
Payer: MEDICARE

## 2024-11-04 ENCOUNTER — HOSPITAL ENCOUNTER (OUTPATIENT)
Dept: RADIOLOGY | Facility: HOSPITAL | Age: 74
Discharge: HOME | End: 2024-11-04
Attending: INTERNAL MEDICINE
Payer: MEDICARE

## 2024-11-04 ENCOUNTER — OFFICE VISIT (OUTPATIENT)
Dept: INTERNAL MEDICINE | Facility: CLINIC | Age: 74
End: 2024-11-04
Payer: MEDICARE

## 2024-11-04 VITALS
HEIGHT: 67 IN | HEART RATE: 74 BPM | OXYGEN SATURATION: 99 % | WEIGHT: 119.19 LBS | DIASTOLIC BLOOD PRESSURE: 60 MMHG | SYSTOLIC BLOOD PRESSURE: 112 MMHG | BODY MASS INDEX: 18.71 KG/M2

## 2024-11-04 DIAGNOSIS — M85.80 OSTEOPENIA, UNSPECIFIED LOCATION: ICD-10-CM

## 2024-11-04 DIAGNOSIS — Z95.0 CARDIAC PACEMAKER IN SITU: ICD-10-CM

## 2024-11-04 DIAGNOSIS — Z23 NEED FOR VACCINATION: ICD-10-CM

## 2024-11-04 DIAGNOSIS — G62.9 NEUROPATHY: ICD-10-CM

## 2024-11-04 DIAGNOSIS — M81.8 OTHER OSTEOPOROSIS WITHOUT CURRENT PATHOLOGICAL FRACTURE: ICD-10-CM

## 2024-11-04 DIAGNOSIS — E78.5 HYPERLIPIDEMIA, UNSPECIFIED HYPERLIPIDEMIA TYPE: Primary | ICD-10-CM

## 2024-11-04 DIAGNOSIS — Z01.419 NORMAL GYNECOLOGIC EXAMINATION: ICD-10-CM

## 2024-11-04 PROCEDURE — 71046 X-RAY EXAM CHEST 2 VIEWS: CPT | Mod: 26,,, | Performed by: RADIOLOGY

## 2024-11-04 PROCEDURE — G0008 ADMIN INFLUENZA VIRUS VAC: HCPCS | Mod: S$GLB,,, | Performed by: INTERNAL MEDICINE

## 2024-11-04 PROCEDURE — 1101F PT FALLS ASSESS-DOCD LE1/YR: CPT | Mod: CPTII,S$GLB,, | Performed by: INTERNAL MEDICINE

## 2024-11-04 PROCEDURE — 1157F ADVNC CARE PLAN IN RCRD: CPT | Mod: CPTII,S$GLB,, | Performed by: INTERNAL MEDICINE

## 2024-11-04 PROCEDURE — 4010F ACE/ARB THERAPY RXD/TAKEN: CPT | Mod: CPTII,S$GLB,, | Performed by: INTERNAL MEDICINE

## 2024-11-04 PROCEDURE — 1126F AMNT PAIN NOTED NONE PRSNT: CPT | Mod: CPTII,S$GLB,, | Performed by: INTERNAL MEDICINE

## 2024-11-04 PROCEDURE — 90653 IIV ADJUVANT VACCINE IM: CPT | Mod: S$GLB,,, | Performed by: INTERNAL MEDICINE

## 2024-11-04 PROCEDURE — 3288F FALL RISK ASSESSMENT DOCD: CPT | Mod: CPTII,S$GLB,, | Performed by: INTERNAL MEDICINE

## 2024-11-04 PROCEDURE — 3078F DIAST BP <80 MM HG: CPT | Mod: CPTII,S$GLB,, | Performed by: INTERNAL MEDICINE

## 2024-11-04 PROCEDURE — 3008F BODY MASS INDEX DOCD: CPT | Mod: CPTII,S$GLB,, | Performed by: INTERNAL MEDICINE

## 2024-11-04 PROCEDURE — 99999 PR PBB SHADOW E&M-EST. PATIENT-LVL V: CPT | Mod: PBBFAC,,, | Performed by: INTERNAL MEDICINE

## 2024-11-04 PROCEDURE — 71046 X-RAY EXAM CHEST 2 VIEWS: CPT | Mod: TC,PO

## 2024-11-04 PROCEDURE — 3074F SYST BP LT 130 MM HG: CPT | Mod: CPTII,S$GLB,, | Performed by: INTERNAL MEDICINE

## 2024-11-04 PROCEDURE — 99214 OFFICE O/P EST MOD 30 MIN: CPT | Mod: S$GLB,,, | Performed by: INTERNAL MEDICINE

## 2024-11-04 RX ORDER — BUSPIRONE HYDROCHLORIDE 5 MG/1
5 TABLET ORAL 2 TIMES DAILY
Qty: 60 TABLET | Refills: 3 | Status: SHIPPED | OUTPATIENT
Start: 2024-11-04 | End: 2024-11-22

## 2024-11-04 RX ORDER — GABAPENTIN 600 MG/1
TABLET ORAL
Qty: 135 TABLET | Refills: 11 | Status: SHIPPED | OUTPATIENT
Start: 2024-11-04

## 2024-11-06 ENCOUNTER — LAB VISIT (OUTPATIENT)
Dept: LAB | Facility: HOSPITAL | Age: 74
End: 2024-11-06
Attending: INTERNAL MEDICINE
Payer: MEDICARE

## 2024-11-06 DIAGNOSIS — E78.5 HYPERLIPIDEMIA, UNSPECIFIED HYPERLIPIDEMIA TYPE: ICD-10-CM

## 2024-11-06 LAB
CHOLEST SERPL-MCNC: 162 MG/DL (ref 120–199)
CHOLEST/HDLC SERPL: 2 {RATIO} (ref 2–5)
HDLC SERPL-MCNC: 81 MG/DL (ref 40–75)
HDLC SERPL: 50 % (ref 20–50)
LDLC SERPL CALC-MCNC: 67.6 MG/DL (ref 63–159)
NONHDLC SERPL-MCNC: 81 MG/DL
TRIGL SERPL-MCNC: 67 MG/DL (ref 30–150)

## 2024-11-06 PROCEDURE — 80061 LIPID PANEL: CPT | Performed by: INTERNAL MEDICINE

## 2024-11-06 PROCEDURE — 36415 COLL VENOUS BLD VENIPUNCTURE: CPT | Performed by: INTERNAL MEDICINE

## 2024-11-07 ENCOUNTER — PATIENT MESSAGE (OUTPATIENT)
Dept: INTERNAL MEDICINE | Facility: CLINIC | Age: 74
End: 2024-11-07
Payer: MEDICARE

## 2024-11-08 ENCOUNTER — TELEPHONE (OUTPATIENT)
Dept: DERMATOLOGY | Facility: CLINIC | Age: 74
End: 2024-11-08
Payer: MEDICARE

## 2024-11-08 NOTE — TELEPHONE ENCOUNTER
----- Message from Alisia sent at 11/7/2024  3:23 PM CST -----  Regarding: Appt  Contact: Pt  [phone]  Pt is calling to schedule a appt states she has more scabs on scalp please call

## 2024-11-11 ENCOUNTER — HOSPITAL ENCOUNTER (OUTPATIENT)
Dept: RADIOLOGY | Facility: HOSPITAL | Age: 74
Discharge: HOME | End: 2024-11-11
Attending: INTERNAL MEDICINE
Payer: MEDICARE

## 2024-11-11 DIAGNOSIS — R41.3 OTHER AMNESIA: ICD-10-CM

## 2024-11-11 PROCEDURE — 70553 MRI BRAIN STEM W/O & W/DYE: CPT | Mod: 26,,, | Performed by: RADIOLOGY

## 2024-11-11 PROCEDURE — 70553 MRI BRAIN STEM W/O & W/DYE: CPT | Mod: TC

## 2024-11-11 PROCEDURE — 25500020 PHARM REV CODE 255: Performed by: INTERNAL MEDICINE

## 2024-11-11 PROCEDURE — A9585 GADOBUTROL INJECTION: HCPCS | Performed by: INTERNAL MEDICINE

## 2024-11-11 RX ORDER — GADOBUTROL 604.72 MG/ML
6 INJECTION INTRAVENOUS
Status: COMPLETED | OUTPATIENT
Start: 2024-11-11 | End: 2024-11-11

## 2024-11-11 RX ADMIN — GADOBUTROL 6 ML: 604.72 INJECTION INTRAVENOUS at 02:11

## 2024-11-13 ENCOUNTER — PATIENT MESSAGE (OUTPATIENT)
Dept: INTERNAL MEDICINE | Facility: CLINIC | Age: 74
End: 2024-11-13
Payer: MEDICARE

## 2024-11-13 NOTE — TELEPHONE ENCOUNTER
No care due was identified.  [facility] Embedded Care Due Messages. Reference number: [id number].   11/13/2024 9:47:30 AM CST

## 2024-11-22 ENCOUNTER — TELEPHONE (OUTPATIENT)
Dept: INTERNAL MEDICINE | Facility: CLINIC | Age: 74
End: 2024-11-22
Payer: MEDICARE

## 2024-11-22 RX ORDER — BUSPIRONE HYDROCHLORIDE 10 MG/1
10 TABLET ORAL 2 TIMES DAILY
Qty: 60 TABLET | Refills: 6 | Status: SHIPPED | OUTPATIENT
Start: 2024-11-22 | End: 2025-11-22

## 2024-11-25 ENCOUNTER — OFFICE VISIT (OUTPATIENT)
Dept: DERMATOLOGY | Facility: CLINIC | Age: 74
End: 2024-11-25
Payer: MEDICARE

## 2024-11-25 DIAGNOSIS — L98.8 EROSIVE PUSTULAR DERMATOSIS: Primary | ICD-10-CM

## 2024-11-25 PROCEDURE — 1160F RVW MEDS BY RX/DR IN RCRD: CPT | Mod: CPTII,S$GLB,, | Performed by: DERMATOLOGY

## 2024-11-25 PROCEDURE — G2211 COMPLEX E/M VISIT ADD ON: HCPCS | Mod: S$GLB,,, | Performed by: DERMATOLOGY

## 2024-11-25 PROCEDURE — 3288F FALL RISK ASSESSMENT DOCD: CPT | Mod: CPTII,S$GLB,, | Performed by: DERMATOLOGY

## 2024-11-25 PROCEDURE — 1159F MED LIST DOCD IN RCRD: CPT | Mod: CPTII,S$GLB,, | Performed by: DERMATOLOGY

## 2024-11-25 PROCEDURE — 4010F ACE/ARB THERAPY RXD/TAKEN: CPT | Mod: CPTII,S$GLB,, | Performed by: DERMATOLOGY

## 2024-11-25 PROCEDURE — 1126F AMNT PAIN NOTED NONE PRSNT: CPT | Mod: CPTII,S$GLB,, | Performed by: DERMATOLOGY

## 2024-11-25 PROCEDURE — 99214 OFFICE O/P EST MOD 30 MIN: CPT | Mod: S$GLB,,, | Performed by: DERMATOLOGY

## 2024-11-25 PROCEDURE — 1101F PT FALLS ASSESS-DOCD LE1/YR: CPT | Mod: CPTII,S$GLB,, | Performed by: DERMATOLOGY

## 2024-11-25 PROCEDURE — 99999 PR PBB SHADOW E&M-EST. PATIENT-LVL II: CPT | Mod: PBBFAC,,, | Performed by: DERMATOLOGY

## 2024-11-25 PROCEDURE — 1157F ADVNC CARE PLAN IN RCRD: CPT | Mod: CPTII,S$GLB,, | Performed by: DERMATOLOGY

## 2024-11-25 RX ORDER — CLOBETASOL PROPIONATE 0.5 MG/G
OINTMENT TOPICAL
Qty: 60 G | Refills: 0 | Status: SHIPPED | OUTPATIENT
Start: 2024-11-25

## 2024-11-25 NOTE — PROGRESS NOTES
Subjective:      Patient ID:  Jalyn Aaron is a 74 y.o. female who presents for   Chief Complaint   Patient presents with    Hair/Scalp Problem     Scalp f/u      History of Present Illness: The patient presents for follow up of scalp.    The patient was last seen on: 10/3/2024 for cryosurgery to actinic keratoses which have resolved and EPD - stable. Tried dermeleve for itching but did not help.    Pt here today for new scab on scalp present a couple weeks. Using Vaseline once a day.             Review of Systems   Skin:  Positive for daily sunscreen use, activity-related sunscreen use and wears hat (always). Negative for itching (sensitive), rash and recent sunburn.   Hematologic/Lymphatic: Bruises/bleeds easily.       Objective:   Physical Exam   Constitutional: She appears well-developed and well-nourished. No distress.   Neurological: She is alert and oriented to person, place, and time. She is not disoriented.   Psychiatric: She has a normal mood and affect.   Skin:   Areas Examined (abnormalities noted in diagram):   Scalp / Hair Palpated and Inspected            Diagram Legend     Erythematous scaling macule/papule c/w actinic keratosis       Vascular papule c/w angioma      Pigmented verrucoid papule/plaque c/w seborrheic keratosis      Yellow umbilicated papule c/w sebaceous hyperplasia      Irregularly shaped tan macule c/w lentigo     1-2 mm smooth white papules consistent with Milia      Movable subcutaneous cyst with punctum c/w epidermal inclusion cyst      Subcutaneous movable cyst c/w pilar cyst      Firm pink to brown papule c/w dermatofibroma      Pedunculated fleshy papule(s) c/w skin tag(s)      Evenly pigmented macule c/w junctional nevus     Mildly variegated pigmented, slightly irregular-bordered macule c/w mildly atypical nevus      Flesh colored to evenly pigmented papule c/w intradermal nevus       Pink pearly papule/plaque c/w basal cell carcinoma      Erythematous  hyperkeratotic cursted plaque c/w SCC      Surgical scar with no sign of skin cancer recurrence      Open and closed comedones      Inflammatory papules and pustules      Verrucoid papule consistent consistent with wart     Erythematous eczematous patches and plaques     Dystrophic onycholytic nail with subungual debris c/w onychomycosis     Umbilicated papule    Erythematous-base heme-crusted tan verrucoid plaque consistent with inflamed seborrheic keratosis     Erythematous Silvery Scaling Plaque c/w Psoriasis     See annotation      Assessment / Plan:        Erosive pustular dermatosis  -     clobetasol 0.05% (TEMOVATE) 0.05 % Oint; AAA scalp bid x 1 month - after warm water compress  Dispense: 60 g; Refill: 0             No follow-ups on file.

## 2024-11-27 RX ORDER — BUSPIRONE HYDROCHLORIDE 5 MG/1
5 TABLET ORAL 2 TIMES DAILY
Qty: 60 TABLET | Refills: 3 | OUTPATIENT
Start: 2024-11-27 | End: 2025-11-27

## 2024-11-29 ENCOUNTER — CLINICAL SUPPORT (OUTPATIENT)
Dept: CARDIOLOGY | Facility: HOSPITAL | Age: 74
End: 2024-11-29

## 2024-11-29 ENCOUNTER — CLINICAL SUPPORT (OUTPATIENT)
Dept: CARDIOLOGY | Facility: HOSPITAL | Age: 74
End: 2024-11-29
Attending: INTERNAL MEDICINE
Payer: MEDICARE

## 2024-11-29 DIAGNOSIS — I44.2 ATRIOVENTRICULAR BLOCK, COMPLETE: ICD-10-CM

## 2024-11-29 DIAGNOSIS — Z95.0 PRESENCE OF CARDIAC PACEMAKER: ICD-10-CM

## 2024-11-29 PROCEDURE — 93294 REM INTERROG EVL PM/LDLS PM: CPT | Mod: ,,, | Performed by: INTERNAL MEDICINE

## 2024-11-29 PROCEDURE — 93296 REM INTERROG EVL PM/IDS: CPT | Performed by: INTERNAL MEDICINE

## 2024-12-02 NOTE — PROGRESS NOTES
Subjective:       Patient ID: Jalyn Aaron is a 74 y.o. female.    Chief Complaint: Follow-up    Follow-up  Pertinent negatives include no abdominal pain, chest pain (arm pain or jaw pain), headaches, nausea or vomiting.   SHe is feeling better overall since pacer battery replaced.  Still has some anxiety.  No CP or SOB.  Review of Systems   Respiratory:  Negative for shortness of breath (PND or orthopnea).    Cardiovascular:  Negative for chest pain (arm pain or jaw pain).   Gastrointestinal:  Negative for abdominal pain, diarrhea, nausea and vomiting.   Genitourinary:  Negative for dysuria.   Neurological:  Negative for seizures, syncope and headaches.       Objective:      Physical Exam  Constitutional:       General: She is not in acute distress.     Appearance: She is well-developed.   HENT:      Head: Normocephalic.   Eyes:      Pupils: Pupils are equal, round, and reactive to light.   Neck:      Thyroid: No thyromegaly.      Vascular: No JVD.   Cardiovascular:      Rate and Rhythm: Normal rate and regular rhythm.      Heart sounds: Normal heart sounds. No murmur heard.     No friction rub. No gallop.   Pulmonary:      Effort: Pulmonary effort is normal.      Breath sounds: Normal breath sounds. No wheezing or rales.   Abdominal:      General: Bowel sounds are normal. There is no distension.      Palpations: Abdomen is soft. There is no mass.      Tenderness: There is no abdominal tenderness. There is no guarding or rebound.   Musculoskeletal:      Cervical back: Neck supple.   Lymphadenopathy:      Cervical: No cervical adenopathy.   Skin:     General: Skin is warm and dry.   Neurological:      Mental Status: She is alert and oriented to person, place, and time.      Deep Tendon Reflexes: Reflexes are normal and symmetric.   Psychiatric:         Behavior: Behavior normal.         Thought Content: Thought content normal.         Judgment: Judgment normal.         Assessment:       1.  Hyperlipidemia, unspecified hyperlipidemia type    2. Neuropathy    3. Normal gynecologic examination    4. Osteopenia, unspecified location    5. Cardiac pacemaker in situ    6. Other osteoporosis without current pathological fracture    7. Need for vaccination        Plan:   Hyperlipidemia, unspecified hyperlipidemia type  -     LIPID PANEL; Future; Expected date: 11/04/2024    Neuropathy  -     gabapentin (NEURONTIN) 600 MG tablet; Half in the AM and 2 in the PM  Dispense: 135 tablet; Refill: 11    Normal gynecologic examination  -     Ambulatory referral/consult to Gynecology; Future; Expected date: 11/11/2024    Osteopenia, unspecified location  -     DXA Bone Density Axial Skeleton 1 or more sites; Future; Expected date: 11/04/2024    Cardiac pacemaker in situ  -     X-Ray Chest PA And Lateral; Future; Expected date: 11/04/2024    Other osteoporosis without current pathological fracture  -     DXA Bone Density Axial Skeleton 1 or more sites; Future; Expected date: 11/04/2024    Need for vaccination  -     Influenza - Trivalent (Adjuvanted)    Other orders  -     Start busPIRone (BUSPAR) 5 MG Tab; Take 1 tablet (5 mg total) by mouth 2 (two) times daily.  Dispense: 60 tablet; Refill: 3

## 2024-12-11 ENCOUNTER — PATIENT MESSAGE (OUTPATIENT)
Dept: INTERNAL MEDICINE | Facility: CLINIC | Age: 74
End: 2024-12-11
Payer: MEDICARE

## 2024-12-11 ENCOUNTER — TELEPHONE (OUTPATIENT)
Dept: INTERNAL MEDICINE | Facility: CLINIC | Age: 74
End: 2024-12-11
Payer: MEDICARE

## 2024-12-11 NOTE — TELEPHONE ENCOUNTER
----- Message from Qing sent at 12/11/2024  9:38 AM CST -----  Contact: [phone]  Would like to receive medical advice.    Would they like a call back or a response via [facility]:  call back     Additional information:  Pt is asking for recommendations for a new cardiologist.

## 2024-12-22 DIAGNOSIS — I42.8 OTHER CARDIOMYOPATHY: ICD-10-CM

## 2024-12-23 ENCOUNTER — OFFICE VISIT (OUTPATIENT)
Dept: DERMATOLOGY | Facility: CLINIC | Age: 74
End: 2024-12-23
Payer: MEDICARE

## 2024-12-23 DIAGNOSIS — L98.8 EROSIVE PUSTULAR DERMATOSIS: ICD-10-CM

## 2024-12-23 DIAGNOSIS — D48.5 NEOPLASM OF UNCERTAIN BEHAVIOR OF SKIN: Primary | ICD-10-CM

## 2024-12-23 PROCEDURE — 1157F ADVNC CARE PLAN IN RCRD: CPT | Mod: CPTII,S$GLB,, | Performed by: DERMATOLOGY

## 2024-12-23 PROCEDURE — 11102 TANGNTL BX SKIN SINGLE LES: CPT | Mod: S$GLB,,, | Performed by: DERMATOLOGY

## 2024-12-23 PROCEDURE — 3288F FALL RISK ASSESSMENT DOCD: CPT | Mod: CPTII,S$GLB,, | Performed by: DERMATOLOGY

## 2024-12-23 PROCEDURE — 99214 OFFICE O/P EST MOD 30 MIN: CPT | Mod: 25,S$GLB,, | Performed by: DERMATOLOGY

## 2024-12-23 PROCEDURE — 1159F MED LIST DOCD IN RCRD: CPT | Mod: CPTII,S$GLB,, | Performed by: DERMATOLOGY

## 2024-12-23 PROCEDURE — 1125F AMNT PAIN NOTED PAIN PRSNT: CPT | Mod: CPTII,S$GLB,, | Performed by: DERMATOLOGY

## 2024-12-23 PROCEDURE — 1101F PT FALLS ASSESS-DOCD LE1/YR: CPT | Mod: CPTII,S$GLB,, | Performed by: DERMATOLOGY

## 2024-12-23 PROCEDURE — 1160F RVW MEDS BY RX/DR IN RCRD: CPT | Mod: CPTII,S$GLB,, | Performed by: DERMATOLOGY

## 2024-12-23 PROCEDURE — 4010F ACE/ARB THERAPY RXD/TAKEN: CPT | Mod: CPTII,S$GLB,, | Performed by: DERMATOLOGY

## 2024-12-23 PROCEDURE — 99999 PR PBB SHADOW E&M-EST. PATIENT-LVL III: CPT | Mod: PBBFAC,,, | Performed by: DERMATOLOGY

## 2024-12-23 PROCEDURE — 88305 TISSUE EXAM BY PATHOLOGIST: CPT | Performed by: DERMATOLOGY

## 2024-12-23 RX ORDER — LOSARTAN POTASSIUM 25 MG/1
TABLET ORAL
Qty: 180 TABLET | Refills: 3 | Status: SHIPPED | OUTPATIENT
Start: 2024-12-23

## 2024-12-23 NOTE — PROGRESS NOTES
Subjective:      Patient ID:  Jalyn Aaron is a 74 y.o. female who presents for   Chief Complaint   Patient presents with    Follow-up     scalp     History of Present Illness: The patient presents for follow up to scalp for EPD, which pt states has improved and denies any current use of Dermeleve for itching.     The patient was last seen on: 11/25/24 for EPD. Pt states she tx area with clobetasol oint bid to scalp, started using 1x day few days ago    Continues to be very tender.         Review of Systems   Skin:  Positive for daily sunscreen use, activity-related sunscreen use and wears hat (always). Negative for itching (sensitive), rash and recent sunburn.   Hematologic/Lymphatic: Bruises/bleeds easily.       Objective:   Physical Exam   Constitutional: She appears well-developed and well-nourished. No distress.   Neurological: She is alert and oriented to person, place, and time. She is not disoriented.   Psychiatric: She has a normal mood and affect.   Skin:   Areas Examined (abnormalities noted in diagram):   Scalp / Hair Palpated and Inspected  RLE Inspected                 Diagram Legend     Erythematous scaling macule/papule c/w actinic keratosis       Vascular papule c/w angioma      Pigmented verrucoid papule/plaque c/w seborrheic keratosis      Yellow umbilicated papule c/w sebaceous hyperplasia      Irregularly shaped tan macule c/w lentigo     1-2 mm smooth white papules consistent with Milia      Movable subcutaneous cyst with punctum c/w epidermal inclusion cyst      Subcutaneous movable cyst c/w pilar cyst      Firm pink to brown papule c/w dermatofibroma      Pedunculated fleshy papule(s) c/w skin tag(s)      Evenly pigmented macule c/w junctional nevus     Mildly variegated pigmented, slightly irregular-bordered macule c/w mildly atypical nevus      Flesh colored to evenly pigmented papule c/w intradermal nevus       Pink pearly papule/plaque c/w basal cell carcinoma       Erythematous hyperkeratotic cursted plaque c/w SCC      Surgical scar with no sign of skin cancer recurrence      Open and closed comedones      Inflammatory papules and pustules      Verrucoid papule consistent consistent with wart     Erythematous eczematous patches and plaques     Dystrophic onycholytic nail with subungual debris c/w onychomycosis     Umbilicated papule    Erythematous-base heme-crusted tan verrucoid plaque consistent with inflamed seborrheic keratosis     Erythematous Silvery Scaling Plaque c/w Psoriasis     See annotation            Assessment / Plan:      Pathology Orders:       Normal Orders This Visit    Specimen to Pathology, Dermatology     Questions:    Procedure Type: Dermatology and skin neoplasms    Number of Specimens: 1    ------------------------: -------------------------    Spec 1 Procedure: Biopsy    Spec 1 Clinical Impression: r/o atypical nevus    Spec 1 Source: right lateral thigh    Release to patient: Immediate    Release to patient:           Neoplasm of uncertain behavior of skin  -     Specimen to Pathology, Dermatology    Shave biopsy procedure note:    Shave biopsy performed after verbal consent including risk of infection, scar, recurrence, need for additional treatment of site. Area prepped with alcohol, anesthetized with approximately 1.0cc of 1% lidocaine with epinephrine. Lesional tissue shaved with razor blade. Hemostasis achieved with application of aluminum chloride followed by hyfrecation. No complications. Dressing applied. Wound care explained.      Erosive pustular dermatosis - improved  Cont clobetasol ointment qday x 1 - 2 months             No follow-ups on file.

## 2024-12-23 NOTE — PATIENT INSTRUCTIONS
Shave Biopsy Wound Care    Your doctor has performed a shave biopsy today.  A band aid and vaseline ointment has been placed over the site.  This should remain in place for NO LONGER THAN 48 hours.  It is fine to remove the bandaid after 24 hours, if the area is no longer bleeding. It is recommended that you keep the area dry (do not wet)) for the first 24 hours.  After 24 hours, wash the area with warm soap and water and apply Vaseline jelly.  Many patients prefer to use Neosporin or Bacitracin ointment.  This is acceptable; however, know that you can develop an allergy to this medication even if you have used it safely for years.  It is important to keep the area moist.  Letting it dry out and get air slows healing time, and will worsen the scar.        If you notice increasing redness, tenderness, pain, or yellow drainage at the biopsy site, please notify your doctor.  These are signs of an infection.    If your biopsy site is bleeding, apply firm pressure for 15 minutes straight.  Repeat for another 15 minutes, if it is still bleeding.   If the surgical site continues to bleed, then please contact your doctor.      For [facility] users:   You will receive your biopsy results in [facility] as soon as they are available. Please be assured that your physician/provider will review your results and will then determine what further treatment, evaluation, or planning is required. You should be contacted by your physician's/provider's office within 5 business days of receiving your results; If not, please reach out to directly. This is one more way [facility] is putting you first.     [facility] [place] [address]/ [phone] FAX/ [email or web address]

## 2024-12-26 LAB
FINAL PATHOLOGIC DIAGNOSIS: NORMAL
GROSS: NORMAL
Lab: NORMAL
MICROSCOPIC EXAM: NORMAL

## 2024-12-27 ENCOUNTER — PATIENT MESSAGE (OUTPATIENT)
Dept: DERMATOLOGY | Facility: CLINIC | Age: 74
End: 2024-12-27
Payer: MEDICARE

## 2024-12-30 ENCOUNTER — TELEPHONE (OUTPATIENT)
Dept: CARDIOLOGY | Facility: CLINIC | Age: 74
End: 2024-12-30
Payer: MEDICARE

## 2024-12-30 NOTE — TELEPHONE ENCOUNTER
Returned pt's call but no answer - Left msg on her vm. Will call again.    Stanley Reeves Staff  Caller: Unspecified (4 days ago,  8:27 AM)  Patient is calling to schedule Echo, but also have some questions regarding a FU appointment. She can be reached [phone].        Thank you

## 2024-12-30 NOTE — TELEPHONE ENCOUNTER
----- Message from Stanley sent at 12/26/2024  8:27 AM CST -----  Patient is calling to schedule Echo, but also have some questions regarding a FU appointment. She can be reached [phone].        Thank you

## 2025-01-03 ENCOUNTER — PATIENT MESSAGE (OUTPATIENT)
Dept: OPTOMETRY | Facility: CLINIC | Age: 75
End: 2025-01-03
Payer: MEDICARE

## 2025-01-03 ENCOUNTER — OFFICE VISIT (OUTPATIENT)
Dept: INTERNAL MEDICINE | Facility: CLINIC | Age: 75
End: 2025-01-03
Payer: MEDICARE

## 2025-01-03 ENCOUNTER — TELEPHONE (OUTPATIENT)
Dept: OPTOMETRY | Facility: CLINIC | Age: 75
End: 2025-01-03
Payer: MEDICARE

## 2025-01-03 VITALS
DIASTOLIC BLOOD PRESSURE: 60 MMHG | WEIGHT: 119.06 LBS | HEIGHT: 67 IN | RESPIRATION RATE: 14 BRPM | HEART RATE: 68 BPM | SYSTOLIC BLOOD PRESSURE: 102 MMHG | TEMPERATURE: 97 F | OXYGEN SATURATION: 98 % | BODY MASS INDEX: 18.69 KG/M2

## 2025-01-03 DIAGNOSIS — I44.2 ATRIOVENTRICULAR BLOCK, COMPLETE: ICD-10-CM

## 2025-01-03 DIAGNOSIS — R41.3 MEMORY LOSS: ICD-10-CM

## 2025-01-03 DIAGNOSIS — F41.9 ANXIETY: Primary | ICD-10-CM

## 2025-01-03 DIAGNOSIS — Z12.31 SCREENING MAMMOGRAM, ENCOUNTER FOR: ICD-10-CM

## 2025-01-03 DIAGNOSIS — I42.8 CARDIOMYOPATHY, NONISCHEMIC: ICD-10-CM

## 2025-01-03 PROCEDURE — 3078F DIAST BP <80 MM HG: CPT | Mod: CPTII,S$GLB,, | Performed by: INTERNAL MEDICINE

## 2025-01-03 PROCEDURE — 3008F BODY MASS INDEX DOCD: CPT | Mod: CPTII,S$GLB,, | Performed by: INTERNAL MEDICINE

## 2025-01-03 PROCEDURE — 99999 PR PBB SHADOW E&M-EST. PATIENT-LVL V: CPT | Mod: PBBFAC,,, | Performed by: INTERNAL MEDICINE

## 2025-01-03 PROCEDURE — 3074F SYST BP LT 130 MM HG: CPT | Mod: CPTII,S$GLB,, | Performed by: INTERNAL MEDICINE

## 2025-01-03 PROCEDURE — 1159F MED LIST DOCD IN RCRD: CPT | Mod: CPTII,S$GLB,, | Performed by: INTERNAL MEDICINE

## 2025-01-03 PROCEDURE — 99214 OFFICE O/P EST MOD 30 MIN: CPT | Mod: S$GLB,,, | Performed by: INTERNAL MEDICINE

## 2025-01-03 PROCEDURE — 1157F ADVNC CARE PLAN IN RCRD: CPT | Mod: CPTII,S$GLB,, | Performed by: INTERNAL MEDICINE

## 2025-01-03 PROCEDURE — 1126F AMNT PAIN NOTED NONE PRSNT: CPT | Mod: CPTII,S$GLB,, | Performed by: INTERNAL MEDICINE

## 2025-01-03 RX ORDER — MEMANTINE HYDROCHLORIDE 5 MG-10 MG
KIT ORAL
Qty: 1 PACKET | Refills: 0 | Status: SHIPPED | OUTPATIENT
Start: 2025-01-03 | End: 2026-01-03

## 2025-01-03 RX ORDER — BUSPIRONE HYDROCHLORIDE 15 MG/1
15 TABLET ORAL 2 TIMES DAILY
Qty: 60 TABLET | Refills: 3 | Status: SHIPPED | OUTPATIENT
Start: 2025-01-03 | End: 2026-01-03

## 2025-01-03 RX ORDER — MEMANTINE HYDROCHLORIDE 10 MG/1
10 TABLET ORAL 2 TIMES DAILY
Qty: 60 TABLET | Refills: 3 | Status: SHIPPED | OUTPATIENT
Start: 2025-01-03 | End: 2026-01-03

## 2025-01-06 NOTE — PROGRESS NOTES
Subjective:       Patient ID: Jalyn Aaron is a 74 y.o. female.    Chief Complaint: Follow-up    Follow-up  Pertinent negatives include no abdominal pain, chest pain (arm pain or jaw pain), headaches, nausea or vomiting.    She is having increased anxiety.  Some memory issues.  No CP or SOB.  Review of Systems   Respiratory:  Negative for shortness of breath (PND or orthopnea).    Cardiovascular:  Negative for chest pain (arm pain or jaw pain).   Gastrointestinal:  Negative for abdominal pain, diarrhea, nausea and vomiting.   Genitourinary:  Negative for dysuria.   Neurological:  Negative for seizures, syncope and headaches.       Objective:      Physical Exam  Constitutional:       General: She is not in acute distress.     Appearance: She is well-developed.   HENT:      Head: Normocephalic.   Eyes:      Pupils: Pupils are equal, round, and reactive to light.   Neck:      Thyroid: No thyromegaly.      Vascular: No JVD.   Cardiovascular:      Rate and Rhythm: Normal rate and regular rhythm.      Heart sounds: Normal heart sounds. No murmur heard.     No friction rub. No gallop.   Pulmonary:      Effort: Pulmonary effort is normal.      Breath sounds: Normal breath sounds. No wheezing or rales.   Abdominal:      General: Bowel sounds are normal. There is no distension.      Palpations: Abdomen is soft. There is no mass.      Tenderness: There is no abdominal tenderness. There is no guarding or rebound.   Musculoskeletal:      Cervical back: Neck supple.   Lymphadenopathy:      Cervical: No cervical adenopathy.   Skin:     General: Skin is warm and dry.   Neurological:      Mental Status: She is alert and oriented to person, place, and time.      Deep Tendon Reflexes: Reflexes are normal and symmetric.   Psychiatric:         Behavior: Behavior normal.         Thought Content: Thought content normal.         Judgment: Judgment normal.         Assessment:       1. Anxiety    2. Memory loss    3. Screening  mammogram, encounter for    4. Cardiomyopathy, nonischemic    5. Atrioventricular block, complete        Plan:   Anxiety  -     Ambulatory referral/consult to Psychiatry; Future; Expected date: 01/10/2025  -     Ambulatory referral/consult to Psychology; Future; Expected date: 01/10/2025  Increase buspar  Memory loss  -     Ambulatory referral/consult to Neurology; Future; Expected date: 01/10/2025  -     Neuropsychological testing; Future  Start memantine  Screening mammogram, encounter for  -     Mammo Digital Screening Bilat w/ Tito; Future; Expected date: 07/03/2025    Cardiomyopathy, nonischemic  Controlled - continue current meds    Atrioventricular block, complete  Has pacer  Other orders  -     memantine (NAMENDA TITRATION PACK) tablet pack; Follow package directions.  Dispense: 1 packet; Refill: 0  -     busPIRone (BUSPAR) 15 MG tablet; Take 1 tablet (15 mg total) by mouth 2 (two) times daily.  Dispense: 60 tablet; Refill: 3  -     memantine (NAMENDA) 10 MG Tab; Take 1 tablet (10 mg total) by mouth 2 (two) times daily.  Dispense: 60 tablet; Refill: 3

## 2025-01-08 ENCOUNTER — PATIENT MESSAGE (OUTPATIENT)
Dept: CARDIOLOGY | Facility: CLINIC | Age: 75
End: 2025-01-08
Payer: MEDICARE

## 2025-01-08 ENCOUNTER — OFFICE VISIT (OUTPATIENT)
Dept: OBSTETRICS AND GYNECOLOGY | Facility: CLINIC | Age: 75
End: 2025-01-08
Payer: MEDICARE

## 2025-01-08 VITALS
DIASTOLIC BLOOD PRESSURE: 63 MMHG | SYSTOLIC BLOOD PRESSURE: 111 MMHG | WEIGHT: 121 LBS | BODY MASS INDEX: 18.99 KG/M2 | HEART RATE: 65 BPM | HEIGHT: 67 IN

## 2025-01-08 DIAGNOSIS — Z12.4 PAP SMEAR FOR CERVICAL CANCER SCREENING: ICD-10-CM

## 2025-01-08 DIAGNOSIS — Z01.419 ENCOUNTER FOR WELL WOMAN EXAM WITH ROUTINE GYNECOLOGICAL EXAM: Primary | ICD-10-CM

## 2025-01-08 DIAGNOSIS — Z12.31 VISIT FOR SCREENING MAMMOGRAM: ICD-10-CM

## 2025-01-08 DIAGNOSIS — N95.2 VAGINAL ATROPHY: ICD-10-CM

## 2025-01-08 PROCEDURE — 99999 PR PBB SHADOW E&M-EST. PATIENT-LVL IV: CPT | Mod: PBBFAC,,, | Performed by: PHYSICIAN ASSISTANT

## 2025-01-08 RX ORDER — ESTRADIOL 10 UG/1
INSERT VAGINAL
Qty: 8 TABLET | Refills: 11 | Status: SHIPPED | OUTPATIENT
Start: 2025-01-08

## 2025-01-08 NOTE — PROGRESS NOTES
CC: Well woman exam    Jalyn Aaron is a 74 y.o. female  presents for well woman exam.  LMP: No LMP recorded. Patient has had a hysterectomy.  No issues, problems, or complaints.  Intrarosa was not covered by her insurance so using estrace cream. Messy and does not like as much, so using only about 2x per month. She does have some dryness. Sexually active with new  of about 5 years.     Mammogram: 2024 TC 10.99 %   Pap: 10/10/2023 negative, + other HPV  (with pcp)  PCP:  Annemarie Kilgore MD   Routine Screening Labs: 2024  Colonoscopy: 2024 repeat in 10 years  DEXA: 2023 osteopenia     Past Medical History:   Diagnosis Date    Arthritis     Atypical ductal hyperplasia, breast 2013    Left    AV block     exercised induced 2:1    Cataract     Fever blister     Heart block     History of acne     Joint pain     hands    Keratoconjunctivitis sicca not due to Sjogren's syndrome     Pacemaker     Sleep apnea, unspecified      Past Surgical History:   Procedure Laterality Date    BREAST BIOPSY Left 2013    papilloma with atypia on core biopsy    BREAST BIOPSY Left 2014    Ex.Bx., removal of ADH/Papilloma    COLONOSCOPY N/A 2016    Procedure: COLONOSCOPY;  Surgeon: Dl Caruso MD;  Location: [facility] (4TH FLR);  Service: Endoscopy;  Laterality: N/A;  Pacemaker in place.    COLONOSCOPY N/A 3/30/2021    Procedure: COLONOSCOPY;  Surgeon: Joaquin Johnson MD;  Location: [facility] (4TH FLR);  Service: Endoscopy;  Laterality: N/A;  prep ins. emailed - COVID screening on 3/27/21 PCW - ERW    ENDOSCOPIC ULTRASOUND OF UPPER GASTROINTESTINAL TRACT N/A 10/9/2023    Procedure: ULTRASOUND, UPPER GI TRACT, ENDOSCOPIC;  Surgeon: Stacey Sesay MD;  Location: [facility] (2ND FLR);  Service: Endoscopy;  Laterality: N/A;  pancreas cyst 7mm on CT (r/o nodule or worrisome features)    23-Instructions via portal-DS  10/2-precall complete-MS    ESOPHAGOGASTRODUODENOSCOPY N/A  3/19/2024    Procedure: EGD (ESOPHAGOGASTRODUODENOSCOPY);  Surgeon: Mynor Watts MD;  Location: [facility] ENDO ([address] [place] [address]);  Service: Endoscopy;  Laterality: N/A;  moved to 2nd floor endoscopy per recommendations of crna  pt has    HYSTERECTOMY      IMPLANTATION OF BIVENTRICULAR PERMANENT PACEMAKER AS UPGRADE TO EXISTING PACEMAKER N/A 10/26/2023    Procedure: Biventricular pacemaker upgrade;  Surgeon: Chacho Colby MD;  Location: [facility] EP LAB;  Service: Cardiology;  Laterality: N/A;  CHB, CRT-P upgrade, MDT, MAC, SK, 3 Prep *MDT DC PPM in situ*    INSERT / REPLACE / REMOVE PACEMAKER      LASIK      LASIK Bilateral 2009    VENOGRAM, EP LAB N/A 12/20/2022    Procedure: Venogram, EP Lab;  Surgeon: Chacho Colby MD;  Location: [facility] EP LAB;  Service: Cardiology;  Laterality: N/A;  NICM, Venogram- left side, SK, 3 Prep *MDT PPM in situ*     Social History     Socioeconomic History    Marital status:     Number of children: 1   Occupational History    Occupation:      Employer: [employer]   Tobacco Use    Smoking status: Never     Passive exposure: Never    Smokeless tobacco: Never   Substance and Sexual Activity    Alcohol use: Not Currently     Alcohol/week: 1.0 standard drink of alcohol     Types: 1 Glasses of wine per week     Comment: weekly    Drug use: No    Sexual activity: Not Currently     Partners: Male     Birth control/protection: None   Other Topics Concern    Are you pregnant or think you may be? No    Breast-feeding No   Social History Narrative     (case management) at [employer]     Social Drivers of Health     Financial Resource Strain: Low Risk  (1/6/2024)    Overall Financial Resource Strain (CARDIA)     Difficulty of Paying Living Expenses: Not hard at all   Food Insecurity: No Food Insecurity (1/6/2024)    Hunger Vital Sign     Worried About Running Out of Food in the Last Year: Never true     Ran Out of Food in the Last Year: Never true   Transportation Needs: No  Transportation Needs (2024)    PRAPARE - Transportation     Lack of Transportation (Medical): No     Lack of Transportation (Non-Medical): No   Physical Activity: Insufficiently Active (2024)    Exercise Vital Sign     Days of Exercise per Week: 5 days     Minutes of Exercise per Session: 20 min   Stress: No Stress Concern Present (2024)    [employer] [employer] - Occupational Stress Questionnaire     Feeling of Stress : Not at all   Housing Stability: Low Risk  (2024)    Housing Stability Vital Sign     Unable to Pay for Housing in the Last Year: No     Number of Places Lived in the Last Year: 1     Unstable Housing in the Last Year: No     Family History   Problem Relation Name Age of Onset    Breast cancer Mother          Paget's dz    Cancer Mother  80        pagets    Cataracts Mother      Hypertension Mother      Multiple sclerosis Father Devyn Love     Multiple sclerosis Sister Lulu Pratt     No Known Problems Maternal Aunt      No Known Problems Maternal Uncle      No Known Problems Paternal Aunt      No Known Problems Paternal Uncle      No Known Problems Maternal Grandmother      Prostate cancer Maternal Grandfather      No Known Problems Paternal Grandmother      No Known Problems Paternal Grandfather      No Known Problems Brother      No Known Problems Son      No Known Problems Son      Amblyopia Neg Hx      Blindness Neg Hx      Diabetes Neg Hx      Glaucoma Neg Hx      Macular degeneration Neg Hx      Retinal detachment Neg Hx      Strabismus Neg Hx      Stroke Neg Hx      Thyroid disease Neg Hx      Melanoma Neg Hx      Ovarian cancer Neg Hx       OB History          2    Para   2    Term   2            AB        Living             SAB        IAB        Ectopic        Multiple        Live Births                     Current Outpatient Medications:     busPIRone (BUSPAR) 15 MG tablet, Take 1 tablet (15 mg total) by mouth 2 (two) times daily.,  Disp: 60 tablet, Rfl: 3    carvediloL (COREG) 3.125 MG tablet, Take 1 tablet (3.125 mg total) by mouth 2 (two) times daily., Disp: 180 tablet, Rfl: 3    clobetasol 0.05% (TEMOVATE) 0.05 % Oint, AAA scalp bid x 1 month - after warm water compress, Disp: 60 g, Rfl: 0    estradioL (VAGIFEM) 10 mcg Tab, Place 1 suppository vaginally at night BIW, Disp: 8 tablet, Rfl: 11    gabapentin (NEURONTIN) 600 MG tablet, Half in the AM and 2 in the PM, Disp: 135 tablet, Rfl: 11    losartan (COZAAR) 25 MG tablet, TAKE 1 TABLET BY MOUTH TWICE DAILY, Disp: 180 tablet, Rfl: 3    memantine (NAMENDA TITRATION PACK) tablet pack, Follow package directions., Disp: 1 packet, Rfl: 0    memantine (NAMENDA) 10 MG Tab, Take 1 tablet (10 mg total) by mouth 2 (two) times daily., Disp: 60 tablet, Rfl: 3    multivitamin capsule, Take 1 capsule by mouth once daily., Disp: , Rfl:     naproxen (NAPROSYN) 250 MG tablet, One daily as needed for arthritis pain, Disp: 30 tablet, Rfl: 1    rosuvastatin (CRESTOR) 10 MG tablet, TAKE 1 TABLET(10 MG) BY MOUTH EVERY DAY, Disp: 90 tablet, Rfl: 3    senna (SENOKOT) 8.6 mg tablet, Take 1 tablet by mouth once daily. Twice a day, Disp: , Rfl:     triamcinolone acetonide 0.025% (KENALOG) 0.025 % cream, Apply topically 2 (two) times daily. for 3 days (Patient not taking: Reported on 10/24/2024), Disp: 80 g, Rfl: 0    vitamin D 1000 units Tab, Take 1,000 Units by mouth once daily., Disp: , Rfl:     Current Facility-Administered Medications:     triamcinolone acetonide injection 10 mg, 10 mg, Intradermal, Once, Christel Ward MD    Facility-Administered Medications Ordered in Other Visits:     ceFAZolin 2 g in dextrose 5 % in water (D5W) 50 mL IVPB (MB+), 2 g, Intravenous, On Call Procedure, Chacho Colby MD    The 10-year ASCVD risk score (Fabio FERNANDEZ, et al., 2019) is: 9.4%    Values used to calculate the score:      Age: 74 years      Sex: Female      Is Non- : No      Diabetic: No       "Tobacco smoker: No      Systolic Blood Pressure: 111 mmHg      Is BP treated: No      HDL Cholesterol: 81 mg/dL      Total Cholesterol: 162 mg/dL    /63 (Patient Position: Sitting)   Pulse 65   Ht 5' 7" (1.702 m)   Wt 54.9 kg (121 lb)   BMI 18.95 kg/m²       ROS:  GENERAL: Denies weight gain or weight loss. Feeling well overall.   SKIN: Denies rash or lesions.   HEAD: Denies head injury or headache.   NODES: Denies enlarged lymph nodes.   CHEST: Denies chest pain or shortness of breath.   CARDIOVASCULAR: Denies palpitations or left sided chest pain.   ABDOMEN: No abdominal pain, constipation, diarrhea, nausea, vomiting or rectal bleeding.   URINARY: No frequency, dysuria, hematuria, or burning on urination.  REPRODUCTIVE: See HPI.   BREASTS: Denies pain, lumps, or nipple discharge.   HEMATOLOGIC: No easy bruisability or excessive bleeding.   MUSCULOSKELETAL: Denies joint pain or swelling.   NEUROLOGIC: Denies syncope or weakness.   PSYCHIATRIC: Denies depression, anxiety or mood swings.    PHYSICAL EXAM:  APPEARANCE: Well nourished, well developed, in no acute distress.  AFFECT: WNL, alert and oriented x 3  CHEST: Good respiratory effect  ABDOMEN: Soft.  No tenderness or masses.  No hepatosplenomegaly.  No hernias.  BREASTS: Symmetrical, no skin changes or visible lesions.  No palpable masses, nipple discharge bilaterally.  PELVIC: Normal external genitalia without lesions.  +atrophy Normal hair distribution.  Adequate perineal body, normal urethral meatus.  Vagina moist and well rugated without lesions or discharge.  Cervix and uterus are surgically absent.  Adnexa without masses or tenderness.    EXTREMITIES: No edema.    ASSESSMENT:   Encounter for well woman exam with routine gynecological exam  -     Ambulatory referral/consult to Gynecology    Visit for screening mammogram    Pap smear for cervical cancer screening  -     Liquid-Based Pap Smear, Screening  -     HPV High Risk Genotypes, " PCR    Vaginal atrophy  -     estradioL (VAGIFEM) 10 mcg Tab; Place 1 suppository vaginally at night BIW  Dispense: 8 tablet; Refill: 11          PLAN:   Vaginal pap/HPV  Annual screening mammogram scheduled  DEXA scheduled  D/c estrace cream  Start vagifem 10mcg BIW  Counseled on use  Continue lubricants prn with intercourse  Follow up in 1 year for WWE or sooner PRN    Patient was counseled today on A.C.S. Pap guidelines and recommendations for yearly pelvic exams, mammograms and monthly self breast exams; to see her PCP for other health maintenance.

## 2025-01-10 LAB
OHS CV AF BURDEN PERCENT: < 1
OHS CV DC REMOTE DEVICE TYPE: NORMAL
OHS CV RV PACING PERCENT: 29.92 %

## 2025-01-14 ENCOUNTER — TELEPHONE (OUTPATIENT)
Dept: ENDOSCOPY | Facility: HOSPITAL | Age: 75
End: 2025-01-14
Payer: MEDICARE

## 2025-01-14 ENCOUNTER — PATIENT MESSAGE (OUTPATIENT)
Dept: GASTROENTEROLOGY | Facility: CLINIC | Age: 75
End: 2025-01-14
Payer: MEDICARE

## 2025-01-14 DIAGNOSIS — K86.2 PANCREAS CYST: Primary | ICD-10-CM

## 2025-01-27 ENCOUNTER — TELEPHONE (OUTPATIENT)
Dept: PODIATRY | Facility: CLINIC | Age: 75
End: 2025-01-27
Payer: MEDICARE

## 2025-01-27 ENCOUNTER — PATIENT MESSAGE (OUTPATIENT)
Dept: INTERNAL MEDICINE | Facility: CLINIC | Age: 75
End: 2025-01-27
Payer: MEDICARE

## 2025-01-27 NOTE — TELEPHONE ENCOUNTER
Spoke with Ms GamingGalen to assist her with making an appt with Dr Nguyen for toe pain and other foot problems. Accepted appt date and time of 2/10/25 at 2 pm. No other needs voiced at this time. Call back encouraged if needed.

## 2025-01-27 NOTE — TELEPHONE ENCOUNTER
----- Message from Sujatha sent at 1/25/2025 10:02 AM CST -----  Appointment Request    Name of Caller:Jalyn  Symptoms or reason for appointment:L toe pain, would like further treatment from options discussed  Best Call Back Number:[phone]

## 2025-02-03 ENCOUNTER — APPOINTMENT (OUTPATIENT)
Dept: RADIOLOGY | Facility: CLINIC | Age: 75
End: 2025-02-03
Attending: INTERNAL MEDICINE
Payer: MEDICARE

## 2025-02-03 DIAGNOSIS — M81.8 OTHER OSTEOPOROSIS WITHOUT CURRENT PATHOLOGICAL FRACTURE: ICD-10-CM

## 2025-02-03 DIAGNOSIS — M85.80 OSTEOPENIA, UNSPECIFIED LOCATION: ICD-10-CM

## 2025-02-03 PROCEDURE — 77080 DXA BONE DENSITY AXIAL: CPT | Mod: 26,,, | Performed by: INTERNAL MEDICINE

## 2025-02-03 PROCEDURE — 77080 DXA BONE DENSITY AXIAL: CPT | Mod: TC,PO

## 2025-02-10 ENCOUNTER — OFFICE VISIT (OUTPATIENT)
Dept: PODIATRY | Facility: CLINIC | Age: 75
End: 2025-02-10
Payer: MEDICARE

## 2025-02-10 VITALS
DIASTOLIC BLOOD PRESSURE: 87 MMHG | SYSTOLIC BLOOD PRESSURE: 132 MMHG | HEIGHT: 67 IN | WEIGHT: 121.06 LBS | BODY MASS INDEX: 19 KG/M2 | HEART RATE: 74 BPM

## 2025-02-10 DIAGNOSIS — M20.10 ACQUIRED HALLUX VALGUS, UNSPECIFIED LATERALITY: ICD-10-CM

## 2025-02-10 DIAGNOSIS — B35.1 ONYCHOMYCOSIS: Primary | ICD-10-CM

## 2025-02-10 DIAGNOSIS — M20.5X9 HALLUX LIMITUS, ACQUIRED, UNSPECIFIED LATERALITY: ICD-10-CM

## 2025-02-10 DIAGNOSIS — L60.3 ONYCHODYSTROPHY: ICD-10-CM

## 2025-02-10 DIAGNOSIS — Z98.890 HISTORY OF FOOT SURGERY: ICD-10-CM

## 2025-02-10 PROCEDURE — 99214 OFFICE O/P EST MOD 30 MIN: CPT | Mod: 57,S$GLB,, | Performed by: PODIATRIST

## 2025-02-10 PROCEDURE — 3079F DIAST BP 80-89 MM HG: CPT | Mod: CPTII,S$GLB,, | Performed by: PODIATRIST

## 2025-02-10 PROCEDURE — 3075F SYST BP GE 130 - 139MM HG: CPT | Mod: CPTII,S$GLB,, | Performed by: PODIATRIST

## 2025-02-10 PROCEDURE — 3288F FALL RISK ASSESSMENT DOCD: CPT | Mod: CPTII,S$GLB,, | Performed by: PODIATRIST

## 2025-02-10 PROCEDURE — 3008F BODY MASS INDEX DOCD: CPT | Mod: CPTII,S$GLB,, | Performed by: PODIATRIST

## 2025-02-10 PROCEDURE — 99999 PR PBB SHADOW E&M-EST. PATIENT-LVL IV: CPT | Mod: PBBFAC,,, | Performed by: PODIATRIST

## 2025-02-10 PROCEDURE — 1125F AMNT PAIN NOTED PAIN PRSNT: CPT | Mod: CPTII,S$GLB,, | Performed by: PODIATRIST

## 2025-02-10 PROCEDURE — 1157F ADVNC CARE PLAN IN RCRD: CPT | Mod: CPTII,S$GLB,, | Performed by: PODIATRIST

## 2025-02-10 PROCEDURE — 1101F PT FALLS ASSESS-DOCD LE1/YR: CPT | Mod: CPTII,S$GLB,, | Performed by: PODIATRIST

## 2025-02-10 PROCEDURE — 1160F RVW MEDS BY RX/DR IN RCRD: CPT | Mod: CPTII,S$GLB,, | Performed by: PODIATRIST

## 2025-02-10 PROCEDURE — 1159F MED LIST DOCD IN RCRD: CPT | Mod: CPTII,S$GLB,, | Performed by: PODIATRIST

## 2025-02-10 NOTE — PROGRESS NOTES
"Subjective:     Patient ID: Jalyn Aaron is a 75 y.o. female.    Chief Complaint: Bunions and Foot Pain    Presents today with chief complaint of discomfort to the right 3rd toenail.  She also has a history of a thickened, discolored left great toenail that does not grow out completely.  History of bunion surgery to the left foot in 2007 and right foot 2015 per .  Change her shoes around in order to wear longer shoes help with any discomfort to her toes.  History of spinal stenosis causing some lost sensation/numbness to the left calf region.    10/14/2024: Returns to review x-rays taken of both feet and to discuss the left great toe.  She was brought 3 pairs of different tennis shoes to look at and assess for sizing.    02/10/2025:  Returns complaining of persistent discomfort around the left great toenail.  She has a pending cruise in 2 weeks.  Relates that the discomfort to left great toe is most likely from her alignment of the left great toe.  Inquiring about potential surgical intervention for both feet.  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    Left    AV block     exercised induced 2:1    Cataract     Fever blister     Heart block     History of acne     Joint pain     hands    Keratoconjunctivitis sicca not due to Sjogren's syndrome     Pacemaker     Sleep apnea, unspecified        Past Surgical History:   Procedure Laterality Date    BREAST BIOPSY Left 12/2013    papilloma with atypia on core biopsy    BREAST BIOPSY Left 01/2014    Ex.Bx., removal of ADH/Papilloma    COLONOSCOPY N/A 11/8/2016    Procedure: COLONOSCOPY;  Surgeon: Dl Caruso MD;  Location: [facility] ([address] [place] [address]);  Service: Endoscopy;  Laterality: N/A;  Pacemaker in place.    COLONOSCOPY N/A 3/30/2021    Procedure: COLONOSCOPY;  " Surgeon: Joaquin Johnson MD;  Location: [facility] ENDO (4TH FLR);  Service: Endoscopy;  Laterality: N/A;  prep ins. emailed - COVID screening on 3/27/21 PCW - ERW    ENDOSCOPIC ULTRASOUND OF UPPER GASTROINTESTINAL TRACT N/A 10/9/2023    Procedure: ULTRASOUND, UPPER GI TRACT, ENDOSCOPIC;  Surgeon: Stacey Sesay MD;  Location: [facility] ENDO (2ND FLR);  Service: Endoscopy;  Laterality: N/A;  pancreas cyst 7mm on CT (r/o nodule or worrisome features)    8/4/23-Instructions via portal-DS  10/2-precall complete-MS    ESOPHAGOGASTRODUODENOSCOPY N/A 3/19/2024    Procedure: EGD (ESOPHAGOGASTRODUODENOSCOPY);  Surgeon: Mynor Watts MD;  Location: [facility] ENDO (2ND FLR);  Service: Endoscopy;  Laterality: N/A;  moved to 2nd floor endoscopy per recommendations of crna  pt has    HYSTERECTOMY      IMPLANTATION OF BIVENTRICULAR PERMANENT PACEMAKER AS UPGRADE TO EXISTING PACEMAKER N/A 10/26/2023    Procedure: Biventricular pacemaker upgrade;  Surgeon: Chacho Colby MD;  Location: [facility] EP LAB;  Service: Cardiology;  Laterality: N/A;  CHB, CRT-P upgrade, MDT, MAC, SK, 3 Prep *MDT DC PPM in situ*    INSERT / REPLACE / REMOVE PACEMAKER      LASIK      LASIK Bilateral 2009    VENOGRAM, EP LAB N/A 12/20/2022    Procedure: Venogram, EP Lab;  Surgeon: Chacho Colby MD;  Location: [facility] EP LAB;  Service: Cardiology;  Laterality: N/A;  NICM, Venogram- left side, SK, 3 Prep *MDT PPM in situ*       Family History   Problem Relation Name Age of Onset    Breast cancer Mother          Paget's dz    Cancer Mother  80        pagets    Cataracts Mother      Hypertension Mother      Multiple sclerosis Father Devyn Love     Multiple sclerosis Sister Lulu Pratt     No Known Problems Maternal Aunt      No Known Problems Maternal Uncle      No Known Problems Paternal Aunt      No Known Problems Paternal Uncle      No Known Problems Maternal Grandmother      Prostate cancer Maternal Grandfather      No Known Problems Paternal Grandmother      No Known  Problems Paternal Grandfather      No Known Problems Brother      No Known Problems Son      No Known Problems Son      Amblyopia Neg Hx      Blindness Neg Hx      Diabetes Neg Hx      Glaucoma Neg Hx      Macular degeneration Neg Hx      Retinal detachment Neg Hx      Strabismus Neg Hx      Stroke Neg Hx      Thyroid disease Neg Hx      Melanoma Neg Hx      Ovarian cancer Neg Hx         Social History     Socioeconomic History    Marital status:     Number of children: 1   Occupational History    Occupation:      Employer: [employer]   Tobacco Use    Smoking status: Never     Passive exposure: Never    Smokeless tobacco: Never   Substance and Sexual Activity    Alcohol use: Not Currently     Alcohol/week: 1.0 standard drink of alcohol     Types: 1 Glasses of wine per week     Comment: weekly    Drug use: No    Sexual activity: Not Currently     Partners: Male     Birth control/protection: None   Other Topics Concern    Are you pregnant or think you may be? No    Breast-feeding No   Social History Narrative     (case management) at [employer]     Social Drivers of Health     Financial Resource Strain: Low Risk  (1/11/2025)    Overall Financial Resource Strain (CARDIA)     Difficulty of Paying Living Expenses: Not hard at all   Food Insecurity: No Food Insecurity (1/11/2025)    Hunger Vital Sign     Worried About Running Out of Food in the Last Year: Never true     Ran Out of Food in the Last Year: Never true   Transportation Needs: No Transportation Needs (1/6/2024)    PRAPARE - Transportation     Lack of Transportation (Medical): No     Lack of Transportation (Non-Medical): No   Physical Activity: Sufficiently Active (1/11/2025)    Exercise Vital Sign     Days of Exercise per Week: 7 days     Minutes of Exercise per Session: 30 min   Stress: Stress Concern Present (1/11/2025)    [place] [employer] - Occupational Stress Questionnaire     Feeling of  Stress : To some extent   Housing Stability: Low Risk  (1/6/2024)    Housing Stability Vital Sign     Unable to Pay for Housing in the Last Year: No     Number of Places Lived in the Last Year: 1     Unstable Housing in the Last Year: No       Current Outpatient Medications   Medication Sig Dispense Refill    busPIRone (BUSPAR) 15 MG tablet Take 1 tablet (15 mg total) by mouth 2 (two) times daily. 60 tablet 3    carvediloL (COREG) 3.125 MG tablet Take 1 tablet (3.125 mg total) by mouth 2 (two) times daily. 180 tablet 3    clobetasol 0.05% (TEMOVATE) 0.05 % Oint AAA scalp bid x 1 month - after warm water compress 60 g 0    estradioL (VAGIFEM) 10 mcg Tab Place 1 suppository vaginally at night BIW 8 tablet 11    gabapentin (NEURONTIN) 600 MG tablet Half in the AM and 2 in the  tablet 11    losartan (COZAAR) 25 MG tablet TAKE 1 TABLET BY MOUTH TWICE DAILY 180 tablet 3    memantine (NAMENDA) 10 MG Tab Take 1 tablet (10 mg total) by mouth 2 (two) times daily. 60 tablet 3    multivitamin capsule Take 1 capsule by mouth once daily.      naproxen (NAPROSYN) 250 MG tablet One daily as needed for arthritis pain 30 tablet 1    rosuvastatin (CRESTOR) 10 MG tablet TAKE 1 TABLET(10 MG) BY MOUTH EVERY DAY 90 tablet 3    senna (SENOKOT) 8.6 mg tablet Take 1 tablet by mouth once daily. Twice a day      vitamin D 1000 units Tab Take 1,000 Units by mouth once daily.      memantine (NAMENDA TITRATION PACK) tablet pack Follow package directions. 1 packet 0    triamcinolone acetonide 0.025% (KENALOG) 0.025 % cream Apply topically 2 (two) times daily. for 3 days (Patient not taking: Reported on 10/24/2024) 80 g 0     Current Facility-Administered Medications   Medication Dose Route Frequency Provider Last Rate Last Admin    triamcinolone acetonide injection 10 mg  10 mg Intradermal Once Christel Ward MD         Facility-Administered Medications Ordered in Other Visits   Medication Dose Route Frequency Provider Last Rate Last  Admin    ceFAZolin 2 g in dextrose 5 % in water (D5W) 50 mL IVPB (MB+)  2 g Intravenous On Call Procedure Chacho Colby MD           Review of patient's allergies indicates:   Allergen Reactions    Atorvastatin Other (See Comments)     Elevated LFTs    Bactrim [sulfamethoxazole-trimethoprim] Nausea Only         Review of Systems   Constitutional: Negative for chills and fever.   HENT:  Negative for congestion and hearing loss.    Cardiovascular:  Negative for chest pain and claudication.   Respiratory:  Negative for cough and shortness of breath.    Skin:  Positive for nail changes. Negative for color change and poor wound healing.   Gastrointestinal:  Negative for nausea and vomiting.   Neurological:  Positive for numbness.   Psychiatric/Behavioral:  Negative for altered mental status.         Objective:     Physical Exam  Constitutional:       General: She is not in acute distress.     Appearance: Normal appearance. She is not ill-appearing.   Cardiovascular:      Pulses:           Dorsalis pedis pulses are 2+ on the right side and 2+ on the left side.        Posterior tibial pulses are 2+ on the right side and 2+ on the left side.      Comments: No significant lower extremity edema bilateral.  Skin temp is warm to foot bilateral.  No rubor on dependency bilateral foot.  Musculoskeletal:      Comments: Moderate track bound hallux abductovalgus bilateral overlapping the 2nd toe.  One MTP range motion right foot with 30-40 degrees of dorsiflexion past neutral in up to 10° plantar flexion past neutral.  One MTP range motion of the left foot with near 45° of dorsiflexion and 10-15 degrees of plantar flexion which is more reducible than the right foot.  Dorsal 1st ray excursion left foot is near 1 cm and less than 1 cm on the right.    Semi-rigid flexion contracture of the right 2nd toe PIPJ and DIPJ.  Flexible mild flexion contractures of the lesser toes 2-4 left and 3-4 right.   Skin:     General: Skin is warm.       Capillary Refill: Capillary refill takes less than 2 seconds.      Findings: No ecchymosis or erythema.      Nails: There is no clubbing.      Comments: Left hallux nail is moderately loosened, thickened, dystrophic with yellow discoloration and moderate underlying debris noting incurvated medial nail border.  There is also enlargement of the distal digital tuft of the left hallux with underlying abnormality to the nailbed.   Neurological:      Mental Status: She is alert.           Assessment:      Encounter Diagnoses   Name Primary?    Onychomycosis Yes    Onychodystrophy     History of foot surgery     Acquired hallux valgus, unspecified laterality     Hallux limitus, acquired, unspecified laterality      Plan:     Jalyn was seen today for bunions and foot pain.    Diagnoses and all orders for this visit:    Onychomycosis    Onychodystrophy    History of foot surgery    Acquired hallux valgus, unspecified laterality    Hallux limitus, acquired, unspecified laterality      I counseled the patient on her conditions, their implications and medical management.    Reviewed bilateral foot weight-bearing x-ray in detail with the patient.  As previously discussed prior evidence of a base osteotomy on left foot single screw fixation and opening base wedge osteotomy of the 1st metatarsal right foot with plate fixation.  Moderate degenerative changes noted to the 1st metatarsophalangeal joint bilateral with multiple subchondral cysts and enlargement of the sesamoids at the 1 MTP left foot.  The 1 MTP bilateral is partially subluxed with the base of the proximal phalanx riding on the lateral aspect of the 1st met head.  Left foot note 1-2 intermetatarsal angle of 12°, hallux valgus angle of 43°, partial subluxation of the 1 MTP with the base of the hallux hanging over the metatarsal head laterally in addition to tibial sesamoid position of 5 with arthrosis of the sesamoids.  There is narrowing of the 1st metatarsal head  secondary to partial resection of the medial eminence.  There is a screw in the base of the 1st metatarsal consistent with closing base wedge osteotomy.  Right foot note 1-2 intermetatarsal angle of 13°, hallux valgus angle of 37°, significant increase in proximal articular set angle with tibial sesamoid position of approximately 6.  There is evidence of prior opening base wedge osteotomy with plate fixation and also partial subluxation of the 1 MTP and evidence of possible prior Akin osteotomy without fixation.  There appears to be met adductus bilateral foot.  There is also a bone island noted at the neck of the left 4th metatarsal.  Lateral projection reveals normal calcaneal inclination angle with mild elevation of the 1st ray compared to lesser rays indicating most likely supination of the forefoot.      We discussed with the patient has underlying exostosis to the distal left hallux which has caused permanent changes to the overlying nail and secondary to the alignment of the left hallux she has developed progressive incurvated medial nail border and dystrophy.  We discussed surgical intervention consisting of left hallux nail avulsion with phenol and alcohol matrixectomy.  Risks, benefits, and anticipated postop course were discussed with the patient.  Patient elected to proceed with surgical intervention in the clinic.  We discussed subsequently discussing potential revision surgical intervention for her hallux abductovalgus bilateral foot once she has healed the left hallux.    RTC p.r.n. as discussed.     Assisted by Keerthi Thapa DPM PGY 2    A portion of this note was generated by voice recognition software and may contain spelling and grammar errors.        .

## 2025-02-15 ENCOUNTER — PATIENT MESSAGE (OUTPATIENT)
Dept: INTERNAL MEDICINE | Facility: CLINIC | Age: 75
End: 2025-02-15
Payer: MEDICARE

## 2025-02-18 ENCOUNTER — OFFICE VISIT (OUTPATIENT)
Dept: INTERNAL MEDICINE | Facility: CLINIC | Age: 75
End: 2025-02-18
Payer: MEDICARE

## 2025-02-18 VITALS
OXYGEN SATURATION: 99 % | HEIGHT: 67 IN | SYSTOLIC BLOOD PRESSURE: 128 MMHG | WEIGHT: 120.06 LBS | BODY MASS INDEX: 18.84 KG/M2 | HEART RATE: 64 BPM | DIASTOLIC BLOOD PRESSURE: 60 MMHG

## 2025-02-18 DIAGNOSIS — R07.89 BURNING CHEST PAIN: ICD-10-CM

## 2025-02-18 DIAGNOSIS — H93.19 TINNITUS, UNSPECIFIED LATERALITY: Primary | ICD-10-CM

## 2025-02-18 LAB
OHS QRS DURATION: 150 MS
OHS QTC CALCULATION: 479 MS

## 2025-02-18 PROCEDURE — 93005 ELECTROCARDIOGRAM TRACING: CPT | Mod: S$GLB,,, | Performed by: INTERNAL MEDICINE

## 2025-02-18 PROCEDURE — 1157F ADVNC CARE PLAN IN RCRD: CPT | Mod: CPTII,S$GLB,, | Performed by: INTERNAL MEDICINE

## 2025-02-18 PROCEDURE — 3008F BODY MASS INDEX DOCD: CPT | Mod: CPTII,S$GLB,, | Performed by: INTERNAL MEDICINE

## 2025-02-18 PROCEDURE — 99999 PR PBB SHADOW E&M-EST. PATIENT-LVL V: CPT | Mod: PBBFAC,,, | Performed by: INTERNAL MEDICINE

## 2025-02-18 PROCEDURE — 3288F FALL RISK ASSESSMENT DOCD: CPT | Mod: CPTII,S$GLB,, | Performed by: INTERNAL MEDICINE

## 2025-02-18 PROCEDURE — 99214 OFFICE O/P EST MOD 30 MIN: CPT | Mod: S$GLB,,, | Performed by: INTERNAL MEDICINE

## 2025-02-18 PROCEDURE — 93010 ELECTROCARDIOGRAM REPORT: CPT | Mod: S$GLB,,, | Performed by: INTERNAL MEDICINE

## 2025-02-18 PROCEDURE — 1101F PT FALLS ASSESS-DOCD LE1/YR: CPT | Mod: CPTII,S$GLB,, | Performed by: INTERNAL MEDICINE

## 2025-02-18 PROCEDURE — 3074F SYST BP LT 130 MM HG: CPT | Mod: CPTII,S$GLB,, | Performed by: INTERNAL MEDICINE

## 2025-02-18 PROCEDURE — 1126F AMNT PAIN NOTED NONE PRSNT: CPT | Mod: CPTII,S$GLB,, | Performed by: INTERNAL MEDICINE

## 2025-02-18 PROCEDURE — 3078F DIAST BP <80 MM HG: CPT | Mod: CPTII,S$GLB,, | Performed by: INTERNAL MEDICINE

## 2025-02-18 RX ORDER — MEMANTINE HYDROCHLORIDE 5 MG/1
5 TABLET ORAL 2 TIMES DAILY
Qty: 60 TABLET | Refills: 2 | Status: SHIPPED | OUTPATIENT
Start: 2025-02-18 | End: 2026-02-18

## 2025-02-24 ENCOUNTER — OFFICE VISIT (OUTPATIENT)
Dept: DERMATOLOGY | Facility: CLINIC | Age: 75
End: 2025-02-24
Payer: MEDICARE

## 2025-02-24 DIAGNOSIS — L98.8 EROSIVE PUSTULAR DERMATOSIS: Primary | ICD-10-CM

## 2025-02-24 PROCEDURE — 3288F FALL RISK ASSESSMENT DOCD: CPT | Mod: CPTII,S$GLB,, | Performed by: DERMATOLOGY

## 2025-02-24 PROCEDURE — 1159F MED LIST DOCD IN RCRD: CPT | Mod: CPTII,S$GLB,, | Performed by: DERMATOLOGY

## 2025-02-24 PROCEDURE — 99999 PR PBB SHADOW E&M-EST. PATIENT-LVL III: CPT | Mod: PBBFAC,,, | Performed by: DERMATOLOGY

## 2025-02-24 PROCEDURE — 99213 OFFICE O/P EST LOW 20 MIN: CPT | Mod: S$GLB,,, | Performed by: DERMATOLOGY

## 2025-02-24 PROCEDURE — 1157F ADVNC CARE PLAN IN RCRD: CPT | Mod: CPTII,S$GLB,, | Performed by: DERMATOLOGY

## 2025-02-24 PROCEDURE — 1125F AMNT PAIN NOTED PAIN PRSNT: CPT | Mod: CPTII,S$GLB,, | Performed by: DERMATOLOGY

## 2025-02-24 PROCEDURE — 1101F PT FALLS ASSESS-DOCD LE1/YR: CPT | Mod: CPTII,S$GLB,, | Performed by: DERMATOLOGY

## 2025-02-24 PROCEDURE — 1160F RVW MEDS BY RX/DR IN RCRD: CPT | Mod: CPTII,S$GLB,, | Performed by: DERMATOLOGY

## 2025-02-24 NOTE — PROGRESS NOTES
Subjective:      Patient ID:  Jalyn Aaron is a 75 y.o. female who presents for   Chief Complaint   Patient presents with    Follow-up     Scalp     History of Present Illness: The patient presents for follow up to scalp for EPD.     The patient was last seen on: 12/23/2024 for bx right lateral thigh c/w benign nevus. Also for follow up EPD - used Clob oint qday until 2 weeks ago (when scabs resolved)  Pt reports some redness and irritation around area after hair appointment last week. Using Vaseline once a day.    Chronic tenderness of AA          Review of Systems   Skin:  Positive for daily sunscreen use, activity-related sunscreen use and wears hat (always). Negative for itching (sensitive), rash and recent sunburn.   Hematologic/Lymphatic: Bruises/bleeds easily.       Objective:   Physical Exam   Constitutional: She appears well-developed and well-nourished. No distress.   Neurological: She is alert and oriented to person, place, and time. She is not disoriented.   Psychiatric: She has a normal mood and affect.   Skin:   Areas Examined (abnormalities noted in diagram):   Scalp / Hair Palpated and Inspected            Diagram Legend     Erythematous scaling macule/papule c/w actinic keratosis       Vascular papule c/w angioma      Pigmented verrucoid papule/plaque c/w seborrheic keratosis      Yellow umbilicated papule c/w sebaceous hyperplasia      Irregularly shaped tan macule c/w lentigo     1-2 mm smooth white papules consistent with Milia      Movable subcutaneous cyst with punctum c/w epidermal inclusion cyst      Subcutaneous movable cyst c/w pilar cyst      Firm pink to brown papule c/w dermatofibroma      Pedunculated fleshy papule(s) c/w skin tag(s)      Evenly pigmented macule c/w junctional nevus     Mildly variegated pigmented, slightly irregular-bordered macule c/w mildly atypical nevus      Flesh colored to evenly pigmented papule c/w intradermal nevus       Pink pearly papule/plaque  c/w basal cell carcinoma      Erythematous hyperkeratotic cursted plaque c/w SCC      Surgical scar with no sign of skin cancer recurrence      Open and closed comedones      Inflammatory papules and pustules      Verrucoid papule consistent consistent with wart     Erythematous eczematous patches and plaques     Dystrophic onycholytic nail with subungual debris c/w onychomycosis     Umbilicated papule    Erythematous-base heme-crusted tan verrucoid plaque consistent with inflamed seborrheic keratosis     Erythematous Silvery Scaling Plaque c/w Psoriasis     See annotation      Assessment / Plan:        Erosive pustular dermatosis - scalp vertex  Currently in remission  Ok to use clob oint biw or just hold until flare then use qday - bid    Cont to wear hat always             No follow-ups on file.

## 2025-02-27 ENCOUNTER — CLINICAL SUPPORT (OUTPATIENT)
Dept: CARDIOLOGY | Facility: HOSPITAL | Age: 75
End: 2025-02-27
Payer: MEDICARE

## 2025-02-27 DIAGNOSIS — Z95.0 PRESENCE OF CARDIAC PACEMAKER: ICD-10-CM

## 2025-02-27 DIAGNOSIS — I44.2 ATRIOVENTRICULAR BLOCK, COMPLETE: ICD-10-CM

## 2025-02-28 ENCOUNTER — CLINICAL SUPPORT (OUTPATIENT)
Dept: CARDIOLOGY | Facility: HOSPITAL | Age: 75
End: 2025-02-28
Attending: INTERNAL MEDICINE
Payer: MEDICARE

## 2025-02-28 DIAGNOSIS — I44.2 ATRIOVENTRICULAR BLOCK, COMPLETE: ICD-10-CM

## 2025-02-28 DIAGNOSIS — Z95.0 PRESENCE OF CARDIAC PACEMAKER: ICD-10-CM

## 2025-02-28 LAB
OHS CV AF BURDEN PERCENT: < 1
OHS CV DC REMOTE DEVICE TYPE: NORMAL
OHS CV RV PACING PERCENT: 65.32 %

## 2025-02-28 PROCEDURE — 93294 REM INTERROG EVL PM/LDLS PM: CPT | Mod: ,,, | Performed by: INTERNAL MEDICINE

## 2025-02-28 PROCEDURE — 93296 REM INTERROG EVL PM/IDS: CPT | Performed by: INTERNAL MEDICINE

## 2025-03-05 NOTE — PROGRESS NOTES
Subjective:       Patient ID: Jalyn Aaron is a 75 y.o. female.    Chief Complaint: Follow-up    Follow-up  Pertinent negatives include no abdominal pain, chest pain (arm pain or jaw pain), headaches, nausea or vomiting.     Pt with some burning chest pain.  No SOB.  Some tinnitus.    Review of Systems   Respiratory:  Negative for shortness of breath (PND or orthopnea).    Cardiovascular:  Negative for chest pain (arm pain or jaw pain).   Gastrointestinal:  Negative for abdominal pain, diarrhea, nausea and vomiting.   Genitourinary:  Negative for dysuria.   Neurological:  Negative for seizures, syncope and headaches.       Objective:      Physical Exam  Constitutional:       General: She is not in acute distress.     Appearance: She is well-developed.   HENT:      Head: Normocephalic.   Eyes:      Pupils: Pupils are equal, round, and reactive to light.   Neck:      Thyroid: No thyromegaly.      Vascular: No JVD.   Cardiovascular:      Rate and Rhythm: Normal rate and regular rhythm.      Heart sounds: Normal heart sounds. No murmur heard.     No friction rub. No gallop.   Pulmonary:      Effort: Pulmonary effort is normal.      Breath sounds: Normal breath sounds. No wheezing or rales.   Abdominal:      General: Bowel sounds are normal. There is no distension.      Palpations: Abdomen is soft. There is no mass.      Tenderness: There is no abdominal tenderness. There is no guarding or rebound.   Musculoskeletal:      Cervical back: Neck supple.   Lymphadenopathy:      Cervical: No cervical adenopathy.   Skin:     General: Skin is warm and dry.   Neurological:      Mental Status: She is alert and oriented to person, place, and time.      Deep Tendon Reflexes: Reflexes are normal and symmetric.   Psychiatric:         Behavior: Behavior normal.         Thought Content: Thought content normal.         Judgment: Judgment normal.         Assessment:       1. Tinnitus, unspecified laterality    2. Burning chest  pain        Plan:   Tinnitus, unspecified laterality  -     Ambulatory referral/consult to ENT; Future; Expected date: 02/25/2025  -     Ambulatory referral/consult to Audiology; Future; Expected date: 02/25/2025    Burning chest pain  -     EKG 12-lead    Other orders  -     memantine (NAMENDA) 5 MG Tab; Take 1 tablet (5 mg total) by mouth 2 (two) times daily.  Dispense: 60 tablet; Refill: 2

## 2025-03-20 ENCOUNTER — OFFICE VISIT (OUTPATIENT)
Dept: OPTOMETRY | Facility: CLINIC | Age: 75
End: 2025-03-20
Payer: MEDICARE

## 2025-03-20 DIAGNOSIS — H50.30 INTERMITTENT ESOTROPIA: Primary | ICD-10-CM

## 2025-03-20 DIAGNOSIS — H51.8 DIVERGENCE INSUFFICIENCY: ICD-10-CM

## 2025-03-20 DIAGNOSIS — H53.2 DIPLOPIA: ICD-10-CM

## 2025-03-20 DIAGNOSIS — H25.13 NUCLEAR SCLEROSIS, BILATERAL: ICD-10-CM

## 2025-03-20 PROCEDURE — 99999 PR PBB SHADOW E&M-EST. PATIENT-LVL III: CPT | Mod: PBBFAC,,, | Performed by: OPTOMETRIST

## 2025-03-20 NOTE — PROGRESS NOTES
HPI    Jalyn Aaron is a 75 y.o. female who returns for an annual eye exam.   Jalyn has intermittent esotropia (divergence insufficiency), MGD, and   bilateral hyperopia.   Glasses are prescribed. Her last exam with me was   on 3/07/2024.  Today, she reports that her distance vision is not as good   as she feels it should be, particularly with glare when driving at night.   .  She relays that she still has diplopia, at distance, that she only   notices at night.     (+)blurred vision  (--)Headaches  (+)diplopia  (--)flashes  (--)floaters  (--)pain  (--)Itching  (--)tearing  (--)burning  (+)Dryness (s/p LASIK)  (+) OTC Drops Refresh uses only when the A/C is on   (--)Photophobia     Last edited by Sonido Mcgraw, OD on 3/20/2025  9:54 AM.      Review of Systems   Constitutional:  Negative for chills, fever and malaise/fatigue.   HENT:  Negative for congestion.    Eyes:  Positive for blurred vision and double vision. Negative for photophobia, pain, discharge and redness.   Respiratory:  Negative for cough.    Gastrointestinal:  Negative for nausea and vomiting.   Neurological:  Negative for seizures.     For exam results, see encounter report    Assessment /Plan    1. Intermittent esotropia with divergence insufficiency  - Diplopia at distance only, at night only  - Not symptomatic enough (per Jalyn) to address with prism  - Will continue to monitor without active treatment      2.  Mild bilateral cataracts  - BCVA 20/20 right eye, 20/30, left eye  - Will continue with glasses  - No active treatment needed at this time    3. Minimal refractive error with presbyopia  - Spec Rx per final Rx below     Eyeglasses Prescription (3/20/2025)          Sphere Cylinder Add    Right [place] Sphere +3.25    Left +0.50 Sphere +3.25      Type: PAL    Expiration Date: 3/20/2026              Patient education; RTC in 1 year with DFE; Ok to instill 1% Tropicamide & 2.5% Phenylephrine after (normal) baseline workup, sooner as needed  at [facility]      Visit today is associated with current or anticipated ongoing medical care related to this patients single serious condition/complex condition  1. Intermittent esotropia with divergence insufficiency      Time spent on this encounter: 30 minutes. This includes face to face time and non-face to face time preparing to see the patient (eg, review of tests), obtaining and/or reviewing separately obtained history, documenting clinical information in the electronic or other health record, independently interpreting results and communicating results to the patient/family/caregiver, or care coordinator.

## 2025-03-24 ENCOUNTER — OFFICE VISIT (OUTPATIENT)
Dept: PODIATRY | Facility: CLINIC | Age: 75
End: 2025-03-24
Payer: MEDICARE

## 2025-03-24 ENCOUNTER — TELEPHONE (OUTPATIENT)
Dept: INTERNAL MEDICINE | Facility: CLINIC | Age: 75
End: 2025-03-24
Payer: MEDICARE

## 2025-03-24 VITALS
SYSTOLIC BLOOD PRESSURE: 153 MMHG | BODY MASS INDEX: 18.85 KG/M2 | HEART RATE: 74 BPM | DIASTOLIC BLOOD PRESSURE: 83 MMHG | WEIGHT: 120.13 LBS | HEIGHT: 67 IN

## 2025-03-24 DIAGNOSIS — L60.3 ONYCHODYSTROPHY: Primary | ICD-10-CM

## 2025-03-24 DIAGNOSIS — Z98.890 POSTOPERATIVE STATE: ICD-10-CM

## 2025-03-24 DIAGNOSIS — Z00.00 ENCOUNTER FOR MEDICARE ANNUAL WELLNESS EXAM: ICD-10-CM

## 2025-03-24 DIAGNOSIS — B35.1 ONYCHOMYCOSIS: ICD-10-CM

## 2025-03-24 PROCEDURE — 99999 PR PBB SHADOW E&M-EST. PATIENT-LVL III: CPT | Mod: PBBFAC,,, | Performed by: PODIATRIST

## 2025-03-24 PROCEDURE — 99499 UNLISTED E&M SERVICE: CPT | Mod: S$GLB,,, | Performed by: PODIATRIST

## 2025-03-24 PROCEDURE — 11750 EXCISION NAIL&NAIL MATRIX: CPT | Mod: S$GLB,,, | Performed by: PODIATRIST

## 2025-03-24 RX ORDER — MUPIROCIN 20 MG/G
OINTMENT TOPICAL 2 TIMES DAILY
Qty: 22 G | Refills: 1 | Status: SHIPPED | OUTPATIENT
Start: 2025-03-24

## 2025-03-24 RX ORDER — TRAMADOL HYDROCHLORIDE 50 MG/1
50 TABLET ORAL EVERY 4 HOURS PRN
Qty: 20 TABLET | Refills: 0 | Status: SHIPPED | OUTPATIENT
Start: 2025-03-24

## 2025-03-24 RX ORDER — DOXYCYCLINE HYCLATE 100 MG
100 TABLET ORAL 2 TIMES DAILY
Qty: 14 TABLET | Refills: 0 | Status: SHIPPED | OUTPATIENT
Start: 2025-03-24

## 2025-03-24 NOTE — TELEPHONE ENCOUNTER
----- Message from Shelley Gabriel sent at 3/24/2025  1:55 PM CDT -----  Regarding: Referral Request  Contact: [phone]  Type: Patient Requesting Referral  Who Called: Patient Referral to What Specialty: Rheumatology  Reason for Referral: Osteoarthritis of hand  Does the patient want the referral with a specific physician? Dr. Devyn Farmer or Dr. Tamika Webb [facility]

## 2025-03-24 NOTE — PROGRESS NOTES
"Subjective:     Patient ID: Jalyn Aaron is a 75 y.o. female.    Chief Complaint: Nail Problem (Left great toe)    Presents today with chief complaint of discomfort to the right 3rd toenail.  She also has a history of a thickened, discolored left great toenail that does not grow out completely.  History of bunion surgery to the left foot in 2007 and right foot 2015 per .  Change her shoes around in order to wear longer shoes help with any discomfort to her toes.  History of spinal stenosis causing some lost sensation/numbness to the left calf region.    10/14/2024: Returns to review x-rays taken of both feet and to discuss the left great toe.  She was brought 3 pairs of different tennis shoes to look at and assess for sizing.    02/10/2025:  Returns complaining of persistent discomfort around the left great toenail.  She has a pending cruise in 2 weeks.  Relates that the discomfort to left great toe is most likely from her alignment of the left great toe.  Inquiring about potential surgical intervention for both feet.    03/24/2025:  Presents for left great toe nail avulsion with phenol and alcohol matrixectomy.  Accompanied by her .  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    Left    AV block     exercised induced 2:1    Cataract     Fever blister     Heart block     History of acne     Joint pain     hands    Keratoconjunctivitis sicca not due to Sjogren's syndrome     Pacemaker     Sleep apnea, unspecified        Past Surgical History:   Procedure Laterality Date    BREAST BIOPSY Left 12/2013    papilloma with atypia on core biopsy    BREAST BIOPSY Left 01/2014    Ex.Bx., removal of ADH/Papilloma    COLONOSCOPY N/A 11/8/2016    Procedure: COLONOSCOPY;  Surgeon: Dl Caruso MD;  " Location: [facility] ENDO (4TH FLR);  Service: Endoscopy;  Laterality: N/A;  Pacemaker in place.    COLONOSCOPY N/A 3/30/2021    Procedure: COLONOSCOPY;  Surgeon: Joaquin Johnson MD;  Location: [facility] ENDO (4TH FLR);  Service: Endoscopy;  Laterality: N/A;  prep ins. emailed - COVID screening on 3/27/21 PCW - ERW    ENDOSCOPIC ULTRASOUND OF UPPER GASTROINTESTINAL TRACT N/A 10/9/2023    Procedure: ULTRASOUND, UPPER GI TRACT, ENDOSCOPIC;  Surgeon: Stacey Sesay MD;  Location: [facility] (2ND FLR);  Service: Endoscopy;  Laterality: N/A;  pancreas cyst 7mm on CT (r/o nodule or worrisome features)    8/4/23-Instructions via portal-DS  10/2-precall complete-MS    ESOPHAGOGASTRODUODENOSCOPY N/A 3/19/2024    Procedure: EGD (ESOPHAGOGASTRODUODENOSCOPY);  Surgeon: Mynor Watts MD;  Location: [facility] (2ND FLR);  Service: Endoscopy;  Laterality: N/A;  moved to 2nd floor endoscopy per recommendations of crna  pt has    HYSTERECTOMY      IMPLANTATION OF BIVENTRICULAR PERMANENT PACEMAKER AS UPGRADE TO EXISTING PACEMAKER N/A 10/26/2023    Procedure: Biventricular pacemaker upgrade;  Surgeon: Chacho Colby MD;  Location: [facility] EP LAB;  Service: Cardiology;  Laterality: N/A;  CHB, CRT-P upgrade, MDT, MAC, SK, 3 Prep *MDT DC PPM in situ*    INSERT / REPLACE / REMOVE PACEMAKER      LASIK      LASIK Bilateral 2009    VENOGRAM, EP LAB N/A 12/20/2022    Procedure: Venogram, EP Lab;  Surgeon: Chacho Colby MD;  Location: [facility] EP LAB;  Service: Cardiology;  Laterality: N/A;  NICM, Venogram- left side, SK, 3 Prep *MDT PPM in situ*       Family History   Problem Relation Name Age of Onset    Breast cancer Mother          Paget's dz    Cancer Mother  80        pagets    Cataracts Mother      Hypertension Mother      Multiple sclerosis Father Devyn Love     Multiple sclerosis Sister Lulu Pratt     No Known Problems Maternal Aunt      No Known Problems Maternal Uncle      No Known Problems Paternal Aunt      No Known Problems Paternal Uncle       No Known Problems Maternal Grandmother      Prostate cancer Maternal Grandfather      No Known Problems Paternal Grandmother      No Known Problems Paternal Grandfather      No Known Problems Brother      No Known Problems Son      No Known Problems Son      Amblyopia Neg Hx      Blindness Neg Hx      Diabetes Neg Hx      Glaucoma Neg Hx      Macular degeneration Neg Hx      Retinal detachment Neg Hx      Strabismus Neg Hx      Stroke Neg Hx      Thyroid disease Neg Hx      Melanoma Neg Hx      Ovarian cancer Neg Hx         Social History     Socioeconomic History    Marital status:     Number of children: 1   Occupational History    Occupation:      Employer: [employer]   Tobacco Use    Smoking status: Never     Passive exposure: Never    Smokeless tobacco: Never   Substance and Sexual Activity    Alcohol use: Not Currently     Alcohol/week: 1.0 standard drink of alcohol     Types: 1 Glasses of wine per week     Comment: weekly    Drug use: No    Sexual activity: Not Currently     Partners: Male     Birth control/protection: None   Other Topics Concern    Are you pregnant or think you may be? No    Breast-feeding No   Social History Narrative     (case management) at [employer]     Social Drivers of Health     Financial Resource Strain: Low Risk  (1/11/2025)    Overall Financial Resource Strain (CARDIA)     Difficulty of Paying Living Expenses: Not hard at all   Food Insecurity: No Food Insecurity (1/11/2025)    Hunger Vital Sign     Worried About Running Out of Food in the Last Year: Never true     Ran Out of Food in the Last Year: Never true   Transportation Needs: No Transportation Needs (1/6/2024)    PRAPARE - Transportation     Lack of Transportation (Medical): No     Lack of Transportation (Non-Medical): No   Physical Activity: Sufficiently Active (1/11/2025)    Exercise Vital Sign     Days of Exercise per Week: 7 days     Minutes of Exercise per Session: 30 min    Stress: Stress Concern Present (1/11/2025)    [employer] [place] [employer] - Occupational Stress Questionnaire     Feeling of Stress : To some extent   Housing Stability: Low Risk  (1/6/2024)    Housing Stability Vital Sign     Unable to Pay for Housing in the Last Year: No     Number of Places Lived in the Last Year: 1     Unstable Housing in the Last Year: No       Current Outpatient Medications   Medication Sig Dispense Refill    busPIRone (BUSPAR) 15 MG tablet Take 1 tablet (15 mg total) by mouth 2 (two) times daily. 60 tablet 3    carvediloL (COREG) 3.125 MG tablet Take 1 tablet (3.125 mg total) by mouth 2 (two) times daily. 180 tablet 3    clobetasol 0.05% (TEMOVATE) 0.05 % Oint AAA scalp bid x 1 month - after warm water compress 60 g 0    estradioL (VAGIFEM) 10 mcg Tab Place 1 suppository vaginally at night BIW 8 tablet 11    gabapentin (NEURONTIN) 600 MG tablet Half in the AM and 2 in the  tablet 11    losartan (COZAAR) 25 MG tablet TAKE 1 TABLET BY MOUTH TWICE DAILY 180 tablet 3    memantine (NAMENDA) 5 MG Tab Take 1 tablet (5 mg total) by mouth 2 (two) times daily. 60 tablet 2    multivitamin capsule Take 1 capsule by mouth once daily.      naproxen (NAPROSYN) 250 MG tablet One daily as needed for arthritis pain 30 tablet 1    rosuvastatin (CRESTOR) 10 MG tablet TAKE 1 TABLET(10 MG) BY MOUTH EVERY DAY 90 tablet 3    senna (SENOKOT) 8.6 mg tablet Take 1 tablet by mouth once daily. Twice a day      vitamin D 1000 units Tab Take 1,000 Units by mouth once daily.      doxycycline (VIBRA-TABS) 100 MG tablet Take 1 tablet (100 mg total) by mouth 2 (two) times daily. 14 tablet 0    mupirocin (BACTROBAN) 2 % ointment Apply topically 2 (two) times daily. 22 g 1    traMADoL (ULTRAM) 50 mg tablet Take 1 tablet (50 mg total) by mouth every 4 (four) hours as needed for Pain. 20 tablet 0     Current Facility-Administered Medications   Medication Dose Route Frequency Provider Last Rate Last Admin     triamcinolone acetonide injection 10 mg  10 mg Intradermal Once Christel Ward MD         Facility-Administered Medications Ordered in Other Visits   Medication Dose Route Frequency Provider Last Rate Last Admin    ceFAZolin 2 g in dextrose 5 % in water (D5W) 50 mL IVPB (MB+)  2 g Intravenous On Call Procedure Chacho Colby MD           Review of patient's allergies indicates:   Allergen Reactions    Atorvastatin Other (See Comments)     Elevated LFTs    Bactrim [sulfamethoxazole-trimethoprim] Nausea Only         Review of Systems   Constitutional: Negative for chills and fever.   HENT:  Negative for congestion and hearing loss.    Cardiovascular:  Negative for chest pain and claudication.   Respiratory:  Negative for cough and shortness of breath.    Skin:  Positive for nail changes. Negative for color change and poor wound healing.   Gastrointestinal:  Negative for nausea and vomiting.   Neurological:  Positive for numbness.   Psychiatric/Behavioral:  Negative for altered mental status.         Objective:     Physical Exam  Constitutional:       General: She is not in acute distress.     Appearance: Normal appearance. She is not ill-appearing.   Cardiovascular:      Pulses:           Dorsalis pedis pulses are 2+ on the right side and 2+ on the left side.        Posterior tibial pulses are 2+ on the right side and 2+ on the left side.      Comments: No significant lower extremity edema bilateral.  Skin temp is warm to foot bilateral.  No rubor on dependency bilateral foot.  Musculoskeletal:      Comments: Moderate track bound hallux abductovalgus bilateral overlapping the 2nd toe.  One MTP range motion right foot with 30-40 degrees of dorsiflexion past neutral in up to 10° plantar flexion past neutral.  One MTP range motion of the left foot with near 45° of dorsiflexion and 10-15 degrees of plantar flexion which is more reducible than the right foot.  Dorsal 1st ray excursion left foot is near 1 cm and less  than 1 cm on the right.    Semi-rigid flexion contracture of the right 2nd toe PIPJ and DIPJ.  Flexible mild flexion contractures of the lesser toes 2-4 left and 3-4 right.   Skin:     General: Skin is warm.      Capillary Refill: Capillary refill takes less than 2 seconds.      Findings: No ecchymosis or erythema.      Nails: There is no clubbing.      Comments: Left hallux nail is moderately loosened, thickened, dystrophic with yellow discoloration and moderate underlying debris noting incurvated medial nail border.  There is also enlargement of the distal digital tuft of the left hallux with underlying abnormality to the nailbed.   Neurological:      Mental Status: She is alert.           Assessment:      Encounter Diagnoses   Name Primary?    Onychodystrophy Yes    Onychomycosis     Postoperative state      Plan:     Jalyn was seen today for nail problem.    Diagnoses and all orders for this visit:    Onychodystrophy    Onychomycosis    Postoperative state  -     traMADoL (ULTRAM) 50 mg tablet; Take 1 tablet (50 mg total) by mouth every 4 (four) hours as needed for Pain.    Other orders  -     doxycycline (VIBRA-TABS) 100 MG tablet; Take 1 tablet (100 mg total) by mouth 2 (two) times daily.  -     mupirocin (BACTROBAN) 2 % ointment; Apply topically 2 (two) times daily.      I counseled the patient on her conditions, their implications and medical management.    Reviewed risks, benefits anticipate postop course for left great toe total nail avulsion with phenol and alcohol matrixectomy.  No guarantees were given or implied.  We discussed less than 10% recurrence rate.  Patient elected to proceed with the procedure.  Written expressed consent obtained.    Discussed treatment options for painful ingrown toenails in detail.    Procedure:  Left hallux total nail avulsion with phenol and alcohol matrixectomy   Pathology: none  EBL: < 1 mL  Materials: none  Injectibles:  3 mL 1:1 mixture containing 2% plain lidocaine  and 0.25% plain Marcaine  Complications: none    Procedure in detail: Time out called verifying patient, procedure and toe. Skin prepped with alcohol followed by ethyl chloride application and infiltration of local anesthetic described above into proximal toe. Skin was prepped with betadine. A sterile tourniquet was applied to the toe. Sterile instrumentation was used to excise the affected nail borders of the described toes above. Phenol was then applied with a cotton tip applicator for 30 second intervals x 3. Alcohol was used to dilute the phenol followed by irrigation with saline. The tourniquet was released noting instant return of the toe color and capillary fill time was instant. Bacitracin ointment was applied to the wound followed by gauze secured with coban. Home care instructions reviewed in detail.    The patient tolerated the procedure well without complication.    RTC 2-3 weeks or p.r.n. as discussed    Assisted per Jose Arriaga DPM PGY 2    A portion of this note was generated by voice recognition software and may contain spelling and grammar errors.        .

## 2025-03-25 ENCOUNTER — TELEPHONE (OUTPATIENT)
Dept: PODIATRY | Facility: CLINIC | Age: 75
End: 2025-03-25
Payer: MEDICARE

## 2025-03-25 NOTE — TELEPHONE ENCOUNTER
Spoke with Ms Aaron to discuss self care after nail care procedure. Discussed that she should apply the mupirocin ointment BID to the affected area as directed per Dr Nguyen and complete the course of doxycycline as directed as well. May change dressing in the morning and at bedtime and may use antibacterial soap to cleanse the area. Verbalized understanding. Follow up appt made for April 7 at 9:30 am with Dr Nguyen. No other needs voiced. Call back encouraged if needed.

## 2025-03-25 NOTE — TELEPHONE ENCOUNTER
----- Message from Kelsey sent at 3/25/2025 11:03 AM CDT -----  Type: Patient Call Back Who called:Self  What is the request in detail:Pt stated she have a question about her dressing change  Can the clinic reply by [facility]? No Would the patient rather a call back or a response via [facility]? Call back Best call back number:.[phone]  Additional Information: Thank you.

## 2025-03-26 ENCOUNTER — TELEPHONE (OUTPATIENT)
Dept: GASTROENTEROLOGY | Facility: CLINIC | Age: 75
End: 2025-03-26
Payer: MEDICARE

## 2025-03-26 ENCOUNTER — PATIENT MESSAGE (OUTPATIENT)
Dept: GASTROENTEROLOGY | Facility: CLINIC | Age: 75
End: 2025-03-26
Payer: MEDICARE

## 2025-03-26 NOTE — TELEPHONE ENCOUNTER
Copied from CRM #[id number]. Topic: General Inquiry - Return Call  >> Mar 26, 2025 12:54 PM Shawna wrote:  Pt is returning a missed call from someone in the office and is asking for a return call back soon. Thanks.     Reason for call:MISS CALL      Patient's DX:     Patient requesting call back or [facility] [phone]

## 2025-03-29 ENCOUNTER — PATIENT MESSAGE (OUTPATIENT)
Dept: GASTROENTEROLOGY | Facility: CLINIC | Age: 75
End: 2025-03-29
Payer: MEDICARE

## 2025-03-31 NOTE — PROGRESS NOTES
NEUROPSYCHOLOGICAL EVALUATION - CONFIDENTIAL    Referring Provider: Annemarie Kilgore MD   Medical Necessity: Evaluate cognitive and emotional functioning, participate in treatment planning/management, and provide supportive therapy in the setting of memory loss  Date Conducted: 2025  Present At Visit: the patient   Billin = 48 minutes  Referral Diagnoses: R41.3 (ICD-10-CM) - Memory loss   Consent: The patient expressed an understanding of the purpose of the evaluation and consented to all procedures. We discussed the limits of confidentiality and discussed an emergency plan.    Telemedicine Details:   Audio Only Telehealth Visit   The patient location is: [place]  The chief complaint leading to consultation is: memory loss  Visit type: Virtual visit with audio only (telephone)  Total time spent in medical discussion with patient: 48 minutes  Total time spent on date of the encounter:50 minutes   The reason for the audio only service rather than synchronous audio and video virtual visit was related to technical difficulties or patient preference/necessity.   Each patient to whom I provide medical services by telemedicine is: (1) informed of the relationship between the physician and patient and the respective role of any other health care provider with respect to management of the patient; and (2) notified that they may decline to receive medical services by telemedicine and may withdraw from such care at any time. Patient verbally consented to receive this service via voice-only telephone call.    SUMMARY & PLAN:   Ms. Jalyn Aaron is an 75 y.o., female with 18 years of formal education and pertinent medical history including anxiety, aortic atherosclerosis,cardiac pacemaker, nonischemic cardiomyopathy, SARAI  who was referred for a neuropsychological evaluation in the setting of one year of stable cognitive changes and worsening anxiety.       Full report to follow completion of testing  "(05/12/2025 at 8:30 AM per patient's schedule). Low risk of anticholingeric burden. Concern for impact of anxiety on cognition. Will check for everything, however.       Diagnoses Associated with Visit   1. Anxiety    2. Memory loss        Thank you for allowing me to assist in Ms. Jalyn Aaron's care. If you have any questions, please contact me at [phone].      Madonna Salazar, PhD, ABPP  Board Certified in Clinical Neuropsychology   [facility] - Department of Neurology    CLINICAL INTERVIEW & RECORD REVIEW:     Previous Workup   Cognitive screener(s): none  Previous evaluation(s): none  Neurology Visits: has seen neurology for lumbar stenosis and neuropathy. Has an appointment in June 2025 for memory loss.      No results found for: "PTAU", "ADEVLPHOSTAU", "[id number]", "ADEVLTOTAL", "[id number]", "NEUROLGTCHN", "APGTPE", "APOE"    Cognitive Functioning   Onset of difficulty: maybe over the past year   Course of difficulty: stable  Examples:   Getting distracted more than she used to. Has left the stove on when serving food a few times but has caught it.   Maybe gets a little sidetracked, tries to stick to a list.   Misplaces things a lot. Has always been that way, but it's happening more now.   Maybe more indecisive. Really feels like she lost her confidence.   Lives a more sedentary life. Did yoga for many years but stopped going and has seen her physical health declining. Thinks this would be a big help.   Just got back from a 15 day [employer] cruise, worried she may lose something, but didn't lose anything. Did a bunch of excursions. Noticed she has more energy than she thought so plans to get back to the gym.   Forgetful more often. Some may be anxiety-related. Driving and may miss a turn,   More trouble with talking in groups. Couldn't remember what they did the day before to share in the conversation.   Can be with people she is real comfortable with, so knows some is " "anxiety.   Some trouble recalling the name of the last book she read.   She was paying the bill at a restaurant and got nervous about leaving the tip and started questioning herself.     Exacerbating factors: anxiety, feeling more anxious about her health   Ameliorating factors: none  Medication for cognition: memantine 5 mg, maybe it's helping, she is unsure    Daily Functioning    Bathing: independent and without difficulty  Dressing: independent and without difficulty  Grooming: independent and without difficulty  Toileting: independent and without difficulty  Transferring: independent and without difficulty.  Eating: independent and without difficulty.    Finances: independent and without difficulty  Medication Mgmt: independent and without difficulty  Driving: sometimes misses a turn when she is driving. Better when she is alone in the car. Never getting totally lost or not recognizing where she is.  Household Mgmt: has never been great with this  Cooking/Meal Preparation: Has left the stove on when serving food a few times but has caught it.   Shopping: independent and without difficulty.  Appointment Mgmt: independent and without difficulty    Psychiatric/Neuropsychiatric Symptoms   Mood: "it's been up and down."   Depression: gets down sometimes but feels she struggles with anxiety not depression. Has a strong kita and relies on it for coping. Very good friends, , dogs. Has a good life.   Veronica/Hypomania: no  Anxiety/Stress: yes, increased. Was very stressed with this trip and some of the things that have been going on. Has a friend coming, excited but stressed about it. Always been a worrier to an extent, but used to be confident and now feels she has lost her confidence over the past year as challenges started to be presented to her. Not handling things the same way she used to.   Social Withdrawal: no  Neurovegetative Sxs:  Appetite: had lost weight, she and [place] unsure why. They think she " has lost muscle. Eating a lot now and not gaining weight, not losing weight   Sleep: usually it's good but it can be bad if she is worried about something. Maybe once a month. Has mild sleep apnea and was on a CPAP machine, but had an injury and 70% of one of her nostrils is blocked. Also has a scalp disease that is on her crown so wearing the mask causes pain. Looking at another oral device that may help. About a year ago, tried for 6 months and turned it back in. If she does have surgery to correct her breathing, she could maybe try again.    Energy: it's not as good now, but does know she has more energy than she realized.   Hallucinations: no  Delusional/Paranoid Thinking: no  Impulsivity: no  Obsessive/Compulsive Behaviors: no  Disinhibition: no  Irritability/Agitation: no  Aggression: no  Apathy/Indifference: no  Problems with Empathy: no  Other changes in personality: no    Physical Functioning   Tremor: no  Difficulty walking: no  Imbalance: a little unsteady when she first gets up and start going. Just had toenail removed from big toe.   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    Past Surgical History:   Procedure Laterality Date    BREAST BIOPSY Left 12/2013    papilloma with atypia on core biopsy    BREAST BIOPSY Left 01/2014    Ex.Bx., removal of ADH/Papilloma    COLONOSCOPY N/A 11/8/2016     Procedure: COLONOSCOPY;  Surgeon: Dl Caruso MD;  Location: [facility] (4TH FLR);  Service: Endoscopy;  Laterality: N/A;  Pacemaker in place.    COLONOSCOPY N/A 3/30/2021    Procedure: COLONOSCOPY;  Surgeon: Joaquin Johnson MD;  Location: [facility] (4TH FLR);  Service: Endoscopy;  Laterality: N/A;  prep ins. emailed - COVID screening on 3/27/21 PCW - ERW    ENDOSCOPIC ULTRASOUND OF UPPER GASTROINTESTINAL TRACT N/A 10/9/2023    Procedure: ULTRASOUND, UPPER GI TRACT, ENDOSCOPIC;  Surgeon: Stacey Sesay MD;  Location: [facility] (2ND FLR);  Service: Endoscopy;  Laterality: N/A;  pancreas cyst 7mm on CT (r/o nodule or worrisome features)    8/4/23-Instructions via portal-DS  10/2-precall complete-MS    ESOPHAGOGASTRODUODENOSCOPY N/A 3/19/2024    Procedure: EGD (ESOPHAGOGASTRODUODENOSCOPY);  Surgeon: Mynor Watts MD;  Location: [facility] (2ND FLR);  Service: Endoscopy;  Laterality: N/A;  moved to 2nd floor endoscopy per recommendations of crna  pt has    HYSTERECTOMY      IMPLANTATION OF BIVENTRICULAR PERMANENT PACEMAKER AS UPGRADE TO EXISTING PACEMAKER N/A 10/26/2023    Procedure: Biventricular pacemaker upgrade;  Surgeon: Chacho Colby MD;  Location: [facility] EP LAB;  Service: Cardiology;  Laterality: N/A;  CHB, CRT-P upgrade, MDT, MAC, SK, 3 Prep *MDT DC PPM in situ*    INSERT / REPLACE / REMOVE PACEMAKER      LASIK      LASIK Bilateral 2009    VENOGRAM, EP LAB N/A 12/20/2022    Procedure: Venogram, EP Lab;  Surgeon: Chacho Colby MD;  Location: [facility] EP LAB;  Service: Cardiology;  Laterality: N/A;  NICM, Venogram- left side, SK, 3 Prep *MDT PPM in situ*     Neurological History    Headaches/Migraines: no  TBI: 6 y/o - does remember she was in the garage in the cabinet and she fell from one of the cabinets. His her head and eyes.   Seizures: no  Stroke: no  Tumor: no  Previous Episodes of Delirium: no  Movement Disorder: no  CNS Infection: no  Other: no    Neurodiagnostics     Results for orders placed or performed  during the hospital encounter of 11/11/24   MRI Brain W WO Contrast    Narrative    EXAMINATION:  MRI BRAIN W WO CONTRAST    CLINICAL HISTORY:  Memory loss; Other amnesia    TECHNIQUE:  Multiplanar multisequence MR imaging of the brain was performed before and after the administration of 6 mL Gadavist intravenous contrast.    COMPARISON:  CT head 01/04/2023    FINDINGS:  Ventricles are stable in size.  No hydrocephalus.    The brain appears within normal limits for age.  No evidence of recent or remote major vascular distribution infarct. No evidence of recent or remote hemorrhage.  No focal edema or significant intracranial mass effect.  No abnormal post-contrast parenchymal or leptomeningeal enhancement.    No extra-axial blood or fluid collections.    The T2 skull base flow voids are preserved.    Bone marrow signal intensity is unremarkable.      Impression    No evidence of acute intracranial pathology.      Electronically signed by: Kiran Jaramillo MD  Date:    11/11/2024  Time:    14:24   Results for orders placed or performed during the hospital encounter of 01/04/23   CT Head Without Contrast    Narrative    EXAMINATION:  CT HEAD WITHOUT CONTRAST    CLINICAL HISTORY:  Memory loss; Other amnesia    TECHNIQUE:  Low dose axial CT images obtained throughout the head without the use of intravenous contrast.  Axial, sagittal and coronal reconstructions were performed.    COMPARISON:  None.    FINDINGS:  Intracranial compartment:    Ventricles and sulci are normal in size for age without evidence of hydrocephalus.    The brain parenchyma appears within normal limits.  No parenchymal  hemorrhage, edema, mass effect or major vascular distribution infarct.    No extra-axial blood or fluid collections.    Skull/extracranial contents (limited evaluation):    No displaced calvarial fracture.    The mastoid air cells and visualized paranasal sinuses are essentially clear.      Impression    No evidence of acute  "intracranial pathology.      Electronically signed by: Kiran Jaramillo MD  Date:    01/04/2023  Time:    14:07     *Note: Due to a large number of results and/or encounters for the requested time period, some results have not been displayed. A complete set of results can be found in Results Review.     Pertinent Lab Work     Lab Results   Component Value Date    [id number] 651 08/12/2024     Lab Results   Component Value Date    RPR Non-reactive 12/29/2022     Lab Results   Component Value Date    FOLATE 14.0 10/01/2020     Lab Results   Component Value Date    TSH 1.099 08/12/2024     Lab Results   Component Value Date    HGBA1C 5.5 12/29/2023     No results found for: "HIV1X2", "[id number]"  No results found for: "PTAU", "ADEVLPHOSTAU", "UVGR262", "ADEVLTOTAL", "[id number]", "NEUROLGTCHN", "APGTPE", "APOE"    Medications   Current Medications[1]     Psychiatric History   Prior Diagnoses: anxiety  History of Trauma/Abuse: no  History of Suicide Attempts: no  Current Suicidal Ideation, Intention, or Plan: no  Current Homicidal Ideation, Intention, or Plan: no  Medication(s): buspirone, going to ask if she can cut back on that.   Hospitalization(s): no  Psychotherapy/Counseling: yes, completed talk therapy in the past   Other: no    Substance Use History   Ms. Aaron  reports that she has never smoked. She has never been exposed to tobacco smoke. She has never used smokeless tobacco. She reports that she does not currently use alcohol after a past usage of about 1.0 standard drink of alcohol per week. She reports that she does not use drugs.   History of abuse/overuse: no    Family Neurological & Psychiatric History     family history includes Breast cancer in her mother; Cancer (age of onset: 80) in her mother; Cataracts in her mother; Hypertension in her mother; Multiple sclerosis in her father and sister; No Known Problems in her brother, maternal aunt, maternal grandmother, maternal uncle, paternal aunt, " paternal grandfather, paternal grandmother, paternal uncle, son, and son; Prostate cancer in her maternal grandfather.  Neurologic: Negative for heritable risk factors.   Psychiatric: Negative for heritable risk factors.    Development  Education   Born & raised: [place]  Prenatal and  development: wnl  Developmental milestones: wnl  Language Acquisition: English first language  Level Attained: masters in social work   Learning/Attention/Behavior Difficulties: has always had trouble with her memory and done things to help.   Repeated Grade(s): no          Occupation  Social   Occupational Status: Retired   Primary Occupation:    Family Status:  to her second  for 6 years. 2 adult children, 1 late (son  almost 20 years ago)  Support System: good - , friends, Religion family, son  Hobbies/Activities: Religion activities, walking dogs, likes to read, loves to paint  Current Living Situation: lives at home with her  and dogs     OBJECTIVE:     Mental Status and Observations  Appearance: Unable to assess   Alertness: Attentive and alert.   Orientation:   O x 4    Gait:  Unable to assess   Psychomotor:  Unable to assess   Handedness:  Right   Vision & Hearing:  Adequate for session   Speech/language: Normal in rate, rhythm, tone, and volume. No significant word finding difficulty observed. Comprehension was normal.   Mood/Affect:  The patient's mood and affect were congruent and euthymic.    Interpersonal Behavior:  Rapport was quickly and easily established    Suicidality/Homicidality: Denied   Hallucinations/Delusions:  None evidenced or endorsed   Thought Content: Logical   Though Processes: Goal-directed   Insight & Judgment:  Appropriate   Participation in Interview:  Full     Procedures/Tests Administered    Performed a review of pertinent medical records, reviewed limits to confidentiality, conducted a clinical interview, and explained procedures.                 This  service was not originating from a related E/M service provided within the previous 7 days nor will  to an E/M service or procedure within the next 24 hours or my soonest available appointment.  Prevailing standard of care was able to be met in this audio-only visit.                 [1]   Current Outpatient Medications:     busPIRone (BUSPAR) 15 MG tablet, Take 1 tablet (15 mg total) by mouth 2 (two) times daily., Disp: 60 tablet, Rfl: 3    carvediloL (COREG) 3.125 MG tablet, Take 1 tablet (3.125 mg total) by mouth 2 (two) times daily., Disp: 180 tablet, Rfl: 3    clobetasol 0.05% (TEMOVATE) 0.05 % Oint, AAA scalp bid x 1 month - after warm water compress, Disp: 60 g, Rfl: 0    doxycycline (VIBRA-TABS) 100 MG tablet, Take 1 tablet (100 mg total) by mouth 2 (two) times daily., Disp: 14 tablet, Rfl: 0    estradioL (VAGIFEM) 10 mcg Tab, Place 1 suppository vaginally at night BIW, Disp: 8 tablet, Rfl: 11    gabapentin (NEURONTIN) 600 MG tablet, Half in the AM and 2 in the PM, Disp: 135 tablet, Rfl: 11    losartan (COZAAR) 25 MG tablet, TAKE 1 TABLET BY MOUTH TWICE DAILY, Disp: 180 tablet, Rfl: 3    memantine (NAMENDA) 5 MG Tab, Take 1 tablet (5 mg total) by mouth 2 (two) times daily., Disp: 60 tablet, Rfl: 2    multivitamin capsule, Take 1 capsule by mouth once daily., Disp: , Rfl:     mupirocin (BACTROBAN) 2 % ointment, Apply topically 2 (two) times daily., Disp: 22 g, Rfl: 1    naproxen (NAPROSYN) 250 MG tablet, One daily as needed for arthritis pain, Disp: 30 tablet, Rfl: 1    rosuvastatin (CRESTOR) 10 MG tablet, TAKE 1 TABLET(10 MG) BY MOUTH EVERY DAY, Disp: 90 tablet, Rfl: 3    senna (SENOKOT) 8.6 mg tablet, Take 1 tablet by mouth once daily. Twice a day, Disp: , Rfl:     traMADoL (ULTRAM) 50 mg tablet, Take 1 tablet (50 mg total) by mouth every 4 (four) hours as needed for Pain., Disp: 20 tablet, Rfl: 0    vitamin D 1000 units Tab, Take 1,000 Units by mouth once daily., Disp: , Rfl:     Current  Facility-Administered Medications:     triamcinolone acetonide injection 10 mg, 10 mg, Intradermal, Once, Christel Ward MD    Facility-Administered Medications Ordered in Other Visits:     ceFAZolin 2 g in dextrose 5 % in water (D5W) 50 mL IVPB (MB+), 2 g, Intravenous, On Call Procedure, Chacho Colby MD

## 2025-04-01 ENCOUNTER — PATIENT MESSAGE (OUTPATIENT)
Dept: PODIATRY | Facility: CLINIC | Age: 75
End: 2025-04-01
Payer: MEDICARE

## 2025-04-01 ENCOUNTER — TELEPHONE (OUTPATIENT)
Dept: PODIATRY | Facility: CLINIC | Age: 75
End: 2025-04-01
Payer: MEDICARE

## 2025-04-01 NOTE — TELEPHONE ENCOUNTER
"Spoke Ms Aaron to discuss that she should continue the mupirocin BID and that some dry scabbing is normal and if phenol was used some redness is normal, but warmth streaking redness and purulent drainage is not normal and would indicate infection which she denies. Instructed her if possible to upload a photo over the portal for us to review. Did express some concern that she is having a little bit of numbness to her toe, but does report she has a "pinched nerve as well." Pt has an appt slated for 4/7/25 at 9:30 am and encouraged her to keep upcoming appt date and time. No other needs voiced at this time. Call back encouraged if needed.  "

## 2025-04-01 NOTE — TELEPHONE ENCOUNTER
----- Message from Mary sent at 4/1/2025  1:35 PM CDT -----  Type:  Patient Returning CallWho Called:Pt Would the patient rather a call back or a response via [facility]? Call back Best Call Back Number:[phone] Information: calling to ask  a question about taking care of her foot after having her toenail removed by Dr Nguyen

## 2025-04-01 NOTE — TELEPHONE ENCOUNTER
----- Message from Jagruti sent at 4/1/2025  4:42 PM CDT -----  Type:  Needs Medical Advice/2nd attempt Who Called: ptWould the patient rather a call back or a response via [facility]? Call Best Call Back Number: [phone] Information: pt requesting to discuss after surgery wound care would like to know if she's doing it correctly scared of infection.

## 2025-04-02 ENCOUNTER — OFFICE VISIT (OUTPATIENT)
Dept: NEUROLOGY | Facility: CLINIC | Age: 75
End: 2025-04-02
Payer: MEDICARE

## 2025-04-02 DIAGNOSIS — R41.3 MEMORY LOSS: ICD-10-CM

## 2025-04-02 DIAGNOSIS — F41.9 ANXIETY: Primary | ICD-10-CM

## 2025-04-03 ENCOUNTER — CLINICAL SUPPORT (OUTPATIENT)
Dept: AUDIOLOGY | Facility: CLINIC | Age: 75
End: 2025-04-03
Payer: MEDICARE

## 2025-04-03 ENCOUNTER — OFFICE VISIT (OUTPATIENT)
Dept: OTOLARYNGOLOGY | Facility: CLINIC | Age: 75
End: 2025-04-03
Payer: MEDICARE

## 2025-04-03 DIAGNOSIS — H93.13 TINNITUS, BILATERAL: ICD-10-CM

## 2025-04-03 DIAGNOSIS — H90.3 SENSORINEURAL HEARING LOSS, BILATERAL: Primary | ICD-10-CM

## 2025-04-03 DIAGNOSIS — H93.19 TINNITUS, UNSPECIFIED LATERALITY: ICD-10-CM

## 2025-04-03 PROCEDURE — 99999 PR PBB SHADOW E&M-EST. PATIENT-LVL III: CPT | Mod: PBBFAC,,, | Performed by: OTOLARYNGOLOGY

## 2025-04-03 PROCEDURE — 92567 TYMPANOMETRY: CPT | Mod: S$GLB,,, | Performed by: AUDIOLOGIST

## 2025-04-03 PROCEDURE — 99999 PR PBB SHADOW E&M-EST. PATIENT-LVL I: CPT | Mod: PBBFAC,,, | Performed by: AUDIOLOGIST

## 2025-04-03 PROCEDURE — 92557 COMPREHENSIVE HEARING TEST: CPT | Mod: S$GLB,,, | Performed by: AUDIOLOGIST

## 2025-04-03 NOTE — PROGRESS NOTES
Jalyn Aaron, a 75 y.o. female, was seen today in the clinic for an audiologic evaluation.  The patient's main complaint was hearing loss and tinnitus.  Mrs. Aaron reported that she has difficulty hearing the TV at volume levels that are comfortable for others.  She reported constant bilateral tinnitus that she describes as white noise.  Pt denied otalgia, aural fullness, and vertigo.    Tympanometry revealed Type A in the right ear and Type A in the left ear. Audiogram results revealed normal sloping to moderate-severe sensorineural hearing loss (SNHL) in the right ear and normal sloping to moderate-severe SNHL in the left ear.  Speech reception thresholds were noted at 15 dB in the right ear and 10 dB in the left ear.  Speech discrimination scores were 96% in the right ear and 92% in the left ear.    Recommendations:  Otologic evaluation  Annual audiogram  Hearing protection when in noise  TV ears for use with watching TV

## 2025-04-03 NOTE — PROGRESS NOTES
"  Ear, Nose, & Throat  Otolaryngology - Head & Neck Surgery    Summary of Visit:  Jalyn Aaron is a kind patient who was referred to me by Dr. Annemarie Kilgore in consultation for Tinnitus and Hearing Loss      Subjective:     Chief Complaint:   Chief Complaint   Patient presents with    Tinnitus    Hearing Loss       Jalyn Aaron is a 75 y.o. female who was referred to me by Dr. Annemarie Kilgore in consultation for tinnitus. She describes a "white noise" sound in both ears for many years. This has slowly been increasing in intensity. She also reports decreased hearing in both ears, especially noticeable when watching TV. She denies any prior ear surgeries or ear infections.     Past Medical History  Active Ambulatory Problems     Diagnosis Date Noted    Atrioventricular block, complete     Cardiac pacemaker in situ 07/26/2013    Anxiety 07/18/2014    Intraductal papilloma of breast 06/06/2018    Ejection fraction < 50% 09/23/2020    Cardiomyopathy, nonischemic 10/12/2020    Lumbar stenosis 01/04/2021    Screening for colon cancer 03/30/2021    Disorder of muscle 05/05/2021    Decreased range of motion of trunk and back 01/25/2022    Decreased strength of trunk and back 01/25/2022    Weakness of left lower extremity 01/25/2022    Neuropathy 03/11/2022    Decreased ROM of neck 05/31/2022    Decreased strength of upper extremity 05/31/2022    Atherosclerosis of aorta 01/25/2023    Snoring 06/14/2023    SARAI (obstructive sleep apnea) 06/30/2023     Resolved Ambulatory Problems     Diagnosis Date Noted    Neck pain 10/02/2013    Papilloma of breast 12/16/2013    Breast mass 01/03/2014    Hallux valgus 04/01/2015    Screening 11/08/2016    Pain 12/02/2020    Discoid lupus erythematosus 05/01/2023     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    AV block     Cataract     Fever blister     Heart block     History of acne     Joint pain  "    Keratoconjunctivitis sicca not due to Sjogren's syndrome     Pacemaker     Sleep apnea, unspecified        Past Surgical History  She has a past surgical history that includes Hysterectomy; LASIK; Insert / replace / remove pacemaker; Colonoscopy (N/A, 11/8/2016); Breast biopsy (Left, 12/2013); Breast biopsy (Left, 01/2014); LASIK (Bilateral, 2009); Colonoscopy (N/A, 3/30/2021); venogram, ep lab (N/A, 12/20/2022); Endoscopic ultrasound of upper gastrointestinal tract (N/A, 10/9/2023); Implantation of biventricular permanent pacemaker as upgrade to existing pacemaker (N/A, 10/26/2023); and Esophagogastroduodenoscopy (N/A, 3/19/2024).    Past Surgical History:   Procedure Laterality Date    BREAST BIOPSY Left 12/2013    papilloma with atypia on core biopsy    BREAST BIOPSY Left 01/2014    Ex.Bx., removal of ADH/Papilloma    COLONOSCOPY N/A 11/8/2016    Procedure: COLONOSCOPY;  Surgeon: Dl Caruso MD;  Location: [facility] (4TH FLR);  Service: Endoscopy;  Laterality: N/A;  Pacemaker in place.    COLONOSCOPY N/A 3/30/2021    Procedure: COLONOSCOPY;  Surgeon: Joaquin Johnson MD;  Location: [facility] (4TH FLR);  Service: Endoscopy;  Laterality: N/A;  prep ins. emailed - COVID screening on 3/27/21 PCW - ERW    ENDOSCOPIC ULTRASOUND OF UPPER GASTROINTESTINAL TRACT N/A 10/9/2023    Procedure: ULTRASOUND, UPPER GI TRACT, ENDOSCOPIC;  Surgeon: Stacey Sesay MD;  Location: [facility] ENDO (2ND FLR);  Service: Endoscopy;  Laterality: N/A;  pancreas cyst 7mm on CT (r/o nodule or worrisome features)    8/4/23-Instructions via portal-DS  10/2-precall complete-MS    ESOPHAGOGASTRODUODENOSCOPY N/A 3/19/2024    Procedure: EGD (ESOPHAGOGASTRODUODENOSCOPY);  Surgeon: Mynor Watts MD;  Location: [facility] ENDO (2ND FLR);  Service: Endoscopy;  Laterality: N/A;  moved to 2nd floor endoscopy per recommendations of crna  pt has    HYSTERECTOMY      IMPLANTATION OF BIVENTRICULAR PERMANENT PACEMAKER AS UPGRADE TO EXISTING PACEMAKER N/A 10/26/2023     Procedure: Biventricular pacemaker upgrade;  Surgeon: Chacho Colby MD;  Location: [facility] EP LAB;  Service: Cardiology;  Laterality: N/A;  CHB, CRT-P upgrade, MDT, MAC, SK, 3 Prep *MDT DC PPM in situ*    INSERT / REPLACE / REMOVE PACEMAKER      LASIK      LASIK Bilateral 2009    VENOGRAM, EP LAB N/A 12/20/2022    Procedure: Venogram, EP Lab;  Surgeon: Chacho Colby MD;  Location: [facility] EP LAB;  Service: Cardiology;  Laterality: N/A;  NICM, Venogram- left side, SK, 3 Prep *MDT PPM in situ*        Family History  Her family history includes Breast cancer in her mother; Cancer (age of onset: 80) in her mother; Cataracts in her mother; Hypertension in her mother; Multiple sclerosis in her father and sister; No Known Problems in her brother, maternal aunt, maternal grandmother, maternal uncle, paternal aunt, paternal grandfather, paternal grandmother, paternal uncle, son, and son; Prostate cancer in her maternal grandfather.    Social History  She reports that she has never smoked. She has never been exposed to tobacco smoke. She has never used smokeless tobacco. She reports that she does not currently use alcohol after a past usage of about 1.0 standard drink of alcohol per week. She reports that she does not use drugs.    Allergies  She is allergic to atorvastatin and bactrim [sulfamethoxazole-trimethoprim].    Medications  She has a current medication list which includes the following prescription(s): buspirone, carvedilol, clobetasol 0.05%, doxycycline, estradiol, gabapentin, losartan, memantine, multivitamin, mupirocin, naproxen, rosuvastatin, senna, tramadol, and vitamin d, and the following Facility-Administered Medications: ceFAZolin 2 g in dextrose 5 % in water (D5W) 50 mL IVPB (MB+) and triamcinolone acetonide.    ROS:  Pertinent positive and negative review of systems as noted in HPI.     Objective:     There were no vitals taken for this visit.   General Appearance:   Awake, Alert and Oriented. NAD. Appropriate  affect and appearance      Neuro:   Spontaneous eye opening, appropriate verbal responses, follows commands  Pupils equal, round & brisk. EOMI, no proptosis, no spontaneous nystagmus  Face is symmetric, HB I, non-edematous and SILT bilaterally  Vision grossly intact, Hearing grossly intact  CHLOE, normal tone     Head and Face:   skin is intact, facial movement symmetric     Ears:  Periauricular regions non-erythematous, non-fluctuanct non-tender  Pinna normal bilaterally, no skin lesions  EACs patent and without drainage bilaterally   Tympanic membranes are normal in appearance bilaterally.  No middle ear effusion noted bilaterally.    Nose:   External nose is symmetric, no skin lesions  Septum midline, No inferior turbinate hypertrophy, No polyps or rhinorrhea     OC/OP:  Tongue midline on extension, non-edematous, soft  No labial, buccal, oral tongue or floor of mouth lesions  Soft palate symmetric, midline and without lesions or masses, tonsils symmetric  No masses or lesions of the visualized oropharynx     Neck:  Neck is symmetric, non-edematous, non-erythematous  Trachea is midline and easily palpable,  No palpable adenopathy or masses in levels I-VI     Glands:  Parotid and submandibular glands are symmetric and non-tender.   No thyroid nodules or masses, non-tender      Respiratory:  Normal work of breathing, no accessory muscle use, no stridor     Voice:  Normal vocal quality, volume, prosody and articulation      Data Review:     AUDIO    Bilateral high frequency SNHL, type A tymps, WRS 92% and 96%      Procedures:   None    Assessment:     1. Tinnitus, unspecified laterality        Plan:     I had a long discussion with the patient regarding her condition and the further workup and management options. We discussed the option for open mold hearing aids to help with high frequency loss and masking tinnitus. She has serviceable low frequency hearing overall and her hearing only bothers her while watching TV.      No orders of the defined types were placed in this encounter.         Problem List Items Addressed This Visit    None  Visit Diagnoses         Tinnitus, unspecified laterality

## 2025-04-07 ENCOUNTER — OFFICE VISIT (OUTPATIENT)
Dept: PODIATRY | Facility: CLINIC | Age: 75
End: 2025-04-07
Payer: MEDICARE

## 2025-04-07 VITALS — DIASTOLIC BLOOD PRESSURE: 73 MMHG | SYSTOLIC BLOOD PRESSURE: 109 MMHG | HEART RATE: 59 BPM

## 2025-04-07 DIAGNOSIS — S91.209D NAIL AVULSION, TOE, SUBSEQUENT ENCOUNTER: Primary | ICD-10-CM

## 2025-04-07 DIAGNOSIS — S91.102A OPEN WOUND OF GREAT TOE, LEFT, INITIAL ENCOUNTER: ICD-10-CM

## 2025-04-07 PROCEDURE — 1126F AMNT PAIN NOTED NONE PRSNT: CPT | Mod: CPTII,S$GLB,, | Performed by: PODIATRIST

## 2025-04-07 PROCEDURE — 1101F PT FALLS ASSESS-DOCD LE1/YR: CPT | Mod: CPTII,S$GLB,, | Performed by: PODIATRIST

## 2025-04-07 PROCEDURE — 4010F ACE/ARB THERAPY RXD/TAKEN: CPT | Mod: CPTII,S$GLB,, | Performed by: PODIATRIST

## 2025-04-07 PROCEDURE — 3288F FALL RISK ASSESSMENT DOCD: CPT | Mod: CPTII,S$GLB,, | Performed by: PODIATRIST

## 2025-04-07 PROCEDURE — 3074F SYST BP LT 130 MM HG: CPT | Mod: CPTII,S$GLB,, | Performed by: PODIATRIST

## 2025-04-07 PROCEDURE — 99212 OFFICE O/P EST SF 10 MIN: CPT | Mod: S$GLB,,, | Performed by: PODIATRIST

## 2025-04-07 PROCEDURE — 99999 PR PBB SHADOW E&M-EST. PATIENT-LVL III: CPT | Mod: PBBFAC,,, | Performed by: PODIATRIST

## 2025-04-07 PROCEDURE — 1157F ADVNC CARE PLAN IN RCRD: CPT | Mod: CPTII,S$GLB,, | Performed by: PODIATRIST

## 2025-04-07 PROCEDURE — 1160F RVW MEDS BY RX/DR IN RCRD: CPT | Mod: CPTII,S$GLB,, | Performed by: PODIATRIST

## 2025-04-07 PROCEDURE — 3078F DIAST BP <80 MM HG: CPT | Mod: CPTII,S$GLB,, | Performed by: PODIATRIST

## 2025-04-07 PROCEDURE — 1159F MED LIST DOCD IN RCRD: CPT | Mod: CPTII,S$GLB,, | Performed by: PODIATRIST

## 2025-04-07 NOTE — PROGRESS NOTES
Subjective:     Patient ID: Jalyn Aaron is a 75 y.o. female.    Chief Complaint: Ingrown Toenail (F/U left foot ingrown toenail)    Presents today with chief complaint of discomfort to the right 3rd toenail.  She also has a history of a thickened, discolored left great toenail that does not grow out completely.  History of bunion surgery to the left foot in 2007 and right foot 2015 per .  Change her shoes around in order to wear longer shoes help with any discomfort to her toes.  History of spinal stenosis causing some lost sensation/numbness to the left calf region.    10/14/2024: Returns to review x-rays taken of both feet and to discuss the left great toe.  She was brought 3 pairs of different tennis shoes to look at and assess for sizing.    02/10/2025:  Returns complaining of persistent discomfort around the left great toenail.  She has a pending cruise in 2 weeks.  Relates that the discomfort to left great toe is most likely from her alignment of the left great toe.  Inquiring about potential surgical intervention for both feet.    03/24/2025:  Presents for left great toe nail avulsion with phenol and alcohol matrixectomy.  Accompanied by her .    04/07/2025:  Status post total nail avulsion left great toe with phenol and alcohol matrixectomy.  Doing very well.  No significant pain reported.  She has been wearing sandals with a Band-Aid and sock.  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 ENDO (4TH FLR);  Service: Endoscopy;  Laterality: N/A;  Pacemaker in place.    COLONOSCOPY N/A 3/30/2021    Procedure: COLONOSCOPY;  Surgeon: Joaquin Johnson MD;  Location: [facility] ENDO (4TH FLR);  Service: Endoscopy;  Laterality: N/A;  prep ins. emailed - COVID screening on 3/27/21 PCW - ERW    ENDOSCOPIC ULTRASOUND OF UPPER GASTROINTESTINAL TRACT N/A 10/9/2023    Procedure: ULTRASOUND, UPPER GI TRACT, ENDOSCOPIC;  Surgeon: Stacey Sesay MD;  Location: [facility] (2ND FLR);  Service: Endoscopy;  Laterality: N/A;  pancreas cyst 7mm on CT (r/o nodule or worrisome features)    8/4/23-Instructions via portal-DS  10/2-precall complete-MS    ESOPHAGOGASTRODUODENOSCOPY N/A 3/19/2024    Procedure: EGD (ESOPHAGOGASTRODUODENOSCOPY);  Surgeon: Mynor Watts MD;  Location: [facility] ENDO (2ND FLR);  Service: Endoscopy;  Laterality: N/A;  moved to 2nd floor endoscopy per recommendations of crna  pt has    HYSTERECTOMY      IMPLANTATION OF BIVENTRICULAR PERMANENT PACEMAKER AS UPGRADE TO EXISTING PACEMAKER N/A 10/26/2023    Procedure: Biventricular pacemaker upgrade;  Surgeon: Chacho Colby MD;  Location: [facility] EP LAB;  Service: Cardiology;  Laterality: N/A;  CHB, CRT-P upgrade, MDT, MAC, SK, 3 Prep *MDT DC PPM in situ*    INSERT / REPLACE / REMOVE PACEMAKER      LASIK      LASIK Bilateral 2009    VENOGRAM, EP LAB N/A 12/20/2022    Procedure: Venogram, EP Lab;  Surgeon: Chacho Colby MD;  Location: [facility] EP LAB;  Service: Cardiology;  Laterality: N/A;  NICM, Venogram- left side, SK, 3 Prep *MDT PPM in situ*       Family History   Problem Relation Name Age of Onset    Breast cancer Mother          Paget's dz    Cancer Mother  80        pagets    Cataracts Mother      Hypertension Mother      Multiple sclerosis Father Devyn Love     Multiple sclerosis Sister  Lulu Pratt     No Known Problems Maternal Aunt      No Known Problems Maternal Uncle      No Known Problems Paternal Aunt      No Known Problems Paternal Uncle      No Known Problems Maternal Grandmother      Prostate cancer Maternal Grandfather      No Known Problems Paternal Grandmother      No Known Problems Paternal Grandfather      No Known Problems Brother      No Known Problems Son      No Known Problems Son      Amblyopia Neg Hx      Blindness Neg Hx      Diabetes Neg Hx      Glaucoma Neg Hx      Macular degeneration Neg Hx      Retinal detachment Neg Hx      Strabismus Neg Hx      Stroke Neg Hx      Thyroid disease Neg Hx      Melanoma Neg Hx      Ovarian cancer Neg Hx         Social History     Socioeconomic History    Marital status:     Number of children: 1   Occupational History    Occupation:      Employer: [employer]   Tobacco Use    Smoking status: Never     Passive exposure: Never    Smokeless tobacco: Never   Substance and Sexual Activity    Alcohol use: Not Currently     Alcohol/week: 1.0 standard drink of alcohol     Types: 1 Glasses of wine per week     Comment: weekly    Drug use: No    Sexual activity: Not Currently     Partners: Male     Birth control/protection: None   Other Topics Concern    Are you pregnant or think you may be? No    Breast-feeding No   Social History Narrative     (case management) at [employer]     Social Drivers of Health     Financial Resource Strain: Low Risk  (1/11/2025)    Overall Financial Resource Strain (CARDIA)     Difficulty of Paying Living Expenses: Not hard at all   Food Insecurity: No Food Insecurity (1/11/2025)    Hunger Vital Sign     Worried About Running Out of Food in the Last Year: Never true     Ran Out of Food in the Last Year: Never true   Transportation Needs: No Transportation Needs (1/6/2024)    PRAPARE - Transportation     Lack of Transportation (Medical): No     Lack of Transportation  (Non-Medical): No   Physical Activity: Sufficiently Active (1/11/2025)    Exercise Vital Sign     Days of Exercise per Week: 7 days     Minutes of Exercise per Session: 30 min   Stress: Stress Concern Present (1/11/2025)    [employer] [place] [employer] - Occupational Stress Questionnaire     Feeling of Stress : To some extent   Housing Stability: Low Risk  (1/6/2024)    Housing Stability Vital Sign     Unable to Pay for Housing in the Last Year: No     Number of Places Lived in the Last Year: 1     Unstable Housing in the Last Year: No       Current Outpatient Medications   Medication Sig Dispense Refill    busPIRone (BUSPAR) 15 MG tablet Take 1 tablet (15 mg total) by mouth 2 (two) times daily. 60 tablet 3    carvediloL (COREG) 3.125 MG tablet Take 1 tablet (3.125 mg total) by mouth 2 (two) times daily. 180 tablet 3    clobetasol 0.05% (TEMOVATE) 0.05 % Oint AAA scalp bid x 1 month - after warm water compress 60 g 0    estradioL (VAGIFEM) 10 mcg Tab Place 1 suppository vaginally at night BIW 8 tablet 11    gabapentin (NEURONTIN) 600 MG tablet Half in the AM and 2 in the  tablet 11    losartan (COZAAR) 25 MG tablet TAKE 1 TABLET BY MOUTH TWICE DAILY 180 tablet 3    memantine (NAMENDA) 5 MG Tab Take 1 tablet (5 mg total) by mouth 2 (two) times daily. 60 tablet 2    multivitamin capsule Take 1 capsule by mouth once daily.      mupirocin (BACTROBAN) 2 % ointment Apply topically 2 (two) times daily. 22 g 1    naproxen (NAPROSYN) 250 MG tablet One daily as needed for arthritis pain 30 tablet 1    rosuvastatin (CRESTOR) 10 MG tablet TAKE 1 TABLET(10 MG) BY MOUTH EVERY DAY 90 tablet 3    senna (SENOKOT) 8.6 mg tablet Take 1 tablet by mouth once daily. Twice a day      traMADoL (ULTRAM) 50 mg tablet Take 1 tablet (50 mg total) by mouth every 4 (four) hours as needed for Pain. 20 tablet 0    vitamin D 1000 units Tab Take 1,000 Units by mouth once daily.      doxycycline (VIBRA-TABS) 100 MG tablet Take 1  tablet (100 mg total) by mouth 2 (two) times daily. (Patient not taking: Reported on 4/7/2025) 14 tablet 0     Current Facility-Administered Medications   Medication Dose Route Frequency Provider Last Rate Last Admin    triamcinolone acetonide injection 10 mg  10 mg Intradermal Once Christel Ward MD         Facility-Administered Medications Ordered in Other Visits   Medication Dose Route Frequency Provider Last Rate Last Admin    ceFAZolin 2 g in dextrose 5 % in water (D5W) 50 mL IVPB (MB+)  2 g Intravenous On Call Procedure Chacho Colby MD           Review of patient's allergies indicates:   Allergen Reactions    Atorvastatin Other (See Comments)     Elevated LFTs    Bactrim [sulfamethoxazole-trimethoprim] Nausea Only         Review of Systems   Constitutional: Negative for chills and fever.   HENT:  Negative for congestion and hearing loss.    Cardiovascular:  Negative for chest pain and claudication.   Respiratory:  Negative for cough and shortness of breath.    Skin:  Positive for nail changes. Negative for color change and poor wound healing.   Gastrointestinal:  Negative for nausea and vomiting.   Neurological:  Positive for numbness.   Psychiatric/Behavioral:  Negative for altered mental status.         Objective:     Physical Exam  Constitutional:       General: She is not in acute distress.     Appearance: Normal appearance. She is not ill-appearing.   Cardiovascular:      Pulses:           Dorsalis pedis pulses are 2+ on the right side and 2+ on the left side.        Posterior tibial pulses are 2+ on the right side and 2+ on the left side.      Comments: No significant lower extremity edema bilateral.  Skin temp is warm to foot bilateral.  No rubor on dependency bilateral foot.  Musculoskeletal:      Comments: Moderate track bound hallux abductovalgus bilateral overlapping the 2nd toe.  One MTP range motion right foot with 30-40 degrees of dorsiflexion past neutral in up to 10° plantar flexion past  neutral.  One MTP range motion of the left foot with near 45° of dorsiflexion and 10-15 degrees of plantar flexion which is more reducible than the right foot.  Dorsal 1st ray excursion left foot is near 1 cm and less than 1 cm on the right.    Semi-rigid flexion contracture of the right 2nd toe PIPJ and DIPJ.  Flexible mild flexion contractures of the lesser toes 2-4 left and 3-4 right.   Skin:     General: Skin is warm.      Capillary Refill: Capillary refill takes less than 2 seconds.      Findings: No ecchymosis or erythema.      Nails: There is no clubbing.      Comments: Wound site left hallux nailbed healing well with a central skin island and residual dry granular tissue to the medial, lateral and proximal nail folds area without any signs of infection.  There is some mild localized edema.  No drainage.   Neurological:      Mental Status: She is alert.           Assessment:      Encounter Diagnoses   Name Primary?    Nail avulsion, toe, subsequent encounter Yes    Open wound of great toe, left, initial encounter      Plan:     Jalyn was seen today for ingrown toenail.    Diagnoses and all orders for this visit:    Nail avulsion, toe, subsequent encounter    Open wound of great toe, left, initial encounter      I counseled the patient on her conditions, their implications and medical management.    Wound site left great toe appears to be healing very well.  Reviewed home care instructions in detail with the patient.  She may continue a sparing amount of mupirocin ointment with a loosely approximated Band-Aid 1-2 times per day and gradually allow the area to dry out and scab over.  She may transition into a shoe as long as it is not rubbing and irritating the toe.    RTC p.r.n. as discussed.    Assisted per Jose Arriaga DPM PGY 2    A portion of this note was generated by voice recognition software and may contain spelling and grammar errors.        .

## 2025-04-14 ENCOUNTER — PATIENT MESSAGE (OUTPATIENT)
Dept: PODIATRY | Facility: CLINIC | Age: 75
End: 2025-04-14
Payer: MEDICARE

## 2025-04-21 ENCOUNTER — NURSE TRIAGE (OUTPATIENT)
Dept: ADMINISTRATIVE | Facility: CLINIC | Age: 75
End: 2025-04-21
Payer: MEDICARE

## 2025-04-21 ENCOUNTER — TELEPHONE (OUTPATIENT)
Dept: PODIATRY | Facility: CLINIC | Age: 75
End: 2025-04-21
Payer: MEDICARE

## 2025-04-21 ENCOUNTER — PATIENT MESSAGE (OUTPATIENT)
Dept: PODIATRY | Facility: CLINIC | Age: 75
End: 2025-04-21

## 2025-04-21 ENCOUNTER — OFFICE VISIT (OUTPATIENT)
Dept: PODIATRY | Facility: CLINIC | Age: 75
End: 2025-04-21
Payer: MEDICARE

## 2025-04-21 VITALS — DIASTOLIC BLOOD PRESSURE: 86 MMHG | SYSTOLIC BLOOD PRESSURE: 138 MMHG | HEART RATE: 85 BPM

## 2025-04-21 DIAGNOSIS — S91.103D: Primary | ICD-10-CM

## 2025-04-21 DIAGNOSIS — L03.032 CELLULITIS OF GREAT TOE, LEFT: ICD-10-CM

## 2025-04-21 PROCEDURE — 1159F MED LIST DOCD IN RCRD: CPT | Mod: CPTII,S$GLB,, | Performed by: PODIATRIST

## 2025-04-21 PROCEDURE — 3288F FALL RISK ASSESSMENT DOCD: CPT | Mod: CPTII,S$GLB,, | Performed by: PODIATRIST

## 2025-04-21 PROCEDURE — 11042 DBRDMT SUBQ TIS 1ST 20SQCM/<: CPT | Mod: S$GLB,,, | Performed by: PODIATRIST

## 2025-04-21 PROCEDURE — 3075F SYST BP GE 130 - 139MM HG: CPT | Mod: CPTII,S$GLB,, | Performed by: PODIATRIST

## 2025-04-21 PROCEDURE — 87070 CULTURE OTHR SPECIMN AEROBIC: CPT | Performed by: PODIATRIST

## 2025-04-21 PROCEDURE — 1101F PT FALLS ASSESS-DOCD LE1/YR: CPT | Mod: CPTII,S$GLB,, | Performed by: PODIATRIST

## 2025-04-21 PROCEDURE — 4010F ACE/ARB THERAPY RXD/TAKEN: CPT | Mod: CPTII,S$GLB,, | Performed by: PODIATRIST

## 2025-04-21 PROCEDURE — 1125F AMNT PAIN NOTED PAIN PRSNT: CPT | Mod: CPTII,S$GLB,, | Performed by: PODIATRIST

## 2025-04-21 PROCEDURE — 1160F RVW MEDS BY RX/DR IN RCRD: CPT | Mod: CPTII,S$GLB,, | Performed by: PODIATRIST

## 2025-04-21 PROCEDURE — 99999 PR PBB SHADOW E&M-EST. PATIENT-LVL III: CPT | Mod: PBBFAC,,, | Performed by: PODIATRIST

## 2025-04-21 PROCEDURE — 1157F ADVNC CARE PLAN IN RCRD: CPT | Mod: CPTII,S$GLB,, | Performed by: PODIATRIST

## 2025-04-21 PROCEDURE — 99213 OFFICE O/P EST LOW 20 MIN: CPT | Mod: 25,S$GLB,, | Performed by: PODIATRIST

## 2025-04-21 PROCEDURE — 3079F DIAST BP 80-89 MM HG: CPT | Mod: CPTII,S$GLB,, | Performed by: PODIATRIST

## 2025-04-21 NOTE — TELEPHONE ENCOUNTER
Dr. Nguyen,     Please advise.    Thank you,     Verito  ===View-only below this line===  ----- Message -----  From: Jalyn Aaron Sujatha  Sent: 4/21/2025   7:51 AM CDT  To: [facility] Staff  Subject: F/U on healing from nail removal                 It?s been 4 wks since my nail removal & I am concerned. At night I take band-aid off. In AM toe is bright red.  I put a little Mupirocin & band-aid on. It is a little hard to get band-aid off at   night so I put more ointment.  I think it would help me to be seen today & be shown exactly how to care for it. Please let me know your thoughts. Thank you! Jalyn Aaron    Sent a message to the patient stating that her information was sent to Dr. Nguyen.     Verito

## 2025-04-21 NOTE — TELEPHONE ENCOUNTER
----- Message from Levi sent at 4/21/2025  9:52 AM CDT -----  Type:  Same Day Appointment RequestCaller is requesting a same day appointment.  Caller declined first available appointment listed below.  Name of Caller:pt When is the first available appointment?n/aSymptoms: Symptom: Toe Pain - Not From InjuryOutcome: Transfer to a nurse or provider NOW!Reason: Recent surgery (< 1 month ago) on this body partBest Call Back Number: [phone] Information: trans to noc

## 2025-04-21 NOTE — TELEPHONE ENCOUNTER
Spoke with Ms Aaron to assist he with making an appt with Dr Nguyen for today 4/21/25 at 2:30 pm with concern for wound area to left great toe post nail avulsion that was done on 3/24/25. No other needs voiced at this time. Call back encouraged if needed. Dr Nguyen is aware of pt's concerns and has reviewed photos in the chart.   The patient's goals for the shift include      The clinical goals for the shift include Patient will not have any emesis with feeds this shift    Pt AVSS overnight. No desats or signs of respiratory distress on 0.25L via TV. Patient had 1 small emesis following 1800 feed. Tolerated continuous feed overnight with no emesis noted. Mom called 2x for updates. Sitter at bedside at this time

## 2025-04-21 NOTE — TELEPHONE ENCOUNTER
OOC RN   GUILHERME Bethea  patient Wants appt to Podiatrist.     Had left big toenail removed 4 weeks ago.   May need another ATB  looks, infected. Redness on toenail bed. No fever, denies drainage, concerned about how she is taking care of toe.   Discomfort  of 3/10  Patient would like today,   See pics on my chart She thinks might need another  ATB  Patient wants to be seen today or tomorrow for toe.   Care advise is to see in office or VV today or tomorrow.  Nothing appt.   Can You reach out to her?    Reason for Disposition   Patient wants to be seen    Additional Information   Negative: Bright red, wide-spread, sunburn-like rash   Negative: Vomiting and persists > 4 hours   Negative: SEVERE headache (e.g., excruciating) and after spinal (epidural) anesthesia   Negative: Vomiting and abdomen looks much more swollen than usual   Negative: Drinking very little and dehydration suspected (e.g., no urine > 12 hours, very dry mouth, very lightheaded)   Negative: Patient sounds very sick or weak to the triager   Negative: Sounds like a serious complication to the triager   Negative: Fever > 100.4 F (38.0 C)   Negative: Caller has URGENT question and triager unable to answer question   Negative: SEVERE post-op pain (e.g., excruciating, pain scale 8-10) that is not controlled with pain medications   Negative: MILD - MODERATE headache (e.g., pain scale 1-7)  and after spinal (epidural) anesthesia   Negative: Fever present > 3 days (72 hours)    Protocols used: Post-Op Symptoms and [id number]

## 2025-04-21 NOTE — PROGRESS NOTES
Subjective:     Patient ID: Jalyn Aaron is a 75 y.o. female.    Chief Complaint: Wound Check (Wound check left great toe; nail avulsion done 3/24/285)    Presents today with chief complaint of discomfort to the right 3rd toenail.  She also has a history of a thickened, discolored left great toenail that does not grow out completely.  History of bunion surgery to the left foot in 2007 and right foot 2015 per .  Change her shoes around in order to wear longer shoes help with any discomfort to her toes.  History of spinal stenosis causing some lost sensation/numbness to the left calf region.    10/14/2024: Returns to review x-rays taken of both feet and to discuss the left great toe.  She was brought 3 pairs of different tennis shoes to look at and assess for sizing.    02/10/2025:  Returns complaining of persistent discomfort around the left great toenail.  She has a pending cruise in 2 weeks.  Relates that the discomfort to left great toe is most likely from her alignment of the left great toe.  Inquiring about potential surgical intervention for both feet.    03/24/2025:  Presents for left great toe nail avulsion with phenol and alcohol matrixectomy.  Accompanied by her .    04/07/2025:  Status post total nail avulsion left great toe with phenol and alcohol matrixectomy.  Doing very well.  No significant pain reported.  She has been wearing sandals with a Band-Aid and sock.    04/21/2025: Returns out of concern for persistent discomfort pain and redness around the left great toe.  She is wearing enclosed shoes.  She is washing the area with dial soap and water.  She is utilizing a Band-Aid but trying to let it air out at night.  She reports to performing some mild gardening but does not believe that the toe was exposed to any dirt.  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    Left    AV block     exercised induced 2:1    Cataract     Fever blister     Heart block     History of acne     Joint pain     hands    Keratoconjunctivitis sicca not due to Sjogren's syndrome     Pacemaker     Sleep apnea, unspecified        Past Surgical History:   Procedure Laterality Date    BREAST BIOPSY Left 12/2013    papilloma with atypia on core biopsy    BREAST BIOPSY Left 01/2014    Ex.Bx., removal of ADH/Papilloma    COLONOSCOPY N/A 11/8/2016    Procedure: COLONOSCOPY;  Surgeon: Dl Caruso MD;  Location: [facility] ENDO (4TH FLR);  Service: Endoscopy;  Laterality: N/A;  Pacemaker in place.    COLONOSCOPY N/A 3/30/2021    Procedure: COLONOSCOPY;  Surgeon: Joaquin Johnson MD;  Location: [facility] (4TH FLR);  Service: Endoscopy;  Laterality: N/A;  prep ins. emailed - COVID screening on 3/27/21 PCW - ERW    ENDOSCOPIC ULTRASOUND OF UPPER GASTROINTESTINAL TRACT N/A 10/9/2023    Procedure: ULTRASOUND, UPPER GI TRACT, ENDOSCOPIC;  Surgeon: Stacey Sesay MD;  Location: [facility] (2ND FLR);  Service: Endoscopy;  Laterality: N/A;  pancreas cyst 7mm on CT (r/o nodule or worrisome features)    8/4/23-Instructions via portal-DS  10/2-precall complete-MS    ESOPHAGOGASTRODUODENOSCOPY N/A 3/19/2024    Procedure: EGD (ESOPHAGOGASTRODUODENOSCOPY);  Surgeon: Mynor Watts MD;  Location: [facility] ENDO (2ND FLR);  Service: Endoscopy;  Laterality: N/A;  moved to 2nd floor endoscopy per recommendations of crna  pt has    HYSTERECTOMY      IMPLANTATION OF BIVENTRICULAR PERMANENT PACEMAKER AS UPGRADE TO EXISTING PACEMAKER N/A 10/26/2023    Procedure: Biventricular pacemaker upgrade;  Surgeon: Chacho Colby MD;  Location: [facility] EP LAB;  Service: Cardiology;  Laterality: N/A;  CHB, CRT-P upgrade, MDT, MAC, SK, 3 Prep *MDT DC PPM in situ*    INSERT / REPLACE / REMOVE PACEMAKER      LASIK      LASIK Bilateral 2009    VENOGRAM, EP LAB N/A 12/20/2022    Procedure: Venogram, EP Lab;  Surgeon: Chacho Colby MD;   Location: [facility] LAB;  Service: Cardiology;  Laterality: N/A;  NICM, Venogram- left side, SK, 3 Prep *MDT PPM in situ*       Family History   Problem Relation Name Age of Onset    Breast cancer Mother          Paget's dz    Cancer Mother  80        pagets    Cataracts Mother      Hypertension Mother      Multiple sclerosis Father Devyn Love     Multiple sclerosis Sister Lulu Pratt     No Known Problems Maternal Aunt      No Known Problems Maternal Uncle      No Known Problems Paternal Aunt      No Known Problems Paternal Uncle      No Known Problems Maternal Grandmother      Prostate cancer Maternal Grandfather      No Known Problems Paternal Grandmother      No Known Problems Paternal Grandfather      No Known Problems Brother      No Known Problems Son      No Known Problems Son      Amblyopia Neg Hx      Blindness Neg Hx      Diabetes Neg Hx      Glaucoma Neg Hx      Macular degeneration Neg Hx      Retinal detachment Neg Hx      Strabismus Neg Hx      Stroke Neg Hx      Thyroid disease Neg Hx      Melanoma Neg Hx      Ovarian cancer Neg Hx         Social History     Socioeconomic History    Marital status:     Number of children: 1   Occupational History    Occupation:      Employer: [employer]   Tobacco Use    Smoking status: Never     Passive exposure: Never    Smokeless tobacco: Never   Substance and Sexual Activity    Alcohol use: Not Currently     Alcohol/week: 1.0 standard drink of alcohol     Types: 1 Glasses of wine per week     Comment: weekly    Drug use: No    Sexual activity: Not Currently     Partners: Male     Birth control/protection: None   Other Topics Concern    Are you pregnant or think you may be? No    Breast-feeding No   Social History Narrative     (case management) at [employer]     Social [employer]     Financial Resource Strain: Low Risk  (1/11/2025)    Overall Financial Resource Strain (CARDIA)     Difficulty of Paying  Living Expenses: Not hard at all   Food Insecurity: No Food Insecurity (1/11/2025)    Hunger Vital Sign     Worried About Running Out of Food in the Last Year: Never true     Ran Out of Food in the Last Year: Never true   Transportation Needs: No Transportation Needs (1/6/2024)    PRAPARE - Transportation     Lack of Transportation (Medical): No     Lack of Transportation (Non-Medical): No   Physical Activity: Sufficiently Active (1/11/2025)    Exercise Vital Sign     Days of Exercise per Week: 7 days     Minutes of Exercise per Session: 30 min   Stress: Stress Concern Present (1/11/2025)    [employer] - Occupational Stress Questionnaire     Feeling of Stress : To some extent   Housing Stability: Low Risk  (1/6/2024)    Housing Stability Vital Sign     Unable to Pay for Housing in the Last Year: No     Number of Places Lived in the Last Year: 1     Unstable Housing in the Last Year: No       Current Outpatient Medications   Medication Sig Dispense Refill    busPIRone (BUSPAR) 15 MG tablet Take 1 tablet (15 mg total) by mouth 2 (two) times daily. 60 tablet 3    carvediloL (COREG) 3.125 MG tablet Take 1 tablet (3.125 mg total) by mouth 2 (two) times daily. 180 tablet 3    clobetasol 0.05% (TEMOVATE) 0.05 % Oint AAA scalp bid x 1 month - after warm water compress 60 g 0    estradioL (VAGIFEM) 10 mcg Tab Place 1 suppository vaginally at night BIW 8 tablet 11    gabapentin (NEURONTIN) 600 MG tablet Half in the AM and 2 in the  tablet 11    losartan (COZAAR) 25 MG tablet TAKE 1 TABLET BY MOUTH TWICE DAILY 180 tablet 3    memantine (NAMENDA) 5 MG Tab Take 1 tablet (5 mg total) by mouth 2 (two) times daily. 60 tablet 2    multivitamin capsule Take 1 capsule by mouth once daily.      mupirocin (BACTROBAN) 2 % ointment Apply topically 2 (two) times daily. 22 g 1    naproxen (NAPROSYN) 250 MG tablet One daily as needed for arthritis pain 30 tablet 1    rosuvastatin (CRESTOR) 10 MG tablet  TAKE 1 TABLET(10 MG) BY MOUTH EVERY DAY 90 tablet 3    senna (SENOKOT) 8.6 mg tablet Take 1 tablet by mouth once daily. Twice a day      vitamin D 1000 units Tab Take 1,000 Units by mouth once daily.       Current Facility-Administered Medications   Medication Dose Route Frequency Provider Last Rate Last Admin    triamcinolone acetonide injection 10 mg  10 mg Intradermal Once Christel Ward MD         Facility-Administered Medications Ordered in Other Visits   Medication Dose Route Frequency Provider Last Rate Last Admin    ceFAZolin 2 g in dextrose 5 % in water (D5W) 50 mL IVPB (MB+)  2 g Intravenous On Call Procedure Chacho Colby MD           Review of patient's allergies indicates:   Allergen Reactions    Atorvastatin Other (See Comments)     Elevated LFTs    Bactrim [sulfamethoxazole-trimethoprim] Nausea Only         Review of Systems   Constitutional: Negative for chills and fever.   HENT:  Negative for congestion and hearing loss.    Cardiovascular:  Negative for chest pain and claudication.   Respiratory:  Negative for cough and shortness of breath.    Skin:  Positive for nail changes. Negative for color change and poor wound healing.   Gastrointestinal:  Negative for nausea and vomiting.   Neurological:  Positive for numbness.   Psychiatric/Behavioral:  Negative for altered mental status.         Objective:     Physical Exam  Constitutional:       General: She is not in acute distress.     Appearance: Normal appearance. She is not ill-appearing.   Cardiovascular:      Pulses:           Dorsalis pedis pulses are 2+ on the right side and 2+ on the left side.        Posterior tibial pulses are 2+ on the right side and 2+ on the left side.      Comments: No significant lower extremity edema bilateral.  Skin temp is warm to foot bilateral.  No rubor on dependency bilateral foot.  Musculoskeletal:      Comments: Moderate track bound hallux abductovalgus bilateral overlapping the 2nd toe.  One MTP range  motion right foot with 30-40 degrees of dorsiflexion past neutral in up to 10° plantar flexion past neutral.  One MTP range motion of the left foot with near 45° of dorsiflexion and 10-15 degrees of plantar flexion which is more reducible than the right foot.  Dorsal 1st ray excursion left foot is near 1 cm and less than 1 cm on the right.    Semi-rigid flexion contracture of the right 2nd toe PIPJ and DIPJ.  Flexible mild flexion contractures of the lesser toes 2-4 left and 3-4 right.   Skin:     General: Skin is warm.      Capillary Refill: Capillary refill takes less than 2 seconds.      Findings: Erythema present. No ecchymosis.      Nails: There is no clubbing.      Comments: Wound sites of the left hallux nailbed with moderate overlying biofilm and mild slough with some persistent surrounding erythema and mild edema with scant serous drainage.  Post debridement wound site extends down to subcu measuring 0.6 x 1.3 x 0.1 cm.  Does not probe, track or undermine post debridement.   Neurological:      Mental Status: She is alert.           Assessment:      Encounter Diagnoses   Name Primary?    Open wound of great toe, subsequent encounter Yes    Cellulitis of great toe, left      Plan:     Jalyn was seen today for wound check.    Diagnoses and all orders for this visit:    Open wound of great toe, subsequent encounter  -     Wound Debridement  -     Aerobic culture (Specify Source)    Cellulitis of great toe, left  -     Wound Debridement  -     Aerobic culture (Specify Source)      I counseled the patient on her conditions, their implications and medical management.    Applied 2% lidocaine gel to the left hallux.  Excisional wound debridement per attached note as well as the dressing.  Home care instructions reviewed in detail with Betadine.  Monitor C&S taken post debridement and prescribe oral antibiotics accordingly.  We discussed avoiding enclosed shoes and wearing her sandals so that there was no rubbing or  irritation against the toes.  Reviewed activity restrictions and modifications.  Avoid gardening.    RTC within 2-3 weeks to re-evaluate or p.r.n. as discussed    Assisted per Jose Arriaga DPM PGY 2    A portion of this note was generated by voice recognition software and may contain spelling and grammar errors.        .

## 2025-04-21 NOTE — PROCEDURES
Wound Debridement    Date/Time: 4/21/2025 2:30 PM    Performed by: Fredis Nguyen DPM  Authorized by: Fredis Nguyen DPM    Consent Done?:  Yes (Verbal)    Preparation: Patient was prepped and draped with clean technique    Local anesthesia used?: Yes    Local anesthetic:  Lidocaine 2% topical gel    Wound Details:    Location:  Left foot    Location:  Left 1st Toe (nail bed)    Type of Debridement:  Excisional       Length (cm):  0.6       Width (cm):  1.3       Depth (cm):  0.1       Area (sq cm):  0.61       Percent Debrided (%):  100       Total Area Debrided (sq cm):  0.61    Depth of debridement:  Subcutaneous tissue    Tissue debrided:  Subcutaneous    Devitalized tissue debrided:  Slough, Fibrin and Biofilm    Instruments:  Curette  Bleeding:  Minimal  Hemostasis Achieved: Yes  Method Used:  Pressure  Patient tolerance:  Patient tolerated the procedure well with no immediate complications  1st Wound Pain Assessment: 4  Specimen Collected: Specimen sent to microbiology     Applied Hydrofera ready blue secured lightly with Mepilex border.

## 2025-04-22 ENCOUNTER — OFFICE VISIT (OUTPATIENT)
Dept: CARDIOLOGY | Facility: CLINIC | Age: 75
End: 2025-04-22
Payer: MEDICARE

## 2025-04-22 VITALS
DIASTOLIC BLOOD PRESSURE: 66 MMHG | BODY MASS INDEX: 18.35 KG/M2 | HEIGHT: 67 IN | WEIGHT: 116.94 LBS | SYSTOLIC BLOOD PRESSURE: 110 MMHG | HEART RATE: 68 BPM

## 2025-04-22 DIAGNOSIS — I25.10 CORONARY ARTERY DISEASE DUE TO CALCIFIED CORONARY LESION: Primary | ICD-10-CM

## 2025-04-22 DIAGNOSIS — I44.2 ATRIOVENTRICULAR BLOCK, COMPLETE: ICD-10-CM

## 2025-04-22 DIAGNOSIS — Z95.0 CARDIAC PACEMAKER IN SITU: ICD-10-CM

## 2025-04-22 DIAGNOSIS — I42.8 CARDIOMYOPATHY, NONISCHEMIC: ICD-10-CM

## 2025-04-22 DIAGNOSIS — I25.84 CORONARY ARTERY DISEASE DUE TO CALCIFIED CORONARY LESION: Primary | ICD-10-CM

## 2025-04-22 PROCEDURE — 1160F RVW MEDS BY RX/DR IN RCRD: CPT | Mod: CPTII,S$GLB,, | Performed by: INTERNAL MEDICINE

## 2025-04-22 PROCEDURE — 4010F ACE/ARB THERAPY RXD/TAKEN: CPT | Mod: CPTII,S$GLB,, | Performed by: INTERNAL MEDICINE

## 2025-04-22 PROCEDURE — 99999 PR PBB SHADOW E&M-EST. PATIENT-LVL IV: CPT | Mod: PBBFAC,,, | Performed by: INTERNAL MEDICINE

## 2025-04-22 PROCEDURE — 3288F FALL RISK ASSESSMENT DOCD: CPT | Mod: CPTII,S$GLB,, | Performed by: INTERNAL MEDICINE

## 2025-04-22 PROCEDURE — 1159F MED LIST DOCD IN RCRD: CPT | Mod: CPTII,S$GLB,, | Performed by: INTERNAL MEDICINE

## 2025-04-22 PROCEDURE — 99214 OFFICE O/P EST MOD 30 MIN: CPT | Mod: S$GLB,,, | Performed by: INTERNAL MEDICINE

## 2025-04-22 PROCEDURE — 3008F BODY MASS INDEX DOCD: CPT | Mod: CPTII,S$GLB,, | Performed by: INTERNAL MEDICINE

## 2025-04-22 PROCEDURE — 3078F DIAST BP <80 MM HG: CPT | Mod: CPTII,S$GLB,, | Performed by: INTERNAL MEDICINE

## 2025-04-22 PROCEDURE — 3074F SYST BP LT 130 MM HG: CPT | Mod: CPTII,S$GLB,, | Performed by: INTERNAL MEDICINE

## 2025-04-22 PROCEDURE — 1126F AMNT PAIN NOTED NONE PRSNT: CPT | Mod: CPTII,S$GLB,, | Performed by: INTERNAL MEDICINE

## 2025-04-22 PROCEDURE — 1157F ADVNC CARE PLAN IN RCRD: CPT | Mod: CPTII,S$GLB,, | Performed by: INTERNAL MEDICINE

## 2025-04-22 PROCEDURE — 1101F PT FALLS ASSESS-DOCD LE1/YR: CPT | Mod: CPTII,S$GLB,, | Performed by: INTERNAL MEDICINE

## 2025-04-22 NOTE — PATIENT INSTRUCTIONS
"Liquid IV - no more 1 pack 2-3 times   Interval training: exercise for 20-30 minutes 3-5 days a week. Accelerate or turn up the resistance every 3 to 5 minutes for about 1/2 a minute to a minute.    Also sitting for long periods of time is bad for the heart. If seated at work or home for long periods recommend that you get up and walk around for 1-2 minutes every 1/2 to 1 hour.           Low carb, Mediterranean style diet:     A sensible diet that limits the intake of sugars, saturated (bad) fats and trans fats while increasing the intake of unsaturated (good) fats and plant proteins is the basis of the current dietary recommendations.     We now recommend drastically reducing the intake of sugar and sugary drinks. There is less emphasis on excluding all fat and more emphasis on the types of different fats. We continue to recommend eating more vegetables.    Cholesterol in our food is generally present in relatively small amounts. New dietary guidelines are less obsessed with the amount of cholesterol. However please do not confuse this with the role of cholesterol in our blood and arteries. The liver converts certain foods into cholesterol.  It is this cholesterol and other fats that clog up our arteries.      Most foods that are high in cholesterol are also high in saturated fat. But there is much more saturated fat than cholesterol in these foods. The saturated fat content matters more than cholesterol. On the other hand, there are a handful of foods that are high in cholesterol but do not contain much saturated fat: eggs, shrimp, crab legs and crawfish are OK to eat in small quantities as long as you do not deep cabrera them. So a few (4-5) eggs a week are fine (both the white and the yolk), but you can eat as many egg whites as you want. Also, some of these same foods irritate the inner lining of blood vessels by inducing inflammation (see below).  This occurs even if your blood cholesterol levels are "normal". So " you should avoid foods that are high in saturated fat and sugar even if your blood cholesterol levels are normal.      Saturated fat is the bad fat - you should limit your intake of this. Deep fried foods, meats and other animal fats are high in saturated fat. Cookies, donuts and most dessert and cakes are usually high in both saturated fat and sugar.       Unsaturated fat is the good fat. It contains the same number of calories as saturated fat but these fats do not get deposited in our arteries. The Mediterranean style diet encourages the intake of unsaturated fat - olive oil, avocado and unsalted nuts. They are anti-inflammatory foods. So instead of baking a piece of fish, consider pan-frying it using olive oil.     You should eat a few servings of vegetables (and fruit as long as you are not diabetic) everyday. Substitute some plant proteins in place of meat: beans, lentils, quinoa and oatmeal. They are lean proteins.     Vegetables (particularly green vegetables such as broccoli, kale and spinach) have anti-inflammatory properties. Some fruits (certain berries) have anti-oxidant properties. However I think foods that reduce vascular inflammation are much  more important than the anti-oxidant effect of fruits. Eat more of the vegetables with anti-oxidant properties.     I recommend using plant based butter either olive oil or avocado oil made by [employer]ai [employer]. Do not use butter or margarine that comes packaged in a stick.      Trans fats should definitely be avoided. Most foods that are labelled as containing 0 gms of trans fat may still contain several hundred milligrams of trans fat: creamer, margarine, dough, deep fried foods, ready made frosting, potato, corn and torilla chips, cakes, cookies, pie crusts and crackers containing shortening made with hydrogenated vegetable oil.

## 2025-04-22 NOTE — PROGRESS NOTES
Subjective:   Chief Complaint:  Jalyn Aaron is a 75 y.o. female who presents for follow-up of non ischemic cardiomyopathy      Problem List and HPI:   AV block s/p PPM DC 2011  Nonischemic cardiomyopathy,  CACS 166 in 7/2023  - negative PET scan 9/2023    History of Present Illness    Ms. Aaron presents today for cardiac follow-up She has a history of non-ischemic cardiomyopathy. She recently underwent pacemaker placement where a third lead placement was attempted but unsuccessful.  She was RV pacing > 80%, most recently 65% She understands her EF may worsen over time without the third lead. CT of the heart in July 2023 showed mild arterial calcification with a calcium score of 166. Cardiac PET stress test demonstrated no evidence of obstructive disease or ischemia. She denies chest discomfort and dyspnea. She reports persistent RLE swelling that is more pronounced compared to the LLE and present throughout the day, not just in the evening. She denies pain in either leg with ambulation. She takes rosuvastatin for cholesterol management, which she tolerates well. She also takes losartan 50 mg twice daily and carvedilol at the lowest dose twice daily for EF. She has a documented allergy to previous statin medication due to elevated liver enzymes. She typically walks 1 mile daily with her , taking 25 minutes. However, this has been interrupted for the past 4 weeks due to recent toe surgery (great toenail removal). Prior to this, she attended senior yoga classes at the gym. She was generally more active in the past, including jogging, but has become more sedentary recently. She plans to resume attending senior classes at [facility] once recovered from surgery.            Review of patient's allergies indicates:   Allergen Reactions    Atorvastatin Other (See Comments)     Elevated LFTs    Bactrim [sulfamethoxazole-trimethoprim] Nausea Only        Current Outpatient Medications   Medication Sig  "   busPIRone (BUSPAR) 15 MG tablet Take 1 tablet (15 mg total) by mouth 2 (two) times daily.    carvediloL (COREG) 3.125 MG tablet Take 1 tablet (3.125 mg total) by mouth 2 (two) times daily.    clobetasol 0.05% (TEMOVATE) 0.05 % Oint AAA scalp bid x 1 month - after warm water compress    estradioL (VAGIFEM) 10 mcg Tab Place 1 suppository vaginally at night BIW    gabapentin (NEURONTIN) 600 MG tablet Half in the AM and 2 in the PM    losartan (COZAAR) 25 MG tablet TAKE 1 TABLET BY MOUTH TWICE DAILY    memantine (NAMENDA) 5 MG Tab Take 1 tablet (5 mg total) by mouth 2 (two) times daily.    multivitamin capsule Take 1 capsule by mouth once daily.    mupirocin (BACTROBAN) 2 % ointment Apply topically 2 (two) times daily.    naproxen (NAPROSYN) 250 MG tablet One daily as needed for arthritis pain    rosuvastatin (CRESTOR) 10 MG tablet TAKE 1 TABLET(10 MG) BY MOUTH EVERY DAY    senna (SENOKOT) 8.6 mg tablet Take 1 tablet by mouth once daily. Twice a day    vitamin D 1000 units Tab Take 1,000 Units by mouth once daily.     Current Facility-Administered Medications   Medication    triamcinolone acetonide injection 10 mg     Facility-Administered Medications Ordered in Other Visits   Medication    ceFAZolin 2 g in dextrose 5 % in water (D5W) 50 mL IVPB (MB+)       Social history:  Jalyn Aaron  reports that she has never smoked. She has never been exposed to tobacco smoke. She has never used smokeless tobacco. She reports that she does not currently use alcohol after a past usage of about 1.0 standard drink of alcohol per week. She reports that she does not use drugs.      Objective:   /66   Pulse 68   Ht 5' 7" (1.702 m)   Wt 53 kg (116 lb 15.3 oz)   BMI 18.32 kg/m²    Physical Exam  Constitutional:       Appearance: She is well-developed.      Comments:      HENT:      Head: Normocephalic.   Neck:      Vascular: No carotid bruit or JVD.   Cardiovascular:      Rate and Rhythm: Normal rate and regular " rhythm.      Pulses:           Radial pulses are 2+ on the right side and 2+ on the left side.        Posterior tibial pulses are 1+ on the right side and 1+ on the left side.      Heart sounds: S1 normal and S2 normal. No murmur heard.     No gallop.   Pulmonary:      Effort: Pulmonary effort is normal.      Breath sounds: No wheezing or rales.   Chest:      Chest wall: There is no dullness to percussion.   Abdominal:      General: There is no abdominal bruit.      Palpations: Abdomen is soft. There is no hepatomegaly or splenomegaly.      Tenderness: There is no abdominal tenderness.   Musculoskeletal:      Right lower leg: No edema.      Left lower leg: No edema.   Skin:     General: Skin is warm and dry.      Findings: No bruising or rash.      Nails: There is no clubbing.   Neurological:      Mental Status: She is alert and oriented to person, place, and time.      Gait: Gait normal.   Psychiatric:         Speech: Speech normal.         Behavior: Behavior normal.         Judgment: Judgment normal.         Physical Exam    General: No acute distress. Well-developed. Well-nourished.  Eyes: EOMI. Sclerae anicteric.  HENT: Normocephalic. Atraumatic. Nares patent. Moist oral mucosa.  Ears: Bilateral TMs clear. Bilateral EACs clear.  Cardiovascular: Regular rate. Regular rhythm. No murmurs. No rubs. No gallops. Normal S1, S2. Trace swelling.  Respiratory: Normal respiratory effort. Clear to auscultation bilaterally. No rales. No rhonchi. No wheezing.  Abdomen: Soft. Non-tender. Non-distended. Normoactive bowel sounds.  Musculoskeletal: No  obvious deformity.  Extremities: No lower extremity edema.  Neurological: Alert & oriented x3. No slurred speech. Normal gait.  Psychiatric: Normal mood. Normal affect. Good insight. Good judgment.  Skin: Warm. Dry. No rash.          Results for orders placed in visit on 08/12/24    Echo Saline Bubble? No; Ultrasound enhancing contrast? No    Interpretation Summary    Left  Ventricle: The left ventricle is normal in size. Normal wall thickness. There is normal systolic function with a visually estimated ejection fraction of 55 - 60%. Biplane (2D) method of discs ejection fraction is 57%. Global longitudinal strain is -17.0%. There is normal diastolic function.    Right Ventricle: Normal right ventricular cavity size. Wall thickness is normal. Systolic function is normal. Pacemaker lead present in the ventricle.    Left Atrium: Left atrium is mildly dilated.    Right Atrium: Right atrium is mildly dilated.    Tricuspid Valve: There is moderate regurgitation.    Pulmonary Artery: The estimated pulmonary artery systolic pressure is 25 mmHg.    IVC/SVC: Normal venous pressure at 3 mmHg.     Component      Latest Ref Rng 12/23/2021 12/13/2022 6/30/2023 10/11/2023 12/29/2023   Cholesterol Total      120 - 199 mg/dL 222 (H)  219 (H)  208 (H)  136  146    Triglycerides      30 - 150 mg/dL 79  90  73  59  59    HDL      40 - 75 mg/dL 80 (H)  73  82 (H)  68  69    LDL Cholesterol      63.0 - 159.0 mg/dL 126.2  128.0  111.4  56.2 (L)  65.2    HDL/Cholesterol Ratio      20.0 - 50.0 % 36.0  33.3  39.4  50.0  47.3       Component      Latest Ref Rng 11/6/2024   Cholesterol Total      120 - 199 mg/dL 162    Triglycerides      30 - 150 mg/dL 67    HDL      40 - 75 mg/dL 81 (H)    LDL Cholesterol      63.0 - 159.0 mg/dL 67.6    HDL/Cholesterol Ratio      20.0 - 50.0 % 50.0       Legend:  (H) High      Assessment and Plan:       ICD-10-CM ICD-9-CM   1. Coronary artery disease due to calcified coronary lesion  I25.10 414.00    I25.84 414.4   2. Cardiomyopathy, nonischemic - resolved  I42.8 425.4   3. Atrioventricular block, complete  I44.2 426.0   4. Cardiac pacemaker in situ  Z95.0 V45.01                Assessment & Plan    I42.8 Cardiomyopathy, nonischemic  I44.2 Atrioventricular block, complete  I25.10, I25.84 Coronary artery disease due to calcified coronary lesion  Z95.0 Cardiac pacemaker in  situ    PLAN SUMMARY:  - Continue Losartan 25 mg twice daily  - Continue Carvedilol at current lowest dose twice daily  - Continue Rosuvastatin (Crestor) at current dose  - Order annual echocardiogram  - Engage in regular exercise, aiming for 1 hour, 3 times per week  - Implement interval training during exercise  - Limit Liquid IV consumption to 1 package 2-3 times per week  - Maintain proper hydration, especially during exercise  - Utilize indoor exercise options to avoid extreme temperatures  - Coordinate follow-up schedule with Dr. Colby's office  - Follow up in approximately 1 year    CARDIOMYOPATHY, NONISCHEMIC:  - Most recent echocardiogram showed normal EF 55%, improved from previous low of 45% in 2020.  - Heart failure managed with low doses of Losartan and Carvedilol, no current need for diuretics.  She does not report shortness of breath, orthopnea PND or edema.  She does not have any symptoms suggestive of heart failure on exam.  - Continued Losartan 25 mg twice daily and Carvedilol at current lowest dose twice daily.  - the concern is that her LV function will deteriorate since a left ventricular lead could not be placed in the coronary sinus.  Will message Joyce Campa with EP to see if it would be appropriate if she reschedule her appointment with her to October.  - Contact the office if patient has shortness of breath, even if recent echocardiogram was normal.    CORONARY ARTERY DISEASE DUE TO CALCIFIED CORONARY LESION:  - Recent CT showed mild coronary artery calcification (score 166).  - Cardiac PET stress test revealed no obstructive disease or ischemia.  - LDL levels well-controlled on current statin therapy (rosuvastatin).  - Explained the significance of coronary artery calcification scores and their relation to cardiovascular risk.  - Discussed the interpretation of lipid panel results, emphasizing the importance of LDL control and the relative significance of HDL levels.  - Continued  Rosuvastatin (Crestor) at current dose.    CARDIAC PACEMAKER IN SITU:  - Pacemaker currently pacing 67% of the time on right side only.  - No immediate need for upgrade or additional lead placement, but will continue monitoring with serial echocardiograms.  - Explained the mechanics of pacemaker function and potential future options for device upgrades if needed.    LIFESTYLE CHANGES:  - Advised engaging in regular exercise, aiming for duration similar to previous activity levels (e.g., 1 hour, 3 times per week).  - Recommend implementing interval training during exercise: 3-5 minutes at regular pace, followed by 30 seconds at a faster pace.  - Suggested utilizing indoor exercise options to avoid extreme temperatures.  - Advised maintaining proper hydration, especially during exercise, using electrolyte-containing drinks like Liquid IV.  - Recommend limiting Liquid IV consumption to no more than 1 package 2-3 times per week.  - If patient has lightheadedness or dizziness, especially in hot weather, skip the next dose of either medication (preferably not both).  - Follow up in approximately 1 year.  - Coordinate follow-up schedule with Dr. Colby's office to avoid duplicate appointments.        Orders placed during this encounter:     Coronary artery disease due to calcified coronary lesion    Cardiomyopathy, nonischemic - resolved    Atrioventricular block, complete    Cardiac pacemaker in situ         Follow up in about 1 year (around 4/22/2026).    This note was generated with the assistance of ambient listening technology. Verbal consent was obtained by the patient and accompanying visitor(s) for the recording of patient appointment to facilitate this note. I attest to having reviewed and edited the generated note for accuracy, though some syntax or spelling errors may persist. Please contact the author of this note for any clarification.

## 2025-04-24 LAB — BACTERIA SPEC AEROBE CULT: NORMAL

## 2025-04-25 ENCOUNTER — PATIENT MESSAGE (OUTPATIENT)
Dept: PODIATRY | Facility: CLINIC | Age: 75
End: 2025-04-25
Payer: MEDICARE

## 2025-04-25 ENCOUNTER — TELEPHONE (OUTPATIENT)
Dept: PODIATRY | Facility: CLINIC | Age: 75
End: 2025-04-25
Payer: MEDICARE

## 2025-04-25 RX ORDER — CEFADROXIL 500 MG/1
500 CAPSULE ORAL EVERY 12 HOURS
Qty: 14 CAPSULE | Refills: 0 | Status: SHIPPED | OUTPATIENT
Start: 2025-04-25

## 2025-04-25 NOTE — TELEPHONE ENCOUNTER
Dr. Nguyen,     Please see message below.     Thank you,     Verito  ===View-only below this line===  ----- Message -----  From: Jalyn Aaron  Sent: 4/25/2025  11:39 AM CDT  To: [facility] Staff  Subject: Culture Results                                  Dr Nguyen,   It looks like the results have come back.  If so, I wondered what  your opinion is.  Also I am sending pics of the last 2 days of my toe after I took bandage off.    Thank you!  Jalyn Aaron    I sent her a message stating that Dr. Nguyen is in surgery all day and will let us know when he is finished with surgery.

## 2025-05-05 ENCOUNTER — OFFICE VISIT (OUTPATIENT)
Dept: PODIATRY | Facility: CLINIC | Age: 75
End: 2025-05-05
Payer: MEDICARE

## 2025-05-05 VITALS
BODY MASS INDEX: 18.35 KG/M2 | SYSTOLIC BLOOD PRESSURE: 115 MMHG | WEIGHT: 116.88 LBS | HEIGHT: 67 IN | DIASTOLIC BLOOD PRESSURE: 81 MMHG | HEART RATE: 68 BPM

## 2025-05-05 DIAGNOSIS — M21.619 HALLUX ABDUCTOVALGUS WITH BUNIONS, UNSPECIFIED LATERALITY: ICD-10-CM

## 2025-05-05 DIAGNOSIS — M20.10 HALLUX ABDUCTOVALGUS WITH BUNIONS, UNSPECIFIED LATERALITY: ICD-10-CM

## 2025-05-05 DIAGNOSIS — S90.424A: ICD-10-CM

## 2025-05-05 DIAGNOSIS — S91.103D: Primary | ICD-10-CM

## 2025-05-05 PROCEDURE — 1159F MED LIST DOCD IN RCRD: CPT | Mod: CPTII,S$GLB,, | Performed by: PODIATRIST

## 2025-05-05 PROCEDURE — 1157F ADVNC CARE PLAN IN RCRD: CPT | Mod: CPTII,S$GLB,, | Performed by: PODIATRIST

## 2025-05-05 PROCEDURE — 3079F DIAST BP 80-89 MM HG: CPT | Mod: CPTII,S$GLB,, | Performed by: PODIATRIST

## 2025-05-05 PROCEDURE — 3288F FALL RISK ASSESSMENT DOCD: CPT | Mod: CPTII,S$GLB,, | Performed by: PODIATRIST

## 2025-05-05 PROCEDURE — 1160F RVW MEDS BY RX/DR IN RCRD: CPT | Mod: CPTII,S$GLB,, | Performed by: PODIATRIST

## 2025-05-05 PROCEDURE — 4010F ACE/ARB THERAPY RXD/TAKEN: CPT | Mod: CPTII,S$GLB,, | Performed by: PODIATRIST

## 2025-05-05 PROCEDURE — 3074F SYST BP LT 130 MM HG: CPT | Mod: CPTII,S$GLB,, | Performed by: PODIATRIST

## 2025-05-05 PROCEDURE — 3008F BODY MASS INDEX DOCD: CPT | Mod: CPTII,S$GLB,, | Performed by: PODIATRIST

## 2025-05-05 PROCEDURE — 1101F PT FALLS ASSESS-DOCD LE1/YR: CPT | Mod: CPTII,S$GLB,, | Performed by: PODIATRIST

## 2025-05-05 PROCEDURE — 99999 PR PBB SHADOW E&M-EST. PATIENT-LVL IV: CPT | Mod: PBBFAC,,, | Performed by: PODIATRIST

## 2025-05-05 PROCEDURE — 1126F AMNT PAIN NOTED NONE PRSNT: CPT | Mod: CPTII,S$GLB,, | Performed by: PODIATRIST

## 2025-05-05 PROCEDURE — 99213 OFFICE O/P EST LOW 20 MIN: CPT | Mod: S$GLB,,, | Performed by: PODIATRIST

## 2025-05-05 NOTE — PROGRESS NOTES
Subjective:     Patient ID: Jalyn Aaron is a 75 y.o. female.    Chief Complaint: Wound Check (Great toe)    Presents today with chief complaint of discomfort to the right 3rd toenail.  She also has a history of a thickened, discolored left great toenail that does not grow out completely.  History of bunion surgery to the left foot in 2007 and right foot 2015 per .  Change her shoes around in order to wear longer shoes help with any discomfort to her toes.  History of spinal stenosis causing some lost sensation/numbness to the left calf region.    10/14/2024: Returns to review x-rays taken of both feet and to discuss the left great toe.  She was brought 3 pairs of different tennis shoes to look at and assess for sizing.    02/10/2025:  Returns complaining of persistent discomfort around the left great toenail.  She has a pending cruise in 2 weeks.  Relates that the discomfort to left great toe is most likely from her alignment of the left great toe.  Inquiring about potential surgical intervention for both feet.    03/24/2025:  Presents for left great toe nail avulsion with phenol and alcohol matrixectomy.  Accompanied by her .    04/07/2025:  Status post total nail avulsion left great toe with phenol and alcohol matrixectomy.  Doing very well.  No significant pain reported.  She has been wearing sandals with a Band-Aid and sock.    04/21/2025: Returns out of concern for persistent discomfort pain and redness around the left great toe.  She is wearing enclosed shoes.  She is washing the area with dial soap and water.  She is utilizing a Band-Aid but trying to let it air out at night.  She reports to performing some mild gardening but does not believe that the toe was exposed to any dirt.    05/05/2025:  Follow-up from open wound to left great toe.  She also complains but a blister that developed to the right 2nd toe which she has been treating with Band-Aids to try separate the right  "great toe and 2nd toe.  No significant pain reported today.  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    Left    AV block     exercised induced 2:1    Cataract     Fever blister     Heart block     History of acne     Joint pain     hands    Keratoconjunctivitis sicca not due to Sjogren's syndrome     Pacemaker     Sleep apnea, unspecified        Past Surgical History:   Procedure Laterality Date    BREAST BIOPSY Left 12/2013    papilloma with atypia on core biopsy    BREAST BIOPSY Left 01/2014    Ex.Bx., removal of ADH/Papilloma    COLONOSCOPY N/A 11/8/2016    Procedure: COLONOSCOPY;  Surgeon: Dl Caruso MD;  Location: [facility] (4TH FLR);  Service: Endoscopy;  Laterality: N/A;  Pacemaker in place.    COLONOSCOPY N/A 3/30/2021    Procedure: COLONOSCOPY;  Surgeon: Joaquin Johnson MD;  Location: [facility] (4TH FLR);  Service: Endoscopy;  Laterality: N/A;  prep ins. emailed - COVID screening on 3/27/21 PCW - ERW    ENDOSCOPIC ULTRASOUND OF UPPER GASTROINTESTINAL TRACT N/A 10/9/2023    Procedure: ULTRASOUND, UPPER GI TRACT, ENDOSCOPIC;  Surgeon: Stacey Sesay MD;  Location: [facility] (2ND FLR);  Service: Endoscopy;  Laterality: N/A;  pancreas cyst 7mm on CT (r/o nodule or worrisome features)    8/4/23-Instructions via portal-DS  10/2-precall complete-MS    ESOPHAGOGASTRODUODENOSCOPY N/A 3/19/2024    Procedure: EGD (ESOPHAGOGASTRODUODENOSCOPY);  Surgeon: Mynor Watts MD;  Location: [facility] ENDO (2ND FLR);  Service: Endoscopy;  Laterality: N/A;  moved to 2nd floor endoscopy per recommendations of crna  pt has    HYSTERECTOMY      IMPLANTATION OF BIVENTRICULAR PERMANENT PACEMAKER AS UPGRADE TO EXISTING PACEMAKER N/A 10/26/2023    Procedure: Biventricular pacemaker upgrade;  Surgeon: Chacho Colby MD;  Location: [facility] EP LAB;  " Service: Cardiology;  Laterality: N/A;  CHB, CRT-P upgrade, MDT, MAC, SK, 3 Prep *MDT DC PPM in situ*    INSERT / REPLACE / REMOVE PACEMAKER      LASIK      LASIK Bilateral 2009    VENOGRAM, EP LAB N/A 12/20/2022    Procedure: Venogram, EP Lab;  Surgeon: Chacho Colby MD;  Location: [facility] EP LAB;  Service: Cardiology;  Laterality: N/A;  NICM, Venogram- left side, SK, 3 Prep *MDT PPM in situ*       Family History   Problem Relation Name Age of Onset    Breast cancer Mother          Paget's dz    Cancer Mother  80        pagets    Cataracts Mother      Hypertension Mother      Multiple sclerosis Father Devyn Love     Multiple sclerosis Sister Lulu Pratt     No Known Problems Maternal Aunt      No Known Problems Maternal Uncle      No Known Problems Paternal Aunt      No Known Problems Paternal Uncle      No Known Problems Maternal Grandmother      Prostate cancer Maternal Grandfather      No Known Problems Paternal Grandmother      No Known Problems Paternal Grandfather      No Known Problems Brother      No Known Problems Son      No Known Problems Son      Amblyopia Neg Hx      Blindness Neg Hx      Diabetes Neg Hx      Glaucoma Neg Hx      Macular degeneration Neg Hx      Retinal detachment Neg Hx      Strabismus Neg Hx      Stroke Neg Hx      Thyroid disease Neg Hx      Melanoma Neg Hx      Ovarian cancer Neg Hx         Social History     Socioeconomic History    Marital status:     Number of children: 1   Occupational History    Occupation:      Employer: [employer]   Tobacco Use    Smoking status: Never     Passive exposure: Never    Smokeless tobacco: Never   Substance and Sexual Activity    Alcohol use: Not Currently     Alcohol/week: 1.0 standard drink of alcohol     Types: 1 Glasses of wine per week     Comment: weekly    Drug use: No    Sexual activity: Not Currently     Partners: Male     Birth control/protection: None   Other Topics Concern    Are you pregnant or  think you may be? No    Breast-feeding No   Social History Narrative     (case management) at [employer]     Social Drivers of Health     Financial Resource Strain: Low Risk  (5/2/2025)    Overall Financial Resource Strain (CARDIA)     Difficulty of Paying Living Expenses: Not hard at all   Food Insecurity: No Food Insecurity (5/2/2025)    Hunger Vital Sign     Worried About Running Out of Food in the Last Year: Never true     Ran Out of Food in the Last Year: Never true   Transportation Needs: No Transportation Needs (5/2/2025)    PRAPARE - Transportation     Lack of Transportation (Medical): No     Lack of Transportation (Non-Medical): No   Physical Activity: Sufficiently Active (5/2/2025)    Exercise Vital Sign     Days of Exercise per Week: 7 days     Minutes of Exercise per Session: 30 min   Stress: Stress Concern Present (5/2/2025)    [place] [employer] - Occupational Stress Questionnaire     Feeling of Stress : To some extent   Housing Stability: Low Risk  (5/2/2025)    Housing Stability Vital Sign     Unable to Pay for Housing in the Last Year: No     Homeless in the Last Year: No       Current Outpatient Medications   Medication Sig Dispense Refill    busPIRone (BUSPAR) 15 MG tablet Take 1 tablet (15 mg total) by mouth 2 (two) times daily. 60 tablet 3    carvediloL (COREG) 3.125 MG tablet Take 1 tablet (3.125 mg total) by mouth 2 (two) times daily. 180 tablet 3    cefadroxil (DURICEF) 500 MG Cap Take 1 capsule (500 mg total) by mouth every 12 (twelve) hours. 14 capsule 0    clobetasol 0.05% (TEMOVATE) 0.05 % Oint AAA scalp bid x 1 month - after warm water compress (Patient taking differently: as needed. AAA scalp bid x 1 month - after warm water compress) 60 g 0    gabapentin (NEURONTIN) 600 MG tablet Half in the AM and 2 in the  tablet 11    losartan (COZAAR) 25 MG tablet TAKE 1 TABLET BY MOUTH TWICE DAILY 180 tablet 3    memantine (NAMENDA) 5 MG Tab Take 1 tablet (5  mg total) by mouth 2 (two) times daily. 60 tablet 2    multivitamin capsule Take 1 capsule by mouth once daily.      mupirocin (BACTROBAN) 2 % ointment Apply topically 2 (two) times daily. 22 g 1    naproxen (NAPROSYN) 250 MG tablet One daily as needed for arthritis pain 30 tablet 1    rosuvastatin (CRESTOR) 10 MG tablet TAKE 1 TABLET(10 MG) BY MOUTH EVERY DAY 90 tablet 3    senna (SENOKOT) 8.6 mg tablet Take 1 tablet by mouth 2 (two) times daily.      vitamin D 1000 units Tab Take 1,000 Units by mouth once daily.       Current Facility-Administered Medications   Medication Dose Route Frequency Provider Last Rate Last Admin    triamcinolone acetonide injection 10 mg  10 mg Intradermal Once Christel Ward MD         Facility-Administered Medications Ordered in Other Visits   Medication Dose Route Frequency Provider Last Rate Last Admin    ceFAZolin 2 g in dextrose 5 % in water (D5W) 50 mL IVPB (MB+)  2 g Intravenous On Call Procedure Chacho Colby MD           Review of patient's allergies indicates:   Allergen Reactions    Atorvastatin Other (See Comments)     Elevated LFTs    Bactrim [sulfamethoxazole-trimethoprim] Nausea Only         Review of Systems   Constitutional: Negative for chills and fever.   HENT:  Negative for congestion and hearing loss.    Cardiovascular:  Negative for chest pain and claudication.   Respiratory:  Negative for cough and shortness of breath.    Skin:  Positive for nail changes. Negative for color change and poor wound healing.   Gastrointestinal:  Negative for nausea and vomiting.   Neurological:  Positive for numbness.   Psychiatric/Behavioral:  Negative for altered mental status.         Objective:     Physical Exam  Constitutional:       General: She is not in acute distress.     Appearance: Normal appearance. She is not ill-appearing.   Cardiovascular:      Pulses:           Dorsalis pedis pulses are 2+ on the right side and 2+ on the left side.        Posterior tibial pulses  are 2+ on the right side and 2+ on the left side.      Comments: No significant lower extremity edema bilateral.  Skin temp is warm to foot bilateral.  No rubor on dependency bilateral foot.  Musculoskeletal:      Comments: Moderate track bound hallux abductovalgus bilateral overlapping the 2nd toe.  One MTP range motion right foot with 30-40 degrees of dorsiflexion past neutral in up to 10° plantar flexion past neutral.  One MTP range motion of the left foot with near 45° of dorsiflexion and 10-15 degrees of plantar flexion which is more reducible than the right foot.  Dorsal 1st ray excursion left foot is near 1 cm and less than 1 cm on the right.    Semi-rigid flexion contracture of the right 2nd toe PIPJ and DIPJ.  Flexible mild flexion contractures of the lesser toes 2-4 left and 3-4 right.   Skin:     General: Skin is warm.      Capillary Refill: Capillary refill takes less than 2 seconds.      Findings: No ecchymosis or erythema.      Nails: There is no clubbing.      Comments: Previous wound sites of the left hallux nailbed is healed with a small residual area measuring 0.2 x 0.1 x 0.1 cm with fragile epithelial tissue that is nearly 100% resolved.  No signs infection.    Superficial clear fluid-filled blister that has formed to the medial aspect of the right 2nd toe PIPJ area without signs infection.   Neurological:      Mental Status: She is alert.           Assessment:      Encounter Diagnoses   Name Primary?    Open wound of great toe, subsequent encounter Yes    Hallux abductovalgus with bunions, unspecified laterality     Blister of second toe, right, initial encounter      Plan:     Jalyn was seen today for wound check.    Diagnoses and all orders for this visit:    Open wound of great toe, subsequent encounter    Hallux abductovalgus with bunions, unspecified laterality    Blister of second toe, right, initial encounter      I counseled the patient on her conditions, their implications and medical  management.    Wound sites of the left hallux is healed.  We discussed strategies to help separate the toes on the right foot and prevent the blister from getting worse.  Furthermore we discussed potential surgical revision surgery of the left foot consisting of screw removal from the 1st metatarsal base followed by minimally invasive bunionectomy with 1st metatarsal transveron osteotomy with single screw fixation and possible Akin osteotomy left foot.  Risks, benefits anticipated postop course were discussed in great detail.  I believe this would be a great way to preserve 1 MTP range motion and prevent recurrence of the hallux valgus.  Patient would like to consider her options and will notify us if she opts for surgical intervention.    RTC p.r.n. as discussed.    A portion of this note was generated by voice recognition software and may contain spelling and grammar errors.        .

## 2025-05-17 ENCOUNTER — OFFICE VISIT (OUTPATIENT)
Dept: URGENT CARE | Facility: CLINIC | Age: 75
End: 2025-05-17
Payer: MEDICARE

## 2025-05-17 VITALS
OXYGEN SATURATION: 97 % | HEIGHT: 67 IN | WEIGHT: 116 LBS | TEMPERATURE: 98 F | HEART RATE: 72 BPM | BODY MASS INDEX: 18.21 KG/M2 | DIASTOLIC BLOOD PRESSURE: 67 MMHG | RESPIRATION RATE: 18 BRPM | SYSTOLIC BLOOD PRESSURE: 103 MMHG

## 2025-05-17 DIAGNOSIS — U07.1 COVID-19 VIRUS DETECTED: ICD-10-CM

## 2025-05-17 DIAGNOSIS — Z20.822 CLOSE EXPOSURE TO COVID-19 VIRUS: Primary | ICD-10-CM

## 2025-05-17 LAB
CTP QC/QA: YES
SARS-COV+SARS-COV-2 AG RESP QL IA.RAPID: POSITIVE

## 2025-05-17 PROCEDURE — 99213 OFFICE O/P EST LOW 20 MIN: CPT | Mod: S$GLB,,, | Performed by: NURSE PRACTITIONER

## 2025-05-17 PROCEDURE — 87811 SARS-COV-2 COVID19 W/OPTIC: CPT | Mod: QW,S$GLB,, | Performed by: NURSE PRACTITIONER

## 2025-05-17 NOTE — PROGRESS NOTES
"Subjective:      Patient ID: Jalyn Aaron is a 75 y.o. female.    Vitals:  height is 5' 7" (1.702 m) and weight is 52.6 kg (116 lb). Her oral temperature is 98.4 °F (36.9 °C). Her blood pressure is 103/67 and her pulse is 72. Her respiration is 18 and oxygen saturation is 97%.     Chief Complaint: Sinus Problem    This is a 75 y.o. female who presents today with a chief complaint of sore throat, sneezing, runny nose, headache, congestion x 5 days.   Pt exposed to Covid. Took at-home covid test, but it was negative--also .    Denies cough.  States she is starting to feel a little better.     Sinus Problem  Her pain is at a severity of 0/10. She is experiencing no pain. Associated symptoms include congestion, headaches, sneezing and a sore throat. Pertinent negatives include no chills, coughing, diaphoresis, ear pain, hoarse voice, neck pain, shortness of breath, sinus pressure or swollen glands.     Constitution: Positive for fatigue. Negative for chills, sweating and fever.   HENT:  Positive for congestion and sore throat. Negative for ear pain and sinus pressure.    Neck: Negative for neck pain.   Respiratory:  Negative for cough, shortness of breath and wheezing.    Gastrointestinal:  Negative for vomiting and diarrhea.   Allergic/Immunologic: Positive for sneezing.   Neurological:  Positive for headaches.      Objective:     Physical Exam   Constitutional: She is oriented to person, place, and time. She appears well-developed. She is cooperative.  Non-toxic appearance. She does not appear ill. No distress.   HENT:   Head: Normocephalic.   Ears:   Right Ear: Hearing, tympanic membrane, external ear and ear canal normal.   Left Ear: Hearing, tympanic membrane, external ear and ear canal normal.   Nose: Congestion present. No mucosal edema, rhinorrhea or nasal deformity. No epistaxis. Right sinus exhibits no maxillary sinus tenderness and no frontal sinus tenderness. Left sinus exhibits no " maxillary sinus tenderness and no frontal sinus tenderness.   Mouth/Throat: Uvula is midline and mucous membranes are normal. Mucous membranes are moist. No trismus in the jaw. Normal dentition. No uvula swelling. Posterior oropharyngeal erythema present. No oropharyngeal exudate or posterior oropharyngeal edema. Oropharynx is clear.   Eyes: Conjunctivae and lids are normal. No scleral icterus.   Neck: Trachea normal and phonation normal. Neck supple. No edema present. No erythema present. No neck rigidity present.   Cardiovascular: Normal rate and regular rhythm.   Pulmonary/Chest: Effort normal and breath sounds normal. No stridor. No respiratory distress. She has no decreased breath sounds. She has no wheezes. She has no rhonchi. She has no rales. She exhibits no tenderness.   Abdominal: Normal appearance.   Musculoskeletal: Normal range of motion.         General: No deformity. Normal range of motion.   Neurological: She is alert and oriented to person, place, and time. She exhibits normal muscle tone. Coordination normal.   Skin: Skin is warm, dry, intact, not diaphoretic and not pale.   Psychiatric: Her speech is normal and behavior is normal. Judgment and thought content normal.   Nursing note and vitals reviewed.    Results for orders placed or performed in visit on 05/17/25   SARS Coronavirus 2 Antigen, POCT Manual Read    Collection Time: 05/17/25  1:06 PM   Result Value Ref Range    SARS Coronavirus 2 Antigen Positive (A) Negative, Presumptive Negative     Acceptable Yes      *Note: Due to a large number of results and/or encounters for the requested time period, some results have not been displayed. A complete set of results can be found in Results Review.      Assessment:     1. Close exposure to COVID-19 virus        Plan:       Close exposure to COVID-19 virus  -     SARS Coronavirus 2 Antigen, POCT Manual Read    Oral fluids  Rest  Steam (hot showers, hot tea)  Blow nose often  Droplet  and contact precautions: good handwashing; wear mask as needed; physical distancing   OTC ibuprofen or tylenol for aches and/or fever   You no longer have to quarantine   You may return to normal activities when for at least 24 hours: your symptoms are overall improving; and you have not had a fever (without use of fever-reducing medicines)   If symptoms worsen, after returning to normal activities, return home and avoid close contact with others for at least another 24 hours   Seek ER care with symptoms such as wheeze, respiratory distress, lethargy, dehydration

## 2025-05-19 ENCOUNTER — OFFICE VISIT (OUTPATIENT)
Dept: INTERNAL MEDICINE | Facility: CLINIC | Age: 75
End: 2025-05-19
Payer: MEDICARE

## 2025-05-19 ENCOUNTER — HOSPITAL ENCOUNTER (OUTPATIENT)
Dept: RADIOLOGY | Facility: HOSPITAL | Age: 75
Discharge: HOME | End: 2025-05-19
Attending: INTERNAL MEDICINE
Payer: MEDICARE

## 2025-05-19 VITALS
TEMPERATURE: 98 F | RESPIRATION RATE: 14 BRPM | HEART RATE: 72 BPM | HEIGHT: 67 IN | SYSTOLIC BLOOD PRESSURE: 110 MMHG | BODY MASS INDEX: 18.44 KG/M2 | OXYGEN SATURATION: 99 % | DIASTOLIC BLOOD PRESSURE: 68 MMHG | WEIGHT: 117.5 LBS

## 2025-05-19 DIAGNOSIS — M25.50 ARTHRALGIA, UNSPECIFIED JOINT: ICD-10-CM

## 2025-05-19 DIAGNOSIS — I11.0 HYPERTENSIVE HEART DISEASE WITH HEART FAILURE: Primary | ICD-10-CM

## 2025-05-19 DIAGNOSIS — R94.6 ABNORMAL RESULTS OF THYROID FUNCTION STUDIES: ICD-10-CM

## 2025-05-19 DIAGNOSIS — Z95.0 CARDIAC PACEMAKER IN SITU: ICD-10-CM

## 2025-05-19 PROCEDURE — 4010F ACE/ARB THERAPY RXD/TAKEN: CPT | Mod: CPTII,S$GLB,, | Performed by: INTERNAL MEDICINE

## 2025-05-19 PROCEDURE — 1159F MED LIST DOCD IN RCRD: CPT | Mod: CPTII,S$GLB,, | Performed by: INTERNAL MEDICINE

## 2025-05-19 PROCEDURE — 73130 X-RAY EXAM OF HAND: CPT | Mod: TC,50,PO

## 2025-05-19 PROCEDURE — 73130 X-RAY EXAM OF HAND: CPT | Mod: 26,50,, | Performed by: RADIOLOGY

## 2025-05-19 PROCEDURE — 3008F BODY MASS INDEX DOCD: CPT | Mod: CPTII,S$GLB,, | Performed by: INTERNAL MEDICINE

## 2025-05-19 PROCEDURE — 1126F AMNT PAIN NOTED NONE PRSNT: CPT | Mod: CPTII,S$GLB,, | Performed by: INTERNAL MEDICINE

## 2025-05-19 PROCEDURE — 1157F ADVNC CARE PLAN IN RCRD: CPT | Mod: CPTII,S$GLB,, | Performed by: INTERNAL MEDICINE

## 2025-05-19 PROCEDURE — 3074F SYST BP LT 130 MM HG: CPT | Mod: CPTII,S$GLB,, | Performed by: INTERNAL MEDICINE

## 2025-05-19 PROCEDURE — 3078F DIAST BP <80 MM HG: CPT | Mod: CPTII,S$GLB,, | Performed by: INTERNAL MEDICINE

## 2025-05-19 PROCEDURE — 99214 OFFICE O/P EST MOD 30 MIN: CPT | Mod: S$GLB,,, | Performed by: INTERNAL MEDICINE

## 2025-05-19 PROCEDURE — 99999 PR PBB SHADOW E&M-EST. PATIENT-LVL V: CPT | Mod: PBBFAC,,, | Performed by: INTERNAL MEDICINE

## 2025-05-19 RX ORDER — BUSPIRONE HYDROCHLORIDE 15 MG/1
15 TABLET ORAL 2 TIMES DAILY
Qty: 30 TABLET | Refills: 3 | Status: SHIPPED | OUTPATIENT
Start: 2025-05-19 | End: 2026-05-19

## 2025-05-19 NOTE — PROGRESS NOTES
Subjective:       Patient ID: Jalyn Aaron is a 75 y.o. female.    Chief Complaint: Follow-up    Follow-up  Pertinent negatives include no abdominal pain, chest pain (arm pain or jaw pain), headaches, nausea or vomiting.    Pt diagnosed with COVID - almost over it. No CP or SOB. Severe hand pain.  Review of Systems   Respiratory:  Negative for shortness of breath (PND or orthopnea).    Cardiovascular:  Negative for chest pain (arm pain or jaw pain).   Gastrointestinal:  Negative for abdominal pain, diarrhea, nausea and vomiting.   Genitourinary:  Negative for dysuria.   Neurological:  Negative for seizures, syncope and headaches.       Objective:      Physical Exam  Constitutional:       General: She is not in acute distress.     Appearance: She is well-developed.   HENT:      Head: Normocephalic.   Eyes:      Pupils: Pupils are equal, round, and reactive to light.   Neck:      Thyroid: No thyromegaly.      Vascular: No JVD.   Cardiovascular:      Rate and Rhythm: Normal rate and regular rhythm.      Heart sounds: Normal heart sounds. No murmur heard.     No friction rub. No gallop.   Pulmonary:      Effort: Pulmonary effort is normal.      Breath sounds: Normal breath sounds. No wheezing or rales.   Abdominal:      General: Bowel sounds are normal. There is no distension.      Palpations: Abdomen is soft. There is no mass.      Tenderness: There is no abdominal tenderness. There is no guarding or rebound.   Musculoskeletal:      Cervical back: Neck supple.   Lymphadenopathy:      Cervical: No cervical adenopathy.   Skin:     General: Skin is warm and dry.   Neurological:      Mental Status: She is alert and oriented to person, place, and time.      Deep Tendon Reflexes: Reflexes are normal and symmetric.   Psychiatric:         Behavior: Behavior normal.         Thought Content: Thought content normal.         Judgment: Judgment normal.         Assessment:       1. Hypertensive heart disease with heart  failure    2. Cardiac pacemaker in situ    3. Arthralgia, unspecified joint    4. Abnormal results of thyroid function studies        Plan:   Hypertensive heart disease with heart failure  -     CBC Auto Differential; Future; Expected date: 05/19/2025  -     Comprehensive Metabolic Panel; Future; Expected date: 05/19/2025  -     TSH; Future; Expected date: 05/19/2025    Cardiac pacemaker in situ  -     Ambulatory referral/consult to Electrophysiology; Future; Expected date: 05/26/2025  -     Echo Saline Bubble? No; Ultrasound enhancing contrast? No    Arthralgia, unspecified joint  -     X-Ray Hand 3 View Bilateral; Future; Expected date: 05/19/2025  -     Sedimentation rate; Future; Expected date: 05/19/2025  -     C-Reactive Protein; Future; Expected date: 05/19/2025  -     JACKIE Screen w/Reflex; Future; Expected date: 05/19/2025  -     Rheumatoid Factor; Future; Expected date: 05/19/2025  -     Cyclic Citrullinated Peptide Antibody, IgG; Future; Expected date: 05/19/2025  -     Ambulatory referral/consult to Rheumatology; Future; Expected date: 05/26/2025    Abnormal results of thyroid function studies  -     TSH; Future; Expected date: 05/19/2025    Other orders  -     busPIRone (BUSPAR) 15 MG tablet; Take 1 tablet (15 mg total) by mouth 2 (two) times daily.  Dispense: 30 tablet; Refill: 3    Try to wean buspar off and gabapentin down to lowest effective dose

## 2025-05-20 ENCOUNTER — TELEPHONE (OUTPATIENT)
Dept: ELECTROPHYSIOLOGY | Facility: CLINIC | Age: 75
End: 2025-05-20
Payer: MEDICARE

## 2025-05-20 ENCOUNTER — TELEPHONE (OUTPATIENT)
Dept: INTERNAL MEDICINE | Facility: CLINIC | Age: 75
End: 2025-05-20
Payer: MEDICARE

## 2025-05-20 ENCOUNTER — PATIENT MESSAGE (OUTPATIENT)
Dept: GASTROENTEROLOGY | Facility: CLINIC | Age: 75
End: 2025-05-20
Payer: MEDICARE

## 2025-05-20 DIAGNOSIS — I44.30 AV BLOCK: Primary | ICD-10-CM

## 2025-05-20 NOTE — TELEPHONE ENCOUNTER
Pt scheduled new patient with SK the patient is an ep . Canceled fermin and LVM for pt to call me to get her scheduled appropriately .

## 2025-05-20 NOTE — TELEPHONE ENCOUNTER
----- Message from Jaimee sent at 5/20/2025 11:03 AM CDT -----  Contact: [phone]  2TESTRESULTSType: Test ResultsWhat test was performed? X Ray Who ordered the test? Dr Kilgore When and where were the test performed? [facility] 5/19/25Would you like a call back and or thru [facility]: Call  Back Comments: pt would like to results to be available for her appt tomorrow.

## 2025-05-20 NOTE — TELEPHONE ENCOUNTER
XR not resulted yet. Informed pt it may take b/w 5-7 business days depending upon how many xrays radiology has had to do & result. Pt VU.

## 2025-05-21 ENCOUNTER — OFFICE VISIT (OUTPATIENT)
Dept: RHEUMATOLOGY | Facility: CLINIC | Age: 75
End: 2025-05-21
Payer: MEDICARE

## 2025-05-21 VITALS
HEIGHT: 67 IN | WEIGHT: 119.06 LBS | SYSTOLIC BLOOD PRESSURE: 112 MMHG | HEART RATE: 73 BPM | BODY MASS INDEX: 18.69 KG/M2 | DIASTOLIC BLOOD PRESSURE: 73 MMHG

## 2025-05-21 DIAGNOSIS — M25.50 ARTHRALGIA, UNSPECIFIED JOINT: ICD-10-CM

## 2025-05-21 DIAGNOSIS — M19.041 PRIMARY OSTEOARTHRITIS OF BOTH HANDS: Primary | ICD-10-CM

## 2025-05-21 DIAGNOSIS — M19.042 PRIMARY OSTEOARTHRITIS OF BOTH HANDS: Primary | ICD-10-CM

## 2025-05-21 PROCEDURE — 1160F RVW MEDS BY RX/DR IN RCRD: CPT | Mod: CPTII,S$GLB,, | Performed by: INTERNAL MEDICINE

## 2025-05-21 PROCEDURE — 1157F ADVNC CARE PLAN IN RCRD: CPT | Mod: CPTII,S$GLB,, | Performed by: INTERNAL MEDICINE

## 2025-05-21 PROCEDURE — 99999 PR PBB SHADOW E&M-EST. PATIENT-LVL IV: CPT | Mod: PBBFAC,,, | Performed by: INTERNAL MEDICINE

## 2025-05-21 PROCEDURE — 1101F PT FALLS ASSESS-DOCD LE1/YR: CPT | Mod: CPTII,S$GLB,, | Performed by: INTERNAL MEDICINE

## 2025-05-21 PROCEDURE — 3074F SYST BP LT 130 MM HG: CPT | Mod: CPTII,S$GLB,, | Performed by: INTERNAL MEDICINE

## 2025-05-21 PROCEDURE — 3288F FALL RISK ASSESSMENT DOCD: CPT | Mod: CPTII,S$GLB,, | Performed by: INTERNAL MEDICINE

## 2025-05-21 PROCEDURE — 99204 OFFICE O/P NEW MOD 45 MIN: CPT | Mod: S$GLB,,, | Performed by: INTERNAL MEDICINE

## 2025-05-21 PROCEDURE — 3008F BODY MASS INDEX DOCD: CPT | Mod: CPTII,S$GLB,, | Performed by: INTERNAL MEDICINE

## 2025-05-21 PROCEDURE — 4010F ACE/ARB THERAPY RXD/TAKEN: CPT | Mod: CPTII,S$GLB,, | Performed by: INTERNAL MEDICINE

## 2025-05-21 PROCEDURE — 1125F AMNT PAIN NOTED PAIN PRSNT: CPT | Mod: CPTII,S$GLB,, | Performed by: INTERNAL MEDICINE

## 2025-05-21 PROCEDURE — 3078F DIAST BP <80 MM HG: CPT | Mod: CPTII,S$GLB,, | Performed by: INTERNAL MEDICINE

## 2025-05-21 PROCEDURE — 1159F MED LIST DOCD IN RCRD: CPT | Mod: CPTII,S$GLB,, | Performed by: INTERNAL MEDICINE

## 2025-05-21 RX ORDER — MELOXICAM 7.5 MG/1
7.5 TABLET ORAL DAILY
Qty: 30 TABLET | Refills: 1 | Status: SHIPPED | OUTPATIENT
Start: 2025-05-21

## 2025-05-21 ASSESSMENT — ROUTINE ASSESSMENT OF PATIENT INDEX DATA (RAPID3)
TOTAL RAPID3 SCORE: 3.33
FATIGUE SCORE: 4
AM STIFFNESS SCORE: 0, NO
PAIN SCORE: 6
MDHAQ FUNCTION SCORE: 0
PSYCHOLOGICAL DISTRESS SCORE: 2.2
PATIENT GLOBAL ASSESSMENT SCORE: 4

## 2025-05-21 NOTE — ASSESSMENT & PLAN NOTE
Erosive osteoarthritis of hands, with persistent inflammatory changes L 3rd PIP and DIP  No clinical evidence of rheumatoid arthritis and CCP is minimally elevated    Little to offer, but will refer to hand therapy and recommend short term Rx with meloxicam, 7.5 mg/d for 2-4 weeks in effort to reduce inflammation at 3rd digit. Because she has ASCVD with non-ischemic cardiomyopathy, she will not stay on this and will resume acetaminophen after

## 2025-05-21 NOTE — PROGRESS NOTES
5/20/2025     9:24 AM   Rapid3 Question Responses and Scores   MDHAQ Score 0   Psychologic Score 2.2   Pain Score 6   When you awakened in the morning OVER THE LAST WEEK, did you feel stiff? No   Fatigue Score 4   Global Health Score 4   RAPID3 Score 3.33        Answers submitted by the patient for this visit:  Rheumatology Questionnaire (Submitted on 5/20/2025)  fever: No  eye redness: No  mouth sores: No  headaches: Yes  shortness of breath: No  chest pain: No  trouble swallowing: No  diarrhea: No  constipation: No  unexpected weight change: No  genital sore: No  dysuria: No  During the last 3 days, have you had a skin rash?: No  Bruises or bleeds easily: Yes  cough: No

## 2025-05-21 NOTE — PROGRESS NOTES
"Subjective:       Patient ID: Jalyn Aaron is a 75 y.o. female.    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   HENT:  Negative for mouth sores and trouble swallowing.    Eyes:  Negative for redness.   Respiratory:  Negative for cough and shortness of breath.    Cardiovascular:  Negative for chest pain.   Gastrointestinal:  Negative for constipation and diarrhea.   Genitourinary:  Negative for dysuria and genital sores.   Skin:  Negative for rash.   Neurological:  Positive for headaches.   Hematological:  Bruises/bleeds easily.         Objective:   /73 (BP Location: Left arm, Patient Position: Sitting)   Pulse 73   Ht 5' 7" (1.702 m)   Wt 54 kg (119 lb 0.8 oz)   BMI 18.65 kg/m²      Physical Exam   Musculoskeletal:         General: Swelling, tenderness and deformity present.      Comments: Osteoarthritis hands with cmc and ip joint changes         Assessment:       1. Primary osteoarthritis of both hands    2. Arthralgia, unspecified joint            Plan:       Problem " List Items Addressed This Visit          Active Problems    Primary osteoarthritis of both hands - Primary    Erosive osteoarthritis of hands, with persistent inflammatory changes L 3rd PIP and DIP  No clinical evidence of rheumatoid arthritis and CCP is minimally elevated    Little to offer, but will refer to hand therapy and recommend short term Rx with meloxicam, 7.5 mg/d for 2-4 weeks in effort to reduce inflammation at 3rd digit. Because she has ASCVD with non-ischemic cardiomyopathy, she will not stay on this and will resume acetaminophen after              Relevant Medications    meloxicam (MOBIC) 7.5 MG tablet    Other Relevant Orders    Ambulatory Referral/Consult to Occupational Therapy     Other Visit Diagnoses         Arthralgia, unspecified joint

## 2025-05-22 ENCOUNTER — RESULTS FOLLOW-UP (OUTPATIENT)
Dept: INTERNAL MEDICINE | Facility: CLINIC | Age: 75
End: 2025-05-22

## 2025-05-23 ENCOUNTER — OFFICE VISIT (OUTPATIENT)
Dept: NEUROLOGY | Facility: CLINIC | Age: 75
End: 2025-05-23
Payer: MEDICARE

## 2025-05-23 DIAGNOSIS — F09 COGNITIVE DYSFUNCTION: Primary | ICD-10-CM

## 2025-05-23 DIAGNOSIS — F41.1 GENERALIZED ANXIETY DISORDER: ICD-10-CM

## 2025-05-23 PROCEDURE — 96133 NRPSYC TST EVAL PHYS/QHP EA: CPT | Mod: S$GLB,,, | Performed by: CLINICAL NEUROPSYCHOLOGIST

## 2025-05-23 PROCEDURE — 96139 PSYCL/NRPSYC TST TECH EA: CPT | Mod: S$GLB,,, | Performed by: CLINICAL NEUROPSYCHOLOGIST

## 2025-05-23 PROCEDURE — 96132 NRPSYC TST EVAL PHYS/QHP 1ST: CPT | Mod: S$GLB,,, | Performed by: CLINICAL NEUROPSYCHOLOGIST

## 2025-05-23 PROCEDURE — 96138 PSYCL/NRPSYC TECH 1ST: CPT | Mod: S$GLB,,, | Performed by: CLINICAL NEUROPSYCHOLOGIST

## 2025-05-23 PROCEDURE — 99499 UNLISTED E&M SERVICE: CPT | Mod: S$GLB,,, | Performed by: CLINICAL NEUROPSYCHOLOGIST

## 2025-05-23 NOTE — PROGRESS NOTES
NEUROPSYCHOLOGICAL EVALUATION - CONFIDENTIAL    Referring Provider: Annemarie Kilgore MD   Medical Necessity: Evaluate cognitive and emotional functioning, participate in treatment planning/management, and provide supportive therapy in the setting of memory loss  Date Conducted: 04/02/2025 & 05/23/2025  Present At Visit: the patient   Referral Diagnoses: R41.3 (ICD-10-CM) - Memory loss   Consent: The patient expressed an understanding of the purpose of the evaluation and consented to all procedures. We discussed the limits of confidentiality and discussed an emergency plan.    SUMMARY & PLAN:   Ms. Jalyn Aaron is an 75 y.o., female with 18 years of formal education and pertinent medical history including anxiety, aortic atherosclerosis,cardiac pacemaker, nonischemic cardiomyopathy, SARAI  who was referred for a neuropsychological evaluation in the setting of one year of stable cognitive changes and worsening anxiety.       Findings:   Compared to high average range premorbid estimates (based on both demographic information and a word reading test), results of the current evaluation reveal a few scattered low scores (divided attention, mental arithmetic, and one semantic fluency measure were reduced), while all other testing was at expectation, including other measures of language skills, visuospatial constructional skills, other measures of executive functioning, processing speed, attention, working memory, and learning and memory. A brief cognitive screener was within normal limits (MoCA = 26/30), temporal orientation was intact, and she was able to provide detailed background information during the clinical interview. Psychological screening questionnaires revealed significantly elevated anxiety, both during the testing session and in general.     Assessment:   Overall, this is a normal cognitive profile with a few scattered low scores (which can happen by chance alone). She does not meet criteria  for a neurocognitive disorder diagnosis. Suspicion for an emerging neurodegenerative condition is low, though she is undergoing further workup with neurology next month which will be helpful to see if other testing also lowers suspicion. I do have concern for her significant symptoms of anxiety and believe these could be driving her cognitive inefficiency. With adequate treatment, she is likely to notice improvement in her thinking. She also has mild sleep apnea that is currently going untreated, which could be another contributing factor. She is at low risk of anticholingeric burden.     Plan:   Results of this evaluation will be discussed during our scheduled feedback session. A copy of this report is being sent to Dr. Kilgore.   I believe Ms. Aaron would significantly benefit from from optimized treatment for her anxiety, including possible changes to her psychiatric medication and attending talk therapy/counseling. She is encouraged to discuss medication options with her prescribing physicians if this is of interest. If she is interested in pursuing therapy services through [facility], I can place a referral. Otherwise she is encouraged to explore [email or web address] to find a list of community providers with whom she may want to work.  She is encouraged to maintain as close to 100% compliance as possible when using a CPAP/BiPAP machine, as untreated or partially treated sleep apnea can negatively impact cognition, both acutely and chronically.   We will review brain health information, including the importance of maintaining good control over vascular risk factors.   Re-evaluation as needed. This evaluation can be used for comparison. She is welcome to return for a check-in at any time to update treatment planning.      Diagnoses Associated with Visit   1. Cognitive dysfunction    2. Generalized anxiety disorder      Thank you for allowing me to assist in Ms. Jalyn Aaron's care. If you have  any questions, please contact me at [phone].      Madonna Salazar, PhD, ABPP  Board Certified in Clinical Neuropsychology   [facility] - Department of Neurology    CLINICAL INTERVIEW & RECORD REVIEW:     Previous Workup   Cognitive screener(s): none  Previous evaluation(s): none  Neurology Visits: has seen neurology for lumbar stenosis and neuropathy. Has an appointment in June 2025 for memory loss.      Cognitive Functioning   Onset of difficulty: maybe over the past year   Course of difficulty: stable  Examples:   Getting distracted more than she used to. Has left the stove on when serving food a few times but has caught it.   Maybe gets a little sidetracked, tries to stick to a list.   Misplaces things a lot. Has always been that way, but it's happening more now.   Maybe more indecisive. Really feels like she lost her confidence.   Lives a more sedentary life. Did yoga for many years but stopped going and has seen her physical health declining. Thinks this would be a big help.   Just got back from a 15 day [employer] cruise, worried she may lose something, but didn't lose anything. Did a bunch of excursions. Noticed she has more energy than she thought so plans to get back to the gym.   Forgetful more often. Some may be anxiety-related. Driving and may miss a turn,   More trouble with talking in groups. Couldn't remember what they did the day before to share in the conversation.   Can be with people she is real comfortable with, so knows some is anxiety.   Some trouble recalling the name of the last book she read.   She was paying the bill at a restaurant and got nervous about leaving the tip and started questioning herself.     Exacerbating factors: anxiety, feeling more anxious about her health   Ameliorating factors: none  Medication for cognition: memantine 5 mg, maybe it's helping, she is unsure    Daily Functioning    Bathing: independent and without difficulty  Dressing: independent and  "without difficulty  Grooming: independent and without difficulty  Toileting: independent and without difficulty  Transferring: independent and without difficulty.  Eating: independent and without difficulty.    Finances: independent and without difficulty  Medication Mgmt: independent and without difficulty  Driving: sometimes misses a turn when she is driving. Better when she is alone in the car. Never getting totally lost or not recognizing where she is.  Household Mgmt: has never been great with this  Cooking/Meal Preparation: Has left the stove on when serving food a few times but has caught it.   Shopping: independent and without difficulty.  Appointment Mgmt: independent and without difficulty    Psychiatric/Neuropsychiatric Symptoms   Mood: "it's been up and down."   Depression: gets down sometimes but feels she struggles with anxiety not depression. Has a strong kita and relies on it for coping. Very good friends, , dogs. Has a good life.   Veronica/Hypomania: no  Anxiety/Stress: yes, increased. Was very stressed with this trip and some of the things that have been going on. Has a friend coming, excited but stressed about it. Always been a worrier to an extent, but used to be confident and now feels she has lost her confidence over the past year as challenges started to be presented to her. Not handling things the same way she used to.   Social Withdrawal: no  Neurovegetative Sxs:  Appetite: had lost weight, she and [place] unsure why. They think she has lost muscle. Eating a lot now and not gaining weight, not losing weight   Sleep: usually it's good but it can be bad if she is worried about something. Maybe once a month. Has mild sleep apnea and was on a CPAP machine, but had an injury and 70% of one of her nostrils is blocked. Also has a scalp disease that is on her crown so wearing the mask causes pain. Looking at another oral device that may help. About a year ago, tried for 6 months and turned " it back in. If she does have surgery to correct her breathing, she could maybe try again.    Energy: it's not as good now, but does know she has more energy than she realized.   Hallucinations: no  Delusional/Paranoid Thinking: no  Impulsivity: no  Obsessive/Compulsive Behaviors: no  Disinhibition: no  Irritability/Agitation: no  Aggression: no  Apathy/Indifference: no  Problems with Empathy: no  Other changes in personality: no    Physical Functioning   Tremor: no  Difficulty walking: no  Imbalance: a little unsteady when she first gets up and start going. Just had toenail removed from big toe.   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    Past Surgical History:   Procedure Laterality Date    BREAST BIOPSY Left 12/2013    papilloma with atypia on core biopsy    BREAST BIOPSY Left 01/2014    Ex.Bx., removal of ADH/Papilloma    COLONOSCOPY N/A 11/8/2016    Procedure: COLONOSCOPY;  Surgeon: Dl Caruso MD;  Location: [address]);  Service: Endoscopy;  Laterality: N/A;  Pacemaker in place.    COLONOSCOPY N/A 3/30/2021    Procedure: COLONOSCOPY;  Surgeon: Joaquin Johnson MD;  Location: [facility] ([address] [place] [address]);  Service: Endoscopy;  Laterality: N/A;  prep ins. emailed - COVID screening on 3/27/21 PCW - ERW    ENDOSCOPIC ULTRASOUND OF UPPER GASTROINTESTINAL TRACT N/A 10/9/2023    Procedure:  ULTRASOUND, UPPER GI TRACT, ENDOSCOPIC;  Surgeon: Stacey Sesay MD;  Location: [facility] ENDO (2ND FLR);  Service: Endoscopy;  Laterality: N/A;  pancreas cyst 7mm on CT (r/o nodule or worrisome features)    8/4/23-Instructions via portal-DS  10/2-precall complete-MS    ESOPHAGOGASTRODUODENOSCOPY N/A 3/19/2024    Procedure: EGD (ESOPHAGOGASTRODUODENOSCOPY);  Surgeon: Mynor Watts MD;  Location: [facility] ENDO (2ND FLR);  Service: Endoscopy;  Laterality: N/A;  moved to 2nd floor endoscopy per recommendations of crna  pt has    HYSTERECTOMY      IMPLANTATION OF BIVENTRICULAR PERMANENT PACEMAKER AS UPGRADE TO EXISTING PACEMAKER N/A 10/26/2023    Procedure: Biventricular pacemaker upgrade;  Surgeon: Chacho Colby MD;  Location: [facility] EP LAB;  Service: Cardiology;  Laterality: N/A;  CHB, CRT-P upgrade, MDT, MAC, SK, 3 Prep *MDT DC PPM in situ*    INSERT / REPLACE / REMOVE PACEMAKER      LASIK      LASIK Bilateral 2009    VENOGRAM, EP LAB N/A 12/20/2022    Procedure: Venogram, EP Lab;  Surgeon: Chacho Colby MD;  Location: [facility] EP LAB;  Service: Cardiology;  Laterality: N/A;  NICM, Venogram- left side, SK, 3 Prep *MDT PPM in situ*     Neurological History    Headaches/Migraines: no  TBI: 6 y/o - does remember she was in the garage in the cabinet and she fell from one of the cabinets. His her head and eyes.   Seizures: no  Stroke: no  Tumor: no  Previous Episodes of Delirium: no  Movement Disorder: no  CNS Infection: no  Other: no    Neurodiagnostics     Results for orders placed or performed during the hospital encounter of 11/11/24   MRI Brain W WO Contrast    Narrative    EXAMINATION:  MRI BRAIN W WO CONTRAST    CLINICAL HISTORY:  Memory loss; Other amnesia    TECHNIQUE:  Multiplanar multisequence MR imaging of the brain was performed before and after the administration of 6 mL Gadavist intravenous contrast.    COMPARISON:  CT head 01/04/2023    FINDINGS:  Ventricles are stable in size.  No hydrocephalus.    The brain appears  within normal limits for age.  No evidence of recent or remote major vascular distribution infarct. No evidence of recent or remote hemorrhage.  No focal edema or significant intracranial mass effect.  No abnormal post-contrast parenchymal or leptomeningeal enhancement.    No extra-axial blood or fluid collections.    The T2 skull base flow voids are preserved.    Bone marrow signal intensity is unremarkable.      Impression    No evidence of acute intracranial pathology.      Electronically signed by: Kiran Jaramillo MD  Date:    11/11/2024  Time:    14:24   Results for orders placed or performed during the hospital encounter of 01/04/23   CT Head Without Contrast    Narrative    EXAMINATION:  CT HEAD WITHOUT CONTRAST    CLINICAL HISTORY:  Memory loss; Other amnesia    TECHNIQUE:  Low dose axial CT images obtained throughout the head without the use of intravenous contrast.  Axial, sagittal and coronal reconstructions were performed.    COMPARISON:  None.    FINDINGS:  Intracranial compartment:    Ventricles and sulci are normal in size for age without evidence of hydrocephalus.    The brain parenchyma appears within normal limits.  No parenchymal  hemorrhage, edema, mass effect or major vascular distribution infarct.    No extra-axial blood or fluid collections.    Skull/extracranial contents (limited evaluation):    No displaced calvarial fracture.    The mastoid air cells and visualized paranasal sinuses are essentially clear.      Impression    No evidence of acute intracranial pathology.      Electronically signed by: Kiran Jaramillo MD  Date:    01/04/2023  Time:    14:07     *Note: Due to a large number of results and/or encounters for the requested time period, some results have not been displayed. A complete set of results can be found in Results Review.     Pertinent Lab Work     Lab Results   Component Value Date    PNVHUSYP74 651 08/12/2024     Lab Results   Component Value Date    RPR Non-reactive  "2022     Lab Results   Component Value Date    FOLATE 14.0 10/01/2020     Lab Results   Component Value Date    TSH 1.035 2025     Lab Results   Component Value Date    HGBA1C 5.5 2023     No results found for: "HIV1X2", "[id number]"  No results found for: "PTAU", "ADEVLPHOSTAU", "VBPG659", "ADEVLTOTAL", "[id number]", "NEUROLGTCHN", "APGTPE", "APOE"    Medications   Current Medications[1]     Psychiatric History   Prior Diagnoses: anxiety  History of Trauma/Abuse: no  History of Suicide Attempts: no  Current Suicidal Ideation, Intention, or Plan: no  Current Homicidal Ideation, Intention, or Plan: no  Medication(s): buspirone, going to ask if she can cut back on that.   Hospitalization(s): no  Psychotherapy/Counseling: yes, completed talk therapy in the past. None currently   Other: no    Substance Use History   Ms. Aaron  reports that she has never smoked. She has never been exposed to tobacco smoke. She has never used smokeless tobacco. She reports that she does not currently use alcohol after a past usage of about 1.0 standard drink of alcohol per week. She reports that she does not use drugs.   History of abuse/overuse: no    Family Neurological & Psychiatric History     family history includes Breast cancer in her mother; Cancer (age of onset: 80) in her mother; Cataracts in her mother; Hypertension in her mother; Multiple sclerosis in her father and sister; No Known Problems in her brother, maternal aunt, maternal grandmother, maternal uncle, paternal aunt, paternal grandfather, paternal grandmother, paternal uncle, son, and son; Prostate cancer in her maternal grandfather.  Neurologic: Negative for heritable risk factors.   Psychiatric: Negative for heritable risk factors.    Development  Education   Born & raised: [place]  Prenatal and  development: wnl  Developmental milestones: wnl  Language Acquisition: English first language  Level Attained: masters in social work "   Learning/Attention/Behavior Difficulties: has always had trouble with her memory and done things to help.   Repeated Grade(s): no          Occupation  Social   Occupational Status: Retired   Primary Occupation:    Family Status:  to her second  for 6 years. 2 adult children, 1 late (son  almost 20 years ago)  Support System: good - , friends, [employer] family, son  Hobbies/Activities: [employer] activities, walking dogs, likes to read, loves to paint  Current Living Situation: lives at home with her  and dogs     OBJECTIVE:     Mental Status and Observations  Appearance: Appropriate to setting    Alertness: Attentive and alert.   Orientation:   O x 4 across both evaluation days   Gait:  Independent    Psychomotor:  Unremarkable   Handedness:  Right   Vision & Hearing:  Wore bifocal lenses on the day of testing. She asked the examiner to repeat herself several times because she was having trouble hearing her.    Speech/language: Normal in rate, rhythm, tone, and volume. No significant word finding difficulty observed. Comprehension was normal during the clinical interview. She was provided with clarification and repetition of task instructions to ensure her comprehension during testing.     Mood/Affect:  The patient's mood and affect were congruent and euthymic during the clinical interview. She appeared anxious and slightly irritable on the day of testing. She also seemed suspicious of the examiners actions/behaviors. She was provided with some additional reassurance throughout the evaluation.   Interpersonal Behavior:  Rapport was quickly and easily established    Suicidality/Homicidality: Denied   Hallucinations/Delusions:  None evidenced or endorsed   Thought Content: Logical   Though Processes: Goal-directed   Insight & Judgment:  Appropriate   Participation in Interview:  Full     Procedures/Tests Administered    In addition to performing a review of pertinent medical  records, reviewing limits to confidentiality, conducting a clinical interview, and explaining procedures, the following measures were administered by TYLER Martel, a trained psychometrician/psychometrist under the direct supervision of Dr. Salazar: MSVT; Jose Cognitive Assessment (MoCA); Test of Premorbid Functioning (TOPF); Wechsler Adult Intelligence Scale, Fourth Edition (WAIS-IV) [Digit Span, Arithmetic, and Symbol Search subtests]; Repeatable Battery for the Assessment of Neuropsychological Status (RBANS, form A); Neuropsychological Assessment Battery (NAB) [Naming subtest, form 1]; Verbal fluency tests (FAS & animal naming; Michelle et al., 2004 norms); Rickey Complex Figure Test (RCFT) [copy only]; Trail Making Test, parts A and B (Michelle et al., 2004 norms); Wisconsin Card Sorting Test -64 card version (WCST-64); Geriatric Depression Scale (GDS-30); and State Trait Anxiety Inventory-Short Form (STAI-SF). Manual norms were used unless otherwise indicated.      Test Taking Behavior and Validity   During testing, Ms. Aaron commented that some test measures were making her feel anxious. She seemed frustrated at times and suspicious of the examiner, asking her what she was writing and observing. She worked at a fast pace. Scores on stand-alone and embedded performance validity measures were within normal limits. The current results, therefore, are likely an accurate reflection of the patient's current functioning.    Test Results       Raw Score Type of Standardized Score Standardized Score Percentile/CP Descriptor   MSVT  - - - -   MSVT DR 95 - - - -   MSVT Cons 95 - - - -   MSVT  - - - -   MSVT FR 70 - - - -   ACS RDS 8 - - - -   RBANS EI 0 - -      PREMORBID FUNCTIONING Raw Score Type of Standardized Score Standardized Score Percentile/CP Descriptor   TOPF simple dem. eFSIQ -  79 High Average   TOPF pred. eFSIQ -  73 Average   TOPF simple + pred. eFSIQ -  77 High  Average   COGNITIVE SCREENING Raw Score Type of Standardized Score Standardized Score Percentile/CP Descriptor   MoCA 26 - - - WNL   Orientation - Place 2/2 - - - -   Orientation - Date 4/4 - - - -   RBANS         Immediate Memory -  66 Average   VS/Construction -  55 Average   Language - SS 99 47 Average   Attention -  73 Average   Delayed Memory -  53 Average   Total Scale -  61 Average   LANGUAGE FUNCTIONING Raw Score Type of Standardized Score Standardized Score Percentile/CP Descriptor   RBANS Naming 9 ss - 26-50 Average   RBANS Semantic Fluency 19 ss 9 37 Average   TOPF Word Reading 54  77 High Average   NAB Naming 30 Tscore 57 76 High Average   NAB Naming Percent Correct After Semantic Cuing 0 - - 38 Average   NAB Naming Percent Correct After Phonemic Cuing 100 - - 100 Exceptionally High   COWAT 49 Tscore 52 58 Average   Animal Naming 12 Tscore 30 2 Below Average   VISUOSPATIAL FUNCTIONING Raw Score Type of Standardized Score Standardized Score Percentile/CP Descriptor   RBANS Line Orientation  16 ss - 26-50 Average   RBANS Figure Copy 19 ss 12 75 High Average   RCFT Copy 34 - - >16 WNL   RCFT Time to Copy 82 - - >16 WNL   LEARNING & MEMORY Raw Score Type of Standardized Score Standardized Score Percentile/CP Descriptor   RBANS         Immediate Memory -  66 Average   Delayed Memory -  53 Average   List Learning (5, 7, 7, 8) 27 ss 11 63 Average   List Recall 5 ss - 51-75 Average   List Recognition 20 ss - 51-75 Average   Story Memory (7, 10)  17 ss 11 63 Average   Story Recall 10 ss 12 75 High Average   Story Recognition  12 - - >69 Average   Figure Recall 9 ss 8 25 Average   Figure Recognition 1/1 - - - WNL   ATTENTION/WORKING MEMORY Raw Score Type of Standardized Score Standardized Score Percentile/CP Descriptor   WAIS-IV WMI - SS 92 30 Average   WAIS-IV Digit Span 25 ss 10 50 Average         DS Forward 9 ss 9 37 Average         DS Backward 7 ss 9 37 Average          DS Sequence 9 ss 12 75 High Average         Longest Digit Forward 6 - - - -         Longest Digit Backward 4 - - - -         Longest Digit Sequence 6 - - - -   WAIS-IV Arithmetic 10 ss 7 16 Low Average   RBANS Digit Span  10 ss 10 50 Average   MENTAL PROCESSING SPEED Raw Score Type of Standardized Score Standardized Score Percentile/CP Descriptor   WAIS-IV Symbol Search 35 ss 15 95 Above Average   RBANS Coding 50 ss 13 84 High Average   TMT A  24 Tscore 59 82 High Average   TMT A Total Err. 0 - - - -   EXECUTIVE FUNCTIONING Raw Score Type of Standardized Score Standardized Score Percentile/CP Descriptor   TMT B 98 Tscore 43 24 Low Average   TMT B Seq Err. 1 - - - -   WCST-64         Total Correct 55 SS - - -   Total Errors 9  99.5 Exceptionally High   Perseverative Resp. 4 SS >145 >99 Exceptionally High   Perseverative Err. 4 SS >145 >99 Exceptionally High   Nonperseverative Err. 5  92 Above Average   Concept. Level Response 55 SS >145 >99 Exceptionally High   Categories Completed 4 - - >16 WNL   FMS 1 - - - -   Learning to Learn 0.47 - - >16 WNL   MOOD & PERSONALITY Raw Score Type of Standardized Score Standardized Score Percentile/CP Descriptor   GDS-30 6 - - - WNL   STAISF:State 28 - - 93 Above Average   STAISF:Trait 22 - - 80 High Average   ss = scaled score (mean = 10, SD = 3); SS = standard score (mean = 100, SD = 15); Tscore mean = 50, SD = 10; zscore (mean = 0.00, SD = 1); WNL = within normal limits; BNL = below normal limits  It is important to note that scores/percentiles should only be interpreted by a neuropsychologist. It is common for healthy individuals to have 1-3 isolated low/unusual scores that are not indicative of any significant cognitive dysfunction.       Billing  Code Description Minutes Units   78168 Psychiatric Interview 0    04108 Nubhvl xm phys/qhp 1st hr 0    65492 Nubhvl xm phy/qhp ea addl hr 0    66735 Psycl tst eval phys/qhp 1st 0    54401 Psycl tst eval phys/qhp  ea 0    67464 Nrpsyc tst eval phys/qhp 1st 60 1   59324 Nrpsyc tst eval phys/qhp ea 97 2     Referral review/test selection 30      Tech consult/test review/modifications 10      Patient limitation management 0      Patient behavior management 0      Patient symptom monitoring 0      Record Review/Integration/Report Generation 86      Face-to-Face interpretive Feedback 31    38164 Psycl/nrpsyc tst phy/qhp 1st 0    99138 Psycl/nrpsyc tst phy/qhp ea 0    29980 Psycl/nrpsyc tech 1st 30 1   38127 Psycl/nrpsyc tst tech ea 115 4                    [1]   Current Outpatient Medications:     busPIRone (BUSPAR) 15 MG tablet, Take 1 tablet (15 mg total) by mouth 2 (two) times daily., Disp: 30 tablet, Rfl: 3    carvediloL (COREG) 3.125 MG tablet, Take 1 tablet (3.125 mg total) by mouth 2 (two) times daily., Disp: 180 tablet, Rfl: 3    clobetasol 0.05% (TEMOVATE) 0.05 % Oint, AAA scalp bid x 1 month - after warm water compress, Disp: 60 g, Rfl: 0    gabapentin (NEURONTIN) 600 MG tablet, Half in the AM and 2 in the PM, Disp: 135 tablet, Rfl: 11    losartan (COZAAR) 25 MG tablet, TAKE 1 TABLET BY MOUTH TWICE DAILY, Disp: 180 tablet, Rfl: 3    meloxicam (MOBIC) 7.5 MG tablet, Take 1 tablet (7.5 mg total) by mouth once daily., Disp: 30 tablet, Rfl: 1    memantine (NAMENDA) 5 MG Tab, Take 1 tablet (5 mg total) by mouth 2 (two) times daily., Disp: 60 tablet, Rfl: 2    multivitamin capsule, Take 1 capsule by mouth once daily., Disp: , Rfl:     mupirocin (BACTROBAN) 2 % ointment, Apply topically 2 (two) times daily. (Patient not taking: Reported on 5/21/2025), Disp: 22 g, Rfl: 1    rosuvastatin (CRESTOR) 10 MG tablet, TAKE 1 TABLET(10 MG) BY MOUTH EVERY DAY, Disp: 90 tablet, Rfl: 3    senna (SENOKOT) 8.6 mg tablet, Take 1 tablet by mouth 2 (two) times daily., Disp: , Rfl:     vitamin D 1000 units Tab, Take 1,000 Units by mouth once daily., Disp: , Rfl:     Current Facility-Administered Medications:     triamcinolone acetonide injection  10 mg, 10 mg, Intradermal, Once, Christel Ward MD    Facility-Administered Medications Ordered in Other Visits:     ceFAZolin 2 g in dextrose 5 % in water (D5W) 50 mL IVPB (MB+), 2 g, Intravenous, On Call Procedure, Chacho Colby MD

## 2025-05-26 ENCOUNTER — OFFICE VISIT (OUTPATIENT)
Dept: DERMATOLOGY | Facility: CLINIC | Age: 75
End: 2025-05-26
Payer: MEDICARE

## 2025-05-26 DIAGNOSIS — L98.8 EROSIVE PUSTULAR DERMATOSIS: Primary | ICD-10-CM

## 2025-05-26 PROBLEM — F41.1 GENERALIZED ANXIETY DISORDER: Status: ACTIVE | Noted: 2025-05-26

## 2025-05-26 PROCEDURE — 1159F MED LIST DOCD IN RCRD: CPT | Mod: CPTII,S$GLB,, | Performed by: DERMATOLOGY

## 2025-05-26 PROCEDURE — 1126F AMNT PAIN NOTED NONE PRSNT: CPT | Mod: CPTII,S$GLB,, | Performed by: DERMATOLOGY

## 2025-05-26 PROCEDURE — 1157F ADVNC CARE PLAN IN RCRD: CPT | Mod: CPTII,S$GLB,, | Performed by: DERMATOLOGY

## 2025-05-26 PROCEDURE — 1160F RVW MEDS BY RX/DR IN RCRD: CPT | Mod: CPTII,S$GLB,, | Performed by: DERMATOLOGY

## 2025-05-26 PROCEDURE — 99999 PR PBB SHADOW E&M-EST. PATIENT-LVL III: CPT | Mod: PBBFAC,,, | Performed by: DERMATOLOGY

## 2025-05-26 PROCEDURE — 4010F ACE/ARB THERAPY RXD/TAKEN: CPT | Mod: CPTII,S$GLB,, | Performed by: DERMATOLOGY

## 2025-05-26 PROCEDURE — 1101F PT FALLS ASSESS-DOCD LE1/YR: CPT | Mod: CPTII,S$GLB,, | Performed by: DERMATOLOGY

## 2025-05-26 PROCEDURE — 99214 OFFICE O/P EST MOD 30 MIN: CPT | Mod: S$GLB,,, | Performed by: DERMATOLOGY

## 2025-05-26 PROCEDURE — 3288F FALL RISK ASSESSMENT DOCD: CPT | Mod: CPTII,S$GLB,, | Performed by: DERMATOLOGY

## 2025-05-26 NOTE — PROGRESS NOTES
"  Subjective:      Patient ID:  Jalyn Aaron is a 75 y.o. female who presents for   Chief Complaint   Patient presents with    Follow-up     Scalp      History of Present Illness: The patient presents for follow up of EPD.     The patient was last seen on: 2/24/25 for EPD of scalp vertex which pt states has worsened  and tx w/ clob oint qday.   Pt states she has not been tx area "as well as she would like for the past few months" due to a toe nail issues which got infected and inquired more attention at the time.     Chronic tenderness of AA          Review of Systems   Skin:  Positive for daily sunscreen use, activity-related sunscreen use and wears hat (always). Negative for itching (sensitive), rash and recent sunburn.   Hematologic/Lymphatic: Bruises/bleeds easily (bruise).       Objective:   Physical Exam   Constitutional: She appears well-developed and well-nourished. No distress.   Neurological: She is alert and oriented to person, place, and time. She is not disoriented.   Psychiatric: She has a normal mood and affect.   Skin:   Areas Examined (abnormalities noted in diagram):   Scalp / Hair Palpated and Inspected            Diagram Legend     Erythematous scaling macule/papule c/w actinic keratosis       Vascular papule c/w angioma      Pigmented verrucoid papule/plaque c/w seborrheic keratosis      Yellow umbilicated papule c/w sebaceous hyperplasia      Irregularly shaped tan macule c/w lentigo     1-2 mm smooth white papules consistent with Milia      Movable subcutaneous cyst with punctum c/w epidermal inclusion cyst      Subcutaneous movable cyst c/w pilar cyst      Firm pink to brown papule c/w dermatofibroma      Pedunculated fleshy papule(s) c/w skin tag(s)      Evenly pigmented macule c/w junctional nevus     Mildly variegated pigmented, slightly irregular-bordered macule c/w mildly atypical nevus      Flesh colored to evenly pigmented papule c/w intradermal nevus       Pink pearly " papule/plaque c/w basal cell carcinoma      Erythematous hyperkeratotic cursted plaque c/w SCC      Surgical scar with no sign of skin cancer recurrence      Open and closed comedones      Inflammatory papules and pustules      Verrucoid papule consistent consistent with wart     Erythematous eczematous patches and plaques     Dystrophic onycholytic nail with subungual debris c/w onychomycosis     Umbilicated papule    Erythematous-base heme-crusted tan verrucoid plaque consistent with inflamed seborrheic keratosis     Erythematous Silvery Scaling Plaque c/w Psoriasis     See annotation      Assessment / Plan:        Erosive pustular dermatosis - mild flare  Gently debrided today.  Restart gentle washings bid followed by Clobetasol ointment bid    Recheck in 1 month             Follow up in about 4 weeks (around 6/23/2025).

## 2025-05-28 NOTE — TELEPHONE ENCOUNTER
Refill Routing Note   Medication(s) are not appropriate for processing by [facility] for the following reason(s):        Outside of protocol    [facility] action(s):  Route             Appointments  past 12m or future 3m with PCP    Date Provider   Last Visit   5/19/2025 Annemarie Kilgore MD   Next Visit   Visit date not found Annemarie Kilgore MD   ED visits in past 90 days: 0        Note composed:12:07 PM 05/28/2025

## 2025-05-30 ENCOUNTER — CLINICAL SUPPORT (OUTPATIENT)
Dept: CARDIOLOGY | Facility: HOSPITAL | Age: 75
End: 2025-05-30

## 2025-05-30 ENCOUNTER — CLINICAL SUPPORT (OUTPATIENT)
Dept: CARDIOLOGY | Facility: HOSPITAL | Age: 75
End: 2025-05-30
Attending: INTERNAL MEDICINE
Payer: MEDICARE

## 2025-05-30 DIAGNOSIS — Z95.0 PRESENCE OF CARDIAC PACEMAKER: ICD-10-CM

## 2025-05-30 DIAGNOSIS — I44.2 ATRIOVENTRICULAR BLOCK, COMPLETE: ICD-10-CM

## 2025-05-30 PROCEDURE — 93294 REM INTERROG EVL PM/LDLS PM: CPT | Mod: ,,, | Performed by: INTERNAL MEDICINE

## 2025-05-30 PROCEDURE — 93296 REM INTERROG EVL PM/IDS: CPT | Performed by: INTERNAL MEDICINE

## 2025-06-02 ENCOUNTER — CLINICAL SUPPORT (OUTPATIENT)
Dept: REHABILITATION | Facility: HOSPITAL | Age: 75
End: 2025-06-02
Attending: INTERNAL MEDICINE
Payer: MEDICARE

## 2025-06-02 ENCOUNTER — PATIENT MESSAGE (OUTPATIENT)
Dept: PODIATRY | Facility: CLINIC | Age: 75
End: 2025-06-02
Payer: MEDICARE

## 2025-06-02 DIAGNOSIS — M79.642 BILATERAL HAND PAIN: ICD-10-CM

## 2025-06-02 DIAGNOSIS — M19.041 PRIMARY OSTEOARTHRITIS OF BOTH HANDS: ICD-10-CM

## 2025-06-02 DIAGNOSIS — M79.644 CHRONIC THUMB PAIN, BILATERAL: Primary | ICD-10-CM

## 2025-06-02 DIAGNOSIS — M79.645 CHRONIC THUMB PAIN, BILATERAL: Primary | ICD-10-CM

## 2025-06-02 DIAGNOSIS — M19.042 PRIMARY OSTEOARTHRITIS OF BOTH HANDS: ICD-10-CM

## 2025-06-02 DIAGNOSIS — G89.29 CHRONIC THUMB PAIN, BILATERAL: Primary | ICD-10-CM

## 2025-06-02 DIAGNOSIS — M79.641 BILATERAL HAND PAIN: ICD-10-CM

## 2025-06-02 PROCEDURE — 97110 THERAPEUTIC EXERCISES: CPT

## 2025-06-02 PROCEDURE — 97165 OT EVAL LOW COMPLEX 30 MIN: CPT

## 2025-06-03 ENCOUNTER — OFFICE VISIT (OUTPATIENT)
Dept: PODIATRY | Facility: CLINIC | Age: 75
End: 2025-06-03
Payer: MEDICARE

## 2025-06-03 VITALS
HEART RATE: 80 BPM | HEIGHT: 67 IN | SYSTOLIC BLOOD PRESSURE: 131 MMHG | WEIGHT: 119.06 LBS | DIASTOLIC BLOOD PRESSURE: 81 MMHG | BODY MASS INDEX: 18.69 KG/M2

## 2025-06-03 DIAGNOSIS — M21.619 HALLUX ABDUCTOVALGUS WITH BUNIONS, UNSPECIFIED LATERALITY: Primary | ICD-10-CM

## 2025-06-03 DIAGNOSIS — M20.10 HALLUX ABDUCTOVALGUS WITH BUNIONS, UNSPECIFIED LATERALITY: Primary | ICD-10-CM

## 2025-06-03 DIAGNOSIS — M19.072 OSTEOARTHRITIS OF FIRST METATARSOPHALANGEAL (MTP) JOINT OF BOTH FEET: ICD-10-CM

## 2025-06-03 DIAGNOSIS — Z98.890 HISTORY OF FOOT SURGERY: ICD-10-CM

## 2025-06-03 DIAGNOSIS — M19.071 OSTEOARTHRITIS OF FIRST METATARSOPHALANGEAL (MTP) JOINT OF BOTH FEET: ICD-10-CM

## 2025-06-03 PROCEDURE — 99999 PR PBB SHADOW E&M-EST. PATIENT-LVL IV: CPT | Mod: PBBFAC,,, | Performed by: PODIATRIST

## 2025-06-03 PROCEDURE — 3079F DIAST BP 80-89 MM HG: CPT | Mod: CPTII,S$GLB,, | Performed by: PODIATRIST

## 2025-06-03 PROCEDURE — 1101F PT FALLS ASSESS-DOCD LE1/YR: CPT | Mod: CPTII,S$GLB,, | Performed by: PODIATRIST

## 2025-06-03 PROCEDURE — 1157F ADVNC CARE PLAN IN RCRD: CPT | Mod: CPTII,S$GLB,, | Performed by: PODIATRIST

## 2025-06-03 PROCEDURE — 4010F ACE/ARB THERAPY RXD/TAKEN: CPT | Mod: CPTII,S$GLB,, | Performed by: PODIATRIST

## 2025-06-03 PROCEDURE — 1125F AMNT PAIN NOTED PAIN PRSNT: CPT | Mod: CPTII,S$GLB,, | Performed by: PODIATRIST

## 2025-06-03 PROCEDURE — 1159F MED LIST DOCD IN RCRD: CPT | Mod: CPTII,S$GLB,, | Performed by: PODIATRIST

## 2025-06-03 PROCEDURE — 3075F SYST BP GE 130 - 139MM HG: CPT | Mod: CPTII,S$GLB,, | Performed by: PODIATRIST

## 2025-06-03 PROCEDURE — 99213 OFFICE O/P EST LOW 20 MIN: CPT | Mod: S$GLB,,, | Performed by: PODIATRIST

## 2025-06-03 PROCEDURE — 1160F RVW MEDS BY RX/DR IN RCRD: CPT | Mod: CPTII,S$GLB,, | Performed by: PODIATRIST

## 2025-06-03 PROCEDURE — 3008F BODY MASS INDEX DOCD: CPT | Mod: CPTII,S$GLB,, | Performed by: PODIATRIST

## 2025-06-03 PROCEDURE — 3288F FALL RISK ASSESSMENT DOCD: CPT | Mod: CPTII,S$GLB,, | Performed by: PODIATRIST

## 2025-06-04 RX ORDER — MEMANTINE HYDROCHLORIDE 5 MG/1
5 TABLET ORAL 2 TIMES DAILY
Qty: 60 TABLET | Refills: 2 | Status: CANCELLED | OUTPATIENT
Start: 2025-06-04 | End: 2026-06-04

## 2025-06-04 NOTE — TELEPHONE ENCOUNTER
Refill Routing Note   Medication(s) are not appropriate for processing by [facility] for the following reason(s):        Outside of protocol    [facility] action(s):  Route               Appointments  past 12m or future 3m with PCP    Date Provider   Last Visit   5/19/2025 Annemarie Kilgore MD   Next Visit   Visit date not found Annemarie Kilgore MD   ED visits in past 90 days: 0        Note composed:1:51 PM 06/04/2025

## 2025-06-10 ENCOUNTER — CLINICAL SUPPORT (OUTPATIENT)
Dept: REHABILITATION | Facility: HOSPITAL | Age: 75
End: 2025-06-10
Attending: INTERNAL MEDICINE
Payer: MEDICARE

## 2025-06-10 DIAGNOSIS — G89.29 CHRONIC THUMB PAIN, BILATERAL: ICD-10-CM

## 2025-06-10 DIAGNOSIS — M79.644 CHRONIC THUMB PAIN, BILATERAL: ICD-10-CM

## 2025-06-10 DIAGNOSIS — M79.642 BILATERAL HAND PAIN: Primary | ICD-10-CM

## 2025-06-10 DIAGNOSIS — M79.641 BILATERAL HAND PAIN: Primary | ICD-10-CM

## 2025-06-10 DIAGNOSIS — M79.645 CHRONIC THUMB PAIN, BILATERAL: ICD-10-CM

## 2025-06-10 PROCEDURE — 97140 MANUAL THERAPY 1/> REGIONS: CPT

## 2025-06-10 PROCEDURE — 97018 PARAFFIN BATH THERAPY: CPT

## 2025-06-10 PROCEDURE — 97110 THERAPEUTIC EXERCISES: CPT

## 2025-06-17 ENCOUNTER — CLINICAL SUPPORT (OUTPATIENT)
Dept: REHABILITATION | Facility: HOSPITAL | Age: 75
End: 2025-06-17
Attending: INTERNAL MEDICINE
Payer: MEDICARE

## 2025-06-17 DIAGNOSIS — M79.642 BILATERAL HAND PAIN: ICD-10-CM

## 2025-06-17 DIAGNOSIS — M79.641 BILATERAL HAND PAIN: ICD-10-CM

## 2025-06-17 DIAGNOSIS — M79.645 CHRONIC THUMB PAIN, BILATERAL: Primary | ICD-10-CM

## 2025-06-17 DIAGNOSIS — M79.644 CHRONIC THUMB PAIN, BILATERAL: Primary | ICD-10-CM

## 2025-06-17 DIAGNOSIS — G89.29 CHRONIC THUMB PAIN, BILATERAL: Primary | ICD-10-CM

## 2025-06-17 PROCEDURE — 97018 PARAFFIN BATH THERAPY: CPT | Mod: 59

## 2025-06-17 PROCEDURE — 97140 MANUAL THERAPY 1/> REGIONS: CPT | Mod: 59

## 2025-06-17 PROCEDURE — 97150 GROUP THERAPEUTIC PROCEDURES: CPT

## 2025-06-25 ENCOUNTER — CLINICAL SUPPORT (OUTPATIENT)
Dept: REHABILITATION | Facility: HOSPITAL | Age: 75
End: 2025-06-25
Attending: INTERNAL MEDICINE
Payer: MEDICARE

## 2025-06-25 DIAGNOSIS — M79.644 CHRONIC THUMB PAIN, BILATERAL: Primary | ICD-10-CM

## 2025-06-25 DIAGNOSIS — G89.29 CHRONIC THUMB PAIN, BILATERAL: Primary | ICD-10-CM

## 2025-06-25 DIAGNOSIS — M79.645 CHRONIC THUMB PAIN, BILATERAL: Primary | ICD-10-CM

## 2025-06-25 DIAGNOSIS — M79.642 BILATERAL HAND PAIN: ICD-10-CM

## 2025-06-25 DIAGNOSIS — M79.641 BILATERAL HAND PAIN: ICD-10-CM

## 2025-06-25 PROCEDURE — 97140 MANUAL THERAPY 1/> REGIONS: CPT

## 2025-06-25 PROCEDURE — 97110 THERAPEUTIC EXERCISES: CPT

## 2025-06-25 PROCEDURE — 97018 PARAFFIN BATH THERAPY: CPT

## 2025-06-25 NOTE — PROGRESS NOTES
Outpatient Rehab    Physical Therapy Visit    Patient Name: Jalyn Aaron  MRN: [id number]  YOB: 1950  Encounter Date: 6/25/2025    Therapy Diagnosis:   Encounter Diagnoses   Name Primary?    Chronic thumb pain, bilateral Yes    Bilateral hand pain      Physician: Evelio Shah, *    Physician Orders: Eval and Treat  Medical Diagnosis: Primary osteoarthritis of both hands  Surgical Diagnosis: Not applicable for this Episode   Surgical Date: Not applicable for this Episode  Days Since Last Surgery: Not applicable for this Episode    Visit # / Visits Authorized:  3 / 12  Insurance Authorization Period: 6/4/2025 to 12/31/2025  Date of Evaluation: 6/2/2025  Plan of Care Certification: 6/2/2025 to 8/25/2025      PT/PTA:     Number of PTA visits since last PT visit:   Time In:     Time Out:    Total Time (in minutes):     Total Billable Time (in minutes):      FOTO:  Intake Score: Not applicable for this Episode%  Survey Score 2: Not applicable for this Episode%  Survey Score 3: Not applicable for this Episode%    Precautions:         Subjective      Pain reported as 5/10.      Objective       Digit 1 - Thumb Active Range of Motion  Right Thumb   Flexion (deg) Extension (deg) Pain   CMC         MCP 50       IP 20         Right Thumb   Range (deg) Pain   Palmar ABduction 40     Radial ABduction 50     ADduction         Left Thumb   Flexion (deg) Extension (deg) Pain   CMC         MCP 53       IP 22         Left Thumb   Range (deg) Pain   Palmar ABduction 45     Radial ABduction 45     ADduction                             Right  Strength  Right Hand Dynamometer Position: 2  Elbow Position Forearm Position Trial 1 (lbs) Trial 2  (lbs) Trial 3  (lbs) Average  (lbs) Pain   Flexed Neutral 48               Left  Strength  Left Hand Dynamometer Position: 2  Elbow Position Forearm Position Trial 1 (lbs) Trial 2 (lbs) Trial 3 (lbs) Average (lbs) Pain   Flexed Neutral 25                          Treatment:       Time Entry(in minutes):       Assessment & Plan   Assessment:         The patient will continue to benefit from skilled outpatient physical therapy in order to address the deficits listed in the problem list on the initial evaluation, provide patient and family education, and maximize the patients level of independence in the home and community environments.     The patient's spiritual, cultural, and educational needs were considered, and the patient is agreeable to the plan of care and goals.           Plan:      Goals:   Resolved       LTGs       1) Patient will be independent in HEP  (Met)       Start:  06/02/25    Expected End:  08/25/25    Resolved:  07/15/25         2) Decrease pain in L hand to no more than 3/10 worst in ADL/IADL's  (Met)       Start:  06/02/25    Expected End:  08/25/25    Resolved:  07/15/25         3) Increase AROM of L long and ring fingers to at least 85 degrees at PIP joint for increased functioning in ADL/IADL's   (Met)       Start:  06/02/25    Expected End:  08/25/25    Resolved:  07/15/25         4) Increase strength in L  by at least 10 psi for improved functioning in ADL/IADL's   (Met)       Start:  06/02/25    Expected End:  08/25/25    Resolved:  07/15/25         5) Decrease edema in H hands to trace or none   (Met)       Start:  06/02/25    Expected End:  08/25/25    Resolved:  07/15/25             Sujatha Velasco OT

## 2025-06-26 ENCOUNTER — OFFICE VISIT (OUTPATIENT)
Dept: NEUROLOGY | Facility: CLINIC | Age: 75
End: 2025-06-26
Payer: MEDICARE

## 2025-06-26 ENCOUNTER — PATIENT MESSAGE (OUTPATIENT)
Dept: NEUROLOGY | Facility: CLINIC | Age: 75
End: 2025-06-26

## 2025-06-26 DIAGNOSIS — F09 COGNITIVE DYSFUNCTION: Primary | ICD-10-CM

## 2025-06-26 DIAGNOSIS — F41.1 GENERALIZED ANXIETY DISORDER: ICD-10-CM

## 2025-06-26 PROCEDURE — 99499 UNLISTED E&M SERVICE: CPT | Mod: 93,,, | Performed by: CLINICAL NEUROPSYCHOLOGIST

## 2025-06-26 NOTE — PROGRESS NOTES
NEUROPSYCHOLOGICAL EVALUATION FEEDBACK    TELEMEDICINE DETAILS:   Audio Only Telehealth Visit   The patient location is: [place]  The chief complaint leading to consultation is: feedback regarding neuropsychological test results  Visit type: Virtual visit with audio only (telephone)  Total time spent in medical discussion with patient: 31 minutes  Total time spent on date of the encounter:31 minutes   The reason for the audio only service rather than synchronous audio and video virtual visit was related to technical difficulties or patient preference/necessity.   Each patient to whom I provide medical services by telemedicine is:  (1) informed of the relationship between the physician and patient and the respective role of any other health care provider with respect to management of the patient; and (2) notified that they may decline to receive medical services by telemedicine and may withdraw from such care at any time. Patient verbally consented to receive this service via voice-only telephone call.    Jalyn Aaron attended a feedback session today.  We discussed the results of the neuropsychological evaluation and I gave time to discuss questions and concerns. For full evaluation details, please see the note from this provider dated 5/23/2025. A copy of the report was provided via [employer].         Madonna Salazar, PhD, ABPP  Board Certified in Clinical Neuropsychology   [facility] - Department of Neurology                     This service was not originating from a related E/M service provided within the previous 7 days nor will  to an E/M service or procedure within the next 24 hours or my soonest available appointment.  Prevailing standard of care was able to be met in this audio-only visit.

## 2025-06-27 ENCOUNTER — TELEPHONE (OUTPATIENT)
Dept: INTERNAL MEDICINE | Facility: CLINIC | Age: 75
End: 2025-06-27
Payer: MEDICARE

## 2025-06-27 NOTE — TELEPHONE ENCOUNTER
I returned patient call.    Lov 5/19/25  She has brief form to check off & dr to sign  For a [employer] trip in October.    She needs to return to them in 1-2 weeks.    I told ok to bring and we will call once ready for .

## 2025-06-27 NOTE — PROGRESS NOTES
[facility]  Consult Note    Date of Service: 6/30/2025  Patient seen at the request of: Annemarie Kilgore MD    Reason for Consultation  Memory loss    Assessment:  Jalyn Aaron is a 75 y.o. female who presents with forgetfulness.    Patient reports a history of forgetfulness.  She reports that she has left the stove on, has not word-finding difficulty and has missed turns while driving.  She is still able to handle all activities of daily living and is able to manage finances without error.    MoCA was a 25/30 with 3/5 on delayed recall.  MRIb - The brain appears within normal limits for age.     We will check for neurodegenerative disease, however, the patient's symptoms may be secondary to concurrent anxiety.    Recommendations:    - agree with establishment with behavioral therapy  - referral to psychiatry  - [id number]  - ptau-[id number]  - AD Detect Beta-amyloid 42/40 ratio      A dictation device was used to produce this document. Use of such devices sometimes results in grammatical errors or replacement of words that sound similarly.     Signed:    David Tavarez MD  Neurology/Epilepsy  06/30/2025 11:57 AM      HPI:  Jalyn Aaron is a 75 y.o. 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  She reports that her  has noticed a change as well.  Patient reports that she has left the stove on.  She reports word finding difficulty.  She has missed turns while driving.  Patient reports that she manages finances without issue and no issues with ADLs.    Patient reports that she retired 8 years ago.  Worked as a .    She reports  increased anxiety.    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 She cites that a scalp issue does not allow her to wear the mask.      No family history of memory loss.  Some forgetfulness in her mother.        This is the extent of the patient's complaints at this time.    Neuropsych evaluation 5/2025:  75 y.o., female with 18 years of formal education and pertinent medical history including anxiety, aortic atherosclerosis,cardiac pacemaker, nonischemic cardiomyopathy, SARAI  who was referred for a neuropsychological evaluation in the setting of one year of stable cognitive changes and worsening anxiety.       Findings:   Compared to high average range premorbid estimates (based on both demographic information and a word reading test), results of the current evaluation reveal a few scattered low scores (divided attention, mental arithmetic, and one semantic fluency measure were reduced), while all other testing was at expectation, including other measures of language skills, visuospatial constructional skills, other measures of executive functioning, processing speed, attention, working memory, and learning and memory. A brief cognitive screener was within normal limits (MoCA = 26/30), temporal orientation was intact, and she was able to provide detailed background information during the clinical interview. Psychological screening questionnaires revealed significantly elevated anxiety, both during the testing session and in general.      Assessment:   Overall, this is a normal cognitive profile with a few scattered low scores (which can happen by chance alone). She does not meet criteria for a neurocognitive disorder diagnosis. Suspicion for an emerging neurodegenerative condition is low, though she is undergoing further workup with neurology next month which will be helpful to see if other testing also lowers suspicion. I do have concern for her significant symptoms of anxiety and believe these could be driving her  cognitive inefficiency. With adequate treatment, she is likely to notice improvement in her thinking. She also has mild sleep apnea that is currently going untreated, which could be another contributing factor. She is at low risk of anticholingeric burden.       TSH   Date Value Ref Range Status   05/19/2025 1.035 0.400 - 4.000 uIU/mL Final   08/12/2024 1.099 0.400 - 4.000 uIU/mL Final   02/28/2024 0.684 0.400 - 4.000 uIU/mL Final     Vitamin B-12   Date Value Ref Range Status   08/12/2024 651 210 - 950 pg/mL Final   12/29/2022 676 210 - 950 pg/mL Final     Hemoglobin A1C   Date Value Ref Range Status   12/29/2023 5.5 4.0 - 5.6 % Final     Comment:     ADA Screening Guidelines:  5.7-6.4%  Consistent with prediabetes  >or=6.5%  Consistent with diabetes    High levels of fetal hemoglobin interfere with the HbA1C  assay. Heterozygous hemoglobin variants (HbS, HgC, etc)do  not significantly interfere with this assay.   However, presence of multiple variants may affect accuracy.     12/13/2022 5.4 4.0 - 5.6 % Final     Comment:     ADA Screening Guidelines:  5.7-6.4%  Consistent with prediabetes  >or=6.5%  Consistent with diabetes    High levels of fetal hemoglobin interfere with the HbA1C  assay. Heterozygous hemoglobin variants (HbS, HgC, etc)do  not significantly interfere with this assay.   However, presence of multiple variants may affect accuracy.           Review of Systems:  ROS negative unless noted in HPI    Past Surgical History:  Past Surgical History:   Procedure Laterality Date    BREAST BIOPSY Left 12/2013    papilloma with atypia on core biopsy    BREAST BIOPSY Left 01/2014    Ex.Bx., removal of ADH/Papilloma    COLONOSCOPY N/A 11/8/2016    Procedure: COLONOSCOPY;  Surgeon: Dl Caruso MD;  Location: [facility] ([address] [place] [address]);  Service: Endoscopy;  Laterality: N/A;  Pacemaker in place.    COLONOSCOPY N/A 3/30/2021    Procedure: COLONOSCOPY;  Surgeon: Joaquin Johnson MD;  [facility]   Service: Endoscopy;  Laterality: N/A;  prep ins. emailed - COVID screening on 3/27/21 PCW - ERW    ENDOSCOPIC ULTRASOUND OF UPPER GASTROINTESTINAL TRACT N/A 10/9/2023    Procedure: ULTRASOUND, UPPER GI TRACT, ENDOSCOPIC;  Surgeon: Stacey Sesay MD;  Location: [facility] ENDO (2ND FLR);  Service: Endoscopy;  Laterality: N/A;  pancreas cyst 7mm on CT (r/o nodule or worrisome features)    8/4/23-Instructions via portal-DS  10/2-precall complete-MS    ESOPHAGOGASTRODUODENOSCOPY N/A 3/19/2024    Procedure: EGD (ESOPHAGOGASTRODUODENOSCOPY);  Surgeon: Mynor Watts MD;  Location: [facility] ENDO (2ND FLR);  Service: Endoscopy;  Laterality: N/A;  moved to 2nd floor endoscopy per recommendations of crna  pt has    HYSTERECTOMY      IMPLANTATION OF BIVENTRICULAR PERMANENT PACEMAKER AS UPGRADE TO EXISTING PACEMAKER N/A 10/26/2023    Procedure: Biventricular pacemaker upgrade;  Surgeon: Chacho Colby MD;  Location: [facility] EP LAB;  Service: Cardiology;  Laterality: N/A;  CHB, CRT-P upgrade, MDT, MAC, SK, 3 Prep *MDT DC PPM in situ*    INSERT / REPLACE / REMOVE PACEMAKER      LASIK      LASIK Bilateral 2009    VENOGRAM, EP LAB N/A 12/20/2022    Procedure: Venogram, EP Lab;  Surgeon: Chacho Colby MD;  Location: [facility] EP LAB;  Service: Cardiology;  Laterality: N/A;  NICM, Venogram- left side, SK, 3 Prep *MDT PPM in situ*       Family History:  Family History   Problem Relation Name Age of Onset    Breast cancer Mother          Paget's dz    Cancer Mother  80        pagets    Cataracts Mother      Hypertension Mother      Multiple sclerosis Father Devyn Love     Multiple sclerosis Sister Lulu Pratt     No Known Problems Maternal Aunt      No Known Problems Maternal Uncle      No Known Problems Paternal Aunt      No Known Problems Paternal Uncle      No Known Problems Maternal Grandmother      Prostate cancer Maternal Grandfather      No Known Problems Paternal Grandmother      No Known Problems Paternal Grandfather      No Known  Problems Brother      No Known Problems Son      No Known Problems Son      Amblyopia Neg Hx      Blindness Neg Hx      Diabetes Neg Hx      Glaucoma Neg Hx      Macular degeneration Neg Hx      Retinal detachment Neg Hx      Strabismus Neg Hx      Stroke Neg Hx      Thyroid disease Neg Hx      Melanoma Neg Hx      Ovarian cancer Neg Hx         Social History:  Social History[1]    Allergies:  Atorvastatin and Bactrim [sulfamethoxazole-trimethoprim]    Outpatient Medications:  Prior to Admission medications    Medication Sig Start Date End Date Taking? Authorizing Provider   busPIRone (BUSPAR) 15 MG tablet Take 1 tablet (15 mg total) by mouth 2 (two) times daily. 5/19/25 5/19/26  Annemarie Kilgore MD   carvediloL (COREG) 3.125 MG tablet Take 1 tablet (3.125 mg total) by mouth 2 (two) times daily. 8/12/24   Annemarie Kilgore MD   clobetasol 0.05% (TEMOVATE) 0.05 % Oint AAA scalp bid x 1 month - after warm water compress 11/25/24   Christel Ward MD   gabapentin (NEURONTIN) 600 MG tablet Half in the AM and 2 in the PM 11/4/24   Annemarie Kilgore MD   losartan (COZAAR) 25 MG tablet TAKE 1 TABLET BY MOUTH TWICE DAILY 12/23/24   Abbe Figueroa MD   meloxicam (MOBIC) 7.5 MG tablet Take 1 tablet (7.5 mg total) by mouth once daily. 5/21/25   Evelio Shah MD   memantine (NAMENDA) 5 MG Tab Take 1 tablet (5 mg total) by mouth 2 (two) times daily. 2/18/25 2/18/26  Annemarie Kilgore MD   multivitamin capsule Take 1 capsule by mouth once daily.    Provider, Historical   mupirocin (BACTROBAN) 2 % ointment Apply topically 2 (two) times daily. 3/24/25   Fredis Nguyen, ANASTASIYA   rosuvastatin (CRESTOR) 10 MG tablet TAKE 1 TABLET(10 MG) BY MOUTH EVERY DAY 8/14/24   Abbe Figueroa MD   senna (SENOKOT) 8.6 mg tablet Take 1 tablet by mouth 2 (two) times daily.    Provider, Historical   vitamin D 1000 units Tab Take 1,000 Units by mouth once daily.    Provider, Historical       Physical  exam:    Vitals: /78 (BP Location: Left arm, Patient Position: Sitting)   Pulse 66   Wt 53.7 kg (118 lb 6.2 oz)   BMI 18.54 kg/m²     General:   in no distress, well-nourished, well-developed, appears stated age.  Head/Neck:   Normocephalic,atraumatic  Pulm:  Non-labored breathing     Mental Status: Alert and oriented to person, time, place, situation. Speech spontaneous and fluent without paraphasias; no dysarthria  CN: III, IV, VI: EOM intact without nystagmus or diplopia.   VII: Facial movement full and symmetric.   VIII: Hearing grossly normal to conversation.  IX, X: Palate midline with symmetric elevation.    XII: Tongue midline without fasciculations.  Motor: Normal bulk throughout all four extremities.   RUE: antigravity  LUE: antigravity   RLE: antigravity  LLE: antigravity  No tremors     MoCA 6/30/2025:              Imaging:  All pertinent imaging was personally reviewed.      Results for orders placed during the hospital encounter of 11/11/24    MRI Brain W WO Contrast    Narrative  EXAMINATION:  MRI BRAIN W WO CONTRAST    CLINICAL HISTORY:  Memory loss; Other amnesia    TECHNIQUE:  Multiplanar multisequence MR imaging of the brain was performed before and after the administration of 6 mL Gadavist intravenous contrast.    COMPARISON:  CT head 01/04/2023    FINDINGS:  Ventricles are stable in size.  No hydrocephalus.    The brain appears within normal limits for age.  No evidence of recent or remote major vascular distribution infarct. No evidence of recent or remote hemorrhage.  No focal edema or significant intracranial mass effect.  No abnormal post-contrast parenchymal or leptomeningeal enhancement.    No extra-axial blood or fluid collections.    The T2 skull base flow voids are preserved.    Bone marrow signal intensity is unremarkable.    Impression  No evidence of acute intracranial pathology.      Electronically signed by: Kiran Jaramillo MD  Date:    11/11/2024  Time:    14:24              [1]   Social History  Tobacco Use    Smoking status: Never     Passive exposure: Never    Smokeless tobacco: Never   Substance Use Topics    Alcohol use: Not Currently     Alcohol/week: 1.0 standard drink of alcohol     Types: 1 Glasses of wine per week     Comment: weekly    Drug use: No

## 2025-06-27 NOTE — TELEPHONE ENCOUNTER
Copied from CRM #[id number]. Topic: General Inquiry - Patient Advice  >> Jun 27, 2025  9:00 AM Dinorah wrote:  .1MEDICALADVICE     Patient is calling for Medical Advice regarding: Pt is calling in regards to needing to drop of a form for the doctor to fill out. It is a form for the pt to go out on a [employer] trip. Pt will drop off today and  next week. Please advise. Thank you    How long has patient had these symptoms:N/A    Pharmacy name and phone#:N/A    Patient wants a call back or thru [facility], provide patient's call back phone number:[phone] Patient    Comments:    Please advise patient replies from provider may take up to 48 hours.

## 2025-06-30 ENCOUNTER — LAB VISIT (OUTPATIENT)
Dept: LAB | Facility: HOSPITAL | Age: 75
End: 2025-06-30
Attending: INTERNAL MEDICINE
Payer: MEDICARE

## 2025-06-30 ENCOUNTER — OFFICE VISIT (OUTPATIENT)
Dept: NEUROLOGY | Facility: CLINIC | Age: 75
End: 2025-06-30
Payer: MEDICARE

## 2025-06-30 VITALS
SYSTOLIC BLOOD PRESSURE: 120 MMHG | WEIGHT: 118.38 LBS | HEART RATE: 66 BPM | BODY MASS INDEX: 18.54 KG/M2 | DIASTOLIC BLOOD PRESSURE: 78 MMHG

## 2025-06-30 DIAGNOSIS — R41.3 MEMORY LOSS: ICD-10-CM

## 2025-06-30 DIAGNOSIS — R41.3 MEMORY LOSS: Primary | ICD-10-CM

## 2025-06-30 PROCEDURE — 36415 COLL VENOUS BLD VENIPUNCTURE: CPT

## 2025-06-30 PROCEDURE — 4010F ACE/ARB THERAPY RXD/TAKEN: CPT | Mod: CPTII,S$GLB,, | Performed by: STUDENT IN AN ORGANIZED HEALTH CARE EDUCATION/TRAINING PROGRAM

## 2025-06-30 PROCEDURE — 99999 PR PBB SHADOW E&M-EST. PATIENT-LVL IV: CPT | Mod: PBBFAC,,, | Performed by: STUDENT IN AN ORGANIZED HEALTH CARE EDUCATION/TRAINING PROGRAM

## 2025-06-30 PROCEDURE — 1126F AMNT PAIN NOTED NONE PRSNT: CPT | Mod: CPTII,S$GLB,, | Performed by: STUDENT IN AN ORGANIZED HEALTH CARE EDUCATION/TRAINING PROGRAM

## 2025-06-30 PROCEDURE — 1159F MED LIST DOCD IN RCRD: CPT | Mod: CPTII,S$GLB,, | Performed by: STUDENT IN AN ORGANIZED HEALTH CARE EDUCATION/TRAINING PROGRAM

## 2025-06-30 PROCEDURE — 84393 TAU PHOSPHORYLATED EA: CPT

## 2025-06-30 PROCEDURE — 1157F ADVNC CARE PLAN IN RCRD: CPT | Mod: CPTII,S$GLB,, | Performed by: STUDENT IN AN ORGANIZED HEALTH CARE EDUCATION/TRAINING PROGRAM

## 2025-06-30 PROCEDURE — 83520 IMMUNOASSAY QUANT NOS NONAB: CPT

## 2025-06-30 PROCEDURE — 99204 OFFICE O/P NEW MOD 45 MIN: CPT | Mod: S$GLB,,, | Performed by: STUDENT IN AN ORGANIZED HEALTH CARE EDUCATION/TRAINING PROGRAM

## 2025-06-30 PROCEDURE — 1101F PT FALLS ASSESS-DOCD LE1/YR: CPT | Mod: CPTII,S$GLB,, | Performed by: STUDENT IN AN ORGANIZED HEALTH CARE EDUCATION/TRAINING PROGRAM

## 2025-06-30 PROCEDURE — 3288F FALL RISK ASSESSMENT DOCD: CPT | Mod: CPTII,S$GLB,, | Performed by: STUDENT IN AN ORGANIZED HEALTH CARE EDUCATION/TRAINING PROGRAM

## 2025-06-30 PROCEDURE — 3078F DIAST BP <80 MM HG: CPT | Mod: CPTII,S$GLB,, | Performed by: STUDENT IN AN ORGANIZED HEALTH CARE EDUCATION/TRAINING PROGRAM

## 2025-06-30 PROCEDURE — 82234 BETA-AMYLOID 1-42 (ABETA 42): CPT

## 2025-06-30 PROCEDURE — 3008F BODY MASS INDEX DOCD: CPT | Mod: CPTII,S$GLB,, | Performed by: STUDENT IN AN ORGANIZED HEALTH CARE EDUCATION/TRAINING PROGRAM

## 2025-06-30 PROCEDURE — 3074F SYST BP LT 130 MM HG: CPT | Mod: CPTII,S$GLB,, | Performed by: STUDENT IN AN ORGANIZED HEALTH CARE EDUCATION/TRAINING PROGRAM

## 2025-06-30 NOTE — PATIENT INSTRUCTIONS
Call [phone] to schedule with [facility].    If there are no appointments soon enough, below is a list of alternative Critical access hospital mental health centers:    [facility]  (aka [facility])  Serves [facility], and [facility] residents.  Serves uninsured patients & those with Medicaid.  Main location: [address] [place] [address]  [phone]  Walk-in's available during regular business hours.  24/7 Crisis Line: [phone]    [facility]  (aka [facility])  Serves [facility] residents.  Serves uninsured patients, those with Medicaid and some private plans.  Walk-in's available during regular business hours.  Primary care services available as well.  [facility]: [address] [place] [address];  [phone]  [place]: [address] [place] [address];  [phone]  24/7 Crisis Line: [phone]    [facility]  Serves uninsured patients & those with Medicaid, call for more info.  Primary care, pediatrics, HIV treatment, and dentistry services available as well.  Three locations.  [phone]    [facility]?Corporate Office  Serves patients with Medicaid, Medicare, and private insurance  [address]  [place],?[place] [id number] [phone]    [facility]  Serves uninsured on a sliding scale, as well as Medicaid, Medicare, and private plans.  Eight locations around the [facility] area.  [phone]    [facility]  Serves uninsured patients & those with Medicaid, private insurances.  Primary care available as well.  [phone]  [address] [place] [address]    [facility]  Serves veterans who were honorably discharged.  [address] [place] [address]  [phone]  24/7 Veterans Crisis  Line: [phone] (Press 1)    If you have private insurance and need to find a specialist, please contact your insurance network to request a list of providers covered by your benefits.

## 2025-06-30 NOTE — PROGRESS NOTES
Outpatient Rehab    Occupational Therapy Visit    Patient Name: Jlayn Aaron  MRN: [id number]  YOB: 1950  Encounter Date: 6/10/2025    Therapy Diagnosis:   Encounter Diagnoses   Name Primary?    Bilateral hand pain Yes    Chronic thumb pain, bilateral      Physician: Evelio Shah, *    Physician Orders: Eval and Treat  Medical Diagnosis: Primary osteoarthritis of both hands  Surgical Diagnosis: Not applicable for this Episode   Surgical Date: Not applicable for this Episode  Days Since Last Surgery: Not applicable for this Episode    Visit # / Visits Authorized: 3 / 12  Insurance Authorization Period: 6/4/2025 to 12/31/2025  Date of Evaluation: 6/2/2025  Plan of Care Certification: 6/2/2025 to 8/25/2025      Time In:     Time Out:    Total Time (in minutes):     Total Billable Time (in minutes):      FOTO:  Intake Score:  %  Survey Score 2:  %  Survey Score 3:  %    Precautions:       Subjective   Pt presents with continued pain and aching with wrist and hand. She reports consistency with HEP.         Objective            Treatment:  Therapeutic Exercise  TE 1: Web space stretch; C holds  TE 2: Index and thumb isometrics  TE 3: MP, PIP blocking; TGEs  Manual Therapy  MT 1: 1st web space massage  Modalities  Paraffin Bath: x10 minutes - For improved circulation and increased tissue flexibility prior to exercises    Time Entry(in minutes):       Assessment & Plan   Assessment: Pt reports improved pain with webspace stretch and massage. Plan to progress with gentle strengthening and ongoing education for maintenance.        The patient will continue to benefit from skilled outpatient occupational therapy in order to address the deficits listed in the problem list on the initial evaluation, provide patient and family education, and maximize the patients level of independence in the home and community environments.     The patient's spiritual, cultural, and educational needs were  considered, and the patient is agreeable to the plan of care and goals.           Plan:      Goals:   Active       LTGs       1) Patient will be independent in HEP        Start:  06/02/25    Expected End:  08/25/25            2) Decrease pain in L hand to no more than 3/10 worst in ADL/IADL's        Start:  06/02/25    Expected End:  08/25/25            3) Increase AROM of L long and ring fingers to at least 85 degrees at PIP joint for increased functioning in ADL/IADL's         Start:  06/02/25    Expected End:  08/25/25            4) Increase strength in L  by at least 10 psi for improved functioning in ADL/IADL's         Start:  06/02/25    Expected End:  08/25/25            5) Decrease edema in H hands to trace or none         Start:  06/02/25    Expected End:  08/25/25                Sujatha Velasco, OT

## 2025-07-01 LAB
IMMUNOLOGIST REVIEW: ABNORMAL
M PHOSPHO-TAU 217: 0.46 PG/ML

## 2025-07-03 ENCOUNTER — PATIENT MESSAGE (OUTPATIENT)
Dept: INTERNAL MEDICINE | Facility: CLINIC | Age: 75
End: 2025-07-03
Payer: MEDICARE

## 2025-07-03 ENCOUNTER — HOSPITAL ENCOUNTER (OUTPATIENT)
Dept: RADIOLOGY | Facility: HOSPITAL | Age: 75
Discharge: HOME | End: 2025-07-03
Attending: INTERNAL MEDICINE
Payer: MEDICARE

## 2025-07-03 VITALS — BODY MASS INDEX: 18.01 KG/M2 | WEIGHT: 115 LBS

## 2025-07-03 DIAGNOSIS — Z12.31 SCREENING MAMMOGRAM, ENCOUNTER FOR: ICD-10-CM

## 2025-07-03 LAB — LC PHOSPHO-TAU (181P): 1.86 PG/ML (ref 0–0.97)

## 2025-07-03 PROCEDURE — 77063 BREAST TOMOSYNTHESIS BI: CPT | Mod: TC

## 2025-07-03 NOTE — TELEPHONE ENCOUNTER
LOV with Annemarie Kilgore MD , 5/19/2025  Next 8/25/25  Pt requesting feedback on labs ordered by Dr. David Tavarez who is no longer with [facility].   Labs collected 6/30/25.

## 2025-07-07 ENCOUNTER — OFFICE VISIT (OUTPATIENT)
Dept: DERMATOLOGY | Facility: CLINIC | Age: 75
End: 2025-07-07
Payer: MEDICARE

## 2025-07-07 DIAGNOSIS — L98.8 EROSIVE PUSTULAR DERMATOSIS: ICD-10-CM

## 2025-07-07 PROCEDURE — G2211 COMPLEX E/M VISIT ADD ON: HCPCS | Mod: S$GLB,,, | Performed by: DERMATOLOGY

## 2025-07-07 PROCEDURE — 3288F FALL RISK ASSESSMENT DOCD: CPT | Mod: CPTII,S$GLB,, | Performed by: DERMATOLOGY

## 2025-07-07 PROCEDURE — 1101F PT FALLS ASSESS-DOCD LE1/YR: CPT | Mod: CPTII,S$GLB,, | Performed by: DERMATOLOGY

## 2025-07-07 PROCEDURE — 4010F ACE/ARB THERAPY RXD/TAKEN: CPT | Mod: CPTII,S$GLB,, | Performed by: DERMATOLOGY

## 2025-07-07 PROCEDURE — 1126F AMNT PAIN NOTED NONE PRSNT: CPT | Mod: CPTII,S$GLB,, | Performed by: DERMATOLOGY

## 2025-07-07 PROCEDURE — 99213 OFFICE O/P EST LOW 20 MIN: CPT | Mod: S$GLB,,, | Performed by: DERMATOLOGY

## 2025-07-07 PROCEDURE — 1160F RVW MEDS BY RX/DR IN RCRD: CPT | Mod: CPTII,S$GLB,, | Performed by: DERMATOLOGY

## 2025-07-07 PROCEDURE — 1159F MED LIST DOCD IN RCRD: CPT | Mod: CPTII,S$GLB,, | Performed by: DERMATOLOGY

## 2025-07-07 PROCEDURE — 99999 PR PBB SHADOW E&M-EST. PATIENT-LVL III: CPT | Mod: PBBFAC,,, | Performed by: DERMATOLOGY

## 2025-07-07 PROCEDURE — 1157F ADVNC CARE PLAN IN RCRD: CPT | Mod: CPTII,S$GLB,, | Performed by: DERMATOLOGY

## 2025-07-07 RX ORDER — CLOBETASOL PROPIONATE 0.5 MG/G
OINTMENT TOPICAL
Qty: 60 G | Refills: 0 | Status: SHIPPED | OUTPATIENT
Start: 2025-07-07

## 2025-07-07 NOTE — PROGRESS NOTES
Subjective:      Patient ID:  Jalyn Aaron is a 75 y.o. female who presents for   Chief Complaint   Patient presents with    Follow-up     History of Present Illness: The patient presents for follow up of EPD.     The patient was last seen on: 5/26/25 for EPD of scalp vertex which pt states has improved and tx w/ gentle washing and clob oint qday (needs refill).     Chronic tenderness of AA    Patient with new area of concern:   Location: left middle finger +tender and raised   Previous treatments: none      Review of Systems   Skin:  Positive for daily sunscreen use, activity-related sunscreen use and wears hat (always). Negative for itching (sensitive), rash and recent sunburn.   Hematologic/Lymphatic: Bruises/bleeds easily (bruise).       Objective:   Physical Exam   Constitutional: She appears well-developed and well-nourished. No distress.   Neurological: She is alert and oriented to person, place, and time. She is not disoriented.   Psychiatric: She has a normal mood and affect.   Skin:   Areas Examined (abnormalities noted in diagram):   Scalp / Hair Palpated and Inspected  Nails and Digits Inspection Performed                Diagram Legend     Erythematous scaling macule/papule c/w actinic keratosis       Vascular papule c/w angioma      Pigmented verrucoid papule/plaque c/w seborrheic keratosis      Yellow umbilicated papule c/w sebaceous hyperplasia      Irregularly shaped tan macule c/w lentigo     1-2 mm smooth white papules consistent with Milia      Movable subcutaneous cyst with punctum c/w epidermal inclusion cyst      Subcutaneous movable cyst c/w pilar cyst      Firm pink to brown papule c/w dermatofibroma      Pedunculated fleshy papule(s) c/w skin tag(s)      Evenly pigmented macule c/w junctional nevus     Mildly variegated pigmented, slightly irregular-bordered macule c/w mildly atypical nevus      Flesh colored to evenly pigmented papule c/w intradermal nevus       Pink pearly  papule/plaque c/w basal cell carcinoma      Erythematous hyperkeratotic cursted plaque c/w SCC      Surgical scar with no sign of skin cancer recurrence      Open and closed comedones      Inflammatory papules and pustules      Verrucoid papule consistent consistent with wart     Erythematous eczematous patches and plaques     Dystrophic onycholytic nail with subungual debris c/w onychomycosis     Umbilicated papule    Erythematous-base heme-crusted tan verrucoid plaque consistent with inflamed seborrheic keratosis     Erythematous Silvery Scaling Plaque c/w Psoriasis     See annotation      Assessment / Plan:        Erosive pustular dermatosis - currently clear  Will work to taper clob ointment -- qday x 2 - 34 weeks then qoday x 2 - 4 weeks then biw   Ok to increase to bid prn flare  -     clobetasol 0.05% (TEMOVATE) 0.05 % Oint; AAA scalp qday, can increase to bid prn flare  Dispense: 60 g; Refill: 0           F/u skin exam   No follow-ups on file.

## 2025-07-08 ENCOUNTER — TELEPHONE (OUTPATIENT)
Dept: NEUROLOGY | Facility: CLINIC | Age: 75
End: 2025-07-08
Payer: MEDICARE

## 2025-07-09 RX ORDER — MEMANTINE HYDROCHLORIDE 5 MG/1
5 TABLET ORAL 2 TIMES DAILY
Qty: 60 TABLET | Refills: 2 | OUTPATIENT
Start: 2025-07-09 | End: 2026-07-09

## 2025-07-14 ENCOUNTER — TELEPHONE (OUTPATIENT)
Dept: INTERNAL MEDICINE | Facility: CLINIC | Age: 75
End: 2025-07-14
Payer: MEDICARE

## 2025-07-14 NOTE — PROGRESS NOTES
Outpatient Rehab    Occupational Therapy Visit    Patient Name: Jalyn Aaron  MRN: [id number]  YOB: 1950  Encounter Date: 6/17/2025    Therapy Diagnosis:   Encounter Diagnoses   Name Primary?    Chronic thumb pain, bilateral Yes    Bilateral hand pain      Physician: Evelio Shah, *    Physician Orders: Eval and Treat  Medical Diagnosis: Primary osteoarthritis of both hands  Surgical Diagnosis: Not applicable for this Episode   Surgical Date: Not applicable for this Episode  Days Since Last Surgery: Not applicable for this Episode    Visit # / Visits Authorized: 3 / 12  Insurance Authorization Period: 6/4/2025 to 12/31/2025  Date of Evaluation: 6/2/2025  Plan of Care Certification: 6/2/2025 to 8/25/2025      Time In: 0900   Time Out: 0945  Total Time (in minutes): 45   Total Billable Time (in minutes): 45    FOTO:  Intake Score:  %  Survey Score 2:  %  Survey Score 3:  %    Precautions:       Subjective             Objective            Treatment:  Therapeutic Exercise  TE 1: Web space stretch; C holds  TE 2: Index and thumb isometrics  TE 3: MP, PIP blocking; TGEs  TE 4: in hand manipulation with pom poms; isospheres  TE 5: digi flex red  Manual Therapy  MT 1: 1st web space massage  MT 2: rotation CMC mobs  Modalities  Paraffin Bath: x10 minutes - For improved circulation and increased tissue flexibility prior to exercises    Time Entry(in minutes):  Paraffin Bath Time Entry: 10  Manual Therapy Time Entry: 15  Therapeutic Exercise Time Entry: 20    Assessment & Plan   Assessment: Pt reports progress overall. She notes she is able to effectively relax her hand with techniques for initial eval. She is planning to travel over the next week. Plan to reassess and discuss discharge plan when she returns.        The patient will continue to benefit from skilled outpatient occupational therapy in order to address the deficits listed in the problem list on the initial evaluation, provide  patient and family education, and maximize the patients level of independence in the home and community environments.     The patient's spiritual, cultural, and educational needs were considered, and the patient is agreeable to the plan of care and goals.           Plan: Progress with POC as tolerated.    Goals:   Active       LTGs       1) Patient will be independent in HEP        Start:  06/02/25    Expected End:  08/25/25            2) Decrease pain in L hand to no more than 3/10 worst in ADL/IADL's        Start:  06/02/25    Expected End:  08/25/25            3) Increase AROM of L long and ring fingers to at least 85 degrees at PIP joint for increased functioning in ADL/IADL's         Start:  06/02/25    Expected End:  08/25/25            4) Increase strength in L  by at least 10 psi for improved functioning in ADL/IADL's         Start:  06/02/25    Expected End:  08/25/25            5) Decrease edema in H hands to trace or none         Start:  06/02/25    Expected End:  08/25/25                Sujatha Velasco OT

## 2025-07-16 LAB
OHS CV AF BURDEN PERCENT: < 1
OHS CV DC REMOTE DEVICE TYPE: NORMAL
OHS CV RV PACING PERCENT: 99.89 %

## 2025-07-22 DIAGNOSIS — G62.9 NEUROPATHY: ICD-10-CM

## 2025-07-22 NOTE — TELEPHONE ENCOUNTER
No care due was identified.  [facility] Embedded Care Due Messages. Reference number: [id number].   7/22/2025 10:20:03 AM CDT

## 2025-07-25 RX ORDER — GABAPENTIN 600 MG/1
TABLET ORAL
Qty: 135 TABLET | Refills: 11 | Status: SHIPPED | OUTPATIENT
Start: 2025-07-25

## 2025-07-31 ENCOUNTER — HOSPITAL ENCOUNTER (OUTPATIENT)
Dept: CARDIOLOGY | Facility: HOSPITAL | Age: 75
Discharge: HOME | End: 2025-07-31
Attending: INTERNAL MEDICINE
Payer: MEDICARE

## 2025-07-31 VITALS
SYSTOLIC BLOOD PRESSURE: 120 MMHG | WEIGHT: 114.63 LBS | HEIGHT: 67 IN | HEART RATE: 66 BPM | BODY MASS INDEX: 17.99 KG/M2 | DIASTOLIC BLOOD PRESSURE: 78 MMHG

## 2025-07-31 PROCEDURE — 93306 TTE W/DOPPLER COMPLETE: CPT | Mod: 26,,, | Performed by: STUDENT IN AN ORGANIZED HEALTH CARE EDUCATION/TRAINING PROGRAM

## 2025-07-31 PROCEDURE — 93306 TTE W/DOPPLER COMPLETE: CPT

## 2025-08-01 NOTE — PROGRESS NOTES
Subjective   Patient ID:  Jalyn Aaron is a 75 y.o. female who presents for follow-up of Pacemaker Check      HPI  76 yo female with AV block, NICM, PPM, anxiety.     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  Significant anxiety -- much improved since she has been on cymbalta  Echo 3/28/16 normal biventricular structure and function.  Echo 6/1/20 EF 45%.  We added Toprol. Felt poorly on the Toprol. Noted heaviness, possibly shortness of breath, thinks anxiety may have been related. This was discontinued.     Echo 10/8/20 EF 45%  Echo 11/21 EF 45%.     12/20/2022: Venogram: significant narrowing at the left brachiocephalic region, but still with forward flow.  10/26/2023:    Successful generator change.    Complex procedure, with unsuccessful placement of coronary sinus or left bundle branch area pacing lead     Echo 11/20/23       Left Ventricle: The left ventricle is normal in size. Ventricular mass is normal. Normal wall thickness. Septal motion is consistent with pacing. There is low normal systolic function with a visually estimated ejection fraction of 50%. Biplane (2D) method of discs ejection fraction is 49%.    Left Ventricle: Grade I diastolic dysfunction.    Right Ventricle: Normal right ventricular cavity size. Systolic function is normal. Pacemaker lead present in the ventricle.    Left Atrium: Left atrium is mildly dilated.    Right Atrium: Right atrium is mildly dilated.    Pulmonary Artery: The estimated pulmonary artery systolic pressure is 21 mmHg.    IVC/SVC: Normal venous pressure at 3 mmHg.     Update:    Remains active, going to the gym more (senior cardio classes). Has been travelling.    Echo 5/19/25    Left Ventricle: The left ventricle is normal in size. Ventricular mass is normal. Normal wall thickness. Normal wall motion. There is low normal systolic function with a visually estimated ejection fraction of 50 -  55%. Quantitated ejection fraction is 54%. Grade I diastolic dysfunction.    Right Ventricle: The right ventricle is normal in size measuring 3.0 cmWall thickness is normal. . Systolic function is normal. Pacemaker lead present in the ventricle.    Tricuspid Valve: There is moderate regurgitation.    Pulmonary Artery: The estimated pulmonary artery systolic pressure is 29 mmHg.    IVC/SVC: Intermediate venous pressure at 8 mmHg.    Device interrogation reveals stable function of the leads, RA pacing 2% RV pacing 83.4% no significant arrhythmias.    Review of Systems   Constitutional: Negative. Negative for fever and malaise/fatigue.   HENT:  Negative for congestion and sore throat.    Cardiovascular:  Negative for chest pain, dyspnea on exertion, irregular heartbeat, leg swelling, near-syncope, orthopnea, palpitations, paroxysmal nocturnal dyspnea and syncope.   Respiratory:  Negative for cough and shortness of breath.    Gastrointestinal:  Negative for abdominal pain, constipation and diarrhea.   Neurological:  Negative for dizziness, light-headedness and weakness.   Psychiatric/Behavioral:  Negative for depression. The patient is not nervous/anxious.    All other systems reviewed and are negative.         Objective     Physical Exam  Constitutional:       Appearance: She is well-developed.   Eyes:      General: No scleral icterus.     Conjunctiva/sclera: Conjunctivae normal.   Neck:      Vascular: No JVD.      Trachea: No tracheal deviation.   Cardiovascular:      Rate and Rhythm: Normal rate and regular rhythm.      Chest Wall: PMI is not displaced.   Pulmonary:      Effort: Pulmonary effort is normal. No respiratory distress.      Breath sounds: Normal breath sounds.   Abdominal:      Palpations: Abdomen is soft.      Tenderness: There is no abdominal tenderness.   Musculoskeletal:         General: No tenderness.   Skin:     General: Skin is warm and dry.      Findings: No rash.   Neurological:      Mental  Status: She is alert and oriented to person, place, and time.   Psychiatric:         Behavior: Behavior normal.            Assessment and Plan     1. Atrioventricular block, complete    2. Ejection fraction < 50%    3. Generalized anxiety disorder    4. SARAI (obstructive sleep apnea)        Plan:    Doing well overall.  Continue current settings and medications.  F/u with monitoring as scheduled, with me in one year.

## 2025-08-04 ENCOUNTER — OFFICE VISIT (OUTPATIENT)
Dept: ELECTROPHYSIOLOGY | Facility: CLINIC | Age: 75
End: 2025-08-04
Payer: MEDICARE

## 2025-08-04 ENCOUNTER — CLINICAL SUPPORT (OUTPATIENT)
Dept: CARDIOLOGY | Facility: HOSPITAL | Age: 75
End: 2025-08-04
Attending: INTERNAL MEDICINE
Payer: MEDICARE

## 2025-08-04 VITALS
HEART RATE: 71 BPM | HEIGHT: 66 IN | WEIGHT: 118.63 LBS | BODY MASS INDEX: 19.07 KG/M2 | DIASTOLIC BLOOD PRESSURE: 74 MMHG | SYSTOLIC BLOOD PRESSURE: 118 MMHG

## 2025-08-04 DIAGNOSIS — G47.33 OSA (OBSTRUCTIVE SLEEP APNEA): ICD-10-CM

## 2025-08-04 DIAGNOSIS — Z95.0 CARDIAC PACEMAKER IN SITU: ICD-10-CM

## 2025-08-04 DIAGNOSIS — R94.30 ABNORMAL RESULT OF CARDIOVASCULAR FUNCTION STUDY, UNSPECIFIED: ICD-10-CM

## 2025-08-04 DIAGNOSIS — I44.2 ATRIOVENTRICULAR BLOCK, COMPLETE: Primary | ICD-10-CM

## 2025-08-04 DIAGNOSIS — F41.1 GENERALIZED ANXIETY DISORDER: ICD-10-CM

## 2025-08-04 DIAGNOSIS — I44.30 AV BLOCK: ICD-10-CM

## 2025-08-04 PROCEDURE — 1159F MED LIST DOCD IN RCRD: CPT | Mod: CPTII,S$GLB,, | Performed by: INTERNAL MEDICINE

## 2025-08-04 PROCEDURE — 99214 OFFICE O/P EST MOD 30 MIN: CPT | Mod: S$GLB,,, | Performed by: INTERNAL MEDICINE

## 2025-08-04 PROCEDURE — 3074F SYST BP LT 130 MM HG: CPT | Mod: CPTII,S$GLB,, | Performed by: INTERNAL MEDICINE

## 2025-08-04 PROCEDURE — 3008F BODY MASS INDEX DOCD: CPT | Mod: CPTII,S$GLB,, | Performed by: INTERNAL MEDICINE

## 2025-08-04 PROCEDURE — 1101F PT FALLS ASSESS-DOCD LE1/YR: CPT | Mod: CPTII,S$GLB,, | Performed by: INTERNAL MEDICINE

## 2025-08-04 PROCEDURE — 93280 PM DEVICE PROGR EVAL DUAL: CPT

## 2025-08-04 PROCEDURE — 3288F FALL RISK ASSESSMENT DOCD: CPT | Mod: CPTII,S$GLB,, | Performed by: INTERNAL MEDICINE

## 2025-08-04 PROCEDURE — 93280 PM DEVICE PROGR EVAL DUAL: CPT | Mod: 26,,, | Performed by: INTERNAL MEDICINE

## 2025-08-04 PROCEDURE — 4010F ACE/ARB THERAPY RXD/TAKEN: CPT | Mod: CPTII,S$GLB,, | Performed by: INTERNAL MEDICINE

## 2025-08-04 PROCEDURE — 1157F ADVNC CARE PLAN IN RCRD: CPT | Mod: CPTII,S$GLB,, | Performed by: INTERNAL MEDICINE

## 2025-08-04 PROCEDURE — 3078F DIAST BP <80 MM HG: CPT | Mod: CPTII,S$GLB,, | Performed by: INTERNAL MEDICINE

## 2025-08-04 PROCEDURE — 1126F AMNT PAIN NOTED NONE PRSNT: CPT | Mod: CPTII,S$GLB,, | Performed by: INTERNAL MEDICINE

## 2025-08-04 PROCEDURE — 99999 PR PBB SHADOW E&M-EST. PATIENT-LVL III: CPT | Mod: PBBFAC,,, | Performed by: INTERNAL MEDICINE

## 2025-08-06 LAB
OHS CV AF BURDEN PERCENT: < 1
OHS CV DC REMOTE DEVICE TYPE: NORMAL
OHS CV RV PACING PERCENT: 83.4 %

## 2025-08-18 ENCOUNTER — OFFICE VISIT (OUTPATIENT)
Dept: DERMATOLOGY | Facility: CLINIC | Age: 75
End: 2025-08-18
Payer: MEDICARE

## 2025-08-18 DIAGNOSIS — C44.91 SUPERFICIAL BASAL CELL CARCINOMA: ICD-10-CM

## 2025-08-18 DIAGNOSIS — L81.4 LENTIGO: ICD-10-CM

## 2025-08-18 DIAGNOSIS — L72.0 MILIA: ICD-10-CM

## 2025-08-18 DIAGNOSIS — L72.11 PILAR CYST: ICD-10-CM

## 2025-08-18 DIAGNOSIS — L57.0 AK (ACTINIC KERATOSIS): Primary | ICD-10-CM

## 2025-08-18 DIAGNOSIS — D18.01 ANGIOMA OF SKIN: ICD-10-CM

## 2025-08-18 DIAGNOSIS — L70.0 OPEN COMEDONE: ICD-10-CM

## 2025-08-18 DIAGNOSIS — L98.8 EROSIVE PUSTULAR DERMATOSIS: ICD-10-CM

## 2025-08-18 DIAGNOSIS — L82.1 SK (SEBORRHEIC KERATOSIS): ICD-10-CM

## 2025-08-18 DIAGNOSIS — Z12.83 SCREENING EXAM FOR SKIN CANCER: ICD-10-CM

## 2025-08-18 PROCEDURE — 4010F ACE/ARB THERAPY RXD/TAKEN: CPT | Mod: CPTII,S$GLB,, | Performed by: DERMATOLOGY

## 2025-08-18 PROCEDURE — 99214 OFFICE O/P EST MOD 30 MIN: CPT | Mod: 25,S$GLB,, | Performed by: DERMATOLOGY

## 2025-08-18 PROCEDURE — 1159F MED LIST DOCD IN RCRD: CPT | Mod: CPTII,S$GLB,, | Performed by: DERMATOLOGY

## 2025-08-18 PROCEDURE — 99999 PR PBB SHADOW E&M-EST. PATIENT-LVL III: CPT | Mod: PBBFAC,,, | Performed by: DERMATOLOGY

## 2025-08-18 PROCEDURE — 1101F PT FALLS ASSESS-DOCD LE1/YR: CPT | Mod: CPTII,S$GLB,, | Performed by: DERMATOLOGY

## 2025-08-18 PROCEDURE — 1157F ADVNC CARE PLAN IN RCRD: CPT | Mod: CPTII,S$GLB,, | Performed by: DERMATOLOGY

## 2025-08-18 PROCEDURE — 17000 DESTRUCT PREMALG LESION: CPT | Mod: S$GLB,,, | Performed by: DERMATOLOGY

## 2025-08-18 PROCEDURE — 1160F RVW MEDS BY RX/DR IN RCRD: CPT | Mod: CPTII,S$GLB,, | Performed by: DERMATOLOGY

## 2025-08-18 PROCEDURE — 3288F FALL RISK ASSESSMENT DOCD: CPT | Mod: CPTII,S$GLB,, | Performed by: DERMATOLOGY

## 2025-08-18 PROCEDURE — 1126F AMNT PAIN NOTED NONE PRSNT: CPT | Mod: CPTII,S$GLB,, | Performed by: DERMATOLOGY

## 2025-08-18 RX ORDER — IMIQUIMOD 12.5 MG/.25G
CREAM TOPICAL
Qty: 12 PACKET | Refills: 1 | Status: SHIPPED | OUTPATIENT
Start: 2025-08-18

## 2025-08-18 RX ORDER — CARVEDILOL 3.12 MG/1
3.12 TABLET ORAL 2 TIMES DAILY
Qty: 180 TABLET | Refills: 2 | Status: SHIPPED | OUTPATIENT
Start: 2025-08-18

## 2025-08-25 ENCOUNTER — OFFICE VISIT (OUTPATIENT)
Dept: INTERNAL MEDICINE | Facility: CLINIC | Age: 75
End: 2025-08-25
Payer: MEDICARE

## 2025-08-25 VITALS
HEIGHT: 66 IN | OXYGEN SATURATION: 96 % | WEIGHT: 119.06 LBS | BODY MASS INDEX: 19.13 KG/M2 | SYSTOLIC BLOOD PRESSURE: 104 MMHG | DIASTOLIC BLOOD PRESSURE: 72 MMHG | HEART RATE: 67 BPM | TEMPERATURE: 97 F

## 2025-08-25 DIAGNOSIS — M19.049 ARTHRITIS OF HAND, UNSPECIFIED LATERALITY: Primary | ICD-10-CM

## 2025-08-25 DIAGNOSIS — F41.1 GENERALIZED ANXIETY DISORDER: ICD-10-CM

## 2025-08-25 DIAGNOSIS — E53.8 B12 DEFICIENCY: ICD-10-CM

## 2025-08-25 DIAGNOSIS — R73.9 HYPERGLYCEMIA: ICD-10-CM

## 2025-08-25 DIAGNOSIS — I11.0 HYPERTENSIVE HEART DISEASE WITH HEART FAILURE: ICD-10-CM

## 2025-08-25 DIAGNOSIS — G62.9 NEUROPATHY: ICD-10-CM

## 2025-08-25 DIAGNOSIS — E78.5 HYPERLIPIDEMIA, UNSPECIFIED HYPERLIPIDEMIA TYPE: ICD-10-CM

## 2025-08-25 PROCEDURE — 99214 OFFICE O/P EST MOD 30 MIN: CPT | Mod: S$GLB,,, | Performed by: INTERNAL MEDICINE

## 2025-08-25 PROCEDURE — 3078F DIAST BP <80 MM HG: CPT | Mod: CPTII,S$GLB,, | Performed by: INTERNAL MEDICINE

## 2025-08-25 PROCEDURE — 1159F MED LIST DOCD IN RCRD: CPT | Mod: CPTII,S$GLB,, | Performed by: INTERNAL MEDICINE

## 2025-08-25 PROCEDURE — 1126F AMNT PAIN NOTED NONE PRSNT: CPT | Mod: CPTII,S$GLB,, | Performed by: INTERNAL MEDICINE

## 2025-08-25 PROCEDURE — 99999 PR PBB SHADOW E&M-EST. PATIENT-LVL V: CPT | Mod: PBBFAC,,, | Performed by: INTERNAL MEDICINE

## 2025-08-25 PROCEDURE — 1157F ADVNC CARE PLAN IN RCRD: CPT | Mod: CPTII,S$GLB,, | Performed by: INTERNAL MEDICINE

## 2025-08-25 PROCEDURE — 4010F ACE/ARB THERAPY RXD/TAKEN: CPT | Mod: CPTII,S$GLB,, | Performed by: INTERNAL MEDICINE

## 2025-08-25 PROCEDURE — 3074F SYST BP LT 130 MM HG: CPT | Mod: CPTII,S$GLB,, | Performed by: INTERNAL MEDICINE

## 2025-08-25 PROCEDURE — 3008F BODY MASS INDEX DOCD: CPT | Mod: CPTII,S$GLB,, | Performed by: INTERNAL MEDICINE

## 2025-08-26 ENCOUNTER — CLINICAL SUPPORT (OUTPATIENT)
Dept: REHABILITATION | Facility: HOSPITAL | Age: 75
End: 2025-08-26
Payer: MEDICARE

## 2025-08-26 DIAGNOSIS — M79.642 PAIN IN BOTH HANDS: Primary | ICD-10-CM

## 2025-08-26 DIAGNOSIS — M25.60 STIFFNESS IN JOINT: ICD-10-CM

## 2025-08-26 DIAGNOSIS — R53.1 WEAKNESS: ICD-10-CM

## 2025-08-26 DIAGNOSIS — M79.641 PAIN IN BOTH HANDS: Primary | ICD-10-CM

## 2025-08-26 PROBLEM — M79.645 CHRONIC THUMB PAIN, BILATERAL: Status: RESOLVED | Noted: 2025-06-02 | Resolved: 2025-08-26

## 2025-08-26 PROBLEM — M79.644 CHRONIC THUMB PAIN, BILATERAL: Status: RESOLVED | Noted: 2025-06-02 | Resolved: 2025-08-26

## 2025-08-26 PROBLEM — G89.29 CHRONIC THUMB PAIN, BILATERAL: Status: RESOLVED | Noted: 2025-06-02 | Resolved: 2025-08-26

## 2025-08-26 PROCEDURE — 97018 PARAFFIN BATH THERAPY: CPT | Mod: PN

## 2025-08-26 PROCEDURE — 97165 OT EVAL LOW COMPLEX 30 MIN: CPT | Mod: PN

## 2025-09-03 ENCOUNTER — OFFICE VISIT (OUTPATIENT)
Dept: PSYCHIATRY | Facility: CLINIC | Age: 75
End: 2025-09-03
Payer: MEDICARE

## 2025-09-03 VITALS
BODY MASS INDEX: 19.27 KG/M2 | DIASTOLIC BLOOD PRESSURE: 77 MMHG | HEART RATE: 66 BPM | WEIGHT: 119.38 LBS | SYSTOLIC BLOOD PRESSURE: 120 MMHG

## 2025-09-03 DIAGNOSIS — F32.A DEPRESSION, UNSPECIFIED DEPRESSION TYPE: ICD-10-CM

## 2025-09-03 DIAGNOSIS — F41.1 GAD (GENERALIZED ANXIETY DISORDER): Primary | ICD-10-CM

## 2025-09-03 DIAGNOSIS — G47.00 INSOMNIA, UNSPECIFIED TYPE: ICD-10-CM

## 2025-09-03 PROCEDURE — 99999 PR PBB SHADOW E&M-EST. PATIENT-LVL III: CPT | Mod: PBBFAC,,, | Performed by: NURSE PRACTITIONER

## 2025-09-03 RX ORDER — BUSPIRONE HYDROCHLORIDE 5 MG/1
5 TABLET ORAL 2 TIMES DAILY
Qty: 60 TABLET | Refills: 1 | Status: SHIPPED | OUTPATIENT
Start: 2025-09-03 | End: 2025-10-03

## (undated) DEVICE — WIRE WHISPER MS 014 X 190

## (undated) DEVICE — GUIDEWIRE ATTAIN HYBRID 108 CM

## (undated) DEVICE — SHEATH SAFE ULTRA 9FR

## (undated) DEVICE — CATH ATTAIN COMMAND ACCESSORY

## (undated) DEVICE — GUIDEWIRE EMERALD 150CM PTFE

## (undated) DEVICE — ELECTRODE REM PLYHSV RETURN 9

## (undated) DEVICE — SLING SWATHE UNIVERSAL FOAM

## (undated) DEVICE — BLADE PLASMA WIDE SPATULA TIP

## (undated) DEVICE — CATH ATTAIN COMMAND

## (undated) DEVICE — KIT WRENCH

## (undated) DEVICE — PACK PACER PERMANENT OMC

## (undated) DEVICE — ADHESIVE DERMABOND ADVANCED

## (undated) DEVICE — CATH ANGIO RIGHTSITE HIS 7X43

## (undated) DEVICE — LEAD 479888 ATTAIN MRI US
Type: IMPLANTABLE DEVICE | Site: HEART | Status: NON-FUNCTIONAL
Brand: ATTAIN STABILITY™ QUAD MRI SURESCAN™
Removed: 2023-10-26

## (undated) DEVICE — WIRE ATTAIN HYBRID 4194-88CM

## (undated) DEVICE — CATH RESPONSE QPLR JSN 6F 120

## (undated) DEVICE — CATH BLLN COR SINUS VENOGRAPHY

## (undated) DEVICE — PAD DEFIB CADENCE ADULT R2

## (undated) DEVICE — CATH ATTAIN SELECT II 130

## (undated) DEVICE — DRAPE INCISE IOBAN 2 23X17IN

## (undated) DEVICE — CABLE PACING ALLGTR CLIP 12

## (undated) DEVICE — R CATH RESPONS QPLR JSN 6F 120

## (undated) DEVICE — WIRE GUIDE WHOLEY MOD J .035

## (undated) DEVICE — LEAD 429888 MRI CANT US
Type: IMPLANTABLE DEVICE | Site: HEART | Status: NON-FUNCTIONAL
Brand: ATTAIN PERFORMA™ MRI SURESCAN™
Removed: 2023-10-26

## (undated) DEVICE — LEAD 439888 MRI STRAIGHT US
Type: IMPLANTABLE DEVICE | Site: HEART | Status: NON-FUNCTIONAL
Brand: ATTAIN PERFORMA™ STRAIGHT MRI SURESCAN™
Removed: 2023-10-26

## (undated) DEVICE — VISIPAQUE CONTRAST 320MG/100ML

## (undated) DEVICE — LEAD 3830 US MKT/ 69CM MRI LBBAP
Type: IMPLANTABLE DEVICE | Site: HEART | Status: NON-FUNCTIONAL
Brand: SELECTSECURE™ MRI SURESCAN™
Removed: 2023-10-26

## (undated) DEVICE — SLITTER UNIVERSAL